# Patient Record
Sex: FEMALE | Race: WHITE | Employment: OTHER | URBAN - METROPOLITAN AREA
[De-identification: names, ages, dates, MRNs, and addresses within clinical notes are randomized per-mention and may not be internally consistent; named-entity substitution may affect disease eponyms.]

---

## 2017-01-02 ENCOUNTER — APPOINTMENT (OUTPATIENT)
Dept: PHYSICAL THERAPY | Facility: CLINIC | Age: 77
DRG: 190 | End: 2017-01-02
Attending: INTERNAL MEDICINE
Payer: COMMERCIAL

## 2017-01-02 ENCOUNTER — CARE COORDINATION (OUTPATIENT)
Dept: GERIATRIC MEDICINE | Facility: CLINIC | Age: 77
End: 2017-01-02

## 2017-01-02 DIAGNOSIS — Z76.89 HEALTH CARE HOME: Primary | ICD-10-CM

## 2017-01-02 NOTE — PROGRESS NOTES
Rec'd notice of ED/observation status, 12/31/16.   EPIC notes reviewed.  Left vm with Candis BARTHOLOMEW [facility] to introduce myself and share community support plan  E-mail to Windy BARTHOLOMEW [facility] to inform.   left with [employer].  CM to follow.  Yeni Savage RN, BC  Supervisor [facility]   [phone] (Fax)

## 2017-01-02 NOTE — PROGRESS NOTES
"Rec'd tele call from Windy BARTHOLOMEW [facility], reviewed client's admission. Windy state that she did complete home visits on 12/29 & 12/30/2016.    Rec'd tele call from client stating that she is in the hospital. Client shared that her hmkr contacted her that her  passed away, \"I really need someone to help me, I have dirty clothes to be washed, dishes to be cleaned.\"  Explained that CM will contact [facility] to inquire on a substitute, but cannot guarantee.  Client states that she is hoping that she can go to transitional care, \"will insurance pay for this?\"  Explained that TCU will be covered if she meets the criteria, that she will be assessed by the TCU facility -client states that she is using 02 off and on. Explained that CM will cont to follow and assist as needed.  Left vm with [facility] re services, request a call back  Left f/u vm with Windy, billing at [facility], requesting a call back re [facility] billing for services.   Rec'd tele call form client's daughter who inquired if CM was aware that her mother is in the hospital. Explained that CM did and that her mother also contacted me.  Shared the above info. Windy inquired if her mother's COPD has worsened since a year ago. Enc'd Windy to be in contact with Dr. Montana's office to inquire on Pulmonary Function results. Windy states that she has spoken with hospital nurses, enc'd use of a small dose of Lorazepam, unsure of efficacy. Explained that CM will cont to follow her mother, enc'd her to call this CM as needed.   Yeni Savage RN, BC  Supervisor [facility]   [phone] (Fax)    "

## 2017-01-03 ENCOUNTER — APPOINTMENT (OUTPATIENT)
Dept: SPEECH THERAPY | Facility: CLINIC | Age: 77
DRG: 190 | End: 2017-01-03
Attending: HOSPITALIST
Payer: COMMERCIAL

## 2017-01-03 ENCOUNTER — APPOINTMENT (OUTPATIENT)
Dept: CT IMAGING | Facility: CLINIC | Age: 77
DRG: 190 | End: 2017-01-03
Attending: HOSPITALIST
Payer: COMMERCIAL

## 2017-01-03 ENCOUNTER — APPOINTMENT (OUTPATIENT)
Dept: OCCUPATIONAL THERAPY | Facility: CLINIC | Age: 77
DRG: 190 | End: 2017-01-03
Attending: INTERNAL MEDICINE
Payer: COMMERCIAL

## 2017-01-03 ENCOUNTER — APPOINTMENT (OUTPATIENT)
Dept: GENERAL RADIOLOGY | Facility: CLINIC | Age: 77
DRG: 190 | End: 2017-01-03
Attending: HOSPITALIST
Payer: COMMERCIAL

## 2017-01-03 ENCOUNTER — TELEPHONE (OUTPATIENT)
Dept: INTERNAL MEDICINE | Facility: CLINIC | Age: 77
End: 2017-01-03

## 2017-01-03 ENCOUNTER — APPOINTMENT (OUTPATIENT)
Dept: PHYSICAL THERAPY | Facility: CLINIC | Age: 77
DRG: 190 | End: 2017-01-03
Attending: INTERNAL MEDICINE
Payer: COMMERCIAL

## 2017-01-03 ENCOUNTER — CARE COORDINATION (OUTPATIENT)
Dept: GERIATRIC MEDICINE | Facility: CLINIC | Age: 77
End: 2017-01-03

## 2017-01-03 PROCEDURE — 71260 CT THORAX DX C+: CPT

## 2017-01-03 PROCEDURE — 71020 XR CHEST 2 VW: CPT

## 2017-01-03 PROCEDURE — 70450 CT HEAD/BRAIN W/O DYE: CPT

## 2017-01-03 NOTE — PROGRESS NOTES
"Rec'd tele call from client inquiring on what the plan is for her hmkr. Explained the info below, client stated that she thought that she was told that her homemakers' spouse passed away and she would not be able to receive assistance for sometime.   Client states that she might have to pay someone to do her dishes, \"the apt management will get upset because the apt is such a mess.\"  Had discussion with client about her feelings of anxiety/worry. Client acknowledged.  Client states that she has requested to speak with a SW.   Offered assistance with a behavioral health referral, client agreeable. Explained that CM will cont to follow, unsure of d/c plans at this time.   Yeni Savage RN, BC  Supervisor [facility]   [phone] (Fax)    "

## 2017-01-03 NOTE — PROGRESS NOTES
EPIC notes reviewed, inpatient status.   Rec'd tele call from Sabi Marin, stating client transferred to the 5th floor, aware that CM is following client.   Provided info from 1/2/17-[facility] status, daughter Windy's concerns, client requesting TCU.   CM to follow.  Yeni Savage RN, BC  Supervisor [facility]   [phone] (Fax)

## 2017-01-03 NOTE — PROGRESS NOTES
Rec'd tele call from [facility], stating that client's caregiver will be available next week (spouse did not pass away-he is currently in the hospital). CM to follow  Yeni Savage RN, BC  Supervisor [facility]   [phone] (Fax)

## 2017-01-04 ENCOUNTER — CARE COORDINATION (OUTPATIENT)
Dept: GERIATRIC MEDICINE | Facility: CLINIC | Age: 77
End: 2017-01-04

## 2017-01-04 ENCOUNTER — APPOINTMENT (OUTPATIENT)
Dept: PHYSICAL THERAPY | Facility: CLINIC | Age: 77
DRG: 190 | End: 2017-01-04
Payer: COMMERCIAL

## 2017-01-04 ENCOUNTER — APPOINTMENT (OUTPATIENT)
Dept: OCCUPATIONAL THERAPY | Facility: CLINIC | Age: 77
DRG: 190 | End: 2017-01-04
Payer: COMMERCIAL

## 2017-01-05 ENCOUNTER — APPOINTMENT (OUTPATIENT)
Dept: PHYSICAL THERAPY | Facility: CLINIC | Age: 77
DRG: 190 | End: 2017-01-05
Payer: COMMERCIAL

## 2017-01-05 ENCOUNTER — APPOINTMENT (OUTPATIENT)
Dept: SPEECH THERAPY | Facility: CLINIC | Age: 77
DRG: 190 | End: 2017-01-05
Payer: COMMERCIAL

## 2017-01-05 DIAGNOSIS — Z53.9 DIAGNOSIS NOT YET DEFINED: Primary | ICD-10-CM

## 2017-01-05 PROCEDURE — G0180 MD CERTIFICATION HHA PATIENT: HCPCS | Performed by: INTERNAL MEDICINE

## 2017-01-05 NOTE — PROGRESS NOTES
1/4/17 Rec'd tele call from Sabi Marin to report that she has placed referrals to [facility] for TCU.  Provided info on homemaking, would be able to provide services next week.  CM to follow.  Yeni Savage RN, BC  Supervisor [facility]   [phone] (Fax)

## 2017-01-06 ENCOUNTER — CARE COORDINATION (OUTPATIENT)
Dept: CARE COORDINATION | Facility: CLINIC | Age: 77
End: 2017-01-06

## 2017-01-06 NOTE — PROGRESS NOTES
Clinic Care Coordination Contact  Care Team Conversations    Received a Care Transition referral.  Reviewed chart and pt was transferred to [facility] for TCU,  Will contact [facility] and request notification once pt is ready to discharge home and provide contact information.  Will continue to partner with TCU to provide care coordination for pt.    Zachary Sullivan RN/CC  Care Coordinator [facility]  [phone]

## 2017-01-07 ENCOUNTER — CARE COORDINATION (OUTPATIENT)
Dept: GERIATRIC MEDICINE | Facility: CLINIC | Age: 77
End: 2017-01-07

## 2017-01-07 NOTE — PROGRESS NOTES
Noted in EPIC, client d/c from [facility] on  1/6/17 and admitted to [facility]  TCU.  Left vm with Codie RINALDI [phone].  Message sent to Debi [facility] to notify and share information on client's community support plan.  Call placed to [facility] to inform.  Client's room # 105, [phone]  Yeni Savage RN, BC  Supervisor [facility]   [phone] (Fax)

## 2017-01-09 ENCOUNTER — NURSING HOME VISIT (OUTPATIENT)
Dept: GERIATRICS | Facility: CLINIC | Age: 77
End: 2017-01-09
Payer: COMMERCIAL

## 2017-01-09 VITALS
BODY MASS INDEX: 25.69 KG/M2 | HEIGHT: 63 IN | HEART RATE: 94 BPM | RESPIRATION RATE: 16 BRPM | WEIGHT: 145 LBS | OXYGEN SATURATION: 93 % | TEMPERATURE: 98 F | DIASTOLIC BLOOD PRESSURE: 85 MMHG | SYSTOLIC BLOOD PRESSURE: 134 MMHG

## 2017-01-09 DIAGNOSIS — E86.0 DEHYDRATION: Primary | ICD-10-CM

## 2017-01-09 DIAGNOSIS — R53.81 PHYSICAL DECONDITIONING: ICD-10-CM

## 2017-01-09 DIAGNOSIS — I10 UNCONTROLLED HYPERTENSION: ICD-10-CM

## 2017-01-09 DIAGNOSIS — I49.1 PAC (PREMATURE ATRIAL CONTRACTION): ICD-10-CM

## 2017-01-09 DIAGNOSIS — J44.1 COPD EXACERBATION (H): ICD-10-CM

## 2017-01-09 DIAGNOSIS — N17.9 AKI (ACUTE KIDNEY INJURY) (H): ICD-10-CM

## 2017-01-09 DIAGNOSIS — I47.10 PSVT (PAROXYSMAL SUPRAVENTRICULAR TACHYCARDIA) (H): ICD-10-CM

## 2017-01-09 PROCEDURE — 99207 ZZC CDG-CORRECTLY CODED, REVIEWED AND AGREE: CPT | Performed by: NURSE PRACTITIONER

## 2017-01-09 PROCEDURE — 99310 SBSQ NF CARE HIGH MDM 45: CPT | Performed by: NURSE PRACTITIONER

## 2017-01-09 NOTE — PROGRESS NOTES
"[facility]  PRIMARY CARE PROVIDER AND CLINIC:  [facility] [address] [facility] *  Chief Complaint   Patient presents with     Hospital F/U       HPI:    Ana Luisa Lee is a 76 year old  (1940),admitted to the [facility] from Hospital  [facility].  Hospital stay 12/31/16 through 1/6/17.  Admitted to this facility for  rehab, medical management and nursing care. Current issues are:       Per Hospital Course:     \"76 year old female with longstanding hx of COPD, and has had 2 recent admissions in the last month for COPD exacerbation who was admitted on 12/31/2016 with episode of hypotension, lightheadedness and dizziness and acute renal insufficiency and positive orthostasis c/w hypovolemia due to over diuresis. Sxs are improved with IV hydration however,  she has become more SOB with activity while on observation and hypoxic (85%) on RA and tachycardic and requiring supplemental O2, which she has never required O2 in the past.     Of note this is her 3rd admission in 1 month for COPD exacerbation and now with dehydration. CT chest -ve for acute pathology. She was noted to be quite deconditioned. Evaluated by PT OT who recommended TCU dc    Pt had significant issues with uncontrolled HTN mostly BP running SBP>190, started on clonidine along with PTA losartan with improvement. Also BP improved with tapering off steroids    She continued to endorse SOB with exertion.  TTE in 2015 showed diastolic dysfunction. She was somewhat improved prior to dc. May consider lexiscan as outpatient per PCP discretion/      1.Acute COPD exacerbation with hypoxia: 3rd episode in 30 days. Off oxygen now but conitnued to endorse SOB and weakness. CT chest -ve for acute etiology. Improved initially with  IV solumedrol at 40 mg q8h->switched to PO prednisone, dc on prolonged taper as below    --cont scheduled " "duonebs, finish 7 days of levofloxacin    --PT/OT at TCU  --needs pulm f/u  --consider lexiscan per PCP discretion     Acute renal injury secondary to overdiuresis and combination of Lasix and ARB: now normal . Resumed lasix and losartan     Lactic elevation, sinus tachycardia: sepsis protocol triggered, due to tachycardia and leukocytosis. Likely partly d/t nebs but treated for presumed bronchitis with levofloxacin      Hypertension: continue losartan. Has been having issues with uncontrolled HTN overnight, suspect 2/2 steroids. Improved with clonidine and tapering steroids. monitor for hypotension when comes off steroids      Steroid-induced hyperglycemia: covered with sliding scale    Episode of blurry vision on the L: intermittent issue over yrs, worse on 1/3. CT head -ve for CVA. Possibly d/t over correction of BP w/ drastic drop. Improved    One out of 2 positive blood culture for gram-positive cocci in clusters, likely contaminant, final culture growing staph epidermidis\"     ---------------------    Alert, calm, NAD. Reports breathing a seems to be getting better. Denies SOB, chest pain. Reports some GRACIA but resolves with rest. States occasional dizziness with position changes- resolves once up a bit. Reminded patient to take position changes slowly and ask for help. Reports appetite good, sleeping okay. States is constipated with no BM in last 3 days. States does not want suppository and requests Miralax stating this usually works well. Voiding without issue.     CODE STATUS/ADVANCE DIRECTIVES DISCUSSION:   CPR/Full code   Patient's living condition: lives alone in an independent living facility     ALLERGIES:Hydralazine; Penicillin g; Meloxicam; Metoprolol; and Norvasc  PAST MEDICAL HISTORY:  has a past medical history of Asthma, persistent; HTN, goal below 140/90; Hyperlipidemia LDL goal < 130; Osteoporosis; Esophageal stricture (2007); Left shoulder pain (Nov 2011); Left foot pain (Nov 2011); Foot " deformity; Chronic intermittent steroid use; Hypothyroidism (Dec 2011); Anemia (Dec 2011); Skin cyst (Dec 2011); PVC (premature ventricular contraction) (May 2012); SOB (shortness of breath) on exertion (May 2012); Medication side effects; Hypothyroidism; Hyperlipidemia; SVT (supraventricular tachycardia) (H); Arthritis of hand; Asthma, persistent; COPD exacerbation (H) (10/25/2013); and Paroxysmal supraventricular tachycardia (H). She also has no past medical history of Coronary artery disease, Congestive heart failure, unspecified, Unspecified cerebral artery occlusion with cerebral infarction, Diabetes mellitus (H), Malignant neoplasm (H), or Blood transfusion.  PAST SURGICAL HISTORY:  has past surgical history that includes GYN surgery (1980); Bunionectomy lapidus with tarsal metatarsal (TMT) fusion (1990's); Tonsillectomy; hysterectomy, pap no longer indicated; Hysterectomy total abdominal; and EP study, modified (7/2012).  FAMILY HISTORY: family history includes CEREBROVASCULAR DISEASE in her mother; Family History Negative in her brother, daughter, father, sister, and son; Hypertension in her mother; Unknown/Adopted in her maternal grandfather, maternal grandmother, paternal grandfather, and paternal grandmother. There is no history of Asthma, C.A.D., DIABETES, or CANCER.  SOCIAL HISTORY:  reports that she quit smoking about 37 years ago. She has never used smokeless tobacco. She reports that she does not drink alcohol or use illicit drugs.    Post Discharge Medication Reconciliation Status: discharge medications reconciled and changed, per note/orders (see AVS).  Current Outpatient Prescriptions   Medication Sig Dispense Refill     melatonin 1 MG TABS tablet Take 5 tablets (5 mg) by mouth At Bedtime       LORazepam (ATIVAN) 0.5 MG tablet Take 0.5 tablets (0.25 mg) by mouth daily as needed for anxiety 20 tablet 0     albuterol (ALBUTEROL) 108 (90 BASE) MCG/ACT Inhaler Inhale 2 puffs into the lungs every 6  hours as needed       levalbuterol (XOPENEX) 1.25 MG/3ML neb solution Take 3 mLs (1.25 mg) by nebulization every 4 hours as needed for shortness of breath / dyspnea or wheezing 1584 mL 1     budesonide (PULMICORT FLEXHALER) 180 MCG/ACT inhaler Inhale 1 puff into the lungs 2 times daily       montelukast (SINGULAIR) 10 MG tablet Take 1 tablet (10 mg) by mouth At Bedtime 30 tablet      salmeterol (SEREVENT) 50 MCG/DOSE diskus inhaler Inhale 1 puff into the lungs 2 times daily       tiotropium (SPIRIVA) 18 MCG capsule Inhale 1 capsule (18 mcg) into the lungs daily 30 capsule      ondansetron (ZOFRAN ODT) 4 MG ODT tab Take 1-2 tablets (4-8 mg) by mouth every 8 hours as needed for nausea 20 tablet 3     gemfibrozil (LOPID) 600 MG tablet Take 1 tablet (600 mg) by mouth daily 90 tablet 0     cloNIDine (CATAPRES) 0.1 MG tablet Take 1 tablet (0.1 mg) by mouth 2 times daily 30 tablet      losartan (COZAAR) 50 MG tablet Take 1 tablet (50 mg) by mouth daily 30 tablet      predniSONE (DELTASONE) 20 MG tablet 40 mg twice a day x3 days then 30 mg daily x3 days then 20 mg daily x3 days then resume daily 10 mg daily x3 days 38 tablet 0     dextromethorphan-guaiFENesin (MUCINEX DM)  MG per 12 hr tablet Take 1 tablet by mouth 2 times daily as needed for cough       guaiFENesin-dextromethorphan (ROBITUSSIN DM) 100-10 MG/5ML syrup Take 5 mLs by mouth every 4 hours as needed for cough 560 mL      Cyanocobalamin (B-12) 1000 MCG TBCR Take 1,000 mcg by mouth daily 100 tablet 1     ferrous sulfate (IRON) 325 (65 FE) MG tablet Take 1 tablet (325 mg) by mouth daily (with breakfast) 100 tablet      calcium 600 MG tablet Take 1 tablet (600 mg) by mouth daily 60 tablet      levothyroxine (SYNTHROID/LEVOTHROID) 25 MCG tablet Take 1 tablet (25 mcg) by mouth daily 90 tablet 1     LANsoprazole (PREVACID) 30 MG CR capsule Take 1 capsule (30 mg) by mouth daily 90 capsule 3     cholecalciferol (VITAMIN D) 1000 UNIT tablet Take 1 tablet (1,000  "Units) by mouth daily 30 tablet      levofloxacin (LEVAQUIN) 500 MG tablet Take 1 tablet (500 mg) by mouth daily for 4 days 4 tablet 0     Multiple Vitamins-Minerals (MULTIVITAMIN OR) Take 1 tablet by mouth daily       ASPIRIN NOT PRESCRIBED, INTENTIONAL, 1 each continuous prn. Antiplatelet medication not prescribed intentionally due to Use of NSAIDS 0 each 0     order for DME Equipment being ordered: Walker Wheels (), Walker () and Other: Four Wheeled Walker  Treatment Diagnosis: Impaired gait stability and activity tolerance 1 each 0     order for DME Equipment being ordered: Nebulizer machine, cup, and tubing 1 each 0     order for DME Equipment being ordered: blood pressure machine 1 Device 0     order for DME Overnight pulse oximetry 1 Device 0       ROS:  4 point ROS including Respiratory, CV, GI and , other than that noted in the HPI,  is negative    Exam:  /85 mmHg  Pulse 94  Temp(Src) 98  F (36.7  C)  Resp 16  Ht 5' 3\" (1.6 m)  Wt 145 lb (65.772 kg)  BMI 25.69 kg/m2  SpO2 93%    GENERAL APPEARANCE: Alert, in no distress   ENT: Mouth and posterior oropharynx normal, moist mucous membranes   EYES: EOM, conjunctivae, lids, pupils and irises normal   NECK: No adenopathy,masses or thyromegaly   RESP: respiratory effort and palpation of chest normal, Lungs scattered wheezing and crackles left base  CV: Palpation and auscultation of heart done , regular rate and rhythm, no murmur, rub, or gallop, peripheral edema - puffy 1+ LE  ABDOMEN: normal bowel sounds, soft, nontender, no hepatosplenomegaly or other masses   : palpation of bladder WNL   M/S: Gait and station normal   Digits and nails normal - [place]  SKIN: Inspection of skin and subcutaneous tissue baseline, Palpation of skin and subcutaneous tissue baseline,   NEURO: Cranial nerves 2-12 are normal tested and grossly at patient's baseline, Examination of sensation by touch normal   PSYCH: oriented X 3, affect and mood normal "         Blood Pressures: 132/77 mmHg-159/99 mmHg    Lab/Diagnostic data:     WBC   Date Value Ref Range Status   01/06/2017 9.3 4.0 - 11.0 10e9/L Final     HEMOGLOBIN   Date Value Ref Range Status   01/06/2017 10.8* 11.7 - 15.7 g/dL Final   01/05/2017 10.1* 11.7 - 15.7 g/dL Final     Last Basic Metabolic Panel:  NA      136   1/6/2017   POTASSIUM      4.1   1/6/2017  DIANA      9.1   1/6/2017  CO2       22   1/6/2017  BUN       18   1/6/2017  CR     0.96   1/6/2017  GLC       81   1/6/2017    WBC      9.3   1/6/2017  RBC     3.68   1/6/2017  HGB     10.8   1/6/2017  HCT     33.7   1/6/2017  MCV       92   1/6/2017  MCH     29.3   1/6/2017  MCHC     32.0   1/6/2017  RDW     15.6   1/6/2017  PLT      221   1/6/2017    ASSESSMENT/PLAN:  Dehydration  - resolved with IVF inpatient  - observe intake/weights/clinically    ALEKSANDRA (acute kidney injury) (H)  2/2 above  Resolved  Recheck BMP    COPD exacerbation (H)  - 3rd exacerbation in one month  - continue prednisone taper- done 1/18  - complete Levaquin- 1/10  - continue on albuterol nebs, Pulmicort, Singulaire, Serevent, Spiriva, albuterol, guaifenesin and Spiriva  - wean O2 off  - follow clinically  - recheck BMP/CBC  - f/w pulmonology as scheduled 1/18    Uncontrolled hypertension  - clonidine added inpatient  - continue Losartan  - VS per unit protocol    PSVT (paroxysmal supraventricular tachycardia) (H)  PAC (premature atrial contraction)   noted  - rate currently stable as above  - VS per unit protocol  - observe    Anxiety  Insomnia  - continue melatonin, prn lorazepam as above    Physical deconditioning  - 2/2 above  - lives I/L   - PT/OT  - ongoing discharge planning, SW follow and care conferences per unit protocol         Orders:      Information reviewed:  Medications, vital signs, orders, nursing notes, problem list, hospital information. Total time spent with patient visit was 45 min including patient visit and review of past records. Greater than 50% of total  time spent with counseling and coordinating care.    Electronically signed by:  IVÁN Bowden CNP

## 2017-01-10 ENCOUNTER — CARE COORDINATION (OUTPATIENT)
Dept: GERIATRIC MEDICINE | Facility: CLINIC | Age: 77
End: 2017-01-10

## 2017-01-10 NOTE — PROGRESS NOTES
Received a voice mail message from the [facility], Codie ([phone]) at [facility]. In her message she was calling to inquire about transportation options for client.  Called Codie back and left a message updating her on the [employer] and encouraged the [facility] to assist with setting up the rides or to contact CMS if need be.  Renata Ibanez, [facility]  [phone]

## 2017-01-11 ENCOUNTER — HOSPITAL LABORATORY (OUTPATIENT)
Dept: OTHER | Facility: CLINIC | Age: 77
End: 2017-01-11

## 2017-01-11 ENCOUNTER — NURSING HOME VISIT (OUTPATIENT)
Dept: GERIATRICS | Facility: CLINIC | Age: 77
End: 2017-01-11
Payer: COMMERCIAL

## 2017-01-11 VITALS
OXYGEN SATURATION: 95 % | HEIGHT: 63 IN | HEART RATE: 103 BPM | RESPIRATION RATE: 16 BRPM | SYSTOLIC BLOOD PRESSURE: 110 MMHG | TEMPERATURE: 98.3 F | DIASTOLIC BLOOD PRESSURE: 75 MMHG | BODY MASS INDEX: 27.14 KG/M2 | WEIGHT: 153.2 LBS

## 2017-01-11 DIAGNOSIS — I47.10 SVT (SUPRAVENTRICULAR TACHYCARDIA) (H): ICD-10-CM

## 2017-01-11 DIAGNOSIS — I10 ESSENTIAL HYPERTENSION WITH GOAL BLOOD PRESSURE LESS THAN 140/90: Chronic | ICD-10-CM

## 2017-01-11 DIAGNOSIS — E03.9 HYPOTHYROIDISM, UNSPECIFIED TYPE: Chronic | ICD-10-CM

## 2017-01-11 DIAGNOSIS — R53.81 PHYSICAL DECONDITIONING: ICD-10-CM

## 2017-01-11 DIAGNOSIS — E78.5 HYPERLIPIDEMIA WITH TARGET LDL LESS THAN 130: ICD-10-CM

## 2017-01-11 DIAGNOSIS — J44.1 COPD EXACERBATION (H): Primary | ICD-10-CM

## 2017-01-11 LAB
ANION GAP SERPL CALCULATED.3IONS-SCNC: 9 MMOL/L (ref 3–14)
BUN SERPL-MCNC: 21 MG/DL (ref 7–30)
CALCIUM SERPL-MCNC: 9.4 MG/DL (ref 8.5–10.1)
CHLORIDE SERPL-SCNC: 99 MMOL/L (ref 94–109)
CO2 SERPL-SCNC: 25 MMOL/L (ref 20–32)
CREAT SERPL-MCNC: 1.15 MG/DL (ref 0.52–1.04)
ERYTHROCYTE [DISTWIDTH] IN BLOOD BY AUTOMATED COUNT: 16.1 % (ref 10–15)
GFR SERPL CREATININE-BSD FRML MDRD: 46 ML/MIN/1.7M2
GLUCOSE SERPL-MCNC: 66 MG/DL (ref 70–99)
HCT VFR BLD AUTO: 34.5 % (ref 35–47)
HGB BLD-MCNC: 11.5 G/DL (ref 11.7–15.7)
MCH RBC QN AUTO: 29.9 PG (ref 26.5–33)
MCHC RBC AUTO-ENTMCNC: 33.3 G/DL (ref 31.5–36.5)
MCV RBC AUTO: 90 FL (ref 78–100)
PLATELET # BLD AUTO: 236 10E9/L (ref 150–450)
POTASSIUM SERPL-SCNC: 4.2 MMOL/L (ref 3.4–5.3)
RBC # BLD AUTO: 3.84 10E12/L (ref 3.8–5.2)
SODIUM SERPL-SCNC: 133 MMOL/L (ref 133–144)
WBC # BLD AUTO: 9.5 10E9/L (ref 4–11)

## 2017-01-11 PROCEDURE — 99306 1ST NF CARE HIGH MDM 50: CPT | Performed by: INTERNAL MEDICINE

## 2017-01-11 PROCEDURE — 99207 ZZC CDG-CORRECTLY CODED, REVIEWED AND AGREE: CPT | Performed by: INTERNAL MEDICINE

## 2017-01-11 RX ORDER — POLYETHYLENE GLYCOL 3350 17 G/17G
17 POWDER, FOR SOLUTION ORAL DAILY PRN
Status: ON HOLD | COMMUNITY
End: 2018-07-22

## 2017-01-11 NOTE — PROGRESS NOTES
PRIMARY CARE PROVIDER AND CLINIC RESPONSIBLE:  Jenny Hamilton, [facility] [address] [facility] *        ADMISSION HISTORY AND PHYSICAL EXAMINATION     Chief Complaint   Patient presents with     Hospital F/U         HISTORY OF PRESENT ILLNESS:  76 year old female, (1940), admitted to the [facility] TCU for continuation of medical care and rehab.    Pt admitted [facility] 12/31 to 1/6 for COPD exacerbation and dehydration.    Pt states breathing is getting better and she is getting stronger. Couch is clear. No fevers/chills/chest pain.    Patient is seen and examined by me with Billie Berumen CNP. Please see Billie Berumen's admit noted dated 1/9 for details of admission, past medical history, family history, allergies, medication list, social history and other details pertinent with this admission. Hospital admission and dc summary reviewed.      Past Medical History   Diagnosis Date     Asthma, persistent      f/U dr Brock at [facility]     HTN, goal below 140/90      Hyperlipidemia LDL goal < 130      Osteoporosis      Esophageal stricture 2007     s/p dilation     Left shoulder pain Nov 2011     Left foot pain Nov 2011     Foot deformity      Left     Chronic intermittent steroid use      for asthma     Hypothyroidism Dec 2011     Anemia Dec 2011     Skin cyst Dec 2011     L thumb     PVC (premature ventricular contraction) May 2012     SOB (shortness of breath) on exertion May 2012     Medication side effects      Hypothyroidism      Hyperlipidemia      SVT (supraventricular tachycardia) (H)      Arthritis of hand      Asthma, persistent      COPD exacerbation (H) 10/25/2013     Paroxysmal supraventricular tachycardia (H)        Past Surgical History   Procedure Laterality Date     Gyn surgery  1980     Bunionectomy lapidus with tarsal metatarsal (tmt) fusion  1990's     Tonsillectomy       Hysterectomy, pap no longer indicated       Hysterectomy total  abdominal       Ep study, modified  7/2012     SVT not induced, PVC's       Current Outpatient Prescriptions   Medication Sig     polyethylene glycol (MIRALAX/GLYCOLAX) Packet Take 17 g by mouth daily as needed for constipation     melatonin 1 MG TABS tablet Take 5 tablets (5 mg) by mouth At Bedtime     LORazepam (ATIVAN) 0.5 MG tablet Take 0.5 tablets (0.25 mg) by mouth daily as needed for anxiety     albuterol (ALBUTEROL) 108 (90 BASE) MCG/ACT Inhaler Inhale 2 puffs into the lungs every 6 hours as needed     levalbuterol (XOPENEX) 1.25 MG/3ML neb solution Take 3 mLs (1.25 mg) by nebulization every 4 hours as needed for shortness of breath / dyspnea or wheezing     budesonide (PULMICORT FLEXHALER) 180 MCG/ACT inhaler Inhale 1 puff into the lungs 2 times daily     montelukast (SINGULAIR) 10 MG tablet Take 1 tablet (10 mg) by mouth At Bedtime     salmeterol (SEREVENT) 50 MCG/DOSE diskus inhaler Inhale 1 puff into the lungs 2 times daily     tiotropium (SPIRIVA) 18 MCG capsule Inhale 1 capsule (18 mcg) into the lungs daily     ondansetron (ZOFRAN ODT) 4 MG ODT tab Take 1-2 tablets (4-8 mg) by mouth every 8 hours as needed for nausea     gemfibrozil (LOPID) 600 MG tablet Take 1 tablet (600 mg) by mouth daily     cloNIDine (CATAPRES) 0.1 MG tablet Take 1 tablet (0.1 mg) by mouth 2 times daily     losartan (COZAAR) 50 MG tablet Take 1 tablet (50 mg) by mouth daily     predniSONE (DELTASONE) 20 MG tablet 40 mg twice a day x3 days then 30 mg daily x3 days then 20 mg daily x3 days then resume daily 10 mg daily x3 days     dextromethorphan-guaiFENesin (MUCINEX DM)  MG per 12 hr tablet Take 1 tablet by mouth 2 times daily as needed for cough     guaiFENesin-dextromethorphan (ROBITUSSIN DM) 100-10 MG/5ML syrup Take 5 mLs by mouth every 4 hours as needed for cough     Cyanocobalamin (B-12) 1000 MCG TBCR Take 1,000 mcg by mouth daily     ferrous sulfate (IRON) 325 (65 FE) MG tablet Take 1 tablet (325 mg) by mouth daily  (with breakfast)     calcium 600 MG tablet Take 1 tablet (600 mg) by mouth daily     levothyroxine (SYNTHROID/LEVOTHROID) 25 MCG tablet Take 1 tablet (25 mcg) by mouth daily     LANsoprazole (PREVACID) 30 MG CR capsule Take 1 capsule (30 mg) by mouth daily     cholecalciferol (VITAMIN D) 1000 UNIT tablet Take 1 tablet (1,000 Units) by mouth daily     Multiple Vitamins-Minerals (MULTIVITAMIN OR) Take 1 tablet by mouth daily     ASPIRIN NOT PRESCRIBED, INTENTIONAL, 1 each continuous prn. Antiplatelet medication not prescribed intentionally due to Use of NSAIDS     order for DME Equipment being ordered: Walker Wheels (), Walker () and Other: Four Wheeled Walker  Treatment Diagnosis: Impaired gait stability and activity tolerance     order for DME Equipment being ordered: Nebulizer machine, cup, and tubing     order for DME Equipment being ordered: blood pressure machine     order for DME Overnight pulse oximetry     No current facility-administered medications for this visit.       Allergies   Allergen Reactions     Hydralazine Anxiety     Penicillin G Hives     Meloxicam      dizziness       Metoprolol      ? Skin rash on the back     Norvasc [Amlodipine Besylate] Hives       Social History     Social History     Marital Status:      Spouse Name: N/A     Number of Children: N/A     Years of Education: N/A     Occupational History     Not on file.     Social History Main Topics     Smoking status: Former Smoker -- 1.00 packs/day for 15 years     Quit date: 01/01/1980     Smokeless tobacco: Never Used     Alcohol Use: No      Comment: Occasional drink     Drug Use: No     Sexual Activity: No     Other Topics Concern     Caffeine Concern No     1 cup of tea in the morning     Sleep Concern No     Stress Concern No     Weight Concern No     Special Diet No     Exercise No     some walking     Seat Belt Yes     Social History Narrative          Information reviewed:  Medications, vital signs, orders,  "nursing notes, problem list, hospital information.     ROS: All 10 point review of system completed, those pertinent positive, please see H&P, the remaining ROS is negative.    /75 mmHg  Pulse 103  Temp(Src) 98.3  F (36.8  C)  Resp 16  Ht 5' 3\" (1.6 m)  Wt 153 lb 3.2 oz (69.491 kg)  BMI 27.14 kg/m2  SpO2 95%    PHYSICAL EXAMINATION:   GENERAL:  No acute distress. Sitting in bed.  SKIN:  Dry and warm.  There is no rash, lesions, ulcers or juandice at area of skin examined.  HEENT:  Head without trauma.  Pupils round, reactive. Exam of conjunctiva and lids are normal. Sclera without icterus. There is no oral thrush.  NECK:  Supple.  There is no cervical adenopathy, no thyromegaly. No jugular venous distension.  CHEST: No reproducible chest tenderness.   LUNGS:  Normal respiratory effort. Lungs are Clear on ascultation. No wheezing.  HEART:  Regular rate and rhythm.  2/6 systolic murmur,no gallops or rubs auscultated.  ABDOMEN:  Soft, bowel sounds positive.  There is no tenderness or guarding.   EXTREMITIES: Trace edema. Normal range of motion. No calf swelling or tenderness.  NEUROLOGIC:  Alert and oriented x3.  There is no focal deficit appreciated.  PSYCH: Recent and remote memory is normal. Mood and affect are normal.    Lab/Diagnostic data:  Reviewed    WBC      9.5   1/11/2017  RBC     3.84   1/11/2017  HGB     11.5   1/11/2017  HCT     34.5   1/11/2017  MCV       90   1/11/2017  MCH     29.9   1/11/2017  MCHC     33.3   1/11/2017  RDW     16.1   1/11/2017  PLT      236   1/11/2017    Last Basic Metabolic Panel:  NA      133   1/11/2017   POTASSIUM      4.2   1/11/2017  CHLORIDE       99   1/11/2017  DIANA      9.4   1/11/2017  CO2       25   1/11/2017  BUN       21   1/11/2017  CR     1.15   1/11/2017  GLC       66   1/11/2017    ASSESSMENT / PLAN:     COPD exacerbation (H)  - Severe COPD at baseline with FEV1 < 1L per PFT's 5/2016  - On pulmicort, serevent, singulair, spiriva, xopenex and prednisone " taper.  - Off supplemental oxygen.    Dehydration and SRF.  - Resolved with IVF's.    H/o SVT (supraventricular tachycardia) (H)  - Monitor.    Hyperlipidemia with target LDL less than 130  - On lopid.    Essential hypertension with goal blood pressure less than 140/90  - On clonidine and losartan.    Hypothyroidism, unspecified type  - On synthroid.    Physical deconditioning  -Plan: PT/OT, fall precautions. Care conference with patient and family for the progress of rehab and disposition issues will be discussed as planned. Rehab evaluation and other evaluations including CPT are at rehab logs, to be reviewed separately.  Fall risk assessment as well as cognitive evaluation will be formed during rehab stay if indicated.    Bacteremia.  - 1/2 staph epidermidis.  - Likely contaminant.    Other problems with same care. Primary care doctor and other specialists to address those chronic problems in next clinic appointment to be scheduled upon discharge from the TCU.    Total time spent with patient visit was 50 min including patient visit, review of past records, 1/2 time on patients counseling and coordinating care.        Latesha Cao MD

## 2017-01-12 ENCOUNTER — NURSING HOME VISIT (OUTPATIENT)
Dept: GERIATRICS | Facility: CLINIC | Age: 77
End: 2017-01-12
Payer: COMMERCIAL

## 2017-01-12 VITALS
SYSTOLIC BLOOD PRESSURE: 107 MMHG | RESPIRATION RATE: 20 BRPM | HEART RATE: 94 BPM | OXYGEN SATURATION: 96 % | DIASTOLIC BLOOD PRESSURE: 71 MMHG | TEMPERATURE: 98.1 F | BODY MASS INDEX: 27.43 KG/M2 | WEIGHT: 154.8 LBS

## 2017-01-12 DIAGNOSIS — G47.00 INSOMNIA, UNSPECIFIED TYPE: ICD-10-CM

## 2017-01-12 DIAGNOSIS — F41.9 ANXIETY: ICD-10-CM

## 2017-01-12 DIAGNOSIS — N17.9 AKI (ACUTE KIDNEY INJURY) (H): ICD-10-CM

## 2017-01-12 DIAGNOSIS — R53.81 PHYSICAL DECONDITIONING: ICD-10-CM

## 2017-01-12 DIAGNOSIS — I10 UNCONTROLLED HYPERTENSION: ICD-10-CM

## 2017-01-12 DIAGNOSIS — E86.0 DEHYDRATION: ICD-10-CM

## 2017-01-12 DIAGNOSIS — I47.10 PAROXYSMAL SUPRAVENTRICULAR TACHYCARDIA (H): ICD-10-CM

## 2017-01-12 DIAGNOSIS — J44.1 COPD EXACERBATION (H): ICD-10-CM

## 2017-01-12 DIAGNOSIS — I49.1 PAC (PREMATURE ATRIAL CONTRACTION): ICD-10-CM

## 2017-01-12 DIAGNOSIS — I95.9 HYPOTENSION, UNSPECIFIED HYPOTENSION TYPE: Primary | ICD-10-CM

## 2017-01-12 PROCEDURE — 99309 SBSQ NF CARE MODERATE MDM 30: CPT | Performed by: NURSE PRACTITIONER

## 2017-01-12 PROCEDURE — 99207 ZZC CDG-CORRECTLY CODED, REVIEWED AND AGREE: CPT | Performed by: NURSE PRACTITIONER

## 2017-01-12 NOTE — PROGRESS NOTES
"[facility]    Chief Complaint   Patient presents with     Hypotension       HPI:    Ana Luisa Lee is a 76 year old  (1940), who is being seen today for an episodic care visit at [facility]. Today's concern is:     Hypotension  Dehydration  ALEKSANDRA (acute kidney injury) (H)  COPD exacerbation (H)  Uncontrolled hypertension  Paroxysmal supraventricular tachycardia (H)  PAC (premature atrial contraction)  Anxiety  Insomnia  Physical deconditioning     Reports \"I feel funny\" but unable to expound in specifics. SBP in low 100s- so antihypertensive agents held today. Patient denies dizziness, vertigo, heart palpitation, chest pain. States does yet have some mild SOB/GRACIA- but reports it is \"about the same' as recent baseline. States is working with therapy and seems to be making progress. Sates sleeping okay. Appetite fair. Denies n/v abd pain- reports moving bowels but does feel slightly constipation. Denies  trouble voiding.     ALLERGIES: Hydralazine; Penicillin g; Meloxicam; Metoprolol; and Norvasc  Past Medical, Surgical, Family and Social History reviewed and updated in [facility].    Current Outpatient Prescriptions   Medication Sig Dispense Refill     ONDANSETRON PO Take 4 mg by mouth every 6 hours as needed for nausea       polyethylene glycol (MIRALAX/GLYCOLAX) Packet Take 17 g by mouth daily as needed for constipation       melatonin 1 MG TABS tablet Take 5 tablets (5 mg) by mouth At Bedtime       LORazepam (ATIVAN) 0.5 MG tablet Take 0.5 tablets (0.25 mg) by mouth daily as needed for anxiety 20 tablet 0     albuterol (ALBUTEROL) 108 (90 BASE) MCG/ACT Inhaler Inhale 2 puffs into the lungs every 6 hours as needed       levalbuterol (XOPENEX) 1.25 MG/3ML neb solution Take 3 mLs (1.25 mg) by nebulization every 4 hours as needed for shortness of breath / dyspnea or wheezing 1584 mL 1     budesonide (PULMICORT FLEXHALER) 180 MCG/ACT inhaler Inhale 1 puff into the lungs " 2 times daily       montelukast (SINGULAIR) 10 MG tablet Take 1 tablet (10 mg) by mouth At Bedtime 30 tablet      salmeterol (SEREVENT) 50 MCG/DOSE diskus inhaler Inhale 1 puff into the lungs 2 times daily       tiotropium (SPIRIVA) 18 MCG capsule Inhale 1 capsule (18 mcg) into the lungs daily 30 capsule      gemfibrozil (LOPID) 600 MG tablet Take 1 tablet (600 mg) by mouth daily 90 tablet 0     losartan (COZAAR) 50 MG tablet Take 1 tablet (50 mg) by mouth daily 30 tablet      predniSONE (DELTASONE) 20 MG tablet 40 mg twice a day x3 days then 30 mg daily x3 days then 20 mg daily x3 days then resume daily 10 mg daily x3 days 38 tablet 0     dextromethorphan-guaiFENesin (MUCINEX DM)  MG per 12 hr tablet Take 1 tablet by mouth 2 times daily as needed for cough       guaiFENesin-dextromethorphan (ROBITUSSIN DM) 100-10 MG/5ML syrup Take 5 mLs by mouth every 4 hours as needed for cough 560 mL      Cyanocobalamin (B-12) 1000 MCG TBCR Take 1,000 mcg by mouth daily 100 tablet 1     ferrous sulfate (IRON) 325 (65 FE) MG tablet Take 1 tablet (325 mg) by mouth daily (with breakfast) 100 tablet      calcium 600 MG tablet Take 1 tablet (600 mg) by mouth daily 60 tablet      levothyroxine (SYNTHROID/LEVOTHROID) 25 MCG tablet Take 1 tablet (25 mcg) by mouth daily 90 tablet 1     LANsoprazole (PREVACID) 30 MG CR capsule Take 1 capsule (30 mg) by mouth daily 90 capsule 3     cholecalciferol (VITAMIN D) 1000 UNIT tablet Take 1 tablet (1,000 Units) by mouth daily 30 tablet      Multiple Vitamins-Minerals (MULTIVITAMIN OR) Take 1 tablet by mouth daily       ASPIRIN NOT PRESCRIBED, INTENTIONAL, 1 each continuous prn. Antiplatelet medication not prescribed intentionally due to Use of NSAIDS 0 each 0     order for DME Equipment being ordered: Walker Wheels (), Walker () and Other: Four Wheeled Walker  Treatment Diagnosis: Impaired gait stability and activity tolerance 1 each 0     order for DME Equipment being ordered:  Nebulizer machine, cup, and tubing 1 each 0     order for DME Equipment being ordered: blood pressure machine 1 Device 0     order for DME Overnight pulse oximetry 1 Device 0     Medications reviewed:  Medications reconciled to facility chart and changes were made to reflect current medications as identified as above med list. Below are the changes that were made:   Medications stopped since last EPIC medication reconciliation:   Medications Discontinued During This Encounter   Medication Reason     ondansetron (ZOFRAN ODT) 4 MG ODT tab Dose adjustment     cloNIDine (CATAPRES) 0.1 MG tablet Therapy completed       Medications started since last [facility] medication reconciliation:  Orders Placed This Encounter   Medications     ONDANSETRON PO     Sig: Take 4 mg by mouth every 6 hours as needed for nausea         REVIEW OF SYSTEMS:  4 point ROS including Respiratory, CV, GI and , other than that noted in the HPI,  is negative    Physical Exam:  /71 mmHg  Pulse 94  Temp(Src) 98.1  F (36.7  C)  Resp 20  Wt 154 lb 12.8 oz (70.217 kg)  SpO2 96%    Resp: Effort WNL, LSCTA   CV: S1-2, no S3-4, no murmurs noted- - no edema noted  Abd- soft, nontender, BS +  Musc- VILLEGAS  Psych- alert, calm, pleasant        Blood Pressures:     01/12/2017 07:30 107/71 mmHg    01/11/2017 20:35 103/60 mmHg (Lying l/arm)    01/11/2017 20:33 103/60 mmHg    01/11/2017 16:40 127/75 mmHg    01/11/2017 15:37 127/75 mmHg (Sitting l/arm)    01/11/2017 07:42 110/75 mmHg    01/10/2017 19:39 112/70 mmHg (Sitting l/arm)    01/10/2017 19:39 112/70 mmHg    01/10/2017 16:01 108/72 mmHg    01/10/2017 07:57 115/73 mmHg    Recent Labs:      Last Basic Metabolic Panel:  NA      133   1/11/2017   POTASSIUM      4.2   1/11/2017  CHLORIDE       99   1/11/2017  DIANA      9.4   1/11/2017  CO2       25   1/11/2017  BUN       21   1/11/2017  CR     1.15   1/11/2017  GLC       66   1/11/2017    WBC      9.5   1/11/2017  RBC     3.84   1/11/2017  HGB     11.5    1/11/2017  HCT     34.5   1/11/2017  MCV       90   1/11/2017  MCH     29.9   1/11/2017  MCHC     33.3   1/11/2017  RDW     16.1   1/11/2017  PLT      236   1/11/2017    Assessment/Plan:    Hypotension/HTN  - clonidine added inpatient 2/2 hypertension  - acute COPD exac improved and now BP coming down- will d/c clonidine and observe  - continue Losartan  - VS per unit protocol      Dehydration  - resolved with IVF inpatient  - observe intake/weights/clinically    ALEKSANDRA (acute kidney injury) (H)  2/2 above  Resolved  Recheck BMP    COPD exacerbation (H)  - 3rd exacerbation in one month  - continue prednisone taper- done 1/18  - complete Levaquin- 1/10  - continue on albuterol nebs, Pulmicort, Singulaire, Serevent, Spiriva, albuterol, guaifenesin and Spiriva  - wean O2 off  - follow clinically  - recheck BMP/CBC  - f/w pulmonology as scheduled 1/18      PSVT (paroxysmal supraventricular tachycardia) (H)  PAC (premature atrial contraction)   noted  - rate currently stable as above  - VS per unit protocol  - observe    Anxiety  Insomnia  - continue melatonin, prn lorazepam as above    Constipation  - continue prn Miralax - give dose today  - encourage po fluids/fiber    Physical deconditioning  - 2/2 above  - lives I/L   - PT/OT  - ongoing discharge planning, SW follow and care conferences per unit protocol          Electronically signed by  Billie Berumen, IVÁN CNP

## 2017-01-16 ENCOUNTER — NURSING HOME VISIT (OUTPATIENT)
Dept: GERIATRICS | Facility: CLINIC | Age: 77
End: 2017-01-16

## 2017-01-16 DIAGNOSIS — Z53.9 ERRONEOUS ENCOUNTER--DISREGARD: ICD-10-CM

## 2017-01-17 VITALS
DIASTOLIC BLOOD PRESSURE: 81 MMHG | RESPIRATION RATE: 18 BRPM | BODY MASS INDEX: 26.79 KG/M2 | WEIGHT: 151.2 LBS | TEMPERATURE: 98 F | SYSTOLIC BLOOD PRESSURE: 114 MMHG | HEART RATE: 97 BPM | OXYGEN SATURATION: 94 %

## 2017-01-17 NOTE — PROGRESS NOTES
"[facility]    Chief Complaint   Patient presents with     RECHECK       HPI:    Ana Luisa Lee is a 76 year old  (1940), who is being seen today for an episodic care visit at [facility]. Today's concern is:     Essential hypertension  Dehydration  ALEKSANDRA (acute kidney injury) (H)  COPD exacerbation (H)  Paroxysmal supraventricular tachycardia (H)  PAC (premature atrial contraction)  Anxiety  Insomnia, unspecified type  Constipation  Physical deconditioning     Reports \"I feel funny\" but unable to expound in specifics. SBP in low 100s- so antihypertensive agents held today. Patient denies dizziness, vertigo, heart palpitation, chest pain. States does yet have some mild SOB/GRACIA- but reports it is \"about the same' as recent baseline.  is working with therapy and seems to be making progress. Sates sleeping okay. Appetite fair. Denies n/v abd pain- reports moving bowels but does feel slightly constipation. Denies  trouble voiding.     ALLERGIES: Hydralazine; Penicillin g; Meloxicam; Metoprolol; and Norvasc  Past Medical, Surgical, Family and Social History reviewed and updated in [facility].    Current Outpatient Prescriptions   Medication Sig Dispense Refill     ONDANSETRON PO Take 4 mg by mouth every 6 hours as needed for nausea       polyethylene glycol (MIRALAX/GLYCOLAX) Packet Take 17 g by mouth daily as needed for constipation       melatonin 1 MG TABS tablet Take 5 tablets (5 mg) by mouth At Bedtime       LORazepam (ATIVAN) 0.5 MG tablet Take 0.5 tablets (0.25 mg) by mouth daily as needed for anxiety 20 tablet 0     albuterol (ALBUTEROL) 108 (90 BASE) MCG/ACT Inhaler Inhale 2 puffs into the lungs every 6 hours as needed       levalbuterol (XOPENEX) 1.25 MG/3ML neb solution Take 3 mLs (1.25 mg) by nebulization every 4 hours as needed for shortness of breath / dyspnea or wheezing 1584 mL 1     budesonide (PULMICORT FLEXHALER) 180 MCG/ACT inhaler Inhale 1 puff " into the lungs 2 times daily       montelukast (SINGULAIR) 10 MG tablet Take 1 tablet (10 mg) by mouth At Bedtime 30 tablet      salmeterol (SEREVENT) 50 MCG/DOSE diskus inhaler Inhale 1 puff into the lungs 2 times daily       tiotropium (SPIRIVA) 18 MCG capsule Inhale 1 capsule (18 mcg) into the lungs daily 30 capsule      gemfibrozil (LOPID) 600 MG tablet Take 1 tablet (600 mg) by mouth daily 90 tablet 0     losartan (COZAAR) 50 MG tablet Take 1 tablet (50 mg) by mouth daily 30 tablet      predniSONE (DELTASONE) 20 MG tablet 40 mg twice a day x3 days then 30 mg daily x3 days then 20 mg daily x3 days then resume daily 10 mg daily x3 days 38 tablet 0     dextromethorphan-guaiFENesin (MUCINEX DM)  MG per 12 hr tablet Take 1 tablet by mouth 2 times daily as needed for cough       guaiFENesin-dextromethorphan (ROBITUSSIN DM) 100-10 MG/5ML syrup Take 5 mLs by mouth every 4 hours as needed for cough 560 mL      Cyanocobalamin (B-12) 1000 MCG TBCR Take 1,000 mcg by mouth daily 100 tablet 1     ferrous sulfate (IRON) 325 (65 FE) MG tablet Take 1 tablet (325 mg) by mouth daily (with breakfast) 100 tablet      calcium 600 MG tablet Take 1 tablet (600 mg) by mouth daily 60 tablet      levothyroxine (SYNTHROID/LEVOTHROID) 25 MCG tablet Take 1 tablet (25 mcg) by mouth daily 90 tablet 1     LANsoprazole (PREVACID) 30 MG CR capsule Take 1 capsule (30 mg) by mouth daily 90 capsule 3     cholecalciferol (VITAMIN D) 1000 UNIT tablet Take 1 tablet (1,000 Units) by mouth daily 30 tablet      Multiple Vitamins-Minerals (MULTIVITAMIN OR) Take 1 tablet by mouth daily       ASPIRIN NOT PRESCRIBED, INTENTIONAL, 1 each continuous prn. Antiplatelet medication not prescribed intentionally due to Use of NSAIDS 0 each 0     order for DME Equipment being ordered: Walker Wheels (), Walker () and Other: Four Wheeled Walker  Treatment Diagnosis: Impaired gait stability and activity tolerance 1 each 0     order for DME Equipment  being ordered: Nebulizer machine, cup, and tubing 1 each 0     order for DME Equipment being ordered: blood pressure machine 1 Device 0     order for DME Overnight pulse oximetry 1 Device 0     Medications reviewed:  Medications reconciled to facility chart and changes were made to reflect current medications as identified as above med list. Below are the changes that were made:   Medications stopped since last EPIC medication reconciliation:   There are no discontinued medications.    Medications started since last [facility] medication reconciliation:  No orders of the defined types were placed in this encounter.     ***        REVIEW OF SYSTEMS:  4 point ROS including Respiratory, CV, GI and , other than that noted in the HPI,  is negative    Physical Exam:  /81 mmHg  Pulse 97  Temp(Src) 98  F (36.7  C)  Resp 18  Wt 151 lb 3.2 oz (68.584 kg)  SpO2 94%    Resp: Effort WNL, LSCTA   CV: S1-2, no S3-4, no murmurs noted- - no edema noted  Abd- soft, nontender, BS +  Musc- VILLEGAS  Psych- alert, calm, pleasant        Blood Pressures: 103/60 mmHg-159/99 mmHg    Recent Labs:      Last Basic Metabolic Panel:  NA      133   1/11/2017   POTASSIUM      4.2   1/11/2017  CHLORIDE       99   1/11/2017  DIANA      9.4   1/11/2017  CO2       25   1/11/2017  BUN       21   1/11/2017  CR     1.15   1/11/2017  GLC       66   1/11/2017    WBC      9.5   1/11/2017  RBC     3.84   1/11/2017  HGB     11.5   1/11/2017  HCT     34.5   1/11/2017  MCV       90   1/11/2017  MCH     29.9   1/11/2017  MCHC     33.3   1/11/2017  RDW     16.1   1/11/2017  PLT      236   1/11/2017    Assessment/Plan:    Hypotension/HTN  - clonidine added inpatient 2/2 hypertension  - acute COPD exac improved and now BP coming down- will d/c clonidine and observe  - continue Losartan  - VS per unit protocol      Dehydration  - resolved with IVF inpatient  - observe intake/weights/clinically    ALEKSANDRA (acute kidney injury) (H)  2/2 above  Resolved  Recheck  BMP    COPD exacerbation (H)  - 3rd exacerbation in one month  - continue prednisone taper- done 1/18  - complete Levaquin- 1/10  - continue on albuterol nebs, Pulmicort, Singulaire, Serevent, Spiriva, albuterol, guaifenesin and Spiriva  - wean O2 off  - follow clinically  - recheck BMP/CBC  - f/w pulmonology as scheduled 1/18      PSVT (paroxysmal supraventricular tachycardia) (H)  PAC (premature atrial contraction)   noted  - rate currently stable as above  - VS per unit protocol  - observe    Anxiety  Insomnia  - continue melatonin, prn lorazepam as above    Constipation  - continue prn Miralax - give dose today  - encourage po fluids/fiber    Physical deconditioning  - 2/2 above  - lives I/L   - PT/OT  - ongoing discharge planning, SW follow and care conferences per unit protocol          Electronically signed by  Gay Archer

## 2017-01-18 ENCOUNTER — NURSING HOME VISIT (OUTPATIENT)
Dept: GERIATRICS | Facility: CLINIC | Age: 77
End: 2017-01-18
Payer: COMMERCIAL

## 2017-01-18 ENCOUNTER — TRANSFERRED RECORDS (OUTPATIENT)
Dept: HEALTH INFORMATION MANAGEMENT | Facility: CLINIC | Age: 77
End: 2017-01-18

## 2017-01-18 VITALS
DIASTOLIC BLOOD PRESSURE: 77 MMHG | BODY MASS INDEX: 27.36 KG/M2 | WEIGHT: 154.4 LBS | HEART RATE: 109 BPM | RESPIRATION RATE: 18 BRPM | TEMPERATURE: 98.2 F | SYSTOLIC BLOOD PRESSURE: 142 MMHG | OXYGEN SATURATION: 96 % | HEIGHT: 63 IN

## 2017-01-18 DIAGNOSIS — G47.00 INSOMNIA, UNSPECIFIED TYPE: ICD-10-CM

## 2017-01-18 DIAGNOSIS — I49.1 PAC (PREMATURE ATRIAL CONTRACTION): ICD-10-CM

## 2017-01-18 DIAGNOSIS — I10 ESSENTIAL HYPERTENSION: ICD-10-CM

## 2017-01-18 DIAGNOSIS — I95.9 HYPOTENSION, UNSPECIFIED HYPOTENSION TYPE: Primary | ICD-10-CM

## 2017-01-18 DIAGNOSIS — R53.81 PHYSICAL DECONDITIONING: ICD-10-CM

## 2017-01-18 DIAGNOSIS — I47.10 PAROXYSMAL SUPRAVENTRICULAR TACHYCARDIA (H): ICD-10-CM

## 2017-01-18 DIAGNOSIS — N17.9 AKI (ACUTE KIDNEY INJURY) (H): ICD-10-CM

## 2017-01-18 DIAGNOSIS — K59.00 CONSTIPATION, UNSPECIFIED CONSTIPATION TYPE: ICD-10-CM

## 2017-01-18 DIAGNOSIS — J44.1 COPD EXACERBATION (H): ICD-10-CM

## 2017-01-18 DIAGNOSIS — F41.9 ANXIETY: ICD-10-CM

## 2017-01-18 DIAGNOSIS — E86.0 DEHYDRATION: ICD-10-CM

## 2017-01-18 PROCEDURE — 99309 SBSQ NF CARE MODERATE MDM 30: CPT | Performed by: NURSE PRACTITIONER

## 2017-01-18 NOTE — PROGRESS NOTES
"[place] [facility]    Chief Complaint   Patient presents with     RECHECK       HPI:    Ana Luisa Lee is a 76 year old  (1940), who is being seen today for an episodic care visit at [facility]. Today's concern is:     Hypotension  Essential hypertension  Dehydration  ALEKSANDRA (acute kidney injury) (H)  COPD exacerbation (H)  Paroxysmal supraventricular tachycardia (H)  PAC (premature atrial contraction)  Anxiety  Insomnia  Constipation, unspecified constipation type  Physical deconditioning     Per nursing weight gain as below. Patient reporting noting some increased SOB. States \"I think it is because of the prednisone\" (Taper). Nsg and therapy note some increased generalized anxiety. Patient agrees is anxious. VSS, O2 sats stable. No new edema. Is making functional gains in therapy. Patient has appt to f/w pulmonology today.         ALLERGIES: Hydralazine; Penicillin g; Meloxicam; Metoprolol; and Norvasc  Past Medical, Surgical, Family and Social History reviewed and updated in [facility].    Current Outpatient Prescriptions   Medication Sig Dispense Refill     ONDANSETRON PO Take 4 mg by mouth every 6 hours as needed for nausea       polyethylene glycol (MIRALAX/GLYCOLAX) Packet Take 17 g by mouth daily as needed for constipation       melatonin 1 MG TABS tablet Take 5 tablets (5 mg) by mouth At Bedtime       LORazepam (ATIVAN) 0.5 MG tablet Take 0.5 tablets (0.25 mg) by mouth daily as needed for anxiety 20 tablet 0     albuterol (ALBUTEROL) 108 (90 BASE) MCG/ACT Inhaler Inhale 2 puffs into the lungs every 6 hours as needed       levalbuterol (XOPENEX) 1.25 MG/3ML neb solution Take 3 mLs (1.25 mg) by nebulization every 4 hours as needed for shortness of breath / dyspnea or wheezing 1584 mL 1     budesonide (PULMICORT FLEXHALER) 180 MCG/ACT inhaler Inhale 1 puff into the lungs 2 times daily       montelukast (SINGULAIR) 10 MG tablet Take 1 tablet (10 mg) by mouth At Bedtime 30 " tablet      salmeterol (SEREVENT) 50 MCG/DOSE diskus inhaler Inhale 1 puff into the lungs 2 times daily       tiotropium (SPIRIVA) 18 MCG capsule Inhale 1 capsule (18 mcg) into the lungs daily 30 capsule      gemfibrozil (LOPID) 600 MG tablet Take 1 tablet (600 mg) by mouth daily 90 tablet 0     losartan (COZAAR) 50 MG tablet Take 1 tablet (50 mg) by mouth daily 30 tablet      predniSONE (DELTASONE) 20 MG tablet 40 mg twice a day x3 days then 30 mg daily x3 days then 20 mg daily x3 days then resume daily 10 mg daily x3 days 38 tablet 0     dextromethorphan-guaiFENesin (MUCINEX DM)  MG per 12 hr tablet Take 1 tablet by mouth 2 times daily as needed for cough       guaiFENesin-dextromethorphan (ROBITUSSIN DM) 100-10 MG/5ML syrup Take 5 mLs by mouth every 4 hours as needed for cough 560 mL      Cyanocobalamin (B-12) 1000 MCG TBCR Take 1,000 mcg by mouth daily 100 tablet 1     ferrous sulfate (IRON) 325 (65 FE) MG tablet Take 1 tablet (325 mg) by mouth daily (with breakfast) 100 tablet      calcium 600 MG tablet Take 1 tablet (600 mg) by mouth daily 60 tablet      levothyroxine (SYNTHROID/LEVOTHROID) 25 MCG tablet Take 1 tablet (25 mcg) by mouth daily 90 tablet 1     LANsoprazole (PREVACID) 30 MG CR capsule Take 1 capsule (30 mg) by mouth daily 90 capsule 3     cholecalciferol (VITAMIN D) 1000 UNIT tablet Take 1 tablet (1,000 Units) by mouth daily 30 tablet      Multiple Vitamins-Minerals (MULTIVITAMIN OR) Take 1 tablet by mouth daily       ASPIRIN NOT PRESCRIBED, INTENTIONAL, 1 each continuous prn. Antiplatelet medication not prescribed intentionally due to Use of NSAIDS 0 each 0     order for DME Equipment being ordered: Walker Wheels (), Walker () and Other: Four Wheeled Walker  Treatment Diagnosis: Impaired gait stability and activity tolerance 1 each 0     order for DME Equipment being ordered: Nebulizer machine, cup, and tubing 1 each 0     order for DME Equipment being ordered: blood pressure  "machine 1 Device 0     order for DME Overnight pulse oximetry 1 Device 0     Medications reviewed:  Medications reconciled to facility chart and changes were made to reflect current medications as identified as above med list. Below are the changes that were made:   Medications stopped since last EPIC medication reconciliation:   There are no discontinued medications.    Medications started since last [facility] medication reconciliation:  No orders of the defined types were placed in this encounter.         REVIEW OF SYSTEMS:  4 point ROS including Respiratory, CV, GI and , other than that noted in the HPI,  is negative    Physical Exam:  /77 mmHg  Pulse 109  Temp(Src) 98.2  F (36.8  C)  Resp 18  Ht 5' 3\" (1.6 m)  Wt 154 lb 6.4 oz (70.035 kg)  BMI 27.36 kg/m2  SpO2 96%    Resp: Effort WNL, LSCTA   CV: S1-2, no S3-4, no murmurs noted- - no edema noted  Abd- soft, nontender, BS +  Musc- VILLEGAS  Psych- alert, anxious, pleasant      Weights:     01/18/2017 08:37 154.4 Lbs (Sitting)    01/17/2017 11:23 151 Lbs (Sitting)    01/16/2017 11:59 151.2 Lbs (Sitting)    01/16/2017 11:47 151.2 Lbs (Sitting)    01/15/2017 10:05 151.4 Lbs (Sitting)    01/14/2017 10:16 151.2 Lbs (Sitting)    01/13/2017 13:51 152 Lbs (Sitting)    01/12/2017 14:19 154.8 Lbs (Sitting)    01/12/2017 14:14 154.8 Lbs (Sitting)    01/11/2017 14:02 155.2 Lbs (Sitting)    01/10/2017 10:28 153.2 Lbs (Sitting)    Blood Pressures: 103/60 mmHg-159/99 mmHg    Recent Labs:      Last Basic Metabolic Panel:  NA      133   1/11/2017   POTASSIUM      4.2   1/11/2017  CHLORIDE       99   1/11/2017  DIANA      9.4   1/11/2017  CO2       25   1/11/2017  BUN       21   1/11/2017  CR     1.15   1/11/2017  GLC       66   1/11/2017    WBC      9.5   1/11/2017  RBC     3.84   1/11/2017  HGB     11.5   1/11/2017  HCT     34.5   1/11/2017  MCV       90   1/11/2017  MCH     29.9   1/11/2017  MCHC     33.3   1/11/2017  RDW     16.1   1/11/2017  PLT      236   " 1/11/2017    Assessment/Plan:    Hypotension/HTN  - clonidine added inpatient 2/2 hypertension  - acute COPD exac improved and BP trending down in TCU so d/cd clonidine and now running -140  - continue Losartan  - VS per unit protocol      Dehydration  - resolved with IVF inpatient  - observe intake/weights/clinically    ALEKSANDRA (acute kidney injury) (H)  2/2 above  Resolved  Recheck BMP    COPD exacerbation (H)  - 3rd exacerbation in one month  - continue prednisone taper- done 1/18  - completed Levaquin- 1/10  - continue on albuterol nebs, Pulmicort, Singulaire, Serevent, Spiriva, albuterol, guaifenesin and Spiriva  - weaned O2 off  - follow clinically  - recheck BMP/CBC  - f/w pulmonology as scheduled 1/18 (today)      PSVT (paroxysmal supraventricular tachycardia) (H)  PAC (premature atrial contraction)   noted  - rate currently stable as above  - VS per unit protocol  - observe    Anxiety  Insomnia  - continue melatonin, prn lorazepam as above    Constipation  - continue prn Miralax - give dose today  - encourage po fluids/fiber    Physical deconditioning  - 2/2 above  - lives I/L   - PT/OT  - ongoing discharge planning, SW follow and care conferences per unit protocol          Electronically signed by  IVÁN Bowden CNP

## 2017-01-19 ENCOUNTER — TELEPHONE (OUTPATIENT)
Dept: INTERNAL MEDICINE | Facility: CLINIC | Age: 77
End: 2017-01-19

## 2017-01-19 NOTE — TELEPHONE ENCOUNTER
Form received from: [facility]    Form requesting following info/need: D/C SN orders    PHYLICIA needed?: No    Location of form: Dr. Tierney's in basket    When completed the route for return: Fax

## 2017-01-20 ENCOUNTER — CARE COORDINATION (OUTPATIENT)
Dept: CARE COORDINATION | Facility: CLINIC | Age: 77
End: 2017-01-20

## 2017-01-20 NOTE — PROGRESS NOTES
Clinic Care Coordination Contact  Care Team Conversations    Reviewed chart and reached out to [facility] and they reported that pt remains in TCU.  Will continue to partner with TCU to partner with TCU to provide care coordination for pt.    Zachary Sullivan RN/CC  Care Coordinator [facility]  [phone]

## 2017-01-23 ENCOUNTER — NURSING HOME VISIT (OUTPATIENT)
Dept: GERIATRICS | Facility: CLINIC | Age: 77
End: 2017-01-23
Payer: COMMERCIAL

## 2017-01-23 DIAGNOSIS — G47.00 INSOMNIA, UNSPECIFIED TYPE: ICD-10-CM

## 2017-01-23 DIAGNOSIS — I10 ESSENTIAL HYPERTENSION WITH GOAL BLOOD PRESSURE LESS THAN 140/90: Chronic | ICD-10-CM

## 2017-01-23 DIAGNOSIS — R00.0 TACHYCARDIA: Primary | ICD-10-CM

## 2017-01-23 DIAGNOSIS — N17.9 AKI (ACUTE KIDNEY INJURY) (H): ICD-10-CM

## 2017-01-23 DIAGNOSIS — J44.1 COPD EXACERBATION (H): ICD-10-CM

## 2017-01-23 DIAGNOSIS — R53.81 PHYSICAL DECONDITIONING: ICD-10-CM

## 2017-01-23 DIAGNOSIS — E86.0 DEHYDRATION: ICD-10-CM

## 2017-01-23 DIAGNOSIS — K59.00 CONSTIPATION, UNSPECIFIED CONSTIPATION TYPE: ICD-10-CM

## 2017-01-23 DIAGNOSIS — I49.1 PAC (PREMATURE ATRIAL CONTRACTION): ICD-10-CM

## 2017-01-23 DIAGNOSIS — I47.10 PAROXYSMAL SUPRAVENTRICULAR TACHYCARDIA (H): ICD-10-CM

## 2017-01-23 DIAGNOSIS — F41.9 ANXIETY: ICD-10-CM

## 2017-01-23 PROCEDURE — 99207 ZZC CDG-CORRECTLY CODED, REVIEWED AND AGREE: CPT | Performed by: NURSE PRACTITIONER

## 2017-01-23 PROCEDURE — 99309 SBSQ NF CARE MODERATE MDM 30: CPT | Performed by: NURSE PRACTITIONER

## 2017-01-24 ENCOUNTER — CARE COORDINATION (OUTPATIENT)
Dept: GERIATRIC MEDICINE | Facility: CLINIC | Age: 77
End: 2017-01-24

## 2017-01-24 VITALS
BODY MASS INDEX: 26.82 KG/M2 | RESPIRATION RATE: 22 BRPM | WEIGHT: 151.4 LBS | DIASTOLIC BLOOD PRESSURE: 80 MMHG | SYSTOLIC BLOOD PRESSURE: 124 MMHG | HEIGHT: 63 IN | OXYGEN SATURATION: 94 % | TEMPERATURE: 97.6 F | HEART RATE: 102 BPM

## 2017-01-24 RX ORDER — AZITHROMYCIN 250 MG/1
250 TABLET, FILM COATED ORAL DAILY
COMMUNITY
Start: 2017-01-24 | End: 2017-01-28

## 2017-01-24 RX ORDER — ROFLUMILAST 500 UG/1
500 TABLET ORAL DAILY
COMMUNITY
Start: 2017-01-19

## 2017-01-24 NOTE — PROGRESS NOTES
Called the [facility], Codie ([phone]), to follow up.  Codie shared that there was a care conference on 1/20/17 and when she had called this CM to invite last week this CM was out of the office.  Codie shared that the discharge is set for 1/28/17.  She shared that the recommendations for discharge are as follows: PT, OT, HHA, and RN.  Reviewed that homemaking and CL services would be resumed.  Codie shared that it was recommended that client get a walker (client is walking 300 feet with walker) but is was still unclear if a standard front wheeled or 4 wheeled walker would be best.  Daughter would like client to just get both and would be willing to privately pay for the one that is not covered.  Codie shared that client had home delivered groceries prior to admission, however this CM was not able to find any provider on the POC.  Reviewed that this CM would follow up with client regarding this after discharge.    Codie also talked about client's anxiety and that client had been seeing the [facility] house psychologist at the TCU and inquired about client being able to see a psychologist upon discharge.  Reviewed that [facility] does home visits and that client may also benefit from an [facility] worker.  Reviewed that the current wait time for an [facility] worker with [facility] is 4-6 weeks.  Requested of Codie to follow up with the house psychologist from [facility] to see if the house psychologist would be able to get client a psychologist and [facility] worker faster by making the referral.  Codie shared that she would follow up with the psychologist and get back to this CM.   Codie also shared that pulmonary rehab was recommended and that client does not want to start this until after she has completed her home therapy.  Will review this with client after discharge.   at [facility] is Kaya ([phone]).  Thanked Codie for the update.  Renata Ibanez, [facility]  [phone]

## 2017-01-24 NOTE — PROGRESS NOTES
"[place] [facility]    Chief Complaint   Patient presents with     RECHECK       HPI:    Ana Luisa Lee is a 76 year old  (1940), who is being seen today for an episodic care visit at [facility]. Today's concern is:     Tachycardia  Hypotension  Essential hypertension with goal blood pressure less than 140/90  Dehydration  ALEKSANDRA (acute kidney injury) (H)  COPD exacerbation (H)  Paroxysmal supraventricular tachycardia (H)  PAC (premature atrial contraction)  Anxiety  Insomnia, unspecified type  Constipation, unspecified constipation type  Physical deconditioning     Patient reporting increased congested cough and SOB. Tight cough noted during interaction. Reports some dizziness with standing- intermittently. Denies chest pain, headache, vision changes. Per therapy has c/o of this intermittently during therapy and when VS checked are always stable. HR slight elevated 100-110 per patient this is chronic 2/2 \"extra heart beats\". Denies palpitations. BP stable.     ALLERGIES: Hydralazine; Penicillin g; Meloxicam; Metoprolol; and Norvasc  Past Medical, Surgical, Family and Social History reviewed and updated in [facility].    Current Outpatient Prescriptions   Medication Sig Dispense Refill     roflumilast (DALIRESP) 500 MCG TABS tablet 259 mcg po QOD 1/19-2/2, 250 mcg po QD 2/2-2/16, 500 mcg po QD starting 2/16       PREDNISONE PO Give 25 mg po on 1/23/17, 40 mg po qd 1/24-1/27, 30 mg po qd 1/27-1/30, 20 mg po qd 1/30-2/2, 10 mg po qd 2/2-2/5       azithromycin (ZITHROMAX) 250 MG tablet Take 250 mg by mouth daily Give 500 mg x 1 on 1/23, then 250 mg X 4 days 1/24-1/28       ONDANSETRON PO Take 4 mg by mouth every 6 hours as needed for nausea       polyethylene glycol (MIRALAX/GLYCOLAX) Packet Take 17 g by mouth daily as needed for constipation       melatonin 1 MG TABS tablet Take 5 tablets (5 mg) by mouth At Bedtime       LORazepam (ATIVAN) 0.5 MG tablet Take 0.5 tablets (0.25 mg) " by mouth daily as needed for anxiety 20 tablet 0     albuterol (ALBUTEROL) 108 (90 BASE) MCG/ACT Inhaler Inhale 2 puffs into the lungs every 6 hours as needed       levalbuterol (XOPENEX) 1.25 MG/3ML neb solution Take 3 mLs (1.25 mg) by nebulization every 4 hours as needed for shortness of breath / dyspnea or wheezing 1584 mL 1     budesonide (PULMICORT FLEXHALER) 180 MCG/ACT inhaler Inhale 1 puff into the lungs 2 times daily       montelukast (SINGULAIR) 10 MG tablet Take 1 tablet (10 mg) by mouth At Bedtime 30 tablet      salmeterol (SEREVENT) 50 MCG/DOSE diskus inhaler Inhale 1 puff into the lungs 2 times daily       tiotropium (SPIRIVA) 18 MCG capsule Inhale 1 capsule (18 mcg) into the lungs daily 30 capsule      gemfibrozil (LOPID) 600 MG tablet Take 1 tablet (600 mg) by mouth daily 90 tablet 0     losartan (COZAAR) 50 MG tablet Take 1 tablet (50 mg) by mouth daily 30 tablet      dextromethorphan-guaiFENesin (MUCINEX DM)  MG per 12 hr tablet Take 1 tablet by mouth 2 times daily as needed for cough       guaiFENesin-dextromethorphan (ROBITUSSIN DM) 100-10 MG/5ML syrup Take 5 mLs by mouth every 4 hours as needed for cough 560 mL      Cyanocobalamin (B-12) 1000 MCG TBCR Take 1,000 mcg by mouth daily 100 tablet 1     ferrous sulfate (IRON) 325 (65 FE) MG tablet Take 1 tablet (325 mg) by mouth daily (with breakfast) 100 tablet      calcium 600 MG tablet Take 1 tablet (600 mg) by mouth daily 60 tablet      levothyroxine (SYNTHROID/LEVOTHROID) 25 MCG tablet Take 1 tablet (25 mcg) by mouth daily 90 tablet 1     LANsoprazole (PREVACID) 30 MG CR capsule Take 1 capsule (30 mg) by mouth daily 90 capsule 3     cholecalciferol (VITAMIN D) 1000 UNIT tablet Take 1 tablet (1,000 Units) by mouth daily 30 tablet      Multiple Vitamins-Minerals (MULTIVITAMIN OR) Take 1 tablet by mouth daily       ASPIRIN NOT PRESCRIBED, INTENTIONAL, 1 each continuous prn. Antiplatelet medication not prescribed intentionally due to Use of  "NSAIDS 0 each 0     order for DME Equipment being ordered: Walker Wheels (), Walker () and Other: Four Wheeled Walker  Treatment Diagnosis: Impaired gait stability and activity tolerance 1 each 0     order for DME Equipment being ordered: Nebulizer machine, cup, and tubing 1 each 0     order for DME Equipment being ordered: blood pressure machine 1 Device 0     order for DME Overnight pulse oximetry 1 Device 0     Medications reviewed:  Medications reconciled to facility chart and changes were made to reflect current medications as identified as above med list. Below are the changes that were made:   Medications stopped since last EPIC medication reconciliation:   There are no discontinued medications.    Medications started since last [facility] medication reconciliation:  No orders of the defined types were placed in this encounter.         REVIEW OF SYSTEMS:  4 point ROS including Respiratory, CV, GI and , other than that noted in the HPI,  is negative    Physical Exam:  /80 mmHg  Pulse 102  Temp(Src) 97.6  F (36.4  C)  Resp 22  Ht 5' 3\" (1.6 m)  Wt 151 lb 6.4 oz (68.675 kg)  BMI 26.83 kg/m2  SpO2 94%    Resp: Effort WNL, LSCTA   CV: S1-2, no S3-4, no murmurs noted- - no edema noted  Abd- soft, nontender, BS +  Musc- VILLEGAS  Psych- alert, anxious, pleasant      Weights:     01/23/2017 10:46 151.4 Lbs (Sitting)    01/22/2017 10:44 154 Lbs (Sitting)    01/21/2017 13:37 154.4 Lbs (Sitting)    01/20/2017 14:10 154.2 Lbs (Sitting)    01/19/2017 09:54 154.4 Lbs (Sitting)    01/18/2017 08:37 154.4 Lbs (Sitting)    01/17/2017 11:23 151 Lbs (Sitting)    01/16/2017 11:59 151.2 Lbs (Sitting)    01/16/2017 11:47 151.2 Lbs (Sitting)    01/15/2017 10:05 151.4 Lbs (Sitting)    01/14/2017 10:16 151.2 Lbs (Sitting)    01/13/2017 13:51 152 Lbs (Sitting)      Blood Pressures: 103/60 mmHg-159/99 mmHg    Recent Labs:      Last Basic Metabolic Panel:  NA      133   1/11/2017   POTASSIUM      4.2   1/11/2017  CHLORIDE   "     99   1/11/2017  DIANA      9.4   1/11/2017  CO2       25   1/11/2017  BUN       21   1/11/2017  CR     1.15   1/11/2017  GLC       66   1/11/2017    WBC      9.5   1/11/2017  RBC     3.84   1/11/2017  HGB     11.5   1/11/2017  HCT     34.5   1/11/2017  MCV       90   1/11/2017  MCH     29.9   1/11/2017  MCHC     33.3   1/11/2017  RDW     16.1   1/11/2017  PLT      236   1/11/2017    Assessment/Plan:    Tachycardia  PSVT (paroxysmal supraventricular tachycardia) (H)  PAC (premature atrial contraction)  Patient has a long history of palpitation due to PVCs and runs of nonsustained SVT - dating back to at least 2012 with Echo, Cardiac stress test and mx Holter exams. Seen by cards 5/2016 and per cardiology --discussed with her that pharmacologic treatment of these innocent arrhythmias can be challenging.  Typically, it is only the strong antiarrhythmic drugs that successfully suppress these.  Unfortunately, treating these arrhythmias with antiarrhythmic drugs is similar to going after a mosquito with a sledgehammer.  Effective, but probably not advisable--  - EKG 1/2017 - Sinus tach with frequent premature SV beats  - VS per unit protocol  - follow clinically      Hypotension/HTN  - clonidine added inpatient - d/cd in TCU 2/2 hypotension which is now resolved  - continue Losartan  - VS per unit protocol    Dehydration  - resolved with IVF inpatient  - observe intake/weights/clinically    ALEKSANDRA (acute kidney injury) (H)  2/2 above  Resolved  Recheck BMP    COPD exacerbation (H)  - 3rd exacerbation in one month- exacerbating again- did just see pulm who recommended adding pred taper and z-pack in setting of exac/bronchitis  - will add as above  - continue on albuterol nebs, Pulmicort, Singulaire, Serevent, Spiriva, albuterol, guaifenesin and Spiriva  - weaned O2 off  - follow clinically  - recheck BMP/CBC  - f/w pulmonology as scheduled 1/18 and as directed         Anxiety  Insomnia  - continue melatonin, prn lorazepam  as above    Constipation  - continue prn Miralax - give dose today  - encourage po fluids/fiber    Physical deconditioning  - 2/2 above  - lives I/L   - PT/OT  - ongoing discharge planning, SW follow and care conferences per unit protocol          Electronically signed by  IVÁN Bowden CNP

## 2017-01-26 ENCOUNTER — CARE COORDINATION (OUTPATIENT)
Dept: GERIATRIC MEDICINE | Facility: CLINIC | Age: 77
End: 2017-01-26

## 2017-01-26 NOTE — PROGRESS NOTES
Received a call from Renata at [facility].  She shared that client reached out to the homemaker and that client had requested that the homemaker resume next week.  Reviewed with Renata that the plan is for client to go home on the 28th.  Agreed to resume the homemaking.  No further questions.  Renata Ibanez, SANDY  [employer]  [phone]

## 2017-01-27 ENCOUNTER — DISCHARGE SUMMARY NURSING HOME (OUTPATIENT)
Dept: GERIATRICS | Facility: CLINIC | Age: 77
End: 2017-01-27
Payer: COMMERCIAL

## 2017-01-27 VITALS
HEIGHT: 63 IN | SYSTOLIC BLOOD PRESSURE: 137 MMHG | TEMPERATURE: 97.7 F | WEIGHT: 149.8 LBS | DIASTOLIC BLOOD PRESSURE: 80 MMHG | RESPIRATION RATE: 18 BRPM | OXYGEN SATURATION: 95 % | BODY MASS INDEX: 26.54 KG/M2 | HEART RATE: 103 BPM

## 2017-01-27 DIAGNOSIS — I47.10 PSVT (PAROXYSMAL SUPRAVENTRICULAR TACHYCARDIA) (H): ICD-10-CM

## 2017-01-27 DIAGNOSIS — J44.1 COPD EXACERBATION (H): Primary | ICD-10-CM

## 2017-01-27 DIAGNOSIS — G47.00 INSOMNIA, UNSPECIFIED TYPE: ICD-10-CM

## 2017-01-27 DIAGNOSIS — K59.00 CONSTIPATION, UNSPECIFIED CONSTIPATION TYPE: ICD-10-CM

## 2017-01-27 DIAGNOSIS — F41.9 ANXIETY: ICD-10-CM

## 2017-01-27 DIAGNOSIS — I10 ESSENTIAL HYPERTENSION, BENIGN: ICD-10-CM

## 2017-01-27 DIAGNOSIS — R53.81 PHYSICAL DECONDITIONING: ICD-10-CM

## 2017-01-27 PROCEDURE — 99316 NF DSCHRG MGMT 30 MIN+: CPT | Performed by: NURSE PRACTITIONER

## 2017-01-27 PROCEDURE — 99207 ZZC CDG-CORRECTLY CODED, REVIEWED AND AGREE: CPT | Performed by: NURSE PRACTITIONER

## 2017-01-27 NOTE — PROGRESS NOTES
[facility] DISCHARGE SUMMARY    PATIENT'S NAME: Ana Luisa Lee  YOB: 1940  MEDICAL RECORD NUMBER:  [id number]    PRIMARY CARE PROVIDER AND CLINIC RESPONSIBLE AFTER TRANSFER: Jenny Hamilton [facility] [address] [facility] *     CODE STATUS/ADVANCE DIRECTIVES DISCUSSION:   DNR/DNI        Allergies   Allergen Reactions     Hydralazine Anxiety     Penicillin G Hives     Meloxicam      dizziness       Metoprolol      ? Skin rash on the back     Norvasc [Amlodipine Besylate] Hives       TRANSFERRING PROVIDERS: IVÁN Blanton CNP, Latesha Cao MD  DATE OF SNF ADMISSION:  January / 06 / 2017  DATE OF SNF (anticipated) DISCHARGE: January / 28 / 2017  [place] DISPOSITION: [facility]   Nursing Facility: [facility] stay 12/31/16 to 1/6/17.     Condition on Discharge:  Improving.  Function:  Mod I with 4WW  Cognitive Scores: SLUMS 21/30    Equipment: walker    DISCHARGE DIAGNOSIS:   1. COPD exacerbation (H)    2. PSVT (paroxysmal supraventricular tachycardia) (H)    3. Essential hypertension, benign    4. Anxiety    5. Insomnia, unspecified type    6. Constipation, unspecified constipation type    7. Physical deconditioning        [facility] Course:    Tachycardia  PSVT (paroxysmal supraventricular tachycardia) (H)  PAC (premature atrial contraction)  Patient has a long history of palpitation due to PVCs and runs of nonsustained SVT - dating back to at least 2012 with Echo, Cardiac stress test and mx Holter exams. Seen by cards 5/2016 and per cardiology --discussed with her that pharmacologic treatment of these innocent arrhythmias can be challenging.  Typically, it is only the strong antiarrhythmic drugs that successfully suppress these.  Unfortunately, treating these arrhythmias with antiarrhythmic drugs is similar to going after a mosquito with a  eh.  Effective, but probably not advisable--  - EKG 1/2017 - Sinus tach with frequent premature SV beats  - VS per unit protocol  - follow clinically      Hypotension/HTN  - clonidine added inpatient - d/cd in TCU 2/2 hypotension which is now resolved  - continue Losartan  - VS per unit protocol    Dehydration  - resolved with IVF inpatient  - observe intake/weights/clinically    ALEKSANDRA (acute kidney injury) (H)  2/2 above  Resolved  Recheck BMP    COPD exacerbation (H)  - 3rd exacerbation in one month- exacerbating again- did just see pulm who recommended adding pred taper and z-pack in setting of exac/bronchitis  - will add as above  - continue on albuterol nebs, Pulmicort, Singulaire, Serevent, Spiriva, albuterol, guaifenesin and Spiriva  - weaned O2 off  - follow clinically  - recheck BMP/CBC  - f/w pulmonology as scheduled 1/18 and as directed  -recommend taking the Guaifenesin scheduled versus PRN        Anxiety  Insomnia  - continue melatonin, prn lorazepam as above    Constipation  - continue prn Miralax - give dose today  - encourage po fluids/fiber    Physical deconditioning  - 2/2 above  - lives I/L    - PT/OT  - ongoing discharge planning, SW follow and care conferences per unit protocol         COPD exacerbation (H)  PSVT (paroxysmal supraventricular tachycardia) (H)  Essential hypertension, benign  Anxiety  Insomnia, unspecified type  Constipation, unspecified constipation type  Physical deconditioning    PAST MEDICAL HISTORY:  has a past medical history of Asthma, persistent; HTN, goal below 140/90; Hyperlipidemia LDL goal < 130; Osteoporosis; Esophageal stricture (2007); Left shoulder pain (Nov 2011); Left foot pain (Nov 2011); Foot deformity; Chronic intermittent steroid use; Hypothyroidism (Dec 2011); Anemia (Dec 2011); Skin cyst (Dec 2011); PVC (premature ventricular contraction) (May 2012); SOB (shortness of breath) on exertion (May 2012); Medication side effects; Hypothyroidism;  Hyperlipidemia; SVT (supraventricular tachycardia) (H); Arthritis of hand; Asthma, persistent; COPD exacerbation (H) (10/25/2013); and Paroxysmal supraventricular tachycardia (H). She also has no past medical history of Coronary artery disease, Congestive heart failure, unspecified, Unspecified cerebral artery occlusion with cerebral infarction, Diabetes mellitus (H), Malignant neoplasm (H), or Blood transfusion.    DISCHARGE MEDICATIONS:  Current Outpatient Prescriptions   Medication Sig Dispense Refill     roflumilast (DALIRESP) 500 MCG TABS tablet 250 mcg po QOD 1/19-2/2, 250 mcg po QD 2/2-2/16, 500 mcg po QD starting 2/16       PREDNISONE PO Give 25 mg po on 1/23/17, 40 mg po qd 1/24-1/27, 30 mg po qd 1/27-1/30, 20 mg po qd 1/30-2/2, 10 mg po qd 2/2-2/5       azithromycin (ZITHROMAX) 250 MG tablet Take 250 mg by mouth daily Give 500 mg x 1 on 1/23, then 250 mg X 4 days 1/24-1/28       ONDANSETRON PO Take 4 mg by mouth every 6 hours as needed for nausea       polyethylene glycol (MIRALAX/GLYCOLAX) Packet Take 17 g by mouth daily as needed for constipation       melatonin 1 MG TABS tablet Take 5 tablets (5 mg) by mouth At Bedtime       LORazepam (ATIVAN) 0.5 MG tablet Take 0.5 tablets (0.25 mg) by mouth daily as needed for anxiety 20 tablet 0     albuterol (ALBUTEROL) 108 (90 BASE) MCG/ACT Inhaler Inhale 2 puffs into the lungs every 6 hours as needed       levalbuterol (XOPENEX) 1.25 MG/3ML neb solution Take 3 mLs (1.25 mg) by nebulization every 4 hours as needed for shortness of breath / dyspnea or wheezing 1584 mL 1     budesonide (PULMICORT FLEXHALER) 180 MCG/ACT inhaler Inhale 1 puff into the lungs 2 times daily       montelukast (SINGULAIR) 10 MG tablet Take 1 tablet (10 mg) by mouth At Bedtime 30 tablet      salmeterol (SEREVENT) 50 MCG/DOSE diskus inhaler Inhale 1 puff into the lungs 2 times daily       tiotropium (SPIRIVA) 18 MCG capsule Inhale 1 capsule (18 mcg) into the lungs daily 30 capsule       gemfibrozil (LOPID) 600 MG tablet Take 1 tablet (600 mg) by mouth daily 90 tablet 0     losartan (COZAAR) 50 MG tablet Take 1 tablet (50 mg) by mouth daily 30 tablet      dextromethorphan-guaiFENesin (MUCINEX DM)  MG per 12 hr tablet Take 1 tablet by mouth 2 times daily as needed for cough       guaiFENesin-dextromethorphan (ROBITUSSIN DM) 100-10 MG/5ML syrup Take 5 mLs by mouth every 4 hours as needed for cough 560 mL      Cyanocobalamin (B-12) 1000 MCG TBCR Take 1,000 mcg by mouth daily 100 tablet 1     ferrous sulfate (IRON) 325 (65 FE) MG tablet Take 1 tablet (325 mg) by mouth daily (with breakfast) 100 tablet      calcium 600 MG tablet Take 1 tablet (600 mg) by mouth daily 60 tablet      levothyroxine (SYNTHROID/LEVOTHROID) 25 MCG tablet Take 1 tablet (25 mcg) by mouth daily 90 tablet 1     LANsoprazole (PREVACID) 30 MG CR capsule Take 1 capsule (30 mg) by mouth daily 90 capsule 3     cholecalciferol (VITAMIN D) 1000 UNIT tablet Take 1 tablet (1,000 Units) by mouth daily 30 tablet      Multiple Vitamins-Minerals (MULTIVITAMIN OR) Take 1 tablet by mouth daily       ASPIRIN NOT PRESCRIBED, INTENTIONAL, 1 each continuous prn. Antiplatelet medication not prescribed intentionally due to Use of NSAIDS 0 each 0     order for DME Equipment being ordered: Walker Wheels (), Walker () and Other: Four Wheeled Walker  Treatment Diagnosis: Impaired gait stability and activity tolerance 1 each 0     order for DME Equipment being ordered: Nebulizer machine, cup, and tubing 1 each 0     order for DME Equipment being ordered: blood pressure machine 1 Device 0     order for DME Overnight pulse oximetry 1 Device 0       MEDICATION CHANGES/RATIONALE:   1/12 Clonidine discontinued due to hypotension  1/23 Prednisone burst with taper started due to increased sx.  Also Zpack initiated  Controlled medications sent with patient: Medication: Lorazepam , 10 tabs given to patient at the time of discharge to take  "home     ROS:    4 point ROS including Respiratory, CV, GI and , other than that noted in the HPI,  is negative    Physical Exam:   Vitals: /80 mmHg  Pulse 103  Temp(Src) 97.7  F (36.5  C)  Resp 18  Ht 5' 3\" (1.6 m)  Wt 149 lb 12.8 oz (67.949 kg)  BMI 26.54 kg/m2  SpO2 95%  BMI= Body mass index is 26.54 kg/(m^2).    GENERAL APPEARANCE:  Alert, appears healthy  RESP:  respiratory effort and palpation of chest normal, no respiratory distress, crackles chronic, cough non-productive  CV:  Palpation and auscultation of heart done , regular rate and rhythm, no murmur, rub, or gallop, no edema  ABDOMEN:  normal bowel sounds, soft, nontender, no hepatosplenomegaly or other masses    DISCHARGE PLAN:  Occupational Therapy, Physical Therapy, Registered Nurse, Home Health Aide and From:  [facility]  Patient instructed to follow-up with:  PCP in 7 days      Current [facility] scheduled appointments: None      Pending labs: none  SNF labs     Last Basic Metabolic Panel:  NA      133   1/11/2017   POTASSIUM      4.2   1/11/2017  CHLORIDE       99   1/11/2017  DIANA      9.4   1/11/2017  CO2       25   1/11/2017  BUN       21   1/11/2017  CR     1.15   1/11/2017  GLC       66   1/11/2017    WBC      9.5   1/11/2017  RBC     3.84   1/11/2017  HGB     11.5   1/11/2017  HCT     34.5   1/11/2017  MCV       90   1/11/2017  MCH     29.9   1/11/2017  MCHC     33.3   1/11/2017  RDW     16.1   1/11/2017  PLT      236   1/11/2017    Discharge Treatments: nebulizer    TOTAL DISCHARGE TIME:   Greater than 30 minutes  Electronically signed by:  IVÁN Blanton CNP    "

## 2017-01-27 NOTE — PATIENT INSTRUCTIONS
[facility] Discharge Orders    Name: Ana Luisa Lee  : 1940  Planned Discharge Date: 2017  Discharged to: previous independent home    MEDICAL FOLLOW UP  Follow up with PCP in 1-2 weeks Dr. Tierney  Follow up with Specialists  none      FUTURE LABS: No labs orders/due    ORDER CHANGES:  Recommend taking Guaifenesin 600 mg Twice daily for a while    DISCHARGE MEDICATIONS:  The patient s pharmacy is authorized to dispense a 30-day supply of medications. Refill requests should be directed to the primary provider, Jenny Hamilton MD.   At discharge, the facility may send patient s remaining supply of controlled substances, specifically Lorazepam.  Current Outpatient Prescriptions   Medication Sig Dispense Refill     roflumilast (DALIRESP) 500 MCG TABS tablet 250 mcg po QOD -, 250 mcg po QD -, 500 mcg po QD starting        PREDNISONE PO Give 25 mg po on 17, 40 mg po qd -, 30 mg po qd -, 20 mg po qd -, 10 mg po qd -       azithromycin (ZITHROMAX) 250 MG tablet Take 250 mg by mouth daily Give 500 mg x 1 on , then 250 mg X 4 days -       ONDANSETRON PO Take 4 mg by mouth every 6 hours as needed for nausea       polyethylene glycol (MIRALAX/GLYCOLAX) Packet Take 17 g by mouth daily as needed for constipation       melatonin 1 MG TABS tablet Take 5 tablets (5 mg) by mouth At Bedtime       LORazepam (ATIVAN) 0.5 MG tablet Take 0.5 tablets (0.25 mg) by mouth daily as needed for anxiety 20 tablet 0     albuterol (ALBUTEROL) 108 (90 BASE) MCG/ACT Inhaler Inhale 2 puffs into the lungs every 6 hours as needed       levalbuterol (XOPENEX) 1.25 MG/3ML neb solution Take 3 mLs (1.25 mg) by nebulization every 4 hours as needed for shortness of breath / dyspnea or wheezing 1584 mL 1     budesonide (PULMICORT FLEXHALER) 180 MCG/ACT inhaler Inhale 1 puff into the lungs 2 times daily       montelukast (SINGULAIR) 10 MG tablet  Take 1 tablet (10 mg) by mouth At Bedtime 30 tablet      salmeterol (SEREVENT) 50 MCG/DOSE diskus inhaler Inhale 1 puff into the lungs 2 times daily       tiotropium (SPIRIVA) 18 MCG capsule Inhale 1 capsule (18 mcg) into the lungs daily 30 capsule      gemfibrozil (LOPID) 600 MG tablet Take 1 tablet (600 mg) by mouth daily 90 tablet 0     losartan (COZAAR) 50 MG tablet Take 1 tablet (50 mg) by mouth daily 30 tablet      dextromethorphan-guaiFENesin (MUCINEX DM)  MG per 12 hr tablet Take 1 tablet by mouth 2 times daily as needed for cough       guaiFENesin-dextromethorphan (ROBITUSSIN DM) 100-10 MG/5ML syrup Take 5 mLs by mouth every 4 hours as needed for cough 560 mL      Cyanocobalamin (B-12) 1000 MCG TBCR Take 1,000 mcg by mouth daily 100 tablet 1     ferrous sulfate (IRON) 325 (65 FE) MG tablet Take 1 tablet (325 mg) by mouth daily (with breakfast) 100 tablet      calcium 600 MG tablet Take 1 tablet (600 mg) by mouth daily 60 tablet      levothyroxine (SYNTHROID/LEVOTHROID) 25 MCG tablet Take 1 tablet (25 mcg) by mouth daily 90 tablet 1     LANsoprazole (PREVACID) 30 MG CR capsule Take 1 capsule (30 mg) by mouth daily 90 capsule 3     cholecalciferol (VITAMIN D) 1000 UNIT tablet Take 1 tablet (1,000 Units) by mouth daily 30 tablet      Multiple Vitamins-Minerals (MULTIVITAMIN OR) Take 1 tablet by mouth daily       ASPIRIN NOT PRESCRIBED, INTENTIONAL, 1 each continuous prn. Antiplatelet medication not prescribed intentionally due to Use of NSAIDS 0 each 0     order for DME Equipment being ordered: Walker Wheels (), Walker () and Other: Four Wheeled Walker  Treatment Diagnosis: Impaired gait stability and activity tolerance 1 each 0     order for DME Equipment being ordered: Nebulizer machine, cup, and tubing 1 each 0     order for DME Equipment being ordered: blood pressure machine 1 Device 0     order for DME Overnight pulse oximetry 1 Device 0       SERVICES:  Home Care:  Occupational Therapy,  Physical Therapy, Registered Nurse, Home Health Aide and From:  [facility]    ADDITIONAL INSTRUCTIONS:  None    IVÁN Blanton CNP   NPI: [id number]  This document was electronically signed on January 27, 2017

## 2017-01-30 ENCOUNTER — TELEPHONE (OUTPATIENT)
Dept: INTERNAL MEDICINE | Facility: CLINIC | Age: 77
End: 2017-01-30

## 2017-01-30 ENCOUNTER — CARE COORDINATION (OUTPATIENT)
Dept: GERIATRIC MEDICINE | Facility: CLINIC | Age: 77
End: 2017-01-30

## 2017-01-30 NOTE — TELEPHONE ENCOUNTER
IP F/U    Date: 01/28/17  Diagnosis: COPD Exacerbation  Is patient active in care coordination? Yes - Route to Care Coordination (P 26843)  Was patient in TCU? No    Next 5 appointments (look out 90 days)     Feb 03, 2017  3:40 PM   [facility]   [facility]    [address]  [facility] [id number] [phone]

## 2017-01-30 NOTE — PROGRESS NOTES
Received a call from the [facility], Codie.  She shared that client did discharge on 1/28/17 back home.  She shared that she tried to get the house psychologist to get an [facility] worker more rapidly in the home and was not able to do so.  Codie shared that the referral for an [facility] worker was placed but that she was told that it could take 4-6 week to get a practitioner assigned.  Thanked Codie for the update.  Called client to follow up on the discharge. Client shared that she is still feeling tired and SOB and will use her nebulizer when she has SOB.  Client shared that she did talk with  Home care regarding the RN, HHA, PT and OT.  She shared that the RN will be out this week to see her.  Client shared that she does have a walker that she is borrowing from a friend that is a standard walker.  Talked with client about ordering her, her own 4 wheeled walker and client stated that it was not necessary at this time but would call if she changes her mind.  Reviewed Pulmonary Rehab and client stated that she doesn't want to start this until after she has completed home therapy.  Talked with client at length regarding the complex mobile team.  Client shared at this time she wants to talk with her PCP regarding this as well as have a discussion with her daughter.  Client acknowledged that it would be nice to have an MD come out when she isn't feeling well but that she really likes Dr. Tierney.  Encouraged client to talk with her PCP regarding this option at the appointment on the 3rd.  Asked client if this CM would be able to call her daughter, Windy, to update Windy on available services in the home and client gave verbal consent for this CM to call her daughter, and thanked this CM for doing so.  Client had no further questions.  Called client's daughter, Windy, and left a message requesting a return call.  Renata Ibanez, [facility]  [phone]

## 2017-02-01 ENCOUNTER — TELEPHONE (OUTPATIENT)
Dept: INTERNAL MEDICINE | Facility: CLINIC | Age: 77
End: 2017-02-01

## 2017-02-01 ENCOUNTER — CARE COORDINATION (OUTPATIENT)
Dept: GERIATRIC MEDICINE | Facility: CLINIC | Age: 77
End: 2017-02-01

## 2017-02-01 NOTE — TELEPHONE ENCOUNTER
Call from Leida at [facility] to clarify meds. Done. Also requesting orders to resume skilled nursing, PT and OT. Please advise.

## 2017-02-01 NOTE — PROGRESS NOTES
Reviewed [facility] and  Home Care has started.  Called the  Home Care RN, Windy Wells, and left a message updating her regarding the medication concerns and that the daughter would like the medication set up to continue and requested to be updated on what her assessment is.  Also reviewed who the primary CM is.    Received a voice mail message from client stating that she would like to get the 4 wheeled walker and requested a return call.  Called client back and left a message requesting a return call to discuss the walker and what color she would like (if possible).  Renata Ibanez, [facility]  [phone]

## 2017-02-01 NOTE — PROGRESS NOTES
Late entry from 1/30/17: Received a return call from client's daughter, Windy.  Reviewed with Windy this CMs conversation with client.  Windy has concerns regarding the medications and would like to ensure that client continues to received medication set up.  Reviewed that the home care RN has not started and reviewed her concerns.  Windy was concerned with 2 medications that have tapers.  Reviewed the medications that are currently being tapered up and tapered down.  Windy will follow up with client regarding this to ensure that client is getting the necessary services.  Will place call to  Home Care regarding the having the RN do medication set up.  Talked about getting client on waiting lists for ALs to start planning for the future.  Discussed that client declined the walker stating that she has one that she has been using.  Daughter shared that she will talk with client about this. Daughter had no further questions.  Renata Ibanez, [facility]  [phone]

## 2017-02-02 NOTE — TELEPHONE ENCOUNTER
Please inform the C that I agree with the HHC as long as they make sure the patient has the documentation for face to face and home bound status. ( If these are required by the patient's insurance).      please instruct the patient to bring all the meds bottles to tomorrow appointment so we can review them.

## 2017-02-03 ENCOUNTER — TELEPHONE (OUTPATIENT)
Dept: INTERNAL MEDICINE | Facility: CLINIC | Age: 77
End: 2017-02-03

## 2017-02-03 ENCOUNTER — OFFICE VISIT (OUTPATIENT)
Dept: INTERNAL MEDICINE | Facility: CLINIC | Age: 77
End: 2017-02-03
Payer: COMMERCIAL

## 2017-02-03 VITALS
HEART RATE: 70 BPM | TEMPERATURE: 97.4 F | SYSTOLIC BLOOD PRESSURE: 110 MMHG | HEIGHT: 63 IN | DIASTOLIC BLOOD PRESSURE: 64 MMHG | WEIGHT: 153.7 LBS | OXYGEN SATURATION: 97 % | BODY MASS INDEX: 27.23 KG/M2

## 2017-02-03 DIAGNOSIS — E03.9 HYPOTHYROIDISM, UNSPECIFIED TYPE: Chronic | ICD-10-CM

## 2017-02-03 DIAGNOSIS — N18.30 CKD (CHRONIC KIDNEY DISEASE) STAGE 3, GFR 30-59 ML/MIN (H): ICD-10-CM

## 2017-02-03 DIAGNOSIS — R11.0 NAUSEA: ICD-10-CM

## 2017-02-03 DIAGNOSIS — Z09 HOSPITAL DISCHARGE FOLLOW-UP: Primary | ICD-10-CM

## 2017-02-03 DIAGNOSIS — J44.9 COPD, MODERATE (H): Chronic | ICD-10-CM

## 2017-02-03 PROCEDURE — 99495 TRANSJ CARE MGMT MOD F2F 14D: CPT | Performed by: INTERNAL MEDICINE

## 2017-02-03 NOTE — NURSING NOTE
"Chief Complaint   Patient presents with     Hospital F/U     Medication Problem     Recheck Medication       Initial /64 mmHg  Pulse 70  Temp(Src) 97.4  F (36.3  C) (Oral)  Ht 5' 3\" (1.6 m)  Wt 153 lb 11.2 oz (69.718 kg)  BMI 27.23 kg/m2  SpO2 97%  Breastfeeding? No Estimated body mass index is 27.23 kg/(m^2) as calculated from the following:    Height as of this encounter: 5' 3\" (1.6 m).    Weight as of this encounter: 153 lb 11.2 oz (69.718 kg).  Medication Reconciliation: complete   Mariluz Ibrahim CMA      "

## 2017-02-03 NOTE — PATIENT INSTRUCTIONS
Plan:  1. Continue same meds, same doses for now   2. Follow up in about a month   3. You need to stay on Prednisone 10 mg until I see you in about a month

## 2017-02-03 NOTE — PROGRESS NOTES
"Dr Tierney's note      Patient's instructions / PLAN:                                                        Plan:  1. Continue same meds, same doses for now   2. Follow up in about a month   3. You need to stay on Prednisone 10 mg until I see you in about a month      ASSESSMENT & PLAN:                                                      (Z09) Hospital discharge follow-up  (primary encounter diagnosis)  (J44.9) COPD, moderate (HCC) / asthma on chr prednisone   Comment: better  Plan: as above     (R11.0) Nausea  Comment:   Plan: ondansetron (ZOFRAN-ODT) 4 MG ODT tab            (E03.9) Hypothyroidism, unspecified type  Comment: Controlled   TSH   Date Value Ref Range Status   01/04/2017 0.35* 0.40 - 4.00 mU/L Final   ]   Plan: Continue same meds, same doses for now     (N18.3) CKD (chronic kidney disease) stage 3, GFR 30-59 ml/min  Comment: stable   Plan: f/u cr        Chief complaint:                                                        hosp f/u  Daughter from [place] on the cell phone       SUBJECTIVE:                                                    Ana Luisa Lee is a 77 year old female who presents to clinic today for the following health issues:    She wants to know what dr Carrasco said : I told her that for now she stays at 10 mg Prednisone and maybe decrease to 5 mg in a month. Ariana doesn't know what dose she takes. The RN puts the meds for her    Dr Carrasco prescribed Daliresp She thinks it makes her dizzy    She has questions about \" mobile home\". She is not yet intersted in it once she heard that she will have different pcp    She feels sleepy and she wants to stop Melatonin         Review of Systems:                                                      ROS: negative for fever, chills,  wheezes, chest pain, shortness of breath, vomiting, abdominal pain, leg swelling Pos for chr sob    A 10-point review of systems was obtained.  Those pertinent are above and in the in the Subjective section.  The " "rest of the systems are negative.           OBJECTIVE:             Physical exam:  Blood pressure 110/64, pulse 70, temperature 97.4  F (36.3  C), temperature source Oral, height 5' 3\" (1.6 m), weight 153 lb 11.2 oz (69.718 kg), SpO2 97 %, not currently breastfeeding.   BP rechecked 122/64  NAD, appears comfortable  Skin: no rashes   Neck: supple, no JVD, No thyroidmegaly. Lymph nodes nonpalpable cervical and supraclavicular.  Chest: clear to auscultation bilaterally, good respiratory effort  Heart: S1 S2, RRR, no mgr appreciated  Abdomen: soft, not tender,   Extremities: no edema,   Neurologic: A, Ox3, no focal signs appreciated    PMHx: reviewed  Past Medical History   Diagnosis Date     Asthma, persistent      f/U dr Brock at [facility]     HTN, goal below 140/90      Hyperlipidemia LDL goal < 130      Osteoporosis      Esophageal stricture 2007     s/p dilation     Left shoulder pain Nov 2011     Left foot pain Nov 2011     Foot deformity      Left     Chronic intermittent steroid use      for asthma     Hypothyroidism Dec 2011     Anemia Dec 2011     Skin cyst Dec 2011     L thumb     PVC (premature ventricular contraction) May 2012     SOB (shortness of breath) on exertion May 2012     Medication side effects      Hypothyroidism      Hyperlipidemia      SVT (supraventricular tachycardia) (H)      Arthritis of hand      Asthma, persistent      COPD exacerbation (H) 10/25/2013     Paroxysmal supraventricular tachycardia (H)       PSHx: reviewed  Past Surgical History   Procedure Laterality Date     Gyn surgery  1980     Bunionectomy lapidus with tarsal metatarsal (tmt) fusion  1990's     Tonsillectomy       Hysterectomy, pap no longer indicated       Hysterectomy total abdominal       Ep study, modified  7/2012     SVT not induced, PVC's        Meds: reviewed  Current Outpatient Prescriptions   Medication Sig Dispense Refill     roflumilast (DALIRESP) 500 MCG TABS tablet 250 mcg po QOD 1/19-2/2, 250 mcg " po QD 2/2-2/16, 500 mcg po QD starting 2/16       PREDNISONE PO Give 25 mg po on 1/23/17, 40 mg po qd 1/24-1/27, 30 mg po qd 1/27-1/30, 20 mg po qd 1/30-2/2, 10 mg po qd 2/2-2/5       ONDANSETRON PO Take 4 mg by mouth every 6 hours as needed for nausea       polyethylene glycol (MIRALAX/GLYCOLAX) Packet Take 17 g by mouth daily as needed for constipation       LORazepam (ATIVAN) 0.5 MG tablet Take 0.5 tablets (0.25 mg) by mouth daily as needed for anxiety 20 tablet 0     albuterol (ALBUTEROL) 108 (90 BASE) MCG/ACT Inhaler Inhale 2 puffs into the lungs every 6 hours as needed       levalbuterol (XOPENEX) 1.25 MG/3ML neb solution Take 3 mLs (1.25 mg) by nebulization every 4 hours as needed for shortness of breath / dyspnea or wheezing 1584 mL 1     budesonide (PULMICORT FLEXHALER) 180 MCG/ACT inhaler Inhale 1 puff into the lungs 2 times daily       montelukast (SINGULAIR) 10 MG tablet Take 1 tablet (10 mg) by mouth At Bedtime 30 tablet      salmeterol (SEREVENT) 50 MCG/DOSE diskus inhaler Inhale 1 puff into the lungs 2 times daily       tiotropium (SPIRIVA) 18 MCG capsule Inhale 1 capsule (18 mcg) into the lungs daily 30 capsule      gemfibrozil (LOPID) 600 MG tablet Take 1 tablet (600 mg) by mouth daily 90 tablet 0     losartan (COZAAR) 50 MG tablet Take 1 tablet (50 mg) by mouth daily 30 tablet      dextromethorphan-guaiFENesin (MUCINEX DM)  MG per 12 hr tablet Take 1 tablet by mouth 2 times daily as needed for cough       guaiFENesin-dextromethorphan (ROBITUSSIN DM) 100-10 MG/5ML syrup Take 5 mLs by mouth every 4 hours as needed for cough 560 mL      Cyanocobalamin (B-12) 1000 MCG TBCR Take 1,000 mcg by mouth daily 100 tablet 1     ferrous sulfate (IRON) 325 (65 FE) MG tablet Take 1 tablet (325 mg) by mouth daily (with breakfast) 100 tablet      calcium 600 MG tablet Take 1 tablet (600 mg) by mouth daily 60 tablet      levothyroxine (SYNTHROID/LEVOTHROID) 25 MCG tablet Take 1 tablet (25 mcg) by mouth daily 90  tablet 1     LANsoprazole (PREVACID) 30 MG CR capsule Take 1 capsule (30 mg) by mouth daily 90 capsule 3     cholecalciferol (VITAMIN D) 1000 UNIT tablet Take 1 tablet (1,000 Units) by mouth daily 30 tablet      order for DME Equipment being ordered: Walker Wheels (), Walker () and Other: Four Wheeled Walker  Treatment Diagnosis: Impaired gait stability and activity tolerance 1 each 0     order for DME Equipment being ordered: Nebulizer machine, cup, and tubing 1 each 0     order for DME Equipment being ordered: blood pressure machine 1 Device 0     Multiple Vitamins-Minerals (MULTIVITAMIN OR) Take 1 tablet by mouth daily       ASPIRIN NOT PRESCRIBED, INTENTIONAL, 1 each continuous prn. Antiplatelet medication not prescribed intentionally due to Use of NSAIDS 0 each 0     melatonin 1 MG TABS tablet Take 5 tablets (5 mg) by mouth At Bedtime       order for DME Overnight pulse oximetry 1 Device 0       Soc Hx: reviewed  Fam Hx: reviewed          Jenny Tierney MD  Internal Medicine            Hospital Follow-up Visit:    Hospital/Nursing Home/ Rehab Facility: [facility]  Date of Admission: [facility] from 12/31/16 through 1/6/17 and [facility] from 1/6/17 through 1/28/17  Reason(s) for Admission:   1.  COPD exacerbation (H)     2.  PSVT (paroxysmal supraventricular tachycardia) (H)     3.  Essential hypertension, benign     4.  Anxiety     5.  Insomnia, unspecified type     6.  Constipation, unspecified constipation type     7.  Physical deconditioning              Problems taking medications regularly:  None       Medication changes since discharge: Medication list reconciled.       Problems adhering to non-medication therapy:  None    Summary of hospitalization:  [facility] discharge summary reviewed  Diagnostic Tests/Treatments reviewed.  Follow up needed: as above   Other Healthcare Providers  Involved in Patient s Care:         pulmonary  Update since discharge: stable.     Post Discharge Medication Reconciliation: discharge medications reconciled and changed, per note/orders (see AVS).  Plan of care communicated with patient     Coding guidelines for this visit:  Type of Medical   Decision Making Face-to-Face Visit       within 7 Days of discharge Face-to-Face Visit        within 14 days of discharge   Moderate Complexity 22408 [id number]   High Complexity 46913 [id number]

## 2017-02-03 NOTE — MR AVS SNAPSHOT
"              After Visit Summary   2/3/2017    Ana Luisa Lee    MRN: [id number]           Patient Information     Date Of Birth          1940        Visit Information        Provider Department      2/3/2017 3:40 PM Jenny Hamilton MD [facility]        Care Instructions    Plan:  1. Continue same meds, same doses for now   2. Follow up in about a month   3. You need to stay on Prednisone 10 mg until I see you in about a month        Follow-ups after your visit        Who to contact     If you have questions or need follow up information about today's clinic visit or your schedule please contact [facility] directly at [phone].  Normal or non-critical lab and imaging results will be communicated to you by MyChart, letter or phone within 4 business days after the clinic has received the results. If you do not hear from us within 7 days, please contact the clinic through [employer] or phone. If you have a critical or abnormal lab result, we will notify you by phone as soon as possible.  Submit refill requests through [facility] or call your pharmacy and they will forward the refill request to us. Please allow 3 business days for your refill to be completed.          Additional Information About Your Visit        MyChart Information     [email or web address] lets you send messages to your doctor, view your test results, renew your prescriptions, schedule appointments and more. To sign up, go to [email or web address] . Click on \"Log in\" on the left side of the screen, which will take you to the Welcome page. Then click on \"Sign up Now\" on the right side of the page.     You will be asked to enter the access code listed below, as well as some personal information. Please follow the directions to create your username and password.     Your access code is: [email or web address]  Expires: 3/1/2017 12:28 PM     Your access code will  in 90 days. If you need help or a new code, " "please call your [facility] or [phone].        Care EveryWhere ID     This is your Care EveryWhere ID. This could be used by other organizations to access your [facility] medical records  [id number]        Your Vitals Were     Pulse Temperature Height BMI (Body Mass Index) Pulse Oximetry Breastfeeding?    70 97.4  F (36.3  C) (Oral) 5' 3\" (1.6 m) 27.23 kg/m2 97% No       Blood Pressure from Last 3 Encounters:   02/03/17 110/64   01/27/17 137/80   01/24/17 124/80    Weight from Last 3 Encounters:   02/03/17 153 lb 11.2 oz (69.718 kg)   01/27/17 149 lb 12.8 oz (67.949 kg)   01/24/17 151 lb 6.4 oz (68.675 kg)              Today, you had the following     No orders found for display       Primary Care Provider Office Phone # Fax #    Jenny Padmini Hamilton [phone]       [facility] [address] [place] [address]        Thank you!     Thank you for choosing [facility]  for your care. Our goal is always to provide you with excellent care. Hearing back from our patients is one way we can continue to improve our services. Please take a few minutes to complete the written survey that you may receive in the mail after your visit with us. Thank you!             Your Updated Medication List - Protect others around you: Learn how to safely use, store and throw away your medicines at www.disposemymeds.org.          This list is accurate as of: 2/3/17  4:09 PM.  Always use your most recent med list.                   Brand Name Dispense Instructions for use    albuterol 108 (90 BASE) MCG/ACT Inhaler    albuterol     Inhale 2 puffs into the lungs every 6 hours as needed       ASPIRIN NOT PRESCRIBED    INTENTIONAL    0 each    1 each continuous prn. Antiplatelet medication not prescribed intentionally due to Use of NSAIDS       B-12 1000 MCG Tbcr     100 tablet    Take 1,000 mcg by mouth daily       budesonide 180 MCG/ACT inhaler    PULMICORT FLEXHALER     " Inhale 1 puff into the lungs 2 times daily       calcium 600 MG tablet     60 tablet    Take 1 tablet (600 mg) by mouth daily       cholecalciferol 1000 UNIT tablet    vitamin D    30 tablet    Take 1 tablet (1,000 Units) by mouth daily       * dextromethorphan-guaiFENesin  MG per 12 hr tablet    MUCINEX DM     Take 1 tablet by mouth 2 times daily as needed for cough       * guaiFENesin-dextromethorphan 100-10 MG/5ML syrup    ROBITUSSIN DM    560 mL    Take 5 mLs by mouth every 4 hours as needed for cough       ferrous sulfate 325 (65 FE) MG tablet    IRON    100 tablet    Take 1 tablet (325 mg) by mouth daily (with breakfast)       gemfibrozil 600 MG tablet    LOPID    90 tablet    Take 1 tablet (600 mg) by mouth daily       LANsoprazole 30 MG CR capsule    PREVACID    90 capsule    Take 1 capsule (30 mg) by mouth daily       levalbuterol 1.25 MG/3ML neb solution    XOPENEX    1584 mL    Take 3 mLs (1.25 mg) by nebulization every 4 hours as needed for shortness of breath / dyspnea or wheezing       levothyroxine 25 MCG tablet    SYNTHROID/LEVOTHROID    90 tablet    Take 1 tablet (25 mcg) by mouth daily       LORazepam 0.5 MG tablet    ATIVAN    20 tablet    Take 0.5 tablets (0.25 mg) by mouth daily as needed for anxiety       losartan 50 MG tablet    COZAAR    30 tablet    Take 1 tablet (50 mg) by mouth daily       melatonin 1 MG Tabs tablet      Take 5 tablets (5 mg) by mouth At Bedtime       montelukast 10 MG tablet    SINGULAIR    30 tablet    Take 1 tablet (10 mg) by mouth At Bedtime       MULTIVITAMIN PO      Take 1 tablet by mouth daily       ONDANSETRON PO      Take 4 mg by mouth every 6 hours as needed for nausea       * order for DME     1 Device    Overnight pulse oximetry       * order for DME     1 Device    Equipment being ordered: blood pressure machine       order for DME     1 each    Equipment being ordered: Nebulizer machine, cup, and tubing       * order for DME     1 each    Equipment  being ordered: Walker Wheels (), Walker () and Other: Four Wheeled Walker Treatment Diagnosis: Impaired gait stability and activity tolerance       polyethylene glycol Packet    MIRALAX/GLYCOLAX     Take 17 g by mouth daily as needed for constipation       PREDNISONE PO      Give 25 mg po on 1/23/17, 40 mg po qd 1/24-1/27, 30 mg po qd 1/27-1/30, 20 mg po qd 1/30-2/2, 10 mg po qd 2/2-2/5       roflumilast 500 MCG Tabs tablet    DALIRESP     250 mcg po QOD 1/19-2/2, 250 mcg po QD 2/2-2/16, 500 mcg po QD starting 2/16       salmeterol 50 MCG/DOSE diskus inhaler    SEREVENT     Inhale 1 puff into the lungs 2 times daily       tiotropium 18 MCG capsule    SPIRIVA    30 capsule    Inhale 1 capsule (18 mcg) into the lungs daily       * Notice:  This list has 5 medication(s) that are the same as other medications prescribed for you. Read the directions carefully, and ask your doctor or other care provider to review them with you.

## 2017-02-04 ASSESSMENT — ASTHMA QUESTIONNAIRES: ACT_TOTALSCORE: 18

## 2017-02-05 RX ORDER — ONDANSETRON 4 MG/1
4-8 TABLET, ORALLY DISINTEGRATING ORAL EVERY 8 HOURS PRN
Qty: 120 TABLET | Refills: 0 | Status: SHIPPED | OUTPATIENT
Start: 2017-02-05 | End: 2017-02-15

## 2017-02-06 ENCOUNTER — TELEPHONE (OUTPATIENT)
Dept: INTERNAL MEDICINE | Facility: CLINIC | Age: 77
End: 2017-02-06

## 2017-02-06 NOTE — TELEPHONE ENCOUNTER
LASHANDA Nation calls for orders for HHA    2x a week for 2 weeks.     Verbal order given to approve visits until certification received for MD signature.

## 2017-02-07 ENCOUNTER — TELEPHONE (OUTPATIENT)
Dept: INTERNAL MEDICINE | Facility: CLINIC | Age: 77
End: 2017-02-07

## 2017-02-07 ENCOUNTER — CARE COORDINATION (OUTPATIENT)
Dept: GERIATRIC MEDICINE | Facility: CLINIC | Age: 77
End: 2017-02-07

## 2017-02-07 NOTE — PROGRESS NOTES
Order placed with [facility] for 4WW.  Order placed on 2/7/17. Access updated.  Kesha Rosado  Case Management Specialist  [facility]  [phone]

## 2017-02-08 ENCOUNTER — CARE COORDINATION (OUTPATIENT)
Dept: GERIATRIC MEDICINE | Facility: CLINIC | Age: 77
End: 2017-02-08

## 2017-02-08 NOTE — PROGRESS NOTES
Received a request from CMS that client is requesting a call regarding her home care services, stating that PT has not started and would be interested in a HHA and face masks.  Called client's home care RN, Windy Lantigua ([phone]) and left a message requesting a return call with any updates on the PT and HHA.  Called client and updated her that this CM had left a message for her home care RN.  Client stated that OT is scheduled and she is wondering about PT.  Reviewed that client's home care RN will need to get back to her regarding the PT.  Client stated that all of her other questions had been answered and would not elaborate on who answered the questions.  But had no other questions for this CM.  Received a call from client's home care RN, Windy, stating that she has been out twice today already, once to set up medications and the other time to complete paperwork for the HHA.  Windy shared that she had talked with client about the PT several times today and had updated client.  Windy also shared that a HHA came out to see client already today and gave client a shower.  Windy shared that she will get a couple of masks for client to use, stating that she has reviewed with client that she is able to re-use the masks.  Reviewed that client is open to EW and that should client need more masks she can access her EW benefit. Windy shared that once client is out of masks she will discuss with client if the masks are even necessary, stating that other than for flu season, there would be no other reason.  Reviewed with Windy that client's daughter would like the medication set up to continue after skilled Medicare time.  Windy shared that she would keep the primary CM posted on the transition to senior living RN.  Renata Ibanez, [facility]  [phone]

## 2017-02-09 ENCOUNTER — TELEPHONE (OUTPATIENT)
Dept: INTERNAL MEDICINE | Facility: CLINIC | Age: 77
End: 2017-02-09

## 2017-02-09 DIAGNOSIS — R11.0 NAUSEA: Primary | ICD-10-CM

## 2017-02-09 RX ORDER — PROCHLORPERAZINE MALEATE 5 MG
5 TABLET ORAL EVERY 6 HOURS PRN
Qty: 120 TABLET | Refills: 0 | Status: SHIPPED | OUTPATIENT
Start: 2017-02-09 | End: 2017-06-20

## 2017-02-09 NOTE — TELEPHONE ENCOUNTER
Received fax from pharm stating Ondansetron ODT not covered by insur (plain Ondansetron not covered either).    Initiate a PA?  Or diff rx?    [facility]sur [phone]    ID # [id number]    Please advise, thanks.

## 2017-02-13 ENCOUNTER — TELEPHONE (OUTPATIENT)
Dept: INTERNAL MEDICINE | Facility: CLINIC | Age: 77
End: 2017-02-13

## 2017-02-14 ENCOUNTER — TELEPHONE (OUTPATIENT)
Dept: INTERNAL MEDICINE | Facility: CLINIC | Age: 77
End: 2017-02-14

## 2017-02-14 ENCOUNTER — CARE COORDINATION (OUTPATIENT)
Dept: GERIATRIC MEDICINE | Facility: CLINIC | Age: 77
End: 2017-02-14

## 2017-02-15 ENCOUNTER — TELEPHONE (OUTPATIENT)
Dept: INTERNAL MEDICINE | Facility: CLINIC | Age: 77
End: 2017-02-15

## 2017-02-15 ENCOUNTER — CARE COORDINATION (OUTPATIENT)
Dept: GERIATRIC MEDICINE | Facility: CLINIC | Age: 77
End: 2017-02-15

## 2017-02-15 DIAGNOSIS — R11.0 NAUSEA: ICD-10-CM

## 2017-02-15 RX ORDER — ONDANSETRON 4 MG/1
4-8 TABLET, ORALLY DISINTEGRATING ORAL EVERY 8 HOURS PRN
Qty: 20 TABLET | Refills: 0 | Status: SHIPPED | OUTPATIENT
Start: 2017-02-15 | End: 2017-02-15

## 2017-02-15 RX ORDER — ONDANSETRON 4 MG/1
4-8 TABLET, ORALLY DISINTEGRATING ORAL EVERY 8 HOURS PRN
Qty: 120 TABLET | Refills: 0 | Status: CANCELLED | OUTPATIENT
Start: 2017-02-15

## 2017-02-15 NOTE — TELEPHONE ENCOUNTER
Spoke with pt. She doesn't know why they notified us about the Zofran again. Pt is already taking Compazine is working fine for her. Pharmacy notified to cancel Zofran.   MIGUEL Dominguez

## 2017-02-15 NOTE — TELEPHONE ENCOUNTER
Gisella, PT from [facility] calls, left vm stating she saw pt for PT today. Requesting orders for gait training and therapeutic exercise.    - 2 times a week x2 wks  - 1 time a week x1 wk    Per epic recent home care orders have been signed by provider at this clinic.    Called Gisella, relay verbal authorization given.

## 2017-02-15 NOTE — PROGRESS NOTES
Received a voice mail message from client stating that she still has not received a return call from the [facility] worker, Jitendra.  Called client back.  Client shared that she called Jitendra again today and left another message.  Client shared that she just needs to know what documentation that the county worker needs.  Reviewed with client that this CM is also able to call Jitendra but that client will need to wait for the county worker as well.  Called Jitendra and left a message inquiring what client is missing or needing to complete and requested a return call.  Renata Ibanez, [facility]  [phone]

## 2017-02-15 NOTE — PROGRESS NOTES
Late entry from 2/14/17: Received a call from client.  She shared that she received a letter from [facility] stating that her MA would be ending at the end of the month.  Reviewed the letter that she received over the phone and from what client read, client has not turned in her renewal.  Client stated that she never received any paperwork for the renewal.  Asked client if she had called her financial worker, and client reported that she had left a message on the 10th.  Reviewed with client that the worker has 72 hours return her call. Client stated that it had already been 72 hours.  Reviewed that weekends do not county.  Encouraged client to allow the worker through today to return her call.  Reviewed that if the worker does call to request a copy of the renewal.  And if the worker does not call to call this CM on the 15th.  Client stated that she would wait.  Renata Ibanez, [facility]  [phone]

## 2017-02-15 NOTE — TELEPHONE ENCOUNTER
Received fax from [employer] Ondansetron 4 mg, plan does not cover this medication, Call [phone] to initiate PA or call/fax to pharmacy to change medication. ID is [id number]

## 2017-02-15 NOTE — TELEPHONE ENCOUNTER
Called [facility], relayed MD message. States they ran even a 9 tablet supply for 30 days and receive message that med needs PA.    Please advise, thanks.

## 2017-02-17 ENCOUNTER — CARE COORDINATION (OUTPATIENT)
Dept: GERIATRIC MEDICINE | Facility: CLINIC | Age: 77
End: 2017-02-17

## 2017-02-17 NOTE — PROGRESS NOTES
Received a voice mail message from client stating that the [facility] worker has still not called her back.  Called client back and updated her that this CM has not received any calls back either.  Reviewed with client that this CM isn't able to get anything from the [facility] until Jitendra calls back with the information that is necessary.  Client shared that it has been a week since she first called the [facility] and left a message.  Reviewed with client that this CM isn't sure what the [facility] needs until Jitendra calls back.  Client is very anxious about this paperwork and that her MA would be ending at the end of the month.  Reviewed the 90 day window with client.  Also encouraged client to see if she would be able to reach Jitendra's supervisor.  Client shared that she would try to see if she is able to reach the supervisor and would let this CM know the outcome if need be.  Renata Ibanez, [facility]  [phone]

## 2017-02-21 ENCOUNTER — CARE COORDINATION (OUTPATIENT)
Dept: GERIATRIC MEDICINE | Facility: CLINIC | Age: 77
End: 2017-02-21

## 2017-02-21 NOTE — PROGRESS NOTES
Late entry out of office, 2/17/17. Rec'd vm from Madhavi OT [facility] to report that client is continue with SNV, HHA, PT/OT under a Medicare episode. Madhavi is inquiring if CM would be agreeable to transition client to the [facility] program due to multiple hospital admissions in order to monitor client more closely.   2/21/17 Call placed to ROSARIO Hendrickson in support of the above and will send e-mail to Windy BARTHOLOMEW.  2/21/17 e-mail sent to Windy BARTHOLOMEW [facility] that CM will support and encourage [facility] visits for med set up and monitoring of health status. CM inquired if HHA will continue.    2/21/17 Call placed to client to inquire on MA status -left vm requesting a return call.  Yeni Savage RN, BC  Supervisor [facility]   [phone] (Fax)

## 2017-02-22 ENCOUNTER — TELEPHONE (OUTPATIENT)
Dept: INTERNAL MEDICINE | Facility: CLINIC | Age: 77
End: 2017-02-22

## 2017-02-22 ENCOUNTER — CARE COORDINATION (OUTPATIENT)
Dept: GERIATRIC MEDICINE | Facility: CLINIC | Age: 77
End: 2017-02-22

## 2017-02-22 DIAGNOSIS — Z76.89 HEALTH CARE HOME: ICD-10-CM

## 2017-02-22 NOTE — PROGRESS NOTES
"Rec'd vm from Windy BARTHOLOMEW [facility] to report that the plan is to transition client to [facility], and cont with HHA 2x/wk. Windy will update CM with SOC date for [facility].    Rec'd vm from client requesting a return call.  Call placed to client who states that she is doing, \"ok.\"  Client states that she did receive a call from Yue at [facility] who states that all paperwork has been rec'd and her financial worker (Sofia) will be back on Friday.    Reviewed [facility] POC -client agreeable to the transition to [facility], states that managing her mediations have been difficult. Client is also requesting to continue HHA 2x/wk, \"I feel more secure because I feel dizzy.\"   Client states that she will see her therapist this afternoon, [employer] worker, comes weekly.   Client also states that she was told by her PCP to wear a mask when she is out of her apt. Client states that the PCC and home care nurse gave her masks to use. Enc'd client to discuss with homecare how often she should change her mask.   Client will f/u with [facility] to see if they can send a replacement this week, as her hmkr is ill.  Enc'd client to f/u CM as needed.     Call to [facility], CM informed SOC date for HHA 2/8/17. CM to have auth completed for [employer].   "

## 2017-02-23 ENCOUNTER — TELEPHONE (OUTPATIENT)
Dept: INTERNAL MEDICINE | Facility: CLINIC | Age: 77
End: 2017-02-23

## 2017-02-24 ENCOUNTER — CARE COORDINATION (OUTPATIENT)
Dept: GERIATRIC MEDICINE | Facility: CLINIC | Age: 77
End: 2017-02-24

## 2017-02-24 NOTE — PROGRESS NOTES
Rec'd vm from client stating that she recd a letter from [employer] that her insurance will be ending.  Call placed to client, left  that CM and client can f/u again with [facility] next Wednesday if she has not rec'd any f/u.  Yeni Savage RN, BC  Supervisor [facility]   [phone] (Fax)

## 2017-02-25 ENCOUNTER — DOCUMENTATION ONLY (OUTPATIENT)
Dept: CARE COORDINATION | Facility: CLINIC | Age: 77
End: 2017-02-25

## 2017-02-25 NOTE — PROGRESS NOTES
[facility] now requests orders and shares plan of care/discharge summaries for some patients through [employer].  Please REPLY TO THIS MESSAGE in order to give authorization for orders when needed.  This is considered a verbal order, you will still receive a faxed copy of orders for signature.  Thank you for your assistance in improving collaboration for our patients.    ORDERS for Recertification    SN 2 w 4, 1 w 5 , 3 As needed visits to include medication management and set up, disease education and management high risk for rehospitalization, care coordination, and lab draws as needed/ordered.

## 2017-02-27 DIAGNOSIS — E78.5 HYPERLIPIDEMIA WITH TARGET LDL LESS THAN 130: ICD-10-CM

## 2017-02-27 NOTE — TELEPHONE ENCOUNTER
gemfibrozil       Last Written Prescription Date: 1/6/17  Last Fill Quantity: 90, # refills: 0    Last Office Visit with G, P or [facility] prescribing provider:  2/3/17   Future Office Visit:    Next 5 appointments (look out 90 days)     Mar 23, 2017 12:00 PM CDT   [facility] with [facility] Hamilton [facility]   [facility]    [address]  [facility] [address]   [phone]                  Cholesterol   Date Value Ref Range Status   06/01/2015 154 <200 mg/dL Final     Comment:     LDL Cholesterol is the primary guide to therapy.   The NCEP recommends further evaluation of: patients with cholesterol greater   than 200 mg/dL if additional risk factors are present, cholesterol greater   than   240 mg/dL, triglycerides greater than 150 mg/dL, or HDL less than 40 mg/dL.       HDL Cholesterol   Date Value Ref Range Status   06/01/2015 48 (L) >50 mg/dL Final     LDL Cholesterol Calculated   Date Value Ref Range Status   06/01/2015 77 0 - 129 mg/dL Final     Comment:     LDL Cholesterol is the primary guide to therapy: LDL-cholesterol goal in high   risk patients is <100 mg/dL and in very high risk patients is <70 mg/dL.       Triglycerides   Date Value Ref Range Status   06/01/2015 144 0 - 150 mg/dL Final     Cholesterol/HDL Ratio   Date Value Ref Range Status   06/01/2015 3.2 0.0 - 5.0 Final     ALT   Date Value Ref Range Status   12/22/2016 17 0 - 50 U/L Final            Labs showing if normal/abnormal  Lab Results   Component Value Date    CHOL 154 06/01/2015    TRIG 144 06/01/2015    HDL 48 (L) 06/01/2015    LDL 77 06/01/2015    VLDL 29 06/01/2015    CHOLHDLRATIO 3.2 06/01/2015     Lab Results   Component Value Date    ALT 17 12/22/2016    GLC 66 (L) 01/11/2017     Lab Results   Component Value Date    WBC 9.5 01/11/2017    RBC 3.84 01/11/2017    HGB 11.5 (L) 01/11/2017    HCT 34.5 (L) 01/11/2017    MCV 90 01/11/2017    MCH 29.9 01/11/2017    [facility]  33.3 01/11/2017    RDW 16.1 (H) 01/11/2017     01/11/2017    DTYP Manual Differential 01/01/2017    NEUTROPHIL 87.0 01/01/2017    LYMPH 6.0 01/01/2017    MONOCYTE 2.0 01/01/2017    EOSINOPHIL 0.0 01/01/2017    BASOPHIL 0.0 01/01/2017    IG 2.0 12/22/2016    ANEU 12.0 (H) 01/01/2017    ALYM 0.8 01/01/2017    KEVIN 0.3 01/01/2017    AEOS 0.0 01/01/2017    ABAS 0.0 01/01/2017    AIG 0.2 12/22/2016

## 2017-02-28 ENCOUNTER — TELEPHONE (OUTPATIENT)
Dept: INTERNAL MEDICINE | Facility: CLINIC | Age: 77
End: 2017-02-28

## 2017-02-28 NOTE — TELEPHONE ENCOUNTER
Leida with [facility] ([phone]) calling to request order for HHA visits twice a week x8 wks for personal cares and bathing.  Order approved per RN protocol.  CAROL Flores R.N.

## 2017-03-01 ENCOUNTER — CARE COORDINATION (OUTPATIENT)
Dept: GERIATRIC MEDICINE | Facility: CLINIC | Age: 77
End: 2017-03-01

## 2017-03-01 ENCOUNTER — TELEPHONE (OUTPATIENT)
Dept: INTERNAL MEDICINE | Facility: CLINIC | Age: 77
End: 2017-03-01

## 2017-03-01 RX ORDER — GEMFIBROZIL 600 MG/1
600 TABLET, FILM COATED ORAL DAILY
Qty: 90 TABLET | Refills: 0 | Status: SHIPPED | OUTPATIENT
Start: 2017-03-01 | End: 2017-05-25

## 2017-03-01 NOTE — PROGRESS NOTES
2/28/17 Rec'd vm from client that the aide was not coming today, stating that she spoke with PT and was informed that she is no longer eligible for the aide to come. Client requests a return call.  2/28/17 Left vm with client, that a HHA 2x/wk was approved with nursing visits under the Community well program. Explained that PT is likely completed as she no longer meets criteria. Explained that CM can be in contact with [facility] to confirm  2/28/17 Rec'd vm from that she rec'd a call from Sofia and she will cont with with nursing and HHA. Client states that she will was suppose to call Sofia back.  3/1/17 Rec'd vm from client that she has not rec'd a call from Sofia regarding her MA, client inquiring if CM can assist.   3/1/17 Call placed to [facility], left vm to f/u on client's MA renewal.  Call placed to client who states that she did speak with [facility], but unsure if she will cont to receive PT. Explained that PT coverage is based on meeting Medicare criteria.  Enc'd client to be in contact with HC re schedules and POC. Client stated understanding.  Explained that CM left vm with Sofia at [facility]. Client stated that she did receive a call from Sofia yesterday and she was requesting bank statements to be faxed to her. Client stated that she faxed the statements to her right away. Enc'd client to be patient, that Sofia will likely not call her back unless there are concerns and she should receive a letter soon.   Left vm with Chayito Yip [facility] to introduce myself, request a call back re POC.  Yeni Savage RN, BC  Supervisor [facility]   [phone] (Fax)

## 2017-03-02 ENCOUNTER — TELEPHONE (OUTPATIENT)
Dept: INTERNAL MEDICINE | Facility: CLINIC | Age: 77
End: 2017-03-02

## 2017-03-03 ENCOUNTER — CARE COORDINATION (OUTPATIENT)
Dept: GERIATRIC MEDICINE | Facility: CLINIC | Age: 77
End: 2017-03-03

## 2017-03-03 NOTE — TELEPHONE ENCOUNTER
"Forms partially completed after speaking to pt. Not sure how to answer a few of the questions.     \"What is the diagnosis that requires the use of this item?\"   Pt reports she also has arthritis and weakness, she was advised she should get one while at the TCU.    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 Fingers on her left hand are deformed from this and she is unable to straighten them all the way. She has limited use of that hand especially, but can still use it for some things.   "

## 2017-03-06 ENCOUNTER — MEDICAL CORRESPONDENCE (OUTPATIENT)
Dept: HEALTH INFORMATION MANAGEMENT | Facility: CLINIC | Age: 77
End: 2017-03-06

## 2017-03-07 ENCOUNTER — TELEPHONE (OUTPATIENT)
Dept: INTERNAL MEDICINE | Facility: CLINIC | Age: 77
End: 2017-03-07

## 2017-03-07 NOTE — TELEPHONE ENCOUNTER
Letter/fax recieved from [employer] (in formulary folder/basket) states lansoprazole is not covered under patient's insurance.      Can medication be changed or should a PA be initiated?    (Provider may request we ask patient to check their formulary book or call their insurance company to find out what medications are on their formulary. Ask patient to call back within 2-3 days.  If they are not able to call back in this time frame this encounter will be closed.  Open new encounter when/if patient calls back.)    If PA [facility] [phone]  No pt id provided.

## 2017-03-08 ENCOUNTER — CARE COORDINATION (OUTPATIENT)
Dept: GERIATRIC MEDICINE | Facility: CLINIC | Age: 77
End: 2017-03-08

## 2017-03-08 NOTE — PROGRESS NOTES
"3/7/17 Rec'd vm from client that she rec'd a letter from [employer] that her MA has been reinstated but she also has paperwork for a spend down, \"what do I do?\"  3/8/17 Attempted to reach client, no answer, left vm that client has had a waiver obligation in the past, but enc'd her to contact Sofia for questions re finances.  Yeni Savage RN, BC  Supervisor [facility]   [phone] (Fax)    "

## 2017-03-09 NOTE — PROGRESS NOTES
3/1/2017  Rec'd vm from Chayito Davis ACP, stating that she is working with client in her her to provide therapy (not [facility]). Chayito states that they are working on anxiety management, reviewed cognitive distortion thinking patterns.  Chayito will see client every other week for one hour. Chayito requests a call back as needed.  Yeni Savage RN, BC  Supervisor [facility]   [phone] (Fax)

## 2017-03-13 DIAGNOSIS — J44.9 COPD, MODERATE (H): Chronic | ICD-10-CM

## 2017-03-13 RX ORDER — PREDNISONE 10 MG/1
10 TABLET ORAL DAILY
Qty: 30 TABLET | Refills: 3 | Status: ON HOLD | OUTPATIENT
Start: 2017-03-13 | End: 2017-05-04

## 2017-03-13 NOTE — TELEPHONE ENCOUNTER
Call from pt stating that she takes Prednisone 10 mg daily. States over the weekend she was having increased trouble breathing so she increased the Prednisone to 20 mg daily. States she took the increased dose Sat and Sun and does feel it is helping. States she has done this before and PCP was OK with it. Asking what she should do with the dosing now. Has apt next week. Also states she has home care and yesterday her BP was 142/86.  Pt also requesting refill for Prednisone. Not current on med list. Pt sates she always takes 10 mg daily. It appears it may have been dc'd off her med list at last hospitalization. Please advise.

## 2017-03-16 ENCOUNTER — TELEPHONE (OUTPATIENT)
Dept: INTERNAL MEDICINE | Facility: CLINIC | Age: 77
End: 2017-03-16

## 2017-03-16 NOTE — TELEPHONE ENCOUNTER
"Leiad BARTHOLOMEW with [facility] ([phone]) calling with update.  Patient has been having SOB and over weekend MD increased her Prednisone from 10 mg daily to 20 mg daily.  Patient having no improvement after 5 days on increased dose.  Still feels SOB with activity, feels like heart racing, heart rate 102, using levoalbuterol nebs q4 hours and Albuterol inhaler as needed q6 hours.  Lungs sound clear, no wheezing, she does have a rattly sounding cough or \"a COPD cough\", afebrile.  No SOB at rest, she looks good when not exerting herself.  Appetite good and drinking plenty of fluids.  RN has reviewed pursed lip breathing, deep breathing with her.  She does have an appt scheduled next week with Dr. Tierney.  "

## 2017-03-17 ENCOUNTER — CARE COORDINATION (OUTPATIENT)
Dept: GERIATRIC MEDICINE | Facility: CLINIC | Age: 77
End: 2017-03-17

## 2017-03-17 NOTE — PROGRESS NOTES
3/14/17 Rec'd vm from client's daughter Windy stating that she would like to talk to CM about the possibility of placing her mother on a waiting lists for Assisted Living facilities.  [phone]  3/15/17 Call placed to Windy, shared that CM can assist with any transitions, but would like her to review this with her mother. Windy states that she plans to talk to her mother within the next couple of weeks. Windy also expressed concerns that her mother might be self adjusting the Prednisone on the weekends, her preference is that her mother f/u with Dr. Carrasco.  Shared that her mother is still receiving services through [facility] management and A for bathing. Windy inquired if someone is managing 02.  Explained that homecare nurses would be monitoring VS at each visit.   CM will f/u with [facility] and client  3/17/17 secure e-mail sent to Windy RN HC re the above info. Rec'd tele call from Windy who shared that client has been instructed by PCP re Prednisone and client does f/u with PCP re tapering dose. VS are monitored at each visit. Windy states that  was contacted yesterday who recommended outpatient Pulmonary Rehab. Windy states that client is to f/u with him.   3/17/17 Call placed to client who shared that she is doing well, will see Dr. Carrasco on Tuesday next week. Client states that she does not feel that she could manage Pulmonary rehab at this time. Reviewed current services.   Client has not rec'd her walker. CM to f/u with [facility].   Yeni Savage RN, BC  Supervisor [facility]   [phone] (Fax)

## 2017-03-21 ENCOUNTER — TELEPHONE (OUTPATIENT)
Dept: INTERNAL MEDICINE | Facility: CLINIC | Age: 77
End: 2017-03-21

## 2017-03-22 ENCOUNTER — TRANSFERRED RECORDS (OUTPATIENT)
Dept: HEALTH INFORMATION MANAGEMENT | Facility: CLINIC | Age: 77
End: 2017-03-22

## 2017-03-22 DIAGNOSIS — Z53.9 DIAGNOSIS NOT YET DEFINED: Primary | ICD-10-CM

## 2017-03-22 PROCEDURE — G0179 MD RECERTIFICATION HHA PT: HCPCS | Performed by: INTERNAL MEDICINE

## 2017-03-24 ENCOUNTER — CARE COORDINATION (OUTPATIENT)
Dept: GERIATRIC MEDICINE | Facility: CLINIC | Age: 77
End: 2017-03-24

## 2017-03-24 NOTE — PROGRESS NOTES
Rec'd message to mail information on Assisted Living facilities to Ana Luisa's daughter Windy.  3/23/17 Mailed information on [facility], [facility], [facility], [facility] and [facility] [place].  Yeni Savage RN, BC  Supervisor [facility]   [phone] (Fax)

## 2017-03-27 ENCOUNTER — CARE COORDINATION (OUTPATIENT)
Dept: GERIATRIC MEDICINE | Facility: CLINIC | Age: 77
End: 2017-03-27

## 2017-03-27 DIAGNOSIS — J44.9 COPD, MODERATE (H): Chronic | ICD-10-CM

## 2017-03-27 DIAGNOSIS — J44.1 COPD EXACERBATION (H): ICD-10-CM

## 2017-03-27 NOTE — TELEPHONE ENCOUNTER
Patient reports that DME order for neb machine had been sent to [facility] but needed to be sent to home medical store.  Please sign and fax to [facility].  They are not open yet, will need to contact them for fax number.  CAROL Flores R.N.

## 2017-03-27 NOTE — PROGRESS NOTES
Rec'd vm from client's daughter Windy requesting a return call re: AL facilities and also to report that Dr. Carrasco's office recommended outpatient Pulmonary Rehab. Windy inquired if her mother would still be eligible for [facility]  E-mail sent to Windy RN [facility] to inform of the above, would like to confirm that there would be no conflict to cont with [facility] and outpatient Pulmonary Rehab.  Call placed to Windy, client's dtr, shared that CM mailed info on AL facilities late last week.  Windy also inquired on options of other Pulmonary clinics.   CM to f/u with [employer] re:  in network providers and will call her back tomorrow. Explained that to CM knowledge, client would be able to cont [facility] and attend outpatient therapy as there is no duplication of services.  Yeni Savage RN, BC  Supervisor [facility]   [phone] (Fax)

## 2017-03-28 RX ORDER — LEVALBUTEROL INHALATION SOLUTION 1.25 MG/3ML
SOLUTION RESPIRATORY (INHALATION)
Qty: 1584 ML | Refills: 0 | Status: ON HOLD | OUTPATIENT
Start: 2017-03-28 | End: 2017-05-04

## 2017-03-28 NOTE — TELEPHONE ENCOUNTER
xopenex       Last Written Prescription Date: 1/6/17  Last Fill Quantity: 1584mL, # refills: 1    Last Office Visit with [facility], [facility] or [facility] prescribing provider:  2/3/17   Future Office Visit:       Date of Last Asthma Action Plan Letter:   Asthma Action Plan Q1 Year    Topic Date Due     Asthma Action Plan - yearly  10/03/2017      Asthma Control Test:   ACT Total Scores 2/3/2017   ACT TOTAL SCORE (Goal Greater than or Equal to 20) 18   In the past 12 months, how many times did you visit the emergency room for your asthma without being admitted to the hospital? 0   In the past 12 months, how many times were you hospitalized overnight because of your asthma? 3       Date of Last Spirometry Test:   No results found for this or any previous visit.

## 2017-03-30 ENCOUNTER — CARE COORDINATION (OUTPATIENT)
Dept: GERIATRIC MEDICINE | Facility: CLINIC | Age: 77
End: 2017-03-30

## 2017-03-30 NOTE — PROGRESS NOTES
3/29/17 Left vm client's daughter Windy amin in network options for Pulmonary Medicine:  [facility] [phone]  [facility] [phone]  [facility] [phone]  Reqeust a call back as needed.  Yeni Savage RN, BC  Supervisor [facility]   [phone] (Fax)

## 2017-03-30 NOTE — PROGRESS NOTES
Rec'd vm from Windy BARTHOLOMEW [facility] stating that it is client's request to change to a new pulmonary specialist. Windy states that she explained to client that CM will be in contact with her daughter re options.  Windy states that client is willing to start Pulmonary Rehab and inquired if CM can assist. Windy states that client will need assistance with transportation   [facility] to decrease SNV to 1x/wk and cont with HHA 2x/wk  Call placed to  Outpatient Pulmonary Rehab central scheduling, no referral rec'd.  Call placed to JACKELIN Snowden, informed that last week visit with NP orders for pulmonary rehab. Left vm with respiratory requesting referral be faxed to  Outpatient Re[facility], [place].  Call placed to client to share the above info. CM did leave vm with her daughter 3/29/17 with 3 different clinic options.   Explained that JACKELIN Snowden will fax orders for rehab to  and she will be contacted to schedule the initial appt.  Provided contact number to call  [facility] for assistance with scheduling.     Rec'd f/u call from JACKELIN Snowden stating that they just faxed orders to  Heather Savage RN, BC  Supervisor [facility]   [phone] (Fax)

## 2017-04-04 ENCOUNTER — CARE COORDINATION (OUTPATIENT)
Dept: GERIATRIC MEDICINE | Facility: CLINIC | Age: 77
End: 2017-04-04

## 2017-04-04 NOTE — PROGRESS NOTES
Rec'd vm from client's daughter Windy thanking CM for the assistance with a listing of alternative lung specialists. Windy inquired if a referral is needed and how the files will be transferred. Windy also stated that she has rec'd the info on AL providers, stating that her mother thinks she can stay in her apt and have more services when needed. Windy requests a return call to review when is it the right time to transition to an AL  Rec'd vm from client stating that she scheduled and eye exam with [facility] and does not know if they are contracted with [facility]  Left vm with Renetta, manager at [facility] requesting a return call to review client's case and inquired on services that can be provided in the apt.   Call placed to client, shared that per the [employer] web site, [facility] does have a participating provider in [place]. Enc'd client to bring her [employer] card to the appt and inquire at the appt about status. Client stated that she was not satisfied with [facility].   Call placed to Windy, shared that a referral is not needed for speciality care, but it is important to keep PCP updated. Explained that she or her mother may need to complete a PHYLICIA to transfer current clinic notes to a new provider. Enc'd establishing care with new provider prior to increased medical needs.   Had discission on AL, enc'd to place her mother on wait list. Explained that her mother could receive more services in her apt depending on her needs. Explained that CM will f/u with Renetta to inquire of any services through [facility] AL would be available.  Had discussion on ACP, per Windy she does not believe her mother is aware of lung condition.   Client currently has a POLST. Enc'd Windy to [employer] [place] [employer] to review documents. CM is available to meet with her mother to complete a HCD.  Yeni Savage RN, BC  Supervisor [facility]   [phone] (Fax)

## 2017-04-10 ENCOUNTER — TELEPHONE (OUTPATIENT)
Dept: INTERNAL MEDICINE | Facility: CLINIC | Age: 77
End: 2017-04-10

## 2017-04-10 NOTE — TELEPHONE ENCOUNTER
Nurse calling to verify pt's Losartan dose.  She says the recent bottle she got the directions were to take 50mg bid.  She was advised that our med list says to only take it once daily.  She says her BP has been fine on this dose.

## 2017-04-17 DIAGNOSIS — F41.9 ANXIETY: ICD-10-CM

## 2017-04-17 DIAGNOSIS — I10 BENIGN ESSENTIAL HYPERTENSION: ICD-10-CM

## 2017-04-17 DIAGNOSIS — I10 ESSENTIAL HYPERTENSION WITH GOAL BLOOD PRESSURE LESS THAN 140/90: ICD-10-CM

## 2017-04-17 RX ORDER — LOSARTAN POTASSIUM 25 MG/1
TABLET ORAL
Qty: 120 TABLET | Refills: 0 | OUTPATIENT
Start: 2017-04-17

## 2017-04-17 RX ORDER — LOSARTAN POTASSIUM 50 MG/1
50 TABLET ORAL DAILY
Qty: 30 TABLET | Refills: 0 | Status: SHIPPED | OUTPATIENT
Start: 2017-04-17 | End: 2017-05-24

## 2017-04-17 NOTE — TELEPHONE ENCOUNTER
Last OV-2/3/17    Per 2/3 dict-Follow up in about a month     Looks like dose dec from 50mg bid to 50mg qd

## 2017-04-19 ENCOUNTER — DOCUMENTATION ONLY (OUTPATIENT)
Dept: OTHER | Facility: CLINIC | Age: 77
End: 2017-04-19

## 2017-04-19 DIAGNOSIS — Z71.89 ADVANCED DIRECTIVES, COUNSELING/DISCUSSION: Chronic | ICD-10-CM

## 2017-04-25 ENCOUNTER — DOCUMENTATION ONLY (OUTPATIENT)
Dept: CARE COORDINATION | Facility: CLINIC | Age: 77
End: 2017-04-25

## 2017-04-25 NOTE — PROGRESS NOTES
Please inform the HHC that I agree with the HHC as long as they make sure the patient has the documentation for face to face and home bound status. ( If these are required by the patient's insurance).

## 2017-04-25 NOTE — PROGRESS NOTES
[facility] now requests orders and shares plan of care/discharge summaries for some patients through [employer].  Please REPLY TO THIS MESSAGE in order to give authorization for orders when needed.  This is considered a verbal order, you will still receive a faxed copy of orders for signature.  Thank you for your assistance in improving collaboration for our patients.    ORDERS for Recertification  HHA 2w9 personal cares and bathing  SN 1w8 4 prn disease management, care coordination, medication management, set up, education, lab collection as ordered/needed

## 2017-04-26 ENCOUNTER — TELEPHONE (OUTPATIENT)
Dept: INTERNAL MEDICINE | Facility: CLINIC | Age: 77
End: 2017-04-26

## 2017-04-26 ENCOUNTER — CARE COORDINATION (OUTPATIENT)
Dept: GERIATRIC MEDICINE | Facility: CLINIC | Age: 77
End: 2017-04-26

## 2017-04-26 NOTE — PROGRESS NOTES
[facility] Six-Month Telephone Assessment    6 month telephone assessment completed on 4/26/2017  See previous note, CM made contact with client as a f/u to inquire on how she is doing.     ER visits: No  Hospitalizations: Yes -  [facility]  3x/COPD   TCU stays: Yes -  [facility]  Significant health status changes: see note below, contact with [facility], prednisone changes   Falls/Injuries: Yes: 1 fall due to illness   ADL/IADL changes: No  Changes in services: Yes: since [facility] for med set up, HHA for bathing, hmkg hours increased, client is followed by a behavioral  Health specialist that makes home visits.     Caregiver Assessment follow up:  N/a     Goals: See POC in chart for goal progress documentation.    Client requests to d/c MTM appt goal.     Will see client in 6 months for an annual health risk assessment.   Encouraged client to call CM with any questions or concerns in the meantime.   Yeni Savage RN, BC  Supervisor [facility]   [phone] (Fax)

## 2017-04-26 NOTE — PROGRESS NOTES
Call placed to client to inquire on how she is doing. Client states that she has been having some increased difficulty with her breathing the past couple of days. Client states that she has made the decision to stay with [facility] as they have been better at returning her calls and she is satisfied. Client states that she recently had a change in her prednisone.   Client cont with [facility], states that she has a new homemaker that she is happy with. Client inquired on Mom's Meals, CM provided info, client stated that she is not ready for delivered meals yet.   Reviewed ACP, client states that she is has a POLST. Offered a home visit to discuss and assist with completing a HCD. Client states that she had a HCD completed with a . Client states that she is going through paperwork today and will call CM back.  Yeni Savage RN, BC  Supervisor [facility]   [phone] (Fax)

## 2017-05-01 DIAGNOSIS — E03.9 HYPOTHYROIDISM, UNSPECIFIED TYPE: Primary | Chronic | ICD-10-CM

## 2017-05-01 NOTE — TELEPHONE ENCOUNTER
Levothyroxine     Last Written Prescription Date: 01/06/17 (too early to fill)  Last Quantity: 90, # refills: 1  Last Office Visit with G, P or [facility] prescribing provider: 02/03/17        TSH   Date Value Ref Range Status   01/04/2017 0.35 (L) 0.40 - 4.00 mU/L Final

## 2017-05-02 RX ORDER — LEVOTHYROXINE SODIUM 25 UG/1
TABLET ORAL
Qty: 90 TABLET | Refills: 0 | Status: ON HOLD | OUTPATIENT
Start: 2017-05-02 | End: 2017-05-04

## 2017-05-02 NOTE — TELEPHONE ENCOUNTER
Routing refill request to provider for review/approval because:  Labs out of range:  Cr  0.35

## 2017-05-03 ENCOUNTER — CARE COORDINATION (OUTPATIENT)
Dept: GERIATRIC MEDICINE | Facility: CLINIC | Age: 77
End: 2017-05-03

## 2017-05-03 ENCOUNTER — HOSPITAL ENCOUNTER (OUTPATIENT)
Facility: CLINIC | Age: 77
Setting detail: OBSERVATION
Discharge: HOME | End: 2017-05-05
Attending: EMERGENCY MEDICINE | Admitting: INTERNAL MEDICINE
Payer: COMMERCIAL

## 2017-05-03 ENCOUNTER — APPOINTMENT (OUTPATIENT)
Dept: GENERAL RADIOLOGY | Facility: CLINIC | Age: 77
End: 2017-05-03
Attending: EMERGENCY MEDICINE
Payer: COMMERCIAL

## 2017-05-03 ENCOUNTER — APPOINTMENT (OUTPATIENT)
Dept: CT IMAGING | Facility: CLINIC | Age: 77
End: 2017-05-03
Attending: EMERGENCY MEDICINE
Payer: COMMERCIAL

## 2017-05-03 DIAGNOSIS — S00.83XA FOREHEAD CONTUSION, INITIAL ENCOUNTER: ICD-10-CM

## 2017-05-03 DIAGNOSIS — R55 SYNCOPE, UNSPECIFIED SYNCOPE TYPE: ICD-10-CM

## 2017-05-03 DIAGNOSIS — I35.0 AORTIC VALVE STENOSIS, UNSPECIFIED ETIOLOGY: Primary | ICD-10-CM

## 2017-05-03 LAB
ANION GAP SERPL CALCULATED.3IONS-SCNC: 10 MMOL/L (ref 3–14)
BASOPHILS # BLD AUTO: 0 10E9/L (ref 0–0.2)
BASOPHILS NFR BLD AUTO: 0.2 %
BUN SERPL-MCNC: 12 MG/DL (ref 7–30)
CALCIUM SERPL-MCNC: 9.2 MG/DL (ref 8.5–10.1)
CHLORIDE SERPL-SCNC: 99 MMOL/L (ref 94–109)
CO2 SERPL-SCNC: 21 MMOL/L (ref 20–32)
CREAT SERPL-MCNC: 0.96 MG/DL (ref 0.52–1.04)
DIFFERENTIAL METHOD BLD: ABNORMAL
EOSINOPHIL # BLD AUTO: 0.1 10E9/L (ref 0–0.7)
EOSINOPHIL NFR BLD AUTO: 0.6 %
ERYTHROCYTE [DISTWIDTH] IN BLOOD BY AUTOMATED COUNT: 14.4 % (ref 10–15)
GFR SERPL CREATININE-BSD FRML MDRD: 56 ML/MIN/1.7M2
GLUCOSE SERPL-MCNC: 98 MG/DL (ref 70–99)
HCT VFR BLD AUTO: 33.1 % (ref 35–47)
HGB BLD-MCNC: 10.8 G/DL (ref 11.7–15.7)
IMM GRANULOCYTES # BLD: 0.1 10E9/L (ref 0–0.4)
IMM GRANULOCYTES NFR BLD: 1.4 %
LACTATE BLD-SCNC: 1.8 MMOL/L (ref 0.7–2.1)
LYMPHOCYTES # BLD AUTO: 1.4 10E9/L (ref 0.8–5.3)
LYMPHOCYTES NFR BLD AUTO: 13.7 %
MCH RBC QN AUTO: 29.8 PG (ref 26.5–33)
MCHC RBC AUTO-ENTMCNC: 32.6 G/DL (ref 31.5–36.5)
MCV RBC AUTO: 91 FL (ref 78–100)
MONOCYTES # BLD AUTO: 0.9 10E9/L (ref 0–1.3)
MONOCYTES NFR BLD AUTO: 8.8 %
NEUTROPHILS # BLD AUTO: 7.6 10E9/L (ref 1.6–8.3)
NEUTROPHILS NFR BLD AUTO: 75.3 %
NRBC # BLD AUTO: 0 10*3/UL
NRBC BLD AUTO-RTO: 0 /100
PLATELET # BLD AUTO: 306 10E9/L (ref 150–450)
POTASSIUM SERPL-SCNC: 4.2 MMOL/L (ref 3.4–5.3)
RBC # BLD AUTO: 3.62 10E12/L (ref 3.8–5.2)
SODIUM SERPL-SCNC: 130 MMOL/L (ref 133–144)
TROPONIN I SERPL-MCNC: NORMAL UG/L (ref 0–0.04)
WBC # BLD AUTO: 10 10E9/L (ref 4–11)

## 2017-05-03 PROCEDURE — 96360 HYDRATION IV INFUSION INIT: CPT

## 2017-05-03 PROCEDURE — 93005 ELECTROCARDIOGRAM TRACING: CPT

## 2017-05-03 PROCEDURE — 70450 CT HEAD/BRAIN W/O DYE: CPT

## 2017-05-03 PROCEDURE — 25000128 H RX IP 250 OP 636: Performed by: EMERGENCY MEDICINE

## 2017-05-03 PROCEDURE — 83605 ASSAY OF LACTIC ACID: CPT | Performed by: EMERGENCY MEDICINE

## 2017-05-03 PROCEDURE — 96361 HYDRATE IV INFUSION ADD-ON: CPT

## 2017-05-03 PROCEDURE — 40000985 XR CHEST 2 VW

## 2017-05-03 PROCEDURE — 80048 BASIC METABOLIC PNL TOTAL CA: CPT | Performed by: EMERGENCY MEDICINE

## 2017-05-03 PROCEDURE — 85025 COMPLETE CBC W/AUTO DIFF WBC: CPT | Performed by: EMERGENCY MEDICINE

## 2017-05-03 PROCEDURE — 99285 EMERGENCY DEPT VISIT HI MDM: CPT | Mod: 25

## 2017-05-03 PROCEDURE — 84484 ASSAY OF TROPONIN QUANT: CPT | Performed by: EMERGENCY MEDICINE

## 2017-05-03 RX ORDER — SODIUM CHLORIDE 9 MG/ML
1000 INJECTION, SOLUTION INTRAVENOUS CONTINUOUS
Status: DISCONTINUED | OUTPATIENT
Start: 2017-05-03 | End: 2017-05-04

## 2017-05-03 RX ADMIN — SODIUM CHLORIDE 1000 ML: 9 INJECTION, SOLUTION INTRAVENOUS at 23:10

## 2017-05-03 NOTE — IP AVS SNAPSHOT
MRN:[id number]                      After Visit Summary   5/3/2017    Ana Luisa Lee    MRN: [id number]           Thank you!     Thank you for choosing [facility] for your care. Our goal is always to provide you with excellent care. Hearing back from our patients is one way we can continue to improve our services. Please take a few minutes to complete the written survey that you may receive in the mail after you visit. If you would like to speak to someone directly about your visit please contact Patient Relations at [phone]. Thank you!          Patient Information     Date Of Birth          1940        About your hospital stay     You were admitted on:  May 4, 2017 You last received care in the:  [facility] Observation Department    You were discharged on:  May 5, 2017        Reason for your hospital stay       You were admitted after a mechanical fall at home.  You were up and walking without difficulty at time of d/c.  You will have home PT evaluation at time of d/c.  You also will need to f/u with your outpt pulmonary rehab, as previously scheduled.                  Who to Call     For medical emergencies, please call 911.  For non-urgent questions about your medical care, please call your primary care provider or clinic, [phone]          Attending Provider     Provider Specialty    Russell Camejo MD Emergency Medicine    [facility] Hospital[facility]ido siddiqui MD Internal Medicine    Dea Riley DO Internal Medicine       Primary Care Provider Office Phone # Fax #    Jenny Padmini Hamilton [phone]       [facility] [address] [place] [address]        After Care Instructions     Activity       Your activity upon discharge: as tolerated with walker.            Diet       Follow this diet upon discharge: resume home cardiac diet                  Follow-up Appointments     Follow-up and recommended  labs and tests        Reschedule your initial pulmonary rehab appointment for next week.  F/U with PCP for hospital f/u in 1-2 weeks.  F/U with cardiology new consult within one month.                  Additional Services     Cardiology Eval Adult Referral       Your provider has referred you to:  [facility]: [facility] [phone]   [email or web address]    Please be aware that coverage of these services is subject to the terms and limitations of your health insurance plan.  Call member services at your health plan with any benefit or coverage questions.      Type of Referral:  New Cardiology Consult    Timeframe requested:  Within 1 month    Please bring the following to your appointment:  >>   Any x-rays, CTs or MRIs which have been performed.  Contact the facility where they were done to arrange for  prior to your scheduled appointment.    >>   List of current medications  >>   This referral request   >>   Any documents/labs given to you for this referral            Home Care PT Referral for Hospital Discharge       PT to eval and treat    Your provider has ordered home care - physical therapy. If you have not been contacted within 2 days of your discharge please call the department phone number listed on the top of this document.            Home care nursing referral       RN skilled nursing visit. RN to assess vital signs and weight and respiratory and cardiac status.    Your provider has ordered home care nursing services. If you have not been contacted within 2 days of your discharge please call the inpatient department phone number at [phone] .                             Pending Results     No orders found from 5/1/2017 to 5/4/2017.            Statement of Approval     Ordered          05/05/17 1418  I have reviewed and agree with all the recommendations and orders detailed in this document.  EFFECTIVE NOW    "  Approved and electronically signed by:  Dea Riley DO           17 0937  I have reviewed and agree with all the recommendations and orders detailed in this document.  EFFECTIVE NOW     Approved and electronically signed by:  Dea Riley DO             Admission Information     Date & Time Provider Department Dept. Phone    5/3/2017 Dea Riley DO [facility] Observation Department [phone]      Your Vitals Were     Blood Pressure Pulse Temperature Respirations Height Weight    178/92 (BP Location: Right arm) 91 97.9  F (36.6  C) (Oral) 18 1.575 m (5' 2\") 66.5 kg (146 lb 8 oz)    Pulse Oximetry BMI (Body Mass Index)                97% 26.8 kg/m2          [employer] Information     [employer] lets you send messages to your doctor, view your test results, renew your prescriptions, schedule appointments and more. To sign up, go to [email or web address] . Click on \"Log in\" on the left side of the screen, which will take you to the Welcome page. Then click on \"Sign up Now\" on the right side of the page.     You will be asked to enter the access code listed below, as well as some personal information. Please follow the directions to create your username and password.     Your access code is: [email or web address]  Expires: 8/3/2017 12:38 PM     Your access code will  in 90 days. If you need help or a new code, please call your [facility] clinic or [phone].        Care EveryWhere ID     This is your Care EveryWhere ID. This could be used by other organizations to access your [facility] medical records  [id number]           Review of your medicines      CONTINUE these medicines which have NOT CHANGED        Dose / Directions    albuterol 108 (90 BASE) MCG/ACT Inhaler   Commonly known as:  albuterol   Used for:  Asthma, persistent        Dose:  2 puff   Inhale 2 puffs into the lungs every 6 hours as needed   Refills:  0       ASPIRIN NOT PRESCRIBED   Commonly " known as:  INTENTIONAL        Dose:  1 each   1 each continuous prn. Antiplatelet medication not prescribed intentionally due to Use of NSAIDS   Quantity:  0 each   Refills:  0       B-12 1000 MCG Tbcr   Used for:  Pernicious anemia        Dose:  1000 mcg   Take 1,000 mcg by mouth daily   Quantity:  100 tablet   Refills:  1       budesonide 180 MCG/ACT inhaler   Commonly known as:  PULMICORT FLEXHALER   Used for:  Pulmonary emphysema, unspecified emphysema type (H)        Dose:  1 puff   Inhale 1 puff into the lungs 2 times daily   Refills:  0       calcium 600 MG tablet   Used for:  Pulmonary emphysema, unspecified emphysema type (H)        Dose:  1 tablet   Take 1 tablet (600 mg) by mouth daily   Quantity:  60 tablet   Refills:  0       cholecalciferol 1000 UNIT tablet   Commonly known as:  vitamin D   Used for:  COPD, moderate (H)        Dose:  1000 Units   Take 1 tablet (1,000 Units) by mouth daily   Quantity:  30 tablet   Refills:  0       ferrous sulfate 325 (65 FE) MG tablet   Commonly known as:  IRON   Used for:  Pulmonary emphysema, unspecified emphysema type (H)        Dose:  1 tablet   Take 1 tablet (325 mg) by mouth daily (with breakfast)   Quantity:  100 tablet   Refills:  0       gemfibrozil 600 MG tablet   Commonly known as:  LOPID   Used for:  Hyperlipidemia with target LDL less than 130        Dose:  600 mg   Take 1 tablet (600 mg) by mouth daily   Quantity:  90 tablet   Refills:  0       guaiFENesin-dextromethorphan 100-10 MG/5ML syrup   Commonly known as:  ROBITUSSIN DM   Used for:  Pulmonary emphysema, unspecified emphysema type (H)        Dose:  5 mL   Take 5 mLs by mouth every 4 hours as needed for cough   Quantity:  560 mL   Refills:  0       LANsoprazole 30 MG CR capsule   Commonly known as:  PREVACID   Used for:  Gastroesophageal reflux disease without esophagitis        Dose:  30 mg   Take 1 capsule (30 mg) by mouth daily   Quantity:  90 capsule   Refills:  3       levalbuterol 1.25 MG/3ML  neb solution   Commonly known as:  XOPENEX   Used for:  COPD, moderate (H), COPD exacerbation (H)        Dose:  1 ampule   Take 3 mLs (1.25 mg) by nebulization every 4 hours as needed for shortness of breath / dyspnea or wheezing   Quantity:  1584 mL   Refills:  1       levothyroxine 25 MCG tablet   Commonly known as:  SYNTHROID/LEVOTHROID   Used for:  Hypothyroidism due to acquired atrophy of thyroid        Dose:  25 mcg   Take 1 tablet (25 mcg) by mouth daily   Quantity:  90 tablet   Refills:  1       losartan 50 MG tablet   Commonly known as:  COZAAR   Used for:  Benign essential hypertension        Dose:  50 mg   Take 1 tablet (50 mg) by mouth daily   Quantity:  30 tablet   Refills:  0       montelukast 10 MG tablet   Commonly known as:  SINGULAIR   Used for:  Pulmonary emphysema, unspecified emphysema type (H)        Dose:  10 mg   Take 1 tablet (10 mg) by mouth At Bedtime   Quantity:  30 tablet   Refills:  0       MULTIVITAMIN PO        Dose:  1 tablet   Take 1 tablet by mouth daily   Refills:  0       * order for DME   Used for:  COPD, moderate (H), Other fatigue        Overnight pulse oximetry   Quantity:  1 Device   Refills:  0       * order for DME   Used for:  HTN, goal below 140/90, Labile blood pressure        Equipment being ordered: blood pressure machine   Quantity:  1 Device   Refills:  0       * order for DME   Used for:  Pulmonary emphysema, unspecified emphysema type (H)        Equipment being ordered: Walker Wheels (), Walker () and Other: Four Wheeled Walker Treatment Diagnosis: Impaired gait stability and activity tolerance   Quantity:  1 each   Refills:  0       order for DME   Used for:  COPD, moderate (H)        Equipment being ordered: Nebulizer machine, cup, and tubing.  Fax to [facility] per pt request   Quantity:  1 each   Refills:  0       polyethylene glycol Packet   Commonly known as:  MIRALAX/GLYCOLAX        Dose:  17 g   Take 17 g by mouth daily as  needed for constipation   Refills:  0       * predniSONE 10 MG tablet   Commonly known as:  DELTASONE        Dose:  10 mg   Take 10 mg by mouth every other day (alternates Prednisone 10 mg with Prednisone 15 mg every other day)   Refills:  0       * PREDNISONE PO        Dose:  15 mg   Take 15 mg by mouth every other day (alternates Prednisone 10 mg with Prednisone 15 mg every other day)   Refills:  0       prochlorperazine 5 MG tablet   Commonly known as:  COMPAZINE   Used for:  Nausea        Dose:  5 mg   Take 1 tablet (5 mg) by mouth every 6 hours as needed for nausea or vomiting   Quantity:  120 tablet   Refills:  0       roflumilast 500 MCG Tabs tablet   Commonly known as:  DALIRESP        Dose:  500 mcg   Take 500 mcg by mouth daily   Refills:  0       salmeterol 50 MCG/DOSE diskus inhaler   Commonly known as:  SEREVENT   Used for:  Pulmonary emphysema, unspecified emphysema type (H)        Dose:  1 puff   Inhale 1 puff into the lungs 2 times daily   Refills:  0       tiotropium 18 MCG capsule   Commonly known as:  SPIRIVA   Used for:  Pulmonary emphysema, unspecified emphysema type (H)        Dose:  18 mcg   Inhale 1 capsule (18 mcg) into the lungs daily   Quantity:  30 capsule   Refills:  0       * Notice:  This list has 5 medication(s) that are the same as other medications prescribed for you. Read the directions carefully, and ask your doctor or other care provider to review them with you.             Protect others around you: Learn how to safely use, store and throw away your medicines at www.disposemymeds.org.             Medication List: This is a list of all your medications and when to take them. Check marks below indicate your daily home schedule. Keep this list as a reference.      Medications           Morning Afternoon Evening Bedtime As Needed    albuterol 108 (90 BASE) MCG/ACT Inhaler   Commonly known as:  albuterol   Inhale 2 puffs into the lungs every 6 hours as needed   Last time this was  given:  2 puffs on 5/4/2017  3:00 AM                                ASPIRIN NOT PRESCRIBED   Commonly known as:  INTENTIONAL   1 each continuous prn. Antiplatelet medication not prescribed intentionally due to Use of NSAIDS                                B-12 1000 MCG Tbcr   Take 1,000 mcg by mouth daily                                budesonide 180 MCG/ACT inhaler   Commonly known as:  PULMICORT FLEXHALER   Inhale 1 puff into the lungs 2 times daily                                calcium 600 MG tablet   Take 1 tablet (600 mg) by mouth daily                                cholecalciferol 1000 UNIT tablet   Commonly known as:  vitamin D   Take 1 tablet (1,000 Units) by mouth daily                                ferrous sulfate 325 (65 FE) MG tablet   Commonly known as:  IRON   Take 1 tablet (325 mg) by mouth daily (with breakfast)                                gemfibrozil 600 MG tablet   Commonly known as:  LOPID   Take 1 tablet (600 mg) by mouth daily   Last time this was given:  600 mg on 5/5/2017  9:10 AM                                guaiFENesin-dextromethorphan 100-10 MG/5ML syrup   Commonly known as:  ROBITUSSIN DM   Take 5 mLs by mouth every 4 hours as needed for cough                                LANsoprazole 30 MG CR capsule   Commonly known as:  PREVACID   Take 1 capsule (30 mg) by mouth daily   Last time this was given:  30 mg on 5/5/2017  9:10 AM                                levalbuterol 1.25 MG/3ML neb solution   Commonly known as:  XOPENEX   Take 3 mLs (1.25 mg) by nebulization every 4 hours as needed for shortness of breath / dyspnea or wheezing                                levothyroxine 25 MCG tablet   Commonly known as:  SYNTHROID/LEVOTHROID   Take 1 tablet (25 mcg) by mouth daily   Last time this was given:  25 mcg on 5/5/2017  9:10 AM                                losartan 50 MG tablet   Commonly known as:  COZAAR   Take 1 tablet (50 mg) by mouth daily   Last time this was given:  50 mg on  5/5/2017  9:10 AM                                montelukast 10 MG tablet   Commonly known as:  SINGULAIR   Take 1 tablet (10 mg) by mouth At Bedtime   Last time this was given:  10 mg on 5/4/2017  9:37 PM                                MULTIVITAMIN PO   Take 1 tablet by mouth daily                                * order for DME   Overnight pulse oximetry                                * order for DME   Equipment being ordered: blood pressure machine                                * order for DME   Equipment being ordered: Walker Wheels (), Walker () and Other: Four Wheeled Walker Treatment Diagnosis: Impaired gait stability and activity tolerance                                order for DME   Equipment being ordered: Nebulizer machine, cup, and tubing.  Fax to [facility] per pt request                                polyethylene glycol Packet   Commonly known as:  MIRALAX/GLYCOLAX   Take 17 g by mouth daily as needed for constipation                                * predniSONE 10 MG tablet   Commonly known as:  DELTASONE   Take 10 mg by mouth every other day (alternates Prednisone 10 mg with Prednisone 15 mg every other day)   Last time this was given:  10 mg on 5/5/2017  9:10 AM                                * PREDNISONE PO   Take 15 mg by mouth every other day (alternates Prednisone 10 mg with Prednisone 15 mg every other day)   Last time this was given:  10 mg on 5/5/2017  9:10 AM                                prochlorperazine 5 MG tablet   Commonly known as:  COMPAZINE   Take 1 tablet (5 mg) by mouth every 6 hours as needed for nausea or vomiting                                roflumilast 500 MCG Tabs tablet   Commonly known as:  DALIRESP   Take 500 mcg by mouth daily   Last time this was given:  500 mcg on 5/5/2017  9:10 AM                                salmeterol 50 MCG/DOSE diskus inhaler   Commonly known as:  SEREVENT   Inhale 1 puff into the lungs 2 times daily                                 tiotropium 18 MCG capsule   Commonly known as:  SPIRIVA   Inhale 1 capsule (18 mcg) into the lungs daily                                * Notice:  This list has 5 medication(s) that are the same as other medications prescribed for you. Read the directions carefully, and ask your doctor or other care provider to review them with you.

## 2017-05-03 NOTE — PROGRESS NOTES
Per Kyler, arranged transportation thru [facility] for the below appt:  Appt Date: 5/4/17 @ 3:00pm  Clinic Name:  Pulmonary Rehab, [address] [facility], [place] [address]  Transportation Provider: [place], [place], [place] or [place]   time:  2:15-2:45pm    Notified member of  time.    Carmenza Griffin  Case Management Specialist  [facility]  [phone]

## 2017-05-03 NOTE — IP AVS SNAPSHOT
[facility] Observation Department    [address]    [facility] [address]    Phone:  [phone]                                       After Visit Summary   5/3/2017    Ana Luisa Lee    MRN: [id number]           After Visit Summary Signature Page     I have received my discharge instructions, and my questions have been answered. I have discussed any challenges I see with this plan with the nurse or doctor.    ..........................................................................................................................................  Patient/Patient Representative Signature      ..........................................................................................................................................  Patient Representative Print Name and Relationship to Patient    ..................................................               ................................................  Date                                            Time    ..........................................................................................................................................  Reviewed by Signature/Title    ...................................................              ..............................................  Date                                                            Time

## 2017-05-04 ENCOUNTER — HOSPITAL ENCOUNTER (OUTPATIENT)
Dept: CARDIAC REHAB | Facility: CLINIC | Age: 77
End: 2017-05-04
Attending: INTERNAL MEDICINE
Payer: COMMERCIAL

## 2017-05-04 ENCOUNTER — CARE COORDINATION (OUTPATIENT)
Dept: GERIATRIC MEDICINE | Facility: CLINIC | Age: 77
End: 2017-05-04

## 2017-05-04 ENCOUNTER — APPOINTMENT (OUTPATIENT)
Dept: CARDIOLOGY | Facility: CLINIC | Age: 77
End: 2017-05-04
Attending: PHYSICIAN ASSISTANT
Payer: COMMERCIAL

## 2017-05-04 PROBLEM — R55 SYNCOPE AND COLLAPSE: Status: ACTIVE | Noted: 2017-05-04

## 2017-05-04 LAB
ALBUMIN UR-MCNC: NEGATIVE MG/DL
ANION GAP SERPL CALCULATED.3IONS-SCNC: 9 MMOL/L (ref 3–14)
APPEARANCE UR: CLEAR
BACTERIA #/AREA URNS HPF: ABNORMAL /HPF
BILIRUB UR QL STRIP: NEGATIVE
BUN SERPL-MCNC: 11 MG/DL (ref 7–30)
CALCIUM SERPL-MCNC: 8.9 MG/DL (ref 8.5–10.1)
CHLORIDE SERPL-SCNC: 104 MMOL/L (ref 94–109)
CO2 SERPL-SCNC: 22 MMOL/L (ref 20–32)
COLOR UR AUTO: YELLOW
CREAT SERPL-MCNC: 0.82 MG/DL (ref 0.52–1.04)
GFR SERPL CREATININE-BSD FRML MDRD: 67 ML/MIN/1.7M2
GLUCOSE SERPL-MCNC: 79 MG/DL (ref 70–99)
GLUCOSE UR STRIP-MCNC: NEGATIVE MG/DL
HGB UR QL STRIP: NEGATIVE
HYALINE CASTS #/AREA URNS LPF: 5 /LPF (ref 0–2)
INTERPRETATION ECG - MUSE: NORMAL
KETONES UR STRIP-MCNC: NEGATIVE MG/DL
LACTATE BLD-SCNC: 1.4 MMOL/L (ref 0.7–2.1)
LEUKOCYTE ESTERASE UR QL STRIP: ABNORMAL
MUCOUS THREADS #/AREA URNS LPF: PRESENT /LPF
NITRATE UR QL: NEGATIVE
PH UR STRIP: 6 PH (ref 5–7)
POTASSIUM SERPL-SCNC: 4.1 MMOL/L (ref 3.4–5.3)
RBC #/AREA URNS AUTO: 2 /HPF (ref 0–2)
SODIUM SERPL-SCNC: 135 MMOL/L (ref 133–144)
SP GR UR STRIP: 1.01 (ref 1–1.03)
SQUAMOUS #/AREA URNS AUTO: <1 /HPF (ref 0–1)
TROPONIN I SERPL-MCNC: NORMAL UG/L (ref 0–0.04)
TSH SERPL DL<=0.005 MIU/L-ACNC: 1.96 MU/L (ref 0.4–4)
URN SPEC COLLECT METH UR: ABNORMAL
UROBILINOGEN UR STRIP-MCNC: 0 MG/DL (ref 0–2)
WBC #/AREA URNS AUTO: 21 /HPF (ref 0–2)

## 2017-05-04 PROCEDURE — 25000132 ZZH RX MED GY IP 250 OP 250 PS 637: Performed by: PHYSICIAN ASSISTANT

## 2017-05-04 PROCEDURE — 83605 ASSAY OF LACTIC ACID: CPT | Performed by: PHYSICIAN ASSISTANT

## 2017-05-04 PROCEDURE — G0378 HOSPITAL OBSERVATION PER HR: HCPCS

## 2017-05-04 PROCEDURE — 25000132 ZZH RX MED GY IP 250 OP 250 PS 637: Performed by: INTERNAL MEDICINE

## 2017-05-04 PROCEDURE — 25000125 ZZHC RX 250

## 2017-05-04 PROCEDURE — 81001 URINALYSIS AUTO W/SCOPE: CPT | Performed by: EMERGENCY MEDICINE

## 2017-05-04 PROCEDURE — 94640 AIRWAY INHALATION TREATMENT: CPT | Mod: 76

## 2017-05-04 PROCEDURE — 87086 URINE CULTURE/COLONY COUNT: CPT | Performed by: PHYSICIAN ASSISTANT

## 2017-05-04 PROCEDURE — 80048 BASIC METABOLIC PNL TOTAL CA: CPT | Performed by: INTERNAL MEDICINE

## 2017-05-04 PROCEDURE — 36415 COLL VENOUS BLD VENIPUNCTURE: CPT | Performed by: PHYSICIAN ASSISTANT

## 2017-05-04 PROCEDURE — 40000264 ECHO COMPLETE WITH OPTISON

## 2017-05-04 PROCEDURE — 93306 TTE W/DOPPLER COMPLETE: CPT | Mod: 26 | Performed by: INTERNAL MEDICINE

## 2017-05-04 PROCEDURE — 84484 ASSAY OF TROPONIN QUANT: CPT | Performed by: INTERNAL MEDICINE

## 2017-05-04 PROCEDURE — 25000125 ZZHC RX 250: Performed by: INTERNAL MEDICINE

## 2017-05-04 PROCEDURE — 96361 HYDRATE IV INFUSION ADD-ON: CPT

## 2017-05-04 PROCEDURE — 36415 COLL VENOUS BLD VENIPUNCTURE: CPT | Performed by: INTERNAL MEDICINE

## 2017-05-04 PROCEDURE — 40000274 ZZH STATISTIC RCP CONSULT EA 30 MIN

## 2017-05-04 PROCEDURE — 25000128 H RX IP 250 OP 636: Performed by: PHYSICIAN ASSISTANT

## 2017-05-04 PROCEDURE — 25000125 ZZHC RX 250: Performed by: PHYSICIAN ASSISTANT

## 2017-05-04 PROCEDURE — 25500064 ZZH RX 255 OP 636: Performed by: INTERNAL MEDICINE

## 2017-05-04 PROCEDURE — 40000275 ZZH STATISTIC RCP TIME EA 10 MIN

## 2017-05-04 PROCEDURE — 99219 ZZC INITIAL OBSERVATION CARE,LEVL II: CPT | Performed by: INTERNAL MEDICINE

## 2017-05-04 PROCEDURE — 84443 ASSAY THYROID STIM HORMONE: CPT | Performed by: INTERNAL MEDICINE

## 2017-05-04 PROCEDURE — 94640 AIRWAY INHALATION TREATMENT: CPT

## 2017-05-04 RX ORDER — LEVALBUTEROL INHALATION SOLUTION 1.25 MG/3ML
1 SOLUTION RESPIRATORY (INHALATION) EVERY 4 HOURS PRN
Status: DISCONTINUED | OUTPATIENT
Start: 2017-05-04 | End: 2017-05-05 | Stop reason: HOSPADM

## 2017-05-04 RX ORDER — MECOBALAMIN 5000 MCG
30 TABLET,DISINTEGRATING ORAL DAILY
Status: DISCONTINUED | OUTPATIENT
Start: 2017-05-04 | End: 2017-05-05 | Stop reason: HOSPADM

## 2017-05-04 RX ORDER — PREDNISONE 10 MG/1
10 TABLET ORAL DAILY
Status: DISCONTINUED | OUTPATIENT
Start: 2017-05-04 | End: 2017-05-04

## 2017-05-04 RX ORDER — IBUPROFEN 400 MG/1
400 TABLET, FILM COATED ORAL EVERY 6 HOURS PRN
Status: DISCONTINUED | OUTPATIENT
Start: 2017-05-04 | End: 2017-05-05 | Stop reason: HOSPADM

## 2017-05-04 RX ORDER — IPRATROPIUM BROMIDE AND ALBUTEROL SULFATE 2.5; .5 MG/3ML; MG/3ML
3 SOLUTION RESPIRATORY (INHALATION)
Status: DISCONTINUED | OUTPATIENT
Start: 2017-05-04 | End: 2017-05-05 | Stop reason: HOSPADM

## 2017-05-04 RX ORDER — PREDNISONE 10 MG/1
10 TABLET ORAL EVERY OTHER DAY
Status: DISCONTINUED | OUTPATIENT
Start: 2017-05-04 | End: 2017-05-04

## 2017-05-04 RX ORDER — SODIUM CHLORIDE 9 MG/ML
INJECTION, SOLUTION INTRAVENOUS CONTINUOUS
Status: DISCONTINUED | OUTPATIENT
Start: 2017-05-04 | End: 2017-05-05

## 2017-05-04 RX ORDER — ALBUTEROL SULFATE 90 UG/1
2 AEROSOL, METERED RESPIRATORY (INHALATION) 4 TIMES DAILY PRN
Status: DISCONTINUED | OUTPATIENT
Start: 2017-05-04 | End: 2017-05-05 | Stop reason: HOSPADM

## 2017-05-04 RX ORDER — ROFLUMILAST 500 UG/1
500 TABLET ORAL DAILY
Status: DISCONTINUED | OUTPATIENT
Start: 2017-05-04 | End: 2017-05-05 | Stop reason: HOSPADM

## 2017-05-04 RX ORDER — PREDNISONE 10 MG/1
10 TABLET ORAL EVERY OTHER DAY
COMMUNITY
End: 2017-05-27

## 2017-05-04 RX ORDER — PREDNISONE 10 MG/1
10 TABLET ORAL EVERY OTHER DAY
Status: DISCONTINUED | OUTPATIENT
Start: 2017-05-05 | End: 2017-05-05 | Stop reason: HOSPADM

## 2017-05-04 RX ORDER — LOSARTAN POTASSIUM 25 MG/1
50 TABLET ORAL DAILY
Status: DISCONTINUED | OUTPATIENT
Start: 2017-05-04 | End: 2017-05-05 | Stop reason: HOSPADM

## 2017-05-04 RX ORDER — GEMFIBROZIL 600 MG/1
600 TABLET, FILM COATED ORAL DAILY
Status: DISCONTINUED | OUTPATIENT
Start: 2017-05-04 | End: 2017-05-05 | Stop reason: HOSPADM

## 2017-05-04 RX ORDER — ALBUTEROL SULFATE 0.83 MG/ML
SOLUTION RESPIRATORY (INHALATION)
Status: COMPLETED
Start: 2017-05-04 | End: 2017-05-04

## 2017-05-04 RX ORDER — LIDOCAINE 40 MG/G
CREAM TOPICAL
Status: DISCONTINUED | OUTPATIENT
Start: 2017-05-04 | End: 2017-05-05 | Stop reason: HOSPADM

## 2017-05-04 RX ORDER — LEVOTHYROXINE SODIUM 25 UG/1
25 TABLET ORAL DAILY
Status: DISCONTINUED | OUTPATIENT
Start: 2017-05-04 | End: 2017-05-05 | Stop reason: HOSPADM

## 2017-05-04 RX ORDER — NALOXONE HYDROCHLORIDE 0.4 MG/ML
.1-.4 INJECTION, SOLUTION INTRAMUSCULAR; INTRAVENOUS; SUBCUTANEOUS
Status: DISCONTINUED | OUTPATIENT
Start: 2017-05-04 | End: 2017-05-05 | Stop reason: HOSPADM

## 2017-05-04 RX ORDER — MONTELUKAST SODIUM 10 MG/1
10 TABLET ORAL AT BEDTIME
Status: DISCONTINUED | OUTPATIENT
Start: 2017-05-04 | End: 2017-05-05 | Stop reason: HOSPADM

## 2017-05-04 RX ORDER — TIOTROPIUM BROMIDE 18 UG/1
18 CAPSULE ORAL; RESPIRATORY (INHALATION) DAILY
Status: DISCONTINUED | OUTPATIENT
Start: 2017-05-04 | End: 2017-05-05 | Stop reason: HOSPADM

## 2017-05-04 RX ORDER — SODIUM CHLORIDE 9 MG/ML
1000 INJECTION, SOLUTION INTRAVENOUS CONTINUOUS
Status: DISCONTINUED | OUTPATIENT
Start: 2017-05-04 | End: 2017-05-04

## 2017-05-04 RX ORDER — ALBUTEROL SULFATE 90 UG/1
2 AEROSOL, METERED RESPIRATORY (INHALATION) 4 TIMES DAILY
Status: DISCONTINUED | OUTPATIENT
Start: 2017-05-04 | End: 2017-05-04

## 2017-05-04 RX ADMIN — LOSARTAN POTASSIUM 50 MG: 25 TABLET, FILM COATED ORAL at 10:31

## 2017-05-04 RX ADMIN — IPRATROPIUM BROMIDE AND ALBUTEROL SULFATE 3 ML: .5; 3 SOLUTION RESPIRATORY (INHALATION) at 19:43

## 2017-05-04 RX ADMIN — GEMFIBROZIL 600 MG: 600 TABLET ORAL at 10:32

## 2017-05-04 RX ADMIN — ROFLUMILAST 500 MCG: 500 TABLET ORAL at 10:32

## 2017-05-04 RX ADMIN — MONTELUKAST SODIUM 10 MG: 10 TABLET, FILM COATED ORAL at 21:37

## 2017-05-04 RX ADMIN — IPRATROPIUM BROMIDE AND ALBUTEROL SULFATE 3 ML: .5; 3 SOLUTION RESPIRATORY (INHALATION) at 15:47

## 2017-05-04 RX ADMIN — ALBUTEROL SULFATE 2 PUFF: 90 INHALANT RESPIRATORY (INHALATION) at 03:00

## 2017-05-04 RX ADMIN — ALBUTEROL SULFATE 2.5 MG: 2.5 SOLUTION RESPIRATORY (INHALATION) at 07:19

## 2017-05-04 RX ADMIN — LEVOTHYROXINE SODIUM 25 MCG: 25 TABLET ORAL at 10:34

## 2017-05-04 RX ADMIN — IBUPROFEN 400 MG: 400 TABLET ORAL at 13:25

## 2017-05-04 RX ADMIN — IPRATROPIUM BROMIDE AND ALBUTEROL SULFATE 3 ML: .5; 3 SOLUTION RESPIRATORY (INHALATION) at 11:51

## 2017-05-04 RX ADMIN — HUMAN ALBUMIN MICROSPHERES AND PERFLUTREN 3 ML: 10; .22 INJECTION, SOLUTION INTRAVENOUS at 15:39

## 2017-05-04 RX ADMIN — LANSOPRAZOLE 30 MG: 15 CAPSULE, DELAYED RELEASE ORAL at 10:33

## 2017-05-04 RX ADMIN — PREDNISONE 15 MG: 10 TABLET ORAL at 10:33

## 2017-05-04 RX ADMIN — SODIUM CHLORIDE: 9 INJECTION, SOLUTION INTRAVENOUS at 13:22

## 2017-05-04 ASSESSMENT — ENCOUNTER SYMPTOMS
PALPITATIONS: 0
DIZZINESS: 1
DIARRHEA: 0
WOUND: 1
HEADACHES: 1

## 2017-05-04 NOTE — PROGRESS NOTES
S: pt. C/o more lightheadedness consistently whenever she is getting up  B: Orthostatic positive this morning, pt. asymptomatic at that time  A: Renee CARVALHO made aware.  R: Orthostatics obtained again, pt. Symptomatic, pt. Started on NS @ 100 mL/hr

## 2017-05-04 NOTE — PROGRESS NOTES
"[facility] RCAT     Date: 5/4/2017  Admission Dx: syncope  Pulmonary History COPD  Home Nebulizer/MDI Use: Spiriva QD, Serevent BID, Pulmicort BID, Albuterol prn, xopenex prn  Home Oxygen: none  Acuity Level (RCAT flow sheet): 4- but will keep pt QID, per pt she takes that often at home  Aerosol Therapy initiated: duoneb QID, pt did not bring home inhalers other then albuterol MDI      Pulmonary Hygiene initiated: deep breath and coughing technique      Volume Expansion initiated: IS      Current Oxygen Requirements: RA   Current SpO2: 97%    Re-evaluation date: 5/7/2017    Patient Education: discussed home regimen and indications of bronchodilators.     See \"RT Assessments\" flow sheet for patient assessment scoring and Acuity Level Details.             "

## 2017-05-04 NOTE — H&P
"Dictation to follow.    Ana Luisa Lee is a 77 year old woman with steroid-dependent COPD who lives in the independent care at [facility]. She came to attention after an apparent syncopal event at home. It sounds very much like a simple vasovagal/orthostatic faint, but she did fall and hit her head.     Ms. Lee, who appears fully appropriate and oriented, describes having falen asleep on the couch at home. She got up to go to the bathroom before going to bed, and she abruptly became very dizzy. She found that she has having trouble and changed her course, but fell before she could sit down. She does not think she lost consciousness. She denies other precedent or following symptoms.    In the ED, she is found to have a bruise on her forehead above her left eyebrow and an abrasion on the bridge of her nose from her glasses. Otherwise, she was feeling well, and denied other pain.     Meds at home include losartan 50 mg daily, prednisone 10 mg daily, lorazepam 0.5 mg bid prn and multiple respiratory meds.     /64  Pulse 101  Temp 97.1  F (36.2  C) (Oral)  Resp 20  Ht 1.575 m (5' 2\")  Wt 66.5 kg (146 lb 8 oz)  SpO2 98%  BMI 26.8 kg/m2   A & O x 3.  Heart is reg without R/G. Aortic murmur is 2/6 and sounds harsh, consistent with known aortic sclerosis.  Chest: CTA  No edema, well perfused.     EKG:     Dx:  Syncope, most consistent with orthostatic hypotension and simple faint. EKG nl, Trop neg.    PLAN:  1. Admit to obs on tele.   2. Orthostatic BPs in am.   3. Anticipate discharge home in am.    MD Richa   Internal Medicine/Pediatrics   "

## 2017-05-04 NOTE — PLAN OF CARE
Problem: Discharge Planning  Goal: Discharge Planning (Adult, OB, Behavioral, Peds)  Outcome: Improving  PRIMARY DIAGNOSIS: SYNCOPE  OUTPATIENT/OBSERVATION GOALS TO BE MET BEFORE DISCHARGE:     1. Diagnostic testing complete & at baseline neurologic function: Yes  2. Cleared by consultants (if involved): PT consult scheduled  3. ADLs back to baseline?  Yes  4. Activity and level of assistance: Up with standby assistance for safety  5. Pain status: Pain free.  6. Barriers to discharge noted No     Patient is alert and oriented x4, VSS, except BP slightly elevated. Orthostatics negative. Denies pain except when touching her nose. During the fall patient received an abrasion to the bridge of her nose and forehead. Patient also has black and blue eyes. The left eye is worse than the right. Neuro's intact. IV site saline locked. Reports have a floater in left eye, patient reports this is baseline for her. No new vision changes noted. Will continue to monitor, assess, and offer supportive cares.

## 2017-05-04 NOTE — PLAN OF CARE
Problem: Discharge Planning  Goal: Discharge Planning (Adult, OB, Behavioral, Peds)  ROOM # 222  Living Situation (if not independent, order SW consult): Lives ind senior living   Facility name: [facility]  : Windy (daughter)     Activity level at baseline: Ind with rolling walker  Activity level on admit:SBA with rolling walker.         Patient registered to observation; given Patient Bill of Rights; given the opportunity to ask questions about observation status and their plan of care.  Patient has been oriented to the observation room, bathroom and call light is in place.     Discussed discharge goals and expectations with patient/family.

## 2017-05-04 NOTE — PHARMACY-ADMISSION MEDICATION HISTORY
Admission medication history interview status for this patient is complete. See [facility] admission navigator for allergy information, prior to admission medications and immunization status.     Medication history interview source(s):Patient  Medication history resources (including written lists, pill bottles, clinic record):[facility] list  Primary pharmacy:[facility] [place]    Changes made to [facility] medication list:  Added: none  Deleted: Lorazepam, Melatonin, Mucinex DM. Duplicate Levalbuterol and duplicate Levothyroxine.  Changed: Prednisone to 10 mg alternating with 15 mg per patient (change per pulmonologist). Daliresp taper now at goal of 500 mcg daily.    Actions taken by pharmacist (provider contacted, etc):None     Additional medication history information:None    Medication reconciliation/reorder completed by provider prior to medication history? Yes      Prior to Admission medications    Medication Sig Last Dose Taking? Auth Provider   predniSONE (DELTASONE) 10 MG tablet Take 10 mg by mouth every other day (alternates Prednisone 10 mg with Prednisone 15 mg every other day) 5/3/2017 at Unknown time Yes Unknown, Entered By History   PREDNISONE PO Take 15 mg by mouth every other day 5/2/2017 at Unknown time Yes Unknown, Entered By History   losartan (COZAAR) 50 MG tablet Take 1 tablet (50 mg) by mouth daily 5/3/2017 at Unknown time Yes Jenny Hamilton MD   gemfibrozil (LOPID) 600 MG tablet Take 1 tablet (600 mg) by mouth daily 5/3/2017 at Unknown time Yes Jenny Hamilton MD   prochlorperazine (COMPAZINE) 5 MG tablet Take 1 tablet (5 mg) by mouth every 6 hours as needed for nausea or vomiting 5/3/2017 at Unknown time Yes Mae Barrera MD   roflumilast (DALIRESP) 500 MCG TABS tablet Take 500 mcg by mouth daily  5/3/2017 at Unknown time Yes Reported, Patient   polyethylene glycol (MIRALAX/GLYCOLAX) Packet Take 17 g by mouth daily as needed for constipation 5/3/2017 at Unknown time Yes  Reported, Patient   albuterol (ALBUTEROL) 108 (90 BASE) MCG/ACT Inhaler Inhale 2 puffs into the lungs every 6 hours as needed 5/3/2017 at Unknown time Yes Hallie Barroso MD   levalbuterol (XOPENEX) 1.25 MG/3ML neb solution Take 3 mLs (1.25 mg) by nebulization every 4 hours as needed for shortness of breath / dyspnea or wheezing 5/3/2017 at Unknown time Yes Hallie Barroso MD   budesonide (PULMICORT FLEXHALER) 180 MCG/ACT inhaler Inhale 1 puff into the lungs 2 times daily 5/3/2017 at Unknown time Yes Hallie Barroso MD   montelukast (SINGULAIR) 10 MG tablet Take 1 tablet (10 mg) by mouth At Bedtime 5/3/2017 at Unknown time Yes Hallie Barroso MD   salmeterol (SEREVENT) 50 MCG/DOSE diskus inhaler Inhale 1 puff into the lungs 2 times daily 5/3/2017 at Unknown time Yes Hallie Barroso MD   tiotropium (SPIRIVA) 18 MCG capsule Inhale 1 capsule (18 mcg) into the lungs daily 5/3/2017 at Unknown time Yes Hallie Barroso MD   guaiFENesin-dextromethorphan (ROBITUSSIN DM) 100-10 MG/5ML syrup Take 5 mLs by mouth every 4 hours as needed for cough unknown Yes Hallie Barroso MD   Cyanocobalamin (B-12) 1000 MCG TBCR Take 1,000 mcg by mouth daily 5/3/2017 at Unknown time Yes Hallie Barroso MD   ferrous sulfate (IRON) 325 (65 FE) MG tablet Take 1 tablet (325 mg) by mouth daily (with breakfast) 5/3/2017 at Unknown time Yes Hallie Barroso MD   calcium 600 MG tablet Take 1 tablet (600 mg) by mouth daily 5/3/2017 at Unknown time Yes Hallie Barroso MD   LANsoprazole (PREVACID) 30 MG CR capsule Take 1 capsule (30 mg) by mouth daily 5/3/2017 at Unknown time Yes Hallie Barroso MD   cholecalciferol (VITAMIN D) 1000 UNIT tablet Take 1 tablet (1,000 Units) by mouth daily 5/3/2017 at Unknown time Yes Hallie Barroso MD   Multiple Vitamins-Minerals (MULTIVITAMIN OR) Take 1 tablet by mouth daily 5/3/2017 at Unknown time Yes Unknown, Entered By  History   order for DME Equipment being ordered: Nebulizer machine, cup, and tubing.  Fax to [facility] per pt request   Jenny Hamilton MD   levothyroxine (SYNTHROID/LEVOTHROID) 25 MCG tablet Take 1 tablet (25 mcg) by mouth daily   Hallie Barroso MD   order for DME Equipment being ordered: Walker Wheels (), Walker () and Other: Four Wheeled Walker  Treatment Diagnosis: Impaired gait stability and activity tolerance   Moses Calle MD   order for DME Equipment being ordered: blood pressure machine   Jenny Hamilton MD   order for DME Overnight pulse oximetry   Jenny Hamilton MD   ASPIRIN NOT PRESCRIBED, INTENTIONAL, 1 each continuous prn. Antiplatelet medication not prescribed intentionally due to Use of NSAIDS   Jenny Hamilton MD

## 2017-05-04 NOTE — PLAN OF CARE
Problem: Discharge Planning  Goal: Discharge Planning (Adult, OB, Behavioral, Peds)  Outcome: Improving  Problem: Discharge Planning  Goal: Discharge Planning (Adult, OB, Behavioral, Peds)  Outcome: Improving  PRIMARY DIAGNOSIS: SYNCOPE  OUTPATIENT/OBSERVATION GOALS TO BE MET BEFORE DISCHARGE:      1. Diagnostic testing complete & at baseline neurologic function: No - echocardiogram ordered  2. Cleared by consultants (if involved): N/A - Pt. Canceled, pt. Moving well SBA  3. ADLs back to baseline? Yes  4. Activity and level of assistance: Up with standby assistance for safety, pt. Able to rise out of bed and move Ind with SBA due to previous fall/presyncope episode   5. Pain status: Pain free.  6. Barriers to discharge noted No      Patient is alert and oriented x4, VSS, orthostatics slightly positive - PA Renee Griffin aware.  Pt. Has trace/+1 edema in right foot.  Pt. Walked this morning with SBA and reports slight lightheadedness and shortness of breath, but feels steady.  Pt. Reports she was supposed to start pulmonary rehab today. During the fall patient received an abrasion to the bridge of her nose and forehead. Patient also ecchymosis over left eye. Neuro's intact. Reports have a floater in left eye, patient reports this is baseline for her. No new vision changes noted. Will continue to monitor, assess, and offer supportive cares.

## 2017-05-04 NOTE — ED PROVIDER NOTES
"  History     Chief Complaint:    Fall      HPI   Ana Luisa Lee is a 77 year old female who presents after a fall. The patient states that earlier this evening she stood up from the couch that she was sleeping on to head toward the bathroom. She states that she perhaps stood up too fast and started to \"stumble\" on the way to the bathroom. She reports feeling dizziness that she described more as lightheadedness as she denies vertigo. Because of this unsteadiness, she changed directions and headed to her bed, but states that she fell to the ground and hit her head before she could make it to her bed. Of note, she states that she normally ambulates with a walker, and that she was not using after she stood up from the couch. The patient states that she crawled to her phone after the fall and called her neighbor. Her neighbor came and called EMS. The patient states that the paramedics helped her onto her feet. Here, she is complaining of pain in her forehead and at the bridge of her nose where she also has an abrasion. The patient denies any other pain in her extremities. The patient also denies diarrhea, chest pain, palpitations, or recent changes to her medications.     Cardiac Risk Factors   Sex: Female   Tobacco: Negative   Hypertension: Positive   Diabetes: Negative  Hyperlipidemia: Positive   Family History: Negative      Allergies:  Hydralazine  Penicillin G   Metoprolol   Norvasc   Meloxicam    Medications:    Levothyroxine (synthroid/levothroid) 25 mcg tablet  Losartan (cozaar) 50 mg tablet  Levalbuterol (xopenex) 1.25 mg/3ml neb solution  Order for dme  Prednisone (deltasone) 10 mg tablet  Gemfibrozil (lopid) 600 mg tablet  Prochlorperazine (compazine) 5 mg tablet  Roflumilast (daliresp) 500 mcg tabs tablet  Polyethylene glycol (miralax/glycolax) packet  Melatonin 1 mg tabs tablet  Lorazepam (ativan) 0.5 mg tablet  Albuterol (albuterol) 108 (90 base) mcg/act inhaler  Levalbuterol (xopenex) 1.25 mg/3ml " neb solution  Budesonide (pulmicort flexhaler) 180 mcg/act inhaler  Montelukast (singulair) 10 mg tablet  Salmeterol (serevent) 50 mcg/dose diskus inhaler  Tiotropium (spiriva) 18 mcg capsule  Dextromethorphan-guaifenesin (mucinex dm)  mg per 12 hr tablet  Guaifenesin-dextromethorphan (robitussin dm) 100-10 mg/5ml syrup  Cyanocobalamin (b-12) 1000 mcg tbcr  Ferrous sulfate (iron) 325 (65 fe) mg tablet  Calcium 600 mg tablet  Levothyroxine (synthroid/levothroid) 25 mcg tablet  Lansoprazole (prevacid) 30 mg cr capsule  Cholecalciferol (vitamin d) 1000 unit tablet  Multiple vitamins-minerals (multivitamin or)  Aspirin not prescribed, intentional,    Past Medical History:    Anemia   Arthritis of hand   Asthma, persistent   Asthma, persistent   Chronic intermittent steroid use   COPD exacerbation (H)   Esophageal stricture   Foot deformity   HTN, goal below 140/90   Hyperlipidemia   Hyperlipidemia LDL goal < 130   Hypothyroidism   Hypothyroidism   Left foot pain   Left shoulder pain   Medication side effects   Osteoporosis   Paroxysmal supraventricular tachycardia (H)   PVC (premature ventricular contraction)   Skin cyst   SOB (shortness of breath) on exertion   SVT (supraventricular tachycardia) (H)   Pernicious anemia  Thrush of mouth and esophagus  Steroid-dependent chronic obstructive pulmonary disease (H)  Hypercalcemia  Pes planus  ALEKSANDRA (acute kidney injury)    Past Surgical History:    Bunionectomy lapidus with tarsal metatarsal (tmt) fusion   Ep study, modified svt not induced, pvc's   Gyn surgery   Hysterectomy total abdominal   Hysterectomy, pap no longer indicated   Tonsillectomy     Family History:    Cerebrovascular disease - Mother   Hypertension - Mother     Social History:  Marital Status:   Presents to the ED alone  Tobacco Use: Former smoker  Alcohol Use: No  PCP: Jenny Hamilton      Review of Systems   Cardiovascular: Negative for chest pain and palpitations.    Gastrointestinal: Negative for diarrhea.   Musculoskeletal:        Negative for extremity pain    Skin: Positive for wound (abrasion to bridge of nose).   Neurological: Positive for dizziness, syncope and headaches.         Physical Exam   First Vitals:  BP: (!) 125/91  Pulse: 101  Heart Rate: 101  Temp: 98  F (36.7  C)  Resp: 18  SpO2: 100 %    Physical Exam  Constitutional:  Appears well-developed and well-nourished. Alert. Conversant. Non toxic.  HENT:   Head: No depressed skull fracture, Racoon Eyes, Og's sign, or hemotympanum. Face normal.   Nose: Nose normal.  Mouth/Throat: Oral mucosa is clear and moist. no trismus. Pharynx normal. Tonsils symmetric. No tonsillar enlargement, erythema, or exudate.  Eyes: Conjunctivae normal. EOM normal. Pupils equal, round, and reactive to light. No scleral icterus.   Neck: Normal range of motion. Neck supple. No tracheal deviation present.   Cardiovascular: Normal rate, regular rhythm. No gallop. No friction rub. systolic murmur heard. Symmetric radial artery pulses   Pulmonary/Chest: Effort normal. No stridor. No respiratory distress. No wheezes. No rales. No rhonchi . No tenderness.   Abdominal: Soft. Bowel sounds normal. No distension. No mass. No tenderness. No rebound. No guarding.   Musculoskeletal: Normal range of motion. No edema. No tenderness. No deformity   Lymph: No cervical adenopathy.   Neurological: Alert and oriented to person, place, and time. Normal strength. CN II-VII intact. No sensory deficit. GCS eye subscore is 4. GCS verbal subscore is 5. GCS motor subscore is 6. Normal coordination   Skin: Skin is warm and dry. No rash noted. No pallor. Normal capillary refill.  Psychiatric:  Normal mood. Normal affect.     Emergency Department Course   ECG:  @ 2232  Indication: syncope   Vent. Rate 106 bpm. FL interval 168 ms. QRS duration 88 ms. QT/QTc 328/435 ms. P-R-T axis 46 -9 50.   Sinus tachycardia with occasional and consecutive premature ventricular  complexes    Abnormal ECG.  No significant change when compared to previous ECG from 1/4/17   Read @ 2251 by Dr. Camejo.       Imaging:  X-ray Chest, 2 views:    Heart size is within normal limits. Minimal linear  atelectasis or scarring left lower lung. Interstitial prominence at  the right base medially noted previously is less prominent. No new  focal air-space disease. Moderate-sized esophageal hiatal hernia. A  small nodular density noted previously in the left lower lung is not  seen. Tiny nodular density measuring 0.4 cm at the right base is of  doubtful significance but technically indeterminate. This may be focal  atelectasis or scarring.  Report per radiology.     Head CT without contrast:    1. No acute intracranial findings.  2. Left anterior scalp soft tissue swelling. No underlying skull  fractures.  3. Opacification of multiple left-sided mastoid air cells.  Report per radiology.     Radiographic findings were communicated with the patient who voiced understanding of the findings.      Laboratory:  UA: Clear yellow urine, leukocyte esterase moderate, WBC 21 (H), bacteria few, mucous present, hyaline casts 5 (H), otherwise WNL     CBC:  WBC 10.0, HGB 10.8 (L),     BMP:  (L), GFR estimate 56 (L), otherwise WNL (Creatinine 0.96)     (2239) Lactic acid: 1.8    (2239) Troponin I: <0.015      Interventions:  (2310) Normal Saline, 1 liter, IV bolus      Emergency Department Course:  Nursing notes and vitals reviewed.  I performed an exam of the patient as documented above.   EKG was done, interpretation as above.   IV inserted.   Blood was drawn from the patient. This was sent for laboratory testing, findings above.   Urine sample was obtained and sent for laboratory analysis, findings above.   The patient was sent for a chest x-ray and head CT while in the emergency department, findings above.     Findings and plan explained to the patient who consents to admission.   (0108) I discussed the  patient with Dr. Spears of the hospitalist service, who will admit the patient to a obs tele bed for further monitoring, evaluation, and treatment.       Impression & Plan      Medical Decision Making:  Ana Luisa Lee is a pleasant 77 year old female with COPD who presents to the ER today by EMS after having had a syncopal event that occurred after she stood up from sleeping on the couch tonight. She thinks that maybe she just got up too fast. She describes her dizziness as a lightheadedness, but not any vertigo type symptoms or other neurologic deficits to suggest stroke. Differential for syncope was broad. She does have a heart murmur although no known history of aortic stenosis.     EKG shows sinus rhythm with PVC's but no other dysrhythmia. During her stay here in the ER she maintained initially sinus tach, then normal sinus rhythm with dysrhythmia. She will be admitted to the tele floor for dysrhythmia monitoring.     At this point, EKG is non-ischemic (although not specific, and troponin is negative)    Chest x-ray is negative for pneumonia or CHF. No cardiomegaly or mediastinal widening.     She has baseline anemia, no symptoms of recent GI bleeding. She has mild hyponatremia of uncertain etiology as she is no longer on diuretics. She has been having some recent trouble with her breathing, but that is actually better now. No evidence for COPD exacerbation that would have triggered hypoxia leading to her syncope.     Overall, even after a liter of fluids, she still feels somewhat unsteady when standing up. She will be admitted to the hospitalist service for ongoing hydration for possible dehydration.     Diagnosis:    ICD-10-CM    1. Syncope, unspecified syncope type R55 UA with Microscopic   2. Forehead contusion, initial encounter S00.83XA        Disposition:  Admitted to OBS [facility]      I, Osman See am serving as a scribe on 5/3/2017 at 10:51 PM to personally document services performed by   Bashir based on my observations and the provider's statements to me.     5/3/2017   [facility] EMERGENCY DEPARTMENT       Russell Camejo MD  05/08/17 1437

## 2017-05-04 NOTE — ED NOTES
[facility]  ED Nurse Handoff Report    Ana Luisa Lee is a 77 year old female Pt to ER with lightheadedness and syncopal episode. Pt c/o head pain pt has small abrasion to bridge of nose  ED Chief complaint: Fall  . ED Diagnosis:   Final diagnoses:   Syncope, unspecified syncope type   Forehead contusion, initial encounter     Allergies:   Allergies   Allergen Reactions     Hydralazine Anxiety     Penicillin G Hives     Meloxicam      dizziness       Metoprolol      ? Skin rash on the back     Norvasc [Amlodipine Besylate] Hives   Full Code    Code Status:   Activity level - Baseline/Home:  Independent. Activity Level - Current:   Stand with Assist. Lift room needed: No. Bariatric: No   Needed: No   Isolation: No. Infection: Not Applicable.     Vital Signs:   Vitals:    05/03/17 2315 05/03/17 2330 05/03/17 2345 05/04/17 0010   BP:    159/86   Pulse:       Resp:       Temp:       TempSrc:       SpO2: 97% (!) 87% 98%      Cardiac Rhythm:  ,      Pain level: 0-10 Pain Scale: 5  Patient confused: No. Patient Falls Risk: Yes.     Patient Report - Initial Complaint:     Syncopal episode                                                                 . Focused Assessment:     Tests Performed:  CT of head normal, Cxr normal. Abnormal Results:See Epic  Treatments provided: IVF  Family Comments: No family here with pt  OBS brochure/video discussed/provided to patient:  Yes  ED Medications:   Medications   0.9% sodium chloride BOLUS (1,000 mLs Intravenous New Bag 5/3/17 2310)     Followed by   0.9% sodium chloride infusion (not administered)   0.9% sodium chloride BOLUS (not administered)     Followed by   0.9% sodium chloride infusion (not administered)     Drips infusing:  No     ED Nurse Name/Phone Number: Sheila Griffin,   1:23 AM  RECEIVING UNIT ED HANDOFF REVIEW    Above ED Nurse Handoff Report was reviewed: Yes  Reviewed by: Rachel Jackson on May 4, 2017 at 1:49 AM

## 2017-05-04 NOTE — PLAN OF CARE
Problem: Discharge Planning  Goal: Discharge Planning (Adult, OB, Behavioral, Peds)  Outcome: No Change  Problem: Discharge Planning  Goal: Discharge Planning (Adult, OB, Behavioral, Peds)  Outcome: Improving  Problem: Discharge Planning  Goal: Discharge Planning (Adult, OB, Behavioral, Peds)  Outcome: Improving  PRIMARY DIAGNOSIS: SYNCOPE  OUTPATIENT/OBSERVATION GOALS TO BE MET BEFORE DISCHARGE:      1. Diagnostic testing complete & at baseline neurologic function: No - echocardiogram ordered  2. Cleared by consultants (if involved): N/A - Pt. Canceled, pt. Moving well SBA  3. ADLs back to baseline? Yes  4. Activity and level of assistance: Up with standby assistance for safety, pt. Able to rise out of bed and move Ind with SBA due to previous fall/presyncope episode   5. Pain status: Pain free.  6. Barriers to discharge noted No      Patient is alert and oriented x4, VSS, orthostatics slightly positive - PA Renee Elias aware. Pt. Has trace/+1 edema in right foot. Pt. Walked this morning with SBA and reports slight lightheadedness and shortness of breath, but feels steady. Pt. Reports she was supposed to start pulmonary rehab today. During the fall patient received an abrasion to the bridge of her nose and forehead. Patient also ecchymosis over left eye. Neuro's intact. Reports have a floater in left eye, patient reports this is baseline for her. No new vision changes noted. Waiting for echocardiogram. Will continue to monitor, assess, and offer supportive cares.

## 2017-05-04 NOTE — ED NOTES
Pt arrived to the ED with a syncopal episode at home. Tried to go to the bathroon felt light headed sat down. Stood up again to try and get to the bedroom when she fell. ABC' intact. A&Ox4 C/o pain on head bruise on head. Pain 4/10

## 2017-05-04 NOTE — PLAN OF CARE
Problem: Discharge Planning  Goal: Discharge Planning (Adult, OB, Behavioral, Peds)  Outcome: No Change  PRIMARY DIAGNOSIS: SYNCOPE  OUTPATIENT/OBSERVATION GOALS TO BE MET BEFORE DISCHARGE:     1. Diagnostic testing complete & at baseline neurologic function: No  2. Cleared by consultants (if involved): No  3. ADLs back to baseline?  Yes  4. Activity and level of assistance: Up with standby assistance.  5. Pain status: Improved-controlled with oral pain medications.Shoulder pain.  6. Barriers to discharge noted No     Patient is alert and oriented. Patient denies SOB at this time. Patient does complain of some light headedness and is on IV fluids. Patient had positive orthostatics, will repeat that after IV fluids are administered. Patient is running sinus tach at a rate of 101 but just had a neb. Patient had echo done and results are pending. Patient is a SBA with walker for safety. Patient has bruising on side of face- head ct was done and was negative. Neuros are intact. Will continue to monitor.

## 2017-05-04 NOTE — PROGRESS NOTES
Patient admitted overnight with syncope that was preceded by dizziness/lightheadedness. She is not orthostatic, but continues to have heart rates in the low 100s. We discussed echocardiogram which she would like to have and she continues to be dizzy when walking so will start fluids and wait for urine culture to return. No complaints of painful urination. She also has no fever or elevated WBC.    -Echo pending  -Urine culture pending  -Fluids tonight  -TSH normal  -Restarted Losartan as pressures are higher

## 2017-05-04 NOTE — PROGRESS NOTES
Rec'd voice message from client, stating that she is in the hospital because she had a fall during the night. Client states that it is nothing serious and will likely be going home later today. Client requests that CM cancel her hmkr for today and also cancel her pulmonary rehab appt for later today.   [phone]  EPIC notes reviewed.   Left vm with [employer] to cancel homemaking for today, call placed to [place] [facility] to cancel 3 pm appt due to hospital.  Attempted to reach client, no answer.  Yeni Savage RN, BC  [employer]   [phone] (Fax)

## 2017-05-04 NOTE — H&P
ADMISSION HISTORY AND PHYSICAL      DATE OF ADMISSION:  05/03/2017.      IDENTIFICATION AND CHIEF COMPLAINT:  Ms. Ana Luisa Lee is a 77-year-old woman with steroid dependent COPD who lives in independent care at [facility].  She came to attention today after passing out at home.      HISTORY OF PRESENT ILLNESS:  Ms. Lee evidently had fallen asleep on the cough today.  When she awakened to go to the toilet she stood and started off.  She did not use her walker as she typically would and on the way to the bathroom she evidently became more and more dizzy.  She tried to changed direction but apparently passed out.  She believes that she did not really lose consciousness and was able to sit up right away.  She did strike her forehead and injure the bridge of her nose on her glasses.  She denies other precedent or following symptoms.      REVIEW OF SYSTEMS:  The patient states she has been feeling completely well over the past several days.  No fevers, sweats, chills, respiratory tract symptoms.  No shortness of breath or chest discomfort.  No palpitations, nausea, vomiting, diarrhea or stool change.  She has not noticed new or increased difficulty with ambulating.  The comprehensive 10-system review is negative except as otherwise documented.      PRIOR MEDICAL HISTORY:   1.  Moderate persistent asthma for which she currently is on prednisone at 10 mg daily.  She follows with Dr. Carrasco from [facility] in [place].   2.  Hypothyroidism.   3.  Dyslipidemia.   4.  Hypertension for which the patient is on losartan.      PRIOR SURGICAL HISTORY:    1.  Remote tonsillectomy.   2.  Remote total abdominal hysterectomy.      FAMILY MEDICAL HISTORY:  Reviewed and considered noncontributory given the patient's advanced age.       SOCIAL HISTORY:  The patient apparently smoked and quit in the 1980s.  She is a nondrinker for the most part and has not had any alcohol in the last several weeks.  She currently lives by herself  in independent living as noted.      ALLERGIES:  Hydralazine which caused anxiety and penicillin which caused hives.  Otherwise she reports milder reactions to Norvasc, metoprolol and meloxicam.      MEDICATIONS PRIOR TO THIS HOSPITALIZATION:   1.  Levothyroxine 25 mcg p.o. daily.   2.  Losartan 50 mg p.o. daily.   3.  Xopenex nebulizer every 4 hours as needed.   4.  Prednisone 10 mg p.o. daily.   5.  Gemfibrozil 600 mg p.o. daily.   6.  Compazine 5 mg every 6 hours as needed.   7.  Roflumilast 500 mcg p.o. daily.   8.  Melatonin at bedtime.   9.  Lorazepam 0.25 mg p.o. daily as needed.   10.  Pulmicort inhaler, 1 inhalation b.i.d.   11.  Montelukast 10 mg p.o. daily.   12.  Salmeterol Diskus inhaler, 1 inhalation b.i.d.   13.  Tiotropium 18 mcg inhaled daily.   14.  Lansoprazole 30 mg p.o. daily.   15.  Vitamin supplements.      OBJECTIVE:   GENERAL:  Ms. Lee is an alert, elderly,  female resting comfortably on a gurney in the Emergency Department.  She is alert and coherent.  She is able to give a full history.     VITAL SIGNS:  Patient is afebrile.  Heart rate about 100.  Blood pressure ranging from 125-170/64-90.  Respirations are unlabored in room air with oxygen saturation greater than 95%.  Weight on admission 66.5 kg.  Body mass index approximately 26.   HEENT:  The cranium is normocephalic and significant for a bruise over the left eyebrow.  No skin break is noted here.  In addition, the bridge of the patient's nose is abraded where she presumably fell and had her glasses hit the bridge of her nose.  The eyes are without remarkable scleral icterus or conjunctival injection.  Extraocular motions are conjugate.  Pupils are equal, minimally reactive to light.  Nares and oropharynx are free of obvious lesions.     NECK:  Supple without remarkable cervical or supraclavicular lymphadenopathy or thyromegaly.   CHEST:  Clear with scant rales scattered.  No wheezes or prolonged expiratory phase.   HEART:   Regular without rubs or gallops.  There is a coarse sounding murmur, grade 2/6 in the aortic area radiating to the upper chest.   ABDOMEN:  Soft, nontender, nondistended without appreciable hepatosplenomegaly.  No other masses are appreciated.  Bowel sounds are normal and active.   GENITOURINARY AND BREAST EXAMINATION:  Deferred.   MUSCULOSKELETAL:  No remarkable tenderness over the posterior spinous processes, paraspinous muscle or costovertebral angles.  No significant trauma to the extremities is noted.  No deformities are noted other than some arthritic changes over the hands bilaterally.  No swollen joints.  No significant peripheral edema.   NEUROLOGIC:  Muscle, tone and bulk is within normal limits.  Cranial nerves II-XII are intact.  As noted previously the patient is alert and oriented, gives a comprehensive history.  Word choice, thought processes are entirely appropriate.  Speech is articulate.   SKIN:  Free of obvious lesions over the exposed areas of the distal extremities, back, head and neck.      DATA:  Basic metabolic panel obtained on admission:  Sodium 130, potassium 4.2, creatinine 0.96.  Troponin is less than measureable threshold.  Glucose is normal.  White cell count 10 with normal differential.  Hemoglobin 10.8 with a normal MCV.  Platelet count 306,000.  Urinalysis is a midstream specimen and appears to be potentially suggestive of a urinary tract infection with moderate leukocyte esterase and 21 white cells per high powered field.  Urine culture is requested.      IMAGING:  Chest x-ray is significant for the absence of acute abnormality.  Heart size is normal.        CT scan of the head without contrast shows no acute abnormality.  There is left anterior soft tissue swelling as described consistent with the frontal bruise.      EKG shows a normal sinus rhythm with normal axes and intervals.  No ST-T wave abnormalities are appreciated.  A premature ventricular contraction is noted.       ASSESSMENT:  Ms. Lee is a 77-year-old woman who currently resides in independent care at [facility].  She does have COPD but overall is doing very well.  She apparently stood after being asleep for a period of time on her couch and after taking a couple of steps away from the couch developed abrupt onset of dizziness.  She did fall.  Overall though this is all very consistent with orthostatic hypotension probably due to decreased vasodepressor response.        DIAGNOSES:   1.  Syncope.  This is most consistent with orthostatic hypotension in a simple faint.  EKG and troponins are negative.     2.  Hypertension.  Managed with losartan.  This may have contributed to some degree to the patient's event.  She also though has poorly controlled hypertension and certainly needs to have that losartan.     3.  Steroid dependent COPD.      PLAN:   1.  Admit to observation on telemetry.   2.  Orthostatic blood pressures in the morning.   3.  Medications from home are resumed.   4.  I anticipate discharge tomorrow.  I did not think that an echocardiogram would be indicated but certainly could be considered.         JUAN J MENDOZA MD             D: 2017 09:02   T: 2017 09:29   MT:       Name:     NAM LEE   MRN:      [id number]        Account:      [id number]   :      1940           Admitted:     [id number]      Document: [id number]

## 2017-05-05 ENCOUNTER — CARE COORDINATION (OUTPATIENT)
Dept: GERIATRIC MEDICINE | Facility: CLINIC | Age: 77
End: 2017-05-05

## 2017-05-05 VITALS
OXYGEN SATURATION: 97 % | TEMPERATURE: 97.9 F | BODY MASS INDEX: 26.96 KG/M2 | HEIGHT: 62 IN | RESPIRATION RATE: 18 BRPM | WEIGHT: 146.5 LBS | DIASTOLIC BLOOD PRESSURE: 92 MMHG | HEART RATE: 91 BPM | SYSTOLIC BLOOD PRESSURE: 178 MMHG

## 2017-05-05 LAB
BACTERIA SPEC CULT: NO GROWTH
Lab: NORMAL
MAGNESIUM SERPL-MCNC: 2.2 MG/DL (ref 1.6–2.3)
MICRO REPORT STATUS: NORMAL
SPECIMEN SOURCE: NORMAL
TSH SERPL DL<=0.005 MIU/L-ACNC: 1.34 MU/L (ref 0.4–4)

## 2017-05-05 PROCEDURE — 96361 HYDRATE IV INFUSION ADD-ON: CPT

## 2017-05-05 PROCEDURE — 36415 COLL VENOUS BLD VENIPUNCTURE: CPT | Performed by: INTERNAL MEDICINE

## 2017-05-05 PROCEDURE — 25000132 ZZH RX MED GY IP 250 OP 250 PS 637: Performed by: INTERNAL MEDICINE

## 2017-05-05 PROCEDURE — 40000275 ZZH STATISTIC RCP TIME EA 10 MIN

## 2017-05-05 PROCEDURE — 25000128 H RX IP 250 OP 636: Performed by: PHYSICIAN ASSISTANT

## 2017-05-05 PROCEDURE — 25000125 ZZHC RX 250: Performed by: INTERNAL MEDICINE

## 2017-05-05 PROCEDURE — 25000132 ZZH RX MED GY IP 250 OP 250 PS 637: Performed by: PHYSICIAN ASSISTANT

## 2017-05-05 PROCEDURE — 99217 ZZC OBSERVATION CARE DISCHARGE: CPT | Performed by: INTERNAL MEDICINE

## 2017-05-05 PROCEDURE — G0378 HOSPITAL OBSERVATION PER HR: HCPCS

## 2017-05-05 PROCEDURE — 84443 ASSAY THYROID STIM HORMONE: CPT | Performed by: INTERNAL MEDICINE

## 2017-05-05 PROCEDURE — 83735 ASSAY OF MAGNESIUM: CPT | Performed by: INTERNAL MEDICINE

## 2017-05-05 PROCEDURE — 25000125 ZZHC RX 250: Performed by: PHYSICIAN ASSISTANT

## 2017-05-05 PROCEDURE — 94640 AIRWAY INHALATION TREATMENT: CPT | Mod: 76

## 2017-05-05 PROCEDURE — 94640 AIRWAY INHALATION TREATMENT: CPT

## 2017-05-05 RX ADMIN — IBUPROFEN 400 MG: 400 TABLET ORAL at 10:31

## 2017-05-05 RX ADMIN — IPRATROPIUM BROMIDE AND ALBUTEROL SULFATE 3 ML: .5; 3 SOLUTION RESPIRATORY (INHALATION) at 12:45

## 2017-05-05 RX ADMIN — PREDNISONE 10 MG: 10 TABLET ORAL at 09:10

## 2017-05-05 RX ADMIN — ROFLUMILAST 500 MCG: 500 TABLET ORAL at 09:10

## 2017-05-05 RX ADMIN — LEVOTHYROXINE SODIUM 25 MCG: 25 TABLET ORAL at 09:10

## 2017-05-05 RX ADMIN — LANSOPRAZOLE 30 MG: 15 CAPSULE, DELAYED RELEASE ORAL at 09:10

## 2017-05-05 RX ADMIN — GEMFIBROZIL 600 MG: 600 TABLET ORAL at 09:10

## 2017-05-05 RX ADMIN — IPRATROPIUM BROMIDE AND ALBUTEROL SULFATE 3 ML: .5; 3 SOLUTION RESPIRATORY (INHALATION) at 08:30

## 2017-05-05 RX ADMIN — LOSARTAN POTASSIUM 50 MG: 25 TABLET, FILM COATED ORAL at 09:10

## 2017-05-05 RX ADMIN — SODIUM CHLORIDE: 9 INJECTION, SOLUTION INTRAVENOUS at 00:25

## 2017-05-05 NOTE — DISCHARGE SUMMARY
"[facility]    Discharge Summary  Hospitalist    Date of Admission:  5/3/2017  Date of Discharge:  5/5/2017  Provider:  Dea Riley, DO    Discharge Diagnoses   1.  Mechanical fall over walker  2.  Facial contusion to nasal bridge and left eye  3.  COPD, steroid-dependent  4.  HTN  5.  Fatigue     Other medical issues:  Past Medical History:   Diagnosis Date     Anemia Dec 2011     Arthritis of hand      Asthma, persistent     f/U dr Brock at [facility]     Asthma, persistent      Chronic intermittent steroid use     for asthma     COPD exacerbation (H) 10/25/2013     Esophageal stricture 2007    s/p dilation     Foot deformity     Left     HTN, goal below 140/90      Hyperlipidemia      Hyperlipidemia LDL goal < 130      Hypothyroidism Dec 2011     Hypothyroidism      Left foot pain Nov 2011     Left shoulder pain Nov 2011     Medication side effects      Osteoporosis      Paroxysmal supraventricular tachycardia (H)      PVC (premature ventricular contraction) May 2012     Skin cyst Dec 2011    L thumb     SOB (shortness of breath) on exertion May 2012     SVT (supraventricular tachycardia) (H)        History of Present Illness   Ana Luisa Lee is an 77 year old female who presented to the ER after sustaining a mechanical fall and facial contusion at independent living situation.  Please see the admission history and physical for full details.    Hospital Course   Ana Luisa Lee was admitted on 5/3/2017.  The following problems were addressed during her hospitalization:    1.  Mechanical fall with facial contusion  Ms. Lee resides in independent living apartment.  She was attempting to get up quickly, felt a \"little dizzy\" and then missed her walker and fell to the ground.  She hit her face in the fall with her glasses on and sustained bruising to the left eye and the nasal bridge.      CT of the head was down without any acute intracranial findings.  She had some " left ant. Scalp soft tissue swelling but no skull fractures.  She did not have any significant tenderness over her facial bones and no clear fractures clinically evident.  She did have significant bruising over the left orbit.  She was told to hold her ASA, for now, because of the bruising.  This can be restarted after hospital f/u with PCP.    She was walking with her walker independently in the halls prior to d/c.  She had negative orthostatic BP readings on admission and prior to d/c.      She will have home PT eval her after her d/c home.    2.  COPD - steroid dependent  No changes were made in her chronic, daily prednisone.  She is followed closely with pulmonary medicine and is scheduled to start pulmonary rehab in the outpt setting.  CXR without acute infiltrate ---but noted to have atelechtesis/scarring.  Will have close f/u imaging with pulmonary medicine.    3.  Aortic stenosis  She had an echo done on day of admission.  She is found to have some progression of her aortic stenosis.  She should f/u with cardiology within the next month (new consult ordered).  She had no exertional symptoms of chest pain, light-headedness or SOB.    Significant Results and Procedures       Pending Results   Unresulted Labs Ordered in the Past 30 Days of this Admission     Date and Time Order Name Status Description    5/4/2017 0850 Urine Culture Aerobic Bacterial Preliminary           Code Status   Full Code       Primary Care Physician   Jenny Hamilton    Physical Exam   Temp: 97.9  F (36.6  C) Temp src: Oral BP: (!) 178/92 Pulse: 91 Heart Rate: 96 Resp: 18 SpO2: 96 % O2 Device: None (Room air)    Vitals:    05/04/17 0215   Weight: 66.5 kg (146 lb 8 oz)     Vital Signs with Ranges  Temp:  [97.3  F (36.3  C)-98.8  F (37.1  C)] 97.9  F (36.6  C)  Pulse:  [91] 91  Heart Rate:  [] 96  Resp:  [18-28] 18  BP: (129-178)/(65-92) 178/92  SpO2:  [95 %-98 %] 96 %  I/O last 3 completed shifts:  In: 1217 [P.O.:400;  "I.V.:817]  Out: -   PT SEEN AND EXAMINED ON DAY OF D/C  GEN:  Alert, oriented x 3, comfortable, no acute respiratory distress  HEENT:  Normocephalic/atraumatic, PERRL, no scleral icterus, no nasal discharge, mouth moist, no clear oral ulcers or thrush noted.  Known \"geographic tongue\".  CV:  Regular rate and rhythm, no murmur to ausc.  S1 + S2 noted, no S3 or S4.  LUNGS:  Mild rales at bilateral bases, otherwise clear to auscultation bilaterally.  No significant rhonchi or wheezing auscultated bilaterally.  No costal retractions bilaterally.  Symmetric chest rise on inhalation noted.  ABD:  Active bowel sounds, soft, non-tender/non-distended.  No rebound/guarding/rigidity.  No masses palpated.  No obvious HSM to exam.  EXT:  No edema or cyanosis bilaterally. No joint synovitis noted.  No calf-tenderness or asymmetry noted.  SKIN:  Dry to touch, no rashes or jaundice noted.  PSYCH:  Mood somewhat anxious, Not tearful or depressed.  Maintains direct eye contact.  NEURO:  No tremors at rest    Discharge Disposition   Discharged to home    Consultations This Hospital Stay   PHYSICAL THERAPY ADULT IP CONSULT  PHYSICAL THERAPY ADULT IP CONSULT    Time Spent on this Encounter   I, Dea Riley, personally saw the patient today and spent greater than 30 minutes discharging this patient.    Discharge Orders     Home care nursing referral     Home Care PT Referral for Hospital Discharge     Cardiology Eval Adult Referral     Reason for your hospital stay   You were admitted after a mechanical fall at home.  You were up and walking without difficulty at time of d/c.  You will have home PT evaluation at time of d/c.  You also will need to f/u with your outpt pulmonary rehab, as previously scheduled.     Activity   Your activity upon discharge: as tolerated with walker.     MD face to face encounter   Documentation of Face to Face and Certification for Home Health Services    I certify that patient: Ana Luisa Haynes " Rosa is under my care and that I, or a nurse practitioner or physician's assistant working with me, had a face-to-face encounter that meets the physician face-to-face encounter requirements with this patient on: 5/5/2017.    This encounter with the patient was in whole, or in part, for the following medical condition, which is the primary reason for home health care: resume home RN and medication set-up services.    I certify that, based on my findings, the following services are medically necessary home health services: Nursing and Physical Therapy.    My clinical findings support the need for the above services because: Nurse is needed: To provide assessment and oversight required in the home to assure adherence to the medical plan due to: progressive COPD.    Further, I certify that my clinical findings support that this patient is homebound (i.e. absences from home require considerable and taxing effort and are for medical reasons or [employer] services or infrequently or of short duration when for other reasons) because: Requires assistance of another person or specialized equipment to access medical services because patient: Is unable to operate assistive equipment on their own...    Based on the above findings. I certify that this patient is confined to the home and needs intermittent skilled nursing care, physical therapy and/or speech therapy.  The patient is under my care, and I have initiated the establishment of the plan of care.  This patient will be followed by a physician who will periodically review the plan of care.  Physician/Provider to provide follow up care: Jenny Hamilton    Attending hospital physician (the Medicare certified [place] provider): Dea Riley  Physician Signature: See electronic signature associated with these discharge orders.  Date: 5/5/2017     Follow-up and recommended labs and tests    Reschedule your initial pulmonary rehab appointment for next  week.  F/U with PCP for hospital f/u in 1-2 weeks.  F/U with cardiology new consult within one month.     Full Code     Diet   Follow this diet upon discharge: resume home cardiac diet       Discharge Medications   Current Discharge Medication List      CONTINUE these medications which have NOT CHANGED    Details   !! predniSONE (DELTASONE) 10 MG tablet Take 10 mg by mouth every other day (alternates Prednisone 10 mg with Prednisone 15 mg every other day)      !! PREDNISONE PO Take 15 mg by mouth every other day (alternates Prednisone 10 mg with Prednisone 15 mg every other day)      losartan (COZAAR) 50 MG tablet Take 1 tablet (50 mg) by mouth daily  Qty: 30 tablet, Refills: 0    Comments: Pt past due for appt-needs to call our office.  Associated Diagnoses: Benign essential hypertension      gemfibrozil (LOPID) 600 MG tablet Take 1 tablet (600 mg) by mouth daily  Qty: 90 tablet, Refills: 0    Comments: Medication is being filled for 1 time refill only due to:  upcoming appt  Associated Diagnoses: Hyperlipidemia with target LDL less than 130      prochlorperazine (COMPAZINE) 5 MG tablet Take 1 tablet (5 mg) by mouth every 6 hours as needed for nausea or vomiting  Qty: 120 tablet, Refills: 0    Associated Diagnoses: Nausea      roflumilast (DALIRESP) 500 MCG TABS tablet Take 500 mcg by mouth daily       polyethylene glycol (MIRALAX/GLYCOLAX) Packet Take 17 g by mouth daily as needed for constipation      albuterol (ALBUTEROL) 108 (90 BASE) MCG/ACT Inhaler Inhale 2 puffs into the lungs every 6 hours as needed    Associated Diagnoses: Asthma, persistent      levalbuterol (XOPENEX) 1.25 MG/3ML neb solution Take 3 mLs (1.25 mg) by nebulization every 4 hours as needed for shortness of breath / dyspnea or wheezing  Qty: 1584 mL, Refills: 1    Associated Diagnoses: COPD, moderate (H); COPD exacerbation (H)      budesonide (PULMICORT FLEXHALER) 180 MCG/ACT inhaler Inhale 1 puff into the lungs 2 times daily    Associated  Diagnoses: Pulmonary emphysema, unspecified emphysema type (H)      montelukast (SINGULAIR) 10 MG tablet Take 1 tablet (10 mg) by mouth At Bedtime  Qty: 30 tablet    Associated Diagnoses: Pulmonary emphysema, unspecified emphysema type (H)      salmeterol (SEREVENT) 50 MCG/DOSE diskus inhaler Inhale 1 puff into the lungs 2 times daily    Associated Diagnoses: Pulmonary emphysema, unspecified emphysema type (H)      tiotropium (SPIRIVA) 18 MCG capsule Inhale 1 capsule (18 mcg) into the lungs daily  Qty: 30 capsule    Associated Diagnoses: Pulmonary emphysema, unspecified emphysema type (H)      guaiFENesin-dextromethorphan (ROBITUSSIN DM) 100-10 MG/5ML syrup Take 5 mLs by mouth every 4 hours as needed for cough  Qty: 560 mL    Associated Diagnoses: Pulmonary emphysema, unspecified emphysema type (H)      Cyanocobalamin (B-12) 1000 MCG TBCR Take 1,000 mcg by mouth daily  Qty: 100 tablet, Refills: 1    Associated Diagnoses: Pernicious anemia      ferrous sulfate (IRON) 325 (65 FE) MG tablet Take 1 tablet (325 mg) by mouth daily (with breakfast)  Qty: 100 tablet    Associated Diagnoses: Pulmonary emphysema, unspecified emphysema type (H)      calcium 600 MG tablet Take 1 tablet (600 mg) by mouth daily  Qty: 60 tablet    Associated Diagnoses: Pulmonary emphysema, unspecified emphysema type (H)      LANsoprazole (PREVACID) 30 MG CR capsule Take 1 capsule (30 mg) by mouth daily  Qty: 90 capsule, Refills: 3    Associated Diagnoses: Gastroesophageal reflux disease without esophagitis      cholecalciferol (VITAMIN D) 1000 UNIT tablet Take 1 tablet (1,000 Units) by mouth daily  Qty: 30 tablet    Associated Diagnoses: COPD, moderate (H)      Multiple Vitamins-Minerals (MULTIVITAMIN OR) Take 1 tablet by mouth daily      !! order for DME Equipment being ordered: Nebulizer machine, cup, and tubing.  Fax to [facility] per pt request  Qty: 1 each, Refills: 0    Associated Diagnoses: COPD, moderate (H)       levothyroxine (SYNTHROID/LEVOTHROID) 25 MCG tablet Take 1 tablet (25 mcg) by mouth daily  Qty: 90 tablet, Refills: 1    Associated Diagnoses: Hypothyroidism due to acquired atrophy of thyroid      !! order for DME Equipment being ordered: Walker Wheels (), Walker () and Other: Four Wheeled Walker  Treatment Diagnosis: Impaired gait stability and activity tolerance  Qty: 1 each, Refills: 0    Associated Diagnoses: Pulmonary emphysema, unspecified emphysema type (H)      !! order for DME Equipment being ordered: blood pressure machine  Qty: 1 Device, Refills: 0    Associated Diagnoses: HTN, goal below 140/90; Labile blood pressure      !! order for DME Overnight pulse oximetry  Qty: 1 Device, Refills: 0    Associated Diagnoses: COPD, moderate (H); Other fatigue      ASPIRIN NOT PRESCRIBED, INTENTIONAL, 1 each continuous prn. Antiplatelet medication not prescribed intentionally due to Use of NSAIDS  Qty: 0 each, Refills: 0       !! - Potential duplicate medications found. Please discuss with provider.        Allergies   Allergies   Allergen Reactions     Hydralazine Anxiety     Penicillin G Hives     Meloxicam      dizziness       Metoprolol      ? Skin rash on the back     Norvasc [Amlodipine Besylate] Hives     Data     Recent Labs  Lab 05/03/17 2239   WBC 10.0   HGB 10.8*   HCT 33.1*   MCV 91          Recent Labs  Lab 05/04/17  0555 05/03/17 2239    130*   POTASSIUM 4.1 4.2   CHLORIDE 104 99   CO2 22 21   ANIONGAP 9 10   GLC 79 98   BUN 11 12   CR 0.82 0.96   GFRESTIMATED 67 56*   GFRESTBLACK 81 68   DIANA 8.9 9.2       Recent Labs  Lab 05/05/17  0950 05/04/17  0555 05/03/17 2239   NA  --  135 130*   POTASSIUM  --  4.1 4.2   CHLORIDE  --  104 99   CO2  --  22 21   ANIONGAP  --  9 10   GLC  --  79 98   BUN  --  11 12   CR  --  0.82 0.96   GFRESTIMATED  --  67 56*   GFRESTBLACK  --  81 68   DIANA  --  8.9 9.2   MAG 2.2  --   --      No results for input(s): AST, ALT, GGT, ALKPHOS, BILITOTAL,  BILICONJ, BILIDIRECT, BEKAH in the last 168 hours.    Invalid input(s): BILIRUBININDIRECT    Recent Labs  Lab 05/05/17  0950 05/04/17  0555   TSH 1.34 1.96     Results for orders placed or performed during the hospital encounter of 05/03/17   XR Chest 2 Views    Narrative    XR CHEST 2 VW  5/4/2017 12:24 AM     INDICATION: Syncope.    COMPARISON: 1/3/2017.      Impression    IMPRESSION: Heart size is within normal limits. Minimal linear  atelectasis or scarring left lower lung. Interstitial prominence at  the right base medially noted previously is less prominent. No new  focal air-space disease. Moderate-sized esophageal hiatal hernia. A  small nodular density noted previously in the left lower lung is not  seen. Tiny nodular density measuring 0.4 cm at the right base is of  doubtful significance but technically indeterminate. This may be focal  atelectasis or scarring.    MANISH AGUDELO MD   CT Head w/o Contrast    Narrative    CT HEAD W/O CONTRAST  5/3/2017 11:55 PM     HISTORY: Syncope, head injury.    TECHNIQUE: Axial images of the head and coronal reformations without  I.V. contrast material. Radiation dose for this scan was reduced using  automated exposure control, adjustment of the mA and/or kV according  to patient size, or iterative reconstruction technique.    COMPARISON: 1/3/2017.    FINDINGS: Mild left anterior scalp soft tissue swelling. No underlying  skull fractures. No intracranial hemorrhage, mass or mass effect. No  acute infarct identified. No shift of midline structures. The  ventricles are symmetric. Visualized paranasal sinuses and right  mastoid air cells are clear. There is new opacification of multiple  left-sided mastoid air cells.      Impression    IMPRESSION:  1. No acute intracranial findings.  2. Left anterior scalp soft tissue swelling. No underlying skull  fractures.  3. Opacification of multiple left-sided mastoid air cells.    MANISH AGUDELO MD

## 2017-05-05 NOTE — PLAN OF CARE
Problem: Discharge Planning  Goal: Discharge Planning (Adult, OB, Behavioral, Peds)  Outcome: Improving  PRIMARY DIAGNOSIS: SYNCOPE  OUTPATIENT/OBSERVATION GOALS TO BE MET BEFORE DISCHARGE:      1. Diagnostic testing complete & at baseline neurologic function: No  2. Cleared by consultants (if involved): No  3. ADLs back to baseline? Yes  4. Activity and level of assistance: Up with standby assistance.  5. Pain status: Improved-controlled with oral pain medications.Shoulder pain.  6. Barriers to discharge noted No      Patient is alert and oriented. Patient denies SOB at this time. Patient does complain of some light headedness and is on IV fluids. Patient had positive orthostatics, will repeat that after IV fluids are administered. Patient is running sinus tach at a rate of 101 but just had a neb. Patient flagged septic protocol, lactic acid was drawn and was WNL. Patient had echo done and results are back, PA notified of these results and said patient will need follow up in am. Patient is a SBA with walker for safety. Patient has bruising on side of face- head ct was done and was negative. Neuros are intact. Will continue to monitor.

## 2017-05-05 NOTE — PROGRESS NOTES
Patient will discharge to home with estabilished [facility] adding PT she will return home at 3:30 via green and white taxi from our hospital, [facility] RN will meet her there as she will need help getting settled.  Home health Aid will be arriving around 4:00.   Please call [facility] when she leaves the hospital so she know that she is on her way to the [facility].    GIANLUCA [phone])  confirmations number [phone]  Rafia Romero RN BSN CTS  [facility]   Care Management Coordinator  [email or web address]   [phone]

## 2017-05-05 NOTE — CONSULTS
Care Transition Initial Assessment - RN    Reason For Consult: care coordination/care conference   Met with: Patient.    DATA   Active Problems:    Syncope and collapse       Cognitive Status: awake.  Primary Care Clinic Name: [facility]  Primary Care MD Name: Kelsy  Contact information and PCP information verified: Yes    Lives With: alone  Living Arrangements: apartment ([place])       Insurance concerns: No Insurance issues identified    ASSESSMENT  Patient currently receives the following services:  [facility]-she would like a life line trial        Identified issues/concerns regarding health management: no  Safety at home, life line offered and she would like that option, [facility] will be increased with PT for home, she currently has [facility]    PLAN  Financial costs for the patient include yes .  Patient given options and choices for discharge yes to TCU .  Patient/family is agreeable to the plan?  Yes: home with increased [facility] PT  Patient anticipates discharging to Home with [facility] .     Patient anticipates needs for home equipment: NO    Plan/Disposition: Home   Appointments: none at this time     Care  (CTS) will continue to follow as needed.    Rafia Romero RN BSN CTS  [facility]   Care Management Coordinator  [email or web address]   [phone]

## 2017-05-05 NOTE — PROGRESS NOTES
Discharge Placement        Facility/Agency Request Status Selected? Address Phone Number Fax Number     [facility] - REFERRAL ONLY (SNF) Pending - Request Sent     [id number] [address] [phone]     [facility] (SNF) Pending - Request Sent     [address] [place] [address]       Rafia Romero RN BSN CTS  [facility]   Care Management Coordinator  [email or web address]   [phone]

## 2017-05-05 NOTE — PLAN OF CARE
Problem: Discharge Planning  Goal: Discharge Planning (Adult, OB, Behavioral, Peds)  Outcome: Improving  PRIMARY DIAGNOSIS: SYNCOPE  OUTPATIENT/OBSERVATION GOALS TO BE MET BEFORE DISCHARGE:      1. Diagnostic testing complete & at baseline neurologic function: No  2. Cleared by consultants (if involved): No  3. ADLs back to baseline? Yes  4. Activity and level of assistance: Up with standby assistance and a walker  5. Pain status: denies pain.   6. Barriers to discharge noted No      Patient is alert and oriented, BP elevated 173/. Tele monitoring in place, Patient is a SBA with walker for safety. Neuros are intact. Will continue to monitor.

## 2017-05-05 NOTE — PLAN OF CARE
Problem: Discharge Planning  Goal: Discharge Planning (Adult, OB, Behavioral, Peds)  Outcome: Adequate for Discharge Date Met:  05/05/17  Patient's After Visit Summary was reviewed with patient.  Patient verbalized understanding of After Visit Summary, recommended follow up and was given an opportunity to ask questions.   Discharge medications sent home with patient/family: Not applicable,    Discharged with self- taxi service     OBSERVATION patient END time: 1454     Patient was stable at discharge. Patient will be provided a wheelchair transport down to discharge around 3:20, taxi is to arrive at 3:30

## 2017-05-05 NOTE — PROGRESS NOTES
EPIC notes reviewed, client remains at [facility].   Rec'd vm from client requesting CM cancel her HHA today.   Rec'd voice message from client's daughter Windy to inform CM that her mother is in the hospital under observation due to fall and will likely be released later today. Windy inquired if her mother would be able to get assistance to get from the taxi to her apt and get settled in.    Rec'd f/u vm from client's daughter Windy to state that her mother will be going to a TCU. Windy requests a return call, stating that she has questions if her mother scheduled her pulmonary rehab appt and how her appt's are going with her psycho therapist  [phone]  Left vm with Mariana BARTHOLOMEW CC at [facility] to introduce myself and state that CM did note in [facility] referral placed to [facility] and [facility]. Shared current community POC, request a call back as needed.  Call placed to client to report that CM is aware of her hospital admission and did notify Windy RN [facility] on 5/4/17 of observation status. Client states that she will be going to TCU. Inquired how she felt about this. Client stated that she feels ok to go home, but the staff recommended she go to rehab. Explained that CM will f/u in the EMR and cont communication during the transition process, enc'd client to call CM as needed.   Shared that her daughter Windy had left CM a vm re pulmonary rehab and her therapist. Explained that CM will call her daughter back and also enc her to be in contact with [facility] as needed. Explained that per this CM knowledge, there would be no one that could assist her with getting from her taxi and back to her apt and get settled in. Explained that she is currently living in an indep apt complex, but CM will f/u with Kaya at the apt.  Client states that she is not ready to move to an AL building yet, stating that a friend usually is available to assist with transportation and help her get settled in her apt, but her friend  is gone for the next week.   Left vm with [facility] , requesting a return call, inquired on how client is doing and if there would be any assistance for client to assist with helping her get settled in to her apt after a hospitalization.   Left vm with client's daughter Windy that CM is aware of the hosp admission as CM is alerted to any [facility] admissions. Explained that client did have a pulmonary rehab appt scheduled for yesterday but was cancelled due to hospitalization. Explained that CM has not been in communication with her mother's therapist recently. Request a call back.  Rec'd vm from Rafia CC at [facility] to report that she has spoken with homecare RN, who will meet client at her apt today at 3:30 via taxi. [facility] to resume services and will add PT. Rafia states that client would like a lifeline unit.   Call placed to client, explained that CM is aware that she will be going home vs TCU. Client reports that she is happy that [facility] will be assisting her. Had discussion on lifeline unit. Client states that she does have an emergency pendant provided by the apt complex.  CM will f/u with Renetta at [facility] on options.   Left vm with Renetta to inquire on emergency pendant that is provided by the apt complex, request a return call.   Rec'd tele call from client's daughter Windy who is aware that her mother will be going home vs TCU.  Reviewed information above. Windy expressed frustration in her mother's reluctance to attend pulmonary rehab. Explained that CM did speak with her mother about a HCD and that her mother insists that she has a HCD completed.   Windy states that she will be coming to [place] the last week in May and will review her mother's paperwork and enc her mother to complete a new HCD. Windy stated that she will also help her mother tour AL facilities to get on a wait list. Enc'd Windy to review the [employer] web-site for info on ACP.  Per Windy's request, ROSARIO  will inform [facility] that she would like to be in communication with them. Explained that her mother may need to complete a PHYLICIA.   Rec'd vm from Windy BARTHOLOMEW [facility] to report that she will meet client at 3:30 and a HHA will come at 4 PM to assist with a sponge bath and help with an evening meal. Windy requests a call back   [phone]  Spoke with Windy BARTHOLOMEW who will write the order for PT today. Explained that  sent her an e-mail with client's daughter contact information.   CM to follow.  Yeni Savage RN, BC  Supervisor [facility]   [phone] (Fax)

## 2017-05-05 NOTE — PLAN OF CARE
Problem: Discharge Planning  Goal: Discharge Planning (Adult, OB, Behavioral, Peds)  Outcome: Improving  PRIMARY DIAGNOSIS: SYNCOPE  OUTPATIENT/OBSERVATION GOALS TO BE MET BEFORE DISCHARGE:      1. Diagnostic testing complete & at baseline neurologic function: No  2. Cleared by consultants (if involved): No  3. ADLs back to baseline? Yes  4. Activity and level of assistance: Up with standby assistance and a walker  5. Pain status: denies pain.   6. Barriers to discharge noted No      Patient is alert and oriented, BP elevated 173/84. Tele monitoring in place, Patient is a SBA with walker for safety. Neuros are intact. Will continue to monitor.

## 2017-05-05 NOTE — PLAN OF CARE
Problem: Discharge Planning  Goal: Discharge Planning (Adult, OB, Behavioral, Peds)  Outcome: Improving  PRIMARY DIAGNOSIS: SYNCOPE  OUTPATIENT/OBSERVATION GOALS TO BE MET BEFORE DISCHARGE:     1. Diagnostic testing complete & at baseline neurologic function: Yes  2. Cleared by consultants (if involved): Yes  3. ADLs back to baseline?  Yes  4. Activity and level of assistance: Up with standby assistance.  5. Pain status: Pain free.  6. Barriers to discharge noted No     Patient is alert and oriented. Patient denies any pain at this time. Patient complains of SOB with exertion and still complains of some lightheadedness. Patient's orthostatics were repeated and were negative. Plan to walk patient in the halls to see how she does. VSS except elevated BP. Will continue to monitor.

## 2017-05-05 NOTE — PLAN OF CARE
Problem: Discharge Planning  Goal: Discharge Planning (Adult, OB, Behavioral, Peds)  Outcome: Improving  PRIMARY DIAGNOSIS: SYNCOPE  OUTPATIENT/OBSERVATION GOALS TO BE MET BEFORE DISCHARGE:     1. Diagnostic testing complete & at baseline neurologic function: Yes  2. Cleared by consultants (if involved): Yes  3. ADLs back to baseline?  Yes  4. Activity and level of assistance: Up with standby assistance.  5. Pain status: Pain free.  6. Barriers to discharge noted No     Patient is alert and oriented. Patient denies SOB. Patient's orthostatics were rechecked this am and were neg. Patient recommended herself going to a TCU so referrals were sent out to a couple and SW is following.

## 2017-05-05 NOTE — PLAN OF CARE
Problem: Discharge Planning  Goal: Discharge Planning (Adult, OB, Behavioral, Peds)  Outcome: Declining  PRIMARY DIAGNOSIS: SYNCOPE  OUTPATIENT/OBSERVATION GOALS TO BE MET BEFORE DISCHARGE:      1. Diagnostic testing complete & at baseline neurologic function: No  2. Cleared by consultants (if involved): No  3. ADLs back to baseline? Yes  4. Activity and level of assistance: Up with standby assistance.  5. Pain status: Improved-controlled with oral pain medications.Shoulder pain.  6. Barriers to discharge noted No      Patient is alert and oriented, VSS except BP slightly elevated. Tele monitoring in place, tele tech reports St/SR with PVC's 100. Patient is a SBA with walker for safety. Patient has bruising on side of face- head. Neuros are intact. Will continue to monitor.

## 2017-05-08 ENCOUNTER — CARE COORDINATION (OUTPATIENT)
Dept: GERIATRIC MEDICINE | Facility: CLINIC | Age: 77
End: 2017-05-08

## 2017-05-08 ENCOUNTER — DOCUMENTATION ONLY (OUTPATIENT)
Dept: CARE COORDINATION | Facility: CLINIC | Age: 77
End: 2017-05-08

## 2017-05-08 ENCOUNTER — TELEPHONE (OUTPATIENT)
Dept: INTERNAL MEDICINE | Facility: CLINIC | Age: 77
End: 2017-05-08

## 2017-05-08 DIAGNOSIS — Z76.89 HEALTH CARE HOME: ICD-10-CM

## 2017-05-08 NOTE — PROGRESS NOTES
"Rec'd tele call from Kaya,  at [facility]. Kaya confirmed that they do not have services available to assist residents, independent apartment complex.  Shared client's community care plan. Kaya shared that she has noticed that client has not been as social recently, preferring to stay in her apt. Explained that client has been more busy with homecare services. Kaya states that she will f/u with client.    Inquired on emergency pendant, per Kaya once initiated a staff member from [facility] will check on client and call 911.   Rec'd tele call from client thanking [facility] and nurse from [facility] who assisted her with her hospital d/c, \"everyone went above and beyond.\"   Shared information above, client states that she is aware of the [facility] apt status and also will cont with the [facility] emergency pendant system. Client states that she is wearing her emergency pendant.   Client reports PT visit this morning, was told that her b/p was low and the nurse will f/u later today. Client reports feeling fatigued, was told to increase her fluids.   Client reports no medication changes. Client is aware to schedule a f/u PCP appt and reschedule her Pulmonary rehab appt. Client is also to schedule a new appt with Cardiology within one month. Client has the contact phone numbers.   Client inquired on [employer] Meals again, CM to place referral per client's request.  Shared that Kaya had concerns re isolation.Client states that she does not feel like socializing, \"my friends come to me and visit me and I go to their apartment.\"  Client reports not feeling well over the past several months.   CM to cont to follow.  Client's daughter Windy to visit the last week in May to assist with HCD and tour AL facilities with her mother.  Yeni aSvage RN, BC  Supervisor [facility]   [phone] (Fax)    "

## 2017-05-08 NOTE — TELEPHONE ENCOUNTER
Called Laura RN informed to hold Losartan. Ama states there will be a home care nurse in tomorrow and once more this week. PT will be there other days. BP will be monitored. Ama will call with an update.

## 2017-05-08 NOTE — PROGRESS NOTES
[facility] now requests orders and shares plan of care/discharge summaries for some patients through [employer].  Please REPLY TO THIS MESSAGE in order to give authorization for orders when needed.  This is considered a verbal order, you will still receive a faxed copy of orders for signature.  Thank you for your assistance in improving collaboration for our patients.    ORDER  PT 2m1 for gait, HEP, safety    MD SUMMARY/PLAN OF CARE  patient presents with pain as well as decreased strength and stability which impairs overall mobility and safety.  patient would benefit from 2 additional PT visits to assess how patient doing with new walker height, assess ambulation on uneven surfaces for ability to get out and review/modifiy HEP.

## 2017-05-08 NOTE — TELEPHONE ENCOUNTER
"Ama RN with [facility] ([phone]) calling because pt had a fall last week after getting up from chair then \"staggering\", hospitalized for observation as she had hit her head.  Still has dizziness at time with sudden movements or movement of her head and bruising around L eye and forehead.  Ama spoke with patient about the importance of staying hydrated.      Patient's BP today sitting 92/52, standing 80/48 with heartrate 100-105.  BP normally runs 120-124 / 70-86.  Currently takes Losartan 50 mg once a day.  Tablets are scored, do you want to decrease dose or hold med?  CAROL Flores R.N.    "

## 2017-05-11 ENCOUNTER — TELEPHONE (OUTPATIENT)
Dept: INTERNAL MEDICINE | Facility: CLINIC | Age: 77
End: 2017-05-11

## 2017-05-11 NOTE — TELEPHONE ENCOUNTER
Laura from [facility] left message on vm today @ 11:58a with update on BP.    States BP today sitting was 148/84, standing 134/80 pulse 96 and reg and R 22.    5-9-17: BP sitting was 110/78, standing 98/70 pulse 80 and reg and R 16.    Pt is c/o dizziness with motion today.    Per TE 5-8-17: Lets hold the losartan for now per Dr. Garza.     Any recommendations?     Please advise, thanks.

## 2017-05-15 ENCOUNTER — TELEPHONE (OUTPATIENT)
Dept: INTERNAL MEDICINE | Facility: CLINIC | Age: 77
End: 2017-05-15

## 2017-05-15 ENCOUNTER — CARE COORDINATION (OUTPATIENT)
Dept: GERIATRIC MEDICINE | Facility: CLINIC | Age: 77
End: 2017-05-15

## 2017-05-15 NOTE — PROGRESS NOTES
Rec'd vm from client's daughter Windy requesting a return call. Windy inquired on what she needed to do to check in with her mother's nurse.   [phone]  Call placed to Windy to share that CM did provide her name and contact info to the [facility] RN, Windy. Explained that Windy has been out of the office the past week and CM will f/u.  Widny states that she will be in town all next week visiting with her mother. Windy states that her mother is not interested in touring any of the AL facilities but she will.   E-mail sent to Windy MUIR RN [facility] with contact number of client's daughter.  Yeni Savage RN, BC  Supervisor [facility]   [phone] (Fax)

## 2017-05-18 ENCOUNTER — TELEPHONE (OUTPATIENT)
Dept: INTERNAL MEDICINE | Facility: CLINIC | Age: 77
End: 2017-05-18

## 2017-05-18 DIAGNOSIS — Z53.9 DIAGNOSIS NOT YET DEFINED: Primary | ICD-10-CM

## 2017-05-18 PROCEDURE — G0179 MD RECERTIFICATION HHA PT: HCPCS | Performed by: INTERNAL MEDICINE

## 2017-05-19 ENCOUNTER — CARE COORDINATION (OUTPATIENT)
Dept: GERIATRIC MEDICINE | Facility: CLINIC | Age: 77
End: 2017-05-19

## 2017-05-19 NOTE — PROGRESS NOTES
5/16/17 Rec'd vm from client's daughter Windy who shared that she was in communication with Renetta,  who expressed some concerns -client is self isolating. Windy inquired if CM would be available for a mini care conference 5/22/2017.    5/16/17 Left vm with Windy that CM would be available at 2:30, request a call back.   5/17/17 Rec'd tele call from Windy, explained that her mother has a busier schedule now with the homecare services: HHA 2x/wk, RN for med set up, weekly hmkg. Windy acknowledged, but expressed concerns of her mother's reluctance to tour any of the AL facilities. Windy states that her mother told her that it was recommended that she have someone with her when she is walking. Windy stated that she could not clarify if the recommendation was for when her mother was walking in the hallways or outside of the apt complex.   Will meet with client/daughter Windy, Renetta and CM for CC on 5/22/2017.  Yeni Savage RN, BC  Supervisor [facility]   [phone] (Fax)

## 2017-05-22 ENCOUNTER — CARE COORDINATION (OUTPATIENT)
Dept: GERIATRIC MEDICINE | Facility: CLINIC | Age: 77
End: 2017-05-22

## 2017-05-22 DIAGNOSIS — Z76.89 HEALTH CARE HOME: ICD-10-CM

## 2017-05-23 NOTE — PROGRESS NOTES
5/22/2017 Rec'd vm from Caren at [facility] requesting a return call regarding spend down  [phone]  5/22/17 Spoke with Caren, who states that [facility] has informed them to collect spend down of 165.96 monthly. Caren states that client has stated that her spend down is only 165. Monthly. CM shared process that [facility] sends information monthly to the health plan and that CM does not receive routinely. Caren expressed concern that client is not in agreement. Explained that CM has a planned visit this afternoon with client and will inquire with her.   5/22/2017 Rec'd vm from Windy BARTHOLOMEW [facility] to report that client has completed 3 PT sessions and is now d/c. Windy states that PT states that client is indep with HEP and fully capable of waling indep without any assistance. Windy states that client's recent fall, client was not using her walker. Windy reports that client was seen on the w/e, monitoring b/p. Windy states that she is planning to be attend the care conference later today, may be a little late. Windy states that she has not been able to reach client's daughter Windy to introduce herself.  5/22/17 Rec'd vm from Windy HOUGH to report that she did speak with client's daughter and she expressed concern that client does not want to go out to dinner because she  eats with her hands.  Windy states that she was not aware that there were concerns, but will offer OT specific eating utensils if needed.   5/22/2017 Attended care conference at client's home with client, daughter Windy, Renetta apt manager, this ROSARIO and Windy BARTHOLOMEW [facility].   Client reports that she is happy/satisfied living in her apt. Discussion regarding client's request to have assistance provided by the housing staff, client resides in an indep apt and there are no services that can be provided unplanned or by the housing staff. Client tearful when talking about Assisted Living facilities.  Client's daughter Windy has scheduled 3 tours ([employer]  [facility], [place] [facility]). Windy BARTHOLOMEW provided info from PT and homecare. Windy also offered OT, client agreeable.   Discussed spend down, client stated that she did not receive any information from [facility] about the increase. Client requests CM to f/u.   Reviewed ACP, Windy states that she has the [place] [employer] choices form and they have already begun to initiate. CM offered assistance.   Client has new cell phone, [phone].  Reviewed community POC. Client states that she is no longer seeing the psychologist, unsure of last visit and preference is not to continue, \"I don't think I need to.\"   CM available as needed.  Yeni Savage RN, BC  Supervisor [facility]   [phone] (Fax)    "

## 2017-05-24 ENCOUNTER — OFFICE VISIT (OUTPATIENT)
Dept: INTERNAL MEDICINE | Facility: CLINIC | Age: 77
End: 2017-05-24
Payer: COMMERCIAL

## 2017-05-24 ENCOUNTER — TELEPHONE (OUTPATIENT)
Dept: INTERNAL MEDICINE | Facility: CLINIC | Age: 77
End: 2017-05-24

## 2017-05-24 VITALS
HEIGHT: 62 IN | OXYGEN SATURATION: 95 % | DIASTOLIC BLOOD PRESSURE: 60 MMHG | BODY MASS INDEX: 26.13 KG/M2 | RESPIRATION RATE: 24 BRPM | HEART RATE: 100 BPM | SYSTOLIC BLOOD PRESSURE: 100 MMHG | WEIGHT: 142 LBS | TEMPERATURE: 98.3 F

## 2017-05-24 DIAGNOSIS — R05.9 COUGH: ICD-10-CM

## 2017-05-24 DIAGNOSIS — J43.9 PULMONARY EMPHYSEMA, UNSPECIFIED EMPHYSEMA TYPE (H): ICD-10-CM

## 2017-05-24 DIAGNOSIS — J44.1 COPD EXACERBATION (H): ICD-10-CM

## 2017-05-24 DIAGNOSIS — J44.9 COPD, MODERATE (H): Primary | Chronic | ICD-10-CM

## 2017-05-24 DIAGNOSIS — J98.01 ACUTE BRONCHOSPASM: ICD-10-CM

## 2017-05-24 DIAGNOSIS — J20.9 ACUTE BRONCHITIS WITH SYMPTOMS > 10 DAYS: ICD-10-CM

## 2017-05-24 PROCEDURE — 99214 OFFICE O/P EST MOD 30 MIN: CPT | Performed by: NURSE PRACTITIONER

## 2017-05-24 RX ORDER — GUAIFENESIN/DEXTROMETHORPHAN 100-10MG/5
5 SYRUP ORAL EVERY 4 HOURS PRN
Qty: 560 ML | Refills: 1 | Status: SHIPPED | OUTPATIENT
Start: 2017-05-24 | End: 2017-06-27

## 2017-05-24 RX ORDER — PREDNISONE 10 MG/1
TABLET ORAL
Qty: 20 TABLET | Refills: 0 | Status: SHIPPED | OUTPATIENT
Start: 2017-05-24 | End: 2017-06-05

## 2017-05-24 RX ORDER — LEVOFLOXACIN 500 MG/1
500 TABLET, FILM COATED ORAL DAILY
Qty: 10 TABLET | Refills: 0 | Status: SHIPPED | OUTPATIENT
Start: 2017-05-24 | End: 2017-06-27

## 2017-05-24 NOTE — PROGRESS NOTES
SUBJECTIVE:                                                    Ana Luisa Lee is a 77 year old female who presents to clinic today for the following health issues:    Chief Complaint   Patient presents with     Cough     Cough and runny nose started 5 days ago. Pt thought she had a cold. Wheezing started yesterday or day before.   Cough productive yellow   Able to sleep at night - wakes her at night   No fever no chills   no ear pain or sore throat   Prednisone 10 alternating with 15 mg daily  Chronic dose   She has nurse set up medication   Nebs once to twice daily        Daughter here, and she needs FMLA form completed for her to help her look into assisted living and to help care for her this week while ill               Problem list and histories reviewed & adjusted, as indicated.  Additional history: as documented    Patient Active Problem List   Diagnosis     Osteoporosis     Hyperlipidemia with target LDL less than 130     Asthma, persistent     Pes planus     Advance Care Planning     Skin cyst     Pain in joint, shoulder region     PVC (premature ventricular contraction)     Arthritis of hand     COPD, moderate (HCC)     Pulmonary nodule seen on imaging study     Health Care Home     Hypercalcemia     Other fatigue     Thrush of mouth and esophagus (H)     Steroid-dependent chronic obstructive pulmonary disease (H)     SVT (supraventricular tachycardia) (H)     Hypothyroidism, unspecified type     HTN, goal <  140/90     Pernicious anemia     COPD exacerbation (H)     COPD (chronic obstructive pulmonary disease) (H)     ALEKSANDRA (acute kidney injury) (H)     Uncontrolled hypertension     Paroxysmal supraventricular tachycardia (H)     PAC (premature atrial contraction)     Syncope and collapse     Past Surgical History:   Procedure Laterality Date     BUNIONECTOMY LAPIDUS WITH TARSAL METATARSAL (TMT) FUSION  1990's     EP STUDY, MODIFIED  7/2012    SVT not induced, PVC's     GYN SURGERY  1980      "HYSTERECTOMY TOTAL ABDOMINAL       HYSTERECTOMY, PAP NO LONGER INDICATED       TONSILLECTOMY         Social History   Substance Use Topics     Smoking status: Former Smoker     Packs/day: 1.00     Years: 15.00     Quit date: 1/1/1980     Smokeless tobacco: Never Used     Alcohol use No      Comment: Occasional drink     Family History   Problem Relation Age of Onset     CEREBROVASCULAR DISEASE Mother      Hypertension Mother      Family History Negative Father      Unknown/Adopted Maternal Grandmother      Unknown/Adopted Maternal Grandfather      Unknown/Adopted Paternal Grandmother      Unknown/Adopted Paternal Grandfather      Family History Negative Brother      Family History Negative Sister      Family History Negative Son      Family History Negative Daughter      Asthma No family hx of      C.A.D. No family hx of      DIABETES No family hx of      CANCER No family hx of            Reviewed and updated as needed this visit by clinical staff  Tobacco  Allergies  Meds  Soc Hx      Reviewed and updated as needed this visit by Provider         ROS:  Constitutional, HEENT, cardiovascular, pulmonary, GI, , musculoskeletal, neuro, skin, endocrine and psych systems are negative, except as otherwise noted.    OBJECTIVE:                                                    /60 (BP Location: Left arm, Patient Position: Chair, Cuff Size: Adult Large)  Pulse 100  Temp 98.3  F (36.8  C) (Oral)  Resp 24  Ht 5' 2\" (1.575 m)  Wt 142 lb (64.4 kg)  SpO2 95%  BMI 25.97 kg/m2  Body mass index is 25.97 kg/(m^2).  GENERAL: alert and no distress  RESP: lungs with scattered wheezing through lung fields and cough present   CV: regular rate and rhythm- systolic murmur   ABDOMEN: soft, nontender, and bowel sounds normal  MS: no gross musculoskeletal defects noted, no edema  NEURO: Normal strength and tone, mentation intact and speech normal  PSYCH: mentation appears normal- a little forgetful , affect " normal/bright    Diagnostic Test Results:  none      ASSESSMENT/PLAN:                                                            1. COPD, moderate (HCC)  exacerbation  - levofloxacin (LEVAQUIN) 500 MG tablet; Take 1 tablet (500 mg) by mouth daily  Dispense: 10 tablet; Refill: 0  - predniSONE (DELTASONE) 10 MG tablet; Take 3 tabs daily for 4 days Take 2 tabs daily for 4 days Then go back to your previous dose of 10 mg alternating with 15 mg  Dispense: 20 tablet; Refill: 0    2. COPD exacerbation (H)  As above     3. Cough  As above     4. Acute bronchospasm  Wheezing - cough   - predniSONE (DELTASONE) 10 MG tablet; Take 3 tabs daily for 4 days Take 2 tabs daily for 4 days Then go back to your previous dose of 10 mg alternating with 15 mg  Dispense: 20 tablet; Refill: 0    5. Acute bronchitis with symptoms > 10 days  As above   - levofloxacin (LEVAQUIN) 500 MG tablet; Take 1 tablet (500 mg) by mouth daily  Dispense: 10 tablet; Refill: 0  - predniSONE (DELTASONE) 10 MG tablet; Take 3 tabs daily for 4 days Take 2 tabs daily for 4 days Then go back to your previous dose of 10 mg alternating with 15 mg  Dispense: 20 tablet; Refill: 0    6. Pulmonary emphysema, unspecified emphysema type (H)  As above   - guaiFENesin-dextromethorphan (ROBITUSSIN DM) 100-10 MG/5ML syrup; Take 5 mLs by mouth every 4 hours as needed for cough  Dispense: 560 mL; Refill: 1    Patient Instructions   Levaquin once daily for 10 days     Prednisone- take in morning with food   Take 3 tabs daily for 4 days   Take 2 tabs daily for 4 days   Then go back to your previous dose of 10 mg alternating with 15 mg     Cough syrup as needed     Follow up with any questions or concerns.             IVÁN Ramirez [facility]

## 2017-05-24 NOTE — TELEPHONE ENCOUNTER
Letter/fax recieved from [facility] [phone] stating Lansoprazole 30mg Dr Capsules is not covered under patient's insurance.      Can medication be changed or should a PA be initiated? **LAST FILLED BY Hallie Barroso MD**    (Provider may request we ask patient to check their formulary book or call their insurance company to find out what medications are on their formulary. Ask patient to call back within 2-3 days.  If they are not able to call back in this time frame this encounter will be closed.  Open new encounter when/if patient calls back.)    Insurance Name: Medicare RX  Insurance Phone: [phone]  Patient ID: [id number]  Patient Group:[id number]  RX BIN: [id number]  RXPCN:[id number]   CoverMyMedsKey: ELEANOR

## 2017-05-24 NOTE — NURSING NOTE
"Chief Complaint   Patient presents with     Cough     Cough and runny nose started 5 days ago. Pt thought she had a cold. Wheezing started yesterday or day before.        Initial /60 (BP Location: Left arm, Patient Position: Chair, Cuff Size: Adult Large)  Pulse 100  Temp 98.3  F (36.8  C) (Oral)  Resp 24  Ht 5' 2\" (1.575 m)  Wt 142 lb (64.4 kg)  SpO2 95%  BMI 25.97 kg/m2 Estimated body mass index is 25.97 kg/(m^2) as calculated from the following:    Height as of this encounter: 5' 2\" (1.575 m).    Weight as of this encounter: 142 lb (64.4 kg).  Medication Reconciliation: complete     MIGUEL Dominguez      "

## 2017-05-24 NOTE — PATIENT INSTRUCTIONS
Levaquin once daily for 10 days     Prednisone- take in morning with food   Take 3 tabs daily for 4 days   Take 2 tabs daily for 4 days   Then go back to your previous dose of 10 mg alternating with 15 mg     Cough syrup as needed     Follow up with any questions or concerns.

## 2017-05-24 NOTE — MR AVS SNAPSHOT
After Visit Summary   5/24/2017    Ana Luisa Lee    MRN: [id number]           Patient Information     Date Of Birth          1940        Visit Information        Provider Department      5/24/2017 1:20 PM Nelia Macdonald APRN CNP [facility]        Today's Diagnoses     COPD, moderate (HCC)    -  1    COPD exacerbation (H)        Cough        Acute bronchospasm        Acute bronchitis with symptoms > 10 days        Pulmonary emphysema, unspecified emphysema type (H)          Care Instructions    Levaquin once daily for 10 days     Prednisone- take in morning with food   Take 3 tabs daily for 4 days   Take 2 tabs daily for 4 days   Then go back to your previous dose of 10 mg alternating with 15 mg     Cough syrup as needed     Follow up with any questions or concerns.                 Follow-ups after your visit        Your next 10 appointments already scheduled     Jun 07, 2017 10:15 AM CDT   New Visit with Tanmay Melton MD   [facility] ([facility])    [address]  [place] [id number] [phone]            Jun 08, 2017 11:00 AM CDT   Pulmonary Eval with [facility]   [facility] ([facility])    [address]  [place] [id number] [phone]              Who to contact     If you have questions or need follow up information about today's clinic visit or your schedule please contact [facility] directly at [phone].  Normal or non-critical lab and imaging results will be communicated to you by MyChart, letter or phone within 4 business days after the clinic has received the results. If you do not hear from us within 7 days, please contact the clinic through MyChart or phone. If you have a critical or abnormal lab result, we will notify you by phone as soon as possible.  Submit refill requests through [facility] or  "call your pharmacy and they will forward the refill request to us. Please allow 3 business days for your refill to be completed.          Additional Information About Your Visit        MyChart Information     [employer] lets you send messages to your doctor, view your test results, renew your prescriptions, schedule appointments and more. To sign up, go to [email or web address] . Click on \"Log in\" on the left side of the screen, which will take you to the Welcome page. Then click on \"Sign up Now\" on the right side of the page.     You will be asked to enter the access code listed below, as well as some personal information. Please follow the directions to create your username and password.     Your access code is: [email or web address]  Expires: 8/3/2017 12:38 PM     Your access code will  in 90 days. If you need help or a new code, please call your [place] clinic or [phone].        Care EveryWhere ID     This is your Care EveryWhere ID. This could be used by other organizations to access your [place] medical records  [id number]        Your Vitals Were     Pulse Temperature Respirations Height Pulse Oximetry BMI (Body Mass Index)    100 98.3  F (36.8  C) (Oral) 24 5' 2\" (1.575 m) 95% 25.97 kg/m2       Blood Pressure from Last 3 Encounters:   17 100/60   17 (!) 178/92   17 110/64    Weight from Last 3 Encounters:   17 142 lb (64.4 kg)   17 146 lb 8 oz (66.5 kg)   17 153 lb 11.2 oz (69.7 kg)              Today, you had the following     No orders found for display         Today's Medication Changes          These changes are accurate as of: 17  2:13 PM.  If you have any questions, ask your nurse or doctor.               Start taking these medicines.        Dose/Directions    levofloxacin 500 MG tablet   Commonly known as:  LEVAQUIN   Used for:  COPD, moderate (H), Acute bronchitis with symptoms > 10 days   Started by:  Nelia Macdonald APRN CNP        Dose:  500 mg "   Take 1 tablet (500 mg) by mouth daily   Quantity:  10 tablet   Refills:  0         These medicines have changed or have updated prescriptions.        Dose/Directions    * predniSONE 10 MG tablet   Commonly known as:  DELTASONE   This may have changed:  Another medication with the same name was added. Make sure you understand how and when to take each.        Dose:  10 mg   Take 10 mg by mouth every other day (alternates Prednisone 10 mg with Prednisone 15 mg every other day)   Refills:  0       * PREDNISONE PO   This may have changed:  Another medication with the same name was added. Make sure you understand how and when to take each.        Dose:  15 mg   Take 15 mg by mouth every other day (alternates Prednisone 10 mg with Prednisone 15 mg every other day)   Refills:  0       * predniSONE 10 MG tablet   Commonly known as:  DELTASONE   This may have changed:  You were already taking a medication with the same name, and this prescription was added. Make sure you understand how and when to take each.   Used for:  COPD, moderate (H), Acute bronchospasm, Acute bronchitis with symptoms > 10 days   Changed by:  Nelia Macdonald APRN CNP        Take 3 tabs daily for 4 days Take 2 tabs daily for 4 days Then go back to your previous dose of 10 mg alternating with 15 mg   Quantity:  20 tablet   Refills:  0       * Notice:  This list has 3 medication(s) that are the same as other medications prescribed for you. Read the directions carefully, and ask your doctor or other care provider to review them with you.         Where to get your medicines      [facility] [address] [place] [address] [address] AT [place] [address] [address]  [address], [facility] [address]     Phone:  [phone]     guaiFENesin-dextromethorphan 100-10 MG/5ML syrup    levofloxacin 500 MG tablet    predniSONE 10 MG tablet                Primary Care Provider Office Phone # Fax #     Jenny Hamilton [phone]       [facility] [address] [facility] [address]        Thank you!     Thank you for choosing [facility]  for your care. Our goal is always to provide you with excellent care. Hearing back from our patients is one way we can continue to improve our services. Please take a few minutes to complete the written survey that you may receive in the mail after your visit with us. Thank you!             Your Updated Medication List - Protect others around you: Learn how to safely use, store and throw away your medicines at www.disposemymeds.org.          This list is accurate as of: 5/24/17  2:13 PM.  Always use your most recent med list.                   Brand Name Dispense Instructions for use    albuterol 108 (90 BASE) MCG/ACT Inhaler    albuterol     Inhale 2 puffs into the lungs every 6 hours as needed       ASPIRIN NOT PRESCRIBED    INTENTIONAL    0 each    1 each continuous prn. Antiplatelet medication not prescribed intentionally due to Use of NSAIDS       B-12 1000 MCG Tbcr     100 tablet    Take 1,000 mcg by mouth daily       budesonide 180 MCG/ACT inhaler    PULMICORT FLEXHALER     Inhale 1 puff into the lungs 2 times daily       calcium 600 MG tablet     60 tablet    Take 1 tablet (600 mg) by mouth daily       cholecalciferol 1000 UNIT tablet    vitamin D    30 tablet    Take 1 tablet (1,000 Units) by mouth daily       ferrous sulfate 325 (65 FE) MG tablet    IRON    100 tablet    Take 1 tablet (325 mg) by mouth daily (with breakfast)       gemfibrozil 600 MG tablet    LOPID    90 tablet    Take 1 tablet (600 mg) by mouth daily       guaiFENesin-dextromethorphan 100-10 MG/5ML syrup    ROBITUSSIN DM    560 mL    Take 5 mLs by mouth every 4 hours as needed for cough       LANsoprazole 30 MG CR capsule    PREVACID    90 capsule    Take 1 capsule (30 mg) by mouth daily       levalbuterol 1.25 MG/3ML neb solution     XOPENEX    1584 mL    Take 3 mLs (1.25 mg) by nebulization every 4 hours as needed for shortness of breath / dyspnea or wheezing       levofloxacin 500 MG tablet    LEVAQUIN    10 tablet    Take 1 tablet (500 mg) by mouth daily       levothyroxine 25 MCG tablet    SYNTHROID/LEVOTHROID    90 tablet    Take 1 tablet (25 mcg) by mouth daily       montelukast 10 MG tablet    SINGULAIR    30 tablet    Take 1 tablet (10 mg) by mouth At Bedtime       MULTIVITAMIN PO      Take 1 tablet by mouth daily       * order for DME     1 Device    Overnight pulse oximetry       * order for DME     1 Device    Equipment being ordered: blood pressure machine       * order for DME     1 each    Equipment being ordered: Walker Wheels (), Walker () and Other: Four Wheeled Walker Treatment Diagnosis: Impaired gait stability and activity tolerance       order for DME     1 each    Equipment being ordered: Nebulizer machine, cup, and tubing.  Fax to [facility] per pt request       polyethylene glycol Packet    MIRALAX/GLYCOLAX     Take 17 g by mouth daily as needed for constipation       * predniSONE 10 MG tablet    DELTASONE     Take 10 mg by mouth every other day (alternates Prednisone 10 mg with Prednisone 15 mg every other day)       * PREDNISONE PO      Take 15 mg by mouth every other day (alternates Prednisone 10 mg with Prednisone 15 mg every other day)       * predniSONE 10 MG tablet    DELTASONE    20 tablet    Take 3 tabs daily for 4 days Take 2 tabs daily for 4 days Then go back to your previous dose of 10 mg alternating with 15 mg       prochlorperazine 5 MG tablet    COMPAZINE    120 tablet    Take 1 tablet (5 mg) by mouth every 6 hours as needed for nausea or vomiting       roflumilast 500 MCG Tabs tablet    DALIRESP     Take 500 mcg by mouth daily       salmeterol 50 MCG/DOSE diskus inhaler    SEREVENT     Inhale 1 puff into the lungs 2 times daily       tiotropium 18 MCG capsule    SPIRIVA     30 capsule    Inhale 1 capsule (18 mcg) into the lungs daily       * Notice:  This list has 6 medication(s) that are the same as other medications prescribed for you. Read the directions carefully, and ask your doctor or other care provider to review them with you.

## 2017-05-25 ENCOUNTER — TELEPHONE (OUTPATIENT)
Dept: INTERNAL MEDICINE | Facility: CLINIC | Age: 77
End: 2017-05-25

## 2017-05-25 DIAGNOSIS — I47.10 SVT (SUPRAVENTRICULAR TACHYCARDIA) (H): Primary | ICD-10-CM

## 2017-05-25 DIAGNOSIS — J44.9 COPD, MODERATE (H): Chronic | ICD-10-CM

## 2017-05-25 DIAGNOSIS — E78.5 HYPERLIPIDEMIA WITH TARGET LDL LESS THAN 130: ICD-10-CM

## 2017-05-25 NOTE — TELEPHONE ENCOUNTER
gemfibrozil       Last Written Prescription Date: 3/1/17  Last Fill Quantity: 90, # refills: 0    [place] [facility] or [facility] prescribing provider:  5/24/17   Future Office Visit:      Cholesterol   Date Value Ref Range Status   06/01/2015 154 <200 mg/dL Final     Comment:     LDL Cholesterol is the primary guide to therapy.   The NCEP recommends further evaluation of: patients with cholesterol greater   than 200 mg/dL if additional risk factors are present, cholesterol greater   than   240 mg/dL, triglycerides greater than 150 mg/dL, or HDL less than 40 mg/dL.       HDL Cholesterol   Date Value Ref Range Status   06/01/2015 48 (L) >50 mg/dL Final     LDL Cholesterol Calculated   Date Value Ref Range Status   06/01/2015 77 0 - 129 mg/dL Final     Comment:     LDL Cholesterol is the primary guide to therapy: LDL-cholesterol goal in high   risk patients is <100 mg/dL and in very high risk patients is <70 mg/dL.       Triglycerides   Date Value Ref Range Status   06/01/2015 144 0 - 150 mg/dL Final     Cholesterol/HDL Ratio   Date Value Ref Range Status   06/01/2015 3.2 0.0 - 5.0 Final     ALT   Date Value Ref Range Status   12/22/2016 17 0 - 50 U/L Final            Labs showing if normal/abnormal  Lab Results   Component Value Date    CHOL 154 06/01/2015    TRIG 144 06/01/2015    HDL 48 (L) 06/01/2015    LDL 77 06/01/2015    VLDL 29 06/01/2015    CHOLHDLRATIO 3.2 06/01/2015     Lab Results   Component Value Date    ALT 17 12/22/2016    GLC 79 05/04/2017     Lab Results   Component Value Date    WBC 10.0 05/03/2017    RBC 3.62 (L) 05/03/2017    HGB 10.8 (L) 05/03/2017    HCT 33.1 (L) 05/03/2017    MCV 91 05/03/2017    MCH 29.8 05/03/2017    MCHC 32.6 05/03/2017    RDW 14.4 05/03/2017     05/03/2017    DTYP Automated Method 05/03/2017    NEUTROPHIL 75.3 05/03/2017    LYMPH 13.7 05/03/2017    MONOCYTE 8.8 05/03/2017    EOSINOPHIL 0.6 05/03/2017    BASOPHIL 0.2 05/03/2017    IG 1.4 05/03/2017    ANEU  7.6 05/03/2017    ALYM 1.4 05/03/2017    KEVIN 0.9 05/03/2017    AEOS 0.1 05/03/2017    ABAS 0.0 05/03/2017    AIG 0.1 05/03/2017

## 2017-05-25 NOTE — TELEPHONE ENCOUNTER
Prednisone high dose can cause the above symptoms.  She has history of tachycardia, has seen cardio in the past with no meds changes.  I recommend MTM consult to help her with the prednisone

## 2017-05-25 NOTE — TELEPHONE ENCOUNTER
If it is not covered, patient needs to call insurance and find out which equivalent is covered.

## 2017-05-25 NOTE — TELEPHONE ENCOUNTER
Patient calling with complaints of her heart beating fast and hard along with dizziness.  States she is usually on daily Prednisone 10mg qod alternating with 15 mg qod but was given a new rx for a taper which she started today but is unable to say whether she took 3 of the 10 mg tabs this morning or if they have been spread out in her pill box to three times daily today.  Even looking at pill box pt is unable to tell which pills are the Prednisone or if there are 3 of the same pills in one section.  Complains of feeling shakey and like her heart is beating fast and hard and is having some dizziness.  Denies CP and breathing is about the same as when seen in clinic yesterday, no worse.  Patient states she is not taking her regularly scheduled Prednisone while on this taper.  Advised pt that it is not unusual for Prednisone to cause shakiness or rapid heart rate.  CAROL Flores R.N.

## 2017-05-26 NOTE — TELEPHONE ENCOUNTER
Called pt, left detailed vm (per consent to communicate) relaying provider message below. Gave phone number to make MTM appt.

## 2017-05-27 ENCOUNTER — APPOINTMENT (OUTPATIENT)
Dept: GENERAL RADIOLOGY | Facility: CLINIC | Age: 77
End: 2017-05-27
Attending: EMERGENCY MEDICINE
Payer: COMMERCIAL

## 2017-05-27 ENCOUNTER — HOSPITAL ENCOUNTER (OUTPATIENT)
Facility: CLINIC | Age: 77
Setting detail: OBSERVATION
Discharge: NURSING, REHAB OR LONG-TERM CARE | End: 2017-05-28
Attending: EMERGENCY MEDICINE | Admitting: HOSPITALIST
Payer: COMMERCIAL

## 2017-05-27 DIAGNOSIS — E87.1 HYPONATREMIA: ICD-10-CM

## 2017-05-27 DIAGNOSIS — I35.0 AORTIC VALVE STENOSIS, UNSPECIFIED ETIOLOGY: ICD-10-CM

## 2017-05-27 DIAGNOSIS — J44.9 COPD, MODERATE (H): Primary | Chronic | ICD-10-CM

## 2017-05-27 DIAGNOSIS — E86.9 VOLUME DEPLETION: ICD-10-CM

## 2017-05-27 DIAGNOSIS — R42 DIZZINESS: ICD-10-CM

## 2017-05-27 PROBLEM — R53.1 WEAKNESS: Status: ACTIVE | Noted: 2017-05-27

## 2017-05-27 LAB
ANION GAP SERPL CALCULATED.3IONS-SCNC: 10 MMOL/L (ref 3–14)
BASOPHILS # BLD AUTO: 0 10E9/L (ref 0–0.2)
BASOPHILS NFR BLD AUTO: 0 %
BUN SERPL-MCNC: 18 MG/DL (ref 7–30)
CALCIUM SERPL-MCNC: 10.1 MG/DL (ref 8.5–10.1)
CHLORIDE SERPL-SCNC: 96 MMOL/L (ref 94–109)
CO2 SERPL-SCNC: 26 MMOL/L (ref 20–32)
CREAT SERPL-MCNC: 1.07 MG/DL (ref 0.52–1.04)
DIFFERENTIAL METHOD BLD: ABNORMAL
EOSINOPHIL # BLD AUTO: 0 10E9/L (ref 0–0.7)
EOSINOPHIL NFR BLD AUTO: 0 %
ERYTHROCYTE [DISTWIDTH] IN BLOOD BY AUTOMATED COUNT: 14 % (ref 10–15)
GFR SERPL CREATININE-BSD FRML MDRD: 50 ML/MIN/1.7M2
GLUCOSE SERPL-MCNC: 146 MG/DL (ref 70–99)
HCT VFR BLD AUTO: 37.9 % (ref 35–47)
HGB BLD-MCNC: 12.5 G/DL (ref 11.7–15.7)
IMM GRANULOCYTES # BLD: 0.1 10E9/L (ref 0–0.4)
IMM GRANULOCYTES NFR BLD: 0.8 %
LYMPHOCYTES # BLD AUTO: 0.4 10E9/L (ref 0.8–5.3)
LYMPHOCYTES NFR BLD AUTO: 6.7 %
MCH RBC QN AUTO: 29.2 PG (ref 26.5–33)
MCHC RBC AUTO-ENTMCNC: 33 G/DL (ref 31.5–36.5)
MCV RBC AUTO: 89 FL (ref 78–100)
MONOCYTES # BLD AUTO: 0.2 10E9/L (ref 0–1.3)
MONOCYTES NFR BLD AUTO: 3 %
NEUTROPHILS # BLD AUTO: 5.8 10E9/L (ref 1.6–8.3)
NEUTROPHILS NFR BLD AUTO: 89.5 %
NRBC # BLD AUTO: 0 10*3/UL
NRBC BLD AUTO-RTO: 0 /100
PLATELET # BLD AUTO: 261 10E9/L (ref 150–450)
POTASSIUM SERPL-SCNC: 3.4 MMOL/L (ref 3.4–5.3)
RBC # BLD AUTO: 4.28 10E12/L (ref 3.8–5.2)
SODIUM SERPL-SCNC: 132 MMOL/L (ref 133–144)
WBC # BLD AUTO: 6.4 10E9/L (ref 4–11)

## 2017-05-27 PROCEDURE — 99285 EMERGENCY DEPT VISIT HI MDM: CPT | Mod: 25

## 2017-05-27 PROCEDURE — 25000125 ZZHC RX 250: Performed by: EMERGENCY MEDICINE

## 2017-05-27 PROCEDURE — 96361 HYDRATE IV INFUSION ADD-ON: CPT

## 2017-05-27 PROCEDURE — 94640 AIRWAY INHALATION TREATMENT: CPT

## 2017-05-27 PROCEDURE — 96360 HYDRATION IV INFUSION INIT: CPT

## 2017-05-27 PROCEDURE — 85025 COMPLETE CBC W/AUTO DIFF WBC: CPT | Performed by: EMERGENCY MEDICINE

## 2017-05-27 PROCEDURE — 71020 XR CHEST 2 VW: CPT

## 2017-05-27 PROCEDURE — 25000128 H RX IP 250 OP 636: Performed by: EMERGENCY MEDICINE

## 2017-05-27 PROCEDURE — 93005 ELECTROCARDIOGRAM TRACING: CPT

## 2017-05-27 PROCEDURE — 80048 BASIC METABOLIC PNL TOTAL CA: CPT | Performed by: EMERGENCY MEDICINE

## 2017-05-27 PROCEDURE — 25000125 ZZHC RX 250: Performed by: HOSPITALIST

## 2017-05-27 PROCEDURE — 40000275 ZZH STATISTIC RCP TIME EA 10 MIN

## 2017-05-27 PROCEDURE — 99220 ZZC INITIAL OBSERVATION CARE,LEVL III: CPT | Performed by: HOSPITALIST

## 2017-05-27 PROCEDURE — G0378 HOSPITAL OBSERVATION PER HR: HCPCS

## 2017-05-27 PROCEDURE — 94640 AIRWAY INHALATION TREATMENT: CPT | Mod: 76

## 2017-05-27 PROCEDURE — 25000132 ZZH RX MED GY IP 250 OP 250 PS 637: Performed by: HOSPITALIST

## 2017-05-27 RX ORDER — ROFLUMILAST 500 UG/1
500 TABLET ORAL DAILY
Status: DISCONTINUED | OUTPATIENT
Start: 2017-05-28 | End: 2017-05-28 | Stop reason: HOSPADM

## 2017-05-27 RX ORDER — ACETAMINOPHEN 325 MG/1
650 TABLET ORAL EVERY 4 HOURS PRN
Status: DISCONTINUED | OUTPATIENT
Start: 2017-05-27 | End: 2017-05-28 | Stop reason: HOSPADM

## 2017-05-27 RX ORDER — LEVOTHYROXINE SODIUM 25 UG/1
25 TABLET ORAL DAILY
Status: DISCONTINUED | OUTPATIENT
Start: 2017-05-28 | End: 2017-05-28 | Stop reason: HOSPADM

## 2017-05-27 RX ORDER — SODIUM CHLORIDE 9 MG/ML
1000 INJECTION, SOLUTION INTRAVENOUS CONTINUOUS
Status: DISCONTINUED | OUTPATIENT
Start: 2017-05-27 | End: 2017-05-27

## 2017-05-27 RX ORDER — MONTELUKAST SODIUM 10 MG/1
10 TABLET ORAL AT BEDTIME
Status: DISCONTINUED | OUTPATIENT
Start: 2017-05-27 | End: 2017-05-28 | Stop reason: HOSPADM

## 2017-05-27 RX ORDER — LEVOFLOXACIN 500 MG/1
500 TABLET, FILM COATED ORAL DAILY
Status: DISCONTINUED | OUTPATIENT
Start: 2017-05-28 | End: 2017-05-28

## 2017-05-27 RX ORDER — LEVALBUTEROL INHALATION SOLUTION 1.25 MG/3ML
1 SOLUTION RESPIRATORY (INHALATION) EVERY 6 HOURS PRN
Status: DISCONTINUED | OUTPATIENT
Start: 2017-05-27 | End: 2017-05-28 | Stop reason: HOSPADM

## 2017-05-27 RX ORDER — IPRATROPIUM BROMIDE AND ALBUTEROL SULFATE 2.5; .5 MG/3ML; MG/3ML
3 SOLUTION RESPIRATORY (INHALATION) ONCE
Status: COMPLETED | OUTPATIENT
Start: 2017-05-27 | End: 2017-05-27

## 2017-05-27 RX ORDER — GUAIFENESIN/DEXTROMETHORPHAN 100-10MG/5
5 SYRUP ORAL EVERY 4 HOURS PRN
Status: DISCONTINUED | OUTPATIENT
Start: 2017-05-27 | End: 2017-05-28 | Stop reason: HOSPADM

## 2017-05-27 RX ORDER — NALOXONE HYDROCHLORIDE 0.4 MG/ML
.1-.4 INJECTION, SOLUTION INTRAMUSCULAR; INTRAVENOUS; SUBCUTANEOUS
Status: DISCONTINUED | OUTPATIENT
Start: 2017-05-27 | End: 2017-05-28 | Stop reason: HOSPADM

## 2017-05-27 RX ORDER — PREDNISONE 20 MG/1
20 TABLET ORAL DAILY
Status: DISCONTINUED | OUTPATIENT
Start: 2017-05-28 | End: 2017-05-28 | Stop reason: HOSPADM

## 2017-05-27 RX ORDER — ONDANSETRON 4 MG/1
4 TABLET, ORALLY DISINTEGRATING ORAL EVERY 6 HOURS PRN
Status: DISCONTINUED | OUTPATIENT
Start: 2017-05-27 | End: 2017-05-28 | Stop reason: HOSPADM

## 2017-05-27 RX ORDER — GEMFIBROZIL 600 MG/1
600 TABLET, FILM COATED ORAL DAILY
Status: DISCONTINUED | OUTPATIENT
Start: 2017-05-28 | End: 2017-05-28 | Stop reason: HOSPADM

## 2017-05-27 RX ORDER — ONDANSETRON 2 MG/ML
4 INJECTION INTRAMUSCULAR; INTRAVENOUS EVERY 6 HOURS PRN
Status: DISCONTINUED | OUTPATIENT
Start: 2017-05-27 | End: 2017-05-28 | Stop reason: HOSPADM

## 2017-05-27 RX ORDER — IPRATROPIUM BROMIDE AND ALBUTEROL SULFATE 2.5; .5 MG/3ML; MG/3ML
3 SOLUTION RESPIRATORY (INHALATION) EVERY 4 HOURS PRN
Status: DISCONTINUED | OUTPATIENT
Start: 2017-05-27 | End: 2017-05-28 | Stop reason: HOSPADM

## 2017-05-27 RX ORDER — MECOBALAMIN 5000 MCG
30 TABLET,DISINTEGRATING ORAL DAILY
Status: DISCONTINUED | OUTPATIENT
Start: 2017-05-28 | End: 2017-05-28 | Stop reason: HOSPADM

## 2017-05-27 RX ORDER — SODIUM CHLORIDE AND POTASSIUM CHLORIDE 150; 900 MG/100ML; MG/100ML
INJECTION, SOLUTION INTRAVENOUS CONTINUOUS
Status: DISPENSED | OUTPATIENT
Start: 2017-05-27 | End: 2017-05-28

## 2017-05-27 RX ADMIN — IPRATROPIUM BROMIDE AND ALBUTEROL SULFATE 3 ML: .5; 3 SOLUTION RESPIRATORY (INHALATION) at 15:30

## 2017-05-27 RX ADMIN — POTASSIUM CHLORIDE AND SODIUM CHLORIDE: 900; 150 INJECTION, SOLUTION INTRAVENOUS at 21:32

## 2017-05-27 RX ADMIN — IPRATROPIUM BROMIDE AND ALBUTEROL SULFATE 3 ML: .5; 3 SOLUTION RESPIRATORY (INHALATION) at 20:29

## 2017-05-27 RX ADMIN — GUAIFENESIN AND DEXTROMETHORPHAN 5 ML: 100; 10 SYRUP ORAL at 23:02

## 2017-05-27 RX ADMIN — SODIUM CHLORIDE 1000 ML: 9 INJECTION, SOLUTION INTRAVENOUS at 15:35

## 2017-05-27 RX ADMIN — MONTELUKAST SODIUM 10 MG: 10 TABLET, FILM COATED ORAL at 21:32

## 2017-05-27 ASSESSMENT — ENCOUNTER SYMPTOMS
LIGHT-HEADEDNESS: 1
FEVER: 1
NAUSEA: 1
APPETITE CHANGE: 1
COUGH: 1
CHILLS: 0
ABDOMINAL PAIN: 0
VOMITING: 0
SHORTNESS OF BREATH: 1

## 2017-05-27 NOTE — ED NOTES
Patient presents with c/o of increasing dizziness;  Patient states that she has been dizzy for the past 3 days; however her dizziness is getting worse and this afternoon she almost fell.  Patient uses a walker for ambulation, and lives  In an independent living facility.  ABCs intact.

## 2017-05-27 NOTE — ED PROVIDER NOTES
History     Chief Complaint:  Lightheadedness    HPI   Ana Luisa Lee is a 77 year old female with a history of COPD start on prednisone and Levaquin on 5/24/17  who presents via EMS to the emergency department today for evaluation of generalized weakness and lightheadedness. The patient has had three day long lightheadedness, increased cough with clear phlegm, shortness of breath, and generalized weakness, which has worsened this afternoon with near syncope prompting visit. She notes symptoms are worse with exertion. The patient also reports week long nausea with decreased fluid and PO intake. The patient states she has not been generally feeling well. Of note, the patient has a recent fall and was evaluated here on 5/3/17. No fevers or chills. No vomiting. No chest pain or leg swelling. No abdominal pain. She states she feels more shaky since starting prednisone.    Cardiac Risk Factors   Sex: Female   Tobacco: Negative   Hypertension: Positive   Diabetes: Negative  Hyperlipidemia: Positive   Family History: Negative     Allergies:  Hydralazine  Penicillin G   Metoprolol   Norvasc   Meloxicam     Medications:    Levothyroxine (synthroid/levothroid) 25 mcg tablet  Losartan (cozaar) 50 mg tablet  Levalbuterol (xopenex) 1.25 mg/3ml neb solution  Order for dme  Prednisone (deltasone) 10 mg tablet  Gemfibrozil (lopid) 600 mg tablet  Prochlorperazine (compazine) 5 mg tablet  Roflumilast (daliresp) 500 mcg tabs tablet  Polyethylene glycol (miralax/glycolax) packet  Melatonin 1 mg tabs tablet  Lorazepam (ativan) 0.5 mg tablet  Albuterol (albuterol) 108 (90 base) mcg/act inhaler  Levalbuterol (xopenex) 1.25 mg/3ml neb solution  Budesonide (pulmicort flexhaler) 180 mcg/act inhaler  Montelukast (singulair) 10 mg tablet  Salmeterol (serevent) 50 mcg/dose diskus inhaler  Tiotropium (spiriva) 18 mcg capsule  Dextromethorphan-guaifenesin (mucinex dm)  mg per 12 hr tablet  Guaifenesin-dextromethorphan (robitussin  dm) 100-10 mg/5ml syrup  Cyanocobalamin (b-12) 1000 mcg tbcr  Ferrous sulfate (iron) 325 (65 fe) mg tablet  Calcium 600 mg tablet  Levothyroxine (synthroid/levothroid) 25 mcg tablet  Lansoprazole (prevacid) 30 mg cr capsule  Cholecalciferol (vitamin d) 1000 unit tablet  Multiple vitamins-minerals (multivitamin or)  Aspirin not prescribed, intentional,     Past Medical History:    Anemia  Arthritis of hand  Asthma, persistent  Asthma, persistent  Chronic intermittent steroid use  COPD exacerbation (H)  Esophageal stricture  Foot deformity  HTN, goal below 140/90  Hyperlipidemia  Hyperlipidemia LDL goal < 130  Hypothyroidism  Hypothyroidism  Left foot pain  Left shoulder pain  Medication side effects  Osteoporosis  Paroxysmal supraventricular tachycardia (H)  PVC (premature ventricular contraction)  Skin cyst  SOB (shortness of breath) on exertion  SVT (supraventricular tachycardia) (H)  Pernicious anemia  Thrush of mouth and esophagus  Steroid-dependent chronic obstructive pulmonary disease (H)  Hypercalcemia  Pes planus  ALEKSANDRA (acute kidney injury)     Past Surgical History:    Bunionectomy lapidus with tarsal metatarsal (tmt) fusion                  Ep study, modified svt not induced, pvc's                  Gyn surgery                Hysterectomy total abdominal                        Hysterectomy, pap no longer indicated                      Tonsillectomy                 Family History:    Cerebrovascular disease - Mother   Hypertension - Mother      Social History:  Marital Status:   Presents to the ED alone  Tobacco Use: Former smoker  Alcohol Use: No  PCP: Jenny Hamilton      Review of Systems   Constitutional: Positive for appetite change and fever. Negative for chills.   Respiratory: Positive for cough and shortness of breath.    Cardiovascular: Negative for chest pain and leg swelling.   Gastrointestinal: Positive for nausea. Negative for abdominal pain and vomiting.   Neurological:  "Positive for light-headedness.   All other systems reviewed and are negative.    Physical Exam   First Vitals:  BP: (!) 120/93  Pulse: 106  Heart Rate: 106  Temp: 98.1  F (36.7  C)  Resp: 18  Height: 157.5 cm (5' 2\")  Weight: 64.4 kg (142 lb)  SpO2: 96 %    Physical Exam  Gen: Pleasant, appears stated age.    Eye:   Pupils are equal, round, and reactive.     Sclera non-injected.    ENT:   Moist mucus membranes.     Normal tongue.    Oropharynx without lesions.    Cardiac:     Tachycardic, but regular rhythm.     No murmurs, gallops, or rubs.    Pulmonary:     Rales in left lung base and prolonged expiratory phase.    Anterior expiratory wheezing.  No increased work of breathing    Abdomen:     Normal active bowel sounds.     Abdomen is soft and non-distended, without focal tenderness.    Musculoskeletal:     Normal movement of all extremities without evidence for deficit.    Extremities:    No edema.    Skin:   Warm and dry. Purplish lesion 1 cm on the base of tongue.     Neurologic:      Speech is fluent, cognition is normal.     EOMI, symmetric grimace.     Str 5/5 in RUE, LUE, RLE, LLE.     Fine touch sensation intact in BUE/BLE.   FTN intact bilaterally.        Psychiatric:     Normal affect with appropriate interaction with examiner.     Emergency Department Course   EC2017 15:22:12  Indication: rule out cardiac etiololgy  Findings:    .Normal sinus rhythm   Normal ECG..   Ventricular rate: 90 bpm  CA interval: 156 ms  QRS duration: 90 ms  QT/QTc: 368/450 ms  R-Axis: -14  No significant change from ECG dated 2017.   ECG read at 15:25 by Dr. West.    Imaging:  Radiographic findings were communicated with the patient who voiced understanding of the findings.    Chest X-Ray. 2 Views:   No active infiltrates. No significant change.     Preliminary reading per radiology.     Laboratory:  CBC:  WBC 6.4, HGB 12.5,   BMP: , Glucose 146, Creatinine 1.07, GFR 50 o/w " WNL.    Interventions:  15:30 DuoNeb 3mL nebulizer   15:35 Normal saline 1,000mL IV     Emergency Department Course:  Nursing notes and vitals reviewed.    15:08 I performed an exam of the patient as documented above.     15:22 EKG obtained in the ED, see results above.     16:07 Blood drawn. This was sent to the lab for further testing, results above.    The patient was taken for x-ray, see imaging results above.     15:35 A peripheral IV was established.The patient received the intervention(s) above.    17:32 Recheck patient. After ambulation test, the patient is feeling better, but still somewhat lightheaded, not feeling safe to go home. Findings and plan explained to the Patient who consents to admission.      Discussed the patient with DEVEN Law, who will admit the patient to an observation bed for further monitoring, evaluation, and treatment.    Impression & Plan    Medical Decision Making:  Ana Luisa Lee is a 77 year old female with history of aortic stenosis, COPD, and chronic hyponitremia who presents today with generalized weakness and lightheadedness. On exam, the patient was initially slightly tachycardiac although this has improved with fluid resuscitation. Otherwise, she has an intact neurological exam. She is tolerating oral fluids. She does feel slightly better following fluid resuscitation although states she still feels somewhat lighheaded with walking. She also notes she feels tremulousness. At this point, her symptoms does not seen to be consistent with vertigo therefore I do not suspect intracranial cause such as a mass, subdural hemorrhage, or poor circulation stroke. She does have hyponitremia although this appears to fairly chronic for her. She is not anemic. She does admit to poor oral intact for unclear reasons for the last week. It could be that her symptoms have been exacerbated by prednisone which is a higher dose that she is accustomed to. Otherwise, she continues to feel  slightly lightheaded with ambulation around the ED. Her symptoms resolved with lying down. At this point, she does not feel comfortable returning home as she lives alone and her daughter lives in [place]. She will be admitted for observation, continued gentle fluid resuscitation, and continued evaluation.     Diagnosis:    ICD-10-CM    1. Volume depletion E86.9    2. Dizziness R42    3. Aortic valve stenosis, unspecified etiology I35.0    4. Hyponatremia E87.1        Disposition:  Admitted to an observation bed under the care of Dr. Gigi Joy Disclosure:  I, Liyah Faust, am serving as a scribe at 3:08 PM on 5/27/2017 to document services personally performed by Bri West MD, based on my observations and the provider's statements to me.  Liyah Faust  5/27/2017   [facility] EMERGENCY DEPARTMENT       Bri West MD  05/27/17 1828

## 2017-05-27 NOTE — PHARMACY-ADMISSION MEDICATION HISTORY
Admission medication history interview status for this patient is complete. See [facility] admission navigator for allergy information, prior to admission medications and immunization status.     Medication history interview source(s):Patient  Medication history resources (including written lists, pill bottles, clinic record):None  Primary pharmacy:[facility] on [address]    Changes made to PTA medication list:  Added: -  Deleted: duplicate prednisone  Changed: -    Actions taken by pharmacist (provider contacted, etc):None     Additional medication history information:None    Medication reconciliation/reorder completed by provider prior to medication history? No    For patients on insulin therapy: No   Lantus/levemir/NPH/Mix 70/30 dose:  _____   in AM/PM  or twice daily   Sliding scale Novolog Y/N  If Yes, do you have a baseline novolog pre-meal dose:  ______units with meals   Patients eat three meals a day:   Y/N     Any Barriers to therapy:  cost of medications/comfortable with giving injections (if applicable)/ comfortable and confident with current diabetes regimen       Prior to Admission medications    Medication Sig Last Dose Taking? Auth Provider   levofloxacin (LEVAQUIN) 500 MG tablet Take 1 tablet (500 mg) by mouth daily 5/27/2017 at day 3 Yes Nelia Macdonald APRN CNP   predniSONE (DELTASONE) 10 MG tablet Take 3 tabs daily for 4 days Take 2 tabs daily for 4 days Then go back to your previous dose of 10 mg alternating with 15 mg 5/27/2017 at 30 mg Yes Nelia Macdonald APRN CNP   guaiFENesin-dextromethorphan (ROBITUSSIN DM) 100-10 MG/5ML syrup Take 5 mLs by mouth every 4 hours as needed for cough  Yes Nelia Macdonald APRN CNP   gemfibrozil (LOPID) 600 MG tablet Take 1 tablet (600 mg) by mouth daily 5/27/2017 at Unknown time Yes Jenny Hamilton MD   prochlorperazine (COMPAZINE) 5 MG tablet Take 1 tablet (5 mg) by mouth every 6 hours as needed for nausea or vomiting  Yes Mae Barrera,  MD   roflumilast (DALIRESP) 500 MCG TABS tablet Take 500 mcg by mouth daily  5/27/2017 at Unknown time Yes Reported, Patient   polyethylene glycol (MIRALAX/GLYCOLAX) Packet Take 17 g by mouth daily as needed for constipation  Yes Reported, Patient   albuterol (ALBUTEROL) 108 (90 BASE) MCG/ACT Inhaler Inhale 2 puffs into the lungs every 6 hours as needed  Yes Hallie Barroso MD   levalbuterol (XOPENEX) 1.25 MG/3ML neb solution Take 3 mLs (1.25 mg) by nebulization every 4 hours as needed for shortness of breath / dyspnea or wheezing  Yes Hallie Barroso MD   budesonide (PULMICORT FLEXHALER) 180 MCG/ACT inhaler Inhale 1 puff into the lungs 2 times daily 5/27/2017 at x1 Yes Hallie Barroso MD   montelukast (SINGULAIR) 10 MG tablet Take 1 tablet (10 mg) by mouth At Bedtime 5/26/2017 at Unknown time Yes Hallie Barroso MD   salmeterol (SEREVENT) 50 MCG/DOSE diskus inhaler Inhale 1 puff into the lungs 2 times daily 5/27/2017 at x1 Yes Hallie Barroso MD   tiotropium (SPIRIVA) 18 MCG capsule Inhale 1 capsule (18 mcg) into the lungs daily 5/27/2017 at Unknown time Yes Hallie Barroso MD   Cyanocobalamin (B-12) 1000 MCG TBCR Take 1,000 mcg by mouth daily 5/27/2017 at Unknown time Yes Hallie Barroso MD   ferrous sulfate (IRON) 325 (65 FE) MG tablet Take 1 tablet (325 mg) by mouth daily (with breakfast) 5/27/2017 at Unknown time Yes Hallie Barroso MD   calcium 600 MG tablet Take 1 tablet (600 mg) by mouth daily 5/27/2017 at Unknown time Yes Hallie Barroso MD   levothyroxine (SYNTHROID/LEVOTHROID) 25 MCG tablet Take 1 tablet (25 mcg) by mouth daily 5/27/2017 at Unknown time Yes Hallie Barroso MD   LANsoprazole (PREVACID) 30 MG CR capsule Take 1 capsule (30 mg) by mouth daily 5/27/2017 at Unknown time Yes Hallie Barroso MD   cholecalciferol (VITAMIN D) 1000 UNIT tablet Take 1 tablet (1,000 Units) by mouth daily 5/27/2017 at Unknown time  Yes Hallie Barroso MD   Multiple Vitamins-Minerals (MULTIVITAMIN OR) Take 1 tablet by mouth daily 5/27/2017 at Unknown time Yes Unknown, Entered By History   order for DME Equipment being ordered: Nebulizer machine, cup, and tubing.  Fax to [facility] per pt request   Jenny Hamilton MD   order for DME Equipment being ordered: Walker Wheels (), Walker () and Other: Four Wheeled Walker  Treatment Diagnosis: Impaired gait stability and activity tolerance   Moses Calle MD   order for DME Equipment being ordered: blood pressure machine   Jenny Hamilton MD   order for DME Overnight pulse oximetry   Jenny Hamilton MD   ASPIRIN NOT PRESCRIBED, INTENTIONAL, 1 each continuous prn. Antiplatelet medication not prescribed intentionally due to Use of NSAIDS   Jenny Hamilton MD

## 2017-05-27 NOTE — IP AVS SNAPSHOT
MRN:[id number]                      After Visit Summary   5/27/2017    Ana Luisa Lee    MRN: [id number]           Thank you!     Thank you for choosing [facility] for your care. Our goal is always to provide you with excellent care. Hearing back from our patients is one way we can continue to improve our services. Please take a few minutes to complete the written survey that you may receive in the mail after you visit. If you would like to speak to someone directly about your visit please contact Patient Relations at [phone]. Thank you!          Patient Information     Date Of Birth          1940        About your hospital stay     You were admitted on:  May 27, 2017 You last received care in the:  [facility] Observation Department    You were discharged on:  May 28, 2017        Reason for your hospital stay       Generalized weakness. Recent treatment for COPD exacerbation                  Who to Call     For medical emergencies, please call 911.  For non-urgent questions about your medical care, please call your primary care provider or clinic, [phone]          Attending Provider     Provider Specialty    Bri West MD Emergency Medicine    Young, Speedy Vinson MD Internal Medicine       Primary Care Provider Office Phone # Fax #    Jenny Hamilton [phone]       [place] [address] [place] [address]        After Care Instructions     Activity       Your activity upon discharge: activity as tolerated            Diet       Follow this diet upon discharge: Orders Placed This Encounter      Snacks/Supplements Adult: Ensure Plus Adult Shake; Between Meals      Regular Diet Adult                  Follow-up Appointments     Follow-up and recommended labs and tests        Follow up with primary care provider, Jenny Hamilton, within 7 days for hospital follow- up.   Follow-up on your blood pressure and re-assess need for BP meds                  Your next 10 appointments already scheduled     Jun 07, 2017 10:15 AM CDT   New Visit with Tanmay Melton MD   [facility] [place] ([facility])    [address]  [facility] [id number] [phone]            Jun 08, 2017 11:00 AM CDT   Pulmonary Eval with [facility]   [facility] ([facility])    [address], [address]  [facility] [id number] [phone]              Additional Services     Home Care OT Referral for Hospital Discharge       OT to eval and treat    Your provider has ordered home care - occupational therapy. If you have not been contacted within 2 days of your discharge please call the department phone number listed on the top of this document.            Home Care PT Referral for Hospital Discharge       PT to eval and treat    Your provider has ordered home care - physical therapy. If you have not been contacted within 2 days of your discharge please call the department phone number listed on the top of this document.            Home Care Social Service Referral for Hospital Discharge        to assist in finding assisted living facility    Your provider has ordered home care - . If you have not been contacted within 2 days of your discharge please call the department phone number listed on the top of this document.            Home care nursing referral       RN extended hours visit. RN to assess vital signs and weight, respiratory and cardiac status, hydration, nutrition and bowel status and home safety.    Your provider has ordered home care nursing services. If you have not been contacted within 2 days of your discharge please call the inpatient department phone number at [phone] .                             Pending Results     No orders found for last 3 day(s).           "  Statement of Approval     Ordered          17 1251  I have reviewed and agree with all the recommendations and orders detailed in this document.  EFFECTIVE NOW     Approved and electronically signed by:  Speedy Yu MD             Admission Information     Date & Time Provider Department Dept. Phone    2017 Speedy Yu MD [facility] Observation Department [phone]      Your Vitals Were     Blood Pressure Pulse Temperature Respirations Height Weight    183/85 (BP Location: Right arm) 106 98.7  F (37.1  C) (Oral) 20 1.575 m (5' 2\") 63.1 kg (139 lb 3.2 oz)    Pulse Oximetry BMI (Body Mass Index)                93% 25.46 kg/m2          [employer] Information     [employer] lets you send messages to your doctor, view your test results, renew your prescriptions, schedule appointments and more. To sign up, go to [email or web address] . Click on \"Log in\" on the left side of the screen, which will take you to the Welcome page. Then click on \"Sign up Now\" on the right side of the page.     You will be asked to enter the access code listed below, as well as some personal information. Please follow the directions to create your username and password.     Your access code is: [email or web address]  Expires: 8/3/2017 12:38 PM     Your access code will  in 90 days. If you need help or a new code, please call your [facility] or [phone].        Care EveryWhere ID     This is your Care EveryWhere ID. This could be used by other organizations to access your [facility] medical records  [id number]           Review of your medicines      CONTINUE these medicines which have NOT CHANGED        Dose / Directions    albuterol 108 (90 BASE) MCG/ACT Inhaler   Commonly known as:  albuterol   Used for:  Asthma, persistent        Dose:  2 puff   Inhale 2 puffs into the lungs every 6 hours as needed   Refills:  0       ASPIRIN NOT PRESCRIBED   Commonly known as:  INTENTIONAL        Dose:  1 each   1 " each continuous prn. Antiplatelet medication not prescribed intentionally due to Use of NSAIDS   Quantity:  0 each   Refills:  0       B-12 1000 MCG Tbcr   Used for:  Pernicious anemia        Dose:  1000 mcg   Take 1,000 mcg by mouth daily   Quantity:  100 tablet   Refills:  1       budesonide 180 MCG/ACT inhaler   Commonly known as:  PULMICORT FLEXHALER   Used for:  Pulmonary emphysema, unspecified emphysema type (H)        Dose:  1 puff   Inhale 1 puff into the lungs 2 times daily   Refills:  0       calcium 600 MG tablet   Used for:  Pulmonary emphysema, unspecified emphysema type (H)        Dose:  1 tablet   Take 1 tablet (600 mg) by mouth daily   Quantity:  60 tablet   Refills:  0       cholecalciferol 1000 UNIT tablet   Commonly known as:  vitamin D   Used for:  COPD, moderate (H)        Dose:  1000 Units   Take 1 tablet (1,000 Units) by mouth daily   Quantity:  30 tablet   Refills:  0       ferrous sulfate 325 (65 FE) MG tablet   Commonly known as:  IRON   Used for:  Pulmonary emphysema, unspecified emphysema type (H)        Dose:  1 tablet   Take 1 tablet (325 mg) by mouth daily (with breakfast)   Quantity:  100 tablet   Refills:  0       gemfibrozil 600 MG tablet   Commonly known as:  LOPID   Used for:  Hyperlipidemia with target LDL less than 130        Dose:  600 mg   Take 1 tablet (600 mg) by mouth daily   Quantity:  90 tablet   Refills:  0       guaiFENesin-dextromethorphan 100-10 MG/5ML syrup   Commonly known as:  ROBITUSSIN DM   Used for:  Pulmonary emphysema, unspecified emphysema type (H)        Dose:  5 mL   Take 5 mLs by mouth every 4 hours as needed for cough   Quantity:  560 mL   Refills:  1       LANsoprazole 30 MG CR capsule   Commonly known as:  PREVACID   Used for:  Gastroesophageal reflux disease without esophagitis        Dose:  30 mg   Take 1 capsule (30 mg) by mouth daily   Quantity:  90 capsule   Refills:  3       levalbuterol 1.25 MG/3ML neb solution   Commonly known as:  XOPENEX    Used for:  COPD, moderate (H), COPD exacerbation (H)        Dose:  1 ampule   Take 3 mLs (1.25 mg) by nebulization every 4 hours as needed for shortness of breath / dyspnea or wheezing   Quantity:  1584 mL   Refills:  1       levofloxacin 500 MG tablet   Commonly known as:  LEVAQUIN   Used for:  COPD, moderate (H), Acute bronchitis with symptoms > 10 days        Dose:  500 mg   Take 1 tablet (500 mg) by mouth daily   Quantity:  10 tablet   Refills:  0       levothyroxine 25 MCG tablet   Commonly known as:  SYNTHROID/LEVOTHROID   Used for:  Hypothyroidism due to acquired atrophy of thyroid        Dose:  25 mcg   Take 1 tablet (25 mcg) by mouth daily   Quantity:  90 tablet   Refills:  1       montelukast 10 MG tablet   Commonly known as:  SINGULAIR   Used for:  Pulmonary emphysema, unspecified emphysema type (H)        Dose:  10 mg   Take 1 tablet (10 mg) by mouth At Bedtime   Quantity:  30 tablet   Refills:  0       MULTIVITAMIN PO        Dose:  1 tablet   Take 1 tablet by mouth daily   Refills:  0       * order for DME   Used for:  COPD, moderate (H), Other fatigue        Overnight pulse oximetry   Quantity:  1 Device   Refills:  0       * order for DME   Used for:  HTN, goal below 140/90, Labile blood pressure        Equipment being ordered: blood pressure machine   Quantity:  1 Device   Refills:  0       * order for DME   Used for:  Pulmonary emphysema, unspecified emphysema type (H)        Equipment being ordered: Walker Wheels (), Walker () and Other: Four Wheeled Walker Treatment Diagnosis: Impaired gait stability and activity tolerance   Quantity:  1 each   Refills:  0       order for DME   Used for:  COPD, moderate (H)        Equipment being ordered: Nebulizer machine, cup, and tubing.  Fax to [facility] per pt request   Quantity:  1 each   Refills:  0       polyethylene glycol Packet   Commonly known as:  MIRALAX/GLYCOLAX        Dose:  17 g   Take 17 g by mouth daily as  needed for constipation   Refills:  0       predniSONE 10 MG tablet   Commonly known as:  DELTASONE   Used for:  COPD, moderate (H), Acute bronchospasm, Acute bronchitis with symptoms > 10 days        Take 3 tabs daily for 4 days Take 2 tabs daily for 4 days Then go back to your previous dose of 10 mg alternating with 15 mg   Quantity:  20 tablet   Refills:  0       prochlorperazine 5 MG tablet   Commonly known as:  COMPAZINE   Used for:  Nausea        Dose:  5 mg   Take 1 tablet (5 mg) by mouth every 6 hours as needed for nausea or vomiting   Quantity:  120 tablet   Refills:  0       roflumilast 500 MCG Tabs tablet   Commonly known as:  DALIRESP        Dose:  500 mcg   Take 500 mcg by mouth daily   Refills:  0       salmeterol 50 MCG/DOSE diskus inhaler   Commonly known as:  SEREVENT   Used for:  Pulmonary emphysema, unspecified emphysema type (H)        Dose:  1 puff   Inhale 1 puff into the lungs 2 times daily   Refills:  0       tiotropium 18 MCG capsule   Commonly known as:  SPIRIVA   Used for:  Pulmonary emphysema, unspecified emphysema type (H)        Dose:  18 mcg   Inhale 1 capsule (18 mcg) into the lungs daily   Quantity:  30 capsule   Refills:  0       * Notice:  This list has 3 medication(s) that are the same as other medications prescribed for you. Read the directions carefully, and ask your doctor or other care provider to review them with you.             Protect others around you: Learn how to safely use, store and throw away your medicines at www.disposemymeds.org.             Medication List: This is a list of all your medications and when to take them. Check marks below indicate your daily home schedule. Keep this list as a reference.      Medications           Morning Afternoon Evening Bedtime As Needed    albuterol 108 (90 BASE) MCG/ACT Inhaler   Commonly known as:  albuterol   Inhale 2 puffs into the lungs every 6 hours as needed                                ASPIRIN NOT PRESCRIBED   Commonly  known as:  INTENTIONAL   1 each continuous prn. Antiplatelet medication not prescribed intentionally due to Use of NSAIDS                                B-12 1000 MCG Tbcr   Take 1,000 mcg by mouth daily                                budesonide 180 MCG/ACT inhaler   Commonly known as:  PULMICORT FLEXHALER   Inhale 1 puff into the lungs 2 times daily                                calcium 600 MG tablet   Take 1 tablet (600 mg) by mouth daily                                cholecalciferol 1000 UNIT tablet   Commonly known as:  vitamin D   Take 1 tablet (1,000 Units) by mouth daily                                ferrous sulfate 325 (65 FE) MG tablet   Commonly known as:  IRON   Take 1 tablet (325 mg) by mouth daily (with breakfast)                                gemfibrozil 600 MG tablet   Commonly known as:  LOPID   Take 1 tablet (600 mg) by mouth daily   Last time this was given:  600 mg on 5/28/2017  8:17 AM                                guaiFENesin-dextromethorphan 100-10 MG/5ML syrup   Commonly known as:  ROBITUSSIN DM   Take 5 mLs by mouth every 4 hours as needed for cough   Last time this was given:  5 mLs on 5/27/2017 11:02 PM                                LANsoprazole 30 MG CR capsule   Commonly known as:  PREVACID   Take 1 capsule (30 mg) by mouth daily   Last time this was given:  30 mg on 5/28/2017  8:16 AM                                levalbuterol 1.25 MG/3ML neb solution   Commonly known as:  XOPENEX   Take 3 mLs (1.25 mg) by nebulization every 4 hours as needed for shortness of breath / dyspnea or wheezing                                levofloxacin 500 MG tablet   Commonly known as:  LEVAQUIN   Take 1 tablet (500 mg) by mouth daily   Last time this was given:  250 mg on 5/28/2017  9:59 AM                                levothyroxine 25 MCG tablet   Commonly known as:  SYNTHROID/LEVOTHROID   Take 1 tablet (25 mcg) by mouth daily   Last time this was given:  25 mcg on 5/28/2017  8:16 AM                                 montelukast 10 MG tablet   Commonly known as:  SINGULAIR   Take 1 tablet (10 mg) by mouth At Bedtime   Last time this was given:  10 mg on 5/27/2017  9:32 PM                                MULTIVITAMIN PO   Take 1 tablet by mouth daily                                * order for DME   Overnight pulse oximetry                                * order for DME   Equipment being ordered: blood pressure machine                                * order for DME   Equipment being ordered: Walker Wheels (), Walker () and Other: Four Wheeled Walker Treatment Diagnosis: Impaired gait stability and activity tolerance                                order for DME   Equipment being ordered: Nebulizer machine, cup, and tubing.  Fax to [facility] per pt request                                polyethylene glycol Packet   Commonly known as:  MIRALAX/GLYCOLAX   Take 17 g by mouth daily as needed for constipation                                predniSONE 10 MG tablet   Commonly known as:  DELTASONE   Take 3 tabs daily for 4 days Take 2 tabs daily for 4 days Then go back to your previous dose of 10 mg alternating with 15 mg   Last time this was given:  20 mg on 5/28/2017  8:16 AM                                prochlorperazine 5 MG tablet   Commonly known as:  COMPAZINE   Take 1 tablet (5 mg) by mouth every 6 hours as needed for nausea or vomiting                                roflumilast 500 MCG Tabs tablet   Commonly known as:  DALIRESP   Take 500 mcg by mouth daily   Last time this was given:  500 mcg on 5/28/2017  8:17 AM                                salmeterol 50 MCG/DOSE diskus inhaler   Commonly known as:  SEREVENT   Inhale 1 puff into the lungs 2 times daily                                tiotropium 18 MCG capsule   Commonly known as:  SPIRIVA   Inhale 1 capsule (18 mcg) into the lungs daily                                * Notice:  This list has 3 medication(s) that are the same as  other medications prescribed for you. Read the directions carefully, and ask your doctor or other care provider to review them with you.

## 2017-05-27 NOTE — ED NOTES
Walked pt. around ED. Pt. Stated she felt lightheaded and unsteady while walking. Pt. Walked well with her walker. MD notified.

## 2017-05-28 VITALS
WEIGHT: 139.2 LBS | RESPIRATION RATE: 20 BRPM | HEART RATE: 106 BPM | BODY MASS INDEX: 25.62 KG/M2 | DIASTOLIC BLOOD PRESSURE: 85 MMHG | SYSTOLIC BLOOD PRESSURE: 183 MMHG | HEIGHT: 62 IN | OXYGEN SATURATION: 93 % | TEMPERATURE: 98.7 F

## 2017-05-28 LAB
ANION GAP SERPL CALCULATED.3IONS-SCNC: 7 MMOL/L (ref 3–14)
BUN SERPL-MCNC: 14 MG/DL (ref 7–30)
CALCIUM SERPL-MCNC: 8.8 MG/DL (ref 8.5–10.1)
CHLORIDE SERPL-SCNC: 105 MMOL/L (ref 94–109)
CO2 SERPL-SCNC: 24 MMOL/L (ref 20–32)
CREAT SERPL-MCNC: 0.92 MG/DL (ref 0.52–1.04)
GFR SERPL CREATININE-BSD FRML MDRD: 59 ML/MIN/1.7M2
GLUCOSE SERPL-MCNC: 92 MG/DL (ref 70–99)
INTERPRETATION ECG - MUSE: NORMAL
LACTATE BLD-SCNC: 0.9 MMOL/L (ref 0.7–2.1)
POTASSIUM SERPL-SCNC: 3 MMOL/L (ref 3.4–5.3)
SODIUM SERPL-SCNC: 136 MMOL/L (ref 133–144)

## 2017-05-28 PROCEDURE — 99217 ZZC OBSERVATION CARE DISCHARGE: CPT | Performed by: HOSPITALIST

## 2017-05-28 PROCEDURE — 80048 BASIC METABOLIC PNL TOTAL CA: CPT | Performed by: HOSPITALIST

## 2017-05-28 PROCEDURE — 96361 HYDRATE IV INFUSION ADD-ON: CPT

## 2017-05-28 PROCEDURE — 40000275 ZZH STATISTIC RCP TIME EA 10 MIN

## 2017-05-28 PROCEDURE — 94640 AIRWAY INHALATION TREATMENT: CPT | Mod: 76

## 2017-05-28 PROCEDURE — 25000132 ZZH RX MED GY IP 250 OP 250 PS 637: Performed by: HOSPITALIST

## 2017-05-28 PROCEDURE — 94640 AIRWAY INHALATION TREATMENT: CPT

## 2017-05-28 PROCEDURE — G0378 HOSPITAL OBSERVATION PER HR: HCPCS

## 2017-05-28 PROCEDURE — 25000125 ZZHC RX 250: Performed by: HOSPITALIST

## 2017-05-28 PROCEDURE — 36415 COLL VENOUS BLD VENIPUNCTURE: CPT | Performed by: HOSPITALIST

## 2017-05-28 PROCEDURE — 25000132 ZZH RX MED GY IP 250 OP 250 PS 637: Performed by: PHYSICIAN ASSISTANT

## 2017-05-28 PROCEDURE — 83605 ASSAY OF LACTIC ACID: CPT | Performed by: HOSPITALIST

## 2017-05-28 RX ORDER — LEVOFLOXACIN 250 MG/1
250 TABLET, FILM COATED ORAL DAILY
Status: DISCONTINUED | OUTPATIENT
Start: 2017-05-28 | End: 2017-05-28 | Stop reason: HOSPADM

## 2017-05-28 RX ORDER — POTASSIUM CHLORIDE 29.8 MG/ML
20 INJECTION INTRAVENOUS
Status: DISCONTINUED | OUTPATIENT
Start: 2017-05-28 | End: 2017-05-28

## 2017-05-28 RX ORDER — LOSARTAN POTASSIUM 25 MG/1
25 TABLET ORAL DAILY
Status: DISCONTINUED | OUTPATIENT
Start: 2017-05-28 | End: 2017-05-28 | Stop reason: HOSPADM

## 2017-05-28 RX ORDER — IPRATROPIUM BROMIDE AND ALBUTEROL SULFATE 2.5; .5 MG/3ML; MG/3ML
3 SOLUTION RESPIRATORY (INHALATION)
Status: DISCONTINUED | OUTPATIENT
Start: 2017-05-28 | End: 2017-05-28 | Stop reason: HOSPADM

## 2017-05-28 RX ORDER — POTASSIUM CHLORIDE 1500 MG/1
20-40 TABLET, EXTENDED RELEASE ORAL
Status: DISCONTINUED | OUTPATIENT
Start: 2017-05-28 | End: 2017-05-28 | Stop reason: HOSPADM

## 2017-05-28 RX ORDER — POTASSIUM CHLORIDE 1.5 G/1.58G
20-40 POWDER, FOR SOLUTION ORAL
Status: DISCONTINUED | OUTPATIENT
Start: 2017-05-28 | End: 2017-05-28 | Stop reason: HOSPADM

## 2017-05-28 RX ORDER — POTASSIUM CHLORIDE 7.45 MG/ML
10 INJECTION INTRAVENOUS
Status: DISCONTINUED | OUTPATIENT
Start: 2017-05-28 | End: 2017-05-28 | Stop reason: HOSPADM

## 2017-05-28 RX ORDER — POTASSIUM CL/LIDO/0.9 % NACL 10MEQ/0.1L
10 INTRAVENOUS SOLUTION, PIGGYBACK (ML) INTRAVENOUS
Status: DISCONTINUED | OUTPATIENT
Start: 2017-05-28 | End: 2017-05-28 | Stop reason: HOSPADM

## 2017-05-28 RX ADMIN — LEVOTHYROXINE SODIUM 25 MCG: 25 TABLET ORAL at 08:16

## 2017-05-28 RX ADMIN — LANSOPRAZOLE 30 MG: 15 CAPSULE, DELAYED RELEASE ORAL at 08:16

## 2017-05-28 RX ADMIN — POTASSIUM CHLORIDE 40 MEQ: 1500 TABLET, EXTENDED RELEASE ORAL at 08:55

## 2017-05-28 RX ADMIN — LOSARTAN POTASSIUM 25 MG: 25 TABLET, FILM COATED ORAL at 12:11

## 2017-05-28 RX ADMIN — ONDANSETRON 4 MG: 4 TABLET, ORALLY DISINTEGRATING ORAL at 11:49

## 2017-05-28 RX ADMIN — PREDNISONE 20 MG: 20 TABLET ORAL at 08:16

## 2017-05-28 RX ADMIN — IPRATROPIUM BROMIDE AND ALBUTEROL SULFATE 3 ML: .5; 3 SOLUTION RESPIRATORY (INHALATION) at 03:40

## 2017-05-28 RX ADMIN — ROFLUMILAST 500 MCG: 500 TABLET ORAL at 08:17

## 2017-05-28 RX ADMIN — LEVOFLOXACIN 250 MG: 250 TABLET, FILM COATED ORAL at 09:59

## 2017-05-28 RX ADMIN — IPRATROPIUM BROMIDE AND ALBUTEROL SULFATE 3 ML: .5; 3 SOLUTION RESPIRATORY (INHALATION) at 10:34

## 2017-05-28 RX ADMIN — GEMFIBROZIL 600 MG: 600 TABLET ORAL at 08:17

## 2017-05-28 RX ADMIN — POTASSIUM CHLORIDE 20 MEQ: 1500 TABLET, EXTENDED RELEASE ORAL at 11:59

## 2017-05-28 NOTE — PLAN OF CARE
Problem: Discharge Planning  Goal: Discharge Planning (Adult, OB, Behavioral, Peds)  Outcome: No Change  ROOM # 222     Living Situation (if not independent, order SW consult):Senior living Ind  Facility name[facility]  : [facility] [place]     Activity level at baseline: Ind with walker  Activity level on admit: SBA with walker        Patient registered to observation; given Patient Bill of Rights; given the opportunity to ask questions about observation status and their plan of care.  Patient has been oriented to the observation room, bathroom and call light is in place.     Discussed discharge goals and expectations with patient/family.

## 2017-05-28 NOTE — DISCHARGE SUMMARY
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 Skin rash on the back     Norvasc [Amlodipine Besylate] Hives        Discharge Medications:   Current Discharge Medication List      CONTINUE these medications which have NOT CHANGED    Details   levofloxacin (LEVAQUIN) 500 MG tablet Take 1 tablet (500 mg) by mouth daily  Qty: 10 tablet, Refills: 0    Associated Diagnoses: COPD, moderate (H); Acute bronchitis with symptoms > 10 days      predniSONE (DELTASONE) 10 MG tablet Take 3 tabs daily for 4 days Take 2 tabs daily for 4 days Then go back to your previous dose of 10 mg alternating with 15 mg  Qty: 20 tablet, Refills: 0    Associated Diagnoses: COPD, moderate (H); Acute bronchospasm; Acute bronchitis with symptoms > 10 days      guaiFENesin-dextromethorphan (ROBITUSSIN DM) 100-10 MG/5ML syrup Take 5 mLs by mouth every 4 hours as needed for cough  Qty: 560 mL, Refills: 1    Associated Diagnoses: Pulmonary emphysema, unspecified emphysema type (H)      gemfibrozil (LOPID) 600 MG tablet Take 1 tablet (600 mg) by mouth daily  Qty: 90 tablet, Refills: 0    Comments: Medication is being filled for 1 time refill only due to:  upcoming appt  Associated Diagnoses: Hyperlipidemia with target LDL less than 130      prochlorperazine (COMPAZINE) 5 MG tablet Take  1 tablet (5 mg) by mouth every 6 hours as needed for nausea or vomiting  Qty: 120 tablet, Refills: 0    Associated Diagnoses: Nausea      roflumilast (DALIRESP) 500 MCG TABS tablet Take 500 mcg by mouth daily       polyethylene glycol (MIRALAX/GLYCOLAX) Packet Take 17 g by mouth daily as needed for constipation      albuterol (ALBUTEROL) 108 (90 BASE) MCG/ACT Inhaler Inhale 2 puffs into the lungs every 6 hours as needed    Associated Diagnoses: Asthma, persistent      levalbuterol (XOPENEX) 1.25 MG/3ML neb solution Take 3 mLs (1.25 mg) by nebulization every 4 hours as needed for shortness of breath / dyspnea or wheezing  Qty: 1584 mL, Refills: 1    Associated Diagnoses: COPD, moderate (H); COPD exacerbation (H)      budesonide (PULMICORT FLEXHALER) 180 MCG/ACT inhaler Inhale 1 puff into the lungs 2 times daily    Associated Diagnoses: Pulmonary emphysema, unspecified emphysema type (H)      montelukast (SINGULAIR) 10 MG tablet Take 1 tablet (10 mg) by mouth At Bedtime  Qty: 30 tablet    Associated Diagnoses: Pulmonary emphysema, unspecified emphysema type (H)      salmeterol (SEREVENT) 50 MCG/DOSE diskus inhaler Inhale 1 puff into the lungs 2 times daily    Associated Diagnoses: Pulmonary emphysema, unspecified emphysema type (H)      tiotropium (SPIRIVA) 18 MCG capsule Inhale 1 capsule (18 mcg) into the lungs daily  Qty: 30 capsule    Associated Diagnoses: Pulmonary emphysema, unspecified emphysema type (H)      Cyanocobalamin (B-12) 1000 MCG TBCR Take 1,000 mcg by mouth daily  Qty: 100 tablet, Refills: 1    Associated Diagnoses: Pernicious anemia      ferrous sulfate (IRON) 325 (65 FE) MG tablet Take 1 tablet (325 mg) by mouth daily (with breakfast)  Qty: 100 tablet    Associated Diagnoses: Pulmonary emphysema, unspecified emphysema type (H)      calcium 600 MG tablet Take 1 tablet (600 mg) by mouth daily  Qty: 60 tablet    Associated Diagnoses: Pulmonary emphysema, unspecified emphysema type (H)     "  levothyroxine (SYNTHROID/LEVOTHROID) 25 MCG tablet Take 1 tablet (25 mcg) by mouth daily  Qty: 90 tablet, Refills: 1    Associated Diagnoses: Hypothyroidism due to acquired atrophy of thyroid      LANsoprazole (PREVACID) 30 MG CR capsule Take 1 capsule (30 mg) by mouth daily  Qty: 90 capsule, Refills: 3    Associated Diagnoses: Gastroesophageal reflux disease without esophagitis      cholecalciferol (VITAMIN D) 1000 UNIT tablet Take 1 tablet (1,000 Units) by mouth daily  Qty: 30 tablet    Associated Diagnoses: COPD, moderate (H)      Multiple Vitamins-Minerals (MULTIVITAMIN OR) Take 1 tablet by mouth daily      !! order for DME Equipment being ordered: Nebulizer machine, cup, and tubing.  Fax to [facility] per pt request  Qty: 1 each, Refills: 0    Associated Diagnoses: COPD, moderate (H)      !! order for DME Equipment being ordered: Walker Wheels (), Walker () and Other: Four Wheeled Walker  Treatment Diagnosis: Impaired gait stability and activity tolerance  Qty: 1 each, Refills: 0    Associated Diagnoses: Pulmonary emphysema, unspecified emphysema type (H)      !! order for DME Equipment being ordered: blood pressure machine  Qty: 1 Device, Refills: 0    Associated Diagnoses: HTN, goal below 140/90; Labile blood pressure      !! order for DME Overnight pulse oximetry  Qty: 1 Device, Refills: 0    Associated Diagnoses: COPD, moderate (H); Other fatigue      ASPIRIN NOT PRESCRIBED, INTENTIONAL, 1 each continuous prn. Antiplatelet medication not prescribed intentionally due to Use of NSAIDS  Qty: 0 each, Refills: 0       !! - Potential duplicate medications found. Please discuss with provider.           Condition on Discharge:  Discharge condition: Stable   Discharge vitals: Blood pressure 183/85, pulse 106, temperature 98.7  F (37.1  C), temperature source Oral, resp. rate 20, height 1.575 m (5' 2\"), weight 63.1 kg (139 lb 3.2 oz), SpO2 93 %, not currently breastfeeding.   Code " "status on discharge: DNR / DNI     History of Illness:  See detailed admission note for full details.    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 Patient states that she saw her PCP on 5/24 for increasing cough. She was diagnosed with COPD exacerbation/bronchitis. She went home with steroid taper and levoflox. For the past few days she has had some decreased appetite. \"just didn't feel like eating\". Some mild nausea (no vomiting). She thinks it may be due to the steroids. Today she felt more generally weak. She did not fall but had fallen a few weeks ago so she got scared and called EMS. No chest pain. No new SOB. Her cough has improved since seeing her PCP. No abdominal pain, vomiting or diarrhea. No fever or chills. No urinary symptoms. She states her daughter had been visiting from [place] last week but went back home. She was scared to be home alone. She has a son in [facility] who is not very involved. Her daughter has been recommending going to assisted living, but patient does not want to.    Significant Physical Exam Findings:  Patient doing better. No new complaints  s1s2 rrr, lungs: good air movement. She does still have delia expiratory wheezing    Procedures:  none     Imaging:  Results for orders placed or performed during the hospital encounter of 05/27/17   XR Chest 2 Views    Narrative    XR CHEST 2 VW   5/27/2017 4:26 PM     HISTORY: increased shortness of breath    COMPARISON: Film performed on 5/3/2017    FINDINGS: The heart is negative. Moderate-sized hiatal hernia. The  lungs are clear. The pulmonary vasculature is normal.  The bones and  soft tissues are unremarkable.      Impression    IMPRESSION: No active infiltrates. No significant change.        WILMA CARBAJAL MD        Consultations:  No consultations were requested during this admission.     Significant Lab Results:    Recent " Labs  Lab 05/27/17  1607   WBC 6.4   HGB 12.5   HCT 37.9   MCV 89             Lab Results   Component Value Date     05/28/2017     05/27/2017     05/04/2017    Lab Results   Component Value Date    CHLORIDE 105 05/28/2017    CHLORIDE 96 05/27/2017    CHLORIDE 104 05/04/2017    Lab Results   Component Value Date    BUN 14 05/28/2017    BUN 18 05/27/2017    BUN 11 05/04/2017      Lab Results   Component Value Date    POTASSIUM 3.0 05/28/2017    POTASSIUM 3.4 05/27/2017    POTASSIUM 4.1 05/04/2017    Lab Results   Component Value Date    CO2 24 05/28/2017    CO2 26 05/27/2017    CO2 22 05/04/2017    Lab Results   Component Value Date    CR 0.92 05/28/2017    CR 1.07 05/27/2017    CR 0.82 05/04/2017           Pending Results:    Unresulted Labs Ordered in the Past 30 Days of this Admission     No orders found for last 61 day(s).           Discharge Instructions and Follow-Up:   Discharge diet:   Active Diet Order      Regular Diet Adult      Diet   Discharge activity: Activity as tolerated   Discharge follow-up: Follow up with primary care provider in 7 days   Outpatient therapy: None    Home Care agency: Home care services    Other instructions: None      Hospital Course:  Patient was admitted to the Obs Unit. She was continued on her home meds for her recent COPD exacerbation. Her work-up was unrevealing. Likely a big issue is the patient is scared to be home alone. I spoke in depth with the patient and her daughter (in [place]). There are plans to move her to assisted living. The daughter has already toured a couple facilities. Patient DC'd home with home services.      Total time spent in face to face contact with the patient and coordinating discharge was:  30 Minutes.    Speedy Yu MD  Pager: [phone]

## 2017-05-28 NOTE — PLAN OF CARE
Problem: Discharge Planning  Goal: Discharge Planning (Adult, OB, Behavioral, Peds)  Outcome: Improving  PRIMARY DIAGNOSIS: Weakness/dizziness  OUTPATIENT/OBSERVATION GOALS TO BE MET BEFORE DISCHARGE:      1. Tolerating PO medications Yes  2. Return to near baseline physical activity (including ADL and safe ambulation) Yes  3. Cleared for discharge by consultants (if involved) Yes  4. Safe discharge environment identified by care coordination (if unable to fully meet other goals) Yes      Interpretation of rhythm per telemetry tech: NA      Patient is alert and oriented. Patient complains of dizziness when ambulating only. Patient denies pain and SOB. Patient is wheezing exp and had a prn neb which she says was effective. Patient is resting comfortably. Patient is a SBA d/t dizziness but is ambulating at baseline. Patient lives in senior living and has home care services. VSS except elevated BP. Will continue to monitor.

## 2017-05-28 NOTE — PROGRESS NOTES
OBSERVATION patient END time: 1320    Patient's After Visit Summary was reviewed with patient.   Patient verbalized understanding of After Visit Summary, recommended follow up and was given an opportunity to ask questions.   Discharge medications sent home with patient/family: Not applicable   Discharged with friend.

## 2017-05-28 NOTE — H&P
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    1. Neuro- no issues  2. Cardiovascular- HTN- recently taken off losartan due to lightheadedness. BP stable. Dyslipidemia- cont meds  3. Pulmonary- COPD- managed by Dr. Carrasco (patient even on Daliresp) . Recently started on steroid taper for exacerbation. Cont oupt dosing (starts 20mg tomorrow). Cont home meds. Nebs.  4. GI- regular diet. Has had decreased PO intake. Patient states she has had decreased appetite and some mild nausea- likely from the steroids  5. ID- was being treated for bronchitis in setting of COPD exacerbation as oupt. Cont her oupt levofox  6. Renal- hyponatremia, mild renal insuff. Likely due to decreased PO. Hyponatremia is chronic. Cont IVF overnight  7. Heme/Onc-no issues  8. Endo- hypothyroidism- cont synthroid  9. Musculoskeletal- general weakness. Likely due to decreased PO intake. Will have nursing assess if patient needs PT  10. Prophylaxis- start if has prolonged hospitalizations  11. DNR/DNI- discussed with patient  12. Offered to update family. She states she already spoke with her daughter      Speedy Yu MD, Hospitalist  Pager: [phone]    Disposition: Expected discharge in 1 day    Speedy Yu MD    Primary Care Physician   Jenny Hamilton    Chief Complaint   Generalized weakness    History is obtained from the patient. Sign out obtained directly from Dr. West in the ED.    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  "Patient states that she saw her PCP on 5/24 for increasing cough. She was diagnosed with COPD exacerbation/bronchitis. She went home with steroid taper and levoflox. For the past few days she has had some decreased appetite. \"just didn't feel like eating\". Some mild nausea (no vomiting). She thinks it may be due to the steroids. Today she felt more generally weak. She did not fall but had fallen a few weeks ago so she got scared and called EMS. No chest pain. No new SOB. Her cough has improved since seeing her PCP. No abdominal pain, vomiting or diarrhea. No fever or chills. No urinary symptoms. She states her daughter had been visiting from [place] last week but went back home. She was scared to be home alone. She has a son in [facility] who is not very involved. Her daughter has been recommending going to assisted living, but patient does not want to.    Past Medical History    I have reviewed this patient's medical history and updated it with pertinent information if needed.   Past Medical History:   Diagnosis Date     Anemia Dec 2011     Arthritis of hand      Asthma, persistent     f/U dr Brock at [facility]     Asthma, persistent      Chronic intermittent steroid use     for asthma     COPD exacerbation (H) 10/25/2013     Esophageal stricture 2007    s/p dilation     Foot deformity     Left     HTN, goal below 140/90      Hyperlipidemia      Hyperlipidemia LDL goal < 130      Hypothyroidism Dec 2011     Hypothyroidism      Left foot pain Nov 2011     Left shoulder pain Nov 2011     Medication side effects      Osteoporosis      Paroxysmal supraventricular tachycardia (H)      PVC (premature ventricular contraction) May 2012     Skin cyst Dec 2011    L thumb     SOB (shortness of breath) on exertion May 2012     SVT (supraventricular tachycardia) (H)        Past Surgical History   I have reviewed this patient's surgical history and updated it with pertinent information if needed.  Past Surgical History: "   Procedure Laterality Date     BUNIONECTOMY LAPIDUS WITH TARSAL METATARSAL (TMT) FUSION  's     EP STUDY, MODIFIED  2012    SVT not induced, PVC's     GYN SURGERY       HYSTERECTOMY TOTAL ABDOMINAL       HYSTERECTOMY, PAP NO LONGER INDICATED       TONSILLECTOMY         Prior to Admission Medications   Prior to Admission Medications   Prescriptions Last Dose Informant Patient Reported? Taking?   ASPIRIN NOT PRESCRIBED, INTENTIONAL,  Self Yes No   Si each continuous prn. Antiplatelet medication not prescribed intentionally due to Use of NSAIDS   Cyanocobalamin (B-12) 1000 MCG TBCR 2017 at Unknown time  No Yes   Sig: Take 1,000 mcg by mouth daily   LANsoprazole (PREVACID) 30 MG CR capsule 2017 at Unknown time  No Yes   Sig: Take 1 capsule (30 mg) by mouth daily   Multiple Vitamins-Minerals (MULTIVITAMIN OR) 2017 at Unknown time Self Yes Yes   Sig: Take 1 tablet by mouth daily   albuterol (ALBUTEROL) 108 (90 BASE) MCG/ACT Inhaler   No Yes   Sig: Inhale 2 puffs into the lungs every 6 hours as needed   budesonide (PULMICORT FLEXHALER) 180 MCG/ACT inhaler 2017 at x1  No Yes   Sig: Inhale 1 puff into the lungs 2 times daily   calcium 600 MG tablet 2017 at Unknown time  No Yes   Sig: Take 1 tablet (600 mg) by mouth daily   cholecalciferol (VITAMIN D) 1000 UNIT tablet 2017 at Unknown time  No Yes   Sig: Take 1 tablet (1,000 Units) by mouth daily   ferrous sulfate (IRON) 325 (65 FE) MG tablet 2017 at Unknown time  No Yes   Sig: Take 1 tablet (325 mg) by mouth daily (with breakfast)   gemfibrozil (LOPID) 600 MG tablet 2017 at Unknown time  No Yes   Sig: Take 1 tablet (600 mg) by mouth daily   guaiFENesin-dextromethorphan (ROBITUSSIN DM) 100-10 MG/5ML syrup   No Yes   Sig: Take 5 mLs by mouth every 4 hours as needed for cough   levalbuterol (XOPENEX) 1.25 MG/3ML neb solution   No Yes   Sig: Take 3 mLs (1.25 mg) by nebulization every 4 hours as needed for shortness of breath  / dyspnea or wheezing   levofloxacin (LEVAQUIN) 500 MG tablet 5/27/2017 at day 3  No Yes   Sig: Take 1 tablet (500 mg) by mouth daily   levothyroxine (SYNTHROID/LEVOTHROID) 25 MCG tablet 5/27/2017 at Unknown time  No Yes   Sig: Take 1 tablet (25 mcg) by mouth daily   montelukast (SINGULAIR) 10 MG tablet 5/26/2017 at Unknown time  No Yes   Sig: Take 1 tablet (10 mg) by mouth At Bedtime   order for DME  Self No No   Sig: Overnight pulse oximetry   order for DME  Self No No   Sig: Equipment being ordered: blood pressure machine   order for DME   No No   Sig: Equipment being ordered: Walker Wheels (), Walker () and Other: Four Wheeled Walker  Treatment Diagnosis: Impaired gait stability and activity tolerance   order for DME   No No   Sig: Equipment being ordered: Nebulizer machine, cup, and tubing.  Fax to [facility] per pt request   polyethylene glycol (MIRALAX/GLYCOLAX) Packet   Yes Yes   Sig: Take 17 g by mouth daily as needed for constipation   predniSONE (DELTASONE) 10 MG tablet 5/27/2017 at 30 mg  No Yes   Sig: Take 3 tabs daily for 4 days Take 2 tabs daily for 4 days Then go back to your previous dose of 10 mg alternating with 15 mg   prochlorperazine (COMPAZINE) 5 MG tablet   No Yes   Sig: Take 1 tablet (5 mg) by mouth every 6 hours as needed for nausea or vomiting   roflumilast (DALIRESP) 500 MCG TABS tablet 5/27/2017 at Unknown time  Yes Yes   Sig: Take 500 mcg by mouth daily    salmeterol (SEREVENT) 50 MCG/DOSE diskus inhaler 5/27/2017 at x1  No Yes   Sig: Inhale 1 puff into the lungs 2 times daily   tiotropium (SPIRIVA) 18 MCG capsule 5/27/2017 at Unknown time  No Yes   Sig: Inhale 1 capsule (18 mcg) into the lungs daily      Facility-Administered Medications: None     Allergies   Allergies   Allergen Reactions     Hydralazine Anxiety     Penicillin G Hives     Meloxicam      dizziness       Metoprolol      ? Skin rash on the back     Norvasc [Amlodipine Besylate] Hives        Social History   I have reviewed this patient's social history and updated it with pertinent information if needed. Ana Luisa Lee  reports that she quit smoking about 37 years ago. She has a 15.00 pack-year smoking history. She has never used smokeless tobacco. She reports that she does not drink alcohol or use illicit drugs.    Family History   I have reviewed this patient's family history and updated it with pertinent information if needed.   Family History   Problem Relation Age of Onset     CEREBROVASCULAR DISEASE Mother      Hypertension Mother      Family History Negative Father      Unknown/Adopted Maternal Grandmother      Unknown/Adopted Maternal Grandfather      Unknown/Adopted Paternal Grandmother      Unknown/Adopted Paternal Grandfather      Family History Negative Brother      Family History Negative Sister      Family History Negative Son      Family History Negative Daughter      Asthma No family hx of      C.A.D. No family hx of      DIABETES No family hx of      CANCER No family hx of      Father  at 99 from fall  Mom had stroke  Review of Systems   The 10 point Review of Systems is negative other than noted in the HPI or here.     Physical Exam   Temp: 98.1  F (36.7  C) Temp src: Oral BP: 138/86 Pulse: 106 Heart Rate: 93 Resp: 16 SpO2: 96 % O2 Device: None (Room air)    Vital Signs with Ranges  Temp:  [98.1  F (36.7  C)] 98.1  F (36.7  C)  Pulse:  [106] 106  Heart Rate:  [] 93  Resp:  [16-18] 16  BP: (120-172)/() 138/86  SpO2:  [94 %-98 %] 96 %  142 lbs 0 oz    Exam:  GEN:  Alert, oriented x 3, appears comfortable, NAD.  HEENT:  Normocephalic/atraumatic, no scleral icterus, no nasal discharge, mouth moist.  CV:  Regular rate and rhythm, no murmur or JVD.  S1 + S2 noted, no S3 or S4.  LUNGS:  Mod air movement. Exp wheezing bilaterally  ABD:  Active bowel sounds, soft, non-tender/non-distended.  No rebound/guarding/rigidity.  EXT:  No edema or cyanosis.  Hands/feet warm  to touch with good signs of peripheral perfusion.  No joint synovitis noted.  SKIN:  Dry to touch, no exanthems noted in the visualized areas.  NEURO:  Symmetric muscle strength, sensation to touch grossly intact.  No new focal deficits appreciated.    Data   Data reviewed today:  I personally reviewed all imaging and EKGs   Results for orders placed or performed during the hospital encounter of 05/27/17   XR Chest 2 Views    Narrative    XR CHEST 2 VW   5/27/2017 4:26 PM     HISTORY: increased shortness of breath    COMPARISON: Film performed on 5/3/2017    FINDINGS: The heart is negative. Moderate-sized hiatal hernia. The  lungs are clear. The pulmonary vasculature is normal.  The bones and  soft tissues are unremarkable.      Impression    IMPRESSION: No active infiltrates. No significant change.        WILMA CARBAJAL MD     I personally reviewed the CXR and did not appreciate any acute findings.    Recent Labs  Lab 05/27/17  1607   WBC 6.4   HGB 12.5   HCT 37.9   MCV 89             Lab Results   Component Value Date     05/27/2017     05/04/2017     05/03/2017    Lab Results   Component Value Date    CHLORIDE 96 05/27/2017    CHLORIDE 104 05/04/2017    CHLORIDE 99 05/03/2017    Lab Results   Component Value Date    BUN 18 05/27/2017    BUN 11 05/04/2017    BUN 12 05/03/2017      Lab Results   Component Value Date    POTASSIUM 3.4 05/27/2017    POTASSIUM 4.1 05/04/2017    POTASSIUM 4.2 05/03/2017    Lab Results   Component Value Date    CO2 26 05/27/2017    CO2 22 05/04/2017    CO2 21 05/03/2017    Lab Results   Component Value Date    CR 1.07 05/27/2017    CR 0.82 05/04/2017    CR 0.96 05/03/2017          Recent Results (from the past 24 hour(s))   XR Chest 2 Views    Narrative    XR CHEST 2 VW   5/27/2017 4:26 PM     HISTORY: increased shortness of breath    COMPARISON: Film performed on 5/3/2017    FINDINGS: The heart is negative. Moderate-sized hiatal hernia. The  lungs are clear. The  pulmonary vasculature is normal.  The bones and  soft tissues are unremarkable.      Impression    IMPRESSION: No active infiltrates. No significant change.        WILMA CARBAJAL MD

## 2017-05-28 NOTE — PLAN OF CARE
Problem: Discharge Planning  Goal: Discharge Planning (Adult, OB, Behavioral, Peds)  PRIMARY DIAGNOSIS: Weakness/dizziness  OUTPATIENT/OBSERVATION GOALS TO BE MET BEFORE DISCHARGE:      1. Tolerating PO medications Yes  2. Return to near baseline physical activity (including ADL and safe ambulation) Yes  3. Cleared for discharge by consultants (if involved) Yes  4. Safe discharge environment identified by care coordination (if unable to fully meet other goals) Yes      Interpretation of rhythm per telemetry tech: NA      Patient is alert and oriented. Patient states she feels a little worse than yesterday, more fatigued and weak. Some nausea this am, Zofran given. K+ 3.0, replaced. Recheck at 4. LS course throughout, expiratory wheezes.

## 2017-05-28 NOTE — ED NOTES
Patient presented to the Ed with c/o of dizziness progressing in severity for the past 48 hours.  VSS.  Patient up with assist of one using her walker. Patient denies pain or discomfort.  Patient wishes not to see Observation video; she states that she has seen this video multiple times.      Ana Luisa Lee is a 77 year old female with a history of COPD start on prednisone and Levaquin on 5/24/17  who presents via EMS to the emergency department today for evaluation of generalized weakness and lightheadedness. The patient has had three day long lightheadedness, increased cough with clear phlegm, shortness of breath, and generalized weakness, which has worsened this afternoon with near syncope prompting visit. She notes symptoms are worse with exertion. The patient also reports week long nausea with decreased fluid and PO intake. The patient states she has not been generally feeling well. Of note, the patient has a recent fall and was evaluated here on 5/3/17. No fevers or chills. No vomiting. No chest pain or leg swelling. No abdominal pain. She states she feels more shaky since starting prednisone.     Cardiac Risk Factors   Sex: Female   Tobacco: Negative   Hypertension: Positive   Diabetes: Negative  Hyperlipidemia: Positive   Family History: Negative      Allergies:  Hydralazine  Penicillin G   Metoprolol   Norvasc   Meloxicam

## 2017-05-28 NOTE — PLAN OF CARE
Problem: Discharge Planning  Goal: Discharge Planning (Adult, OB, Behavioral, Peds)  Outcome: Improving  PRIMARY DIAGNOSIS: Weakness/dizziness  OUTPATIENT/OBSERVATION GOALS TO BE MET BEFORE DISCHARGE:     1. Tolerating PO medications Yes  2. Return to near baseline physical activity (including ADL and safe ambulation) Yes  3. Cleared for discharge by consultants (if involved) Yes  4. Safe discharge environment identified by care coordination (if unable to fully meet other goals) Yes     Interpretation of rhythm per telemetry tech: NA     Patient is alert and oriented. Patient complains of dizziness when ambulating only. Patient denies pain and SOB. Patient is wheezing exp and had a prn neb which she says was effective. Patient is resting comfortably. Patient is a SBA d/t dizziness but is ambulating at baseline. Patient lives in senior living and has home care services. VSS except elevated BP but patient was ambulating prior and was SOB. Will continue to monitor.

## 2017-05-28 NOTE — ED NOTES
[facility]  ED Nurse Handoff Report    Ana Luisa Lee is a 77 year old female   ED Chief complaint: Dizziness  . ED Diagnosis:   Final diagnoses:   Volume depletion   Dizziness   Aortic valve stenosis, unspecified etiology   Hyponatremia     Allergies:   Allergies   Allergen Reactions     Hydralazine Anxiety     Penicillin G Hives     Meloxicam      dizziness       Metoprolol      ? Skin rash on the back     Norvasc [Amlodipine Besylate] Hives       Code Status: Full Code  Activity level - Baseline/Home:  Stand with Assist. Activity Level - Current:   Stand with Assist. Lift room needed: No. Bariatric: No   Needed: No   Isolation: No. Infection: Not Applicable.     Vital Signs:   Vitals:    05/27/17 1715 05/27/17 1730 05/27/17 1800 05/27/17 1830   BP:  (!) 168/105 160/83 153/82   Pulse:       Resp:  18 16 16   Temp:       TempSrc:       SpO2: 94%  94% 96%   Weight:       Height:         Cardiac Rhythm:  ,      Pain level: 0-10 Pain Scale: 0  Patient confused: No. Patient Falls Risk: Yes.   Elimination Status: Has voided   Patient Report - Initial Complaint: Patient presented to the Ed with c/o of dizziness progressing in severity for the past 48 hours.  VSS.  Patient up with assist of one using her walker. Patient denies pain or discomfort.  Patient wishes not to see Observation video; she states that she has seen this video multiple times.  . Focused Assessment: Dizziness with ambulation.  Uses walker.  Decreased intake secondary to nausea.    Tests Performed: labs and chest x-ray. Abnormal Results: NA   Treatments provided: Fluid bolus  Family Comments:  No family present at this time  OBS brochure/video discussed/provided to patient:  Yes  ED Medications:   Medications   sodium chloride (PF) 0.9% PF flush 3 mL (not administered)   sodium chloride (PF) 0.9% PF flush 3 mL (not administered)   0.9% sodium chloride BOLUS (1,000 mLs Intravenous New Bag 5/27/17 1645)     Followed by    0.9% sodium chloride infusion (not administered)   ipratropium - albuterol 0.5 mg/2.5 mg/3 mL (DUONEB) neb solution 3 mL (3 mLs Nebulization Given 5/27/17 2320)     Drips infusing:  No     ED Nurse Name/Phone Number: Libia Henderson,   7:31 PM  RECEIVING UNIT ED HANDOFF REVIEW    Above ED Nurse Handoff Report was reviewed: Yes  Reviewed by: Aurora Lee on May 27, 2017 at 7:46 PM

## 2017-05-28 NOTE — PLAN OF CARE
Problem: Discharge Planning  Goal: Discharge Planning (Adult, OB, Behavioral, Peds)  PRIMARY DIAGNOSIS: Weakness/dizziness  OUTPATIENT/OBSERVATION GOALS TO BE MET BEFORE DISCHARGE:      1. Tolerating PO medications Yes  2. Return to near baseline physical activity (including ADL and safe ambulation) Yes  3. Cleared for discharge by consultants (if involved) Yes  4. Safe discharge environment identified by care coordination (if unable to fully meet other goals) Yes      Interpretation of rhythm per telemetry tech: NA      Patient is alert and oriented. Patient states she feels a little worse than yesterday, more fatigued and weak. Some dizziness when standing. LS course throughout, expiratory wheezes.

## 2017-05-30 ENCOUNTER — CARE COORDINATION (OUTPATIENT)
Dept: GERIATRIC MEDICINE | Facility: CLINIC | Age: 77
End: 2017-05-30

## 2017-05-30 RX ORDER — GEMFIBROZIL 600 MG/1
600 TABLET, FILM COATED ORAL DAILY
Qty: 90 TABLET | Refills: 0 | Status: SHIPPED | OUTPATIENT
Start: 2017-05-30 | End: 2017-09-18

## 2017-05-30 NOTE — PROGRESS NOTES
Rec'd vm from Sofia at [facility] on 5/25/17 to report that client's waiver obligation increased to 169/month eff 3/1/17  [facility]  Rec'd EPIC notification of ED visit/observation 5/27/17-5/28/17 weakness/COPD. EPIC note reviewed.  Rec'd vm from client's daughter Windy stating that her mother went to the hospital on Saturday and is now back home. Windy states that she rec'd a call from the doctor who also made it clear to her mother that the time is now to move to an AL. Windy reports that she has made 21 calls to AL facilities, stating that she is not comfortable with [facility] or [facility].  Windy inquired on exploring smaller group home setting.   Windy also inquired if her mother could receive additional help on the weekends as this is the time that she has increased panic and will call 911  Call placed to [facility], explained that CM has not had the chance to f/u with her mother since the ED visit.   Reviewed AL facilities, CM offered to f/u and assist with calls. Explained that this CM experience of group homes is for client's with more cognitive impairment, also stated that her mother would only have her bedroom as private space as the rest of the home would be shared space.   Windy states that she is interested in [facility] in [facility], stating that they have EW openings and she was sent an application and will do a virtual tour on Thursday. Windy also has rec'd info on EW availability for an AL in [facility]. Windy states that her mother is more accepting of moving to an AL now, is aware that she needs more supervision.   Windy expressed concern re her mother's hearing and if the batteries are working in her hearing aides.   PLAN:  CM to f/u with [facility] to introduce myself. Left vm requesting a return call  F/U with Windy [facility] to inquire if the HHA can check hearing aid batteries and change if needed, also to inquire if there is a chance that a HHA can  provide one of the shower assists on a w/e day.  Secure e-mail sent to Simi Underwood  with client requesting a return call.   Yeni Savage RN, BC  Supervisor [facility]   [phone] (Fax)

## 2017-05-31 ENCOUNTER — CARE COORDINATION (OUTPATIENT)
Dept: GERIATRIC MEDICINE | Facility: CLINIC | Age: 77
End: 2017-05-31

## 2017-05-31 ENCOUNTER — TELEPHONE (OUTPATIENT)
Dept: INTERNAL MEDICINE | Facility: CLINIC | Age: 77
End: 2017-05-31

## 2017-05-31 DIAGNOSIS — Z76.89 HEALTH CARE HOME: ICD-10-CM

## 2017-05-31 NOTE — PROGRESS NOTES
Rec'd vm from [facility] requesting a call back [phone]  Spoke with Carlos who states that they are in need of authorization for HHA start date 2/8/2017. Information provided 2/8/17-11/30/17, CM to have auth completed.  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 Maryan requests a call back to review, [phone]  Rec'd tele call from Windy [facility]OMEW [facility] who states that she has a planned visit with client today. Windy states that she will attempt to schedule a visit on a w/e to decrease anxiety.Windy states that client has an emergency number for [facility] that she can call and she has called in the past.  Windy will f/u if client would benefit from a PT visit. Windy states that she met with client on Monday to set up medications, client then indep with med admin.   Call placed to client to f/u on ED visit. Client had visit from home care today, stating that she will have a PT visit tomorrow. Client states that she just wants to feel better. Had discussion on what feeling better means to her and with her COPD what are some of the expectations on how she will feel. Enc'd client to talk to the pulmonary rehab staff about her prognosis/expectations.  Client states that she did not get the chance to complete her HCD when her daughter was visiting because she wasn't feeling well. CM offered to schedule a visit to assist, client requests that CM call her next week to set up a visit. Client will begin outpatient pulmonary rehab, has an appt with cardiologist scheduled. Client states that she will call PCP to inquire about scheduling a f/u ED visit.   Enc'd client to talk with the HHA to check her hearing aide batteries, client states that she can change her own batteries, but would like someone to clean her hearing aid. Client will check with HHA, if not able to assist, to contact CM.   Client is aware that her daughter Windy is working on the  "application for [facility], \"it sounds like a nice place.\"   Client also aware of [facility], stating that she really likes her homemaker and is agreeable to the change in agencies.   JACINTA log not completed, visit was observation status.   Yeni Savage RN, BC  Supervisor [facility]   [phone] (Fax)      "

## 2017-05-31 NOTE — TELEPHONE ENCOUNTER
Windy RN with [facility] ([phone]) calling for orders:  1.  Increase SN visits for a few weeks post hospital visit (observation for COPD exacerbation) as recommended by ER provider.  2.  PT and OT to rey Flores R.N.

## 2017-06-01 ENCOUNTER — TELEPHONE (OUTPATIENT)
Dept: INTERNAL MEDICINE | Facility: CLINIC | Age: 77
End: 2017-06-01

## 2017-06-01 NOTE — TELEPHONE ENCOUNTER
Bel, PT with [facility] ([phone]) calling to request order for pt to have a vestibular evaluation.  Patient has been getting episodes of dizziness, room spins.  Bel understands that some of patient's symptoms may be related to low BP but would like a vestibular eval done to see if there are any other issues contributing to the dizziness.      There is a vestibular therapist that can see patient tomorrow 6/2/17 if PCP gives approval.  CAROL Flores R.N.

## 2017-06-02 ENCOUNTER — TELEPHONE (OUTPATIENT)
Dept: INTERNAL MEDICINE | Facility: CLINIC | Age: 77
End: 2017-06-02

## 2017-06-02 NOTE — TELEPHONE ENCOUNTER
Bel, PT calling informed PCP agrees. Bel states no Referral is necessary, home care does vestibular evaluation. Bel states will fax over forms for PCP to sign.

## 2017-06-05 DIAGNOSIS — J20.9 ACUTE BRONCHITIS WITH SYMPTOMS > 10 DAYS: ICD-10-CM

## 2017-06-05 DIAGNOSIS — J98.01 ACUTE BRONCHOSPASM: ICD-10-CM

## 2017-06-05 DIAGNOSIS — J44.9 COPD, MODERATE (H): Chronic | ICD-10-CM

## 2017-06-05 NOTE — TELEPHONE ENCOUNTER
prednisone      Last Written Prescription Date:  5/24/17  Last Fill Quantity: 20,   # refills: 0  Last Office Visit with [facility] or  Health prescribing provider: 5/24/17  Future Office visit:       Routing refill request to provider for review/approval because:  Drug not on the [facility] or [facility] refill protocol or controlled substance

## 2017-06-06 ENCOUNTER — CARE COORDINATION (OUTPATIENT)
Dept: GERIATRIC MEDICINE | Facility: CLINIC | Age: 77
End: 2017-06-06

## 2017-06-06 ENCOUNTER — TELEPHONE (OUTPATIENT)
Dept: INTERNAL MEDICINE | Facility: CLINIC | Age: 77
End: 2017-06-06

## 2017-06-06 RX ORDER — PREDNISONE 10 MG/1
TABLET ORAL
Qty: 20 TABLET | Refills: 0 | Status: ON HOLD | OUTPATIENT
Start: 2017-06-06 | End: 2017-11-20

## 2017-06-06 NOTE — PROGRESS NOTES
6/2/2017 Rec'd vm from Loyda at [facility], stating that she is retuning CM call. Loyda requests a return call to herself or Ana Luisa, [phone]  6/5/17 Rec'd e-mail from Madhavi OT [facility] with recommendations for client: I would recommend the fork and spoon for the utensils and then the rocker knife.  Madhavi also recommends a magnifying glass to assist with the vials for her nebulizer. Madhavi provided product information through [facility].   6/5/17 equipment recommendations from OT sent to MARIA T Herr to place order with [facility]. CM requests EW costs to place on care plan.  6/6/17 left vm with Ana Luisa at [facility] requesting a return call to discuss EW admissions process/availability.  6/6/17 left vm with client's daughter Windy to inquire on the virtual visit with [facility] and offered assistance as needed.   Yeni Savage RN, BC  Supervisor [facility]   [phone] (Fax)

## 2017-06-06 NOTE — TELEPHONE ENCOUNTER
Laura RN with [facility] ([phone]) calling.  Patient's weight on 6/4/17 was 131 lb and today on 6/6/17 she is 134.5 lb.  Bilateral lower extremity is noted, not pitting.  BP today 98/60, heart rate 102.  Lungs are clear, had wheezing at 6/4/17 visit but has been using nebs and having no wheezing today.  Denies SOB unless walking any distance, denies dizziness.  Has cardiology appt tomorrow but wants to know if there is any recommendation from PCP.  CAROL Flores R.N.

## 2017-06-07 ENCOUNTER — TELEPHONE (OUTPATIENT)
Dept: INTERNAL MEDICINE | Facility: CLINIC | Age: 77
End: 2017-06-07

## 2017-06-07 ENCOUNTER — OFFICE VISIT (OUTPATIENT)
Dept: CARDIOLOGY | Facility: CLINIC | Age: 77
End: 2017-06-07
Attending: INTERNAL MEDICINE
Payer: COMMERCIAL

## 2017-06-07 VITALS
DIASTOLIC BLOOD PRESSURE: 64 MMHG | BODY MASS INDEX: 24.1 KG/M2 | WEIGHT: 136 LBS | SYSTOLIC BLOOD PRESSURE: 120 MMHG | HEART RATE: 84 BPM | HEIGHT: 63 IN

## 2017-06-07 DIAGNOSIS — I49.1 PAC (PREMATURE ATRIAL CONTRACTION): ICD-10-CM

## 2017-06-07 DIAGNOSIS — I47.10 PAROXYSMAL SUPRAVENTRICULAR TACHYCARDIA (H): ICD-10-CM

## 2017-06-07 DIAGNOSIS — I47.10 SVT (SUPRAVENTRICULAR TACHYCARDIA) (H): ICD-10-CM

## 2017-06-07 DIAGNOSIS — I35.0 AORTIC VALVE STENOSIS, UNSPECIFIED ETIOLOGY: Primary | ICD-10-CM

## 2017-06-07 DIAGNOSIS — I10 ESSENTIAL HYPERTENSION WITH GOAL BLOOD PRESSURE LESS THAN 140/90: Chronic | ICD-10-CM

## 2017-06-07 DIAGNOSIS — E78.5 HYPERLIPIDEMIA WITH TARGET LDL LESS THAN 130: ICD-10-CM

## 2017-06-07 PROCEDURE — 99214 OFFICE O/P EST MOD 30 MIN: CPT | Performed by: INTERNAL MEDICINE

## 2017-06-07 NOTE — PROGRESS NOTES
REQUESTING PROVIDER:  Dr. Riley.      INDICATION:  Aortic stenosis.      HISTORY OF PRESENT ILLNESS:  Ms. Lee is a pleasant 77-year-old female with a history of COPD, former tobacco abuse, palpitations related to nonsustained atrial tachycardia and PACs who has previously seen Dr. Donaldson who is referred to [facility] because of aortic stenosis.      The patient was recently admitted to the hospital following a mechanical fall.  She did not have syncope.  A transthoracic echocardiogram was completed on 05/04/2017 and showed moderate to severe aortic valvular stenosis with a mean gradient of 26 mmHg.  The patient had a hyperdynamic LV with an ejection fraction estimated at 65-70%.  There was mild concentric left ventricular hypertrophy.  In comparison with the previous transthoracic echocardiogram from 2015, the patient's aortic stenosis had worsened.      Fortunately, the patient is not having any chest pain or chest pressure.  There has been no evidence of congestive heart failure.  She has not had ozzie syncope nor does she have any presyncope.  She clearly recalls that her episode in May was a mechanical fall.      The patient follows with [facility] for COPD and does have a history of dyspnea with exertion.  She states since discharge from the hospital, she has been feeling well.      Please see my separate note with a full list of her allergies, medications, past medical history, social history, family history and review of systems.      Please see my separate note with her full physical examination.      IMPRESSION AND PLAN:  Ms. Lee is a pleasant 77-year-old female who has previously seen [facility] because of paroxysmal atrial tachycardia and palpitations.  She is diagnosed with moderate to severe aortic valvular stenosis with a mean gradient of 26 mmHg.  Fortunately, this is asymptomatic and the patient is not manifesting any symptoms from her aortic stenosis.  At this time,  as the patient is clinically stable.  I have asked her to return to see me in 1 year with a repeat transthoracic echocardiogram to be completed at that time.  Since she did not have rapid progression from her last transthoracic echocardiogram in  until 2017 I do believe that we can repeat an echo in 1 year.         NOELLE OLIVAS MD             D: 2017 10:56   T: 2017 15:16   MT: [facility]      Name:     NAM DAS   MRN:      [id number]        Account:      [id number]   :      1940           Service Date: 2017      Document: [id number]

## 2017-06-07 NOTE — LETTER
6/7/2017    eDa Riley, DO  [address] [place] [address]    RE: Ana Luisa Lee       Dear Colleague,    I had the pleasure of seeing Ana Luisa Lee in the [facility].    REQUESTING PROVIDER:  Dr. Riley.      INDICATION:  Aortic stenosis.      Ms. Lee is a pleasant 77-year-old female with a history of COPD, former tobacco abuse, palpitations related to nonsustained atrial tachycardia and PACs who has previously seen Dr. Donaldson who is referred to [facility] because of aortic stenosis.      The patient was recently admitted to the hospital following a mechanical fall.  She did not have syncope.  A transthoracic echocardiogram was completed on 05/04/2017 and showed moderate to severe aortic valvular stenosis with a mean gradient of 26 mmHg.  The patient had a hyperdynamic LV with an ejection fraction estimated at 65-70%.  There was mild concentric left ventricular hypertrophy.  In comparison with the previous transthoracic echocardiogram from 2015, the patient's aortic stenosis had worsened.      Fortunately, the patient is not having any chest pain or chest pressure.  There has been no evidence of congestive heart failure.  She has not had ozzie syncope nor does she have any presyncope.  She clearly recalls that her episode in May was a mechanical fall.      The patient follows with [facility] for COPD and does have a history of dyspnea with exertion.  She states since discharge from the hospital, she has been feeling well.      Please see my separate note with a full list of her allergies, medications, past medical history, social history, family history and review of systems.      Please see my separate note with her full physical examination.      Outpatient Encounter Prescriptions as of 6/7/2017   Medication Sig Dispense Refill     predniSONE (DELTASONE) 10 MG tablet Take 3 tabs daily for 4 days Take 2 tabs daily for  4 days Then go back to your previous dose of 10 mg alternating with 15 mg (Patient taking differently: Take dose of 10 mg alternating with 15 mg) 20 tablet 0     gemfibrozil (LOPID) 600 MG tablet Take 1 tablet (600 mg) by mouth daily 90 tablet 0     [DISCONTINUED] levofloxacin (LEVAQUIN) 500 MG tablet Take 1 tablet (500 mg) by mouth daily (Patient not taking: Reported on 6/27/2017) 10 tablet 0     [DISCONTINUED] guaiFENesin-dextromethorphan (ROBITUSSIN DM) 100-10 MG/5ML syrup Take 5 mLs by mouth every 4 hours as needed for cough (Patient not taking: Reported on 6/27/2017) 560 mL 1     order for DME Equipment being ordered: Nebulizer machine, cup, and tubing.  Fax to [facility] per pt request 1 each 0     [DISCONTINUED] prochlorperazine (COMPAZINE) 5 MG tablet Take 1 tablet (5 mg) by mouth every 6 hours as needed for nausea or vomiting 120 tablet 0     roflumilast (DALIRESP) 500 MCG TABS tablet Take 500 mcg by mouth daily        polyethylene glycol (MIRALAX/GLYCOLAX) Packet Take 17 g by mouth daily as needed for constipation       albuterol (ALBUTEROL) 108 (90 BASE) MCG/ACT Inhaler Inhale 2 puffs into the lungs every 6 hours as needed       budesonide (PULMICORT FLEXHALER) 180 MCG/ACT inhaler Inhale 1 puff into the lungs 2 times daily       montelukast (SINGULAIR) 10 MG tablet Take 1 tablet (10 mg) by mouth At Bedtime 30 tablet      salmeterol (SEREVENT) 50 MCG/DOSE diskus inhaler Inhale 1 puff into the lungs 2 times daily       tiotropium (SPIRIVA) 18 MCG capsule Inhale 1 capsule (18 mcg) into the lungs daily 30 capsule      Cyanocobalamin (B-12) 1000 MCG TBCR Take 1,000 mcg by mouth daily 100 tablet 1     ferrous sulfate (IRON) 325 (65 FE) MG tablet Take 1 tablet (325 mg) by mouth daily (with breakfast) 100 tablet      calcium 600 MG tablet Take 1 tablet (600 mg) by mouth daily 60 tablet      LANsoprazole (PREVACID) 30 MG CR capsule Take 1 capsule (30 mg) by mouth daily 90 capsule 3      cholecalciferol (VITAMIN D) 1000 UNIT tablet Take 1 tablet (1,000 Units) by mouth daily 30 tablet      [DISCONTINUED] levalbuterol (XOPENEX) 1.25 MG/3ML neb solution Take 3 mLs (1.25 mg) by nebulization every 4 hours as needed for shortness of breath / dyspnea or wheezing 1584 mL 1     [DISCONTINUED] levothyroxine (SYNTHROID/LEVOTHROID) 25 MCG tablet Take 1 tablet (25 mcg) by mouth daily 90 tablet 1     order for DME Equipment being ordered: Walker Wheels (), Walker () and Other: Four Wheeled Walker  Treatment Diagnosis: Impaired gait stability and activity tolerance 1 each 0     order for DME Equipment being ordered: blood pressure machine 1 Device 0     order for DME Overnight pulse oximetry 1 Device 0     Multiple Vitamins-Minerals (MULTIVITAMIN OR) Take 1 tablet by mouth daily       ASPIRIN NOT PRESCRIBED, INTENTIONAL, 1 each continuous prn. Antiplatelet medication not prescribed intentionally due to Use of NSAIDS 0 each 0     No facility-administered encounter medications on file as of 6/7/2017.      IMPRESSION AND PLAN:  Ms. Lee is a pleasant 77-year-old female who has previously seen [facility] because of paroxysmal atrial tachycardia and palpitations.  She is diagnosed with moderate to severe aortic valvular stenosis with a mean gradient of 26 mmHg.  Fortunately, this is asymptomatic and the patient is not manifesting any symptoms from her aortic stenosis.  At this time, as the patient is clinically stable.  I have asked her to return to see me in 1 year with a repeat transthoracic echocardiogram to be completed at that time.  Since she did not have rapid progression from her last transthoracic echocardiogram in 2015 until 2017 I do believe that we can repeat an echo in 1 year.     Again, thank you for allowing me to participate in the care of your patient.      Sincerely,    Tanmay Melton MD     [facility] [place]    cc:   Jenny Hamilton MD  [address] [facility] [id number]

## 2017-06-07 NOTE — PROGRESS NOTES
HPI and Plan:   See dictation    Orders Placed This Encounter   Procedures     Follow-Up with Cardiologist     Echocardiogram       No orders of the defined types were placed in this encounter.      There are no discontinued medications.      Encounter Diagnoses   Name Primary?     SVT (supraventricular tachycardia) (H)      HTN, goal <  140/90      Paroxysmal supraventricular tachycardia (H)      PAC (premature atrial contraction)      Hyperlipidemia with target LDL less than 130      Aortic valve stenosis, unspecified etiology Yes       CURRENT MEDICATIONS:  Current Outpatient Prescriptions   Medication Sig Dispense Refill     predniSONE (DELTASONE) 10 MG tablet Take 3 tabs daily for 4 days Take 2 tabs daily for 4 days Then go back to your previous dose of 10 mg alternating with 15 mg 20 tablet 0     gemfibrozil (LOPID) 600 MG tablet Take 1 tablet (600 mg) by mouth daily 90 tablet 0     levofloxacin (LEVAQUIN) 500 MG tablet Take 1 tablet (500 mg) by mouth daily 10 tablet 0     guaiFENesin-dextromethorphan (ROBITUSSIN DM) 100-10 MG/5ML syrup Take 5 mLs by mouth every 4 hours as needed for cough 560 mL 1     order for DME Equipment being ordered: Nebulizer machine, cup, and tubing.  Fax to [facility] per pt request 1 each 0     prochlorperazine (COMPAZINE) 5 MG tablet Take 1 tablet (5 mg) by mouth every 6 hours as needed for nausea or vomiting 120 tablet 0     roflumilast (DALIRESP) 500 MCG TABS tablet Take 500 mcg by mouth daily        polyethylene glycol (MIRALAX/GLYCOLAX) Packet Take 17 g by mouth daily as needed for constipation       albuterol (ALBUTEROL) 108 (90 BASE) MCG/ACT Inhaler Inhale 2 puffs into the lungs every 6 hours as needed       levalbuterol (XOPENEX) 1.25 MG/3ML neb solution Take 3 mLs (1.25 mg) by nebulization every 4 hours as needed for shortness of breath / dyspnea or wheezing 1584 mL 1     budesonide (PULMICORT FLEXHALER) 180 MCG/ACT inhaler Inhale 1 puff into the lungs  2 times daily       montelukast (SINGULAIR) 10 MG tablet Take 1 tablet (10 mg) by mouth At Bedtime 30 tablet      salmeterol (SEREVENT) 50 MCG/DOSE diskus inhaler Inhale 1 puff into the lungs 2 times daily       tiotropium (SPIRIVA) 18 MCG capsule Inhale 1 capsule (18 mcg) into the lungs daily 30 capsule      Cyanocobalamin (B-12) 1000 MCG TBCR Take 1,000 mcg by mouth daily 100 tablet 1     ferrous sulfate (IRON) 325 (65 FE) MG tablet Take 1 tablet (325 mg) by mouth daily (with breakfast) 100 tablet      calcium 600 MG tablet Take 1 tablet (600 mg) by mouth daily 60 tablet      levothyroxine (SYNTHROID/LEVOTHROID) 25 MCG tablet Take 1 tablet (25 mcg) by mouth daily 90 tablet 1     LANsoprazole (PREVACID) 30 MG CR capsule Take 1 capsule (30 mg) by mouth daily 90 capsule 3     cholecalciferol (VITAMIN D) 1000 UNIT tablet Take 1 tablet (1,000 Units) by mouth daily 30 tablet      order for DME Equipment being ordered: Walker Wheels (), Walker () and Other: Four Wheeled Walker  Treatment Diagnosis: Impaired gait stability and activity tolerance 1 each 0     order for DME Equipment being ordered: blood pressure machine 1 Device 0     order for DME Overnight pulse oximetry 1 Device 0     Multiple Vitamins-Minerals (MULTIVITAMIN OR) Take 1 tablet by mouth daily       ASPIRIN NOT PRESCRIBED, INTENTIONAL, 1 each continuous prn. Antiplatelet medication not prescribed intentionally due to Use of NSAIDS 0 each 0       ALLERGIES     Allergies   Allergen Reactions     Hydralazine Anxiety     Penicillin G Hives     Meloxicam      dizziness       Metoprolol      ? Skin rash on the back     Norvasc [Amlodipine Besylate] Hives       PAST MEDICAL HISTORY:  Past Medical History:   Diagnosis Date     Anemia Dec 2011     Arthritis of hand      Asthma, persistent     f/U dr Brock at [facility]     Asthma, persistent      Chronic intermittent steroid use     for asthma     COPD exacerbation (H) 10/25/2013     Esophageal  stricture 2007    s/p dilation     Foot deformity     Left     HTN, goal below 140/90      Hyperlipidemia      Hyperlipidemia LDL goal < 130      Hypothyroidism Dec 2011     Hypothyroidism      Left foot pain Nov 2011     Left shoulder pain Nov 2011     Medication side effects      Osteoporosis      Paroxysmal supraventricular tachycardia (H)      PVC (premature ventricular contraction) May 2012     Skin cyst Dec 2011    L thumb     SOB (shortness of breath) on exertion May 2012     SVT (supraventricular tachycardia) (H)        PAST SURGICAL HISTORY:  Past Surgical History:   Procedure Laterality Date     BUNIONECTOMY LAPIDUS WITH TARSAL METATARSAL (TMT) FUSION  1990's     EP STUDY, MODIFIED  7/2012    SVT not induced, PVC's     GYN SURGERY  1980     HYSTERECTOMY TOTAL ABDOMINAL       HYSTERECTOMY, PAP NO LONGER INDICATED       TONSILLECTOMY         FAMILY HISTORY:  Family History   Problem Relation Age of Onset     CEREBROVASCULAR DISEASE Mother      Hypertension Mother      Family History Negative Father      Unknown/Adopted Maternal Grandmother      Unknown/Adopted Maternal Grandfather      Unknown/Adopted Paternal Grandmother      Unknown/Adopted Paternal Grandfather      Family History Negative Brother      Family History Negative Sister      Family History Negative Son      Family History Negative Daughter      Asthma No family hx of      C.A.D. No family hx of      DIABETES No family hx of      CANCER No family hx of        SOCIAL HISTORY:  Social History     Social History     Marital status:      Spouse name: N/A     Number of children: N/A     Years of education: N/A     Social History Main Topics     Smoking status: Former Smoker     Packs/day: 1.00     Years: 15.00     Quit date: 1/1/1980     Smokeless tobacco: Never Used     Alcohol use No      Comment: Occasional drink     Drug use: No     Sexual activity: No     Other Topics Concern     Caffeine Concern No     1 cup of tea in the morning      "Sleep Concern No     Stress Concern No     Weight Concern No     Special Diet No     Exercise No     some walking     Seat Belt Yes     Social History Narrative       Review of Systems:  Skin:  Negative for       Eyes:    glasses floaters  ENT:  Positive for hearing loss    Respiratory:  Positive for dyspnea on exertion asthma   Cardiovascular:    fatigue;lightheadedness    Gastroenterology: Negative for      Genitourinary:  Negative for      Musculoskeletal:  Positive for arthritis    Neurologic:  Negative for      Psychiatric:  Positive for anxiety    Heme/Lymph/Imm:  Negative      Endocrine:  Negative        Physical Exam:  Vitals: /64  Pulse 84  Ht 1.6 m (5' 3\")  Wt 61.7 kg (136 lb)  BMI 24.09 kg/m2    Constitutional:  cooperative;well developed;in no acute distress        Skin:  warm and dry to the touch        Head:  normocephalic        Eyes:  sclera white        ENT:  no pallor or cyanosis        Neck:  no stiffness        Chest:  clear to auscultation diminished breath sounds bilaterally        Cardiac:   irregular rhythm                Abdomen:  abdomen soft        Vascular: pulses full and equal                                        Extremities and Back:  no edema              Neurological:  no gross motor deficits;affect appropriate              CC  Dea Riley DO  [facility]  [address] [place] [address]              "

## 2017-06-07 NOTE — MR AVS SNAPSHOT
After Visit Summary   6/7/2017    Ana Luisa Lee    MRN: [id number]           Patient Information     Date Of Birth          1940        Visit Information        Provider Department      6/7/2017 10:15 AM Tanmay Melton MD [facility]        Today's Diagnoses     Aortic valve stenosis, unspecified etiology    -  1    SVT (supraventricular tachycardia) (H)        HTN, goal <  140/90        Paroxysmal supraventricular tachycardia (H)        PAC (premature atrial contraction)        Hyperlipidemia with target LDL less than 130           Follow-ups after your visit        Additional Services     Follow-Up with Cardiologist                 Your next 10 appointments already scheduled     Jun 08, 2017 11:00 AM CDT   Pulmonary Eval with [facility]   [facility]    [address] [facility], [address] [address] [phone]              Future tests that were ordered for you today     Open Future Orders        Priority Expected Expires Ordered    Echocardiogram Routine 6/7/2018 7/12/2018 6/7/2017    Follow-Up with Cardiologist Routine 6/7/2018 10/20/2018 6/7/2017            Who to contact     If you have questions or need follow up information about today's clinic visit or your schedule please contact [facility] AT [place] directly at [phone].  Normal or non-critical lab and imaging results will be communicated to you by MyChart, letter or phone within 4 business days after the clinic has received the results. If you do not hear from us within 7 days, please contact the clinic through MyChart or phone. If you have a critical or abnormal lab result, we will notify you by phone as soon as possible.  Submit refill requests through [employer] or call your pharmacy and they will forward the refill request to us. Please allow 3 business days for your refill to be  "completed.          Additional Information About Your Visit        [employer] Information     [facility] lets you send messages to your doctor, view your test results, renew your prescriptions, schedule appointments and more. To sign up, go to [email or web address] . Click on \"Log in\" on the left side of the screen, which will take you to the Welcome page. Then click on \"Sign up Now\" on the right side of the page.     You will be asked to enter the access code listed below, as well as some personal information. Please follow the directions to create your username and password.     Your access code is: [email or web address]  Expires: 8/3/2017 12:38 PM     Your access code will  in 90 days. If you need help or a new code, please call your [place] clinic or [phone].        Care EveryWhere ID     This is your Care EveryWhere ID. This could be used by other organizations to access your [place] medical records  [id number]        Your Vitals Were     Pulse Height BMI (Body Mass Index)             84 1.6 m (5' 3\") 24.09 kg/m2          Blood Pressure from Last 3 Encounters:   17 120/64   17 183/85   17 100/60    Weight from Last 3 Encounters:   17 61.7 kg (136 lb)   17 63.1 kg (139 lb 3.2 oz)   17 64.4 kg (142 lb)               Primary Care Provider Office Phone # Fax #    Jenny Padmini Hamilton [phone]       [facility] [address] [place] [address]        Thank you!     Thank you for choosing [facility] AT [place]  for your care. Our goal is always to provide you with excellent care. Hearing back from our patients is one way we can continue to improve our services. Please take a few minutes to complete the written survey that you may receive in the mail after your visit with us. Thank you!             Your Updated Medication List - Protect others around you: Learn how to safely use, store and throw away " your medicines at www.disposemymeds.org.          This list is accurate as of: 6/7/17 10:39 AM.  Always use your most recent med list.                   Brand Name Dispense Instructions for use    albuterol 108 (90 BASE) MCG/ACT Inhaler    albuterol     Inhale 2 puffs into the lungs every 6 hours as needed       ASPIRIN NOT PRESCRIBED    INTENTIONAL    0 each    1 each continuous prn. Antiplatelet medication not prescribed intentionally due to Use of NSAIDS       B-12 1000 MCG Tbcr     100 tablet    Take 1,000 mcg by mouth daily       budesonide 180 MCG/ACT inhaler    PULMICORT FLEXHALER     Inhale 1 puff into the lungs 2 times daily       calcium 600 MG tablet     60 tablet    Take 1 tablet (600 mg) by mouth daily       cholecalciferol 1000 UNIT tablet    vitamin D    30 tablet    Take 1 tablet (1,000 Units) by mouth daily       ferrous sulfate 325 (65 FE) MG tablet    IRON    100 tablet    Take 1 tablet (325 mg) by mouth daily (with breakfast)       gemfibrozil 600 MG tablet    LOPID    90 tablet    Take 1 tablet (600 mg) by mouth daily       guaiFENesin-dextromethorphan 100-10 MG/5ML syrup    ROBITUSSIN DM    560 mL    Take 5 mLs by mouth every 4 hours as needed for cough       LANsoprazole 30 MG CR capsule    PREVACID    90 capsule    Take 1 capsule (30 mg) by mouth daily       levalbuterol 1.25 MG/3ML neb solution    XOPENEX    1584 mL    Take 3 mLs (1.25 mg) by nebulization every 4 hours as needed for shortness of breath / dyspnea or wheezing       levofloxacin 500 MG tablet    LEVAQUIN    10 tablet    Take 1 tablet (500 mg) by mouth daily       levothyroxine 25 MCG tablet    SYNTHROID/LEVOTHROID    90 tablet    Take 1 tablet (25 mcg) by mouth daily       montelukast 10 MG tablet    SINGULAIR    30 tablet    Take 1 tablet (10 mg) by mouth At Bedtime       MULTIVITAMIN PO      Take 1 tablet by mouth daily       * order for DME     1 Device    Overnight pulse oximetry       * order for DME     1 Device     Equipment being ordered: blood pressure machine       * order for DME     1 each    Equipment being ordered: Walker Wheels (), Walker () and Other: Four Wheeled Walker Treatment Diagnosis: Impaired gait stability and activity tolerance       order for DME     1 each    Equipment being ordered: Nebulizer machine, cup, and tubing.  Fax to [facility] per pt request       polyethylene glycol Packet    MIRALAX/GLYCOLAX     Take 17 g by mouth daily as needed for constipation       predniSONE 10 MG tablet    DELTASONE    20 tablet    Take 3 tabs daily for 4 days Take 2 tabs daily for 4 days Then go back to your previous dose of 10 mg alternating with 15 mg       prochlorperazine 5 MG tablet    COMPAZINE    120 tablet    Take 1 tablet (5 mg) by mouth every 6 hours as needed for nausea or vomiting       roflumilast 500 MCG Tabs tablet    DALIRESP     Take 500 mcg by mouth daily       salmeterol 50 MCG/DOSE diskus inhaler    SEREVENT     Inhale 1 puff into the lungs 2 times daily       tiotropium 18 MCG capsule    SPIRIVA    30 capsule    Inhale 1 capsule (18 mcg) into the lungs daily       * Notice:  This list has 3 medication(s) that are the same as other medications prescribed for you. Read the directions carefully, and ask your doctor or other care provider to review them with you.

## 2017-06-08 ENCOUNTER — HOSPITAL ENCOUNTER (OUTPATIENT)
Dept: CARDIAC REHAB | Facility: CLINIC | Age: 77
End: 2017-06-08
Attending: INTERNAL MEDICINE
Payer: COMMERCIAL

## 2017-06-08 VITALS — HEIGHT: 63 IN | WEIGHT: 135.2 LBS | BODY MASS INDEX: 23.96 KG/M2

## 2017-06-08 PROCEDURE — 40000244 ZZH STATISTIC VISIT PULM REHAB

## 2017-06-08 PROCEDURE — G0424 PULMONARY REHAB W EXER: HCPCS

## 2017-06-08 ASSESSMENT — PULMONARY FUNCTION TESTS
FVC_PERCENT_PREDICTED: 61
FVC_PERCENT_PREDICTED: 61
FEV1 (%PREDICTED): 49
FEV1/FVC: 74
FEV1: .85
FEV1/FVC: 74
FVC: 1.44
FVC: 1.44
FEV1 (%PREDICTED): 49
FEV1: .85

## 2017-06-08 ASSESSMENT — 6 MINUTE WALK TEST (6MWT)
MALE CALC: 407.5
GENDER SELECTION: FEMALE
FEMALE CALC: 418.81
TOTAL DISTANCE WALKED: 170.73
TOTAL DISTANCE WALKED: 170.73
GENDER SELECTION: FEMALE

## 2017-06-08 ASSESSMENT — ACTIVITIES OF DAILY LIVING (ADL)
ADL_LIMITATIONS: STAIR CLIMBING
ADL_LIMITATIONS: STAIR CLIMBING

## 2017-06-08 ASSESSMENT — DUKE ACTIVITY SCORE INDEX (DASI)
VO2_PEAK: 14.61
VO2_PEAK: 14.61
DASI METS SCORE: 4.17
DASI METS SCORE: 4.17

## 2017-06-08 NOTE — PROGRESS NOTES
Ana Luisa Ivy Lee  77 year old  1940  I have reviewed and agree with this patient s individual treatment plan and exercise prescription for pulmonary rehabilitation.  Please see  individual treatment plan for details of progress and plan.   06/08/17 1100   Session   Session 30 Day Individualized Treatment Plan  (forwarded for medical director signature)   Certified through this date 07/08/17   Type Reassessment   General Information   Treatment Diagnosis COPD   Classification of COPD Stage 3 (Severe)   Onset Date 06/14/14   Hospital Location [facility]   Outpatient Pulmonary Rehab Start Date 06/14/16   Primary Physician Jenny Tierney-Vero   Pulmonolgist Cole Carrasco MD   Medical History/Comorbidities   Medical History/Comorbidities Hypertension;Osteoarthritis;GERD   Medical History Comments Past Medical History:   Diagnosis Date     Anemia Dec 2011     Arthritis of hand      Asthma, persistent     f/U dr Brock at [facility]     Asthma, persistent      Chronic intermittent steroid use     for asthma     COPD exacerbation (H) 10/25/2013     Esophageal stricture 2007    s/p dilation     Foot deformity     Left     HTN, goal below 140/90      Hyperlipidemia      Hyperlipidemia LDL goal < 130      Hypothyroidism Dec 2011     Hypothyroidism      Left foot pain Nov 2011     Left shoulder pain Nov 2011     Medication side effects      Osteoporosis      Paroxysmal supraventricular tachycardia (H)      PVC (premature ventricular contraction) May 2012     Skin cyst Dec 2011    L thumb     SOB (shortness of breath) on exertion May 2012     SVT (supraventricular tachycardia) (H)       Untoward Events/Exacerbations/Hospitalizations   Untoward Events/Exacerbations/Hospitalizations None   Sputum   Sputum Production Amount none   Tobacco History   Tobacco Former smoker   Tobacco Habit Cigarettes   Years Smoked 7   Average Packs Per Day 1   Quit Date or Planned Quit Date 06/14/81   Interventions Planned  None: Patient is in maintenance   Medications   Long-Acting Beta Agonist Prescribed, taking as prescribed  (Serevent Diskus)   Short-Acting Beta Agonist Prescribed, taking as prescribed  (Albuterol)   Long-Acting Anticholinergic Prescribed, taking as prescribed  (Spiriva)   Short-Acting Anticholinergic Not prescribed   Inhaled Corticosteroid Prescribed, taking as prescribed  (Pulmicort)   Oral Corticosteroid Prescribed, taking as prescribed  (Prednisone)   Medications Reconciled By Patient;Medical record   Pain   Patient Currently in Pain Yes   Pain Location Shoulder, Ankle   Pain Rating 4/10  (on and off pain with walking)   Pain Description Ache   Pain Comments osteoarthritis   Pain Treatment Recommendations (takes Advil as needed)   Falls Screen   Have you fallen two or more times in the past year? No   Have you fallen and had an injury in the past year? No   Comment using a 4 wheeled cart   Living and Work Status   Living Arrangements apartment   Support System Live alone, family in area   Environmental Factors No concerns   ADL Limitations Stair climbing   Initial Duke Activity Status Index (DASI) score. A measure of functional capacity. The goal is to have a pre-program raw score of 9.95 (~4 METs) or above 11.65   Initial DASI VO2 Peak (ml*kg-1*min-1) 14.61   Initial DASI MET Level 4.17   Return to Employment Retired   Physical Assessments   Incisions Not assessed   Edema None   Left Lung Sounds normal   Right Lung Sounds normal   Pulmonary Function Test (PFTs)   PFT Results Available   Date Completed 03/22/16   FVC Actual 1.44   % Predicted FVC 61   FEV1 Actual 0.85   % Predicted FEV1 49   FEV1/FEV Ratio 74   FRC Actual 60   Pre/Post Bronchodilator Post   Airway Obstruction Severe   Individualized Treatment Plan   Sessions Scheduled 18   Oxygen Use   Supplemental Oxygen Needed No   Exercise Prescription   Mode Treadmill;Nustep;Weights   Frequency 2 days/week   Duration/Time 15-30 min   THR (85% of age  "predicted max heart rate)  121.55   Effort Rating (0-10) 4-6/10   Progression of Exercise Start with 2 bouts on TM until 15\" is reached continuous  , then add MPH and grade at .025 METS/week    Oxygen Titration with Exercise NA   Exercise Assessment   6 Minute Walk Predicted - Gender Selection Female   6 Minute Walk Distance (Initial) 170.73 Meters   Resting HR 94   Resting /80   Exercise /60   SpO2 97   Exercise SpO2 96   Current MET level 2.0   Exercise Tolerance fair   Normal Limits Discussed Yes   Current Symptoms at Home Dyspnea;Fatigue   Limitations frozen shoulder , arthritis in feet.    Nutrition Management   Age 76   Assessment Normal   Goal weight (not to lose wt)   Interventions Planned Attend group nutrition class;Dietician Consult   Psychosocial   Initial Patient Health Questionnaire -9 (PHQ-9) for depression. To notify physician if pre-score >9. 8   Initial [facility] [place] Survey score. A quality of life measure. To re evaluate if total score is > 25. Also consider PHQ-9 and DASI to determine if patient should be referred back to physician. 25   Initial Shortness of Breath Questionnaire (SOBQ) score. The goal is to reduce the score pre to post program. 66   Psychosocial Comments 6/8/17 Pt getsdown about her lower level of activity.She sees people gardening and misses it.   Stages of Change   Aerobic Exercise Preparation   Physical Activity Preparation   Recommended diet Maintenance   Stress Maintenance   Smoking Cessation Maintenance   Oxygen Usage NA   Current Home Exercise   Type of Exercise Walking;None   Recommended Home Exercise Prescription   Type of Exercise Walking;LE Strengthening Exercise   Frequency (Days per week) 2-3   Duration (minutes per session) 15-30 min   Effort Rating Recommended (0-10) Scale  4-6/10   30 Day Exercise Plan Start by walking in hallway at apartment [facility]    Learning Assessment   Learner Patient   Primary Language English   Preferred Learning Style " Listening;Reading;Demonstration;Pictures/Video   Barriers to Learning No barriers noted   Patient Education/Referrals   Education Recommended Activities of Daily Living;Breathing Techniques;Community Resources/Exacerbation Management;Energy Conservation;Exercise Principles   Follow-up/On-going Support   Provider follow-up needed on the following No follow-up needed   Pulmonary Rehab Goals   Pulmonary Rehab Goals 1;2   Goal 1   Goal Pt to learn   Target Date 08/08/17   Goal 2   Goal Pt to start an aerobic and muscle strengthing program through [facility] to carry home after the program has ended.   Target Date 08/08/17   Assessment   Assessment Pt is a 78 yo with COPD III. She was in the [facility] program and discharged9/17/16. She is back to review education and to gain strength and endurance. She is limited by shoulder and other ostioarthitic pain but manages well with OTC tx. She now has a walker instead of a cane for walking.

## 2017-06-08 NOTE — PROGRESS NOTES
"6/6/17 Rec'd vm from client's daughter Windy that the virtual tour went well, concerns that it is a large AL facility ([facility]). Windy states that she also heard back form [place] in [place] and [place] in [place]. Windy states that her mother is not interested and is digging in her heels. Windy reports that she is stronger and she is taking a more active role in her health. Windy states that she is doubtful. Windy also stated that she is signing up for My Chart.  6/7/17 rec'd vm from Ana Luisa at [facility], stating that they have just opened in November and every apt is eligible for EW. Ana Luisa states that they have tax credit community rent, less than market rate.  Request a call back if needed.  6/8/17 Call placed to client to inquire on how she is doing, client states that she is feeling better, \"doing more stuff, more active, getting out of my apartment.\" Client shared that she had her cardiology appt yesterday, \"I have a leakage in my heart, but not to worry, they are just going to watch it.\"  Client also shared that she had her first Pulmonary rehab appt, stating that it is different from the past and that she will meet again with the same therapist next week to complete the paperwork and work on goals.  Had brief conversation on assisted living, client stated that she is not interested. Client states that she has support of her friends in the building.   Scheduled home visit to assist client with her HCD 6/12/17 at 3 PM.  Call placed to client's daughter Windy to share info above. CM will not f/u with AL facilities at this time.  Yeni Savage RN, BC  Supervisor [facility]   [phone] (Fax)      .    "

## 2017-06-08 NOTE — PROGRESS NOTES
Ana Luisa Ivy Lee  77 year old  1940  I have reviewed initial evaluation outcomes including patient's response to initial activity assessment, and agree with exercise prescription for pulmonary rehabilitation for this patient.  Physician Signature: _____________________________  Date: _______   Time: ______ 06/08/17 0900   Session   Session Initial Evaluation and Exercise Prescription   Certified through this date 07/07/17   Type Initial   General Information   Treatment Diagnosis COPD   Classification of COPD Stage 3 (Severe)   Onset Date 06/14/14   Hospital Location [facility]   Current Signs and Symptoms SOB;Fatigue   Outpatient Pulmonary Rehab Start Date 06/14/16   Primary Physician Jenny Parekh   Pulmonolgist Cole Carrasco MD   Medical History/Comorbidities   Medical History/Comorbidities Hypertension;Osteoarthritis;GERD   Medical History Comments Past Medical History:   Diagnosis Date     Anemia Dec 2011     Arthritis of hand      Asthma, persistent     f/U dr Brock at [facility]     Asthma, persistent      Chronic intermittent steroid use     for asthma     COPD exacerbation (H) 10/25/2013     Esophageal stricture 2007    s/p dilation     Foot deformity     Left     HTN, goal below 140/90      Hyperlipidemia      Hyperlipidemia LDL goal < 130      Hypothyroidism Dec 2011     Hypothyroidism      Left foot pain Nov 2011     Left shoulder pain Nov 2011     Medication side effects      Osteoporosis      Paroxysmal supraventricular tachycardia (H)      PVC (premature ventricular contraction) May 2012     Skin cyst Dec 2011    L thumb     SOB (shortness of breath) on exertion May 2012     SVT (supraventricular tachycardia) (H)       Untoward Events/Exacerbations/Hospitalizations   Untoward Events/Exacerbations/Hospitalizations None   Sputum   Sputum Production Amount none   Tobacco History   Tobacco Former smoker   Tobacco Habit Cigarettes   Years Smoked 7   Average Packs Per Day 1    Quit Date or Planned Quit Date 06/14/81   Interventions Planned None: Patient is in maintenance   Medications   Long-Acting Beta Agonist Prescribed, taking as prescribed  (Serevent Diskus)   Short-Acting Beta Agonist Prescribed, taking as prescribed  (Albuterol)   Long-Acting Anticholinergic Prescribed, taking as prescribed  (Spiriva)   Short-Acting Anticholinergic Not prescribed   Inhaled Corticosteroid Prescribed, taking as prescribed  (Pulmicort)   Oral Corticosteroid Prescribed, taking as prescribed  (Prednisone)   Medications Reconciled By Patient;Medical record   Preventative Vaccinations   Influenza Vaccination Yes   Pneumonia Vaccination Yes   Pain   Patient Currently in Pain Yes   Pain Location Shoulder, Ankle   Pain Rating 4/10  (on and off pain with walking)   Pain Description Ache   Pain Comments osteoarthritis   Pain Treatment Recommendations (takes Advil as needed)   Falls Screen   Have you fallen two or more times in the past year? No   Have you fallen and had an injury in the past year? No   Comment using a 4 wheeled cart   Living and Work Status   Living Arrangements apartment   Support System Live alone, family in area   Environmental Factors No concerns   ADL Limitations Stair climbing   Initial Duke Activity Status Index (DASI) score. A measure of functional capacity. The goal is to have a pre-program raw score of 9.95 (~4 METs) or above 11.65   Initial DASI VO2 Peak (ml*kg-1*min-1) 14.61   Initial DASI MET Level 4.17   Return to Employment Retired   Physical Assessments   Incisions Not assessed   Edema None   Left Lung Sounds normal   Right Lung Sounds normal   Pulmonary Function Test (PFTs)   PFT Results Available   Date Completed 03/22/16   FVC Actual 1.44   % Predicted FVC 61   FEV1 Actual 0.85   % Predicted FEV1 49   FEV1/FEV Ratio 74   FRC Actual 60   Pre/Post Bronchodilator Post   Airway Obstruction Severe   Individualized Treatment Plan   Sessions Scheduled 18   Sessions Attended 1  "  Type Aerobic exercise;Resistance training   Oxygen Use   Supplemental Oxygen Needed No   Exercise Prescription   Mode Treadmill;Nustep;Weights   Frequency 2 days/week   Duration/Time 15-30 min   THR (85% of age predicted max heart rate)  121.55   Effort Rating (0-10) 4-6/10   Progression of Exercise Start with 2 bouts on TM until 15\" is reached continuous  , then add MPH and grade at .025 METS/week    Oxygen Titration with Exercise NA   Exercise Assessment   6 Minute Walk Predicted - Gender Selection Female   6 Minute Walk Predicted (Female) 418.81   6 Minute Walk Predicted (Male) 407.5   6 Minute Walk Distance (Initial) 170.73 Meters   Resting HR 94   Exercise    Resting /80   Exercise /60   SpO2 97   Exercise SpO2 96   Current MET level 2.0   Exercise Tolerance fair   Normal Limits Discussed Yes   Current Symptoms at Home Dyspnea;Fatigue   Limitations frozen shoulder , arthritis in feet.    Nutrition Management   Age 76   Height 1.6 m (5' 2.99\")   Weight 61.3 kg (135 lb 3.2 oz)   BMI (Calculated) 24.01   Assessment Normal   Goal weight (not to lose wt)   Interventions Planned Attend group nutrition class;Dietician Consult   Psychosocial   Initial Patient Health Questionnaire -9 (PHQ-9) for depression. To notify physician if pre-score >9. 8   Initial [facility] Survey score. A quality of life measure. To re evaluate if total score is > 25. Also consider PHQ-9 and DASI to determine if patient should be referred back to physician. 25   Initial Shortness of Breath Questionnaire (SOBQ) score. The goal is to reduce the score pre to post program. 66   Psychosocial Comments 6/8/17 Pt getsdown about her lower level of activity.She sees people gardening and misses it.   Stages of Change   Aerobic Exercise Preparation   Physical Activity Preparation   Recommended diet Maintenance   Stress Maintenance   Smoking Cessation Maintenance   Oxygen Usage NA   Current Home Exercise   Type of Exercise " Walking;None   Recommended Home Exercise Prescription   Type of Exercise Walking;LE Strengthening Exercise   Frequency (Days per week) 2-3   Duration (minutes per session) 15-30 min   Effort Rating Recommended (0-10) Scale  4-6/10   Recommended Exercise Heart Rate Range not given   30 Day Exercise Plan Start by walking in hallway at apartment building    Learning Assessment   Learner Patient   Primary Language English   Preferred Learning Style Listening;Reading;Demonstration;Pictures/Video   Barriers to Learning No barriers noted   Patient Education/Referrals   Education Recommended Activities of Daily Living;Breathing Techniques;Community Resources/Exacerbation Management;Energy Conservation;Exercise Principles   Follow-up/On-going Support   Provider follow-up needed on the following No follow-up needed   Pulmonary Rehab Goals   Pulmonary Rehab Goals 1;2   Goal 1   Goal Pt to learn   Target Date 08/08/17   Goal 2   Goal Pt to start an aerobic and muscle strengthing program through [facility] to carry home after the program has ended.   Target Date 08/08/17   Assessment   Assessment Pt is a 76 yo with COPD III. She was in the [facility] program and discharged9/17/16. She is back to review education and to gain strength and endurance. She is limited by shoulder and other ostioarthitic pain but manages well with OTC tx. She now has a walker instead of a cane for walking.    The patient was assessed to be stable and appropriate to begin exercise.  The patient's functional capacity and exercise prescription were determined by the completion of the 6 minute walk test. See results above.  The patient was orientated to the program and the equipment. Pulmonary response to exercise was assessed and WNL. No symptoms, complaints or pain were reported.   Goals and objectives were discussed. Good prognosis for reaching goals.   Skilled therapy is necessary to monitor pulmonary response to exercise, provide education, and provide behavior  change counseling to achieve patient's goals.

## 2017-06-12 NOTE — PROGRESS NOTES
Spoke with client to request change of home visit to 6/14/17 @ 3 PM to assist with HCD, client in agreement.   Yeni [place] RN, BC  Supervisor [facility]   [phone] (Fax)

## 2017-06-14 ENCOUNTER — CARE COORDINATION (OUTPATIENT)
Dept: GERIATRIC MEDICINE | Facility: CLINIC | Age: 77
End: 2017-06-14

## 2017-06-15 ENCOUNTER — CARE COORDINATION (OUTPATIENT)
Dept: GERIATRIC MEDICINE | Facility: CLINIC | Age: 77
End: 2017-06-15

## 2017-06-15 NOTE — PROGRESS NOTES
Per CM request, changed meal delivery services from [employer] to [employer]. Emailed [employer] to cancel services. Faxed new referral form to [employer].     Carmenza Griffin  Case Management Specialist  [facility]  [phone]

## 2017-06-15 NOTE — PROGRESS NOTES
"6/14/17 Met with client in her home to assist with ACP.  Client's [place] the [place]. Client talked about completing a POLST when she was at [facility]. Noted in EPIC POLST, reviewed with client who states that the form is up to date and accurate. CM to provide client with a copy of her POLST.   CM assisted client with initiating the [place], client states that she wants to be DNR/DNI, client shared that she would like Hospice care when appropriate. Client stated that she wants her daughter Windy to be her health care agent. CM to complete f/u visit on 6/22/2017 to finish forms and have witnessed.  CM to assist client in contacting a local [facility] to inquire on [place] nursing.    Client also inquired if she can change [employer] to [employer]. Client states that she does not like the 2 wk delivery, \"too much.\"   Client agreeable that CM f/u with client's dtr Windy to share that the HCD was been initiated.  6/15/17 left vm with Windy, sharing the above info.   Yeni [place] RN, BC  Supervisor [facility]   [phone] (Fax)    "

## 2017-06-19 ENCOUNTER — HOSPITAL ENCOUNTER (OUTPATIENT)
Dept: CARDIAC REHAB | Facility: CLINIC | Age: 77
End: 2017-06-19
Attending: INTERNAL MEDICINE
Payer: COMMERCIAL

## 2017-06-19 VITALS — BODY MASS INDEX: 24.45 KG/M2 | WEIGHT: 138 LBS

## 2017-06-19 PROCEDURE — 40000244 ZZH STATISTIC VISIT PULM REHAB

## 2017-06-19 PROCEDURE — G0424 PULMONARY REHAB W EXER: HCPCS

## 2017-06-19 NOTE — PROGRESS NOTES
6/16/17 Rec'd vm from Yohana, Cris Zapata requesting emergency contact information. Yohana provided direct fax/contact numbers for [employer].  6/19/17, Call placed to Yohana to provide client's daughter Windy's name and contact information.  Yeni [place] RN, BC  Supervisor [facility]   [phone] (Fax)

## 2017-06-20 DIAGNOSIS — R11.0 NAUSEA: ICD-10-CM

## 2017-06-20 NOTE — TELEPHONE ENCOUNTER
COMPAZINE      Last Written Prescription Date: 002/09/17  Last Fill Quantity: 120,  # refills: 0   Last Office Visit with G, [facility] or [facility] prescribing provider: 05/24/17                                         Next 5 appointments (look out 90 days)     Jun 23, 2017  2:00 PM CDT   SHORT with Jenny [place] MD   [facility]    [address]  [facility] [id number] [phone]

## 2017-06-21 ENCOUNTER — TELEPHONE (OUTPATIENT)
Dept: INTERNAL MEDICINE | Facility: CLINIC | Age: 77
End: 2017-06-21

## 2017-06-21 RX ORDER — PROCHLORPERAZINE MALEATE 5 MG
5 TABLET ORAL EVERY 6 HOURS PRN
Qty: 120 TABLET | Refills: 0 | Status: SHIPPED | OUTPATIENT
Start: 2017-06-21 | End: 2017-08-07

## 2017-06-21 NOTE — TELEPHONE ENCOUNTER
Form received from: [facility], [facility].    Form requesting following info/need: Homemaking services orders     PHYLICIA needed?: No    Location of form: Dr. Tierney's in basket    When completed the route for return: Fax

## 2017-06-22 ENCOUNTER — HOSPITAL ENCOUNTER (OUTPATIENT)
Dept: CARDIAC REHAB | Facility: CLINIC | Age: 77
End: 2017-06-22
Attending: INTERNAL MEDICINE
Payer: COMMERCIAL

## 2017-06-22 ENCOUNTER — TELEPHONE (OUTPATIENT)
Dept: INTERNAL MEDICINE | Facility: CLINIC | Age: 77
End: 2017-06-22

## 2017-06-22 ENCOUNTER — CARE COORDINATION (OUTPATIENT)
Dept: GERIATRIC MEDICINE | Facility: CLINIC | Age: 77
End: 2017-06-22

## 2017-06-22 VITALS — BODY MASS INDEX: 24.1 KG/M2 | WEIGHT: 136 LBS

## 2017-06-22 DIAGNOSIS — Z76.89 HEALTH CARE HOME: ICD-10-CM

## 2017-06-22 PROCEDURE — G0424 PULMONARY REHAB W EXER: HCPCS

## 2017-06-22 PROCEDURE — 40000244 ZZH STATISTIC VISIT PULM REHAB

## 2017-06-22 NOTE — TELEPHONE ENCOUNTER
Windy RN with [facility] ([phone]) calling for orders:  1.  SN visits once a week x9 wks plus 3 as needed visits  2. HHA twice a week x9 wks to assist with bathing.    Left detailed message on Windy's VM giving approval of orders per RN protocol.  CAROL Flores R.N.

## 2017-06-22 NOTE — PROGRESS NOTES
6/19/17 Call placed to [facility] in [place] (client's preference). CM informed that they provide a Be-friender's group that can make home visit 1-2x/month to visit and bring communion.  CM will provide information to client.   To initiate the Be-friender's group, call Deacon Bennett Dinero at [phone]    6/21/17 Rec'd vm from Madhavi HOGAN [facility] to inquire if client has rec'd the feeding utensils that were requested.  Per [facility]: Ana Luisa NUNEZ -rocker knife, built up fork and spoon, magnifying glass. Per Alfonzo at [facility], auth received yesterday (6/19) and order will be out to patient shortly.  Information provided to Madhavi HOGAN.   6/22/2017 Rec'd vm from client inquiring if she could receive a standard walker with tennis balls. Client states that this walker would be able to fold easier and place in a car.     6/22/2017 Information recd: [employer] is delivering lat meal today,  then [employer] will start on Monday 6/26.   6/22/2017  Left vm with client that she is not eligible for a new walker as she just rec'd a walker 2/2017.   Explained that a home visit is scheduled for later today to complete HCD.  Yeni [place] RN, BC  Supervisor [facility]   [phone] (Fax)

## 2017-06-22 NOTE — PROGRESS NOTES
Rec'd tele call from client stating that she is having diarrhea and inquired if CM could change visit to tomorrow. Home visit rescheduled for tomorrow 6/23/17 @ 9:30  Client states understanding that she is not able to obtain another walker.    Yeni [place] RN, BC  Supervisor [facility]   [phone] (Fax)

## 2017-06-23 ENCOUNTER — CARE COORDINATION (OUTPATIENT)
Dept: GERIATRIC MEDICINE | Facility: CLINIC | Age: 77
End: 2017-06-23

## 2017-06-23 DIAGNOSIS — Z71.89 ADVANCED DIRECTIVES, COUNSELING/DISCUSSION: Chronic | ICD-10-CM

## 2017-06-23 NOTE — PROGRESS NOTES
Completed home visit with client to complete HCD. Client has the original, enc'd to review with her daughter Windy, who is her health care agent.  Client to have document witnessed by 2 friends in the building and will fax a copy to CM to have scanned in EPIC.    6/22/17 Rec'd vm from client's daughter Windy thanking CM for assistance. Windy states that she will also f/u with Windy RN [facility] to inquire her mother's health status.  Windy states that she purchased a smart phone for her mother, but her mother is having difficulty navigating the touch screen due to her arthritis and shakiness. Windy inquired if CM had any recommendations.  6/22/17 e-mail to Madhavi HOGAN [facility] to inquire if she had any ideas re daughter's inquiry on cell phone.  6/22/2017 rec'd e-mail from Madhavi: I spoke with Windy and I will try out a stylus if that doesn't work I recommend the jitterbug phone it's got larger buttons on it    Yeni [place] RN, BC  Supervisor [facility]   [phone] (Fax)

## 2017-06-26 ENCOUNTER — TELEPHONE (OUTPATIENT)
Dept: INTERNAL MEDICINE | Facility: CLINIC | Age: 77
End: 2017-06-26

## 2017-06-26 NOTE — TELEPHONE ENCOUNTER
Received call from Laura RN with [facility] ([phone]) reporting that patient hurt her R shoulder during pulmonary rehab last week.  Has very minimal ROM,  rates pain 8/10, heart rate 100.  Patient had been scheduled to see PCP 6/29/17 but she has been rescheduled to see PCP tomorrow 6/27/17.  CAROL Flores R.N.

## 2017-06-27 ENCOUNTER — OFFICE VISIT (OUTPATIENT)
Dept: INTERNAL MEDICINE | Facility: CLINIC | Age: 77
End: 2017-06-27
Payer: COMMERCIAL

## 2017-06-27 ENCOUNTER — TELEPHONE (OUTPATIENT)
Dept: INTERNAL MEDICINE | Facility: CLINIC | Age: 77
End: 2017-06-27

## 2017-06-27 VITALS
SYSTOLIC BLOOD PRESSURE: 122 MMHG | HEIGHT: 63 IN | TEMPERATURE: 98.6 F | HEART RATE: 100 BPM | OXYGEN SATURATION: 95 % | BODY MASS INDEX: 23.92 KG/M2 | WEIGHT: 135 LBS | DIASTOLIC BLOOD PRESSURE: 65 MMHG

## 2017-06-27 DIAGNOSIS — M25.511 ACUTE PAIN OF RIGHT SHOULDER: Primary | ICD-10-CM

## 2017-06-27 DIAGNOSIS — M25.511 ACUTE PAIN OF RIGHT SHOULDER: ICD-10-CM

## 2017-06-27 DIAGNOSIS — K21.9 GASTROESOPHAGEAL REFLUX DISEASE, ESOPHAGITIS PRESENCE NOT SPECIFIED: ICD-10-CM

## 2017-06-27 DIAGNOSIS — J44.9 COPD, MODERATE (H): Chronic | ICD-10-CM

## 2017-06-27 DIAGNOSIS — I35.0 NONRHEUMATIC AORTIC VALVE STENOSIS: ICD-10-CM

## 2017-06-27 PROCEDURE — 99214 OFFICE O/P EST MOD 30 MIN: CPT | Performed by: INTERNAL MEDICINE

## 2017-06-27 RX ORDER — OMEPRAZOLE 40 MG/1
40 CAPSULE, DELAYED RELEASE ORAL DAILY
Qty: 90 CAPSULE | Refills: 3 | Status: SHIPPED | OUTPATIENT
Start: 2017-06-27 | End: 2018-06-22

## 2017-06-27 NOTE — TELEPHONE ENCOUNTER
Called [facility], they stock the gel. Called pt, states she has no ride or anyone to  the rx tonight. Pt okay with sending rx to [facility] and will have someone drive her or pick it up for her tomorrow. Resent rx to [facility].

## 2017-06-27 NOTE — MR AVS SNAPSHOT
After Visit Summary   6/27/2017    Ana Luisa Lee    MRN: [id number]           Patient Information     Date Of Birth          1940        Visit Information        Provider Department      6/27/2017 11:00 AM Jenny Hamilton MD [facility]        Today's Diagnoses     Acute pain of right shoulder    -  1    Gastroesophageal reflux disease, esophagitis presence not specified          Care Instructions    Plan:  1. Voltaren gel 3-4 times a day to the R shoulder as needed  2. Ortho referral  [phone]  3. Omeprazole 40 mg daily  4. Continue the other meds, same doses for now.  5. Follow up 2 months          Follow-ups after your visit        Additional Services     ORTHO  REFERRAL       [facility] is referring you to the Orthopedic  Services at [facility].       The  Representative will assist you in the coordination of your Orthopedic and Musculoskeletal Care as prescribed by your physician.    The  Representative will call you within 1 business day to help schedule your appointment, or you may contact the  Representative at:    All areas ~ [phone]     Type of Referral : Non Surgical       Timeframe requested: Routine    Coverage of these services is subject to the terms and limitations of your health insurance plan.  Please call member services at your health plan with any benefit or coverage questions.      If X-rays, CT or MRI's have been performed, please contact the facility where they were done to arrange for , prior to your scheduled appointment.  Please bring this referral request to your appointment and present it to your specialist.                  Your next 10 appointments already scheduled     Jun 29, 2017  1:00 PM CDT   Pulmonary Treatment with Rh Pulmonary Rehab 1   [facility] ([facility])    [address],  [address]  [facility] [address]   [phone]            Jul 06, 2017  1:00 PM CDT   Pulmonary Treatment with Rh Pulmonary Rehab 1   [facility] ([facility])    [address], [address]  [place] [facility] [address]   [phone]            Jul 10, 2017  2:00 PM CDT   Pulmonary Treatment with Rh Pulmonary Rehab 1   [facility] ([facility])    [address], [address]  [facility] [address]   [phone]            Jul 13, 2017  1:00 PM CDT   Pulmonary Treatment with Rh Pulmonary Rehab 1   [facility] ([facility])    [address], [address]  [facility] [address]   [phone]            Jul 17, 2017 11:00 AM CDT   CONSULT with Rh Pulmonary Rehab 2   [facility] ([facility])    [address], [address]  [facility] [address]   [phone]            Jul 20, 2017  1:00 PM CDT   Pulmonary Treatment with Rh Pulmonary Rehab 1   [facility] ([facility])    [address], [address]  [facility] [address]   [phone]            Jul 24, 2017  2:00 PM CDT   Pulmonary Treatment with Rh Pulmonary Rehab 1   [facility] ([facility])    [address], [address]  [facility] [address]   [phone]            Jul 27, 2017  1:00 PM CDT   Pulmonary Treatment with Rh Pulmonary Rehab 1   [facility] ([facility])    [address], [address]  [facility] [address]   [phone]            Jul 31, 2017  2:00 PM CDT   Pulmonary Treatment with Rh Pulmonary Rehab 1   [facility] ([facility])    [address], [address]  [facility] [address]   [phone]            Aug 03, 2017  1:00 PM CDT   Pulmonary Treatment with Rh Pulmonary Rehab 1   [facility] ([facility])    [address]  [facility] [address] [phone]              Who to contact     If you have questions or need follow up information about today's clinic visit or your schedule please contact [facility] directly at [phone].  Normal or non-critical lab and imaging results will be communicated to you by MyChart, letter or phone within 4 business days after the clinic has received the results. If you do not hear from us within 7 days, please contact the clinic through [employer] or phone. If you have a critical or abnormal lab result, we will notify you by phone as soon as possible.  Submit refill requests through [employer] or call your pharmacy and they will forward the refill request to us. Please allow 3 business days for your refill to be completed.          Additional Information About Your Visit        [employer] Information     [employer] gives you secure access to your electronic health record. If you see a primary care provider, you can also send messages to your care team and make appointments. If you have questions, please call your primary care clinic.  If you do not have a primary care provider, please call [phone] and they will assist you.        Care EveryWhere ID     This is your Care EveryWhere ID. This could be used by other organizations to access your [place] medical records  [id number]        Your Vitals Were     Pulse Temperature Height Pulse Oximetry BMI (Body Mass Index)       100 98.6  F (37  C) (Oral) 5' 3\" (1.6 m) 95% 23.91 kg/m2        Blood Pressure from Last 3 Encounters:   06/27/17 122/65   06/07/17 120/64   05/28/17 183/85    Weight from Last 3 Encounters:   06/27/17 135 lb (61.2 kg)   06/22/17 136 lb (61.7 kg)   06/19/17 138 lb (62.6 kg)              We Performed the Following     ORTHO  REFERRAL          Today's Medication Changes          These changes are accurate as of: 6/27/17 11:32 AM.  If you have any questions, ask your nurse or " doctor.               Start taking these medicines.        Dose/Directions    diclofenac 1 % Gel topical gel   Commonly known as:  VOLTAREN   Used for:  Acute pain of right shoulder   Started by:  Jenny Hamilton MD        2 grams to R shoulder four times daily using enclosed dosing card.   Quantity:  100 g   Refills:  1       omeprazole 40 MG capsule   Commonly known as:  priLOSEC   Used for:  Gastroesophageal reflux disease, esophagitis presence not specified   Started by:  Jenny Hamilton MD        Dose:  40 mg   Take 1 capsule (40 mg) by mouth daily Take 30-60 minutes before a meal.   Quantity:  90 capsule   Refills:  3         These medicines have changed or have updated prescriptions.        Dose/Directions    predniSONE 10 MG tablet   Commonly known as:  DELTASONE   This may have changed:  additional instructions   Used for:  COPD, moderate (H), Acute bronchospasm, Acute bronchitis with symptoms > 10 days        Take 3 tabs daily for 4 days Take 2 tabs daily for 4 days Then go back to your previous dose of 10 mg alternating with 15 mg   Quantity:  20 tablet   Refills:  0            Where to get your medicines      [facility] [address] [place] [address] - [address] AT [place] [address] [address]  [address], [facility] [address]     Phone:  [phone]     diclofenac 1 % Gel topical gel    omeprazole 40 MG capsule                Primary Care Provider Office Phone # Fax #    Jenny Hamilton [phone]       [facility] [address] [place] [address]        Equal Access to Services     [facility] AH: Hadii aad ku hadasho Soomaali, waaxda luqadaha, qaybta kaalmada adeegyada, waxay mychalin hayroyal braga. So [facility] [phone].    ATENCIÓN: Si habla español, tiene a martinez disposición servicios gratuitos de asistencia lingüística. Llame al [phone].    We  comply with applicable federal civil rights laws and [place] laws. We do not discriminate on the basis of race, color, national origin, age, disability sex, sexual orientation or gender identity.            Thank you!     Thank you for choosing [facility]  for your care. Our goal is always to provide you with excellent care. Hearing back from our patients is one way we can continue to improve our services. Please take a few minutes to complete the written survey that you may receive in the mail after your visit with us. Thank you!             Your Updated Medication List - Protect others around you: Learn how to safely use, store and throw away your medicines at www.disposemymeds.org.          This list is accurate as of: 6/27/17 11:32 AM.  Always use your most recent med list.                   Brand Name Dispense Instructions for use Diagnosis    albuterol 108 (90 BASE) MCG/ACT Inhaler    albuterol     Inhale 2 puffs into the lungs every 6 hours as needed    Asthma, persistent       ASPIRIN NOT PRESCRIBED    INTENTIONAL    0 each    1 each continuous prn. Antiplatelet medication not prescribed intentionally due to Use of NSAIDS        B-12 1000 MCG Tbcr     100 tablet    Take 1,000 mcg by mouth daily    Pernicious anemia       budesonide 180 MCG/ACT inhaler    PULMICORT FLEXHALER     Inhale 1 puff into the lungs 2 times daily    Pulmonary emphysema, unspecified emphysema type (H)       calcium 600 MG tablet     60 tablet    Take 1 tablet (600 mg) by mouth daily    Pulmonary emphysema, unspecified emphysema type (H)       cholecalciferol 1000 UNIT tablet    vitamin D    30 tablet    Take 1 tablet (1,000 Units) by mouth daily    COPD, moderate (H)       diclofenac 1 % Gel topical gel    VOLTAREN    100 g    2 grams to R shoulder four times daily using enclosed dosing card.    Acute pain of right shoulder       ferrous sulfate 325 (65 FE) MG tablet    IRON    100 tablet    Take 1 tablet (325 mg) by  mouth daily (with breakfast)    Pulmonary emphysema, unspecified emphysema type (H)       gemfibrozil 600 MG tablet    LOPID    90 tablet    Take 1 tablet (600 mg) by mouth daily    Hyperlipidemia with target LDL less than 130       LANsoprazole 30 MG CR capsule    PREVACID    90 capsule    Take 1 capsule (30 mg) by mouth daily    Gastroesophageal reflux disease without esophagitis       levalbuterol 1.25 MG/3ML neb solution    XOPENEX    1584 mL    Take 3 mLs (1.25 mg) by nebulization every 4 hours as needed for shortness of breath / dyspnea or wheezing    COPD, moderate (H), COPD exacerbation (H)       levothyroxine 25 MCG tablet    SYNTHROID/LEVOTHROID    90 tablet    Take 1 tablet (25 mcg) by mouth daily    Hypothyroidism due to acquired atrophy of thyroid       montelukast 10 MG tablet    SINGULAIR    30 tablet    Take 1 tablet (10 mg) by mouth At Bedtime    Pulmonary emphysema, unspecified emphysema type (H)       MULTIVITAMIN PO      Take 1 tablet by mouth daily        omeprazole 40 MG capsule    priLOSEC    90 capsule    Take 1 capsule (40 mg) by mouth daily Take 30-60 minutes before a meal.    Gastroesophageal reflux disease, esophagitis presence not specified       * order for DME     1 Device    Overnight pulse oximetry    COPD, moderate (H), Other fatigue       * order for DME     1 Device    Equipment being ordered: blood pressure machine    HTN, goal below 140/90, Labile blood pressure       * order for DME     1 each    Equipment being ordered: Walker Wheels (), Walker () and Other: Four Wheeled Walker Treatment Diagnosis: Impaired gait stability and activity tolerance    Pulmonary emphysema, unspecified emphysema type (H)       order for DME     1 each    Equipment being ordered: Nebulizer machine, cup, and tubing.  Fax to [facility] per pt request    COPD, moderate (H)       polyethylene glycol Packet    MIRALAX/GLYCOLAX     Take 17 g by mouth daily as needed for  constipation        predniSONE 10 MG tablet    DELTASONE    20 tablet    Take 3 tabs daily for 4 days Take 2 tabs daily for 4 days Then go back to your previous dose of 10 mg alternating with 15 mg    COPD, moderate (H), Acute bronchospasm, Acute bronchitis with symptoms > 10 days       prochlorperazine 5 MG tablet    COMPAZINE    120 tablet    Take 1 tablet (5 mg) by mouth every 6 hours as needed for nausea or vomiting    Nausea       roflumilast 500 MCG Tabs tablet    DALIRESP     Take 500 mcg by mouth daily        salmeterol 50 MCG/DOSE diskus inhaler    SEREVENT     Inhale 1 puff into the lungs 2 times daily    Pulmonary emphysema, unspecified emphysema type (H)       tiotropium 18 MCG capsule    SPIRIVA    30 capsule    Inhale 1 capsule (18 mcg) into the lungs daily    Pulmonary emphysema, unspecified emphysema type (H)       * Notice:  This list has 3 medication(s) that are the same as other medications prescribed for you. Read the directions carefully, and ask your doctor or other care provider to review them with you.

## 2017-06-27 NOTE — TELEPHONE ENCOUNTER
Patient states that the pharmacy said it will be a 5-7 day wait on the Voltaren gel but patient states she needs something now.  Using heat and Advil which gives very short term relief.  CAROL Flores R.N.

## 2017-06-27 NOTE — PROGRESS NOTES
Patient's instructions / PLAN:                                                        Plan:  1. Voltaren gel 3-4 times a day to the R shoulder as needed  2. Ortho referral  [phone]  3. Omeprazole 40 mg daily  4. Continue the other meds, same doses for now.  5. Follow up 2 months        ASSESSMENT & PLAN:                                                      (M25.511) Acute pain of right shoulder  (primary encounter diagnosis)  Comment: no po NSAIDs because of GERD  Plan: ORTHO  REFERRAL, DISCONTINUED:         diclofenac (VOLTAREN) 1 % GEL topical gel            (K21.9) Gastroesophageal reflux disease, esophagitis presence not specified  Comment:   Plan: omeprazole (PRILOSEC) 40 MG capsule            (I35.0) Nonrheumatic aortic valve stenosis  Comment: advancing   Plan: f/u with cardio in 1y. Continue same meds, same doses for now     (J44.9) COPD, moderate (HCC)  Comment: better  Plan: Continue same meds, same doses for now         Chief Complaint:                                                      R shoulder  Follow up chronic medical problems        SUBJECTIVE:                                                    History of present illness     Cardiology appointment June 7 with dr Melton   -- _ reviewed the note   -- the Ao stenosis is getting worse  --  F/u in 1 year with cardio   -- Hx of parox A Tach Eval in the past by EP. Can't tolerate BB or calcium channel blockers.     R shoulder pain  -- started with pulmonary rehab lifting weights  -- pain at rest as well    -- tender on the ant part and tender on the ant part of the R arm - possible muscle strain    GERD  -- she takes OTC omeprazole because insurance doesn't cover it anymore the lansoprazole. Omeprazole hasn't worked so well  -- she has heartburn if she doesn't take it     Asthma/COPD  -- better since Daliresp tx    ROS:                                                      ROS: negative for fever, chills, cough, wheezes, chest pain,  "shortness of breath, vomiting, abdominal pain, leg swelling     A 10-point review of systems was obtained.  Those pertinent are above and in the in the Subjective section.  The rest of the systems are negative.        OBJECTIVE:                                                    Physical Exam :    Blood pressure 122/65, pulse 100, temperature 98.6  F (37  C), temperature source Oral, height 5' 3\" (1.6 m), weight 135 lb (61.2 kg), SpO2 95 %, not currently breastfeeding.     NAD, appears comfortable  Skin: no rashes   HEENT: PERRLA, EOMI, pink conjunctiva, anicteric sclerae, bilateral tympanic membranes are clinically normal, oropharynx is normal color  Neck: supple, no JVD,  No thyroidmegaly. Lymph nodes nonpalpable cervical and supraclavicular.  Chest: clear to auscultation bilaterally, but distant sounds, poor respiratory effort  Heart: S1 S2, RRR, no mgr appreciated  Abdomen: soft, not tender,   Extremities: no edema,   Neurologic: A, Ox3, no focal signs appreciated    PMHx: reviewed  Past Medical History:   Diagnosis Date     Anemia Dec 2011     Arthritis of hand      Asthma, persistent     f/U dr Brock [facility]     Asthma, persistent      Chronic intermittent steroid use     for asthma     COPD exacerbation (H) 10/25/2013     Esophageal stricture 2007    s/p dilation     Foot deformity     Left     HTN, goal below 140/90      Hyperlipidemia      Hyperlipidemia LDL goal < 130      Hypothyroidism Dec 2011     Hypothyroidism      Left foot pain Nov 2011     Left shoulder pain Nov 2011     Medication side effects      Osteoporosis      Paroxysmal supraventricular tachycardia (H)      PVC (premature ventricular contraction) May 2012     Skin cyst Dec 2011    L thumb     SOB (shortness of breath) on exertion May 2012     SVT (supraventricular tachycardia) (H)       PSHx: reviewed  Past Surgical History:   Procedure Laterality Date     BUNIONECTOMY LAPIDUS WITH TARSAL METATARSAL (TMT) FUSION  1990's     " EP STUDY, MODIFIED  7/2012    SVT not induced, PVC's     GYN SURGERY  1980     HYSTERECTOMY TOTAL ABDOMINAL       HYSTERECTOMY, PAP NO LONGER INDICATED       TONSILLECTOMY          Meds: reviewed  Current Outpatient Prescriptions   Medication Sig Dispense Refill     prochlorperazine (COMPAZINE) 5 MG tablet Take 1 tablet (5 mg) by mouth every 6 hours as needed for nausea or vomiting 120 tablet 0     predniSONE (DELTASONE) 10 MG tablet Take 3 tabs daily for 4 days Take 2 tabs daily for 4 days Then go back to your previous dose of 10 mg alternating with 15 mg (Patient taking differently: Take dose of 10 mg alternating with 15 mg) 20 tablet 0     gemfibrozil (LOPID) 600 MG tablet Take 1 tablet (600 mg) by mouth daily 90 tablet 0     order for DME Equipment being ordered: Nebulizer machine, cup, and tubing.  Fax to [facility] per pt request 1 each 0     roflumilast (DALIRESP) 500 MCG TABS tablet Take 500 mcg by mouth daily        polyethylene glycol (MIRALAX/GLYCOLAX) Packet Take 17 g by mouth daily as needed for constipation       albuterol (ALBUTEROL) 108 (90 BASE) MCG/ACT Inhaler Inhale 2 puffs into the lungs every 6 hours as needed       levalbuterol (XOPENEX) 1.25 MG/3ML neb solution Take 3 mLs (1.25 mg) by nebulization every 4 hours as needed for shortness of breath / dyspnea or wheezing 1584 mL 1     budesonide (PULMICORT FLEXHALER) 180 MCG/ACT inhaler Inhale 1 puff into the lungs 2 times daily       montelukast (SINGULAIR) 10 MG tablet Take 1 tablet (10 mg) by mouth At Bedtime 30 tablet      salmeterol (SEREVENT) 50 MCG/DOSE diskus inhaler Inhale 1 puff into the lungs 2 times daily       tiotropium (SPIRIVA) 18 MCG capsule Inhale 1 capsule (18 mcg) into the lungs daily 30 capsule      Cyanocobalamin (B-12) 1000 MCG TBCR Take 1,000 mcg by mouth daily 100 tablet 1     ferrous sulfate (IRON) 325 (65 FE) MG tablet Take 1 tablet (325 mg) by mouth daily (with breakfast) 100 tablet      calcium 600  MG tablet Take 1 tablet (600 mg) by mouth daily 60 tablet      levothyroxine (SYNTHROID/LEVOTHROID) 25 MCG tablet Take 1 tablet (25 mcg) by mouth daily 90 tablet 1     LANsoprazole (PREVACID) 30 MG CR capsule Take 1 capsule (30 mg) by mouth daily 90 capsule 3     cholecalciferol (VITAMIN D) 1000 UNIT tablet Take 1 tablet (1,000 Units) by mouth daily 30 tablet      order for DME Equipment being ordered: Walker Wheels (), Walker () and Other: Four Wheeled Walker  Treatment Diagnosis: Impaired gait stability and activity tolerance 1 each 0     order for DME Overnight pulse oximetry 1 Device 0     Multiple Vitamins-Minerals (MULTIVITAMIN OR) Take 1 tablet by mouth daily       ASPIRIN NOT PRESCRIBED, INTENTIONAL, 1 each continuous prn. Antiplatelet medication not prescribed intentionally due to Use of NSAIDS 0 each 0     order for DME Equipment being ordered: blood pressure machine 1 Device 0       Soc Hx: reviewed  Fam Hx: reviewed          Jenny Tierney MD  Internal Medicine

## 2017-06-27 NOTE — PATIENT INSTRUCTIONS
Plan:  1. Voltaren gel 3-4 times a day to the R shoulder as needed  2. Ortho referral  [phone]  3. Omeprazole 40 mg daily  4. Continue the other meds, same doses for now.  5. Follow up 2 months

## 2017-06-27 NOTE — TELEPHONE ENCOUNTER
Check with other pharmacies ( including the one down stairs) and see where she can get it sooner and transfer Rx

## 2017-06-27 NOTE — TELEPHONE ENCOUNTER
Form received from: [facility]    Form requesting following info/need: Certification and OC    PHYLICIA needed?: No    Location of form: Dr. Tierney's in basket    When completed the route for return: Fax

## 2017-06-28 ENCOUNTER — DOCUMENTATION ONLY (OUTPATIENT)
Dept: OTHER | Facility: CLINIC | Age: 77
End: 2017-06-28

## 2017-06-28 ENCOUNTER — TELEPHONE (OUTPATIENT)
Dept: INTERNAL MEDICINE | Facility: CLINIC | Age: 77
End: 2017-06-28

## 2017-06-28 DIAGNOSIS — Z71.89 ADVANCED DIRECTIVES, COUNSELING/DISCUSSION: Chronic | ICD-10-CM

## 2017-06-28 NOTE — TELEPHONE ENCOUNTER
PA needed for pt for Voltaren gel 2%  Initiated on CMM, awaiting response, pt aware that we are working on this as well, she wants it to go to [facility], when we hear response let [facility] know  Key # [id number]  [id number]  Last name-Rosa  [phone]   phone  [phone]   fax

## 2017-06-29 ENCOUNTER — HOSPITAL ENCOUNTER (OUTPATIENT)
Dept: CARDIAC REHAB | Facility: CLINIC | Age: 77
End: 2017-06-29
Attending: INTERNAL MEDICINE
Payer: COMMERCIAL

## 2017-06-29 VITALS — WEIGHT: 135 LBS | BODY MASS INDEX: 23.92 KG/M2 | HEIGHT: 63 IN

## 2017-06-29 PROCEDURE — G0424 PULMONARY REHAB W EXER: HCPCS | Performed by: REHABILITATION PRACTITIONER

## 2017-06-29 PROCEDURE — 40000244 ZZH STATISTIC VISIT PULM REHAB: Performed by: REHABILITATION PRACTITIONER

## 2017-06-29 ASSESSMENT — 6 MINUTE WALK TEST (6MWT)
TOTAL DISTANCE WALKED: 170.73
GENDER SELECTION: FEMALE
FEMALE CALC: 419.02
MALE CALC: 407.66

## 2017-06-29 ASSESSMENT — PULMONARY FUNCTION TESTS
FEV1: .85
FEV1 (%PREDICTED): 49
FEV1/FVC: 74
FVC_PERCENT_PREDICTED: 61
FVC: 1.44

## 2017-06-29 ASSESSMENT — ACTIVITIES OF DAILY LIVING (ADL): ADL_LIMITATIONS: STAIR CLIMBING

## 2017-06-29 ASSESSMENT — DUKE ACTIVITY SCORE INDEX (DASI)
DASI METS SCORE: 4.17
VO2_PEAK: 14.61

## 2017-06-30 PROCEDURE — G0179 MD RECERTIFICATION HHA PT: HCPCS | Performed by: INTERNAL MEDICINE

## 2017-06-30 NOTE — TELEPHONE ENCOUNTER
Rep from ins comp called-Had questions regarding PA for Voltaren gel. Answered questions, rep will now forward for medical review

## 2017-07-02 PROBLEM — I35.0 NONRHEUMATIC AORTIC VALVE STENOSIS: Status: ACTIVE | Noted: 2017-07-02

## 2017-07-06 ENCOUNTER — HOSPITAL ENCOUNTER (OUTPATIENT)
Dept: CARDIAC REHAB | Facility: CLINIC | Age: 77
End: 2017-07-06
Attending: INTERNAL MEDICINE
Payer: COMMERCIAL

## 2017-07-06 VITALS — BODY MASS INDEX: 24.1 KG/M2 | WEIGHT: 136 LBS

## 2017-07-06 DIAGNOSIS — J44.1 COPD EXACERBATION (H): ICD-10-CM

## 2017-07-06 DIAGNOSIS — J44.9 COPD, MODERATE (H): Chronic | ICD-10-CM

## 2017-07-06 PROCEDURE — 40000244 ZZH STATISTIC VISIT PULM REHAB

## 2017-07-06 PROCEDURE — G0424 PULMONARY REHAB W EXER: HCPCS

## 2017-07-06 NOTE — TELEPHONE ENCOUNTER
levalbuterol       Last Written Prescription Date: 1/6/17  Last Fill Quantity: 1584mL, # refills: 1    Last Office Visit with [facility], [facility] or [facility] prescribing provider:  6/27/17   Future Office Visit:       Date of Last Asthma Action Plan Letter:   Asthma Action Plan Q1 Year    Topic Date Due     Asthma Action Plan - yearly  10/03/2017      Asthma Control Test:   ACT Total Scores 2/3/2017   ACT TOTAL SCORE (Goal Greater than or Equal to 20) 18   In the past 12 months, how many times did you visit the emergency room for your asthma without being admitted to the hospital? 0   In the past 12 months, how many times were you hospitalized overnight because of your asthma? 3       Date of Last Spirometry Test:   No results found for this or any previous visit.

## 2017-07-07 RX ORDER — LEVALBUTEROL INHALATION SOLUTION 1.25 MG/3ML
1 SOLUTION RESPIRATORY (INHALATION) EVERY 4 HOURS PRN
Qty: 1584 ML | Refills: 1 | Status: SHIPPED | OUTPATIENT
Start: 2017-07-07 | End: 2018-03-21

## 2017-07-10 ENCOUNTER — HOSPITAL ENCOUNTER (OUTPATIENT)
Dept: CARDIAC REHAB | Facility: CLINIC | Age: 77
End: 2017-07-10
Attending: INTERNAL MEDICINE
Payer: COMMERCIAL

## 2017-07-10 VITALS — BODY MASS INDEX: 24.1 KG/M2 | WEIGHT: 136 LBS

## 2017-07-10 PROCEDURE — G0424 PULMONARY REHAB W EXER: HCPCS

## 2017-07-10 PROCEDURE — 40000244 ZZH STATISTIC VISIT PULM REHAB

## 2017-07-12 ENCOUNTER — CARE COORDINATION (OUTPATIENT)
Dept: GERIATRIC MEDICINE | Facility: CLINIC | Age: 77
End: 2017-07-12

## 2017-07-12 NOTE — PROGRESS NOTES
Rec'd vm from [facility] requesting a return call to discuss client's spend down.   [phone]  Call placed to Ca[facility], shared client's obligation 169 (3/1/17-6/30/17) 167 (7/1/17).  Caren shared that recently billing has been done differently and she has been trying to assist client in understanding her spend down.   Yeni Savage RN, BC  Supervisor [facility]   [phone] (Fax)

## 2017-07-13 ENCOUNTER — HOSPITAL ENCOUNTER (OUTPATIENT)
Dept: CARDIAC REHAB | Facility: CLINIC | Age: 77
End: 2017-07-13
Attending: INTERNAL MEDICINE
Payer: COMMERCIAL

## 2017-07-13 VITALS — BODY MASS INDEX: 23.74 KG/M2 | WEIGHT: 134 LBS

## 2017-07-13 PROCEDURE — G0424 PULMONARY REHAB W EXER: HCPCS

## 2017-07-13 PROCEDURE — 40000244 ZZH STATISTIC VISIT PULM REHAB

## 2017-07-17 ENCOUNTER — HOSPITAL ENCOUNTER (OUTPATIENT)
Dept: CARDIAC REHAB | Facility: CLINIC | Age: 77
End: 2017-07-17
Attending: INTERNAL MEDICINE
Payer: COMMERCIAL

## 2017-07-17 ENCOUNTER — CARE COORDINATION (OUTPATIENT)
Dept: GERIATRIC MEDICINE | Facility: CLINIC | Age: 77
End: 2017-07-17

## 2017-07-17 VITALS — BODY MASS INDEX: 23.64 KG/M2 | HEIGHT: 63 IN | WEIGHT: 133.4 LBS

## 2017-07-17 PROCEDURE — G0424 PULMONARY REHAB W EXER: HCPCS

## 2017-07-17 PROCEDURE — 40000244 ZZH STATISTIC VISIT PULM REHAB

## 2017-07-17 ASSESSMENT — PULMONARY FUNCTION TESTS
FEV1 (%PREDICTED): 49
FVC_PERCENT_PREDICTED: 61
FEV1: .85
FVC: 1.44
FEV1/FVC: 74

## 2017-07-17 ASSESSMENT — 6 MINUTE WALK TEST (6MWT)
TOTAL DISTANCE WALKED: 170.73
GENDER SELECTION: FEMALE
FEMALE CALC: 420.68
MALE CALC: 408.94

## 2017-07-17 ASSESSMENT — DUKE ACTIVITY SCORE INDEX (DASI)
DASI METS SCORE: 4.17
VO2_PEAK: 14.61

## 2017-07-17 ASSESSMENT — ACTIVITIES OF DAILY LIVING (ADL): ADL_LIMITATIONS: STAIR CLIMBING

## 2017-07-17 NOTE — PROGRESS NOTES
Ana Luisa Ivy Lee  77 year old  1940  I have reviewed and agree with this patient s individual treatment plan and exercise prescription for pulmonary rehabilitation.  Please see  individual treatment plan for details of progress and plan. 07/17/17 1100   Session   Session 90 Day Individualized Treatment Plan   Certified through this date 09/02/17   Type Reassessment   General Information   Treatment Diagnosis COPD   Classification of COPD Stage 3 (Severe)   Onset Date 06/14/14   Hospital Location [facility]   Outpatient Pulmonary Rehab Start Date 06/14/16   Primary Physician Jenny Parekh   Pulmonolgist Cole Carrasco MD   Medical History/Comorbidities   Medical History/Comorbidities Hypertension;Osteoarthritis;GERD   Medical History Comments Past Medical History:   Diagnosis Date     Anemia Dec 2011     Arthritis of hand      Asthma, persistent     f/U dr Brock at [facility]     Asthma, persistent      Chronic intermittent steroid use     for asthma     COPD exacerbation (H) 10/25/2013     Esophageal stricture 2007    s/p dilation     Foot deformity     Left     HTN, goal below 140/90      Hyperlipidemia      Hyperlipidemia LDL goal < 130      Hypothyroidism Dec 2011     Hypothyroidism      Left foot pain Nov 2011     Left shoulder pain Nov 2011     Medication side effects      Osteoporosis      Paroxysmal supraventricular tachycardia (H)      PVC (premature ventricular contraction) May 2012     Skin cyst Dec 2011    L thumb     SOB (shortness of breath) on exertion May 2012     SVT (supraventricular tachycardia) (H)       Untoward Events/Exacerbations/Hospitalizations   Untoward Events/Exacerbations/Hospitalizations None   Sputum   Sputum Production Amount none   Tobacco History   Tobacco Former smoker   Tobacco Habit Cigarettes   Years Smoked 7   Average Packs Per Day 1   Quit Date or Planned Quit Date 06/14/81   Interventions Planned None: Patient is in maintenance   Medications    Long-Acting Beta Agonist Prescribed, taking as prescribed  (Serevent Diskus)   Short-Acting Beta Agonist Prescribed, taking as prescribed  (Albuterol)   Long-Acting Anticholinergic Prescribed, taking as prescribed  (Spiriva)   Short-Acting Anticholinergic Not prescribed   Inhaled Corticosteroid Prescribed, taking as prescribed  (Pulmicort)   Oral Corticosteroid Prescribed, taking as prescribed  (Prednisone)   Medications Reconciled By Patient;Medical record   Preventative Vaccinations   Influenza Vaccination Yes   Pneumonia Vaccination Yes   Pain   Patient Currently in Pain Yes   Pain Location Shoulder, Ankle   Pain Rating 4/10  (on and off pain with walking  SHoulder increased with wts)   Pain Description Ache   Pain Comments osteoarthritis  6-29-17  NO change in pain noted.    Pain Treatment Recommendations (takes Advil as needed )   Falls Screen   Have you fallen two or more times in the past year? No   Have you fallen and had an injury in the past year? No   Comment using a 4 wheeled cart   Living and Work Status   Living Arrangements apartment   Support System Live alone, family in area   Environmental Factors No concerns   ADL Limitations Stair climbing   Initial Duke Activity Status Index (DASI) score. A measure of functional capacity. The goal is to have a pre-program raw score of 9.95 (~4 METs) or above 11.65   Initial DASI VO2 Peak (ml*kg-1*min-1) 14.61   Initial DASI MET Level 4.17   Return to Employment Retired   Physical Assessments   Incisions Not assessed   Edema None   Left Lung Sounds normal   Right Lung Sounds normal   Pulmonary Function Test (PFTs)   PFT Results Available   Date Completed 03/22/16   FVC Actual 1.44   % Predicted FVC 61   FEV1 Actual 0.85   % Predicted FEV1 49   FEV1/FEV Ratio 74   FRC Actual 60   Pre/Post Bronchodilator Post   Airway Obstruction Severe   Individualized Treatment Plan   Sessions Scheduled 18   Sessions Attended 8   Type Aerobic exercise;Resistance training  "  Oxygen Use   Supplemental Oxygen Needed No   Exercise Prescription   Mode Treadmill;Nustep;Weights   Frequency 2 days/week   Duration/Time 15-30 min   THR (85% of age predicted max heart rate)  121.55   Effort Rating (0-10) 4-6/10   Progression of Exercise PT exercising two bouts of 10-15 min.  WIll continue to increase MET level by 1/4 MET per tolerance. 7/17/17 Pt is now at 15' continuous and progress at 0.25 mets/1-2 weeks if OMNI/SOB 4-6/10.    Oxygen Titration with Exercise NA   Exercise Assessment   6 Minute Walk Predicted - Gender Selection Female   6 Minute Walk Predicted (Female) 420.68   6 Minute Walk Predicted (Male) 408.94   6 Minute Walk Distance (Initial) 170.73 Meters   Resting    Resting /76   SpO2 94   Exercise SpO2 94   Current MET level 2.0   Exercise Tolerance fair   Normal Limits Discussed Yes   Current Symptoms at Home Dyspnea;Fatigue   Limitations frozen shoulder , arthritis in feet.    Nutrition Management   Age 76   Height 1.6 m (5' 2.99\")   Weight 60.5 kg (133 lb 6.4 oz)   BMI (Calculated) 23.69   Assessment Normal   Goal weight (not to lose wt)   Interventions Planned Attend group nutrition class;Dietician Consult   Psychosocial   Initial Patient Health Questionnaire -9 (PHQ-9) for depression. To notify physician if pre-score >9. 8   [facility] score. A quality of life measure. To re evaluate if total score is > 25. Also consider PHQ-9 and DASI to determine if patient should be referred back to physician. 25   Initial Shortness of Breath Questionnaire (SOBQ) score. The goal is to reduce the score pre to post program. 66   Intervention Planned Patient to identify 2-3 coping mechanisms to decrease stress and anxiety;Patient to attend appropriate education class;Develop and implement coping techniques;Recognize signs and symptoms of depression   Interventions Completed Identified 2-3 coping mechanisms for stress;Recognizes Signs and Symptoms of " Depression;Introduced to Relaxation Techniques to assist with decreased anxiety   Psychosocial Comments 6/8/17 Pt getsdown about her lower level of activity.She sees people gardening and misses it. 7/17/17 Pt has good support. Her daughter lives in [place] but calls daily. She lives in [facility] and has friends there that get her to appointments. She feels well taken care of.   Stages of Change   Aerobic Exercise Action   Physical Activity Action   Recommended diet Maintenance   Stress Maintenance   Smoking Cessation Maintenance   Oxygen Usage NA   Current Home Exercise   Type of Exercise Walking   Frequency (days per week) daily    Duration (minutes per session) 5 min  (up and down hallway)   Recommended Home Exercise Prescription   Type of Exercise Walking;LE Strengthening Exercise   Frequency (Days per week) 2-3   Duration (minutes per session) 15-30 min  (Intermittent bouts of up to 10 min X 3. )   Effort Rating Recommended (0-10) Scale  4-6/10   Recommended Exercise Heart Rate Range 121   30 Day Exercise Plan Start by walking in hallway at apartment building /.  6-29-17  PT encoureaged to walk on her off days up to 3 times daily if able.  GOal 30 min a day. 7/17/17 pt to continue hallwalking plan.   Learning Assessment   Learner Patient   Primary Language English   Preferred Learning Style Listening;Reading;Demonstration;Pictures/Video   Barriers to Learning No barriers noted   Patient Education/Referrals   Education Recommended Activities of Daily Living;Breathing Techniques;Community Resources/Exacerbation Management;Energy Conservation;Exercise Principles   Follow-up/On-going Support   Provider follow-up needed on the following No follow-up needed   Pulmonary Rehab Goals   Pulmonary Rehab Goals 1;2   Goal 1   Goal Pt to learn relaxation techniques and pursed lip breathing to assist with anxietyu   Target Date 08/08/17   Progress Towards Goal 6-29-17  PT given relaxation handout and we practiced puresed lip  breathing today. 7/17/17 Continue to practice PLB during exercise and encorage it's use when relaxing.   Goal 2   Goal Pt to start an aerobic and muscle strengthing program through [facility] to carry home after the program has ended.   Target Date 08/08/17   Date Met 07/17/17   Progress Towards Goal 6/29/17Pt was given exercise handouts on stretchinga nd muscle conditioning to take home with her. She is doing these without weights a couple days a week and feels confident in the form and proper breathing technique. 7/17/17 Pt continues to do some exercises at home without wt due to shoulder pain. She has not been staying for conditioning exercises with the group. Reviewed exercise principles today and gave her a handout as well as ADL instruction and demo. Pt was able to repeat and demonstrate at 100% back. Goal met.   Assessment   Assessment Pt is a 78 yo with COPD III. She was in the [facility] program and discharged9/17/16. She is back to review education and to gain strength and endurance. She is limited by shoulder and other ostioarthitic pain but manages well with OTC tx. She now has a walker instead of a cane for walking.   7/17/17 Pt is consistantly here for her MO. Skilled care will continue to support  her goal of increasing her stamina and reviewing educational components. Nutrition education remains to be discussed. Today we covered stairclimbing, ADL's and Exercise principles. She went home with handouts and was able to comprehend and demonstrate back incorporating breathing with different ADL's and stairclimbing.

## 2017-07-20 ENCOUNTER — HOSPITAL ENCOUNTER (OUTPATIENT)
Dept: CARDIAC REHAB | Facility: CLINIC | Age: 77
End: 2017-07-20
Attending: INTERNAL MEDICINE
Payer: COMMERCIAL

## 2017-07-20 VITALS — BODY MASS INDEX: 23.99 KG/M2 | WEIGHT: 135.4 LBS

## 2017-07-20 PROCEDURE — 40000244 ZZH STATISTIC VISIT PULM REHAB

## 2017-07-20 PROCEDURE — G0424 PULMONARY REHAB W EXER: HCPCS

## 2017-07-24 ENCOUNTER — HOSPITAL ENCOUNTER (OUTPATIENT)
Dept: CARDIAC REHAB | Facility: CLINIC | Age: 77
End: 2017-07-24
Attending: INTERNAL MEDICINE
Payer: COMMERCIAL

## 2017-07-24 VITALS — BODY MASS INDEX: 24.03 KG/M2 | WEIGHT: 135.6 LBS

## 2017-07-24 PROCEDURE — 40000244 ZZH STATISTIC VISIT PULM REHAB

## 2017-07-24 PROCEDURE — G0424 PULMONARY REHAB W EXER: HCPCS

## 2017-07-27 ENCOUNTER — HOSPITAL ENCOUNTER (OUTPATIENT)
Dept: CARDIAC REHAB | Facility: CLINIC | Age: 77
End: 2017-07-27
Attending: INTERNAL MEDICINE
Payer: COMMERCIAL

## 2017-07-27 VITALS — WEIGHT: 136.4 LBS | BODY MASS INDEX: 24.17 KG/M2

## 2017-07-27 PROCEDURE — G0424 PULMONARY REHAB W EXER: HCPCS

## 2017-07-27 PROCEDURE — 40000244 ZZH STATISTIC VISIT PULM REHAB

## 2017-07-28 ENCOUNTER — CARE COORDINATION (OUTPATIENT)
Dept: GERIATRIC MEDICINE | Facility: CLINIC | Age: 77
End: 2017-07-28

## 2017-07-28 NOTE — PATIENT INSTRUCTIONS
Arranged transportation thru [employer] for the below appt:  Appt Date & Time: 8/3 @ 1:00pm  Clinic Name & Address:  [facility] - [address] [place] [address]   Transportation Provider: [employer]   time:  12:30 - 12:55pm (per pt request)    Notified member of  time.    Arranged transportation thru [employer] [facility] for the below appt:  Appt Date & Time: 8/7 @ 2:00pm  Clinic Name & Address:  [facility] - [address] [place] [address]   Transportation Provider: [employer]   time:  1:30 - 1:55pm (per pt request)    Notified member of  time.    Arranged transportation thru [employer] for the below appt:  Appt Date & Time: 8/10 @ 1:00pm  Clinic Name & Address:  [facility] [address]   Transportation Provider: [employer]   time:  12:30 - 12:55pm (per pt request)    Notified member of  time.    Arranged transportation thru [employer] for the below appt:  Appt Date & Time: 8/14 @ 2:00pm  Clinic Name & Address:  [facility] - [address] [place] [address]   Transportation Provider: [employer]   time:  1:30 - 1:55pm (per pt request)    Notified member of  time.    Arranged transportation thru [employer] for the below appt:  Appt Date & Time: 8/17 @ 1:00pm  Clinic Name & Address:  [facility] - [address] [place] [address]   Transportation Provider: [employer]   time:  12:30 - 12:55pm (per pt request)    Notified member of  time.

## 2017-07-30 DIAGNOSIS — E03.9 HYPOTHYROIDISM, UNSPECIFIED TYPE: Chronic | ICD-10-CM

## 2017-07-31 DIAGNOSIS — E03.9 HYPOTHYROIDISM: ICD-10-CM

## 2017-08-01 ENCOUNTER — OFFICE VISIT (OUTPATIENT)
Dept: INTERNAL MEDICINE | Facility: CLINIC | Age: 77
End: 2017-08-01
Payer: COMMERCIAL

## 2017-08-01 VITALS
SYSTOLIC BLOOD PRESSURE: 128 MMHG | TEMPERATURE: 98.3 F | OXYGEN SATURATION: 97 % | WEIGHT: 136 LBS | BODY MASS INDEX: 24.1 KG/M2 | HEART RATE: 100 BPM | DIASTOLIC BLOOD PRESSURE: 78 MMHG | HEIGHT: 63 IN

## 2017-08-01 DIAGNOSIS — H90.6 MIXED CONDUCTIVE AND SENSORINEURAL HEARING LOSS OF BOTH EARS: ICD-10-CM

## 2017-08-01 DIAGNOSIS — H61.23 BILATERAL IMPACTED CERUMEN: Primary | ICD-10-CM

## 2017-08-01 DIAGNOSIS — E03.4 HYPOTHYROIDISM DUE TO ACQUIRED ATROPHY OF THYROID: ICD-10-CM

## 2017-08-01 PROCEDURE — 99213 OFFICE O/P EST LOW 20 MIN: CPT | Mod: 25 | Performed by: INTERNAL MEDICINE

## 2017-08-01 PROCEDURE — 69210 REMOVE IMPACTED EAR WAX UNI: CPT | Mod: 50 | Performed by: INTERNAL MEDICINE

## 2017-08-01 RX ORDER — LEVOTHYROXINE SODIUM 25 UG/1
TABLET ORAL
Qty: 90 TABLET | Refills: 0 | Status: SHIPPED | OUTPATIENT
Start: 2017-08-01 | End: 2017-08-01

## 2017-08-01 RX ORDER — LEVOTHYROXINE SODIUM 25 UG/1
TABLET ORAL
Qty: 90 TABLET | Refills: 0 | OUTPATIENT
Start: 2017-08-01

## 2017-08-01 RX ORDER — LEVOTHYROXINE SODIUM 25 UG/1
25 TABLET ORAL DAILY
Qty: 90 TABLET | Refills: 0 | Status: SHIPPED | OUTPATIENT
Start: 2017-08-01 | End: 2018-01-29

## 2017-08-01 NOTE — NURSING NOTE
"Chief Complaint   Patient presents with     Ear Problem     Difficulty hearing, ears plugged       Initial /78  Pulse 100  Temp 98.3  F (36.8  C) (Oral)  Ht 5' 3\" (1.6 m)  Wt 136 lb (61.7 kg)  SpO2 97%  BMI 24.09 kg/m2 Estimated body mass index is 24.09 kg/(m^2) as calculated from the following:    Height as of this encounter: 5' 3\" (1.6 m).    Weight as of this encounter: 136 lb (61.7 kg).  Medication Reconciliation: complete   Rosa M Contreras MA      "

## 2017-08-01 NOTE — PROGRESS NOTES
SUBJECTIVE:                                                    Ana Luisa Lee is a 77 year old female who presents to clinic today for the following health issues:      Difficulty hearing, ears plugged:    Presents with difficulty hearing. Has hearing aids ,  But getting worse, ears feel plugged.   Worse after taking a shower.   No pain, no dizziness, no fever.       Problem list and histories reviewed & adjusted, as indicated.  Additional history: as documented    Patient Active Problem List   Diagnosis     Osteoporosis     Hyperlipidemia with target LDL less than 130     Asthma, persistent     Pes planus     Advance Care Planning     Skin cyst     Pain in joint, shoulder region     PVC (premature ventricular contraction)     Arthritis of hand     COPD, moderate (HCC)     Pulmonary nodule seen on imaging study     Health Care Home     Hypercalcemia     Other fatigue     Thrush of mouth and esophagus (H)     Steroid-dependent chronic obstructive pulmonary disease (H)     SVT (supraventricular tachycardia) (H)     Hypothyroidism, unspecified type     HTN, goal <  140/90     Pernicious anemia     COPD exacerbation (H)     COPD (chronic obstructive pulmonary disease) (H)     ALEKSANDRA (acute kidney injury) (H)     Uncontrolled hypertension     Paroxysmal supraventricular tachycardia (H)     PAC (premature atrial contraction)     Syncope and collapse     Malignant hypertension     Weakness     Nonrheumatic aortic valve stenosis     Past Surgical History:   Procedure Laterality Date     BUNIONECTOMY LAPIDUS WITH TARSAL METATARSAL (TMT) FUSION  1990's     EP STUDY, MODIFIED  7/2012    SVT not induced, PVC's     GYN SURGERY  1980     HYSTERECTOMY TOTAL ABDOMINAL       HYSTERECTOMY, PAP NO LONGER INDICATED       TONSILLECTOMY         Social History   Substance Use Topics     Smoking status: Former Smoker     Packs/day: 1.00     Years: 15.00     Quit date: 1/1/1980     Smokeless tobacco: Never Used     Alcohol use No       Comment: Occasional drink     Family History   Problem Relation Age of Onset     CEREBROVASCULAR DISEASE Mother      Hypertension Mother      Family History Negative Father      Unknown/Adopted Maternal Grandmother      Unknown/Adopted Maternal Grandfather      Unknown/Adopted Paternal Grandmother      Unknown/Adopted Paternal Grandfather      Family History Negative Brother      Family History Negative Sister      Family History Negative Son      Family History Negative Daughter      Asthma No family hx of      C.A.D. No family hx of      DIABETES No family hx of      CANCER No family hx of          Current Outpatient Prescriptions   Medication Sig Dispense Refill     [DISCONTINUED] levothyroxine (SYNTHROID/LEVOTHROID) 25 MCG tablet TAKE ONE TABLET BY MOUTH ONCE DAILY 90 tablet 0     omeprazole (PRILOSEC) 40 MG capsule Take 1 capsule (40 mg) by mouth daily Take 30-60 minutes before a meal. 90 capsule 3     diclofenac (VOLTAREN) 1 % GEL topical gel 2 grams to R shoulder four times daily using enclosed dosing card. 100 g 1     prochlorperazine (COMPAZINE) 5 MG tablet Take 1 tablet (5 mg) by mouth every 6 hours as needed for nausea or vomiting 120 tablet 0     predniSONE (DELTASONE) 10 MG tablet Take 3 tabs daily for 4 days Take 2 tabs daily for 4 days Then go back to your previous dose of 10 mg alternating with 15 mg (Patient taking differently: Take dose of 10 mg alternating with 15 mg) 20 tablet 0     gemfibrozil (LOPID) 600 MG tablet Take 1 tablet (600 mg) by mouth daily 90 tablet 0     roflumilast (DALIRESP) 500 MCG TABS tablet Take 500 mcg by mouth daily        polyethylene glycol (MIRALAX/GLYCOLAX) Packet Take 17 g by mouth daily as needed for constipation       albuterol (ALBUTEROL) 108 (90 BASE) MCG/ACT Inhaler Inhale 2 puffs into the lungs every 6 hours as needed       budesonide (PULMICORT FLEXHALER) 180 MCG/ACT inhaler Inhale 1 puff into the lungs 2 times daily       montelukast (SINGULAIR) 10 MG tablet  Take 1 tablet (10 mg) by mouth At Bedtime 30 tablet      salmeterol (SEREVENT) 50 MCG/DOSE diskus inhaler Inhale 1 puff into the lungs 2 times daily       tiotropium (SPIRIVA) 18 MCG capsule Inhale 1 capsule (18 mcg) into the lungs daily 30 capsule      Cyanocobalamin (B-12) 1000 MCG TBCR Take 1,000 mcg by mouth daily 100 tablet 1     ferrous sulfate (IRON) 325 (65 FE) MG tablet Take 1 tablet (325 mg) by mouth daily (with breakfast) 100 tablet      calcium 600 MG tablet Take 1 tablet (600 mg) by mouth daily 60 tablet      LANsoprazole (PREVACID) 30 MG CR capsule Take 1 capsule (30 mg) by mouth daily 90 capsule 3     cholecalciferol (VITAMIN D) 1000 UNIT tablet Take 1 tablet (1,000 Units) by mouth daily 30 tablet      [DISCONTINUED] levothyroxine (SYNTHROID/LEVOTHROID) 25 MCG tablet Take 1 tablet (25 mcg) by mouth daily 90 tablet 1     Multiple Vitamins-Minerals (MULTIVITAMIN OR) Take 1 tablet by mouth daily       levothyroxine (SYNTHROID/LEVOTHROID) 25 MCG tablet Take 1 tablet (25 mcg) by mouth daily 90 tablet 0     levalbuterol (XOPENEX) 1.25 MG/3ML neb solution Take 3 mLs (1.25 mg) by nebulization every 4 hours as needed for shortness of breath / dyspnea or wheezing 1584 mL 1     order for DME Equipment being ordered: Nebulizer machine, cup, and tubing.  [facility]ax to [facility] per pt request 1 each 0     order for DME Equipment being ordered: Walker Wheels (), Walker () and Other: Four Wheeled Walker  Treatment Diagnosis: Impaired gait stability and activity tolerance 1 each 0     order for DME Equipment being ordered: blood pressure machine 1 Device 0     order for DME Overnight pulse oximetry 1 Device 0     ASPIRIN NOT PRESCRIBED, INTENTIONAL, 1 each continuous prn. Antiplatelet medication not prescribed intentionally due to Use of NSAIDS 0 each 0         Reviewed and updated as needed this visit by clinical staff       Reviewed and updated as needed this visit by Provider      "    ROS:  Constitutional, HEENT, cardiovascular, pulmonary, gi and gu systems are negative, except as otherwise noted.      OBJECTIVE:   /78  Pulse 100  Temp 98.3  F (36.8  C) (Oral)  Ht 5' 3\" (1.6 m)  Wt 136 lb (61.7 kg)  SpO2 97%  BMI 24.09 kg/m2  Body mass index is 24.09 kg/(m^2).   GENERAL: healthy, alert and no distress  HENT: ear canals - obstructed with light yellow, soft cerumen, and after removal of cerumen TM's normal, nose and mouth without ulcers or lesions  MS: no gross musculoskeletal defects noted, no edema    Diagnostic Test Results:  none     ASSESSMENT/PLAN:     Problem List Items Addressed This Visit     None      Visit Diagnoses     Bilateral impacted cerumen    -  Primary    Relevant Orders    REMOVE IMPACTED CERUMEN (Completed)    Mixed conductive and sensorineural hearing loss of both ears               Bilateral ear canals cerumen removed with curette and flushed  Good results and able to use her hearing aids.     Follow-Up:as needed     Marin Mar MD  [facility]  "

## 2017-08-01 NOTE — TELEPHONE ENCOUNTER
Prescription approved per [facility] Refill Protocol. CAROL Flores R.N.

## 2017-08-01 NOTE — MR AVS SNAPSHOT
After Visit Summary   8/1/2017    Ana Luisa Lee    MRN: [id number]           Patient Information     Date Of Birth          1940        Visit Information        Provider Department      8/1/2017 3:00 PM Marin Mar MD [facility]        Today's Diagnoses     Bilateral impacted cerumen    -  1    Mixed conductive and sensorineural hearing loss of both ears           Follow-ups after your visit        Your next 10 appointments already scheduled     Aug 03, 2017  1:00 PM CDT   Pulmonary Treatment with Rh Pulmonary Rehab 1   [facility] ([facility])    [id number] [place] [address], [address]  [facility] [address]   [phone]            Aug 07, 2017  2:00 PM CDT   Pulmonary Treatment with Rh Pulmonary Rehab 1   [facility] ([facility])    [id number] [place] [address], [address]  [facility] [address]   [phone]            Aug 10, 2017  1:00 PM CDT   Pulmonary Treatment with Rh Pulmonary Rehab 1   [facility] ([facility])    [id number] [place] [address], [address]  [facility] [address]   [phone]            Aug 14, 2017  2:00 PM CDT   Pulmonary Treatment with Rh Pulmonary Rehab 1   [facility] ([facility])    [id number] [place] [address], [address]  [facility] [address]   [phone]            Aug 17, 2017  1:00 PM CDT   Pulmonary Treatment with Rh Pulmonary Rehab 1   [facility] ([facility])    [address] [place] [address], [address]  [facility] [address]   [phone]            Aug 21, 2017  2:00 PM CDT   Pulmonary Treatment with Rh Pulmonary Rehab 1   [facility] ([facility])    [address] [place] [address], [address]  [facility] [address]   [phone]              Who to contact     If you have questions or need follow up information about today's clinic visit or your schedule please contact  "[facility] directly at [phone].  Normal or non-critical lab and imaging results will be communicated to you by MyChart, letter or phone within 4 business days after the clinic has received the results. If you do not hear from us within 7 days, please contact the clinic through [facility] or phone. If you have a critical or abnormal lab result, we will notify you by phone as soon as possible.  Submit refill requests through [employer] or call your pharmacy and they will forward the refill request to us. Please allow 3 business days for your refill to be completed.          Additional Information About Your Visit        [facility] Information     [employer] gives you secure access to your electronic health record. If you see a primary care provider, you can also send messages to your care team and make appointments. If you have questions, please call your primary care clinic.  If you do not have a primary care provider, please call [phone] and they will assist you.        Care EveryWhere ID     This is your Care EveryWhere ID. This could be used by other organizations to access your [facility] medical records  [id number]        Your Vitals Were     Pulse Temperature Height Pulse Oximetry BMI (Body Mass Index)       100 98.3  F (36.8  C) (Oral) 5' 3\" (1.6 m) 97% 24.09 kg/m2        Blood Pressure from Last 3 Encounters:   08/01/17 128/78   06/27/17 122/65   06/07/17 120/64    Weight from Last 3 Encounters:   08/01/17 136 lb (61.7 kg)   07/27/17 136 lb 6.4 oz (61.9 kg)   07/24/17 135 lb 9.6 oz (61.5 kg)              We Performed the Following     REMOVE IMPACTED CERUMEN          Today's Medication Changes          These changes are accurate as of: 8/1/17  3:51 PM.  If you have any questions, ask your nurse or doctor.               These medicines have changed or have updated prescriptions.        Dose/Directions    predniSONE 10 MG tablet   Commonly known as:  DELTASONE   This may have changed:  additional " instructions   Used for:  COPD, moderate (H), Acute bronchospasm, Acute bronchitis with symptoms > 10 days        Take 3 tabs daily for 4 days Take 2 tabs daily for 4 days Then go back to your previous dose of 10 mg alternating with 15 mg   Quantity:  20 tablet   Refills:  0            Where to get your medicines      These medications were sent to [facility] - [place], [place] - [address] AT [facility] [address] [facility]  [address], [facility] [address]     Phone:  [phone]     levothyroxine 25 MCG tablet                Primary Care Provider Office Phone # Fax #    Jenny Padmini Hamilton [phone]       [facility] [address] [facility] [address]        Equal Access to Services     SOBEIDA GIFFORD : Hadii ankit alegriao Somerry, waaxda luqadaha, qaybta kaalmada adeegyada, holly garcia . So [facility] [place] [facility] [phone].    ATENCIÓN: Si habla español, tiene a martinez disposición servicios gratuitos de asistencia lingüística. Robert al [phone].    We comply with applicable federal civil rights laws and [place] laws. We do not discriminate on the basis of race, color, national origin, age, disability sex, sexual orientation or gender identity.            Thank you!     Thank you for choosing [facility]  for your care. Our goal is always to provide you with excellent care. Hearing back from our patients is one way we can continue to improve our services. Please take a few minutes to complete the written survey that you may receive in the mail after your visit with us. Thank you!             Your Updated Medication List - Protect others around you: Learn how to safely use, store and throw away your medicines at www.disposemymeds.org.          This list is accurate as of: 8/1/17  3:51 PM.  Always use your most recent med list.                   Brand Name Dispense Instructions for use Diagnosis     albuterol 108 (90 BASE) MCG/ACT Inhaler    albuterol     Inhale 2 puffs into the lungs every 6 hours as needed    Asthma, persistent       ASPIRIN NOT PRESCRIBED    INTENTIONAL    0 each    1 each continuous prn. Antiplatelet medication not prescribed intentionally due to Use of NSAIDS        B-12 1000 MCG Tbcr     100 tablet    Take 1,000 mcg by mouth daily    Pernicious anemia       budesonide 180 MCG/ACT inhaler    PULMICORT FLEXHALER     Inhale 1 puff into the lungs 2 times daily    Pulmonary emphysema, unspecified emphysema type (H)       calcium 600 MG tablet     60 tablet    Take 1 tablet (600 mg) by mouth daily    Pulmonary emphysema, unspecified emphysema type (H)       cholecalciferol 1000 UNIT tablet    vitamin D    30 tablet    Take 1 tablet (1,000 Units) by mouth daily    COPD, moderate (H)       diclofenac 1 % Gel topical gel    VOLTAREN    100 g    2 grams to R shoulder four times daily using enclosed dosing card.    Acute pain of right shoulder       ferrous sulfate 325 (65 FE) MG tablet    IRON    100 tablet    Take 1 tablet (325 mg) by mouth daily (with breakfast)    Pulmonary emphysema, unspecified emphysema type (H)       gemfibrozil 600 MG tablet    LOPID    90 tablet    Take 1 tablet (600 mg) by mouth daily    Hyperlipidemia with target LDL less than 130       LANsoprazole 30 MG CR capsule    PREVACID    90 capsule    Take 1 capsule (30 mg) by mouth daily    Gastroesophageal reflux disease without esophagitis       levalbuterol 1.25 MG/3ML neb solution    XOPENEX    1584 mL    Take 3 mLs (1.25 mg) by nebulization every 4 hours as needed for shortness of breath / dyspnea or wheezing    COPD, moderate (H), COPD exacerbation (H)       levothyroxine 25 MCG tablet    SYNTHROID/LEVOTHROID    90 tablet    Take 1 tablet (25 mcg) by mouth daily    Hypothyroidism due to acquired atrophy of thyroid       montelukast 10 MG tablet    SINGULAIR    30 tablet    Take 1 tablet (10 mg) by mouth At Bedtime     Pulmonary emphysema, unspecified emphysema type (H)       MULTIVITAMIN PO      Take 1 tablet by mouth daily        omeprazole 40 MG capsule    priLOSEC    90 capsule    Take 1 capsule (40 mg) by mouth daily Take 30-60 minutes before a meal.    Gastroesophageal reflux disease, esophagitis presence not specified       * order for DME     1 Device    Overnight pulse oximetry    COPD, moderate (H), Other fatigue       * order for DME     1 Device    Equipment being ordered: blood pressure machine    HTN, goal below 140/90, Labile blood pressure       * order for DME     1 each    Equipment being ordered: Walker Wheels (), Walker () and Other: Four Wheeled Walker Treatment Diagnosis: Impaired gait stability and activity tolerance    Pulmonary emphysema, unspecified emphysema type (H)       order for DME     1 each    Equipment being ordered: Nebulizer machine, cup, and tubing.  Fax to [facility] per pt request    COPD, moderate (H)       polyethylene glycol Packet    MIRALAX/GLYCOLAX     Take 17 g by mouth daily as needed for constipation        predniSONE 10 MG tablet    DELTASONE    20 tablet    Take 3 tabs daily for 4 days Take 2 tabs daily for 4 days Then go back to your previous dose of 10 mg alternating with 15 mg    COPD, moderate (H), Acute bronchospasm, Acute bronchitis with symptoms > 10 days       prochlorperazine 5 MG tablet    COMPAZINE    120 tablet    Take 1 tablet (5 mg) by mouth every 6 hours as needed for nausea or vomiting    Nausea       roflumilast 500 MCG Tabs tablet    DALIRESP     Take 500 mcg by mouth daily        salmeterol 50 MCG/DOSE diskus inhaler    SEREVENT     Inhale 1 puff into the lungs 2 times daily    Pulmonary emphysema, unspecified emphysema type (H)       tiotropium 18 MCG capsule    SPIRIVA    30 capsule    Inhale 1 capsule (18 mcg) into the lungs daily    Pulmonary emphysema, unspecified emphysema type (H)       * Notice:  This list has 3  medication(s) that are the same as other medications prescribed for you. Read the directions carefully, and ask your doctor or other care provider to review them with you.

## 2017-08-02 NOTE — NURSING NOTE
Right ear irrigation with good results. Patient tolerated procedure well.    Arlin Parks, [facility]

## 2017-08-03 ENCOUNTER — HOSPITAL ENCOUNTER (OUTPATIENT)
Dept: CARDIAC REHAB | Facility: CLINIC | Age: 77
End: 2017-08-03
Attending: INTERNAL MEDICINE
Payer: COMMERCIAL

## 2017-08-03 VITALS — BODY MASS INDEX: 23.67 KG/M2 | WEIGHT: 133.6 LBS

## 2017-08-03 PROCEDURE — G0424 PULMONARY REHAB W EXER: HCPCS

## 2017-08-03 PROCEDURE — 40000244 ZZH STATISTIC VISIT PULM REHAB

## 2017-08-07 ENCOUNTER — TELEPHONE (OUTPATIENT)
Dept: INTERNAL MEDICINE | Facility: CLINIC | Age: 77
End: 2017-08-07

## 2017-08-07 ENCOUNTER — HOSPITAL ENCOUNTER (OUTPATIENT)
Dept: CARDIAC REHAB | Facility: CLINIC | Age: 77
End: 2017-08-07
Attending: INTERNAL MEDICINE
Payer: COMMERCIAL

## 2017-08-07 VITALS — WEIGHT: 134 LBS | BODY MASS INDEX: 23.74 KG/M2 | HEIGHT: 63 IN

## 2017-08-07 DIAGNOSIS — R11.0 NAUSEA: ICD-10-CM

## 2017-08-07 PROCEDURE — 40000244 ZZH STATISTIC VISIT PULM REHAB

## 2017-08-07 PROCEDURE — G0424 PULMONARY REHAB W EXER: HCPCS

## 2017-08-07 RX ORDER — PROCHLORPERAZINE MALEATE 5 MG
5 TABLET ORAL EVERY 6 HOURS PRN
Qty: 120 TABLET | Refills: 0 | Status: SHIPPED | OUTPATIENT
Start: 2017-08-07 | End: 2018-06-25

## 2017-08-07 ASSESSMENT — PULMONARY FUNCTION TESTS
FVC_PERCENT_PREDICTED: 61
FEV1/FVC: 74
FEV1 (%PREDICTED): 49
FEV1: .85
FVC: 1.44

## 2017-08-07 ASSESSMENT — 6 MINUTE WALK TEST (6MWT)
FEMALE CALC: 420.06
MALE CALC: 408.46
GENDER SELECTION: FEMALE
TOTAL DISTANCE WALKED: 170.73

## 2017-08-07 ASSESSMENT — DUKE ACTIVITY SCORE INDEX (DASI)
VO2_PEAK: 14.61
DASI METS SCORE: 4.17

## 2017-08-07 ASSESSMENT — ACTIVITIES OF DAILY LIVING (ADL): ADL_LIMITATIONS: STAIR CLIMBING

## 2017-08-07 NOTE — TELEPHONE ENCOUNTER
compazine      Last Written Prescription Date: 6/21/17  Last Fill Quantity: 120,  # refills: 0   Last Office Visit with G, [facility] or [facility] prescribing provider: 8/1/17

## 2017-08-07 NOTE — TELEPHONE ENCOUNTER
"Ama, home care nurse calling to report tachycardia trend and patient had reported felt like \"heart is racing\" during visit today.     7/03/17   7/10/17   7/16/17 HR 94  7/23/17   7/31/17 HR 96  8/07/17 , /60, R: 28 , denies SOB. Denies chest pain. CMS  bilateral LE non-pitting edema noted.    Provider please review and advise. Thank you.    "

## 2017-08-07 NOTE — PROGRESS NOTES
Ana Luisa Ivy Lee  77 year old  1940  I have reviewed and agree with this patient s individual treatment plan and exercise prescription for pulmonary rehabilitation.  Please see  individual treatment plan for details of progress and plan. 08/07/17 1500   Session   Session 120 Day Individualized Treatment Plan   Certified through this date 09/23/17   Type Reassessment   General Information   Treatment Diagnosis COPD   Classification of COPD Stage 3 (Severe)   Onset Date 06/14/14   Hospital Location [facility]   Current Signs and Symptoms SOB;Fatigue   Outpatient Pulmonary Rehab Start Date 06/14/16   Primary Physician Jenny Tierney-Vero   Pulmonolgist Cole Carrasco MD   Medical History/Comorbidities   Medical History/Comorbidities Hypertension;Osteoarthritis;GERD   Medical History Comments Past Medical History:   Diagnosis Date     Anemia Dec 2011     Arthritis of hand      Asthma, persistent     f/U dr [facility]     Asthma, persistent      Chronic intermittent steroid use     for asthma     COPD exacerbation (H) 10/25/2013     Esophageal stricture 2007    s/p dilation     Foot deformity     Left     HTN, goal below 140/90      Hyperlipidemia      Hyperlipidemia LDL goal < 130      Hypothyroidism Dec 2011     Hypothyroidism      Left foot pain Nov 2011     Left shoulder pain Nov 2011     Medication side effects      Osteoporosis      Paroxysmal supraventricular tachycardia (H)      PVC (premature ventricular contraction) May 2012     Skin cyst Dec 2011    L thumb     SOB (shortness of breath) on exertion May 2012     SVT (supraventricular tachycardia) (H)       Untoward Events/Exacerbations/Hospitalizations   Untoward Events/Exacerbations/Hospitalizations None   Sputum   Sputum Production Amount none   Tobacco History   Tobacco Former smoker   Tobacco Habit Cigarettes   Years Smoked 7   Average Packs Per Day 1   Quit Date or Planned Quit Date 06/14/81   Interventions Planned None:  Patient is in maintenance   Medications   Long-Acting Beta Agonist Prescribed, taking as prescribed  (Serevent Diskus)   Short-Acting Beta Agonist Prescribed, taking as prescribed  (Albuterol)   Long-Acting Anticholinergic Prescribed, taking as prescribed  (Spiriva)   Short-Acting Anticholinergic Not prescribed   Inhaled Corticosteroid Prescribed, taking as prescribed  (Pulmicort)   Oral Corticosteroid Prescribed, taking as prescribed  (Prednisone)   Medications Reconciled By Patient;Medical record   Preventative Vaccinations   Influenza Vaccination Yes   Pneumonia Vaccination Yes   Pain   Patient Currently in Pain Yes   Pain Location Shoulder, Ankle   Pain Rating 4/10  (on and off pain with walking  SHoulder increased with wts)   Pain Description Ache   Pain Comments osteoarthritis  6-29-17  NO change in pain noted.    Pain Treatment Recommendations (takes Advil as needed )   Falls Screen   Have you fallen two or more times in the past year? No   Have you fallen and had an injury in the past year? No   Comment using a 4 wheeled cart   Living and Work Status   Living Arrangements apartment   Support System Live alone, family in area   Environmental Factors No concerns   ADL Limitations Stair climbing   Initial Duke Activity Status Index (DASI) score. A measure of functional capacity. The goal is to have a pre-program raw score of 9.95 (~4 METs) or above 11.65   Initial DASI VO2 Peak (ml*kg-1*min-1) 14.61   Initial DASI MET Level 4.17   Return to Employment Retired   Physical Assessments   Incisions Not assessed   Edema None   Left Lung Sounds normal   Right Lung Sounds normal   Pulmonary Function Test (PFTs)   PFT Results Available   Date Completed 03/22/16   FVC Actual 1.44   % Predicted FVC 61   FEV1 Actual 0.85   % Predicted FEV1 49   FEV1/FEV Ratio 74   FRC Actual 60   Pre/Post Bronchodilator Post   Airway Obstruction Severe   Individualized Treatment Plan   Sessions Scheduled 18   Sessions Attended 13   Type  "Aerobic exercise;Resistance training   Oxygen Use   Supplemental Oxygen Needed No   Exercise Prescription   Mode Treadmill;Nustep;Weights   Frequency 2 days/week   Duration/Time 15-30 min   THR (85% of age predicted max heart rate)  121.55   Effort Rating (0-10) 4-6/10   Progression of Exercise PT exercising two bouts of 10-15 min.  WIll continue to increase MET level by 1/4 MET per tolerance. 7/17/17 Pt is now at 15' continuous and progress at 0.25 mets/1-2 weeks if OMNI/SOB 4-6/10. 8/7/17 Continue with 20\" nustep and 10\" TM for a total of 30\" of aerobic exercise/session and taking rest breaks as needed. No conditioning 8/7/17    Oxygen Titration with Exercise NA   Exercise Assessment   6 Minute Walk Predicted - Gender Selection Female   6 Minute Walk Predicted (Female) 420.06   6 Minute Walk Predicted (Male) 408.46   6 Minute Walk Distance (Initial) 170.73 Meters   Resting    Resting /76   SpO2 94   Exercise SpO2 94   Current MET level 2.0   Exercise Tolerance fair   Normal Limits Discussed Yes   Current Symptoms at Home Dyspnea;Fatigue   Limitations frozen shoulder , arthritis in feet.    Nutrition Management   Age 76   Height 1.6 m (5' 2.99\")   Weight 60.8 kg (134 lb)   BMI (Calculated) 23.79   Assessment Normal   Goal weight (not to lose wt)   Interventions Planned Attend group nutrition class;Dietician Consult   Psychosocial   Initial Patient Health Questionnaire -9 (PHQ-9) for depression. To notify physician if pre-score >9. 8   Initial [facility] [place] Survey score. A quality of life measure. To re evaluate if total score is > 25. Also consider PHQ-9 and DASI to determine if patient should be referred back to physician. 25   Initial Shortness of Breath Questionnaire (SOBQ) score. The goal is to reduce the score pre to post program. 66   Intervention Planned Patient to identify 2-3 coping mechanisms to decrease stress and anxiety;Patient to attend appropriate education class;Develop and implement " "coping techniques;Recognize signs and symptoms of depression   Interventions Completed Identified 2-3 coping mechanisms for stress;Recognizes Signs and Symptoms of Depression;Introduced to Relaxation Techniques to assist with decreased anxiety   Psychosocial Comments 6/8/17 Pt getsdown about her lower level of activity.She sees people gardening and misses it. 7/17/17 Pt has good support. Her daughter lives in [place] but calls daily. She lives in [facility] and has friends there that get her to appointments. She feels well taken care of. 8/7/17 Pt is active in [employer] and cards at her building. She does not need intervention for depression or anxiety.    Stages of Change   Aerobic Exercise Action   Physical Activity Action   Recommended diet Maintenance   Stress Maintenance   Smoking Cessation Maintenance   Oxygen Usage NA   Current Home Exercise   Type of Exercise Walking   Frequency (days per week) daily    Duration (minutes per session) 5 min  (up and down hallway)   Recommended Home Exercise Prescription   Type of Exercise Walking;LE Strengthening Exercise   Frequency (Days per week) 2-3   Duration (minutes per session) 15-30 min  (Intermittent bouts of up to 10 min X 3. )   Effort Rating Recommended (0-10) Scale  4-6/10   Recommended Exercise Heart Rate Range 121   30 Day Exercise Plan Start by walking in hallway at apartment building /.  6-29-17  PT encoureaged to walk on her off days up to 3 times daily if able.  GOal 30 min a day. 7/17/17 pt to continue hallwalking plan. 8/7/17  Pt agrees that she needs to increase her ocasio walking to 20-30\" two times per week.   Learning Assessment   Learner Patient   Primary Language English   Preferred Learning Style Listening;Reading;Demonstration;Pictures/Video   Barriers to Learning No barriers noted   Patient Education/Referrals   Education Recommended Activities of Daily Living;Breathing Techniques;Community Resources/Exacerbation Management;Energy Conservation;Exercise " Principles   Follow-up/On-going Support   Provider follow-up needed on the following No follow-up needed   Pulmonary Rehab Goals   Pulmonary Rehab Goals 1;2   Goal 1   Goal Pt to learn relaxation techniques and pursed lip breathing to assist with anxietyu   Target Date 08/08/17   Progress Towards Goal 6-29-17  PT given relaxation handout and we practiced puresed lip breathing today. 7/17/17 Continue to practice PLB during exercise and encorage it's use when relaxing.   Goal 2   Goal Pt to start an aerobic and muscle strengthing program through [facility] to carry home after the program has ended.   Target Date 08/08/17   Date Met 07/17/17   Progress Towards Goal 6/29/17Pt was given exercise handouts on stretchinga nd muscle conditioning to take home with her. She is doing these without weights a couple days a week and feels confident in the form and proper breathing technique. 7/17/17 Pt continues to do some exercises at home without wt due to shoulder pain. She has not been staying for conditioning exercises with the group. Reviewed exercise principles today and gave her a handout as well as ADL instruction and demo. Pt was able to repeat and demonstrate at 100% back. Goal met.   Assessment   Assessment Pt is a 78 yo with COPD III. She was in the [facility] program and discharged9/17/16. She is back to review education and to gain strength and endurance. She is limited by shoulder and other ostioarthitic pain but manages well with OTC tx. She now has a walker instead of a cane for walking.   7/17/17 Pt is consistantly here for her MO. Skilled care will continue to support  her goal of increasing her stamina and reviewing educational components. Nutrition education remains to be discussed. Today we covered stairclimbing, ADL's and Exercise principles. She went home with handouts and was able to comprehend and demonstrate back incorporating breathing with different ADL's and stairclimbing. 8/7/17 Updated ITP today. She is making  slow but steady progress due to shoulder pain and weakness and congestion today. Pt will continue skilled care to get dietary info  and progression of exercise when aches and pains allow her to advance in mets. She has agreed to increase the walking at home twice/week to add to stamina.. 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 She continues to have a home care nurse check in on her for any medical needs. Presents with crackele in the right base today. not sure if this is her baseline.and will be listening once more next session.

## 2017-08-08 NOTE — TELEPHONE ENCOUNTER
Tachycardia is not new.  She doesn't tolerate medications that would slow  down the heart rate: Calcium channel blockers and beta blockers  She has been evaluated by cardiologist in the past for the same reasons    Last appointment with me was June 27 and we agreed for two month follow-up.  She can come sooner if needed

## 2017-08-08 NOTE — TELEPHONE ENCOUNTER
Attempted to contact [facility], no answer, left detailed voice message as instructed by Ama with provider message from below and informed patient to call back with questions.

## 2017-08-10 ENCOUNTER — TELEPHONE (OUTPATIENT)
Dept: INTERNAL MEDICINE | Facility: CLINIC | Age: 77
End: 2017-08-10

## 2017-08-10 ENCOUNTER — CARE COORDINATION (OUTPATIENT)
Dept: GERIATRIC MEDICINE | Facility: CLINIC | Age: 77
End: 2017-08-10

## 2017-08-10 NOTE — TELEPHONE ENCOUNTER
Pt unable to come in today and tomorrow cannot come in until after 11:30 a.m.  Scheduled with PCP at noon Fri. 8/11/17.  Unable to reach Windy to let her know about appt.  CAROL Flores R.N.

## 2017-08-10 NOTE — PROGRESS NOTES
Arranged transportation thru [employer] for the below appt:  Appt Date & Time: 8/11 @ 12:00pm  Clinic Name & Address:  [facility]  Transportation Provider: [employer]    time:  11:15am - 11:45am.    Notified member of  time.    Carmenza Griffin  Case Management Specialist  [facility]  [phone]

## 2017-08-10 NOTE — TELEPHONE ENCOUNTER
"PUMA Temple with [facility] ([phone]) calling.  She made a supervisory visit to pt today and pt told her that she feels \"lousy\".  Patient feels warm to touch but afebrile, has a dry hacky cough, diarrhea yesterday so she canceled today's therapy, lung sounds are course in R lower lobe, still on prednisone 15 mg alternating with 10 mg.  BP is good, heart rate 96, O2 sat on RA 93-94%.  Pt is \"high risk\" for bronchitis/upper respiratory problems.  CAROL Flores R.N.    "

## 2017-08-11 ENCOUNTER — OFFICE VISIT (OUTPATIENT)
Dept: INTERNAL MEDICINE | Facility: CLINIC | Age: 77
End: 2017-08-11
Payer: COMMERCIAL

## 2017-08-11 VITALS
OXYGEN SATURATION: 95 % | HEART RATE: 80 BPM | TEMPERATURE: 98.4 F | HEIGHT: 63 IN | WEIGHT: 131.4 LBS | DIASTOLIC BLOOD PRESSURE: 70 MMHG | BODY MASS INDEX: 23.28 KG/M2 | SYSTOLIC BLOOD PRESSURE: 110 MMHG

## 2017-08-11 DIAGNOSIS — J44.9 COPD, MODERATE (H): Primary | Chronic | ICD-10-CM

## 2017-08-11 PROCEDURE — 99214 OFFICE O/P EST MOD 30 MIN: CPT | Performed by: INTERNAL MEDICINE

## 2017-08-11 RX ORDER — PREDNISONE 5 MG/1
TABLET ORAL
Qty: 300 TABLET | Refills: 1 | Status: ON HOLD | OUTPATIENT
Start: 2017-08-11 | End: 2017-11-20

## 2017-08-11 NOTE — PROGRESS NOTES
"Dr Tierney's note      Patient's instructions / PLAN:                                                        Plan:  1. If the breathing becomes heavy this weekend, take Prednisone 15 mg daily for a week and then we will try to decrease to 15 alternating with 10  2. Continue nebulizer and the rest of the meds same doses   3. Follow up as needed  4. Continue pulmonary rehab      ASSESSMENT & PLAN:                                                      (J44.9) COPD, moderate (HCC)  (primary encounter diagnosis)  Comment:   Plan: predniSONE (DELTASONE) 5 MG tablet        as above        Chief complaint:                                                      SOB    SUBJECTIVE:                                                    Ana Luisa Lee is a 77 year old female who presents to clinic today for the following health issues:    Severe COPD   -- somedays better, some day worse   -- 3 days ago the breathing was worse but she didn't feel ill enough to come to the doctor but the  RN called here to be seen This am feels little better, but some lightheadeness   -- she doesn't look ill.   -- takes prednisone 10 altern with 15  -- No fever    Tachycardia  -- She has known tachycardia.  She doesn't tolerate calcium channel blockers.  No beta blockers because of severe asthma/COPD  -- Some times the HR goes high and it increases her anxiety but she doesn't think she needs anything for anxiety          Review of Systems:                                                      ROS: negative for fever, chills,  wheezes, chest pain,  vomiting, abdominal pain, leg swelling positive for chronic cough and shortness of breath    A 10-point review of systems was obtained.  Those pertinent are above and in the in the Subjective section.  The rest of the systems are negative.           OBJECTIVE:             Physical exam:  Blood pressure 110/70, pulse 80, temperature 98.4  F (36.9  C), temperature source Oral, height 5' 3\" (1.6 m), " weight 131 lb 6.4 oz (59.6 kg), SpO2 95 %, not currently breastfeeding.   NAD, appears comfortable  Skin: no rashes   Neck: supple, no JVD, . No thyroidmegaly. Lymph nodes nonpalpable cervical and supraclavicular.  Chest:-- few rales in the L base, but otherwise clear, no wheezes, good respiratory effort  Heart: S1 S2, RRR, no mgr appreciated  Abdomen: soft, not tender,   Extremities: no edema,   Neurologic: A, Ox3, no focal signs appreciated    PMHx: reviewed  Past Medical History:   Diagnosis Date     Anemia Dec 2011     Arthritis of hand      Asthma, persistent     f/U dr Brock at [facility]     Asthma, persistent      Chronic intermittent steroid use     for asthma     COPD exacerbation (H) 10/25/2013     Esophageal stricture 2007    s/p dilation     Foot deformity     Left     HTN, goal below 140/90      Hyperlipidemia      Hyperlipidemia LDL goal < 130      Hypothyroidism Dec 2011     Hypothyroidism      Left foot pain Nov 2011     Left shoulder pain Nov 2011     Medication side effects      Osteoporosis      Paroxysmal supraventricular tachycardia (H)      PVC (premature ventricular contraction) May 2012     Skin cyst Dec 2011    L thumb     SOB (shortness of breath) on exertion May 2012     SVT (supraventricular tachycardia) (H)       PSHx: reviewed  Past Surgical History:   Procedure Laterality Date     BUNIONECTOMY LAPIDUS WITH TARSAL METATARSAL (TMT) FUSION  1990's     EP STUDY, MODIFIED  7/2012    SVT not induced, PVC's     GYN SURGERY  1980     HYSTERECTOMY TOTAL ABDOMINAL       HYSTERECTOMY, PAP NO LONGER INDICATED       TONSILLECTOMY          Meds: reviewed  Current Outpatient Prescriptions   Medication Sig Dispense Refill     prochlorperazine (COMPAZINE) 5 MG tablet Take 1 tablet (5 mg) by mouth every 6 hours as needed for nausea or vomiting 120 tablet 0     levothyroxine (SYNTHROID/LEVOTHROID) 25 MCG tablet Take 1 tablet (25 mcg) by mouth daily 90 tablet 0     levalbuterol (XOPENEX) 1.25  MG/3ML neb solution Take 3 mLs (1.25 mg) by nebulization every 4 hours as needed for shortness of breath / dyspnea or wheezing 1584 mL 1     omeprazole (PRILOSEC) 40 MG capsule Take 1 capsule (40 mg) by mouth daily Take 30-60 minutes before a meal. 90 capsule 3     predniSONE (DELTASONE) 10 MG tablet Take 3 tabs daily for 4 days Take 2 tabs daily for 4 days Then go back to your previous dose of 10 mg alternating with 15 mg (Patient taking differently: Take dose of 10 mg alternating with 15 mg) 20 tablet 0     gemfibrozil (LOPID) 600 MG tablet Take 1 tablet (600 mg) by mouth daily 90 tablet 0     roflumilast (DALIRESP) 500 MCG TABS tablet Take 500 mcg by mouth daily        budesonide (PULMICORT FLEXHALER) 180 MCG/ACT inhaler Inhale 1 puff into the lungs 2 times daily       montelukast (SINGULAIR) 10 MG tablet Take 1 tablet (10 mg) by mouth At Bedtime 30 tablet      salmeterol (SEREVENT) 50 MCG/DOSE diskus inhaler Inhale 1 puff into the lungs 2 times daily       tiotropium (SPIRIVA) 18 MCG capsule Inhale 1 capsule (18 mcg) into the lungs daily 30 capsule      Cyanocobalamin (B-12) 1000 MCG TBCR Take 1,000 mcg by mouth daily 100 tablet 1     ferrous sulfate (IRON) 325 (65 FE) MG tablet Take 1 tablet (325 mg) by mouth daily (with breakfast) 100 tablet      calcium 600 MG tablet Take 1 tablet (600 mg) by mouth daily 60 tablet      LANsoprazole (PREVACID) 30 MG CR capsule Take 1 capsule (30 mg) by mouth daily 90 capsule 3     cholecalciferol (VITAMIN D) 1000 UNIT tablet Take 1 tablet (1,000 Units) by mouth daily 30 tablet      Multiple Vitamins-Minerals (MULTIVITAMIN OR) Take 1 tablet by mouth daily       diclofenac (VOLTAREN) 1 % GEL topical gel 2 grams to R shoulder four times daily using enclosed dosing card. 100 g 1     order for DME Equipment being ordered: Nebulizer machine, cup, and tubing.  Fax to [facility] per pt request 1 each 0     polyethylene glycol (MIRALAX/GLYCOLAX) Packet Take 17 g  by mouth daily as needed for constipation       albuterol (ALBUTEROL) 108 (90 BASE) MCG/ACT Inhaler Inhale 2 puffs into the lungs every 6 hours as needed       order for DME Equipment being ordered: Walker Wheels (), Walker () and Other: Four Wheeled Walker  Treatment Diagnosis: Impaired gait stability and activity tolerance 1 each 0     order for DME Equipment being ordered: blood pressure machine 1 Device 0     order for DME Overnight pulse oximetry 1 Device 0     ASPIRIN NOT PRESCRIBED, INTENTIONAL, 1 each continuous prn. Antiplatelet medication not prescribed intentionally due to Use of NSAIDS 0 each 0       Soc Hx: reviewed  Fam Hx: reviewed          Jenny Tierney MD  Internal Medicine

## 2017-08-11 NOTE — NURSING NOTE
"Chief Complaint   Patient presents with     Cough     Diarrhea       Initial /70  Pulse 80  Temp 98.4  F (36.9  C) (Oral)  Ht 5' 3\" (1.6 m)  Wt 131 lb 6.4 oz (59.6 kg)  SpO2 95%  BMI 23.28 kg/m2 Estimated body mass index is 23.28 kg/(m^2) as calculated from the following:    Height as of this encounter: 5' 3\" (1.6 m).    Weight as of this encounter: 131 lb 6.4 oz (59.6 kg).  Medication Reconciliation: complete    "

## 2017-08-11 NOTE — MR AVS SNAPSHOT
After Visit Summary   8/11/2017    Ana Luisa Lee    MRN: [id number]           Patient Information     Date Of Birth          1940        Visit Information        Provider Department      8/11/2017 12:00 PM Jenny Hamilton MD [facility]        Care Instructions    Plan:  1. If the breathing becomes heavy this weekend, take Prednisone 15 mg daily for a week and then we will try to decrease to 15 alternating with 10  2. Continue nebulizer and the rest of the meds same doses   3. Follow up as needed  4. Continue pulmonary rehab          Follow-ups after your visit        Your next 10 appointments already scheduled     Aug 14, 2017  2:00 PM CDT   Pulmonary Treatment with [facility] Pulmonary Rehab 1   [facility] ([facility])    [id number] [place] [address], [facility] [address]  [facility] [id number] [phone]            Aug 17, 2017  1:00 PM CDT   Pulmonary Treatment with [facility] Pulmonary Rehab 1   [facility] ([facility])    [id number] [place] [address], [facility] [address]  [facility] [id number] [phone]            Aug 21, 2017  2:00 PM CDT   Pulmonary Treatment with [facility] Pulmonary Rehab 1   [facility] ([facility])    [address] [place] [address], [address]  [facility] [id number] [phone]              Who to contact     If you have questions or need follow up information about today's clinic visit or your schedule please contact [facility] directly at [phone].  Normal or non-critical lab and imaging results will be communicated to you by MyChart, letter or phone within 4 business days after the clinic has received the results. If you do not hear from us within 7 days, please contact the clinic through [employer] or phone. If you have a critical or abnormal lab result, we will notify you by phone as soon as possible.  Submit refill requests through [employer] or call your pharmacy  "and they will forward the refill request to us. Please allow 3 business days for your refill to be completed.          Additional Information About Your Visit        [employer] Information     [employer] gives you secure access to your electronic health record. If you see a primary care provider, you can also send messages to your care team and make appointments. If you have questions, please call your primary care clinic.  If you do not have a primary care provider, please call [phone] and they will assist you.        Care EveryWhere ID     This is your Care EveryWhere ID. This could be used by other organizations to access your [place] medical records  [id number]        Your Vitals Were     Pulse Temperature Height Pulse Oximetry BMI (Body Mass Index)       80 98.4  F (36.9  C) (Oral) 5' 3\" (1.6 m) 95% 23.28 kg/m2        Blood Pressure from Last 3 Encounters:   08/11/17 110/70   08/01/17 128/78   06/27/17 122/65    Weight from Last 3 Encounters:   08/11/17 131 lb 6.4 oz (59.6 kg)   08/07/17 134 lb (60.8 kg)   08/03/17 133 lb 9.6 oz (60.6 kg)              Today, you had the following     No orders found for display         Today's Medication Changes          These changes are accurate as of: 8/11/17 12:34 PM.  If you have any questions, ask your nurse or doctor.               These medicines have changed or have updated prescriptions.        Dose/Directions    predniSONE 10 MG tablet   Commonly known as:  DELTASONE   This may have changed:  additional instructions   Used for:  COPD, moderate (H), Acute bronchospasm, Acute bronchitis with symptoms > 10 days        Take 3 tabs daily for 4 days Take 2 tabs daily for 4 days Then go back to your previous dose of 10 mg alternating with 15 mg   Quantity:  20 tablet   Refills:  0                Primary Care Provider Office Phone # Fax #    Jenny Hamilton [phone]       [address]  [facility] [address]        Equal Access to " Services     [facility]: Hadii aad ku hadalanissilvestre Lissetmerry, waaxda luqadaha, qaybta kaalmada germain, holly garcia . So [facility] [phone].    ATENCIÓN: Si habla analilia, tiene a martinez disposición servicios gratuitos de asistencia lingüística. Llame al [phone].    We comply with applicable federal civil rights laws and [place] laws. We do not discriminate on the basis of race, color, national origin, age, disability sex, sexual orientation or gender identity.            Thank you!     Thank you for choosing [facility]  for your care. Our goal is always to provide you with excellent care. Hearing back from our patients is one way we can continue to improve our services. Please take a few minutes to complete the written survey that you may receive in the mail after your visit with us. Thank you!             Your Updated Medication List - Protect others around you: Learn how to safely use, store and throw away your medicines at www.disposemymeds.org.          This list is accurate as of: 8/11/17 12:34 PM.  Always use your most recent med list.                   Brand Name Dispense Instructions for use Diagnosis    albuterol 108 (90 BASE) MCG/ACT Inhaler    albuterol     Inhale 2 puffs into the lungs every 6 hours as needed    Asthma, persistent       ASPIRIN NOT PRESCRIBED    INTENTIONAL    0 each    1 each continuous prn. Antiplatelet medication not prescribed intentionally due to Use of NSAIDS        B-12 1000 MCG Tbcr     100 tablet    Take 1,000 mcg by mouth daily    Pernicious anemia       budesonide 180 MCG/ACT inhaler    PULMICORT FLEXHALER     Inhale 1 puff into the lungs 2 times daily    Pulmonary emphysema, unspecified emphysema type (H)       calcium 600 MG tablet     60 tablet    Take 1 tablet (600 mg) by mouth daily    Pulmonary emphysema, unspecified emphysema type (H)       cholecalciferol 1000 UNIT tablet    vitamin D    30 tablet    Take 1 tablet (1,000  Units) by mouth daily    COPD, moderate (H)       diclofenac 1 % Gel topical gel    VOLTAREN    100 g    2 grams to R shoulder four times daily using enclosed dosing card.    Acute pain of right shoulder       ferrous sulfate 325 (65 FE) MG tablet    IRON    100 tablet    Take 1 tablet (325 mg) by mouth daily (with breakfast)    Pulmonary emphysema, unspecified emphysema type (H)       gemfibrozil 600 MG tablet    LOPID    90 tablet    Take 1 tablet (600 mg) by mouth daily    Hyperlipidemia with target LDL less than 130       LANsoprazole 30 MG CR capsule    PREVACID    90 capsule    Take 1 capsule (30 mg) by mouth daily    Gastroesophageal reflux disease without esophagitis       levalbuterol 1.25 MG/3ML neb solution    XOPENEX    1584 mL    Take 3 mLs (1.25 mg) by nebulization every 4 hours as needed for shortness of breath / dyspnea or wheezing    COPD, moderate (H), COPD exacerbation (H)       levothyroxine 25 MCG tablet    SYNTHROID/LEVOTHROID    90 tablet    Take 1 tablet (25 mcg) by mouth daily    Hypothyroidism due to acquired atrophy of thyroid       montelukast 10 MG tablet    SINGULAIR    30 tablet    Take 1 tablet (10 mg) by mouth At Bedtime    Pulmonary emphysema, unspecified emphysema type (H)       MULTIVITAMIN PO      Take 1 tablet by mouth daily        omeprazole 40 MG capsule    priLOSEC    90 capsule    Take 1 capsule (40 mg) by mouth daily Take 30-60 minutes before a meal.    Gastroesophageal reflux disease, esophagitis presence not specified       * order for DME     1 Device    Overnight pulse oximetry    COPD, moderate (H), Other fatigue       * order for DME     1 Device    Equipment being ordered: blood pressure machine    HTN, goal below 140/90, Labile blood pressure       * order for DME     1 each    Equipment being ordered: Walker Wheels (), Walker () and Other: Four Wheeled Walker Treatment Diagnosis: Impaired gait stability and activity tolerance    Pulmonary emphysema,  unspecified emphysema type (H)       order for DME     1 each    Equipment being ordered: Nebulizer machine, cup, and tubing.  Fax to [facility] per pt request    COPD, moderate (H)       polyethylene glycol Packet    MIRALAX/GLYCOLAX     Take 17 g by mouth daily as needed for constipation        predniSONE 10 MG tablet    DELTASONE    20 tablet    Take 3 tabs daily for 4 days Take 2 tabs daily for 4 days Then go back to your previous dose of 10 mg alternating with 15 mg    COPD, moderate (H), Acute bronchospasm, Acute bronchitis with symptoms > 10 days       prochlorperazine 5 MG tablet    COMPAZINE    120 tablet    Take 1 tablet (5 mg) by mouth every 6 hours as needed for nausea or vomiting    Nausea       roflumilast 500 MCG Tabs tablet    DALIRESP     Take 500 mcg by mouth daily        salmeterol 50 MCG/DOSE diskus inhaler    SEREVENT     Inhale 1 puff into the lungs 2 times daily    Pulmonary emphysema, unspecified emphysema type (H)       tiotropium 18 MCG capsule    SPIRIVA    30 capsule    Inhale 1 capsule (18 mcg) into the lungs daily    Pulmonary emphysema, unspecified emphysema type (H)       * Notice:  This list has 3 medication(s) that are the same as other medications prescribed for you. Read the directions carefully, and ask your doctor or other care provider to review them with you.

## 2017-08-11 NOTE — PATIENT INSTRUCTIONS
Plan:  1. If the breathing becomes heavy this weekend, take Prednisone 15 mg daily for a week and then we will try to decrease to 15 alternating with 10  2. Continue nebulizer and the rest of the meds same doses   3. Follow up as needed  4. Continue pulmonary rehab

## 2017-08-12 ASSESSMENT — ASTHMA QUESTIONNAIRES: ACT_TOTALSCORE: 18

## 2017-08-14 ENCOUNTER — HOSPITAL ENCOUNTER (OUTPATIENT)
Dept: CARDIAC REHAB | Facility: CLINIC | Age: 77
End: 2017-08-14
Attending: INTERNAL MEDICINE
Payer: COMMERCIAL

## 2017-08-14 VITALS — BODY MASS INDEX: 23.38 KG/M2 | WEIGHT: 132 LBS

## 2017-08-14 DIAGNOSIS — D51.0 PERNICIOUS ANEMIA: Chronic | ICD-10-CM

## 2017-08-14 PROCEDURE — G0424 PULMONARY REHAB W EXER: HCPCS

## 2017-08-14 PROCEDURE — 40000244 ZZH STATISTIC VISIT PULM REHAB

## 2017-08-14 RX ORDER — UBIDECARENONE 75 MG
CAPSULE ORAL
Qty: 100 TABLET | Refills: 0 | Status: SHIPPED | OUTPATIENT
Start: 2017-08-14 | End: 2017-12-06

## 2017-08-14 NOTE — TELEPHONE ENCOUNTER
Vit B12        Last Written Prescription Date: 1/6/17  Last Fill Quantity: 100,    # refills: 1  Last Office Visit with [facility], [facility] or [facility] prescribing provider:  8/11/17      Lab Results   Component Value Date    WBC 6.4 05/27/2017     Lab Results   Component Value Date    RBC 4.28 05/27/2017     Lab Results   Component Value Date    HGB 12.5 05/27/2017     Lab Results   Component Value Date    HCT 37.9 05/27/2017     No components found for: MCT  Lab Results   Component Value Date    MCV 89 05/27/2017     Lab Results   Component Value Date    MCH 29.2 05/27/2017     Lab Results   Component Value Date    MCHC 33.0 05/27/2017     Lab Results   Component Value Date    RDW 14.0 05/27/2017     Lab Results   Component Value Date     05/27/2017     Lab Results   Component Value Date    AST 14 12/22/2016     Lab Results   Component Value Date    ALT 17 12/22/2016     Creatinine   Date Value Ref Range Status   05/28/2017 0.92 0.52 - 1.04 mg/dL Final       Labs showing if normal/abnormal  Lab Results   Component Value Date    WBC 6.4 05/27/2017    RBC 4.28 05/27/2017    HGB 12.5 05/27/2017    HCT 37.9 05/27/2017    MCV 89 05/27/2017    MCH 29.2 05/27/2017    MCHC 33.0 05/27/2017    RDW 14.0 05/27/2017     05/27/2017    DTYP Automated Method 05/27/2017    NEUTROPHIL 89.5 05/27/2017    LYMPH 6.7 05/27/2017    MONOCYTE 3.0 05/27/2017    EOSINOPHIL 0.0 05/27/2017    BASOPHIL 0.0 05/27/2017    IG 0.8 05/27/2017    ANEU 5.8 05/27/2017    ALYM 0.4 (L) 05/27/2017    KEVIN 0.2 05/27/2017    AEOS 0.0 05/27/2017    ABAS 0.0 05/27/2017    AIG 0.1 05/27/2017      Lab Results   Component Value Date    AST 14 12/22/2016    ALT 17 12/22/2016

## 2017-08-17 ENCOUNTER — HOSPITAL ENCOUNTER (OUTPATIENT)
Dept: CARDIAC REHAB | Facility: CLINIC | Age: 77
End: 2017-08-17
Attending: INTERNAL MEDICINE
Payer: COMMERCIAL

## 2017-08-17 VITALS — BODY MASS INDEX: 23.74 KG/M2 | WEIGHT: 134 LBS

## 2017-08-17 PROCEDURE — 40000244 ZZH STATISTIC VISIT PULM REHAB: Performed by: REHABILITATION PRACTITIONER

## 2017-08-17 PROCEDURE — G0239 OTH RESP PROC, GROUP: HCPCS | Performed by: REHABILITATION PRACTITIONER

## 2017-08-17 PROCEDURE — G0424 PULMONARY REHAB W EXER: HCPCS | Performed by: REHABILITATION PRACTITIONER

## 2017-08-21 ENCOUNTER — CARE COORDINATION (OUTPATIENT)
Dept: GERIATRIC MEDICINE | Facility: CLINIC | Age: 77
End: 2017-08-21

## 2017-08-21 NOTE — PROGRESS NOTES
8/20/17 Rec'd vm from client that she rec'd a letter from [facility] stating that her MA will close because they did not receive the paperwork. Client states that she did complete all the paperwork and mailed back.   8/21/17 Left vm with Sofia CHAN @ [place] re above info, request a call back if CM can be of any assistance with MA renewal.  Spoke with client to inform that CM left message with Sofia, client stated that she also called.  CM to follow.  Yeni Savage RN, BC  Supervisor [facility]   [phone] (Fax)

## 2017-08-22 ENCOUNTER — CARE COORDINATION (OUTPATIENT)
Dept: GERIATRIC MEDICINE | Facility: CLINIC | Age: 77
End: 2017-08-22

## 2017-08-22 NOTE — PROGRESS NOTES
Rec'd vm from Sofia at [facility] stating that client is due for MA renewal, application received but incomplete. Sofia states that verification of [employer]mer[employer] pension is required. Sofia reports the following info: tele # [phone], client's ID # [id number]  Call placed to client who states that she did contact her pension in [place] 3 weeks ago for the information. Client states that she will contact them again today. Client states that she will fax the verification form to Sofia once received.   Yeni Savage RN, BC  Supervisor [facility]   [phone] (Fax)

## 2017-08-22 NOTE — PROGRESS NOTES
Arranged transportation thru [employer] for the below appt:  Appt Date & Time: 8/17 @ 1:00pm  Clinic Name & Address:  Pulmonary Rehab - [address] [place] [address]   Transportation Provider: [employer]   time:  12:30 - 12:55pm (per pt request)     Notified member of  time.    Carmenza Griffin  Case Management Specialist  [facility]  [phone]

## 2017-08-24 ENCOUNTER — TELEPHONE (OUTPATIENT)
Dept: INTERNAL MEDICINE | Facility: CLINIC | Age: 77
End: 2017-08-24

## 2017-08-24 ENCOUNTER — HOSPITAL ENCOUNTER (OUTPATIENT)
Dept: CARDIAC REHAB | Facility: CLINIC | Age: 77
End: 2017-08-24
Attending: INTERNAL MEDICINE
Payer: COMMERCIAL

## 2017-08-24 VITALS — HEIGHT: 63 IN | WEIGHT: 134 LBS | BODY MASS INDEX: 23.74 KG/M2

## 2017-08-24 PROCEDURE — G0424 PULMONARY REHAB W EXER: HCPCS

## 2017-08-24 PROCEDURE — 40000244 ZZH STATISTIC VISIT PULM REHAB

## 2017-08-24 ASSESSMENT — PULMONARY FUNCTION TESTS
FEV1: .85
FEV1 (%PREDICTED): 49
FEV1/FVC: 74
FVC_PERCENT_PREDICTED: 61
FVC: 1.44

## 2017-08-24 ASSESSMENT — 6 MINUTE WALK TEST (6MWT)
TOTAL DISTANCE WALKED: 170.73
MALE CALC: 408.46
GENDER SELECTION: FEMALE
FEMALE CALC: 420.06
TOTAL DISTANCE WALKED: 208

## 2017-08-24 ASSESSMENT — DUKE ACTIVITY SCORE INDEX (DASI)
DASI METS SCORE: 4.17
VO2_PEAK: 14.61

## 2017-08-24 ASSESSMENT — ACTIVITIES OF DAILY LIVING (ADL): ADL_LIMITATIONS: STAIR CLIMBING

## 2017-08-24 NOTE — TELEPHONE ENCOUNTER
Windy RN with [facility] ([phone]) calling to request the following orders:  1.  Continued SN visits once a week x2 months and 3 as needed visits  2.  HHA twice a week.     Orders approved per RN protocol.  CAROL Flores R.N.

## 2017-08-24 NOTE — PROGRESS NOTES
Ana Luisa Lee  1940  77 year old  COPDIII 08/24/17 1400   Physician cosignature/electronic signature indicates agreements with the ITP document and approval of discharge.    Session   Session Discharge Note   Certified through this date 09/23/17   Type Reassessment   General Information   Treatment Diagnosis COPD   Classification of COPD Stage 3 (Severe)   Onset Date 06/14/14   Hospital Location [facility]   Outpatient Pulmonary Rehab Start Date 06/14/16   Primary Physician Jenny Parekh   Pulmonolgist Cole Carrasco MD   Medical History/Comorbidities   Medical History/Comorbidities Hypertension;Osteoarthritis;GERD   Medical History Comments .m   Untoward Events/Exacerbations/Hospitalizations   Untoward Events/Exacerbations/Hospitalizations None   Sputum   Sputum Production Amount none   Tobacco History   Tobacco Former smoker   Tobacco Habit Cigarettes   Years Smoked 7   Average Packs Per Day 1   Quit Date or Planned Quit Date 06/14/81   Interventions Planned None: Patient is in maintenance   Medications   Long-Acting Beta Agonist Prescribed, taking as prescribed  (Serevent Diskus)   Short-Acting Beta Agonist Prescribed, taking as prescribed  (Albuterol)   Long-Acting Anticholinergic Prescribed, taking as prescribed  (Spiriva)   Short-Acting Anticholinergic Not prescribed   Inhaled Corticosteroid Prescribed, taking as prescribed  (Pulmicort)   Oral Corticosteroid Prescribed, taking as prescribed  (Prednisone)   Medications Reconciled By Patient;Medical record   Preventative Vaccinations   Influenza Vaccination Yes   Pneumonia Vaccination Yes   Pain   Patient Currently in Pain Yes   Pain Location Shoulder, Ankle   Pain Rating 4/10  (on and off pain with walking  SHoulder increased with wts)   Pain Description Ache   Pain Comments osteoarthritis  6-29-17  NO change in pain noted. 8/24 No change in pain - pain has been chronic. She will be following up with MD regarding better pain management  "regimen.   Pain Treatment Recommendations (takes Advil as needed )   Falls Screen   Have you fallen two or more times in the past year? No   Have you fallen and had an injury in the past year? No   Referral initiated to physical therapy No   Comment using a 4 wheeled cart   Living and Work Status   Living Arrangements apartment   Support System Live alone, family in area   Environmental Factors No concerns   ADL Limitations Stair climbing   Initial Duke Activity Status Index (DASI) score. A measure of functional capacity. The goal is to have a pre-program raw score of 9.95 (~4 METs) or above 11.65   Initial DASI VO2 Peak (ml*kg-1*min-1) 14.61   Initial DASI MET Level 4.17   Return to Employment Retired   Physical Assessments   Incisions Not assessed   Edema None   Left Lung Sounds normal   Right Lung Sounds normal   Pulmonary Function Test (PFTs)   PFT Results Available   Date Completed 03/22/16   FVC Actual 1.44   % Predicted FVC 61   FEV1 Actual 0.85   % Predicted FEV1 49   FEV1/FEV Ratio 74   FRC Actual 60   Pre/Post Bronchodilator Post   Airway Obstruction Severe   Individualized Treatment Plan   Sessions Scheduled 18   Sessions Attended 15   Type Aerobic exercise;Flexibility training;Resistance training   Oxygen Use   Supplemental Oxygen Needed No   Interventions Recommended NA   Interventions Completed NA   Exercise Prescription   Mode Treadmill;Nustep;Weights   Frequency 2 days/week   Duration/Time 15-30 min   THR (85% of age predicted max heart rate)  121.55   Effort Rating (0-10) 4-6/10   Progression of Exercise PT exercising two bouts of 10-15 min.  WIll continue to increase MET level by 1/4 MET per tolerance. 7/17/17 Pt is now at 15' continuous and progress at 0.25 mets/1-2 weeks if OMNI/SOB 4-6/10. 8/7/17 Continue with 20\" nustep and 10\" TM for a total of 30\" of aerobic exercise/session and taking rest breaks as needed. No conditioning 8/7/17    Oxygen Titration with Exercise NA   Exercise Assessment   6 " "Minute Walk Predicted - Gender Selection Female   6 Minute Walk Predicted (Female) 420.06   6 Minute Walk Predicted (Male) 408.46   6 Minute Walk Distance (Initial) 170.73 Meters   6-min Walk Distance (Discharge) 208 Meters   Resting HR 97   Exercise    Resting /60   SpO2 96   Exercise SpO2 96   Current MET level 2.4   Exercise Tolerance fair   Normal Limits Discussed Yes   Current Symptoms at Home Dyspnea;Fatigue;Joint pain   Current Symptoms in Rehab Joint pain   Limitations frozen shoulder , arthritis in feet.    Nutrition Management   Age 76   Height 1.6 m (5' 2.99\")   Weight 60.8 kg (134 lb)   BMI (Calculated) 23.79   Assessment Normal   Goal weight (not to lose wt)   Interventions Planned Attend group nutrition class;Dietician Consult   Interventions Completed Instructed on label reading   Psychosocial   Initial Patient Health Questionnaire -9 (PHQ-9) for depression. To notify physician if pre-score >9. 8   Initial Dartmouth COOP Survey score. A quality of life measure. To re evaluate if total score is > 25. Also consider PHQ-9 and DASI to determine if patient should be referred back to physician. 25   Initial Shortness of Breath Questionnaire (SOBQ) score. The goal is to reduce the score pre to post program. 66   Discharge PHQ-9 for depression 2   Discharge [place]mouth COOP Survey Score 28   Discharge SOBQ Score 60   Intervention Planned Patient to identify 2-3 coping mechanisms to decrease stress and anxiety;Patient to attend appropriate education class;Develop and implement coping techniques;Recognize signs and symptoms of depression   Interventions Completed Identified 2-3 coping mechanisms for stress;Recognizes Signs and Symptoms of Depression;Introduced to Relaxation Techniques to assist with decreased anxiety;Verbalized understanding of negative impact of stress to personal health   Psychosocial Comments 6/8/17 Pt getsdown about her lower level of activity.She sees people gardening and [employer] " "it. 7/17/17 Pt has good support. Her daughter lives in [place] but calls daily. She lives [place] and has friends there that get her to appointments. She feels well taken care of. 8/7/17 Pt is active in [employer] and cards at her building. She does not need intervention for depression or anxiety.  8/23 Reviewed survey results with Pt today and she verbalized understanding. Noted improvement in PHQ9 score and dartmouth score adversely affected by pain rating.   Stages of Change   Aerobic Exercise Action   Physical Activity Action   Recommended diet Maintenance   Stress Maintenance   Smoking Cessation Maintenance   Oxygen Usage NA   Current Home Exercise   Type of Exercise Walking   Frequency (days per week) daily    Duration (minutes per session) 5 min  (up and down hallway)   Recommended Home Exercise Prescription   Type of Exercise Walking;LE Strengthening Exercise   Frequency (Days per week) 2-3   Duration (minutes per session) 15-30 min  (Intermittent bouts of up to 10 min X 3. )   Effort Rating Recommended (0-10) Scale  4-6/10   30 Day Exercise Plan Start by walking in hallway at apartment building /.  6-29-17  PT encoureaged to walk on her off days up to 3 times daily if able.  GOal 30 min a day. 7/17/17 pt to continue hallwalking plan. 8/7/17  Pt agrees that she needs to increase her ocasio walking to 20-30\" two times per week. 8/23 Pt to transition to at home walking program upon discharge from [facility] 3-5 times a week completing 20 minutes per day in 2x10 min bouts.    Learning Assessment   Learner Patient   Primary Language English   Preferred Learning Style Listening;Reading;Demonstration;Pictures/Video   Barriers to Learning No barriers noted   Patient Education/Referrals   Education Recommended Activities of Daily Living;Breathing Techniques;Community Resources/Exacerbation Management;Energy Conservation;Exercise Principles   Education Attended Exercise Principles   Follow-up/On-going Support   Provider follow-up " needed on the following No follow-up needed   Pulmonary Rehab Goals   Pulmonary Rehab Goals 1;2   Goal 1   Goal Pt to learn relaxation techniques and pursed lip breathing to assist with anxietyu   Target Date 08/08/17   Date Met 08/24/17   Progress Towards Goal 6-29-17  PT given relaxation handout and we practiced puresed lip breathing today. 7/17/17 Continue to practice PLB during exercise and encorage it's use when relaxing. 8/24 Pt continues to practice techniques that she has learned. Goal has been met   Goal 2   Goal Pt to start an aerobic and muscle strengthing program through DC to carry home after the program has ended.   Target Date 08/08/17   Date Met 07/17/17   Progress Towards Goal 6/29/17Pt was given exercise handouts on stretchinga nd muscle conditioning to take home with her. She is doing these without weights a couple days a week and feels confident in the form and proper breathing technique. 7/17/17 Pt continues to do some exercises at home without wt due to shoulder pain. She has not been staying for conditioning exercises with the group. Reviewed exercise principles today and gave her a handout as well as ADL instruction and demo. Pt was able to repeat and demonstrate at 100% back. Goal met.   Assessment   Assessment Pt is a 78 yo with COPD III. She was in the DC program and discharged9/17/16. She is back to review education and to gain strength and endurance. She is limited by shoulder and other ostioarthitic pain but manages well with OTC tx. She now has a walker instead of a cane for walking.   7/17/17 Pt is consistantly here for her DC. Skilled care will continue to support  her goal of increasing her stamina and reviewing educational components. Nutrition education remains to be discussed. Today we covered stairclimbing, ADL's and Exercise principles. She went home with handouts and was able to comprehend and demonstrate back incorporating breathing with different ADL's and stairclimbing.  8/7/17 Updated ITP today. She is making slow but steady progress due to shoulder pain and weakness and congestion today. Pt will continue skilled care to get dietary info  and progression of exercise when aches and pains allow her to advance in mets. She has agreed to increase the walking at home twice/week to add to stamina.. 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 She continues to have a home care nurse check in on her for any medical needs. Presents with crackele in the right base today. not sure if this is her baseline.and will be listening once more next session. 8/23 Discharge session completed today. Pt improved six minute walk test distance by 22% from initial session on 6/8/17. She has increaed her MET level from 1.8 METs to 2.4 METs with her exercise program that was completed during rehab. Her scores also improved in her SOBQ and PHQ9, with noted decrease in score for Dartmouth due to pain levels with joints. She will continue to exercise at home in her building using walking as her primary mode of exercise. She understands guidelines and was happy with the way rehab affected her ADL's at home.

## 2017-08-29 ENCOUNTER — TELEPHONE (OUTPATIENT)
Dept: INTERNAL MEDICINE | Facility: CLINIC | Age: 77
End: 2017-08-29

## 2017-08-30 ENCOUNTER — TELEPHONE (OUTPATIENT)
Dept: INTERNAL MEDICINE | Facility: CLINIC | Age: 77
End: 2017-08-30

## 2017-08-30 DIAGNOSIS — Z53.9 DIAGNOSIS NOT YET DEFINED: Primary | ICD-10-CM

## 2017-08-30 PROCEDURE — G0179 MD RECERTIFICATION HHA PT: HCPCS | Performed by: INTERNAL MEDICINE

## 2017-09-06 ENCOUNTER — CARE COORDINATION (OUTPATIENT)
Dept: GERIATRIC MEDICINE | Facility: CLINIC | Age: 77
End: 2017-09-06

## 2017-09-11 NOTE — PROGRESS NOTES
9/8/17 Rec'd vm from client stating that she did receive all the items listed on the bill. Client reports that she rec'd a magnifying glass, silverware, stating that she did not order and unsure how to proceed. Explained that [facility] OT recommended because client was having difficulty using regular utensils (client was eating the food with her hands). Explained that CM will f/u with [facility], possible f/u OT visit and if equipment not needed, will have sent back.  E-mail sent to Windy BARTHOLOMEW [facility].  Rec'd vm from Windy,stating that she will f/u with client and update CM.  Yeni Savage RN, BC  Supervisor [facility]   [phone] (Fax)

## 2017-09-18 DIAGNOSIS — E78.5 HYPERLIPIDEMIA WITH TARGET LDL LESS THAN 130: ICD-10-CM

## 2017-09-18 NOTE — TELEPHONE ENCOUNTER
gemfibrozil       Last Written Prescription Date: 5/30/17  Last Fill Quantity: 90, # refills: 0    [facility] Office [facility] prescribing provider:  8/11/17   Future Office Visit:      Cholesterol   Date Value Ref Range Status   06/01/2015 154 <200 mg/dL Final     Comment:     LDL Cholesterol is the primary guide to therapy.   The NCEP recommends further evaluation of: patients with cholesterol greater   than 200 mg/dL if additional risk factors are present, cholesterol greater   than   240 mg/dL, triglycerides greater than 150 mg/dL, or HDL less than 40 mg/dL.       HDL Cholesterol   Date Value Ref Range Status   06/01/2015 48 (L) >50 mg/dL Final     LDL Cholesterol Calculated   Date Value Ref Range Status   06/01/2015 77 0 - 129 mg/dL Final     Comment:     LDL Cholesterol is the primary guide to therapy: LDL-cholesterol goal in high   risk patients is <100 mg/dL and in very high risk patients is <70 mg/dL.       Triglycerides   Date Value Ref Range Status   06/01/2015 144 0 - 150 mg/dL Final     Cholesterol/HDL Ratio   Date Value Ref Range Status   06/01/2015 3.2 0.0 - 5.0 Final     ALT   Date Value Ref Range Status   12/22/2016 17 0 - 50 U/L Final            Labs showing if normal/abnormal  Lab Results   Component Value Date    CHOL 154 06/01/2015    TRIG 144 06/01/2015    HDL 48 (L) 06/01/2015    LDL 77 06/01/2015    VLDL 29 06/01/2015    CHOLHDLRATIO 3.2 06/01/2015     Lab Results   Component Value Date    ALT 17 12/22/2016    GLC 92 05/28/2017     Lab Results   Component Value Date    WBC 6.4 05/27/2017    RBC 4.28 05/27/2017    HGB 12.5 05/27/2017    HCT 37.9 05/27/2017    MCV 89 05/27/2017    MCH 29.2 05/27/2017    MCHC 33.0 05/27/2017    RDW 14.0 05/27/2017     05/27/2017    DTYP Automated Method 05/27/2017    NEUTROPHIL 89.5 05/27/2017    LYMPH 6.7 05/27/2017    MONOCYTE 3.0 05/27/2017    EOSINOPHIL 0.0 05/27/2017    BASOPHIL 0.0 05/27/2017    IG 0.8 05/27/2017    ANEU 5.8  05/27/2017    ALYM 0.4 (L) 05/27/2017    KEVIN 0.2 05/27/2017    AEOS 0.0 05/27/2017    ABAS 0.0 05/27/2017    AIG 0.1 05/27/2017

## 2017-09-20 ENCOUNTER — CARE COORDINATION (OUTPATIENT)
Dept: GERIATRIC MEDICINE | Facility: CLINIC | Age: 77
End: 2017-09-20

## 2017-09-20 NOTE — PROGRESS NOTES
9/19/17 Rec'd vm from client stating that she faxed all the paperwork to Sofia at [facility]. Client states that she did call and leave a voice message for Sofia. Client inquired if she has been reinstated. Client also states that there is no cash value on her EBT card, but the card is activated  9/20/17 Rec'd vm from clients inquiring if CM has any information from Sofia at [facility].  9/20/17 Call placed to client to explain that she will need to wait to hear back from Sofia at [facility]. Client states that she left a vm and also the apt manager called to leave a voice message. Shared information from [place] [facility]: MA eligible/[facility]/EW.   Client will f/u with Sofia regarding the EBT card.  CM also shared that [facility] informed CM that her $70 bill is her waiver obligation.   Yeni Savage RN, BC  Supervisor [facility]   [phone] (Fax)

## 2017-09-21 RX ORDER — GEMFIBROZIL 600 MG/1
600 TABLET, FILM COATED ORAL DAILY
Qty: 90 TABLET | Refills: 0 | Status: SHIPPED | OUTPATIENT
Start: 2017-09-21 | End: 2017-12-26

## 2017-09-21 NOTE — PROGRESS NOTES
Call placed to client who shared that she rec'd a letter in the mail from Sofia at [facility] that her MA has been reinstated 9/1/17  Yeni Savage RN, BC  Supervisor [facility]   [phone] (Fax)

## 2017-09-26 ENCOUNTER — CARE COORDINATION (OUTPATIENT)
Dept: GERIATRIC MEDICINE | Facility: CLINIC | Age: 77
End: 2017-09-26

## 2017-09-26 NOTE — PROGRESS NOTES
9/25/17 Rec'd vm from client's daughter Windy to report that she will be in [place] the week of 10/14/17  Windy states that her mother has agreed to tour AL facilities and that she has 3 good options and inquired if CM has any other suggestions.  Windy also states that she will be going with her mother to her Pulmonology appt.   [phone]  9/26/17 Call placed to Windy, explained that CM has been in communication intermittently with her mother, but overall she is stable at home. Reviewed services. Windy is aware the her HCD has been completed. CM to provide options to AL facilities that are accepting of EW.   Yeni Savage RN, BC  Supervisor [facility]   [phone] (Fax)

## 2017-10-04 NOTE — PROGRESS NOTES
E-mailed information on several AL facilities that accept EW in the [facility] to client's daughter Windy.   Yeni Savage RN, BC  Supervisor [facility]   [phone] (Fax)

## 2017-10-17 ENCOUNTER — OFFICE VISIT (OUTPATIENT)
Dept: INTERNAL MEDICINE | Facility: CLINIC | Age: 77
End: 2017-10-17
Payer: COMMERCIAL

## 2017-10-17 ENCOUNTER — TELEPHONE (OUTPATIENT)
Dept: INTERNAL MEDICINE | Facility: CLINIC | Age: 77
End: 2017-10-17

## 2017-10-17 VITALS
TEMPERATURE: 98.1 F | DIASTOLIC BLOOD PRESSURE: 76 MMHG | RESPIRATION RATE: 16 BRPM | SYSTOLIC BLOOD PRESSURE: 134 MMHG | WEIGHT: 131.4 LBS | HEIGHT: 62 IN | OXYGEN SATURATION: 98 % | BODY MASS INDEX: 24.18 KG/M2 | HEART RATE: 106 BPM

## 2017-10-17 DIAGNOSIS — T14.8XXA BRUISING: Primary | ICD-10-CM

## 2017-10-17 LAB
ERYTHROCYTE [DISTWIDTH] IN BLOOD BY AUTOMATED COUNT: 14.8 % (ref 10–15)
HCT VFR BLD AUTO: 29.7 % (ref 35–47)
HGB BLD-MCNC: 9.7 G/DL (ref 11.7–15.7)
MCH RBC QN AUTO: 30.2 PG (ref 26.5–33)
MCHC RBC AUTO-ENTMCNC: 32.7 G/DL (ref 31.5–36.5)
MCV RBC AUTO: 93 FL (ref 78–100)
PLATELET # BLD AUTO: 339 10E9/L (ref 150–450)
RBC # BLD AUTO: 3.21 10E12/L (ref 3.8–5.2)
WBC # BLD AUTO: 12.5 10E9/L (ref 4–11)

## 2017-10-17 PROCEDURE — 85027 COMPLETE CBC AUTOMATED: CPT | Performed by: NURSE PRACTITIONER

## 2017-10-17 PROCEDURE — 36415 COLL VENOUS BLD VENIPUNCTURE: CPT | Performed by: NURSE PRACTITIONER

## 2017-10-17 PROCEDURE — 99213 OFFICE O/P EST LOW 20 MIN: CPT | Performed by: NURSE PRACTITIONER

## 2017-10-17 NOTE — TELEPHONE ENCOUNTER
Please advise pt las show normal platelets, but low Hgb, 9.7, and slightly elevated WBC.  Please start ferrous sulfate 325 mg BID and recheck labs in 4 weeks.  Stool softener on hand since iron can constipate.  Paradise Vasquez CNP

## 2017-10-17 NOTE — PROGRESS NOTES
SUBJECTIVE:   Ana Luisa Lee is a 77 year old female who presents to clinic today for the following health issues:      Bruising on leg      Duration: first noted last night    Description (location/character/radiation): RLE from upper medial thigh to posterior knee, extensive dark purple bruising, no pain    Intensity:  moderate    Accompanying signs and symptoms: no pain or contusions    History (similar episodes/previous evaluation): None, no use of blood thinning agents.    Precipitating or alleviating factors: no known injury or fall or syncope    Therapies tried and outcome: None           Patient Active Problem List   Diagnosis     Osteoporosis     Hyperlipidemia with target LDL less than 130     Asthma, persistent     Pes planus     Advance Care Planning     Skin cyst     Pain in joint, shoulder region     PVC (premature ventricular contraction)     Arthritis of hand     COPD, moderate (HCC)     Pulmonary nodule seen on imaging study     Health Care Home     Hypercalcemia     Other fatigue     Thrush of mouth and esophagus (H)     Steroid-dependent chronic obstructive pulmonary disease (H)     SVT (supraventricular tachycardia) (H)     Hypothyroidism, unspecified type     HTN, goal <  140/90     Pernicious anemia     COPD exacerbation (H)     COPD (chronic obstructive pulmonary disease) (H)     ALEKSANDRA (acute kidney injury) (H)     Uncontrolled hypertension     Paroxysmal supraventricular tachycardia (H)     PAC (premature atrial contraction)     Syncope and collapse     Malignant hypertension     Weakness     Nonrheumatic aortic valve stenosis     Past Surgical History:   Procedure Laterality Date     BUNIONECTOMY LAPIDUS WITH TARSAL METATARSAL (TMT) FUSION  1990's     EP STUDY, MODIFIED  7/2012    SVT not induced, PVC's     GYN SURGERY  1980     HYSTERECTOMY TOTAL ABDOMINAL       HYSTERECTOMY, PAP NO LONGER INDICATED       TONSILLECTOMY         Social History   Substance Use Topics     Smoking status:  Former Smoker     Packs/day: 1.00     Years: 15.00     Quit date: 1/1/1980     Smokeless tobacco: Never Used     Alcohol use No      Comment: Occasional drink     Family History   Problem Relation Age of Onset     CEREBROVASCULAR DISEASE Mother      Hypertension Mother      Family History Negative Father      Unknown/Adopted Maternal Grandmother      Unknown/Adopted Maternal Grandfather      Unknown/Adopted Paternal Grandmother      Unknown/Adopted Paternal Grandfather      Family History Negative Brother      Family History Negative Sister      Family History Negative Son      Family History Negative Daughter      Asthma No family hx of      C.A.D. No family hx of      DIABETES No family hx of      CANCER No family hx of          Current Outpatient Prescriptions   Medication Sig Dispense Refill     gemfibrozil (LOPID) 600 MG tablet Take 1 tablet (600 mg) by mouth daily 90 tablet 0     B-12 TR 1000 MCG TBCR TAKE 1 TABLET BY MOUTH DAILY 100 tablet 0     predniSONE (DELTASONE) 5 MG tablet Takes 10 mg every other day alternating with 15 mg every other day. But she takes 40 mg daily during the exacerbations as per doctor advice. 300 tablet 1     prochlorperazine (COMPAZINE) 5 MG tablet Take 1 tablet (5 mg) by mouth every 6 hours as needed for nausea or vomiting 120 tablet 0     levothyroxine (SYNTHROID/LEVOTHROID) 25 MCG tablet Take 1 tablet (25 mcg) by mouth daily 90 tablet 0     levalbuterol (XOPENEX) 1.25 MG/3ML neb solution Take 3 mLs (1.25 mg) by nebulization every 4 hours as needed for shortness of breath / dyspnea or wheezing 1584 mL 1     omeprazole (PRILOSEC) 40 MG capsule Take 1 capsule (40 mg) by mouth daily Take 30-60 minutes before a meal. 90 capsule 3     diclofenac (VOLTAREN) 1 % GEL topical gel 2 grams to R shoulder four times daily using enclosed dosing card. 100 g 1     predniSONE (DELTASONE) 10 MG tablet Take 3 tabs daily for 4 days Take 2 tabs daily for 4 days Then go back to your previous dose of 10  mg alternating with 15 mg (Patient taking differently: Take dose of 10 mg alternating with 15 mg) 20 tablet 0     order for DME Equipment being ordered: Nebulizer machine, cup, and tubing.  Fax to [facility] per pt request 1 each 0     roflumilast (DALIRESP) 500 MCG TABS tablet Take 500 mcg by mouth daily        polyethylene glycol (MIRALAX/GLYCOLAX) Packet Take 17 g by mouth daily as needed for constipation       albuterol (ALBUTEROL) 108 (90 BASE) MCG/ACT Inhaler Inhale 2 puffs into the lungs every 6 hours as needed       budesonide (PULMICORT FLEXHALER) 180 MCG/ACT inhaler Inhale 1 puff into the lungs 2 times daily       montelukast (SINGULAIR) 10 MG tablet Take 1 tablet (10 mg) by mouth At Bedtime 30 tablet      salmeterol (SEREVENT) 50 MCG/DOSE diskus inhaler Inhale 1 puff into the lungs 2 times daily       tiotropium (SPIRIVA) 18 MCG capsule Inhale 1 capsule (18 mcg) into the lungs daily 30 capsule      Cyanocobalamin (B-12) 1000 MCG TBCR Take 1,000 mcg by mouth daily 100 tablet 1     ferrous sulfate (IRON) 325 (65 FE) MG tablet Take 1 tablet (325 mg) by mouth daily (with breakfast) 100 tablet      calcium 600 MG tablet Take 1 tablet (600 mg) by mouth daily 60 tablet      LANsoprazole (PREVACID) 30 MG CR capsule Take 1 capsule (30 mg) by mouth daily 90 capsule 3     cholecalciferol (VITAMIN D) 1000 UNIT tablet Take 1 tablet (1,000 Units) by mouth daily 30 tablet      order for DME Equipment being ordered: Walker Wheels (), Walker () and Other: Four Wheeled Walker  Treatment Diagnosis: Impaired gait stability and activity tolerance 1 each 0     order for DME Equipment being ordered: blood pressure machine 1 Device 0     order for DME Overnight pulse oximetry 1 Device 0     Multiple Vitamins-Minerals (MULTIVITAMIN OR) Take 1 tablet by mouth daily       ASPIRIN NOT PRESCRIBED, INTENTIONAL, 1 each continuous prn. Antiplatelet medication not prescribed intentionally due to Use of  "NSAIDS 0 each 0     BP Readings from Last 3 Encounters:   10/17/17 134/76   08/11/17 110/70   08/01/17 128/78    Wt Readings from Last 3 Encounters:   10/17/17 131 lb 6.4 oz (59.6 kg)   08/24/17 134 lb (60.8 kg)   08/17/17 134 lb (60.8 kg)                        Reviewed and updated as needed this visit by clinical staffTobacco  Allergies  Meds  Med Hx  Surg Hx  Fam Hx  Soc Hx      Reviewed and updated as needed this visit by Provider         ROS:  C: NEGATIVE for fever, chills, change in weight  E/M: NEGATIVE for ear, mouth and throat problems  RESP:POSITIVE for Hx COPD and SOB/dyspnea  CV: POSITIVE for palpitations  HEME/ALLERGY/IMMUNE: NEGATIVE for bleeding problems  ROS otherwise negative    OBJECTIVE:     /76 (BP Location: Right arm, Patient Position: Sitting, Cuff Size: Adult Regular)  Pulse 106  Temp 98.1  F (36.7  C) (Oral)  Resp 16  Ht 5' 2\" (1.575 m)  Wt 131 lb 6.4 oz (59.6 kg)  SpO2 98%  BMI 24.03 kg/m2  Body mass index is 24.03 kg/(m^2).  GENERAL: alert, no distress, frail and elderly female using wheeled walker  RESP: lungs clear to auscultation - no rales, rhonchi or wheezes  CV: regular rate and rhythm, normal S1 S2, no S3 or S4, no murmur, click or rub, no peripheral edema and peripheral pulses strong  CV: RLE extensive deep purple bruising, no visible cuts, contusions, or injury.  R medial thigh from inguinal to posterior knee.        ASSESSMENT/PLAN:               ICD-10-CM    1. Bruising T14.8XXA CBC with platelets       Home safety, fall and injury prevention.  Ambulation aides always.    Paradise Vasquez NP  [facility]    "

## 2017-10-17 NOTE — MR AVS SNAPSHOT
"              After Visit Summary   10/17/2017    Ana Luisa Lee    MRN: [id number]           Patient Information     Date Of Birth          1940        Visit Information        Provider Department      10/17/2017 1:40 PM Paradise Vasquez NP [facility]        Today's Diagnoses     Bruising    -  1       Follow-ups after your visit        Who to contact     If you have questions or need follow up information about today's clinic visit or your schedule please contact [facility] directly at [phone].  Normal or non-critical lab and imaging results will be communicated to you by [employer], letter or phone within 4 business days after the clinic has received the results. If you do not hear from us within 7 days, please contact the clinic through [employer] or phone. If you have a critical or abnormal lab result, we will notify you by phone as soon as possible.  Submit refill requests through [employer] or call your pharmacy and they will forward the refill request to us. Please allow 3 business days for your refill to be completed.          Additional Information About Your Visit        MyChart Information     [employer] gives you secure access to your electronic health record. If you see a primary care provider, you can also send messages to your care team and make appointments. If you have questions, please call your primary care clinic.  If you do not have a primary care provider, please call [phone] and they will assist you.        Care EveryWhere ID     This is your Care EveryWhere ID. This could be used by other organizations to access your [place] medical records  [id number]        Your Vitals Were     Pulse Temperature Respirations Height Pulse Oximetry BMI (Body Mass Index)    106 98.1  F (36.7  C) (Oral) 16 5' 2\" (1.575 m) 98% 24.03 kg/m2       Blood Pressure from Last 3 Encounters:   10/17/17 134/76   08/11/17 110/70   08/01/17 128/78    Weight from Last 3 " Encounters:   10/17/17 131 lb 6.4 oz (59.6 kg)   08/24/17 134 lb (60.8 kg)   08/17/17 134 lb (60.8 kg)              We Performed the Following     CBC with platelets          Today's Medication Changes          These changes are accurate as of: 10/17/17  2:16 PM.  If you have any questions, ask your nurse or doctor.               These medicines have changed or have updated prescriptions.        Dose/Directions    * predniSONE 10 MG tablet   Commonly known as:  DELTASONE   This may have changed:  additional instructions   Used for:  COPD, moderate (H), Acute bronchospasm, Acute bronchitis with symptoms > 10 days   Changed by:  Jenny Hamilton MD        Take 3 tabs daily for 4 days Take 2 tabs daily for 4 days Then go back to your previous dose of 10 mg alternating with 15 mg   Quantity:  20 tablet   Refills:  0       * predniSONE 5 MG tablet   Commonly known as:  DELTASONE   This may have changed:  Another medication with the same name was changed. Make sure you understand how and when to take each.   Used for:  COPD, moderate (H)   Changed by:  Jenny Hamilton MD        Takes 10 mg every other day alternating with 15 mg every other day. But she takes 40 mg daily during the exacerbations as per doctor advice.   Quantity:  300 tablet   Refills:  1       * Notice:  This list has 2 medication(s) that are the same as other medications prescribed for you. Read the directions carefully, and ask your doctor or other care provider to review them with you.             Primary Care Provider Office Phone # Fax #    Jenny Hamilton [phone]       [facility] [address] [facility] [address]        Equal Access to Services     [facility]: Hadronit Fernandes, shannan hodge, holly sampson. So [facility] [phone].    ATENCIÓN: Si habla español, tiene a martinez disposición servicios gratuitos de asistencia  lingüísticaVirginia Jones al [phone].    We comply with applicable federal civil rights laws and [place] laws. We do not discriminate on the basis of race, color, national origin, age, disability, sex, sexual orientation, or gender identity.            Thank you!     Thank you for choosing [facility]  for your care. Our goal is always to provide you with excellent care. Hearing back from our patients is one way we can continue to improve our services. Please take a few minutes to complete the written survey that you may receive in the mail after your visit with us. Thank you!             Your Updated Medication List - Protect others around you: Learn how to safely use, store and throw away your medicines at www.disposemymeds.org.          This list is accurate as of: 10/17/17  2:16 PM.  Always use your most recent med list.                   Brand Name Dispense Instructions for use Diagnosis    albuterol 108 (90 BASE) MCG/ACT Inhaler    PROAIR HFA     Inhale 2 puffs into the lungs every 6 hours as needed    Asthma, persistent       ASPIRIN NOT PRESCRIBED    INTENTIONAL    0 each    1 each continuous prn. Antiplatelet medication not prescribed intentionally due to Use of NSAIDS        * B-12 1000 MCG Tbcr     100 tablet    Take 1,000 mcg by mouth daily    Pernicious anemia       * B-12 TR 1000 MCG Tbcr   Generic drug:  cyanocobalamin     100 tablet    TAKE 1 TABLET BY MOUTH DAILY    Pernicious anemia       budesonide 180 MCG/ACT inhaler    PULMICORT FLEXHALER     Inhale 1 puff into the lungs 2 times daily    Pulmonary emphysema, unspecified emphysema type (H)       calcium 600 MG tablet     60 tablet    Take 1 tablet (600 mg) by mouth daily    Pulmonary emphysema, unspecified emphysema type (H)       cholecalciferol 1000 UNIT tablet    vitamin D    30 tablet    Take 1 tablet (1,000 Units) by mouth daily    COPD, moderate (H)       diclofenac 1 % Gel topical gel    VOLTAREN    100 g    2 grams to R  shoulder four times daily using enclosed dosing card.    Acute pain of right shoulder       ferrous sulfate 325 (65 FE) MG tablet    IRON    100 tablet    Take 1 tablet (325 mg) by mouth daily (with breakfast)    Pulmonary emphysema, unspecified emphysema type (H)       gemfibrozil 600 MG tablet    LOPID    90 tablet    Take 1 tablet (600 mg) by mouth daily    Hyperlipidemia with target LDL less than 130       LANsoprazole 30 MG CR capsule    PREVACID    90 capsule    Take 1 capsule (30 mg) by mouth daily    Gastroesophageal reflux disease without esophagitis       levalbuterol 1.25 MG/3ML neb solution    XOPENEX    1584 mL    Take 3 mLs (1.25 mg) by nebulization every 4 hours as needed for shortness of breath / dyspnea or wheezing    COPD, moderate (H), COPD exacerbation (H)       levothyroxine 25 MCG tablet    SYNTHROID/LEVOTHROID    90 tablet    Take 1 tablet (25 mcg) by mouth daily    Hypothyroidism due to acquired atrophy of thyroid       montelukast 10 MG tablet    SINGULAIR    30 tablet    Take 1 tablet (10 mg) by mouth At Bedtime    Pulmonary emphysema, unspecified emphysema type (H)       MULTIVITAMIN PO      Take 1 tablet by mouth daily        omeprazole 40 MG capsule    priLOSEC    90 capsule    Take 1 capsule (40 mg) by mouth daily Take 30-60 minutes before a meal.    Gastroesophageal reflux disease, esophagitis presence not specified       * order for DME     1 Device    Overnight pulse oximetry    COPD, moderate (H), Other fatigue       * order for DME     1 Device    Equipment being ordered: blood pressure machine    HTN, goal below 140/90, Labile blood pressure       * order for DME     1 each    Equipment being ordered: Walker Wheels (), Walker () and Other: Four Wheeled Walker Treatment Diagnosis: Impaired gait stability and activity tolerance    Pulmonary emphysema, unspecified emphysema type (H)       order for DME     1 each    Equipment being ordered: Nebulizer machine, cup, and  tubing.  Fax to [facility] per pt request    COPD, moderate (H)       polyethylene glycol Packet    MIRALAX/GLYCOLAX     Take 17 g by mouth daily as needed for constipation        * predniSONE 10 MG tablet    DELTASONE    20 tablet    Take 3 tabs daily for 4 days Take 2 tabs daily for 4 days Then go back to your previous dose of 10 mg alternating with 15 mg    COPD, moderate (H), Acute bronchospasm, Acute bronchitis with symptoms > 10 days       * predniSONE 5 MG tablet    DELTASONE    300 tablet    Takes 10 mg every other day alternating with 15 mg every other day. But she takes 40 mg daily during the exacerbations as per doctor advice.    COPD, moderate (H)       prochlorperazine 5 MG tablet    COMPAZINE    120 tablet    Take 1 tablet (5 mg) by mouth every 6 hours as needed for nausea or vomiting    Nausea       roflumilast 500 MCG Tabs tablet    DALIRESP     Take 500 mcg by mouth daily        salmeterol 50 MCG/DOSE diskus inhaler    SEREVENT     Inhale 1 puff into the lungs 2 times daily    Pulmonary emphysema, unspecified emphysema type (H)       tiotropium 18 MCG capsule    SPIRIVA    30 capsule    Inhale 1 capsule (18 mcg) into the lungs daily    Pulmonary emphysema, unspecified emphysema type (H)       * Notice:  This list has 7 medication(s) that are the same as other medications prescribed for you. Read the directions carefully, and ask your doctor or other care provider to review them with you.

## 2017-10-17 NOTE — NURSING NOTE
"Chief Complaint   Patient presents with     Musculoskeletal Problem     bruise on back of rt leg that travels down to the back of the knee.       Initial /76 (BP Location: Right arm, Patient Position: Sitting, Cuff Size: Adult Regular)  Pulse 106  Temp 98.1  F (36.7  C) (Oral)  Resp 16  Ht 5' 2\" (1.575 m)  Wt 131 lb 6.4 oz (59.6 kg)  SpO2 98%  BMI 24.03 kg/m2 Estimated body mass index is 24.03 kg/(m^2) as calculated from the following:    Height as of this encounter: 5' 2\" (1.575 m).    Weight as of this encounter: 131 lb 6.4 oz (59.6 kg).  Medication Reconciliation: complete    "

## 2017-10-19 ENCOUNTER — TELEPHONE (OUTPATIENT)
Dept: INTERNAL MEDICINE | Facility: CLINIC | Age: 77
End: 2017-10-19

## 2017-10-19 NOTE — TELEPHONE ENCOUNTER
Calling for Homecare orders:  [facility] [id number] and [facility] [id number].  Pt did just see NP, Paradise Vasquez.  OK'd

## 2017-10-20 ENCOUNTER — TRANSFERRED RECORDS (OUTPATIENT)
Dept: HEALTH INFORMATION MANAGEMENT | Facility: CLINIC | Age: 77
End: 2017-10-20

## 2017-10-22 NOTE — IP AVS SNAPSHOT
[facility] Observation Department    [address]    [facility] [address]    Phone:  [phone]                                       After Visit Summary   5/27/2017    Ana Luisa Lee    MRN: [id number]           After Visit Summary Signature Page     I have received my discharge instructions, and my questions have been answered. I have discussed any challenges I see with this plan with the nurse or doctor.    ..........................................................................................................................................  Patient/Patient Representative Signature      ..........................................................................................................................................  Patient Representative Print Name and Relationship to Patient    ..................................................               ................................................  Date                                            Time    ..........................................................................................................................................  Reviewed by Signature/Title    ...................................................              ..............................................  Date                                                            Time           no

## 2017-10-24 ENCOUNTER — TELEPHONE (OUTPATIENT)
Dept: INTERNAL MEDICINE | Facility: CLINIC | Age: 77
End: 2017-10-24

## 2017-10-31 ENCOUNTER — APPOINTMENT (OUTPATIENT)
Dept: GENERAL RADIOLOGY | Facility: CLINIC | Age: 77
End: 2017-10-31
Attending: EMERGENCY MEDICINE
Payer: COMMERCIAL

## 2017-10-31 ENCOUNTER — HOSPITAL ENCOUNTER (EMERGENCY)
Facility: CLINIC | Age: 77
Discharge: HOME | End: 2017-10-31
Attending: EMERGENCY MEDICINE | Admitting: EMERGENCY MEDICINE
Payer: COMMERCIAL

## 2017-10-31 ENCOUNTER — APPOINTMENT (OUTPATIENT)
Dept: ULTRASOUND IMAGING | Facility: CLINIC | Age: 77
End: 2017-10-31
Attending: EMERGENCY MEDICINE
Payer: COMMERCIAL

## 2017-10-31 ENCOUNTER — TELEPHONE (OUTPATIENT)
Dept: INTERNAL MEDICINE | Facility: CLINIC | Age: 77
End: 2017-10-31

## 2017-10-31 VITALS
HEIGHT: 61 IN | OXYGEN SATURATION: 99 % | RESPIRATION RATE: 22 BRPM | SYSTOLIC BLOOD PRESSURE: 148 MMHG | DIASTOLIC BLOOD PRESSURE: 79 MMHG | BODY MASS INDEX: 25.3 KG/M2 | WEIGHT: 134 LBS | TEMPERATURE: 98.2 F | HEART RATE: 101 BPM

## 2017-10-31 DIAGNOSIS — M79.661 PAIN OF RIGHT LOWER LEG: ICD-10-CM

## 2017-10-31 DIAGNOSIS — Z53.9 DIAGNOSIS NOT YET DEFINED: Primary | ICD-10-CM

## 2017-10-31 LAB
ANION GAP SERPL CALCULATED.3IONS-SCNC: 10 MMOL/L (ref 3–14)
APTT PPP: 31 SEC (ref 22–37)
BASOPHILS # BLD AUTO: 0 10E9/L (ref 0–0.2)
BASOPHILS NFR BLD AUTO: 0.1 %
BUN SERPL-MCNC: 10 MG/DL (ref 7–30)
CALCIUM SERPL-MCNC: 8.6 MG/DL (ref 8.5–10.1)
CHLORIDE SERPL-SCNC: 98 MMOL/L (ref 94–109)
CO2 SERPL-SCNC: 23 MMOL/L (ref 20–32)
CREAT SERPL-MCNC: 0.76 MG/DL (ref 0.52–1.04)
D DIMER PPP FEU-MCNC: 3.7 UG/ML FEU (ref 0–0.5)
DIFFERENTIAL METHOD BLD: ABNORMAL
EOSINOPHIL # BLD AUTO: 0 10E9/L (ref 0–0.7)
EOSINOPHIL NFR BLD AUTO: 0.1 %
ERYTHROCYTE [DISTWIDTH] IN BLOOD BY AUTOMATED COUNT: 17.6 % (ref 10–15)
GFR SERPL CREATININE-BSD FRML MDRD: 73 ML/MIN/1.7M2
GLUCOSE SERPL-MCNC: 166 MG/DL (ref 70–99)
HCT VFR BLD AUTO: 28.8 % (ref 35–47)
HGB BLD-MCNC: 8.9 G/DL (ref 11.7–15.7)
IMM GRANULOCYTES # BLD: 0.1 10E9/L (ref 0–0.4)
IMM GRANULOCYTES NFR BLD: 1.1 %
INR PPP: 1.13 (ref 0.86–1.14)
LYMPHOCYTES # BLD AUTO: 0.3 10E9/L (ref 0.8–5.3)
LYMPHOCYTES NFR BLD AUTO: 2.9 %
MCH RBC QN AUTO: 30.2 PG (ref 26.5–33)
MCHC RBC AUTO-ENTMCNC: 30.9 G/DL (ref 31.5–36.5)
MCV RBC AUTO: 98 FL (ref 78–100)
MONOCYTES # BLD AUTO: 0.3 10E9/L (ref 0–1.3)
MONOCYTES NFR BLD AUTO: 3 %
NEUTROPHILS # BLD AUTO: 8.3 10E9/L (ref 1.6–8.3)
NEUTROPHILS NFR BLD AUTO: 92.8 %
NRBC # BLD AUTO: 0 10*3/UL
NRBC BLD AUTO-RTO: 0 /100
PLATELET # BLD AUTO: 350 10E9/L (ref 150–450)
POTASSIUM SERPL-SCNC: 4 MMOL/L (ref 3.4–5.3)
RBC # BLD AUTO: 2.95 10E12/L (ref 3.8–5.2)
SODIUM SERPL-SCNC: 131 MMOL/L (ref 133–144)
WBC # BLD AUTO: 8.9 10E9/L (ref 4–11)

## 2017-10-31 PROCEDURE — 85610 PROTHROMBIN TIME: CPT | Performed by: EMERGENCY MEDICINE

## 2017-10-31 PROCEDURE — 85379 FIBRIN DEGRADATION QUANT: CPT | Performed by: EMERGENCY MEDICINE

## 2017-10-31 PROCEDURE — 93971 EXTREMITY STUDY: CPT | Mod: RT

## 2017-10-31 PROCEDURE — 85730 THROMBOPLASTIN TIME PARTIAL: CPT | Performed by: EMERGENCY MEDICINE

## 2017-10-31 PROCEDURE — 73502 X-RAY EXAM HIP UNI 2-3 VIEWS: CPT

## 2017-10-31 PROCEDURE — 99284 EMERGENCY DEPT VISIT MOD MDM: CPT | Mod: 25

## 2017-10-31 PROCEDURE — 85025 COMPLETE CBC W/AUTO DIFF WBC: CPT | Performed by: EMERGENCY MEDICINE

## 2017-10-31 PROCEDURE — G0179 MD RECERTIFICATION HHA PT: HCPCS | Performed by: INTERNAL MEDICINE

## 2017-10-31 PROCEDURE — 80048 BASIC METABOLIC PNL TOTAL CA: CPT | Performed by: EMERGENCY MEDICINE

## 2017-10-31 ASSESSMENT — ENCOUNTER SYMPTOMS
COLOR CHANGE: 1
FEVER: 0
SHORTNESS OF BREATH: 0
NUMBNESS: 1

## 2017-10-31 NOTE — ED NOTES
Patient discharged to home. Patient received follow-up information with PCP and US. Patient received discharge instructions and has no other questions at this time.

## 2017-10-31 NOTE — ED PROVIDER NOTES
History     Chief Complaint:  Leg Pain      HPI   Ana Luisa Lee is a 77 year old female who presents to the emergency department today via EMS for evaluation of right leg pain. The patient reports that she twisted her right leg while sitting down on the sofa approximately three weeks ago, resulting in right hip pain. Additionally, she states that the day before this injury occurred she developed diffuse bruising in the right leg with no precipitating injury or trauma. She reports that this bruising is improved today compared with 3 wks ago. She notes that she had a mechanical fall three to four months ago but that she thinks her current pain is unrelated. She reports aching, burning pain from her right buttocks and hip throughout her entire right leg. She also notes that she noticed swelling of her right foot this morning when she woke up, which is what prompted return to the ER. She denies any other recent injuries or trauma. She denies shooting pain or electric shock sensation, fevers, chest pain, shortness of breath, or numbness of the right leg. She notes occasional numbness of the left leg, though this is chronic. She states that she has taken Advil a couple of times today. She reports that she ambulates with a walker. She notes a history of COPD and states that she nebulizes four times daily.     Allergies:  Hydralazine  Penicillin G  Meloxicam  Metoprolol  Norvasc [Amlodipine Besylate]     Medications:    Gemfibrozil  Prednisone  Compazine  Synthroid  Levalbuterol  Prilosec  Voltaren  Roflumilast  Miralax  Albuterol inhaler  Singulair  Salmeterol  Spiriva  Lansoprazole    Past Medical History:    Anemia  Arthritis of hand  Asthma, persistent  Chronic intermittent steroid use  COPD  Esophageal stricture  Foot deformity  Hypertension  Hyperlipidemia  Hypothyroidism  Osteoporosis  Paroxysmal supraventricular tachycardia  Premature ventricular contraction  Skin cyst    Past Surgical History:   "  Bunionectomy lapidus with tarsal metatarsal fusion  Total hysterectomy  Tonsillectomy    Family History:    Mother: Cerebrovascular disease, hypertension    Social History:  Smoking Status: Former Smoker  Smokeless Tobacco: Never Used  Alcohol Use: Negative  Marital Status:       Review of Systems   Constitutional: Negative for fever.   Respiratory: Negative for shortness of breath.    Cardiovascular: Negative for chest pain.   Musculoskeletal:        Positive for right hip and leg pain, edema of the right foot.  Negative for shooting pain.   Skin: Positive for color change (ecchymosis of the right leg).   Neurological: Positive for numbness (left leg).        Negative for numbness of the right leg.   All other systems reviewed and are negative.    Physical Exam     Patient Vitals for the past 24 hrs:   BP Temp Temp src Pulse Resp SpO2 Height Weight   10/31/17 1649 - - - - - 99 % - -   10/31/17 1645 148/79 - - - - 100 % - -   10/31/17 1630 151/78 - - - - 98 % - -   10/31/17 1615 133/73 - - - - 98 % - -   10/31/17 1600 138/73 - - - - 98 % - -   10/31/17 1556 - - - - - 98 % - -   10/31/17 1500 129/69 - - - - 98 % - -   10/31/17 1445 123/67 - - - - 98 % - -   10/31/17 1430 121/67 - - - - 99 % - -   10/31/17 1417 146/74 98.2  F (36.8  C) Oral 101 22 97 % 1.549 m (5' 1\") 60.8 kg (134 lb)     Physical Exam  General:                        Well-nourished                        Speaking in full sentences  Eyes:                        Conjunctiva without injection or scleral icterus                        PERRL  ENT:                        Moist mucous membranes                        Posterior oropharynx clear without erythema or exudate                        Nares patent                        Pinnae normal  Neck:                        Full ROM                        No stiffness appreciated  Resp:                        Lungs CTAB                        No crackles, wheezing or audible rubs                     "    Good air movement  CV:                                        Tachycardic rate, regular rhythm                        S1 and S2 present                        II/VI systolic ejection murmur at LUSB (discussed with patient)  GI:                        BS present                        Abdomen soft without distention                        Non-tender to light and deep palpation                        No guarding or rebound tenderness  Skin:                        Warm, dry, well perfused                        Ecchymoses noted over RLE, predominantly to lateral aspect (improving per pt)                        Compartments are soft  MSK:                        Moves all extremities                        2+ LE edema to RLE                        2+ DP pulse bilaterally                        Full ROM to hip flexion, log rolling                        No leg length discrepancy  Neuro:                        Alert                        Answers questions appropriately                        Moves all extremities equally             Gait stable with walker in ED  Psych:                        Normal affect, normal mood    Emergency Department Course     Imaging:  Radiology findings were communicated with the patient who voiced understanding of the findings.    XR Pelvis and Hip Right 2 Views  No acute process. Mild degenerative changes of both hips.   Reading per radiology     US Lower Extremity Venous Duplex Right  No DVT demonstrated.  Reading per radiology    Laboratory:  Laboratory findings were communicated with the patient who voiced understanding of the findings.    CBC: WBC 8.9, HGB 8.9 (L),   INR: 1.13  PTT: 31  BMP:  (L), Glucose 166 (H),  o/w WNL (Creatinine 0.76)  D dimer: 3.7 (H)    Emergency Department Course:  Nursing notes and vitals reviewed.  I performed an exam of the patient as documented above.   The patient was sent for a XR Pelvis and Hip Right 2 Views and US Lower Extremity Venous  Duplex Right while in the emergency department, results above.   IV was inserted and blood was drawn for laboratory testing, results above.  1710 I updated her patient regarding her work up. I discussed the treatment plan with the patient. They expressed understanding of this plan and consented to discharge. They will be discharged home with instructions for care and follow up. In addition, the patient will return to the emergency department if their symptoms persist, worsen, if new symptoms arise or if there is any concern.  All questions were answered.  I personally reviewed the imaging and laboratory results with the patient and answered all related questions prior to discharge.    Impression & Plan      Medical Decision Making:  Ana Luisa Lee is a 77 year old female presenting to the emergency department for evaluation of leg pain. Her vital signs on presentation reveal elevated blood pressure and heart rate which improved during her ED course. Differential diagnosis is broad including but not limited to venous insufficiency, DVT, cellulitis, congestive heart failure, lymphedema, arterial occlusion, among others. At present, the exact cause of the patient's right lower extremity swelling is not entirely clear. She reports a history of mild traumatic injury three weeks prior with subsequent ecchymosis which is improving per her report. Compartments are soft throughout. She has not sustained any interval trauma to suggest bony abnormality and demonstrates full range of motion about her hips and lower extremity. X-ray negative for fracture or dislocation. Utilizing Well's criteria, the patient is at moderate risk for DVT. D dimer returned elevated at 3.7. Venous ultrasound demonstrates no evidence of DVT to the right lower extremity. Given elevated D dimer, I have recommended repeat ultrasound in three to five days for possible interval development of venous occlusion. Patient notes no symptoms of chest  pain or shortness of breath to suggest PE and is without hypoxia. Laboratory evaluation does reveal hemoglobin of 8.9 which is down slightly from 9.7 earlier this month. She denies any other external blood loss. This is unlikely to be secondary to acute arterial bleed as patient notes essentially no symptoms at rest. Again, compartments are soft. Extremities are warm and well perfused distally with palpable, strong distal pulses. She has no overlying skin changes to suggest cellulitis. Of note was incidental Baker's cyst behind the right knee. Patient informed of this. Patient was ambulatory in the ED without difficulty. I feel that she is stable for discharge home with close out-patient follow up. I recommended follow up with PCP in three days for repeat evaluation, and repeat venous ultrasound. Patient felt comfortable with this plan of care and all questions were answered prior to discharge.    Diagnosis:    ICD-10-CM    1. Pain of right lower leg M79.661 D dimer quantitative     D dimer quantitative     CANCELED: D dimer quantitative     Disposition:  The patient is discharged to home.  Scribe Disclosure:  I, Tin Chau, am serving as a scribe at 3:52 PM on 10/31/2017 to document services personally performed by Henri Hou MD based on my observations and the provider's statements to me.  [facility] EMERGENCY DEPARTMENT       Henri Hou MD  11/01/17 0859

## 2017-10-31 NOTE — ED AVS SNAPSHOT
[facility] Emergency Department    [address]    [facility] [address]    Phone:  [phone]    Fax:  [phone]                                       Ana Luisa Lee   MRN: [id number]    Department:  [facility] Emergency Department   Date of Visit:  10/31/2017           After Visit Summary Signature Page     I have received my discharge instructions, and my questions have been answered. I have discussed any challenges I see with this plan with the nurse or doctor.    ..........................................................................................................................................  Patient/Patient Representative Signature      ..........................................................................................................................................  Patient Representative Print Name and Relationship to Patient    ..................................................               ................................................  Date                                            Time    ..........................................................................................................................................  Reviewed by Signature/Title    ...................................................              ..............................................  Date                                                            Time

## 2017-10-31 NOTE — DISCHARGE INSTRUCTIONS
Follow-up:  Please follow-up with your primary care provider in 2 days for re-evaluation and discussion of your visit to the emergency department today.    You will need a repeat ultrasound of your leg in 3-5 days.  This can be arranged through your primary care provider.    Home treatments:  Recommended home therapies include rest, ice, elevation, and close monitoring of symptoms.    New prescriptions:  None    Return precautions:  Warning signs which should prompt you to return to the ER include worsening pain, swelling, chest pain, shortness of breath, or any other new or troubling symptoms.  We are always happy to see you again.      Leg Swelling in a Single Leg  Swelling of the arms, feet, ankles, and legs is called edema. It is caused by extra fluid collecting in the tissues. Because of gravity, extra fluid in the body settles to the lowest part. That is why the legs and feet are most affected. You have swelling in a single leg.  Some of the causes for swelling in only a single leg include:    Infection in the foot or leg    Long-term problem with a vein not working well (venous insufficiency)    Swollen, twisted vein in the leg (varicose veins)    Insect bite or sting on the foot or leg    Injury or recent surgery on the foot or leg    Blood clot in a deep vein of the leg (deep vein thrombosis or DVT)    Inflammation of the joints of the lower leg  Medical treatment will depend on what is causing your swelling.  Home care  Follow these guidelines when caring for yourself at home:    Don t wear tight clothing.    Keep your legs up while lying or sitting.    Take any medicines as directed.    If infection, injury, or recent surgery is the cause of your swelling, stay off your legs as much as possible until your symptoms get better.    If you have venous insufficiency or varicose veins, don t sit or  one place for long periods of time. Take breaks and walk around every few hours. Talk with your  healthcare provider about wearing support stockings to help lessen swelling during the day.    Wear compression stockings with your doctor's approval  Follow-up care  Follow up with your healthcare provider as advised.  Call 911  Call 911 if any of these occur:    Shortness of breath or trouble breathing    Chest pain    Coughing up blood    Fainting or loss of consciousness   When to seek medical advice  Call your healthcare provider right away if any of these occur:    Increased pain, swelling, warmth, or redness of the leg, ankle, or foot    Fever of 100.4 F (38 C) or higher, or as directed by your healthcare provider    Weakness or dizziness    Shaking chills    Drenching sweats  Date Last Reviewed: 4/11/2016 2000-2017 [employer]. [address] [place] [address], [place] [address]. All rights reserved. This information is not intended as a substitute for professional medical care. Always follow your healthcare professional's instructions.

## 2017-10-31 NOTE — ED NOTES
Twisted sitting onto the sofa about 3 weeks ago injurying right hip.  Bruising noted but swelling noticed this morning accompanied by worsening pain, unable to bear weight.  Patient alert and oriented x3.  Airway, breathing and circulation intact.

## 2017-10-31 NOTE — ED NOTES
Ambulated the patient down the ocasio and back. Patient was able to walk unassisted with a walker, stating that she only felt intermittent pain in her right hip while walking.

## 2017-10-31 NOTE — ED AVS SNAPSHOT
[facility] Emergency Department    [address]    [facility] [address]    Phone:  [phone]    Fax:  [phone]                                       Ana Luisa Lee   MRN: [id number]    Department:  [facility] Emergency Department   Date of Visit:  10/31/2017           Patient Information     Date Of Birth          1940        Your diagnoses for this visit were:     Pain of right lower leg        You were seen by Viridiana Parra MD and Henri Hou MD.      Follow-up Information     Follow up with Jenny Hamilton MD. Schedule an appointment as soon as possible for a visit in 2 days.    Specialty:  Internal Medicine    Contact information:    [address] [facility] [address]  [phone]          Follow up with [facility] Emergency Department.    Specialty:  EMERGENCY MEDICINE    Why:  If symptoms worsen    Contact information:    [address] [facility] [address] [phone]        Discharge Instructions       Follow-up:  Please follow-up with your primary care provider in 2 days for re-evaluation and discussion of your visit to the emergency department today.    You will need a repeat ultrasound of your leg in 3-5 days.  This can be arranged through your primary care provider.    Home treatments:  Recommended home therapies include rest, ice, elevation, and close monitoring of symptoms.    New prescriptions:  None    Return precautions:  Warning signs which should prompt you to return to the ER include worsening pain, swelling, chest pain, shortness of breath, or any other new or troubling symptoms.  We are always happy to see you again.      Leg Swelling in a Single Leg  Swelling of the arms, feet, ankles, and legs is called edema. It is caused by extra fluid collecting in the tissues. Because of gravity, extra fluid in the body settles to the lowest part. That is why the legs and feet are  most affected. You have swelling in a single leg.  Some of the causes for swelling in only a single leg include:    Infection in the foot or leg    Long-term problem with a vein not working well (venous insufficiency)    Swollen, twisted vein in the leg (varicose veins)    Insect bite or sting on the foot or leg    Injury or recent surgery on the foot or leg    Blood clot in a deep vein of the leg (deep vein thrombosis or DVT)    Inflammation of the joints of the lower leg  Medical treatment will depend on what is causing your swelling.  Home care  Follow these guidelines when caring for yourself at home:    Don t wear tight clothing.    Keep your legs up while lying or sitting.    Take any medicines as directed.    If infection, injury, or recent surgery is the cause of your swelling, stay off your legs as much as possible until your symptoms get better.    If you have venous insufficiency or varicose veins, don t sit or  one place for long periods of time. Take breaks and walk around every few hours. Talk with your healthcare provider about wearing support stockings to help lessen swelling during the day.    Wear compression stockings with your doctor's approval  Follow-up care  Follow up with your healthcare provider as advised.  Call 911  Call 911 if any of these occur:    Shortness of breath or trouble breathing    Chest pain    Coughing up blood    Fainting or loss of consciousness   When to seek medical advice  Call your healthcare provider right away if any of these occur:    Increased pain, swelling, warmth, or redness of the leg, ankle, or foot    Fever of 100.4 F (38 C) or higher, or as directed by your healthcare provider    Weakness or dizziness    Shaking chills    Drenching sweats  Date Last Reviewed: 4/11/2016 2000-2017 [employer]. [address] [place] [address]. All rights reserved. This information is not intended as a substitute for professional medical care.  Always follow your healthcare professional's instructions.                  24 Hour Appointment Hotline       To make an appointment at any [facility], call [phone] ([phone]). If you don't have a family doctor or clinic, we will help you find one. [place] clinics are conveniently located to serve the needs of you and your family.             Review of your medicines      Our records show that you are taking the medicines listed below. If these are incorrect, please call your family doctor or clinic.        Dose / Directions Last dose taken    albuterol 108 (90 BASE) MCG/ACT Inhaler   Commonly known as:  PROAIR HFA   Dose:  2 puff        Inhale 2 puffs into the lungs every 6 hours as needed   Refills:  0        ASPIRIN NOT PRESCRIBED   Commonly known as:  INTENTIONAL   Dose:  1 each   Quantity:  0 each        1 each continuous prn. Antiplatelet medication not prescribed intentionally due to Use of NSAIDS   Refills:  0        * B-12 1000 MCG Tbcr   Dose:  1000 mcg   Quantity:  100 tablet        Take 1,000 mcg by mouth daily   Refills:  1        * B-12 TR 1000 MCG Tbcr   Quantity:  100 tablet   Generic drug:  cyanocobalamin        TAKE 1 TABLET BY MOUTH DAILY   Refills:  0        budesonide 180 MCG/ACT inhaler   Commonly known as:  PULMICORT FLEXHALER   Dose:  1 puff        Inhale 1 puff into the lungs 2 times daily   Refills:  0        calcium 600 MG tablet   Dose:  1 tablet   Quantity:  60 tablet        Take 1 tablet (600 mg) by mouth daily   Refills:  0        cholecalciferol 1000 UNIT tablet   Commonly known as:  vitamin D3   Dose:  1000 Units   Quantity:  30 tablet        Take 1 tablet (1,000 Units) by mouth daily   Refills:  0        diclofenac 1 % Gel topical gel   Commonly known as:  VOLTAREN   Quantity:  100 g        2 grams to R shoulder four times daily using enclosed dosing card.   Refills:  1        ferrous sulfate 325 (65 FE) MG tablet   Commonly known as:  IRON   Dose:  1 tablet    Quantity:  100 tablet        Take 1 tablet (325 mg) by mouth daily (with breakfast)   Refills:  0        gemfibrozil 600 MG tablet   Commonly known as:  LOPID   Dose:  600 mg   Quantity:  90 tablet        Take 1 tablet (600 mg) by mouth daily   Refills:  0        LANsoprazole 30 MG CR capsule   Commonly known as:  PREVACID   Dose:  30 mg   Quantity:  90 capsule        Take 1 capsule (30 mg) by mouth daily   Refills:  3        levalbuterol 1.25 MG/3ML neb solution   Commonly known as:  XOPENEX   Dose:  1 ampule   Quantity:  1584 mL        Take 3 mLs (1.25 mg) by nebulization every 4 hours as needed for shortness of breath / dyspnea or wheezing   Refills:  1        levothyroxine 25 MCG tablet   Commonly known as:  SYNTHROID/LEVOTHROID   Dose:  25 mcg   Quantity:  90 tablet        Take 1 tablet (25 mcg) by mouth daily   Refills:  0        montelukast 10 MG tablet   Commonly known as:  SINGULAIR   Dose:  10 mg   Quantity:  30 tablet        Take 1 tablet (10 mg) by mouth At Bedtime   Refills:  0        MULTIVITAMIN PO   Dose:  1 tablet        Take 1 tablet by mouth daily   Refills:  0        omeprazole 40 MG capsule   Commonly known as:  priLOSEC   Dose:  40 mg   Quantity:  90 capsule        Take 1 capsule (40 mg) by mouth daily Take 30-60 minutes before a meal.   Refills:  3        * order for DME   Quantity:  1 Device        Overnight pulse oximetry   Refills:  0        * order for DME   Quantity:  1 Device        Equipment being ordered: blood pressure machine   Refills:  0        * order for DME   Quantity:  1 each        Equipment being ordered: Walker Wheels (), Walker () and Other: Four Wheeled Walker Treatment Diagnosis: Impaired gait stability and activity tolerance   Refills:  0        order for DME   Quantity:  1 each        Equipment being ordered: Nebulizer machine, cup, and tubing.  Fax to [facility] per pt request   Refills:  0        polyethylene glycol Packet   Commonly  known as:  MIRALAX/GLYCOLAX   Dose:  17 g        Take 17 g by mouth daily as needed for constipation   Refills:  0        * predniSONE 10 MG tablet   Commonly known as:  DELTASONE   Quantity:  20 tablet        Take 3 tabs daily for 4 days Take 2 tabs daily for 4 days Then go back to your previous dose of 10 mg alternating with 15 mg   Refills:  0        * predniSONE 5 MG tablet   Commonly known as:  DELTASONE   Quantity:  300 tablet        Takes 10 mg every other day alternating with 15 mg every other day. But she takes 40 mg daily during the exacerbations as per doctor advice.   Refills:  1        prochlorperazine 5 MG tablet   Commonly known as:  COMPAZINE   Dose:  5 mg   Quantity:  120 tablet        Take 1 tablet (5 mg) by mouth every 6 hours as needed for nausea or vomiting   Refills:  0        roflumilast 500 MCG Tabs tablet   Commonly known as:  DALIRESP   Dose:  500 mcg        Take 500 mcg by mouth daily   Refills:  0        salmeterol 50 MCG/DOSE diskus inhaler   Commonly known as:  SEREVENT   Dose:  1 puff        Inhale 1 puff into the lungs 2 times daily   Refills:  0        tiotropium 18 MCG capsule   Commonly known as:  SPIRIVA   Dose:  18 mcg   Quantity:  30 capsule        Inhale 1 capsule (18 mcg) into the lungs daily   Refills:  0        * Notice:  This list has 7 medication(s) that are the same as other medications prescribed for you. Read the directions carefully, and ask your doctor or other care provider to review them with you.            Procedures and tests performed during your visit     Basic metabolic panel    CBC with platelets differential    D dimer quantitative    INR    PTT    US Lower Extremity Venous Duplex Right    XR Pelvis and Hip Right 2 Views      Orders Needing Specimen Collection     None      Pending Results     No orders found from 10/29/2017 to 11/1/2017.            Pending Culture Results     No orders found from 10/29/2017 to 11/1/2017.            Pending Results  Instructions     If you had any lab results that were not finalized at the time of your Discharge, you can call the ED Lab Result RN at [phone]. You will be contacted by this team for any positive Lab results or changes in treatment. The nurses are available 7 days a week from 10A to 6:30P.  You can leave a message 24 hours per day and they will return your call.        Test Results From Your Hospital Stay        10/31/2017  3:53 PM      Narrative     ULTRASOUND VENOUS LOWER EXTREMITY UNILATERAL RIGHT  10/31/2017 3:23 PM      HISTORY: Edema, ecchymosis, pain.    COMPARISON: None.    TECHNIQUE: Ultrasound gray scale, Color Doppler flow, and spectral  Doppler waveform analysis performed.    FINDINGS: A 4.9 x 1.5 x 4.2 cm Baker's cyst is present. The right  common femoral, superficial femoral, popliteal and posterior tibial  veins are patent and fully compressible and demonstrate normal venous  Doppler flow. The visualized greater saphenous vein is negative for  thrombus.        Impression     IMPRESSION: No DVT demonstrated.    TORSTEN FLOWERS MD         10/31/2017  4:09 PM      Narrative     PELVIS AND RIGHT HIP TWO VIEWS  10/31/2017 3:33 PM    HISTORY: Fall, persistent hip pain, left lower extremity ecchymosis,  edema.    COMPARISON: None.        Impression     IMPRESSION: No acute process. Mild degenerative changes of both hips.     KEVIN TRIPP MD         10/31/2017  3:08 PM      Component Results     Component Value Ref Range & Units Status    WBC 8.9 4.0 - 11.0 10e9/L Final    RBC Count 2.95 (L) 3.8 - 5.2 10e12/L Final    Hemoglobin 8.9 (L) 11.7 - 15.7 g/dL Final    Hematocrit 28.8 (L) 35.0 - 47.0 % Final    MCV 98 78 - 100 fl Final    MCH 30.2 26.5 - 33.0 pg Final    MCHC 30.9 (L) 31.5 - 36.5 g/dL Final    RDW 17.6 (H) 10.0 - 15.0 % Final    Platelet Count 350 150 - 450 10e9/L Final    Diff Method Automated Method  Final    % Neutrophils 92.8 % Final    % Lymphocytes 2.9 % Final    % Monocytes 3.0 %  Final    % Eosinophils 0.1 % Final    % Basophils 0.1 % Final    % Immature Granulocytes 1.1 % Final    Nucleated RBCs 0 0 /100 Final    Absolute Neutrophil 8.3 1.6 - 8.3 10e9/L Final    Absolute Lymphocytes 0.3 (L) 0.8 - 5.3 10e9/L Final    Absolute Monocytes 0.3 0.0 - 1.3 10e9/L Final    Absolute Eosinophils 0.0 0.0 - 0.7 10e9/L Final    Absolute Basophils 0.0 0.0 - 0.2 10e9/L Final    Abs Immature Granulocytes 0.1 0 - 0.4 10e9/L Final    Absolute Nucleated RBC 0.0  Final         10/31/2017  3:29 PM      Component Results     Component Value Ref Range & Units Status    INR 1.13 0.86 - 1.14 Final         10/31/2017  3:29 PM      Component Results     Component Value Ref Range & Units Status    PTT 31 22 - 37 sec Final         10/31/2017  4:14 PM      Component Results     Component Value Ref Range & Units Status    Sodium 131 (L) 133 - 144 mmol/L Final    Potassium 4.0 3.4 - 5.3 mmol/L Final    Chloride 98 94 - 109 mmol/L Final    Carbon Dioxide 23 20 - 32 mmol/L Final    Anion Gap 10 3 - 14 mmol/L Final    Glucose 166 (H) 70 - 99 mg/dL Final    Urea Nitrogen 10 7 - 30 mg/dL Final    Creatinine 0.76 0.52 - 1.04 mg/dL Final    GFR Estimate 73 >60 mL/min/1.7m2 Final    Non  GFR Calc    GFR Estimate If Black 89 >60 mL/min/1.7m2 Final    African American GFR Calc    Calcium 8.6 8.5 - 10.1 mg/dL Final         10/31/2017  5:00 PM      Component Results     Component Value Ref Range & Units Status    D Dimer 3.7 (H) 0.0 - 0.50 ug/ml FEU Final    This D-dimer assay is intended for use in conjunction with a clinical pretest   probability assessment model to exclude pulmonary embolism (PE) and deep   venous thrombosis (DVT) in outpatients suspected of PE or DVT. The cut-off   value is 0.5 ug/mL FEU.                  Clinical Quality Measure: Blood Pressure Screening     Your blood pressure was checked while you were in the emergency department today. The last reading we obtained was  BP: 148/79 . Please read  the guidelines below about what these numbers mean and what you should do about them.  If your systolic blood pressure (the top number) is less than 120 and your diastolic blood pressure (the bottom number) is less than 80, then your blood pressure is normal. There is nothing more that you need to do about it.  If your systolic blood pressure (the top number) is 120-139 or your diastolic blood pressure (the bottom number) is 80-89, your blood pressure may be higher than it should be. You should have your blood pressure rechecked within a year by a primary care provider.  If your systolic blood pressure (the top number) is 140 or greater or your diastolic blood pressure (the bottom number) is 90 or greater, you may have high blood pressure. High blood pressure is treatable, but if left untreated over time it can put you at risk for heart attack, stroke, or kidney failure. You should have your blood pressure rechecked by a primary care provider within the next 4 weeks.  If your provider in the emergency department today gave you specific instructions to follow-up with your doctor or provider even sooner than that, you should follow that instruction and not wait for up to 4 weeks for your follow-up visit.        Thank you for choosing [facility]       Thank you for choosing [facility] for your care. Our goal is always to provide you with excellent care. Hearing back from our patients is one way we can continue to improve our services. Please take a few minutes to complete the written survey that you may receive in the mail after you visit with us. Thank you!        [employer] Information     [employer] gives you secure access to your electronic health record. If you see a primary care provider, you can also send messages to your care team and make appointments. If you have questions, please call your primary care clinic.  If you do not have a primary care provider, please call [phone] and they will assist you.        Care  EveryWhere ID     This is your Care EveryWhere ID. This could be used by other organizations to access your [place] medical records  [id number]        Equal Access to Services     SOBEIDA GIFFORD : Allyson Fernandes, shannan hodge, leslee groves, holly braga. So [facility] [phone].    ATENCIÓN: Si habla español, tiene a martinez disposición servicios gratuitos de asistencia lingüística. Llame al [phone].    We comply with applicable federal civil rights laws and [place] laws. We do not discriminate on the basis of race, color, national origin, age, disability, sex, sexual orientation, or gender identity.            After Visit Summary       This is your record. Keep this with you and show to your community pharmacist(s) and doctor(s) at your next visit.

## 2017-10-31 NOTE — ED NOTES
Bed: ED02  Expected date: 10/31/17  Expected time: 2:11 PM  Means of arrival: Ambulance  Comments:  BV 76 yo F leg pain

## 2017-10-31 NOTE — TELEPHONE ENCOUNTER
HARSHAL:  Laura RN with [facility] ([phone]) calling to report that the  bruising that started on R upper thigh is now into R buttock and R lower back.  Pt still states she has not had a fall but admits she did sit down hard on the couch.    Of note, pt is in ER now for leg pain.  CAROL Flores R.N.

## 2017-11-01 ENCOUNTER — CARE COORDINATION (OUTPATIENT)
Dept: GERIATRIC MEDICINE | Facility: CLINIC | Age: 77
End: 2017-11-01

## 2017-11-01 ENCOUNTER — CARE COORDINATION (OUTPATIENT)
Dept: CARE COORDINATION | Facility: CLINIC | Age: 77
End: 2017-11-01

## 2017-11-01 NOTE — PROGRESS NOTES
Rec'd notification of ED visit Bárbara Darlings 10/31/17. EPIC notes reviewed . Call placed to client who stated that her homecare recommended that she go to the ED due to bruising and swelling. Client states that all tests were negative, but needs to have a f/u ultra sound. Client states that she did receive a voice message from a care coordinator at the clinic. Enc'd client to contact [facility] to schedule f/u appt and ultra sound, client in agreement.  Scheduled annual HRA 11/8/17 @ 11 AM.  Yeni Savage RN, BC  Supervisor [facility]   [phone] (Fax)

## 2017-11-01 NOTE — PROGRESS NOTES
Clinic Care Coordination Contact  [facility]    Referral Source: ED Follow-Up      Ana Luisa Lee is a 77 year old female presenting to the emergency department for evaluation of leg pain. Her vital signs on presentation reveal elevated blood pressure and heart rate which improved during her ED course. Differential diagnosis is broad including but not limited to venous insufficiency, DVT, cellulitis, congestive heart failure, lymphedema, arterial occlusion, among others. At present, the exact cause of the patient's right lower extremity swelling is not entirely clear. She reports a history of mild traumatic injury three weeks prior with subsequent ecchymosis which is improving per her report. Compartments are soft throughout. She has not sustained any interval trauma to suggest bony abnormality and demonstrates full range of motion about her hips and lower extremity. X-ray negative for fracture or dislocation. Utilizing Well's criteria, the patient is at moderate risk for DVT. D dimer returned elevated at 3.7. Venous ultrasound demonstrates no evidence of DVT to the right lower extremity. Given elevated D dimer, I have recommended repeat ultrasound in three to five days for possible interval development of venous occlusion. Patient notes no symptoms of chest pain or shortness of breath to suggest PE and is without hypoxia. Laboratory evaluation does reveal hemoglobin of 8.9 which is down slightly from 9.7 earlier this month. She denies any other external blood loss. This is unlikely to be secondary to acute arterial bleed as patient notes essentially no symptoms at rest. Again, compartments are soft. Extremities are warm and well perfused distally with palpable, strong distal pulses. She has no overlying skin changes to suggest cellulitis. Of note was incidental Baker's cyst behind the right knee. Patient informed of this. Patient was ambulatory in the ED without difficulty. I feel that she is stable for  discharge home with close out-patient follow up. I recommended follow up with PCP in three days for repeat evaluation, and repeat venous ultrasound. Patient felt comfortable with this plan of care and all questions were answered prior to discharge.       Clinical Data: Care Coordinator Outreach    Outreach attempted x 1.  Left message on voicemail with call back information and requested return call.    Plan:Care Coordinator will try to reach patient again in 1-2 business days.      Zachary Sullivan RN/CC  Care Coordinator [facility]  [phone]

## 2017-11-06 ENCOUNTER — OFFICE VISIT (OUTPATIENT)
Dept: INTERNAL MEDICINE | Facility: CLINIC | Age: 77
End: 2017-11-06
Payer: COMMERCIAL

## 2017-11-06 VITALS
HEIGHT: 61 IN | HEART RATE: 110 BPM | TEMPERATURE: 97.6 F | WEIGHT: 133.8 LBS | SYSTOLIC BLOOD PRESSURE: 122 MMHG | DIASTOLIC BLOOD PRESSURE: 70 MMHG | BODY MASS INDEX: 25.26 KG/M2 | OXYGEN SATURATION: 96 %

## 2017-11-06 DIAGNOSIS — M79.661 PAIN OF RIGHT LOWER LEG: ICD-10-CM

## 2017-11-06 DIAGNOSIS — D64.9 ANEMIA, UNSPECIFIED TYPE: ICD-10-CM

## 2017-11-06 DIAGNOSIS — R60.0 BILATERAL LEG EDEMA: ICD-10-CM

## 2017-11-06 DIAGNOSIS — J44.9 COPD, MODERATE (H): Primary | Chronic | ICD-10-CM

## 2017-11-06 DIAGNOSIS — Z23 NEED FOR 23-POLYVALENT PNEUMOCOCCAL POLYSACCHARIDE VACCINE: ICD-10-CM

## 2017-11-06 LAB
ERYTHROCYTE [DISTWIDTH] IN BLOOD BY AUTOMATED COUNT: 17.2 % (ref 10–15)
FOLATE SERPL-MCNC: 34.8 NG/ML
HCT VFR BLD AUTO: 31.3 % (ref 35–47)
HGB BLD-MCNC: 9.7 G/DL (ref 11.7–15.7)
MCH RBC QN AUTO: 30.9 PG (ref 26.5–33)
MCHC RBC AUTO-ENTMCNC: 31 G/DL (ref 31.5–36.5)
MCV RBC AUTO: 100 FL (ref 78–100)
PLATELET # BLD AUTO: 417 10E9/L (ref 150–450)
RBC # BLD AUTO: 3.14 10E12/L (ref 3.8–5.2)
RETICS # AUTO: 183.4 10E9/L (ref 25–95)
RETICS/RBC NFR AUTO: 5.7 % (ref 0.5–2)
VIT B12 SERPL-MCNC: 1182 PG/ML (ref 193–986)
WBC # BLD AUTO: 9.6 10E9/L (ref 4–11)

## 2017-11-06 PROCEDURE — 83540 ASSAY OF IRON: CPT | Performed by: INTERNAL MEDICINE

## 2017-11-06 PROCEDURE — 36415 COLL VENOUS BLD VENIPUNCTURE: CPT | Performed by: INTERNAL MEDICINE

## 2017-11-06 PROCEDURE — 83550 IRON BINDING TEST: CPT | Performed by: INTERNAL MEDICINE

## 2017-11-06 PROCEDURE — 85045 AUTOMATED RETICULOCYTE COUNT: CPT | Performed by: INTERNAL MEDICINE

## 2017-11-06 PROCEDURE — 82728 ASSAY OF FERRITIN: CPT | Performed by: INTERNAL MEDICINE

## 2017-11-06 PROCEDURE — 82607 VITAMIN B-12: CPT | Performed by: INTERNAL MEDICINE

## 2017-11-06 PROCEDURE — 85027 COMPLETE CBC AUTOMATED: CPT | Performed by: INTERNAL MEDICINE

## 2017-11-06 PROCEDURE — 99215 OFFICE O/P EST HI 40 MIN: CPT | Performed by: INTERNAL MEDICINE

## 2017-11-06 PROCEDURE — 82746 ASSAY OF FOLIC ACID SERUM: CPT | Performed by: INTERNAL MEDICINE

## 2017-11-06 RX ORDER — FUROSEMIDE 20 MG
20 TABLET ORAL EVERY OTHER DAY
Qty: 10 TABLET | Refills: 3 | Status: ON HOLD | OUTPATIENT
Start: 2017-11-06 | End: 2017-11-20

## 2017-11-06 NOTE — NURSING NOTE
"Chief Complaint   Patient presents with     ER F/U       Initial /70 (BP Location: Right arm, Patient Position: Chair, Cuff Size: Adult Large)  Pulse 110  Temp 97.6  F (36.4  C) (Oral)  Ht 5' 1\" (1.549 m)  Wt 133 lb 12.8 oz (60.7 kg)  SpO2 96%  Breastfeeding? No  BMI 25.28 kg/m2 Estimated body mass index is 25.28 kg/(m^2) as calculated from the following:    Height as of this encounter: 5' 1\" (1.549 m).    Weight as of this encounter: 133 lb 12.8 oz (60.7 kg).  Medication Reconciliation: complete   Mariluz Ibrahim CMA      "

## 2017-11-06 NOTE — PATIENT INSTRUCTIONS
Plan:  1. Furosemide 20 mg every other day for 1-2 weeks until the swelling resolves   2. Lidocaine patch 4% over the counter 1-2 patches to the painful area for maximum 12 h a day  3. Follow up in 2 weeks  4. Continue the other meds, same doses for now.  5. Labs today

## 2017-11-06 NOTE — MR AVS SNAPSHOT
After Visit Summary   11/6/2017    Ana Luisa Lee    MRN: [id number]           Patient Information     Date Of Birth          1940        Visit Information        Provider Department      11/6/2017 1:20 PM Jenny Hamilton MD [facility]        Today's Diagnoses     Asthma, persistent    -  1    Need for 23-polyvalent pneumococcal polysaccharide vaccine        Bilateral leg edema        Anemia, unspecified type          Care Instructions      Plan:  1. Furosemide 20 mg every other day for 1-2 weeks until the swelling resolves   2. Lidocaine patch 4% over the counter 1-2 patches to the painful area for maximum 12 h a day  3. Follow up in 2 weeks  4. Continue the other meds, same doses for now.  5. Labs today          Follow-ups after your visit        Who to contact     If you have questions or need follow up information about today's clinic visit or your schedule please contact [facility] directly at [phone].  Normal or non-critical lab and imaging results will be communicated to you by Neocraftshart, letter or phone within 4 business days after the clinic has received the results. If you do not hear from us within 7 days, please contact the clinic through YogaTrailt or phone. If you have a critical or abnormal lab result, we will notify you by phone as soon as possible.  Submit refill requests through Greenlots or call your pharmacy and they will forward the refill request to us. Please allow 3 business days for your refill to be completed.          Additional Information About Your Visit        MyChart Information     Greenlots gives you secure access to your electronic health record. If you see a primary care provider, you can also send messages to your care team and make appointments. If you have questions, please call your primary care clinic.  If you do not have a primary care provider, please call [phone] and they will assist you.       "  Care EveryWhere ID     This is your Care EveryWhere ID. This could be used by other organizations to access your [place] medical records  [id number]        Your Vitals Were     Pulse Temperature Height Pulse Oximetry Breastfeeding? BMI (Body Mass Index)    110 97.6  F (36.4  C) (Oral) 5' 1\" (1.549 m) 96% No 25.28 kg/m2       Blood Pressure from Last 3 Encounters:   11/06/17 122/70   10/31/17 148/79   10/17/17 134/76    Weight from Last 3 Encounters:   11/06/17 133 lb 12.8 oz (60.7 kg)   10/31/17 134 lb (60.8 kg)   10/17/17 131 lb 6.4 oz (59.6 kg)              We Performed the Following     Asthma Action Plan (AAP)     CBC with platelets     Ferritin     Folate     Iron and iron binding capacity     Reticulocyte count     Vitamin B12          Today's Medication Changes          These changes are accurate as of: 11/6/17  2:00 PM.  If you have any questions, ask your nurse or doctor.               Start taking these medicines.        Dose/Directions    furosemide 20 MG tablet   Commonly known as:  LASIX   Used for:  Bilateral leg edema   Started by:  Jenny Hamilton MD        Dose:  20 mg   Take 1 tablet (20 mg) by mouth every other day   Quantity:  10 tablet   Refills:  3         These medicines have changed or have updated prescriptions.        Dose/Directions    * predniSONE 10 MG tablet   Commonly known as:  DELTASONE   This may have changed:  additional instructions   Used for:  COPD, moderate (H), Acute bronchospasm, Acute bronchitis with symptoms > 10 days   Changed by:  Jenny Hamilton MD        Take 3 tabs daily for 4 days Take 2 tabs daily for 4 days Then go back to your previous dose of 10 mg alternating with 15 mg   Quantity:  20 tablet   Refills:  0       * predniSONE 5 MG tablet   Commonly known as:  DELTASONE   This may have changed:  Another medication with the same name was changed. Make sure you understand how and when to take each.   Used for:  COPD, moderate (H) "   Changed by:  Jenny Hamilton MD        Takes 10 mg every other day alternating with 15 mg every other day. But she takes 40 mg daily during the exacerbations as per doctor advice.   Quantity:  300 tablet   Refills:  1       * Notice:  This list has 2 medication(s) that are the same as other medications prescribed for you. Read the directions carefully, and ask your doctor or other care provider to review them with you.         Where to get your medicines      These medications were sent to [id number] - [facility] [address] AT [place] [address] & [address]  [address], [facility] [address]     Phone:  [phone]     furosemide 20 MG tablet                Primary Care Provider Office Phone # Fax #    Jenny Hamilton [phone]       [address]  [facility] [address]        Equal Access to Services     [facility] Center[facility] : Hadii aad ku hadasho Soomaali, waaxda luqadaha, qaybta kaalmada adeegyada, waxay idiin hayaan adepatti kharajony garcia . So [facility] [phone].    ATENCIÓN: Si habla español, tiene a martinez disposición servicios gratuitos de asistencia lingüística. [facility] [phone].    We comply with applicable federal civil rights laws and [place] laws. We do not discriminate on the basis of race, color, national origin, age, disability, sex, sexual orientation, or gender identity.            Thank you!     Thank you for choosing [facility]  for your care. Our goal is always to provide you with excellent care. Hearing back from our patients is one way we can continue to improve our services. Please take a few minutes to complete the written survey that you may receive in the mail after your visit with us. Thank you!             Your Updated Medication List - Protect others around you: Learn how to safely use, store and throw away your medicines at www.disposemymeds.org.          This list is accurate  as of: 11/6/17  2:00 PM.  Always use your most recent med list.                   Brand Name Dispense Instructions for use Diagnosis    albuterol 108 (90 BASE) MCG/ACT Inhaler    PROAIR HFA     Inhale 2 puffs into the lungs every 6 hours as needed    Asthma, persistent       ASPIRIN NOT PRESCRIBED    INTENTIONAL    0 each    1 each continuous prn. Antiplatelet medication not prescribed intentionally due to Use of NSAIDS        * B-12 1000 MCG Tbcr     100 tablet    Take 1,000 mcg by mouth daily    Pernicious anemia       * B-12 TR 1000 MCG Tbcr   Generic drug:  cyanocobalamin     100 tablet    TAKE 1 TABLET BY MOUTH DAILY    Pernicious anemia       budesonide 180 MCG/ACT inhaler    PULMICORT FLEXHALER     Inhale 1 puff into the lungs 2 times daily    Pulmonary emphysema, unspecified emphysema type (H)       calcium 600 MG tablet     60 tablet    Take 1 tablet (600 mg) by mouth daily    Pulmonary emphysema, unspecified emphysema type (H)       cholecalciferol 1000 UNIT tablet    vitamin D3    30 tablet    Take 1 tablet (1,000 Units) by mouth daily    COPD, moderate (H)       diclofenac 1 % Gel topical gel    VOLTAREN    100 g    2 grams to R shoulder four times daily using enclosed dosing card.    Acute pain of right shoulder       ferrous sulfate 325 (65 FE) MG tablet    IRON    100 tablet    Take 1 tablet (325 mg) by mouth daily (with breakfast)    Pulmonary emphysema, unspecified emphysema type (H)       furosemide 20 MG tablet    LASIX    10 tablet    Take 1 tablet (20 mg) by mouth every other day    Bilateral leg edema       gemfibrozil 600 MG tablet    LOPID    90 tablet    Take 1 tablet (600 mg) by mouth daily    Hyperlipidemia with target LDL less than 130       LANsoprazole 30 MG CR capsule    PREVACID    90 capsule    Take 1 capsule (30 mg) by mouth daily    Gastroesophageal reflux disease without esophagitis       levalbuterol 1.25 MG/3ML neb solution    XOPENEX    1584 mL    Take 3 mLs (1.25 mg) by  nebulization every 4 hours as needed for shortness of breath / dyspnea or wheezing    COPD, moderate (H), COPD exacerbation (H)       levothyroxine 25 MCG tablet    SYNTHROID/LEVOTHROID    90 tablet    Take 1 tablet (25 mcg) by mouth daily    Hypothyroidism due to acquired atrophy of thyroid       montelukast 10 MG tablet    SINGULAIR    30 tablet    Take 1 tablet (10 mg) by mouth At Bedtime    Pulmonary emphysema, unspecified emphysema type (H)       MULTIVITAMIN PO      Take 1 tablet by mouth daily        omeprazole 40 MG capsule    priLOSEC    90 capsule    Take 1 capsule (40 mg) by mouth daily Take 30-60 minutes before a meal.    Gastroesophageal reflux disease, esophagitis presence not specified       * order for DME     1 Device    Overnight pulse oximetry    COPD, moderate (H), Other fatigue       * order for DME     1 Device    Equipment being ordered: blood pressure machine    HTN, goal below 140/90, Labile blood pressure       * order for DME     1 each    Equipment being ordered: Walker Wheels (), Walker () and Other: Four Wheeled Walker Treatment Diagnosis: Impaired gait stability and activity tolerance    Pulmonary emphysema, unspecified emphysema type (H)       order for DME     1 each    Equipment being ordered: Nebulizer machine, cup, and tubing.  Fax to [facility] per pt request    COPD, moderate (H)       polyethylene glycol Packet    MIRALAX/GLYCOLAX     Take 17 g by mouth daily as needed for constipation        * predniSONE 10 MG tablet    DELTASONE    20 tablet    Take 3 tabs daily for 4 days Take 2 tabs daily for 4 days Then go back to your previous dose of 10 mg alternating with 15 mg    COPD, moderate (H), Acute bronchospasm, Acute bronchitis with symptoms > 10 days       * predniSONE 5 MG tablet    DELTASONE    300 tablet    Takes 10 mg every other day alternating with 15 mg every other day. But she takes 40 mg daily during the exacerbations as per doctor  advice.    COPD, moderate (H)       prochlorperazine 5 MG tablet    COMPAZINE    120 tablet    Take 1 tablet (5 mg) by mouth every 6 hours as needed for nausea or vomiting    Nausea       roflumilast 500 MCG Tabs tablet    DALIRESP     Take 500 mcg by mouth daily        salmeterol 50 MCG/DOSE diskus inhaler    SEREVENT     Inhale 1 puff into the lungs 2 times daily    Pulmonary emphysema, unspecified emphysema type (H)       tiotropium 18 MCG capsule    SPIRIVA    30 capsule    Inhale 1 capsule (18 mcg) into the lungs daily    Pulmonary emphysema, unspecified emphysema type (H)       * Notice:  This list has 7 medication(s) that are the same as other medications prescribed for you. Read the directions carefully, and ask your doctor or other care provider to review them with you.

## 2017-11-06 NOTE — LETTER
My Asthma Action Plan  Name: Ana Luisa Lee   YOB: 1940  Date: 11/6/2017   My doctor: Jenny Hamilton MD   My clinic: [facility]        My Control Medicine: Budesonide (Pulmicort Flexhaler) -  180 mcg One puff BID  Montelukast (Singulair) -  10 mg daily  Tiotropium (Spiriva) -  Handihaler 18 mcg One capsule per day  My Rescue Medicine: Albuterol (Proair/Ventolin/Proventil) inhaler two puffs every 6 hours PRN  Levalbuterol (Xopenex) nebulizer solution 1.25mg/3mL PRN  Salmeterol 50mcg one puff BID  My Oral Steroid Medicine: Prednisone 40mg X 3 days, 20mg X 2 days My Asthma Severity: severe persistent  Avoid your asthma triggers: Patient is unaware of triggers               GREEN ZONE   Good Control    I feel good    No cough or wheeze    Can work, sleep and play without asthma symptoms       Take your asthma control medicine every day.     1. If exercise triggers your asthma, take your rescue medication    15 minutes before exercise or sports, and    During exercise if you have asthma symptoms  2. Spacer to use with inhaler: If you have a spacer, make sure to use it with your inhaler             YELLOW ZONE Getting Worse  I have ANY of these:    I do not feel good    Cough or wheeze    Chest feels tight    Wake up at night   1. Keep taking your Green Zone medications  2. Start taking your rescue medicine:    every 20 minutes for up to 1 hour. Then every 4 hours for 24-48 hours.  3. If you stay in the Yellow Zone for more than 12-24 hours, contact your doctor.  4. If you do not return to the Green Zone in 12-24 hours or you get worse, start taking your oral steroid medicine if prescribed by your provider.           RED ZONE Medical Alert - Get Help  I have ANY of these:    I feel awful    Medicine is not helping    Breathing getting harder    Trouble walking or talking    Nose opens wide to breathe       1. Take your rescue medicine NOW  2. If your provider has  prescribed an oral steroid medicine, start taking it NOW  3. Call your doctor NOW  4. If you are still in the Red Zone after 20 minutes and you have not reached your doctor:    Take your rescue medicine again and    Call 911 or go to the emergency room right away    See your regular doctor within 2 weeks of an Emergency Room or Urgent Care visit for follow-up treatment.        Electronically signed by: Mariluz Ibrahim, November 6, 2017    Annual Reminders:  Meet with Asthma Educator,  Flu Shot in the Fall, consider Pneumonia Vaccination for patients with asthma (aged 19 and older).    Pharmacy:    [facility] [address] [place] [address] - [address] AT [place] [address] & [address]  [facility]                    Asthma Triggers  How To Control Things That Make Your Asthma Worse    Triggers are things that make your asthma worse.  Look at the list below to help you find your triggers and what you can do about them.  You can help prevent asthma flare-ups by staying away from your triggers.      Trigger                                                          What you can do   Cigarette Smoke  Tobacco smoke can make asthma worse. Do not allow smoking in your home, car or around you.  Be sure no one smokes at a child s day care or school.  If you smoke, ask your health care provider for ways to help you quit.  Ask family members to quit too.  Ask your health care provider for a referral to Quit Plan to help you quit smoking, or call 7-366-693-PLAN.     Colds, Flu, Bronchitis  These are common triggers of asthma. Wash your hands often.  Don t touch your eyes, nose or mouth.  Get a flu shot every year.     Dust Mites  These are tiny bugs that live in cloth or carpet. They are too small to see. Wash sheets and blankets in hot water every week.   Encase pillows and mattress in dust mite proof covers.  Avoid having carpet if you can. If you have carpet, vacuum weekly.   Use a  dust mask and HEPA vacuum.   Pollen and Outdoor Mold  Some people are allergic to trees, grass, or weed pollen, or molds. Try to keep your windows closed.  Limit time out doors when pollen count is high.   Ask you health care provider about taking medicine during allergy season.     Animal Dander  Some people are allergic to skin flakes, urine or saliva from pets with fur or feathers. Keep pets with fur or feathers out of your home.    If you can t keep the pet outdoors, then keep the pet out of your bedroom.  Keep the bedroom door closed.  Keep pets off cloth furniture and away from stuffed toys.     Mice, Rats, and Cockroaches  Some people are allergic to the waste from these pests.   Cover food and garbage.  Clean up spills and food crumbs.  Store grease in the refrigerator.   Keep food out of the bedroom.   Indoor Mold  This can be a trigger if your home has high moisture. Fix leaking faucets, pipes, or other sources of water.   Clean moldy surfaces.  Dehumidify basement if it is damp and smelly.   Smoke, Strong Odors, and Sprays  These can reduce air quality. Stay away from strong odors and sprays, such as perfume, powder, hair spray, paints, smoke incense, paint, cleaning products, candles and new carpet.   Exercise or Sports  Some people with asthma have this trigger. Be active!  Ask your doctor about taking medicine before sports or exercise to prevent symptoms.    Warm up for 5-10 minutes before and after sports or exercise.     Other Triggers of Asthma  Cold air:  Cover your nose and mouth with a scarf.  Sometimes laughing or crying can be a trigger.  Some medicines and food can trigger asthma.

## 2017-11-06 NOTE — PROGRESS NOTES
Dr Tierney's note      Patient's instructions / PLAN:                                                        Plan:  1. Furosemide 20 mg every other day for 1-2 weeks until the swelling resolves   2. Lidocaine patch 4% over the counter 1-2 patches to the painful area for maximum 12 h a day  3. Follow up in 2 weeks  4. Continue the other meds, same doses for now.  5. Labs today         ASSESSMENT & PLAN:                                                      76 y/o woman with complex Med Prob: steroid depended COPD/asthma ( multiple hosp stay), SVT, (allergy to BB and Ca channel blockers), HTN, hypothyroidism, osteoporosis, HLipidemia   Today she has worsening anemia ( with no obvious signs of bleeding),  leg edema, R leg pain.   R leg pain -- I suspect hematoma and she opted for conservative tx - obs, for now, she will cont Advil and tylenol and use local lidoderm patch   Anemia is getting progressively worse. We need more labs to see what type.   She has leg edema and she will take low dose furosemide considering she had low BP in the past with diuretics.     (J44.9) COPD, moderate (HCC)  (primary encounter diagnosis)  Comment:   Plan: order for DME            (J45.909) Asthma, persistent  Comment:   Plan: Continue same meds, same doses for now     (R60.0) Bilateral leg edema  Comment:   Plan: furosemide (LASIX) 20 MG tablet            (D64.9) Anemia, unspecified type  Comment:   Plan: CBC with platelets, Iron and iron binding         capacity, Vitamin B12, Folate, Ferritin,         Reticulocyte count            (Z23) Need for 23-polyvalent pneumococcal polysaccharide vaccine  Comment:   Plan:      (M79.661) Pain of right lower leg  Comment:   Plan: obs     Chief complaint:                                                      R leg pain   Anemia   Nebs   Leg edema    SUBJECTIVE:   Ana Luisa Lee is a 77 year old female who presents to clinic today for the following health issues:  *ER F/U-LOV  8/11/17  *Consult-OK to do pneumovax today? Is coming down from a prednisone taper right now.     R leg pain:   -- About 3-4 weeks ago she twisted the R hip than she noticed pain in the R hip and black and blue all the way to the R knee and little lower, but not to the foot.   -- Takes Advil/tylenol for pain   -- pain mild-moderate. No fever.  -- Exam: swelling round at the inf R buttock, tender. No cellulitis, Suspect hematoma   -- Discuss about CT or waiting. She decided for waiting.     Leg edema   + 2 leg edema soft Afraid to take furosemide because in the past she became dehydrated and fainted       Anemia  -- Hgb 8.9 < -- 9.7<-- 10.8  -- Wants rx for iron   -- we don't know the iron  -- she needs more tests     COPD/Asthma   -- prednisone dependent   -- stable today   -- Wants rx for tubing and mouth piece to use w nebulizer. I will give it to her, but if more info or documentation ( Medicare)  is required she needs to get this rx through the [facility]     ED/ Followup:    Facility:  [facility]  Date of visit: 10/31/17  Reason for visit:   Pain of right lower leg M79.661   Current Status: That pain seems to have improved, but she now has a different type of pain in the upper butt/lower back area on right side (seemed to come on when she went to stand up out of a chair earlier today). Right foot is still swollen (has been like this for about a week, but she says they essentially dismissed this in the ED. Left foot is also swollen, but to a lesser extent).        Review of Systems:                                                      ROS: negative for fever, chills, cough, wheezes, chest pain,  vomiting, abdominal pain, leg swelling pos for chr sob    A 10-point review of systems was obtained.  Those pertinent are above and in the in the Subjective section.  The rest of the systems are negative.           OBJECTIVE:             Physical exam:  Blood pressure 122/70, pulse 110, temperature 97.6  " F (36.4  C), temperature source Oral, height 5' 1\" (1.549 m), weight 133 lb 12.8 oz (60.7 kg), SpO2 96 %, not currently breastfeeding.   NAD, appears comfortable  Skin: no rashes   HEENT: PERRLA, EOMI, pink conjunctiva, anicteric sclerae, bilateral tympanic membranes are clinically normal, oropharynx is normal color  Neck: supple, no JVD, no carotid bruits. No thyroidmegaly. Lymph nodes nonpalpable cervical and supraclavicular.  Chest: clear to auscultation bilaterally, good respiratory effort  Heart: S1 S2, RRR, no mgr appreciated  Abdomen: soft, not tender,   Extremities: +2 edema, swelling round at the inf R buttock, tender. No cellulitis, Suspect hematoma   Neurologic: A, Ox3, no focal signs appreciated    PMHx: reviewed  Past Medical History:   Diagnosis Date     Anemia Dec 2011     Arthritis of hand      Asthma, persistent     f/U dr Brock at [facility]     Asthma, persistent      Chronic intermittent steroid use     for asthma     COPD exacerbation (H) 10/25/2013     Esophageal stricture 2007    s/p dilation     Foot deformity     Left     HTN, goal below 140/90      Hyperlipidemia      Hyperlipidemia LDL goal < 130      Hypothyroidism Dec 2011     Hypothyroidism      Left foot pain Nov 2011     Left shoulder pain Nov 2011     Medication side effects      Osteoporosis      Paroxysmal supraventricular tachycardia (H)      PVC (premature ventricular contraction) May 2012     Skin cyst Dec 2011    L thumb     SOB (shortness of breath) on exertion May 2012     SVT (supraventricular tachycardia) (H)       PSHx: reviewed  Past Surgical History:   Procedure Laterality Date     BUNIONECTOMY LAPIDUS WITH TARSAL METATARSAL (TMT) FUSION  1990's     EP STUDY, MODIFIED  7/2012    SVT not induced, PVC's     GYN SURGERY  1980     HYSTERECTOMY TOTAL ABDOMINAL       HYSTERECTOMY, PAP NO LONGER INDICATED       TONSILLECTOMY          Meds: reviewed  Current Outpatient Prescriptions   Medication Sig Dispense Refill     " gemfibrozil (LOPID) 600 MG tablet Take 1 tablet (600 mg) by mouth daily 90 tablet 0     B-12 TR 1000 MCG TBCR TAKE 1 TABLET BY MOUTH DAILY 100 tablet 0     predniSONE (DELTASONE) 5 MG tablet Takes 10 mg every other day alternating with 15 mg every other day. But she takes 40 mg daily during the exacerbations as per doctor advice. 300 tablet 1     prochlorperazine (COMPAZINE) 5 MG tablet Take 1 tablet (5 mg) by mouth every 6 hours as needed for nausea or vomiting 120 tablet 0     levothyroxine (SYNTHROID/LEVOTHROID) 25 MCG tablet Take 1 tablet (25 mcg) by mouth daily 90 tablet 0     levalbuterol (XOPENEX) 1.25 MG/3ML neb solution Take 3 mLs (1.25 mg) by nebulization every 4 hours as needed for shortness of breath / dyspnea or wheezing 1584 mL 1     omeprazole (PRILOSEC) 40 MG capsule Take 1 capsule (40 mg) by mouth daily Take 30-60 minutes before a meal. 90 capsule 3     diclofenac (VOLTAREN) 1 % GEL topical gel 2 grams to R shoulder four times daily using enclosed dosing card. 100 g 1     predniSONE (DELTASONE) 10 MG tablet Take 3 tabs daily for 4 days Take 2 tabs daily for 4 days Then go back to your previous dose of 10 mg alternating with 15 mg (Patient taking differently: Take dose of 10 mg alternating with 15 mg) 20 tablet 0     order for DME Equipment being ordered: Nebulizer machine, cup, and tubing.  Fax to [facility] per pt request 1 each 0     roflumilast (DALIRESP) 500 MCG TABS tablet Take 500 mcg by mouth daily        polyethylene glycol (MIRALAX/GLYCOLAX) Packet Take 17 g by mouth daily as needed for constipation       albuterol (ALBUTEROL) 108 (90 BASE) MCG/ACT Inhaler Inhale 2 puffs into the lungs every 6 hours as needed       budesonide (PULMICORT FLEXHALER) 180 MCG/ACT inhaler Inhale 1 puff into the lungs 2 times daily       montelukast (SINGULAIR) 10 MG tablet Take 1 tablet (10 mg) by mouth At Bedtime 30 tablet      salmeterol (SEREVENT) 50 MCG/DOSE diskus inhaler Inhale 1 puff  into the lungs 2 times daily       tiotropium (SPIRIVA) 18 MCG capsule Inhale 1 capsule (18 mcg) into the lungs daily 30 capsule      Cyanocobalamin (B-12) 1000 MCG TBCR Take 1,000 mcg by mouth daily 100 tablet 1     ferrous sulfate (IRON) 325 (65 FE) MG tablet Take 1 tablet (325 mg) by mouth daily (with breakfast) 100 tablet      calcium 600 MG tablet Take 1 tablet (600 mg) by mouth daily 60 tablet      LANsoprazole (PREVACID) 30 MG CR capsule Take 1 capsule (30 mg) by mouth daily 90 capsule 3     cholecalciferol (VITAMIN D) 1000 UNIT tablet Take 1 tablet (1,000 Units) by mouth daily 30 tablet      order for DME Equipment being ordered: Walker Wheels (), Walker () and Other: Four Wheeled Walker  Treatment Diagnosis: Impaired gait stability and activity tolerance 1 each 0     order for DME Equipment being ordered: blood pressure machine 1 Device 0     order for DME Overnight pulse oximetry 1 Device 0     Multiple Vitamins-Minerals (MULTIVITAMIN OR) Take 1 tablet by mouth daily       ASPIRIN NOT PRESCRIBED, INTENTIONAL, 1 each continuous prn. Antiplatelet medication not prescribed intentionally due to Use of NSAIDS 0 each 0       Soc Hx: reviewed  Fam Hx: reviewed      Time spent with the patient  40 min, more than 50% in counseling and coordinating care, Re above medical problems leg pain, leg edema, meds side effects, anemia, COPD      Jenny Tierney MD  Internal Medicine

## 2017-11-07 LAB
FERRITIN SERPL-MCNC: 241 NG/ML (ref 8–252)
IRON SATN MFR SERPL: 15 % (ref 15–46)
IRON SERPL-MCNC: 46 UG/DL (ref 35–180)
TIBC SERPL-MCNC: 301 UG/DL (ref 240–430)

## 2017-11-08 ENCOUNTER — CARE COORDINATION (OUTPATIENT)
Dept: GERIATRIC MEDICINE | Facility: CLINIC | Age: 77
End: 2017-11-08

## 2017-11-08 DIAGNOSIS — Z76.89 HEALTH CARE HOME: ICD-10-CM

## 2017-11-08 ASSESSMENT — PATIENT HEALTH QUESTIONNAIRE - PHQ9: SUM OF ALL RESPONSES TO PHQ QUESTIONS 1-9: 4

## 2017-11-08 NOTE — PROGRESS NOTES
[facility] Home Visit Assessment     Home visit for Health Risk Assessment with Ana Luisa Lee completed on November 8, 2017  Member resides in [facility] and lives alone  Present at assessment: member and this care coordinator    Current Care Plan  Member currently receiving the following services: Home Health Aide Homemaking RN ([facility] wkly med set up and HHA 2x/wk bathing) PERS unit provided by the apt complex       Medication Review  Medication reconciliation completed in Epic:Yes  Medication set-up & administration: RN sets up  weekly.  Self-administers medications.  Medication understanding/adherence (by member, family or CC): Member has questions about his or her medications:  No  and Medications adherence concerns:  No   Client inquired on mail delivery of medications, CM to provide options to client.     Mental/Behavioral Health   Depression Screening: See PHQ assessment flowsheet.   Mental Health Diagnosis: No    Falls in last 12 months: No If yes, was an injury sustained? No    ADL/IADL Dependencies: Ambulation-walker, Bathing, Cleaning, Laundry, Shopping and Medication Management     [facility] sponsored benefits: Shared information re: Silver Sneakers/gym memberships, ASA, Calcium +D.    PCA Assessment completed at visit: Not applicable     Elderly Waiver Eligibility: Yes-will continue on EW    Care Plan & Recommendations: Noted referral for [facility], client states that she has not rec'd and does not wish to pursue, CM to complete a DTR.  Cont with [facility] weekly med set up and HHA 2x/wk, homemaker 3 hrs/wk.  Client states an interest in a , client agreeable with referral to [facility] -wkly visits to assist client with getting out of her apt, to do errands, shop.   Reviewed weight loss this past year, client states that she has a good appetite and PCP is aware.  Client will discuss at next PCP appt in 2 wks.     See LTCC for detailed assessment  information.    Follow-Up Plan: Member informed of future contact, plan to f/u with member with a 6 month telephone assessment.  Contact information shared with member and family, encouraged member to call with any questions or concerns at any time.  [place] care continuum providers: Please refer to Health Care Home on the [facility] Problem List to view this patient's [facility] Care Plan Summary  MMIS completed, Rate Cell B, Case Mix L.  Voice message left with Enid RINALDI at [facility] requesting to place a referral.   Care plan completed, pending .  Yeni Savage RN, BC  Supervisor [facility]   [phone] (Fax)

## 2017-11-09 NOTE — PROGRESS NOTES
11/8/17 Rec'd vm from [facility] stating that they no longer provide  services, request that CM contact January at [facility], [phone]  11/9/17 VM left with January [facility] requesting a return call  11/9/17 Rec'd vm from client's daughter Windy inquiring if CM had information on aide/ providers. Windy stated that she contacted [facility] and their minimum is 6 hrs/wk, her preference is 3 hrs/wk.   Call placed to Windy to share the above info.   CM to follow.   Yeni Savage RN, BC  Supervisor [facility]   [phone] (Fax)

## 2017-11-14 NOTE — PROGRESS NOTES
Rec'd e-mail from January at [facility] with updated  referral form. Completed  referral and Right faxed to [facility].  DTR completed per d/c MOW per client's request. Unknown date of last meal, client not able to recall.   Call placed to [facility] mail in pharmacy [phone] informed that all medications would be mailed via mail from [facility], any urgent medications such as antibiotics, would encourage client to p/u from a local pharmacy. 3 options to order, call in, intranet or automated system.  Call placed to client's current pharmacy [facility] informed that they do not provide home delivery of medications.  Call placed to client to share the above information, client would like to continue with [facility] at this time.  Yeni Savage RN, BC  Supervisor [facility]   [phone] (Fax)

## 2017-11-15 ENCOUNTER — CARE COORDINATION (OUTPATIENT)
Dept: GERIATRIC MEDICINE | Facility: CLINIC | Age: 77
End: 2017-11-15

## 2017-11-15 NOTE — PROGRESS NOTES
Received a request to submit a DTR for the termination of delivered meals. Documentation completed and faxed to the health plan.  aware.    Candis Wiseman RN  Utilization   [facility]  [phone]

## 2017-11-16 ENCOUNTER — CARE COORDINATION (OUTPATIENT)
Dept: GERIATRIC MEDICINE | Facility: CLINIC | Age: 77
End: 2017-11-16

## 2017-11-16 ENCOUNTER — TELEPHONE (OUTPATIENT)
Dept: INTERNAL MEDICINE | Facility: CLINIC | Age: 77
End: 2017-11-16

## 2017-11-16 DIAGNOSIS — J44.9 COPD, MODERATE (H): Primary | Chronic | ICD-10-CM

## 2017-11-16 NOTE — TELEPHONE ENCOUNTER
Windy, RN with [facility] ([phone]) calling to report that patient has not been feeling well for about the past 24 hours and contacted home care nurse to make a visit today.  Has had a low grade fever, wet and productive cough, slight wheezing but overall lungs sound pretty clear.  Heart rate 80s, BP slightly elevated.  Windy attempted to schedule appt for pt to be seen in clinic today or tomorrow and there were no openings.  Would provider consider giving rx for Levaquin which is what she has used in the past.  She does have an appt scheduled next week to review lab results.      Call patient with response.  CAROL Flores R.N.

## 2017-11-16 NOTE — PROGRESS NOTES
Information rec'd from [employer]  The DTR review team has reviewed your request for the member Ana Luisa Lee.     Decision: The reviewer agrees with CC recommendation to terminate services.  Item/Service: Home Delivered Meals.  Amount: NA  Start date of termination: 11/26/17.  Yeni Savage RN, BC  Supervisor [facility]   [phone] (Fax)

## 2017-11-17 RX ORDER — LEVOFLOXACIN 500 MG/1
500 TABLET, FILM COATED ORAL DAILY
Qty: 7 TABLET | Refills: 0 | Status: ON HOLD | OUTPATIENT
Start: 2017-11-17 | End: 2017-11-22

## 2017-11-19 ENCOUNTER — APPOINTMENT (OUTPATIENT)
Dept: GENERAL RADIOLOGY | Facility: CLINIC | Age: 77
DRG: 543 | End: 2017-11-19
Attending: EMERGENCY MEDICINE
Payer: COMMERCIAL

## 2017-11-19 ENCOUNTER — HOSPITAL ENCOUNTER (INPATIENT)
Facility: CLINIC | Age: 77
LOS: 2 days | Discharge: HOME HEALTH | DRG: 543 | End: 2017-11-22
Attending: EMERGENCY MEDICINE | Admitting: HOSPITALIST
Payer: COMMERCIAL

## 2017-11-19 ENCOUNTER — APPOINTMENT (OUTPATIENT)
Dept: CT IMAGING | Facility: CLINIC | Age: 77
DRG: 543 | End: 2017-11-19
Attending: EMERGENCY MEDICINE
Payer: COMMERCIAL

## 2017-11-19 DIAGNOSIS — M84.48XA SACRAL INSUFFICIENCY FRACTURE, INITIAL ENCOUNTER: ICD-10-CM

## 2017-11-19 DIAGNOSIS — R26.2 UNABLE TO AMBULATE: ICD-10-CM

## 2017-11-19 DIAGNOSIS — T14.8XXA HEMATOMA: ICD-10-CM

## 2017-11-19 LAB
ALBUMIN SERPL-MCNC: 3.5 G/DL (ref 3.4–5)
ALP SERPL-CCNC: 89 U/L (ref 40–150)
ALT SERPL W P-5'-P-CCNC: 14 U/L (ref 0–50)
ANION GAP SERPL CALCULATED.3IONS-SCNC: 8 MMOL/L (ref 3–14)
AST SERPL W P-5'-P-CCNC: 12 U/L (ref 0–45)
BASOPHILS # BLD AUTO: 0 10E9/L (ref 0–0.2)
BASOPHILS NFR BLD AUTO: 0.2 %
BILIRUB SERPL-MCNC: 0.4 MG/DL (ref 0.2–1.3)
BUN SERPL-MCNC: 10 MG/DL (ref 7–30)
CALCIUM SERPL-MCNC: 9.2 MG/DL (ref 8.5–10.1)
CHLORIDE SERPL-SCNC: 97 MMOL/L (ref 94–109)
CO2 SERPL-SCNC: 23 MMOL/L (ref 20–32)
CREAT SERPL-MCNC: 0.79 MG/DL (ref 0.52–1.04)
DIFFERENTIAL METHOD BLD: ABNORMAL
EOSINOPHIL # BLD AUTO: 0 10E9/L (ref 0–0.7)
EOSINOPHIL NFR BLD AUTO: 0.3 %
ERYTHROCYTE [DISTWIDTH] IN BLOOD BY AUTOMATED COUNT: 15.1 % (ref 10–15)
GFR SERPL CREATININE-BSD FRML MDRD: 71 ML/MIN/1.7M2
GLUCOSE SERPL-MCNC: 121 MG/DL (ref 70–99)
HCT VFR BLD AUTO: 31 % (ref 35–47)
HGB BLD-MCNC: 10.2 G/DL (ref 11.7–15.7)
IMM GRANULOCYTES # BLD: 0.1 10E9/L (ref 0–0.4)
IMM GRANULOCYTES NFR BLD: 0.9 %
LYMPHOCYTES # BLD AUTO: 1.5 10E9/L (ref 0.8–5.3)
LYMPHOCYTES NFR BLD AUTO: 14.1 %
MCH RBC QN AUTO: 30.5 PG (ref 26.5–33)
MCHC RBC AUTO-ENTMCNC: 32.9 G/DL (ref 31.5–36.5)
MCV RBC AUTO: 93 FL (ref 78–100)
MONOCYTES # BLD AUTO: 0.9 10E9/L (ref 0–1.3)
MONOCYTES NFR BLD AUTO: 8.5 %
NEUTROPHILS # BLD AUTO: 8.3 10E9/L (ref 1.6–8.3)
NEUTROPHILS NFR BLD AUTO: 76 %
NRBC # BLD AUTO: 0 10*3/UL
NRBC BLD AUTO-RTO: 0 /100
PLATELET # BLD AUTO: 347 10E9/L (ref 150–450)
POTASSIUM SERPL-SCNC: 3.6 MMOL/L (ref 3.4–5.3)
PROT SERPL-MCNC: 6.3 G/DL (ref 6.8–8.8)
RBC # BLD AUTO: 3.34 10E12/L (ref 3.8–5.2)
SODIUM SERPL-SCNC: 128 MMOL/L (ref 133–144)
WBC # BLD AUTO: 11 10E9/L (ref 4–11)

## 2017-11-19 PROCEDURE — 25000125 ZZHC RX 250: Performed by: EMERGENCY MEDICINE

## 2017-11-19 PROCEDURE — 36415 COLL VENOUS BLD VENIPUNCTURE: CPT | Performed by: EMERGENCY MEDICINE

## 2017-11-19 PROCEDURE — 85025 COMPLETE CBC W/AUTO DIFF WBC: CPT | Performed by: EMERGENCY MEDICINE

## 2017-11-19 PROCEDURE — 40000275 ZZH STATISTIC RCP TIME EA 10 MIN

## 2017-11-19 PROCEDURE — 80053 COMPREHEN METABOLIC PANEL: CPT | Performed by: EMERGENCY MEDICINE

## 2017-11-19 PROCEDURE — 99285 EMERGENCY DEPT VISIT HI MDM: CPT | Mod: 25

## 2017-11-19 PROCEDURE — 72192 CT PELVIS W/O DYE: CPT

## 2017-11-19 PROCEDURE — 96374 THER/PROPH/DIAG INJ IV PUSH: CPT

## 2017-11-19 PROCEDURE — 25000128 H RX IP 250 OP 636: Performed by: EMERGENCY MEDICINE

## 2017-11-19 PROCEDURE — 94640 AIRWAY INHALATION TREATMENT: CPT

## 2017-11-19 PROCEDURE — 72170 X-RAY EXAM OF PELVIS: CPT

## 2017-11-19 PROCEDURE — 96375 TX/PRO/DX INJ NEW DRUG ADDON: CPT

## 2017-11-19 PROCEDURE — 73552 X-RAY EXAM OF FEMUR 2/>: CPT | Mod: RT

## 2017-11-19 RX ORDER — IPRATROPIUM BROMIDE AND ALBUTEROL SULFATE 2.5; .5 MG/3ML; MG/3ML
3 SOLUTION RESPIRATORY (INHALATION) ONCE
Status: COMPLETED | OUTPATIENT
Start: 2017-11-19 | End: 2017-11-19

## 2017-11-19 RX ORDER — HYDROMORPHONE HYDROCHLORIDE 1 MG/ML
0.5 INJECTION, SOLUTION INTRAMUSCULAR; INTRAVENOUS; SUBCUTANEOUS
Status: DISCONTINUED | OUTPATIENT
Start: 2017-11-19 | End: 2017-11-20

## 2017-11-19 RX ADMIN — IPRATROPIUM BROMIDE AND ALBUTEROL SULFATE 3 ML: .5; 3 SOLUTION RESPIRATORY (INHALATION) at 21:09

## 2017-11-19 RX ADMIN — HYDROMORPHONE HYDROCHLORIDE 0.5 MG: 1 INJECTION, SOLUTION INTRAMUSCULAR; INTRAVENOUS; SUBCUTANEOUS at 22:39

## 2017-11-19 RX ADMIN — HYDROMORPHONE HYDROCHLORIDE 0.5 MG: 1 INJECTION, SOLUTION INTRAMUSCULAR; INTRAVENOUS; SUBCUTANEOUS at 21:10

## 2017-11-19 ASSESSMENT — ENCOUNTER SYMPTOMS
ARTHRALGIAS: 1
SHORTNESS OF BREATH: 1
WEAKNESS: 1
FEVER: 0

## 2017-11-19 NOTE — IP AVS SNAPSHOT
MRN:[id number]                      After Visit Summary   11/19/2017    Ana Luisa Lee    MRN: [id number]           Thank you!     Thank you for choosing [facility] for your care. Our goal is always to provide you with excellent care. Hearing back from our patients is one way we can continue to improve our services. Please take a few minutes to complete the written survey that you may receive in the mail after you visit. If you would like to speak to someone directly about your visit please contact Patient Relations at [phone]. Thank you!          Patient Information     Date Of Birth          1940        Designated Caregiver       Most Recent Value    Caregiver    Will someone help with your care after discharge? yes    Name of designated caregiver Home health nurse    Phone number of caregiver Unknown    Caregiver address Unknown      About your hospital stay     You were admitted on:  November 20, 2017 You last received care in the:  [facility]    You were discharged on:  November 22, 2017       Who to Call     For medical emergencies, please call 911.  For non-urgent questions about your medical care, please call your primary care provider or clinic, [phone]          Attending Provider     Provider Specialty    Lum, Maribel Hyatt MD Emergency Medicine    Bri Thompson MD Emergency Medicine    Alex, Damien Carlton DO Internal Medicine       Primary Care Provider Office Phone # Fax #    Jenny Hamilton [phone]      After Care Instructions     Activity       Your activity upon discharge: activity as tolerated            Diet       Follow this diet upon discharge: Regular                  Follow-up Appointments     Follow-up and recommended labs and tests        Follow up with primary care provider, Jenyn Hamilton, within 7 days for hospital follow- up.  The following labs/tests are  recommended: BMP and CBC in 7 days.                  Your next 10 appointments already scheduled     Nov 27, 2017 11:00 AM CST   [facility] Jenny [facility] MD   [facility]    [address]  [facility] [id number] [phone]           Bring a current list of meds and any records pertaining to this visit. For Physicals, please bring immunization records and any forms needing to be filled out. Please arrive 10 minutes early to complete paperwork.              Additional Services     Home Care OT Referral for Hospital Discharge       OT to eval and treat    Your provider has ordered home care - occupational therapy. If you have not been contacted within 2 days of your discharge please call the department phone number listed on the top of this document.            Home Care PT Referral for Hospital Discharge       PT to eval and treat    Your provider has ordered home care - physical therapy. If you have not been contacted within 2 days of your discharge please call the department phone number listed on the top of this document.            Home care nursing referral       RN skilled nursing visit. RN to assess respiratory and cardiac status.    Your provider has ordered home care nursing services. If you have not been contacted within 2 days of your discharge please call the inpatient department phone number at [phone] .                  Pending Results     No orders found from 11/17/2017 to 11/20/2017.            Statement of Approval     Ordered          11/22/17 1248  I have reviewed and agree with all the recommendations and orders detailed in this document.  EFFECTIVE NOW     Approved and electronically signed by:  Fuentes Hopson DO             Admission Information     Date & Time Provider Department Dept. Phone    11/19/2017 Damien Johnson DO [facility] [phone]      Your Vitals Were     Blood  Pressure Pulse Temperature Respirations Weight Pulse Oximetry    159/83 (BP Location: Left arm) 95 98.1  F (36.7  C) (Oral) 18 56.8 kg (125 lb 3.2 oz) 99%    BMI (Body Mass Index)                   23.66 kg/m2           [facility] Integrated Information     [employer] [facility] gives you secure access to your electronic health record. If you see a primary care provider, you can also send messages to your care team and make appointments. If you have questions, please call your primary care clinic.  If you do not have a primary care provider, please call [phone] and they will assist you.        Care EveryWhere ID     This is your Care EveryWhere ID. This could be used by other organizations to access your [place] medical records  [id number]        Equal Access to Services     SOBEIDA GIFFORD : Allyson Fernandes, shannan hodge, leslee groves, holly braga. So [facility] [phone].    ATENCIÓN: Si habla español, tiene a martinez disposición servicios gratuitos de asistencia lingüística. [facility] [phone].    We comply with applicable federal civil rights laws and [place] laws. We do not discriminate on the basis of race, color, national origin, age, disability, sex, sexual orientation, or gender identity.               Review of your medicines      START taking        Dose / Directions    acetaminophen 325 MG tablet   Commonly known as:  TYLENOL   Used for:  Sacral insufficiency fracture, initial encounter        Dose:  650 mg   Take 2 tablets (650 mg) by mouth every 4 hours as needed for mild pain   Quantity:  100 tablet   Refills:  0       hydrOXYzine 25 MG tablet   Commonly known as:  ATARAX   Used for:  Sacral insufficiency fracture, initial encounter        Dose:  25 mg   Take 1 tablet (25 mg) by mouth 3 times daily as needed for other (pain)   Quantity:  40 tablet   Refills:  0       lisinopril 5 MG tablet   Commonly known as:  PRINIVIL/ZESTRIL   Used for:  Sacral insufficiency  fracture, initial encounter        Dose:  5 mg   Start taking on:  11/23/2017   Take 1 tablet (5 mg) by mouth daily   Quantity:  30 tablet   Refills:  0       oxyCODONE IR 5 MG tablet   Commonly known as:  ROXICODONE   Used for:  Sacral insufficiency fracture, initial encounter        Dose:  5 mg   Take 1 tablet (5 mg) by mouth every 4 hours as needed for moderate to severe pain   Quantity:  20 tablet   Refills:  0       senna-docusate 8.6-50 MG per tablet   Commonly known as:  SENOKOT-S;PERICOLACE   Used for:  Sacral insufficiency fracture, initial encounter        Dose:  1 tablet   Take 1 tablet by mouth daily   Quantity:  20 tablet   Refills:  0         CONTINUE these medicines which may have CHANGED, or have new prescriptions. If we are uncertain of the size of tablets/capsules you have at home, strength may be listed as something that might have changed.        Dose / Directions    ferrous sulfate 325 (65 FE) MG tablet   Commonly known as:  IRON   This may have changed:  when to take this   Used for:  Pulmonary emphysema, unspecified emphysema type (H)        Dose:  1 tablet   Take 1 tablet (325 mg) by mouth daily (with breakfast)   Quantity:  100 tablet   Refills:  0         CONTINUE these medicines which have NOT CHANGED        Dose / Directions    albuterol 108 (90 BASE) MCG/ACT Inhaler   Commonly known as:  PROAIR HFA   Used for:  Asthma, persistent        Dose:  2 puff   Inhale 2 puffs into the lungs every 6 hours as needed   Refills:  0       ASPIRIN NOT PRESCRIBED   Commonly known as:  INTENTIONAL        Dose:  1 each   1 each continuous prn. Antiplatelet medication not prescribed intentionally due to Use of NSAIDS   Quantity:  0 each   Refills:  0       B-12 TR 1000 MCG Tbcr   Used for:  Pernicious anemia   Generic drug:  cyanocobalamin        TAKE 1 TABLET BY MOUTH DAILY   Quantity:  100 tablet   Refills:  0       budesonide 180 MCG/ACT inhaler   Commonly known as:  PULMICORT FLEXHALER   Used for:   Pulmonary emphysema, unspecified emphysema type (H)        Dose:  1 puff   Inhale 1 puff into the lungs 2 times daily   Refills:  0       calcium 600 MG tablet   Used for:  Pulmonary emphysema, unspecified emphysema type (H)        Dose:  1 tablet   Take 1 tablet (600 mg) by mouth daily   Quantity:  60 tablet   Refills:  0       cholecalciferol 1000 UNIT tablet   Commonly known as:  vitamin D3   Used for:  COPD, moderate (H)        Dose:  1000 Units   Take 1 tablet (1,000 Units) by mouth daily   Quantity:  30 tablet   Refills:  0       gemfibrozil 600 MG tablet   Commonly known as:  LOPID   Used for:  Hyperlipidemia with target LDL less than 130        Dose:  600 mg   Take 1 tablet (600 mg) by mouth daily   Quantity:  90 tablet   Refills:  0       INCRUSE ELLIPTA 62.5 MCG/INH oral inhaler   Generic drug:  umeclidinium        Dose:  1 puff   Inhale 1 puff into the lungs daily   Refills:  0       LANsoprazole 30 MG CR capsule   Commonly known as:  PREVACID   Used for:  Gastroesophageal reflux disease without esophagitis        Dose:  30 mg   Take 1 capsule (30 mg) by mouth daily   Quantity:  90 capsule   Refills:  3       levalbuterol 1.25 MG/3ML neb solution   Commonly known as:  XOPENEX   Used for:  COPD, moderate (H), COPD exacerbation (H)        Dose:  1 ampule   Take 3 mLs (1.25 mg) by nebulization every 4 hours as needed for shortness of breath / dyspnea or wheezing   Quantity:  1584 mL   Refills:  1       levothyroxine 25 MCG tablet   Commonly known as:  SYNTHROID/LEVOTHROID   Used for:  Hypothyroidism due to acquired atrophy of thyroid        Dose:  25 mcg   Take 1 tablet (25 mcg) by mouth daily   Quantity:  90 tablet   Refills:  0       montelukast 10 MG tablet   Commonly known as:  SINGULAIR   Used for:  Pulmonary emphysema, unspecified emphysema type (H)        Dose:  10 mg   Take 1 tablet (10 mg) by mouth At Bedtime   Quantity:  30 tablet   Refills:  0       MULTIVITAMIN PO        Dose:  1 tablet   Take 1  tablet by mouth daily   Refills:  0       omeprazole 40 MG capsule   Commonly known as:  priLOSEC   Used for:  Gastroesophageal reflux disease, esophagitis presence not specified        Dose:  40 mg   Take 1 capsule (40 mg) by mouth daily Take 30-60 minutes before a meal.   Quantity:  90 capsule   Refills:  3       polyethylene glycol Packet   Commonly known as:  MIRALAX/GLYCOLAX        Dose:  17 g   Take 17 g by mouth daily as needed for constipation   Refills:  0       PREDNISONE PO   Indication:  Chronic Obstructive Lung Disease        See Admin Instructions 11/17/17 started prednisone taper.  20 mg daily for 1 week then taper as directed   Refills:  0       prochlorperazine 5 MG tablet   Commonly known as:  COMPAZINE   Used for:  Nausea        Dose:  5 mg   Take 1 tablet (5 mg) by mouth every 6 hours as needed for nausea or vomiting   Quantity:  120 tablet   Refills:  0       roflumilast 500 MCG Tabs tablet   Commonly known as:  DALIRESP        Dose:  500 mcg   Take 500 mcg by mouth daily   Refills:  0       salmeterol 50 MCG/DOSE diskus inhaler   Commonly known as:  SEREVENT   Used for:  Pulmonary emphysema, unspecified emphysema type (H)        Dose:  1 puff   Inhale 1 puff into the lungs 2 times daily   Refills:  0         STOP taking     ADVIL PO           levofloxacin 500 MG tablet   Commonly known as:  LEVAQUIN                Where to get your medicines      These medications were sent to [facility] [address] [address]     Phone:  [phone]     hydrOXYzine 25 MG tablet    lisinopril 5 MG tablet    senna-docusate 8.6-50 MG per tablet         Some of these will need a paper prescription and others can be bought over the counter. Ask your nurse if you have questions.     Bring a paper prescription for each of these medications     oxyCODONE IR 5 MG tablet       You don't need a prescription for these medications      acetaminophen 325 MG tablet                Protect others around you: Learn how to safely use, store and throw away your medicines at www.disposemymeds.org.             Medication List: This is a list of all your medications and when to take them. Check marks below indicate your daily home schedule. Keep this list as a reference.      Medications           Morning Afternoon Evening Bedtime As Needed    acetaminophen 325 MG tablet   Commonly known as:  TYLENOL   Take 2 tablets (650 mg) by mouth every 4 hours as needed for mild pain   Last time this was given:  975 mg on 11/22/2017  8:28 AM                                albuterol 108 (90 BASE) MCG/ACT Inhaler   Commonly known as:  PROAIR HFA   Inhale 2 puffs into the lungs every 6 hours as needed                                ASPIRIN NOT PRESCRIBED   Commonly known as:  INTENTIONAL   1 each continuous prn. Antiplatelet medication not prescribed intentionally due to Use of NSAIDS   Last time this was given:  11/22/2017  9:09 AM                                B-12 TR 1000 MCG Tbcr   TAKE 1 TABLET BY MOUTH DAILY   Generic drug:  cyanocobalamin                                budesonide 180 MCG/ACT inhaler   Commonly known as:  PULMICORT FLEXHALER   Inhale 1 puff into the lungs 2 times daily                                calcium 600 MG tablet   Take 1 tablet (600 mg) by mouth daily   Last time this was given:  1,250 mg on 11/22/2017 12:49 PM                                cholecalciferol 1000 UNIT tablet   Commonly known as:  vitamin D3   Take 1 tablet (1,000 Units) by mouth daily   Last time this was given:  1,000 Units on 11/22/2017  8:28 AM                                ferrous sulfate 325 (65 FE) MG tablet   Commonly known as:  IRON   Take 1 tablet (325 mg) by mouth daily (with breakfast)                                gemfibrozil 600 MG tablet   Commonly known as:  LOPID   Take 1 tablet (600 mg) by mouth daily   Last time this was given:  600 mg on 11/22/2017   8:28 AM                                hydrOXYzine 25 MG tablet   Commonly known as:  ATARAX   Take 1 tablet (25 mg) by mouth 3 times daily as needed for other (pain)                                INCRUSE ELLIPTA 62.5 MCG/INH oral inhaler   Inhale 1 puff into the lungs daily   Last time this was given:  1 puff on 11/22/2017 10:45 AM   Generic drug:  umeclidinium                                LANsoprazole 30 MG CR capsule   Commonly known as:  PREVACID   Take 1 capsule (30 mg) by mouth daily                                levalbuterol 1.25 MG/3ML neb solution   Commonly known as:  XOPENEX   Take 3 mLs (1.25 mg) by nebulization every 4 hours as needed for shortness of breath / dyspnea or wheezing   Last time this was given:  1.25 mg on 11/22/2017 10:45 AM                                levothyroxine 25 MCG tablet   Commonly known as:  SYNTHROID/LEVOTHROID   Take 1 tablet (25 mcg) by mouth daily   Last time this was given:  25 mcg on 11/22/2017  8:10 AM                                lisinopril 5 MG tablet   Commonly known as:  PRINIVIL/ZESTRIL   Take 1 tablet (5 mg) by mouth daily   Start taking on:  11/23/2017   Last time this was given:  5 mg on 11/22/2017  8:28 AM                                montelukast 10 MG tablet   Commonly known as:  SINGULAIR   Take 1 tablet (10 mg) by mouth At Bedtime   Last time this was given:  10 mg on 11/21/2017 10:01 PM                                MULTIVITAMIN PO   Take 1 tablet by mouth daily                                omeprazole 40 MG capsule   Commonly known as:  priLOSEC   Take 1 capsule (40 mg) by mouth daily Take 30-60 minutes before a meal.   Last time this was given:  40 mg on 11/22/2017  8:28 AM                                oxyCODONE IR 5 MG tablet   Commonly known as:  ROXICODONE   Take 1 tablet (5 mg) by mouth every 4 hours as needed for moderate to severe pain   Last time this was given:  5 mg on 11/20/2017 10:35 AM                                polyethylene  glycol Packet   Commonly known as:  MIRALAX/GLYCOLAX   Take 17 g by mouth daily as needed for constipation                                PREDNISONE PO   See Admin Instructions 11/17/17 started prednisone taper.  20 mg daily for 1 week then taper as directed   Last time this was given:  20 mg on 11/22/2017  8:28 AM                                prochlorperazine 5 MG tablet   Commonly known as:  COMPAZINE   Take 1 tablet (5 mg) by mouth every 6 hours as needed for nausea or vomiting                                roflumilast 500 MCG Tabs tablet   Commonly known as:  DALIRESP   Take 500 mcg by mouth daily   Last time this was given:  500 mcg on 11/22/2017  8:28 AM                                salmeterol 50 MCG/DOSE diskus inhaler   Commonly known as:  SEREVENT   Inhale 1 puff into the lungs 2 times daily   Last time this was given:  1 puff on 11/22/2017 10:45 AM                                senna-docusate 8.6-50 MG per tablet   Commonly known as:  SENOKOT-S;PERICOLACE   Take 1 tablet by mouth daily

## 2017-11-19 NOTE — IP AVS SNAPSHOT
[facility]    [address]    [facility] [address]    Phone:  [phone]    Fax:  [phone]                                       After Visit Summary   11/19/2017    Ana Luisa Lee    MRN: [id number]           After Visit Summary Signature Page     I have received my discharge instructions, and my questions have been answered. I have discussed any challenges I see with this plan with the nurse or doctor.    ..........................................................................................................................................  Patient/Patient Representative Signature      ..........................................................................................................................................  Patient Representative Print Name and Relationship to Patient    ..................................................               ................................................  Date                                            Time    ..........................................................................................................................................  Reviewed by Signature/Title    ...................................................              ..............................................  Date                                                            Time

## 2017-11-19 NOTE — LETTER
Transition Communication Hand-off for Care Transitions to Next Level of Care Provider    Name: Ana Luisa Lee  MRN #: [id number]  Primary Care Provider: Jenny Hamilton  Primary Care MD Name: celestino   Primary Clinic: [address]  [facility] [address]  Primary Care Clinic Name: [facility]   Reason for Hospitalization:  Hematoma [T14.8XXA]  Unable to ambulate [R26.2]  Sacral insufficiency fracture, initial encounter [M84.48XA]  Admit Date/Time: 11/19/2017  8:39 PM  Discharge Date: 11/22/17  Payor Source: Payor: MEDICA / Plan: [employer] [employer] / Product Type: HMO /     Readmission Assessment Measure (COURTNEY) Risk Score/category: AVERAGE      Reason for Communication Hand-off Referral: Fragility  Difficulty understanding plan of care    Discharge Plan:       Concern for non-adherence with plan of care:   NO  Discharge Needs Assessment:  Needs       Most Recent Value    Equipment Currently Used at Home walker, rolling    Transportation Available car, family or friend will provide    # of Referrals Placed by [facility], MTM, Scheduled Follow-up appointments, Communication hand-offs to next level of Care Providers          Already enrolled in Tele-monitoring program and name of program:  na  Follow-up specialty is recommended: No    Follow-up plan:  Future Appointments  Date Time Provider Department Center   11/27/2017 11:00 AM Jenny Hamilton MD \[facility] [place] [facility]\""       Any outstanding tests or procedures:        Referrals     Future Labs/Procedures    Home care nursing referral     Comments:    RN skilled nursing visit. RN to assess respiratory and cardiac status.    Your provider has ordered home care nursing services. If you have not been contacted within 2 days of your discharge please call the inpatient department phone number at [phone] .    Home Care OT Referral for Hospital Discharge     Comments:    OT to eval and treat    Your provider has  ordered home care - occupational therapy. If you have not been contacted within 2 days of your discharge please call the department phone number listed on the top of this document.    Home Care PT Referral for Hospital Discharge     Comments:    PT to eval and treat    Your provider has ordered home care - physical therapy. If you have not been contacted within 2 days of your discharge please call the department phone number listed on the top of this document.          Follow-up and recommended labs and tests        Follow up with primary care provider, Jenny Hamilton, within 7 days for hospital follow- up.  The following labs/tests are recommended: BMP and CBC in 7 days             Key Recommendations:  [place], from [facility] [employer].  Follows with [facility].  RN CTS did upate her on resources requested.  Dc with [facility] support.      Katie Ellis    AVS/Discharge Summary is the source of truth; this is a helpful guide for improved communication of patient story

## 2017-11-20 ENCOUNTER — APPOINTMENT (OUTPATIENT)
Dept: MRI IMAGING | Facility: CLINIC | Age: 77
DRG: 543 | End: 2017-11-20
Attending: ORTHOPAEDIC SURGERY
Payer: COMMERCIAL

## 2017-11-20 ENCOUNTER — CARE COORDINATION (OUTPATIENT)
Dept: GERIATRIC MEDICINE | Facility: CLINIC | Age: 77
End: 2017-11-20

## 2017-11-20 PROBLEM — M25.559 HIP PAIN: Status: ACTIVE | Noted: 2017-11-20

## 2017-11-20 LAB
ANION GAP SERPL CALCULATED.3IONS-SCNC: 8 MMOL/L (ref 3–14)
BUN SERPL-MCNC: 10 MG/DL (ref 7–30)
CALCIUM SERPL-MCNC: 9.2 MG/DL (ref 8.5–10.1)
CHLORIDE SERPL-SCNC: 97 MMOL/L (ref 94–109)
CO2 SERPL-SCNC: 25 MMOL/L (ref 20–32)
CREAT SERPL-MCNC: 0.84 MG/DL (ref 0.52–1.04)
GFR SERPL CREATININE-BSD FRML MDRD: 66 ML/MIN/1.7M2
GLUCOSE SERPL-MCNC: 107 MG/DL (ref 70–99)
HGB BLD-MCNC: 9.9 G/DL (ref 11.7–15.7)
OSMOLALITY UR: 340 MMOL/KG (ref 100–1200)
POTASSIUM SERPL-SCNC: 3.9 MMOL/L (ref 3.4–5.3)
SODIUM SERPL-SCNC: 130 MMOL/L (ref 133–144)
SODIUM UR-SCNC: 33 MMOL/L

## 2017-11-20 PROCEDURE — 25000132 ZZH RX MED GY IP 250 OP 250 PS 637: Performed by: INTERNAL MEDICINE

## 2017-11-20 PROCEDURE — 83935 ASSAY OF URINE OSMOLALITY: CPT | Performed by: HOSPITALIST

## 2017-11-20 PROCEDURE — 94640 AIRWAY INHALATION TREATMENT: CPT

## 2017-11-20 PROCEDURE — 25000125 ZZHC RX 250

## 2017-11-20 PROCEDURE — 72197 MRI PELVIS W/O & W/DYE: CPT

## 2017-11-20 PROCEDURE — 36415 COLL VENOUS BLD VENIPUNCTURE: CPT | Performed by: HOSPITALIST

## 2017-11-20 PROCEDURE — 12000000 ZZH R&B MED SURG/OB

## 2017-11-20 PROCEDURE — A9585 GADOBUTROL INJECTION: HCPCS | Performed by: RADIOLOGY

## 2017-11-20 PROCEDURE — 99232 SBSQ HOSP IP/OBS MODERATE 35: CPT | Performed by: INTERNAL MEDICINE

## 2017-11-20 PROCEDURE — 99223 1ST HOSP IP/OBS HIGH 75: CPT | Mod: AI | Performed by: HOSPITALIST

## 2017-11-20 PROCEDURE — 84300 ASSAY OF URINE SODIUM: CPT | Performed by: HOSPITALIST

## 2017-11-20 PROCEDURE — 25000132 ZZH RX MED GY IP 250 OP 250 PS 637: Performed by: HOSPITALIST

## 2017-11-20 PROCEDURE — 80048 BASIC METABOLIC PNL TOTAL CA: CPT | Performed by: HOSPITALIST

## 2017-11-20 PROCEDURE — 25000128 H RX IP 250 OP 636: Performed by: HOSPITALIST

## 2017-11-20 PROCEDURE — 94640 AIRWAY INHALATION TREATMENT: CPT | Mod: 76

## 2017-11-20 PROCEDURE — 25000125 ZZHC RX 250: Performed by: HOSPITALIST

## 2017-11-20 PROCEDURE — 40000275 ZZH STATISTIC RCP TIME EA 10 MIN

## 2017-11-20 PROCEDURE — 25000128 H RX IP 250 OP 636: Performed by: RADIOLOGY

## 2017-11-20 PROCEDURE — 85018 HEMOGLOBIN: CPT | Performed by: HOSPITALIST

## 2017-11-20 RX ORDER — LORAZEPAM 2 MG/ML
0.5 INJECTION INTRAMUSCULAR ONCE
Status: DISCONTINUED | OUTPATIENT
Start: 2017-11-20 | End: 2017-11-20

## 2017-11-20 RX ORDER — ALBUTEROL SULFATE 0.83 MG/ML
SOLUTION RESPIRATORY (INHALATION)
Status: COMPLETED
Start: 2017-11-20 | End: 2017-11-20

## 2017-11-20 RX ORDER — TIOTROPIUM BROMIDE 18 UG/1
18 CAPSULE ORAL; RESPIRATORY (INHALATION) DAILY
Status: DISCONTINUED | OUTPATIENT
Start: 2017-11-20 | End: 2017-11-20

## 2017-11-20 RX ORDER — BISACODYL 10 MG
10 SUPPOSITORY, RECTAL RECTAL DAILY PRN
Status: DISCONTINUED | OUTPATIENT
Start: 2017-11-20 | End: 2017-11-22 | Stop reason: HOSPADM

## 2017-11-20 RX ORDER — LEVALBUTEROL INHALATION SOLUTION 1.25 MG/3ML
1 SOLUTION RESPIRATORY (INHALATION) EVERY 4 HOURS PRN
Status: DISCONTINUED | OUTPATIENT
Start: 2017-11-20 | End: 2017-11-22 | Stop reason: HOSPADM

## 2017-11-20 RX ORDER — LANOLIN ALCOHOL/MO/W.PET/CERES
1000 CREAM (GRAM) TOPICAL DAILY
Status: DISCONTINUED | OUTPATIENT
Start: 2017-11-20 | End: 2017-11-22 | Stop reason: HOSPADM

## 2017-11-20 RX ORDER — LORAZEPAM 2 MG/ML
0.5 INJECTION INTRAMUSCULAR ONCE
Status: COMPLETED | OUTPATIENT
Start: 2017-11-20 | End: 2017-11-20

## 2017-11-20 RX ORDER — CALCIUM CARBONATE 500(1250)
1250 TABLET ORAL DAILY
Status: DISCONTINUED | OUTPATIENT
Start: 2017-11-20 | End: 2017-11-22 | Stop reason: HOSPADM

## 2017-11-20 RX ORDER — GADOBUTROL 604.72 MG/ML
7.5 INJECTION INTRAVENOUS ONCE
Status: COMPLETED | OUTPATIENT
Start: 2017-11-20 | End: 2017-11-20

## 2017-11-20 RX ORDER — NALOXONE HYDROCHLORIDE 0.4 MG/ML
.1-.4 INJECTION, SOLUTION INTRAMUSCULAR; INTRAVENOUS; SUBCUTANEOUS
Status: DISCONTINUED | OUTPATIENT
Start: 2017-11-20 | End: 2017-11-22 | Stop reason: HOSPADM

## 2017-11-20 RX ORDER — LIDOCAINE 50 MG/G
2 PATCH TOPICAL
Status: DISCONTINUED | OUTPATIENT
Start: 2017-11-20 | End: 2017-11-22 | Stop reason: HOSPADM

## 2017-11-20 RX ORDER — AMOXICILLIN 250 MG
1 CAPSULE ORAL 2 TIMES DAILY PRN
Status: DISCONTINUED | OUTPATIENT
Start: 2017-11-20 | End: 2017-11-22 | Stop reason: HOSPADM

## 2017-11-20 RX ORDER — LEVOTHYROXINE SODIUM 25 UG/1
25 TABLET ORAL DAILY
Status: DISCONTINUED | OUTPATIENT
Start: 2017-11-20 | End: 2017-11-22 | Stop reason: HOSPADM

## 2017-11-20 RX ORDER — ROFLUMILAST 500 UG/1
500 TABLET ORAL DAILY
Status: DISCONTINUED | OUTPATIENT
Start: 2017-11-20 | End: 2017-11-22 | Stop reason: HOSPADM

## 2017-11-20 RX ORDER — PROCHLORPERAZINE MALEATE 5 MG
5 TABLET ORAL EVERY 6 HOURS PRN
Status: DISCONTINUED | OUTPATIENT
Start: 2017-11-20 | End: 2017-11-22 | Stop reason: HOSPADM

## 2017-11-20 RX ORDER — MONTELUKAST SODIUM 10 MG/1
10 TABLET ORAL AT BEDTIME
Status: DISCONTINUED | OUTPATIENT
Start: 2017-11-20 | End: 2017-11-22 | Stop reason: HOSPADM

## 2017-11-20 RX ORDER — HYDROMORPHONE HYDROCHLORIDE 1 MG/ML
.3-.5 INJECTION, SOLUTION INTRAMUSCULAR; INTRAVENOUS; SUBCUTANEOUS
Status: DISCONTINUED | OUTPATIENT
Start: 2017-11-20 | End: 2017-11-22 | Stop reason: HOSPADM

## 2017-11-20 RX ORDER — PREDNISONE 20 MG/1
20 TABLET ORAL DAILY
Status: DISCONTINUED | OUTPATIENT
Start: 2017-11-20 | End: 2017-11-22 | Stop reason: HOSPADM

## 2017-11-20 RX ORDER — ONDANSETRON 4 MG/1
4 TABLET, ORALLY DISINTEGRATING ORAL EVERY 6 HOURS PRN
Status: DISCONTINUED | OUTPATIENT
Start: 2017-11-20 | End: 2017-11-22 | Stop reason: HOSPADM

## 2017-11-20 RX ORDER — LISINOPRIL 5 MG/1
5 TABLET ORAL DAILY
Status: DISCONTINUED | OUTPATIENT
Start: 2017-11-20 | End: 2017-11-22 | Stop reason: HOSPADM

## 2017-11-20 RX ORDER — GEMFIBROZIL 600 MG/1
600 TABLET, FILM COATED ORAL DAILY
Status: DISCONTINUED | OUTPATIENT
Start: 2017-11-20 | End: 2017-11-22 | Stop reason: HOSPADM

## 2017-11-20 RX ORDER — LIDOCAINE 50 MG/G
2 PATCH TOPICAL
Status: DISCONTINUED | OUTPATIENT
Start: 2017-11-20 | End: 2017-11-20

## 2017-11-20 RX ORDER — POLYETHYLENE GLYCOL 3350 17 G/17G
17 POWDER, FOR SOLUTION ORAL DAILY PRN
Status: DISCONTINUED | OUTPATIENT
Start: 2017-11-20 | End: 2017-11-22 | Stop reason: HOSPADM

## 2017-11-20 RX ORDER — PROCHLORPERAZINE 25 MG
12.5 SUPPOSITORY, RECTAL RECTAL EVERY 12 HOURS PRN
Status: DISCONTINUED | OUTPATIENT
Start: 2017-11-20 | End: 2017-11-22 | Stop reason: HOSPADM

## 2017-11-20 RX ORDER — OXYCODONE HYDROCHLORIDE 5 MG/1
5-10 TABLET ORAL
Status: DISCONTINUED | OUTPATIENT
Start: 2017-11-20 | End: 2017-11-22 | Stop reason: HOSPADM

## 2017-11-20 RX ORDER — ACETAMINOPHEN 325 MG/1
975 TABLET ORAL EVERY 8 HOURS
Status: DISCONTINUED | OUTPATIENT
Start: 2017-11-20 | End: 2017-11-22 | Stop reason: HOSPADM

## 2017-11-20 RX ORDER — LANOLIN ALCOHOL/MO/W.PET/CERES
3 CREAM (GRAM) TOPICAL
Status: DISCONTINUED | OUTPATIENT
Start: 2017-11-20 | End: 2017-11-22 | Stop reason: HOSPADM

## 2017-11-20 RX ORDER — ONDANSETRON 2 MG/ML
4 INJECTION INTRAMUSCULAR; INTRAVENOUS EVERY 6 HOURS PRN
Status: DISCONTINUED | OUTPATIENT
Start: 2017-11-20 | End: 2017-11-22 | Stop reason: HOSPADM

## 2017-11-20 RX ORDER — HYDROXYZINE HYDROCHLORIDE 25 MG/1
25 TABLET, FILM COATED ORAL 3 TIMES DAILY PRN
Status: DISCONTINUED | OUTPATIENT
Start: 2017-11-20 | End: 2017-11-22 | Stop reason: HOSPADM

## 2017-11-20 RX ORDER — AMOXICILLIN 250 MG
2 CAPSULE ORAL 2 TIMES DAILY PRN
Status: DISCONTINUED | OUTPATIENT
Start: 2017-11-20 | End: 2017-11-22 | Stop reason: HOSPADM

## 2017-11-20 RX ORDER — FUROSEMIDE 20 MG
20 TABLET ORAL EVERY OTHER DAY
Status: DISCONTINUED | OUTPATIENT
Start: 2017-11-20 | End: 2017-11-20

## 2017-11-20 RX ADMIN — OXYCODONE HYDROCHLORIDE 5 MG: 5 TABLET ORAL at 10:35

## 2017-11-20 RX ADMIN — CYANOCOBALAMIN TAB 1000 MCG 1000 MCG: 1000 TAB at 10:59

## 2017-11-20 RX ADMIN — ACETAMINOPHEN 975 MG: 325 TABLET, FILM COATED ORAL at 01:59

## 2017-11-20 RX ADMIN — LEVALBUTEROL HYDROCHLORIDE 1.25 MG: 1.25 SOLUTION RESPIRATORY (INHALATION) at 15:43

## 2017-11-20 RX ADMIN — GADOBUTROL 6 ML: 604.72 INJECTION INTRAVENOUS at 16:52

## 2017-11-20 RX ADMIN — VITAMIN D, TAB 1000IU (100/BT) 1000 UNITS: 25 TAB at 10:33

## 2017-11-20 RX ADMIN — ROFLUMILAST 500 MCG: 500 TABLET ORAL at 11:43

## 2017-11-20 RX ADMIN — DICLOFENAC SODIUM 4 G: 10 GEL TOPICAL at 17:38

## 2017-11-20 RX ADMIN — LEVALBUTEROL HYDROCHLORIDE 1.25 MG: 1.25 SOLUTION RESPIRATORY (INHALATION) at 19:15

## 2017-11-20 RX ADMIN — UMECLIDINIUM 1 PUFF: 62.5 AEROSOL, POWDER ORAL at 11:07

## 2017-11-20 RX ADMIN — ACETAMINOPHEN 975 MG: 325 TABLET, FILM COATED ORAL at 17:37

## 2017-11-20 RX ADMIN — OMEPRAZOLE 40 MG: 20 CAPSULE, DELAYED RELEASE ORAL at 10:35

## 2017-11-20 RX ADMIN — PREDNISONE 20 MG: 20 TABLET ORAL at 10:34

## 2017-11-20 RX ADMIN — LISINOPRIL 5 MG: 5 TABLET ORAL at 14:45

## 2017-11-20 RX ADMIN — CALCIUM 1250 MG: 500 TABLET ORAL at 10:59

## 2017-11-20 RX ADMIN — LIDOCAINE 2 PATCH: 50 PATCH CUTANEOUS at 22:47

## 2017-11-20 RX ADMIN — LEVOTHYROXINE SODIUM 25 MCG: 25 TABLET ORAL at 11:43

## 2017-11-20 RX ADMIN — DICLOFENAC SODIUM 4 G: 10 GEL TOPICAL at 11:00

## 2017-11-20 RX ADMIN — SALMETEROL XINAFOATE 1 PUFF: 50 POWDER, METERED ORAL; RESPIRATORY (INHALATION) at 11:06

## 2017-11-20 RX ADMIN — MONTELUKAST SODIUM 10 MG: 10 TABLET, FILM COATED ORAL at 22:45

## 2017-11-20 RX ADMIN — GEMFIBROZIL 600 MG: 600 TABLET ORAL at 10:34

## 2017-11-20 RX ADMIN — MONTELUKAST SODIUM 10 MG: 10 TABLET, FILM COATED ORAL at 01:59

## 2017-11-20 RX ADMIN — ALBUTEROL SULFATE 2.5 MG: 2.5 SOLUTION RESPIRATORY (INHALATION) at 08:19

## 2017-11-20 RX ADMIN — LIDOCAINE 2 PATCH: 50 PATCH CUTANEOUS at 01:55

## 2017-11-20 RX ADMIN — SALMETEROL XINAFOATE 1 PUFF: 50 POWDER, METERED ORAL; RESPIRATORY (INHALATION) at 19:15

## 2017-11-20 RX ADMIN — LEVALBUTEROL HYDROCHLORIDE 1.25 MG: 1.25 SOLUTION RESPIRATORY (INHALATION) at 11:07

## 2017-11-20 RX ADMIN — LORAZEPAM 0.5 MG: 2 INJECTION INTRAMUSCULAR at 16:06

## 2017-11-20 NOTE — PLAN OF CARE
Problem: Patient Care Overview  Goal: Plan of Care/Patient Progress Review  Orders received, pt awaiting imaging, will hold eval until results of MRI are completed.

## 2017-11-20 NOTE — PLAN OF CARE
PT: Orders received and chart reviewed. Will await imaging results and formal ortho consult prior to initiating PT.

## 2017-11-20 NOTE — PROGRESS NOTES
Rec'd EPIC notification that client was admitted to [facility] 11/19/17, dx hematoma, sacral insufficiency fracture.  EPIC notes reviewed.  Voice message left with Rosio RINALDI to introduce myself, shared community POC.  E-mail sent to Windy BARTHOLOMEW [facility] to inform of admission  Call placed to [facility], left vm with Maryan to inform of admission.   JACINTA log initiated   Yeni Savage RN, BC  Supervisor [facility]   [phone] (Fax)

## 2017-11-20 NOTE — PLAN OF CARE
Problem: Patient Care Overview  Goal: Plan of Care/Patient Progress Review  Outcome: No Change  RN - Hypertensive and tachycardic otherwise vitally stable. Pt A&Ox4. Endorsing R hip tenderness and small visible bruising on the posterior side. Pain relieved with PO medications. Pt tolerating PO intake. Up with assist of 1 to commode. Pt able to void x1 for 175ml - bladder scanned earlier for 250ml. Pt awaiting MRI this afternoon of right hip - ordered to receive IV ativan push prior to MRI. Slight edema in lower extremities. Pt and family updated on POC.

## 2017-11-20 NOTE — PHARMACY-ADMISSION MEDICATION HISTORY
Admission medication history interview status for this patient is complete. See [facility] admission navigator for allergy information, prior to admission medications and immunization status.     Medication history interview source(s):Patient  Medication history resources (including written lists, pill bottles, clinic record):pharmacy records  Primary pharmacy:[facility]    Changes made to [facility] medication list:  Added: none  Deleted: Furosemide, Spiriva, Voltaren gel  Changed: prednisone taper    Actions taken by pharmacist (provider contacted, etc):None     Additional medication history information:None    Medication reconciliation/reorder completed by provider prior to medication history? Yes    For patients on insulin therapy: No     Prior to Admission medications    Medication Sig Last Dose Taking? Auth Provider   levofloxacin (LEVAQUIN) 500 MG tablet Take 1 tablet (500 mg) by mouth daily for 7 days 11/19/2017 at Unknown time Yes Jenny Hamilton MD   PREDNISONE PO See Admin Instructions 11/17/17 started prednisone taper.   20 mg daily for 1 week then taper as directed 11/19/2017 at Unknown time Yes Cole Carrasco MD   umeclidinium (INCRUSE ELLIPTA) 62.5 MCG/INH oral inhaler Inhale 1 puff into the lungs daily 11/19/2017 at Unknown time Yes Cole Carrasco MD   Ibuprofen (ADVIL PO) Take 400 mg by mouth 2 times daily as needed for moderate pain (arthritis pain) 11/19/2017 at Unknown time Yes Reported, Patient   gemfibrozil (LOPID) 600 MG tablet Take 1 tablet (600 mg) by mouth daily 11/19/2017 at Unknown time Yes Jenny Hamilton MD   B-12 TR 1000 MCG TBCR TAKE 1 TABLET BY MOUTH DAILY 11/19/2017 at Unknown time Yes Jenny Hamilton MD   prochlorperazine (COMPAZINE) 5 MG tablet Take 1 tablet (5 mg) by mouth every 6 hours as needed for nausea or vomiting Past Week at Unknown time Yes Jenny Hamilton MD   levothyroxine (SYNTHROID/LEVOTHROID) 25 MCG tablet  Take 1 tablet (25 mcg) by mouth daily 11/19/2017 at Unknown time Yes Jenny Hamilton MD   levalbuterol (XOPENEX) 1.25 MG/3ML neb solution Take 3 mLs (1.25 mg) by nebulization every 4 hours as needed for shortness of breath / dyspnea or wheezing 11/19/2017 at Unknown time Yes Jenny Hamilton MD   omeprazole (PRILOSEC) 40 MG capsule Take 1 capsule (40 mg) by mouth daily Take 30-60 minutes before a meal. 11/19/2017 at Unknown time Yes Jenny Hamilton MD   roflumilast (DALIRESP) 500 MCG TABS tablet Take 500 mcg by mouth daily  11/19/2017 at Unknown time Yes Cole Carrasco MD   polyethylene glycol (MIRALAX/GLYCOLAX) Packet Take 17 g by mouth daily as needed for constipation Past Week at Unknown time Yes Reported, Patient   albuterol (ALBUTEROL) 108 (90 BASE) MCG/ACT Inhaler Inhale 2 puffs into the lungs every 6 hours as needed Past Week at Unknown time Yes Hallie Barroso MD   budesonide (PULMICORT FLEXHALER) 180 MCG/ACT inhaler Inhale 1 puff into the lungs 2 times daily 11/19/2017 at Unknown time Yes Hallie Barroso MD   montelukast (SINGULAIR) 10 MG tablet Take 1 tablet (10 mg) by mouth At Bedtime 11/19/2017 at Unknown time Yes Hallie Barroso MD   salmeterol (SEREVENT) 50 MCG/DOSE diskus inhaler Inhale 1 puff into the lungs 2 times daily 11/19/2017 at Unknown time Yes Hallie Barroso MD   ferrous sulfate (IRON) 325 (65 FE) MG tablet Take 1 tablet (325 mg) by mouth daily (with breakfast)  Patient taking differently: Take 1 tablet by mouth 2 times daily  11/19/2017 at Unknown time Yes Hallie Barroso MD   calcium 600 MG tablet Take 1 tablet (600 mg) by mouth daily 11/19/2017 at Unknown time Yes SaHallie danielle MD   LANsoprazole (PREVACID) 30 MG CR capsule Take 1 capsule (30 mg) by mouth daily 11/19/2017 at Unknown time Yes Hallie Barroso MD   cholecalciferol (VITAMIN D) 1000 UNIT tablet Take 1 tablet (1,000  Units) by mouth daily 11/19/2017 at Unknown time Yes Hallie Barroso MD   Multiple Vitamins-Minerals (MULTIVITAMIN OR) Take 1 tablet by mouth daily 11/19/2017 at Unknown time Yes Unknown, Entered By History   ASPIRIN NOT PRESCRIBED, INTENTIONAL, 1 each continuous prn. Antiplatelet medication not prescribed intentionally due to Use of NSAIDS Jenny Gooden MD

## 2017-11-20 NOTE — CONSULTS
CTS aware of consult. Waiting on MRI result for pts disposition. SW will follow up.    Marti Mccoy RN, BSN CTS  Care Coordinator  [phone]

## 2017-11-20 NOTE — PROGRESS NOTES
[facility]  Hospitalist Progress Note  Patient Name: Ana Luisa Lee    MRN: [id number]  Provider: Paulo Curry MD  11/20/17    Initial presenting complaint/issue to hospital (Diagnosis): right hip pain         Assessment and Plan:      Summary:  Ana Luisa Lee is a 77-year-old female with history of steroid-dependent COPD, hypertension, hypothyroidism, chronic anemia, asthma, and aortic stenosis.  She presented to [facility] ED on 11/19/17 for evaluation of right hip pain.  She had hip pain without obvious trauma one month before, with unexplained bruising in her right leg.  She had abrupt worsening of pain so came to the ED on 11/19/17.  CT was obtained and suggested right upper thigh and gluteal fluid collection concerning for hematoma versus soft tissue mass.  It also showed multiple sacral insufficiency fractures. ED work up also showed modest hyponatremia with Na of 128 (this seems somewhat chronic).  She was admitted to the hospital for pain control and further evaluation and treatment.     Problem list:    1.  Right hip pain thought to be due to spontaneous sacral insufficiency fracture and hematoma in right gluteus and thigh. Risk factor for insufficiency fracture is chronic prednisone use for COPD. Sacral fracture may have happened weeks ago with development of pain due to enlarging hematoma over subsequent weeks.   MRI has been ordered for confirmation.  If confirmed, this will likely be treated conservatively with pain management.  If pain worsens or if patient develops neurologic symptoms related to hematoma intervention might be needed, but I doubt that will be the case.  Await MRI results.  Pain control.  Avoid anticoagulants for now (will use PCD's for DVT propylaxis).  Serial hemoglobins.  Orthopedic surgery consulted and their help is appreciated. PT consulted for mobility and discharge planning.  Consider Lovenox for DVT prophylaxis at some point if no  signs of ongoing blood loss.     2.  Steroid-dependent chronic obstructive pulmonary disease:  Continue PTA Prednisone 20 mg daily, bronchodilators, and O2 as needed.       3.  Hypertension:  BP persistently elevated.  Currently not on any medicine for this.  I will start Lisinopril 5 mg daily to be titrated as appropriate.  Note allergies to hydralazine, Metoprolol, and Norvasc.  HCTZ would not be adviseable in setting of chronic hyponatremia.      5.  Hypothyroidism:  Continue PTA Levothyroxine.     6.  Gastroesophageal reflux disease:  Continue PTA Prevacid    7.  Hyponatremia:  Stable, improved.  Monitor.     DVT Prophylaxis:  PCD's  Code Status: DNR / DNI  Discharge Dispo: home versus TCU depending on progress with mobility  Estimated Disch Date / # of Days until Discharge: 2 days?        Interval History:      Doing ok.  Pain is better but she hasn't really gotten up (awaiting MRI).  No new problems.                   Physical Exam:      Last Vital Signs:  /80  Pulse 101  Temp 98.2  F (36.8  C) (Oral)  Resp 18  Wt 58.2 kg (128 lb 3.2 oz)  SpO2 98%  BMI 24.22 kg/m2    Intake/Output Summary (Last 24 hours) at 11/20/17 1257  Last data filed at 11/20/17 0614   Gross per 24 hour   Intake              120 ml   Output              520 ml   Net             -400 ml       GENERAL:  Comfortable. Cooperative.  PSYCH: pleasant, oriented, No acute distress.  EYES: PERRLA, Normal conjunctiva.  HEART:  Regular rate and rhythm. No JVD. Pulses normal. No edema.  LUNGS:  Clear to auscultation, normal Respiratory effort.  ABDOMEN:  Soft, no hepatosplenomegaly, normal bowel sounds.  EXTREMETIES: No clubbing, cyanosis or ischemia  SKIN:  Dry to touch, No rash.           Medications:      All current medications were reviewed.         Data:      All new lab and imaging data was reviewed.   Labs:       Lab Results   Component Value Date     11/20/2017     11/19/2017     10/31/2017    Lab Results    Component Value Date    CHLORIDE 97 11/20/2017    CHLORIDE 97 11/19/2017    CHLORIDE 98 10/31/2017    Lab Results   Component Value Date    BUN 10 11/20/2017    BUN 10 11/19/2017    BUN 10 10/31/2017      Lab Results   Component Value Date    POTASSIUM 3.9 11/20/2017    POTASSIUM 3.6 11/19/2017    POTASSIUM 4.0 10/31/2017    Lab Results   Component Value Date    CO2 25 11/20/2017    CO2 23 11/19/2017    CO2 23 10/31/2017    Lab Results   Component Value Date    CR 0.84 11/20/2017    CR 0.79 11/19/2017    CR 0.76 10/31/2017          Recent Labs  Lab 11/20/17  0550 11/19/17  2250   WBC  --  11.0   HGB 9.9* 10.2*   HCT  --  31.0*   MCV  --  93   PLT  --  347

## 2017-11-20 NOTE — H&P
HISTORY AND PHYSICAL     PRIMARY CARE PHYSICIAN:  Jenny Hamilton MD      CHIEF COMPLAINT:  Hip pain.      HISTORY OF PRESENT ILLNESS:  Ana Luisa Lee is a 77-year-old female with a history of steroid-dependent COPD, hypothyroidism, hypertension, pernicious anemia, asthma, osteoarthritis, and aortic stenosis, presenting to [facility] for evaluation of right hip pain.  The patient indicates that earlier today, she was rolling over in bed and felt sudden onset of right hip pain.  It was diffusely located in her buttocks as well as the greater trochanter area.  It even radiated slightly down her femur and into her groin.  She denies any falls or trauma to the area.  Normally she lives in a senior living facility independently by herself.  Shortly after the onset of pain, she was unable to walk.  Due to these issues and need for pain control, she came to the Emergency Department for evaluation.  To note, she was seen in the Emergency Department about three weeks ago for similar complaints, but of less severity.  She had an unremarkable venous duplex ultrasound showing no DVT.  An x-ray of the pelvis and hip was also performed showing no acute process, but with mild degenerative changes.  She was ultimately discharged back home, but there was a finding noted of an incidental Baker cyst that was not thought to be contributory towards her presentation.  She was recommended follow-up with her primary care physician.      Here in the Emergency Department, I discussed the case with the emergency room physician, Dr. Madrigal.  Upon presentation, the patient had stable vital signs with a temperature of 98.5, heart rate of 108, blood pressure 153/116, respiratory rate 24, oxygen saturation 97% on room air.  Labs were drawn and largely unremarkable except for a sodium of 128 and protein of 6.3.  She was mildly anemic at 10.2, but otherwise, her CBC, BMP and liver function tests were normal.      Pelvic and  femur x-rays were repeated again with no specific acute findings.  CT of the pelvis, without contrast, was performed, and the radiologist has expressed concern that there could be a right upper thigh gluteal collection, concerning for hematoma versus a soft tissue mass.  There also appear to be multiple sacral insufficiency fractures.  The recommendation was for a Musculoskeletal Radiology specialist to evaluate the scan in the morning. The patient was given some pain medications and feels better.  Ambulation was attempted but failed.  As a result, she is being admitted for pain control, further workup, physical therapy, and possible placement concerns.      PAST MEDICAL HISTORY:   1.  Steroid-dependent chronic obstructive pulmonary disease.   2.  Hypothyroidism.   3.  Hypertension.   4.  Pernicious anemia.   5.  Osteoporosis.   6.  Hyperlipidemia.   7.  Asthma.   8.  History of supraventricular tachycardia.   9.  Aortic valvular stenosis.      Prior to Admission medications    Medication Sig Last Dose Taking? Auth Provider   levofloxacin (LEVAQUIN) 500 MG tablet Take 1 tablet (500 mg) by mouth daily for 7 days   Jenny Hamilton MD   PREDNISONE PO Take 5 mg by mouth daily 10/20/2017 new order: Decrease by 2.5 mg every 2 weeks, may need to slow the rate of the wean.   Cole Carrasco MD   umeclidinium (INCRUSE ELLIPTA) 62.5 MCG/INH oral inhaler Inhale 1 puff into the lungs daily   Cole Carrasco MD   Ibuprofen (ADVIL PO) Take 400 mg by mouth 2 times daily as needed for moderate pain (arthritis pain)   Reported, Patient   furosemide (LASIX) 20 MG tablet Take 1 tablet (20 mg) by mouth every other day   Jenny Hamilton MD   order for DME Tubing and mouth piece for nebulizer   Jenny Hamilton MD   gemfibrozil (LOPID) 600 MG tablet Take 1 tablet (600 mg) by mouth daily   Jenny Hamilton MD   B-12 TR 1000 MCG TBCR TAKE 1 TABLET BY MOUTH DAILY    Jenny Hamilton MD   predniSONE (DELTASONE) 5 MG tablet Takes 10 mg every other day alternating with 15 mg every other day. But she takes 40 mg daily during the exacerbations as per doctor advice.  Patient not taking: Reported on 11/8/2017   Jenny Hamilton MD   prochlorperazine (COMPAZINE) 5 MG tablet Take 1 tablet (5 mg) by mouth every 6 hours as needed for nausea or vomiting   Jenny Hamilton MD   levothyroxine (SYNTHROID/LEVOTHROID) 25 MCG tablet Take 1 tablet (25 mcg) by mouth daily   Jenny Hamilton MD   levalbuterol (XOPENEX) 1.25 MG/3ML neb solution Take 3 mLs (1.25 mg) by nebulization every 4 hours as needed for shortness of breath / dyspnea or wheezing   Jenny Hamilton MD   omeprazole (PRILOSEC) 40 MG capsule Take 1 capsule (40 mg) by mouth daily Take 30-60 minutes before a meal.   Jenny Hamilton MD   diclofenac (VOLTAREN) 1 % GEL topical gel 2 grams to R shoulder four times daily using enclosed dosing card.  Patient not taking: Reported on 11/8/2017   Jenny Hamilton MD   predniSONE (DELTASONE) 10 MG tablet Take 3 tabs daily for 4 days Take 2 tabs daily for 4 days Then go back to your previous dose of 10 mg alternating with 15 mg  Patient not taking: Reported on 11/8/2017   Jenny Hamilton MD   order for DME Equipment being ordered: Nebulizer machine, cup, and tubing.  Fax to [facility] per pt request   Jenny Hamilton MD   roflumilast (DALIRESP) 500 MCG TABS tablet Take 500 mcg by mouth daily    Cole Carrasco MD   polyethylene glycol (MIRALAX/GLYCOLAX) Packet Take 17 g by mouth daily as needed for constipation   Reported, Patient   albuterol (ALBUTEROL) 108 (90 BASE) MCG/ACT Inhaler Inhale 2 puffs into the lungs every 6 hours as needed   Hallie Barroso MD   budesonide (PULMICORT FLEXHALER) 180 MCG/ACT inhaler Inhale 1 puff  into the lungs 2 times daily   Hallie Barroso MD   montelukast (SINGULAIR) 10 MG tablet Take 1 tablet (10 mg) by mouth At Bedtime   Hallie Barroso MD   salmeterol (SEREVENT) 50 MCG/DOSE diskus inhaler Inhale 1 puff into the lungs 2 times daily   Hallie Barroso MD   tiotropium (SPIRIVA) 18 MCG capsule Inhale 1 capsule (18 mcg) into the lungs daily  Patient not taking: Reported on 11/8/2017   Hallie Barroso MD   Cyanocobalamin (B-12) 1000 MCG TBCR Take 1,000 mcg by mouth daily   Hallie Barroso MD   ferrous sulfate (IRON) 325 (65 FE) MG tablet Take 1 tablet (325 mg) by mouth daily (with breakfast)  Patient taking differently: Take 1 tablet by mouth 2 times daily    Hallie Barroso MD   calcium 600 MG tablet Take 1 tablet (600 mg) by mouth daily   Hallie Barroso MD   LANsoprazole (PREVACID) 30 MG CR capsule Take 1 capsule (30 mg) by mouth daily  Patient not taking: Reported on 11/8/2017   Hallie Barroso MD   cholecalciferol (VITAMIN D) 1000 UNIT tablet Take 1 tablet (1,000 Units) by mouth daily   Hallie Barroso MD   order for DME Equipment being ordered: Walker Wheels (), Walker () and Other: Four Wheeled Walker  Treatment Diagnosis: Impaired gait stability and activity tolerance   Moses Calle MD   order for DME Equipment being ordered: blood pressure machine   Jenny Hamilton MD   order for DME Overnight pulse oximetry   Jenny Hamilton MD   Multiple Vitamins-Minerals (MULTIVITAMIN OR) Take 1 tablet by mouth daily   Unknown, Entered By History   ASPIRIN NOT PRESCRIBED, INTENTIONAL, 1 each continuous prn. Antiplatelet medication not prescribed intentionally due to Use of NSAIDS   Jenny Hamilton MD     SOCIAL HISTORY:  The patient quit smoking.  She denies any drinking or drug use.  She lives in a senior living facility by herself.      FAMILY HISTORY:  Assessed and  noncontributory.      ALLERGIES:   1.  Hydralazine.   2.  Penicillin.   3.  Meloxicam.   4.  Metoprolol.   5.  Norvasc.      REVIEW OF SYSTEMS:  A 10-point review of systems was performed, and the pertinent positive findings are presented in the history of present illness.  The remainder of the review of systems is negative.      PHYSICAL EXAMINATION:   VITAL SIGNS:  Temperature 98.5, heart rate 108, blood pressure 153/116, respiratory rate 24, oxygen saturation 97% on room air.   GENERAL:  Mild distress from pain.  Awake, alert and oriented x3.   HEENT:  Normocephalic, atraumatic.  Extraocular movements are intact.   NECK:  Supple without JVD.   CARDIOVASCULAR:  Mildly tachycardic but regular.  No murmurs, rubs or gallops.   PULMONARY:  Mild end-expiratory wheezing.  No respiratory distress.  No crackles.   ABDOMEN:  Soft, nontender, nondistended.  Bowel sounds are present.   MUSCULOSKELETAL:  There is some point tenderness of the greater trochanter as well as the lateral gluteal area.  She also expresses some tenderness in the medial area of the thigh as well.  She has limited range of motion of that extremity as well.  Otherwise, no cyanosis, clubbing or edema.   SKIN:  No rashes, ulcers or jaundice.   NEUROLOGICAL:  Cranial nerves II through XII are grossly intact.  No focal neurologic deficits.   PSYCHIATRIC:  Mood and affect appropriate.      LABORATORY AND IMAGING:  I personally reviewed and discussed pertinent findings in the HPI.      ASSESSMENT AND PLAN:  This is a 77-year-old female with a history of steroid-dependent COPD, hypertension, hypothyroidism, chronic anemia, asthma, and aortic stenosis, presenting to [facility] for evaluation of right hip pain, with CT imaging evidence of a potential right upper thigh and gluteal collection, concerning for hematoma versus soft tissue mass, as well as multiple sacral insufficiency fractures.   1.  Right hip pain secondary to suspected thigh and  gluteal collection, concerning for hematoma or mass, with additional findings of multiple sacral insufficiency fractures:  The CT is likely explaining her pain complaints.  Currently, she is relatively comfortable.  Unfortunately, she cannot ambulate and does require admission to the hospital.  Recommendation from the overnight radiologist is to have a Musculoskeletal Radiology specialist evaluate the CT in the morning to see if there are any other pertinent findings.  I would like to request an Orthopedics consultation to see if surgical intervention is required, or potentially biopsy if there is a soft tissue mass present or maybe an ultrasound for better characterization.  Regarding the multiple sacral insufficiency fractures, I suspect these are nonoperative and require pain management.  Multimodal pain medications have been ordered.  Pain Management consultation could be considered, pending her course.  Physical and Occupational Therapy consults would certainly be reasonable, and have been ordered.  She will required enoxaparin DVT prophylaxis as her mobility will be limited for an extended time.   2.  Steroid-dependent chronic obstructive pulmonary disease:  The patient has been on variable doses of prednisone with attempt at weaning the dose down.  Her longstanding prednisone use is likely contributing to some osteoporosis and brittle bones.  At this time, she tells me she takes 20 mg daily.  This will be continued.  I will also add on some nebulizer treatments, as she does have some mild wheezing on examination.   3.  Hypertension:  Blood pressure is currently mildly elevated.  Some of this could be due to pain complaints.  We will continue her furosemide.  I otherwise do not see any antihypertensive medications in her prior to admission medication list.   4.  COPD and asthma:  Again, reasonably well controlled at this time.  She is on numerous inhalers that have all been continued following medication  reconciliation.   5.  Hypothyroidism:  We will continue her prior to admission levothyroxine.   6.  Gastroesophageal reflux disease:  We will continue her oral proton pump inhibitor.   7.  Hyponatremia:  Unclear etiology, chronic to some degree.  Will add on urine osmolality and urine sodium.  8.  The patient will be admitted to inpatient status with an expected 2-midnight hospitalization for pain control, physical therapy due to inability to ambulate, Orthopedics consultation, and potentially further evaluation of this abnormality seen in her thigh and buttock.   8.  CODE STATUS:  DNR/DNI, as discussed with the patient.         DEANN LYLES DO MPH             D: 2017 00:32   T: 2017 03:00   MT: [id number]      Name:     NAM DAS   MRN:      [id number]        Account:      [id number]   :      1940           Admitted:     [id number]      Document: [id number]       cc: Jenny Hamilton MD

## 2017-11-20 NOTE — ED PROVIDER NOTES
History     Chief Complaint:  Hip Pain     HPI   Ana Luisa Lee is a 77 year old female who presents with right hip pain.  Patient was rolling over today in bed and felt sudden pain in her right hip.  It radiates down into her femur.  She denies any specific trauma and has not fallen recently.  Patient states she was unable to get up and walk afterwards.  Patient is on chronic prednisone which is recently changed to 20 mg daily for her COPD.  Patient was seen several weeks ago in the emergency department had a negative x-ray and duplex ultrasound.  She denies any swelling in her lower extremities.  She is followed by home health care nurse.  She denies any fevers or cough.  She does feel short of breath and is requesting for a nebulizer treatment.    Allergies:  Hydralazine  Penicillin G  Meloxicam  Metoprolol  Norvasc [Amlodipine Besylate]      Medications:    Gemfibrozil  Prednisone  Compazine  Synthroid  Levalbuterol  Prilosec  Voltaren  Roflumilast  Miralax  Albuterol inhaler  Singulair  Salmeterol  Spiriva  Lansoprazole     Past Medical History:    Anemia  Arthritis of hand  Asthma, persistent  Chronic intermittent steroid use  COPD  Esophageal stricture  Foot deformity  Hypertension  Hyperlipidemia  Hypothyroidism  Osteoporosis  Paroxysmal supraventricular tachycardia  Premature ventricular contraction  Skin cyst     Past Surgical History:    Bunionectomy lapidus with tarsal metatarsal fusion  Total hysterectomy  Tonsillectomy     Family History:    Mother: Cerebrovascular disease, hypertension     Social History:  Smoking Status: Former Smoker  Smokeless Tobacco: Never Used  Alcohol Use: Negative  Marital Status:     PCP: Jenny Hamilton     Review of Systems   Constitutional: Negative for fever.   Respiratory: Positive for shortness of breath.    Musculoskeletal: Positive for arthralgias (right hip) and gait problem.   Neurological: Positive for weakness.   All other systems  reviewed and are negative.        Physical Exam     Patient Vitals for the past 24 hrs:   BP Temp Temp src Pulse Resp SpO2   11/19/17 2230 146/86 - - 98 20 98 %   11/19/17 2109 - - - - - 97 %   11/19/17 2045 (!) 153/116 98.5  F (36.9  C) Oral 108 24 97 %        Physical Exam  Constitutional: Alert, attentive, GCS 15, elderly woman, thin and cachectic appearing, in mild distress secondary to pain  HENT:    Nose: Nose normal.    Mouth/Throat: Oropharynx is clear, mucous membranes are moist   Eyes: Normal conjunctiva. Pupils are equal, round, and reactive to light.   CV: regular rate and rhythm; no murmurs, rubs or gallups  Chest: Effort normal and with bilateral expiratory wheezing  GI:  There is no tenderness, rebound or guarding.. No distension. Normal bowel sounds  MSK: Decreased range of motion of right hip, she is tender to palpation along her right hip and proximal femur.  No overlying bruising.  Right leg is slightly externally rotated.  No foreshortening  Neurological: Alert, attentive  Skin: Skin is warm and dry.    Emergency Department Course     Imaging:  CT Pelvis w/o contrast, per radiology  Radiologist discussed with me that there could be a right upper thigh/gluteal collection concerning for hematoma versus soft tissue mass.  There also appeared to be multiple sacral insufficiency fractures.  The radiologist will have the musculoskeletal specialist look at this scan in the morning.    Pelvis XR, per radiology:   Unremarkable exam.    Right Femur XR, per radiology:  Unremarkable exam.    Laboratory:  CBC: HG 10.2, otherwise WNL   CMP: Na 128 (L), Glucose 121 (H), Protein 6.3 (L), o/w WNL (Creatinine 0.79)     Procedures:  None     Interventions:  2109: Duoneb, 3 mL, Nebulization   2239: Dilaudid, 0.5 mg, IV injection    Emergency Department Course:  Past medical records, nursing notes, and vitals reviewed.  I performed an exam of the patient and obtained history, as documented above.    I rechecked the  patient. Findings and plan explained to the Patient and she is amenable to admission.  She is unable to ambulate.    I spoke with Dr. Johnson, internal medicine doctor.  He was updated about the CT results.    Impression & Plan      Medical Decision Makin-year-old female presenting with right hip pain.  She did not have any traumatic injury besides rolling over in bed, sudden onset of pain and inability to walk.  Differential diagnosis is broad and includes occult hip fracture, hip dislocation, osteoarthritis, septic arthritis, DVT.  Patient's x-rays of his right femur and hip do not show any acute fractures.  She was reevaluated, and was unable to stand or bear weight.  CT pelvis without contrast was obtained due to concern for occult fracture, and patient appears to have multiple sacral insufficiency fractures of indeterminate age, but possibly acute.  She also appears to have soft tissue mass versus hematoma in the right gluteal region and thigh.  Patient will be admitted to internal medicine for physical therapy, pain control and potentially placement.      Diagnosis:    ICD-10-CM    1. Hematoma T14.8XXA    2. Sacral insufficiency fracture, initial encounter M84.48XA    3. Unable to ambulate R26.2         Discharge Medications:  New Prescriptions    No medications on file        2017   Maribel Madrigal MD Lum, Marija Margaret, MD  [phone]

## 2017-11-20 NOTE — PROGRESS NOTES
[facility]  Patient is currently open to home care services with [facility].  The patient is currently receiving RN/HHA services.  [facility]  and team have been notified of patient admission.  [facility] liaison will continue to follow patient during stay.  If appropriate provide orders to resume home care at time of discharge.

## 2017-11-20 NOTE — ED NOTES
Pt arrived in the ED via EMS from home with reports of right hip pain after rolling on to her side in bed; no injury/trauma/falls.  Pt is awake, alert, and oriented x4; pt normally is ambulatory with a walker.  Asking for a nebulizer.

## 2017-11-20 NOTE — PLAN OF CARE
Problem: Patient Care Overview  Goal: Plan of Care/Patient Progress Review  Outcome: No Change  Admitted from ER just before 0100. Was able to pivot into bed from wheel chair. Denied pain. On scheduled Tylenol. Assist of 2 to bedside commode. Unable to void. Bladder scanned for 553ml. Straight cathed for 520ml, tolerated well.

## 2017-11-20 NOTE — PROGRESS NOTES
1029 Text page to MD: Can you please confirm if lovenox dose should be given? Is hematoma a concern or okay to give? Thank you

## 2017-11-20 NOTE — ED NOTES
[facility]  ED Nurse Handoff Report    Ana Luisa Lee is a 77 year old female   ED Chief complaint: Hip Pain  . ED Diagnosis:   Final diagnoses:   None     Allergies:   Allergies   Allergen Reactions     Hydralazine Anxiety     Penicillin G Hives     Meloxicam      dizziness       Metoprolol      ? Skin rash on the back     Norvasc [Amlodipine Besylate] Hives       Code Status: Not assessed.  Activity level - Baseline/Home:  Ambulates with a walker.. Activity Level - Current:   Stand with Assist of 2. Lift room needed: No. Bariatric: No   Needed: No   Isolation: No. Infection: Not Applicable.     Vital Signs:   Vitals:    11/19/17 2045 11/19/17 2109 11/19/17 2230   BP: (!) 153/116  146/86   BP Location:   Left arm   Pulse: 108  98   Resp: 24  20   Temp: 98.5  F (36.9  C)     TempSrc: Oral     SpO2: 97% 97% 98%       Cardiac Rhythm:  ,      Pain level: 0-10 Pain Scale: 7  Patient confused: No. Patient Falls Risk: Yes.   Elimination Status: Not voided.   Patient Report - Initial Complaint: Rolled over to her side in bed, c/o right hip pain---no injury/trauma.. Focused Assessment: Unable to ambulate in ED due to pain.  Pt states she has neb treatments every 4 hours due to her COPD.   Tests Performed: blood, imaging. Abnormal Results:   Labs Ordered and Resulted from Time of ED Arrival Up to the Time of Departure from the ED   CBC WITH PLATELETS DIFFERENTIAL - Abnormal; Notable for the following:        Result Value    RBC Count 3.34 (*)     Hemoglobin 10.2 (*)     Hematocrit 31.0 (*)     RDW 15.1 (*)     All other components within normal limits   COMPREHENSIVE METABOLIC PANEL - Abnormal; Notable for the following:     Sodium 128 (*)     Glucose 121 (*)     Protein Total 6.3 (*)     All other components within normal limits     .   Treatments provided: Neb treatment, IV dilaudid.  Family Comments: N/A  OBS brochure/video discussed/provided to patient:  Yes  ED Medications:   Medications    HYDROmorphone (PF) (DILAUDID) injection 0.5 mg (0.5 mg Intravenous Given 11/19/17 2239)   ipratropium - albuterol 0.5 mg/2.5 mg/3 mL (DUONEB) neb solution 3 mL (3 mLs Nebulization Given 11/19/17 2109)     Drips infusing:  No  For the majority of the shift, the patient's behavior Green. Interventions performed were N/A.     Severe Sepsis OR Septic Shock Diagnosis Present: No      ED Nurse Name/Phone Number: Aurora Casas,   11:43 PM.  RECEIVING UNIT ED HANDOFF REVIEW    Above ED Nurse Handoff Report was reviewed: Yes  Reviewed by: Gala Shepherd on November 20, 2017 at 12:32 AM

## 2017-11-20 NOTE — CONSULTS
ORTHOPEDIC CONSULTATION      CHIEF COMPLAINT:  Right hip pain.      HISTORY OF PRESENT ILLNESS:  Ana Luisa Lee is a 77-year-old woman who was admitted through the Emergency Room last night with a history of simply rolling over in bed and feeling severe pain in her right buttock and leg.  She was unable to ambulate.  She was admitted for definitive management.      The patient has not really had any falls that she can remember.  She tells me that a couple weeks ago, she did have a significant amount of bruising on her leg of unknown etiology.      PHYSICAL EXAMINATION:  The patient is alert and oriented in bed.  Her only complaint is her right hip and buttock area.  She is tender to palpation and with any movement of her leg.      IMAGING STUDIES:  The patient's x-rays are normal.  Her CT scan calls the possibility of a sacral insufficiency fracture as well as a hematoma.  It looks like the hematomas in the buttock and extending to the sciatic notch medial to the acetabular wall.      IMPRESSION:   1.  Probable right sacral insufficiency fracture.   2.  Probable hematoma, right hemipelvis.      PLAN:  I discussed options with the patient.  I think first we need to confirm the diagnosis.  An MRI scan will be much more sensitive to study the possibility of sacral insufficiency fracture and/or hematoma.  I ordered the MRI scan.  I told her that if it is simply an insufficiency fracture, it will be treated nonsurgically.  If it is simply hematoma, unless there is neurologic compromise that will also be managed nonsurgically.  I suppose there is a possibility that it would need to be drained if she was having extreme pain or neurologic dysfunction, but that would be done with a percutaneous CT-guided needle biopsy drainage.  I will see her back when the results of the MRI scan are available to me.         JESSIKA HILARIO MD             D: 11/20/2017 10:44   T: 11/20/2017 11:26   MT: [id number]      Name:     PIPPA  NAM   MRN:      [id number]        Account:       [id number]   :      1940           Consult Date:  2017      Document: [id number]       cc: Yan Cruz MD

## 2017-11-21 ENCOUNTER — APPOINTMENT (OUTPATIENT)
Dept: OCCUPATIONAL THERAPY | Facility: CLINIC | Age: 77
DRG: 543 | End: 2017-11-21
Attending: HOSPITALIST
Payer: COMMERCIAL

## 2017-11-21 LAB
ANION GAP SERPL CALCULATED.3IONS-SCNC: 8 MMOL/L (ref 3–14)
BUN SERPL-MCNC: 11 MG/DL (ref 7–30)
CALCIUM SERPL-MCNC: 9.3 MG/DL (ref 8.5–10.1)
CHLORIDE SERPL-SCNC: 100 MMOL/L (ref 94–109)
CO2 SERPL-SCNC: 26 MMOL/L (ref 20–32)
CREAT SERPL-MCNC: 0.81 MG/DL (ref 0.52–1.04)
GFR SERPL CREATININE-BSD FRML MDRD: 68 ML/MIN/1.7M2
GLUCOSE SERPL-MCNC: 97 MG/DL (ref 70–99)
HGB BLD-MCNC: 9.5 G/DL (ref 11.7–15.7)
POTASSIUM SERPL-SCNC: 3.4 MMOL/L (ref 3.4–5.3)
SODIUM SERPL-SCNC: 134 MMOL/L (ref 133–144)

## 2017-11-21 PROCEDURE — 25000125 ZZHC RX 250: Performed by: HOSPITALIST

## 2017-11-21 PROCEDURE — 94640 AIRWAY INHALATION TREATMENT: CPT

## 2017-11-21 PROCEDURE — 85018 HEMOGLOBIN: CPT | Performed by: INTERNAL MEDICINE

## 2017-11-21 PROCEDURE — 97165 OT EVAL LOW COMPLEX 30 MIN: CPT | Mod: GO | Performed by: REHABILITATION PRACTITIONER

## 2017-11-21 PROCEDURE — 80048 BASIC METABOLIC PNL TOTAL CA: CPT | Performed by: INTERNAL MEDICINE

## 2017-11-21 PROCEDURE — 12000000 ZZH R&B MED SURG/OB

## 2017-11-21 PROCEDURE — 25000132 ZZH RX MED GY IP 250 OP 250 PS 637: Performed by: HOSPITALIST

## 2017-11-21 PROCEDURE — 25000132 ZZH RX MED GY IP 250 OP 250 PS 637: Performed by: INTERNAL MEDICINE

## 2017-11-21 PROCEDURE — 94640 AIRWAY INHALATION TREATMENT: CPT | Mod: 76

## 2017-11-21 PROCEDURE — 99232 SBSQ HOSP IP/OBS MODERATE 35: CPT | Performed by: INTERNAL MEDICINE

## 2017-11-21 PROCEDURE — 97535 SELF CARE MNGMENT TRAINING: CPT | Mod: GO | Performed by: REHABILITATION PRACTITIONER

## 2017-11-21 PROCEDURE — 40000133 ZZH STATISTIC OT WARD VISIT: Performed by: REHABILITATION PRACTITIONER

## 2017-11-21 PROCEDURE — 36415 COLL VENOUS BLD VENIPUNCTURE: CPT | Performed by: INTERNAL MEDICINE

## 2017-11-21 PROCEDURE — 40000275 ZZH STATISTIC RCP TIME EA 10 MIN

## 2017-11-21 RX ADMIN — SALMETEROL XINAFOATE 1 PUFF: 50 POWDER, METERED ORAL; RESPIRATORY (INHALATION) at 08:29

## 2017-11-21 RX ADMIN — OMEPRAZOLE 40 MG: 20 CAPSULE, DELAYED RELEASE ORAL at 09:09

## 2017-11-21 RX ADMIN — PREDNISONE 20 MG: 20 TABLET ORAL at 09:09

## 2017-11-21 RX ADMIN — CYANOCOBALAMIN TAB 1000 MCG 1000 MCG: 1000 TAB at 09:09

## 2017-11-21 RX ADMIN — ROFLUMILAST 500 MCG: 500 TABLET ORAL at 09:09

## 2017-11-21 RX ADMIN — LEVOTHYROXINE SODIUM 25 MCG: 25 TABLET ORAL at 09:09

## 2017-11-21 RX ADMIN — CALCIUM 1250 MG: 500 TABLET ORAL at 09:09

## 2017-11-21 RX ADMIN — ACETAMINOPHEN 975 MG: 325 TABLET, FILM COATED ORAL at 09:08

## 2017-11-21 RX ADMIN — LIDOCAINE 2 PATCH: 50 PATCH CUTANEOUS at 20:33

## 2017-11-21 RX ADMIN — GEMFIBROZIL 600 MG: 600 TABLET ORAL at 09:09

## 2017-11-21 RX ADMIN — MONTELUKAST SODIUM 10 MG: 10 TABLET, FILM COATED ORAL at 22:01

## 2017-11-21 RX ADMIN — LISINOPRIL 5 MG: 5 TABLET ORAL at 09:09

## 2017-11-21 RX ADMIN — DICLOFENAC SODIUM 4 G: 10 GEL TOPICAL at 12:39

## 2017-11-21 RX ADMIN — SALMETEROL XINAFOATE 1 PUFF: 50 POWDER, METERED ORAL; RESPIRATORY (INHALATION) at 20:23

## 2017-11-21 RX ADMIN — ACETAMINOPHEN 975 MG: 325 TABLET, FILM COATED ORAL at 17:18

## 2017-11-21 RX ADMIN — LEVALBUTEROL HYDROCHLORIDE 1.25 MG: 1.25 SOLUTION RESPIRATORY (INHALATION) at 15:15

## 2017-11-21 RX ADMIN — VITAMIN D, TAB 1000IU (100/BT) 1000 UNITS: 25 TAB at 09:09

## 2017-11-21 RX ADMIN — LEVALBUTEROL HYDROCHLORIDE 1.25 MG: 1.25 SOLUTION RESPIRATORY (INHALATION) at 08:37

## 2017-11-21 RX ADMIN — DICLOFENAC SODIUM 4 G: 10 GEL TOPICAL at 20:36

## 2017-11-21 RX ADMIN — LEVALBUTEROL HYDROCHLORIDE 1.25 MG: 1.25 SOLUTION RESPIRATORY (INHALATION) at 20:21

## 2017-11-21 RX ADMIN — UMECLIDINIUM 1 PUFF: 62.5 AEROSOL, POWDER ORAL at 08:29

## 2017-11-21 ASSESSMENT — ACTIVITIES OF DAILY LIVING (ADL): PREVIOUS_RESPONSIBILITIES: MEAL PREP

## 2017-11-21 ASSESSMENT — PAIN DESCRIPTION - DESCRIPTORS: DESCRIPTORS: ACHING;SORE

## 2017-11-21 NOTE — PROGRESS NOTES
11/21/17 1429   Quick Adds   Type of Visit Initial Occupational Therapy Evaluation   Living Environment   Lives With alone   Living Arrangements independent living facility   Home Accessibility grab bars present (toilet);grab bars present (bathtub)   Number of Stairs to Enter Home 0   Number of Stairs Within Home 0   Transportation Available car;family or friend will provide   Living Environment Comment Pt reported to be independent in mobility using a 4WW.  She has a homemaker 2x/wk, visiting nurse for medication set-up, HHA for bathing.   Self-Care   Dominant Hand right   Usual Activity Tolerance moderate   Current Activity Tolerance fair   Regular Exercise no   Equipment Currently Used at Home walker, rolling;shower chair;grab bar   Activity/Exercise/Self-Care Comment Pt reported to be independent in ADLs and cooking at baseline.   Functional Level Prior   Ambulation 1-->assistive equipment   Transferring 1-->assistive equipment   Toileting 1-->assistive equipment   Bathing 3-->assistive equipment and person   Dressing 0-->independent   Eating 0-->independent   Communication 0-->understands/communicates without difficulty   Swallowing 0-->swallows foods/liquids without difficulty   Cognition 1 - attention or memory deficits   Fall history within last six months no   Which of the above functional risks had a recent onset or change? ambulation;transferring;cognition       Present no   General Information   Onset of Illness/Injury or Date of Surgery - Date 11/19/17   Referring Physician Dr. Damien Johnson   Patient/Family Goals Statement Return to home.   Additional Occupational Profile Info/Pertinent History of Current Problem Pt is a 77-year-old female with history of steroid-dependent COPD, hypertension, hypothyroidism, chronic anemia, asthma, and aortic stenosis.  She presented to [facility] ED on 11/19/17 for evaluation of right hip pain.  MRI revealed R hamstring disruption with  MD recommending symptomatic treatment only.   Precautions/Limitations fall precautions   Cognitive Status Examination   Orientation person;place   Level of Consciousness alert   Able to Follow Commands WNL/WFL   Personal Safety (Cognitive) decreased awareness, need for assist;decreased awareness, need for safety   Memory impaired   Attention No deficits were identified   Organization/Problem Solving Problem solving impaired   Executive Function Working memory impaired, decreased storage of information for performing tasks;Planning ability impaired;Self awareness/monitoring impaired   Visual Perception   Visual Perception Wears glasses  (reading only)   Sensory Examination   Sensory Quick Adds No deficits were identified   Pain Assessment   Patient Currently in Pain Yes, see Vital Sign flowsheet   Posture   Posture forward head position   Range of Motion (ROM)   ROM Quick Adds No deficits were identified   ROM Comment B UE AROM WFL   Strength   Manual Muscle Testing Quick Adds Other   Strength Comments B UE strength grossly 4/5.   Hand Strength   Hand Strength Comments WFL   Muscle Tone Assessment   Muscle Tone Quick Adds No deficits were identified   Coordination   Upper Extremity Coordination No deficits were identified   Mobility   Bed Mobility Comments SBA   Transfer Skill: Bed to Chair/Chair to Bed   Level of Assumption: Bed to Chair contact guard   Physical Assist/Nonphysical Assist: Bed to Chair verbal cues   Assistive Device - Transfer Skill Bed to Chair Chair to Bed Rehab Eval rolling walker   Transfer Skill: Sit to Stand   Level of Assumption: Sit/Stand contact guard   Physical Assist/Nonphysical Assist: Sit/Stand verbal cues   Assistive Device for Transfer: Sit/Stand rolling walker   Transfer Skill: Toilet Transfer   Level of Assumption: Toilet contact guard   Physical Assist/Nonphysical Assist: Toilet verbal cues   Assistive Device rolling walker;grab bars   Upper Body Dressing   Level of  "Chestnut: Dress Upper Body stand-by assist   Physical Assist/Nonphysical Assist: Dress Upper Body set-up required   Lower Body Dressing   Level of Chestnut: Dress Lower Body minimum assist (75% patients effort)   Physical Assist/Nonphysical Assist: Dress Lower Body set-up required   Grooming   Level of Chestnut: Grooming stand-by assist   Physical Assist/Nonphysical Assist: Grooming set-up required  (sitting up in chair)   Eating/Self Feeding   Level of Chest[place]: Eating independent   Physical Assist/Nonphysical Assist: Eating set-up required   Instrumental Activities of Daily Living (IADL)   Previous Responsibilities meal prep   Activities of Daily Living Analysis   Impairments Contributing to Impaired Activities of Daily Living balance impaired;pain;strength decreased   General Therapy Interventions   Planned Therapy Interventions ADL retraining;transfer training   Clinical Impression   Criteria for Skilled Therapeutic Interventions Met yes, treatment indicated   OT Diagnosis decreased ADL performance   Influenced by the following impairments pain, weakness   Assessment of Occupational Performance 3-5 Performance Deficits   Identified Performance Deficits Pt with decreased ability to complete dressing, bathing, toileting, cooking.   Clinical Decision Making (Complexity) Low complexity   Therapy Frequency daily   Predicted Duration of Therapy Intervention (days/wks) 3 days   Anticipated Discharge Disposition Home with Home Therapy   Risks and Benefits of Treatment have been explained. Yes   Patient, Family & other staff in agreement with plan of care Yes   [facility] [employer] TM \"6 Clicks\"   2016, Trustees of [facility], under license to [employer].  All rights reserved.   6 Clicks Short Forms Daily Activity Inpatient Short Form   [facility]  \"6 Clicks\" Daily Activity Inpatient Short Form   1. Putting on and taking off regular lower body clothing? 3 - A Little   2. Bathing " (including washing, rinsing, drying)? 3 - A Little   3. Toileting, which includes using toilet, bedpan or urinal? 3 - A Little   4. Putting on and taking off regular upper body clothing? 4 - None   5. Taking care of personal grooming such as brushing teeth? 3 - A Little   6. Eating meals? 4 - None   Daily Activity Raw Score (Score out of 24.Lower scores equate to lower levels of function) 20   Total Evaluation Time   Total Evaluation Time (Minutes) 8

## 2017-11-21 NOTE — CONSULTS
"Care Transition Initial Assessment - RN    Reason For Consult: care coordination/care conference, discharge planning   Met with: Patient.    DATA   Active Problems:    Hip pain       Cognitive Status: awake, alert and oriented.  Primary Care Clinic Name: [facility]   Primary Care MD Name: celestino   Contact information and PCP information verified: Yes    Lives With: alone  Living Arrangements: independent living facility  Quality Of Family Relationships: supportive (Dtr )  Description of Support System: Supportive, Involved   Who is your support system?: Children (friends, home care staff )   Support Assessment: Adequate family and caregiver support, Adequate social supports     Insurance concerns: No Insurance issues identified and Insurance Plan    ASSESSMENT  Patient currently receives the following services:    Patient is currently open to home care services with [facility].  The patient is currently receiving RN/HHA services.       Identified issues/concerns regarding health management: Pt reports at baseline she is independent functioning in her own apartment. She is up with a walker, has a life alert, she orders her own groceries on line and has them delivered, she cooks her own meals, she takes her own medication but has help with set up. She has a friend that transports to MD appt's and \"little errands\" otherwise \"My dtr is available\"    Pt is followed by [facility] ROSARIO Savage (p) [phone] (f)[phone]), please update on discharge     PLAN  Financial costs for the patient include NA .  Patient given options and choices for discharge yes   Patient/family is agreeable to the plan?  Yes , which will be to return home [facility]   Patient anticipates discharging to back to Independent living        Patient anticipates needs for home equipment: No  Discharge Planner   Discharge Plans in progress: Awaiting MRI results and Ortho follow up for plan.   Barriers to discharge plan: MRI results and " identified plan.   Plan/Disposition: Home   Appointments:     Nov 27, 2017 11:00 AM CST   Office Visit with Jenny Hamilton MD   [facility]    [address]  [facility] [address] [phone]          Care  (CTS) will continue to follow as needed.

## 2017-11-21 NOTE — PROGRESS NOTES
MRI shows Hamstring disruption ( Likely chronic) and resultant hematoma/inflamation. No tumor. No fracture.    Recommend symptomatic treatment only.

## 2017-11-21 NOTE — PLAN OF CARE
Problem: Patient Care Overview  Goal: Plan of Care/Patient Progress Review  Outcome: Improving  Pt  Was up with assist of 1 and walker. Pt is taking tylenol for pain .  Pt was unable to void, Pt states she needs her lasix.  Pt was straight cathed  for 500 cc.

## 2017-11-21 NOTE — PLAN OF CARE
Problem: Patient Care Overview  Goal: Plan of Care/Patient Progress Review  Outcome: Improving  Vitals stable. Alert this morning but forgetful. Set VPM off x2. Up with assist of 1 to bedside commode. Voided. Slept comfortably most of the night.

## 2017-11-21 NOTE — PLAN OF CARE
Problem: Patient Care Overview  Goal: Plan of Care/Patient Progress Review  Outcome: Improving  Pt sleepy, but easily arousable. Answered questions appropriately. Lidoderm patches placed on Rt hip/thigh area. VPM at bedside. VSS except  /84. MRI done this evening, results pending. Continue w/plan of care.

## 2017-11-22 ENCOUNTER — APPOINTMENT (OUTPATIENT)
Dept: PHYSICAL THERAPY | Facility: CLINIC | Age: 77
DRG: 543 | End: 2017-11-22
Payer: COMMERCIAL

## 2017-11-22 ENCOUNTER — APPOINTMENT (OUTPATIENT)
Dept: OCCUPATIONAL THERAPY | Facility: CLINIC | Age: 77
DRG: 543 | End: 2017-11-22
Payer: COMMERCIAL

## 2017-11-22 VITALS
OXYGEN SATURATION: 99 % | BODY MASS INDEX: 23.66 KG/M2 | WEIGHT: 125.2 LBS | DIASTOLIC BLOOD PRESSURE: 83 MMHG | HEART RATE: 95 BPM | RESPIRATION RATE: 18 BRPM | SYSTOLIC BLOOD PRESSURE: 159 MMHG | TEMPERATURE: 98.1 F

## 2017-11-22 LAB — HGB BLD-MCNC: 9.2 G/DL (ref 11.7–15.7)

## 2017-11-22 PROCEDURE — 25000132 ZZH RX MED GY IP 250 OP 250 PS 637: Performed by: HOSPITALIST

## 2017-11-22 PROCEDURE — 25000132 ZZH RX MED GY IP 250 OP 250 PS 637

## 2017-11-22 PROCEDURE — 97110 THERAPEUTIC EXERCISES: CPT | Mod: GP

## 2017-11-22 PROCEDURE — 94640 AIRWAY INHALATION TREATMENT: CPT | Mod: 76

## 2017-11-22 PROCEDURE — 99239 HOSP IP/OBS DSCHRG MGMT >30: CPT | Performed by: INTERNAL MEDICINE

## 2017-11-22 PROCEDURE — 94640 AIRWAY INHALATION TREATMENT: CPT

## 2017-11-22 PROCEDURE — 36415 COLL VENOUS BLD VENIPUNCTURE: CPT | Performed by: INTERNAL MEDICINE

## 2017-11-22 PROCEDURE — 25000125 ZZHC RX 250: Performed by: HOSPITALIST

## 2017-11-22 PROCEDURE — 40000133 ZZH STATISTIC OT WARD VISIT: Performed by: REHABILITATION PRACTITIONER

## 2017-11-22 PROCEDURE — 97535 SELF CARE MNGMENT TRAINING: CPT | Mod: GO | Performed by: REHABILITATION PRACTITIONER

## 2017-11-22 PROCEDURE — 40000275 ZZH STATISTIC RCP TIME EA 10 MIN

## 2017-11-22 PROCEDURE — 97161 PT EVAL LOW COMPLEX 20 MIN: CPT | Mod: GP

## 2017-11-22 PROCEDURE — 40000193 ZZH STATISTIC PT WARD VISIT

## 2017-11-22 PROCEDURE — 85018 HEMOGLOBIN: CPT | Performed by: INTERNAL MEDICINE

## 2017-11-22 PROCEDURE — 97116 GAIT TRAINING THERAPY: CPT | Mod: GP

## 2017-11-22 PROCEDURE — 97530 THERAPEUTIC ACTIVITIES: CPT | Mod: GP

## 2017-11-22 PROCEDURE — 25000132 ZZH RX MED GY IP 250 OP 250 PS 637: Performed by: INTERNAL MEDICINE

## 2017-11-22 RX ORDER — OXYCODONE HYDROCHLORIDE 5 MG/1
5 TABLET ORAL EVERY 4 HOURS PRN
Qty: 20 TABLET | Refills: 0 | Status: SHIPPED | OUTPATIENT
Start: 2017-11-22 | End: 2018-01-02

## 2017-11-22 RX ORDER — ACETAMINOPHEN 325 MG/1
650 TABLET ORAL EVERY 4 HOURS PRN
Qty: 100 TABLET | COMMUNITY
Start: 2017-11-22 | End: 2018-04-27

## 2017-11-22 RX ORDER — HYDROXYZINE HYDROCHLORIDE 25 MG/1
25 TABLET, FILM COATED ORAL 3 TIMES DAILY PRN
Qty: 40 TABLET | Refills: 0 | Status: ON HOLD | OUTPATIENT
Start: 2017-11-22 | End: 2018-04-13

## 2017-11-22 RX ORDER — LISINOPRIL 5 MG/1
5 TABLET ORAL DAILY
Qty: 30 TABLET | Refills: 0 | Status: SHIPPED | OUTPATIENT
Start: 2017-11-23 | End: 2017-11-27

## 2017-11-22 RX ORDER — AMOXICILLIN 250 MG
1 CAPSULE ORAL DAILY
Qty: 20 TABLET | Refills: 0 | Status: SHIPPED | OUTPATIENT
Start: 2017-11-22 | End: 2018-01-02

## 2017-11-22 RX ADMIN — CALCIUM 1250 MG: 500 TABLET ORAL at 12:49

## 2017-11-22 RX ADMIN — LEVOTHYROXINE SODIUM 25 MCG: 25 TABLET ORAL at 08:10

## 2017-11-22 RX ADMIN — ACETAMINOPHEN 975 MG: 325 TABLET, FILM COATED ORAL at 01:18

## 2017-11-22 RX ADMIN — VITAMIN D, TAB 1000IU (100/BT) 1000 UNITS: 25 TAB at 08:28

## 2017-11-22 RX ADMIN — GEMFIBROZIL 600 MG: 600 TABLET ORAL at 08:28

## 2017-11-22 RX ADMIN — CYANOCOBALAMIN TAB 1000 MCG 1000 MCG: 1000 TAB at 08:28

## 2017-11-22 RX ADMIN — ACETAMINOPHEN 975 MG: 325 TABLET, FILM COATED ORAL at 08:28

## 2017-11-22 RX ADMIN — PREDNISONE 20 MG: 20 TABLET ORAL at 08:28

## 2017-11-22 RX ADMIN — DICLOFENAC SODIUM 4 G: 10 GEL TOPICAL at 12:49

## 2017-11-22 RX ADMIN — ROFLUMILAST 500 MCG: 500 TABLET ORAL at 08:28

## 2017-11-22 RX ADMIN — DICLOFENAC SODIUM 4 G: 10 GEL TOPICAL at 08:11

## 2017-11-22 RX ADMIN — LISINOPRIL 5 MG: 5 TABLET ORAL at 08:28

## 2017-11-22 RX ADMIN — FLUTICASONE FUROATE 1 PUFF: 100 POWDER RESPIRATORY (INHALATION) at 12:21

## 2017-11-22 RX ADMIN — UMECLIDINIUM 1 PUFF: 62.5 AEROSOL, POWDER ORAL at 10:45

## 2017-11-22 RX ADMIN — SALMETEROL XINAFOATE 1 PUFF: 50 POWDER, METERED ORAL; RESPIRATORY (INHALATION) at 10:45

## 2017-11-22 RX ADMIN — LEVALBUTEROL HYDROCHLORIDE 1.25 MG: 1.25 SOLUTION RESPIRATORY (INHALATION) at 10:45

## 2017-11-22 RX ADMIN — LEVALBUTEROL HYDROCHLORIDE 1.25 MG: 1.25 SOLUTION RESPIRATORY (INHALATION) at 01:38

## 2017-11-22 RX ADMIN — OMEPRAZOLE 40 MG: 20 CAPSULE, DELAYED RELEASE ORAL at 08:28

## 2017-11-22 ASSESSMENT — PAIN DESCRIPTION - DESCRIPTORS: DESCRIPTORS: SORE

## 2017-11-22 NOTE — PLAN OF CARE
Problem: Patient Care Overview  Goal: Plan of Care/Patient Progress Review    Discharge Planner OT   Patient plan for discharge: home with assist  Current status: Stating 5-6/10 pain. Pt with good ability to follow directions throughout session. Pt donned/doffed socks SBA. Donned LB clothing; in stance to pull up clothing SBA with use of 2ww for stability. Discussed use of reacher during dressing and other daily activities; pt stating she has reacher at home. Discussed EC/WS techniques during dressing and IADL (cooking) tasks; pt attentive and receptive. Pt stating concerns with transport upon leaving hospital; SW notified.   Barriers to return to prior living situation: Pt lives alone with decreased mobility and increased pain.  Recommendations for discharge: home with Home OT and continued support.  Rationale for recommendations: Pt would benefit from continued OT to maximize safety and independence in ADLs and functional transfers with improved strength and endurance.       Entered by: Kaitlyn Jurado 11/22/2017 11:54 AM

## 2017-11-22 NOTE — PLAN OF CARE
Problem: Patient Care Overview  Goal: Plan of Care/Patient Progress Review  Discharge Planner PT     PT: Orders received, chart reviewed, eval completed and treatment initiated. Pt is a 77-year-old female with history of steroid-dependent COPD, hypertension, hypothyroidism, chronic anemia, asthma, and aortic stenosis.  She presented to [facility] ED on 11/19/17 for evaluation of right hip pain.  MRI revealed R hamstring disruption with MD recommending symptomatic treatment only. Pt reports living in an independent living and reports being independent at baseline with 4ww with mobility. She reports havine a homemaker 2x/week, nurse for med set-up, and HHA for bathing. Pt does voice concerns about meals upon return home as she was responsible for her meals at baseline.     Patient plan for discharge: Pt open to TCU or home  Current status: Pt is mod I with bed mobility, SBA for sit to/from stand transfers, and SBA for gait with fww up to 150ft. Pt reports shortness of breath due to co-morbidities at baseline and reports feeling short of breath following gait.   Barriers to return to prior living situation: Lives alone  Recommendations for discharge: Home with home PT and continued supportive assistance  Rationale for recommendations: Pt would benefit from continued skilled PT interventions prior to discharge and upon return home to address functional limitations.        Entered by: Betty Webb 11/22/2017 9:50 AM         Physical Therapy Discharge Summary    Reason for therapy discharge:    Discharged to home with home therapy.    Progress towards therapy goal(s). See goals on Care Plan in [facility] electronic health record for goal details.  Goals partially met.  Barriers to achieving goals:   discharge on same date as initial evaluation.    Therapy recommendation(s):    Continued therapy is recommended.  Rationale/Recommendations:  Eval and tx Home PT.

## 2017-11-22 NOTE — PLAN OF CARE
Problem: Patient Care Overview  Goal: Plan of Care/Patient Progress Review  Outcome: Improving  A&O. Took scheduled Tylenol. PRN nebs x1. Up with assist of 1, walker and gait belt. Voided

## 2017-11-22 NOTE — PLAN OF CARE
Problem: Patient Care Overview  Goal: Plan of Care/Patient Progress Review  Outcome: Improving  A/O. Up SBA w/ walker. Pain managed with tylenol. Nebs PRN, pt has intermittent cough. CMS intact. No edema noted. DTV see bladder scans, encouraged fluids. Tolerating reg diet.

## 2017-11-22 NOTE — DISCHARGE SUMMARY
Discharge Summary  Hospitalist Service    Ana Luisa Lee MRN# [id number]   YOB: 1940 Age: 77 year old     Date of Admission:  11/19/2017  Date of Discharge:  11/22/2017  Admitting Physician:  Damien Johnson DO  Discharge Physician: Fuentes Hopson DO  Discharging Service: Hospitalist Service     Primary Provider: Jenny Hamilton  Primary Care Physician Phone Number: [phone]         Discharge Diagnoses/Problem Oriented Hospital Course (Providers):    Ana Luisa Lee was admitted on 11/19/2017 by Damien Johnson DO and I would refer you to their history and physical.  The following problems were addressed during her hospitalization:  1.  Acute pain due to Sacral insufficiency fracture, hematoma in right gluteus and thigh, and hamstring disruption.  Ortho consulted during hospital stay.  Management is non operative support at this time.  Pain meds PRN.  Therapy.     2.  Steroid-dependent chronic obstructive pulmonary disease:  Continue PTA Prednisone 20 mg daily, bronchodilators.  Symptoms back to baseline by time of discharge.       3.  Hypertension:  Continue Lisinopril 5 mg/Day.  Will need BMP in 7 days.  HTN may have been due to pain from issues in #1.  May be able to come off Lisinopril in future.  Will need to continue to follow with Primary Care Provider as outpatient.      5.  Hypothyroidism:  Continue Levothyroxine.       6.  Gastroesophageal reflux disease:  Continue PPI.      7.  Hyponatremia:  Resolved.  Recheck BMP in 7 days as outpatient.         Code Status:      DNR / DNI          Pending Results:        Unresulted Labs Ordered in the Past 30 Days of this Admission     No orders found from 9/20/2017 to 11/20/2017.            Discharge Instructions and Follow-Up:      Follow-up Appointments     Follow-up and recommended labs and tests        Follow up with primary care provider, Jenny Hamilton,   within 7 days for hospital follow-  up.  The following labs/tests are   recommended: BMP and CBC in 7 days.                      Discharge Disposition:      Discharged to home         Discharge Medications:        Current Discharge Medication List      START taking these medications    Details   oxyCODONE IR (ROXICODONE) 5 MG tablet Take 1 tablet (5 mg) by mouth every 4 hours as needed for moderate to severe pain  Qty: 20 tablet, Refills: 0    Associated Diagnoses: Sacral insufficiency fracture, initial encounter      acetaminophen (TYLENOL) 325 MG tablet Take 2 tablets (650 mg) by mouth every 4 hours as needed for mild pain  Qty: 100 tablet    Associated Diagnoses: Sacral insufficiency fracture, initial encounter      hydrOXYzine (ATARAX) 25 MG tablet Take 1 tablet (25 mg) by mouth 3 times daily as needed for other (pain)  Qty: 40 tablet, Refills: 0    Associated Diagnoses: Sacral insufficiency fracture, initial encounter      lisinopril (PRINIVIL/ZESTRIL) 5 MG tablet Take 1 tablet (5 mg) by mouth daily  Qty: 30 tablet, Refills: 0    Associated Diagnoses: Sacral insufficiency fracture, initial encounter      senna-docusate (SENOKOT-S;PERICOLACE) 8.6-50 MG per tablet Take 1 tablet by mouth daily  Qty: 20 tablet, Refills: 0    Comments: Use daily while taking oxycodone.  Stop once oxycodone completed, or if diarrhea develops.  Associated Diagnoses: Sacral insufficiency fracture, initial encounter         CONTINUE these medications which have NOT CHANGED    Details   PREDNISONE PO See Admin Instructions 11/17/17 started prednisone taper.   20 mg daily for 1 week then taper as directed      umeclidinium (INCRUSE ELLIPTA) 62.5 MCG/INH oral inhaler Inhale 1 puff into the lungs daily      gemfibrozil (LOPID) 600 MG tablet Take 1 tablet (600 mg) by mouth daily  Qty: 90 tablet, Refills: 0    Associated Diagnoses: Hyperlipidemia with target LDL less than 130      B-12 TR 1000 MCG TBCR TAKE 1 TABLET BY MOUTH DAILY  Qty: 100 tablet, Refills: 0    Associated  Diagnoses: Pernicious anemia      prochlorperazine (COMPAZINE) 5 MG tablet Take 1 tablet (5 mg) by mouth every 6 hours as needed for nausea or vomiting  Qty: 120 tablet, Refills: 0    Associated Diagnoses: Nausea      levothyroxine (SYNTHROID/LEVOTHROID) 25 MCG tablet Take 1 tablet (25 mcg) by mouth daily  Qty: 90 tablet, Refills: 0    Associated Diagnoses: Hypothyroidism due to acquired atrophy of thyroid      levalbuterol (XOPENEX) 1.25 MG/3ML neb solution Take 3 mLs (1.25 mg) by nebulization every 4 hours as needed for shortness of breath / dyspnea or wheezing  Qty: 1584 mL, Refills: 1    Associated Diagnoses: COPD, moderate (H); COPD exacerbation (H)      omeprazole (PRILOSEC) 40 MG capsule Take 1 capsule (40 mg) by mouth daily Take 30-60 minutes before a meal.  Qty: 90 capsule, Refills: 3    Comments: 20 mg are not controlling the GERD symptoms  Associated Diagnoses: Gastroesophageal reflux disease, esophagitis presence not specified      roflumilast (DALIRESP) 500 MCG TABS tablet Take 500 mcg by mouth daily       polyethylene glycol (MIRALAX/GLYCOLAX) Packet Take 17 g by mouth daily as needed for constipation      albuterol (ALBUTEROL) 108 (90 BASE) MCG/ACT Inhaler Inhale 2 puffs into the lungs every 6 hours as needed    Associated Diagnoses: Asthma, persistent      budesonide (PULMICORT FLEXHALER) 180 MCG/ACT inhaler Inhale 1 puff into the lungs 2 times daily    Associated Diagnoses: Pulmonary emphysema, unspecified emphysema type (H)      montelukast (SINGULAIR) 10 MG tablet Take 1 tablet (10 mg) by mouth At Bedtime  Qty: 30 tablet    Associated Diagnoses: Pulmonary emphysema, unspecified emphysema type (H)      salmeterol (SEREVENT) 50 MCG/DOSE diskus inhaler Inhale 1 puff into the lungs 2 times daily    Associated Diagnoses: Pulmonary emphysema, unspecified emphysema type (H)      ferrous sulfate (IRON) 325 (65 FE) MG tablet Take 1 tablet (325 mg) by mouth daily (with breakfast)  Qty: 100 tablet     Associated Diagnoses: Pulmonary emphysema, unspecified emphysema type (H)      calcium 600 MG tablet Take 1 tablet (600 mg) by mouth daily  Qty: 60 tablet    Associated Diagnoses: Pulmonary emphysema, unspecified emphysema type (H)      LANsoprazole (PREVACID) 30 MG CR capsule Take 1 capsule (30 mg) by mouth daily  Qty: 90 capsule, Refills: 3    Associated Diagnoses: Gastroesophageal reflux disease without esophagitis      cholecalciferol (VITAMIN D) 1000 UNIT tablet Take 1 tablet (1,000 Units) by mouth daily  Qty: 30 tablet    Associated Diagnoses: COPD, moderate (H)      Multiple Vitamins-Minerals (MULTIVITAMIN OR) Take 1 tablet by mouth daily      ASPIRIN NOT PRESCRIBED, INTENTIONAL, 1 each continuous prn. Antiplatelet medication not prescribed intentionally due to Use of NSAIDS  Qty: 0 each, Refills: 0         STOP taking these medications       levofloxacin (LEVAQUIN) 500 MG tablet Comments:   Reason for Stopping:         Ibuprofen (ADVIL PO) Comments:   Reason for Stopping:                 Allergies:         Allergies   Allergen Reactions     Hydralazine Anxiety     Penicillin G Hives     Meloxicam      dizziness       Metoprolol      ? Skin rash on the back     Norvasc [Amlodipine Besylate] Hives           Condition and Physical on Discharge:      Discharge condition: Stable   Vitals: Blood pressure 159/83, pulse 95, temperature 98.1  F (36.7  C), temperature source Oral, resp. rate 18, weight 56.8 kg (125 lb 3.2 oz), SpO2 99 %, not currently breastfeeding.   Gen:  NAD, A&Ox3.  Eyes:  PERRL, sclera anicteric.  OP:  MMM, no lesions.  Neck:  Supple.  CV:  Regular, no murmurs.  Lung:  CTA b/l, normal effort.  Ab:  +BS, soft.  Skin:  Warm, dry to touch.  No rash.  Ext:  No pitting edema LE b/l.        Discharge Time:      I spent 40 minutes with Ms. Lee and working on discharge on 11/22/17.        Image Results From This Hospital Stay (For Non-EPIC Providers):        Results for orders placed or performed  during the hospital encounter of 11/19/17   Femur XR, 2 views, right    Narrative    XR FEMUR RT 2 VW   11/19/2017 10:01 PM     HISTORY:  femur pain;       Impression    IMPRESSION: Unremarkable exam.    JUAN OROZCO MD   XR Pelvis 1/2 Views    Narrative    PELVIS ONE-TWO VIEWS   11/19/2017 10:01 PM     HISTORY: Right hip pain, on chronic prednisone, evaluate for  pathologic fracture.    COMPARISON: 10/31/2017      Impression    IMPRESSION: Unremarkable exam.    JUAN OROZCO MD   CT Pelvis w/o Contrast    Narrative    CT PELVIS WITHOUT CONTRAST November 19, 2017 11:30 PM     HISTORY: Right hip pain, unable to walk, on chronic steroids, evaluate  for occult pathologic fracture.     TECHNIQUE: Axial scans were performed through the pelvis with sagittal  and coronal reconstruction. Radiation dose for this scan was reduced  using automated exposure control, adjustment of the mA and/or kV  according to patient size, or iterative reconstruction technique.    COMPARISON: X-ray from 11/19/2017.    FINDINGS: No evidence of hip fracture. Probable left sacral  insufficiency fracture. There is some sclerosis on the right as well  and an old right sacral insufficiency fracture not excluded. Grade 1  spondylolisthesis at L5-S1 with at least moderate bilateral foraminal  stenosis.    There is a lobulated mixed attenuation mass measuring approximately  7.0 x 6.5 x 10.0 cm in the right gluteal region. Mass extends just  medial to the medial acetabular wall as well. This most likely  represents a hematoma. Recommend follow-up imaging to allow for  resolution of the hematoma to exclude hematoma obscuring an underlying  mass. MRI would be the best imaging modality for this.    There are two small sclerotic foci in the right iliac bone and left  superior acetabular region likely representing bone islands.      Impression    IMPRESSION:  1. Large lobulated mass right gluteal region with heterogeneous signal  intensity most likely  representing a hematoma. Recommend follow-up MRI  after resolution of the hematoma to exclude hematoma obscuring an  underlying mass.  2. Probable bilateral sacral insufficiency fractures age indeterminate  but likely chronic.  3. There are few sclerotic foci in the pelvis likely representing bone  islands.    MARYCARMEN PORTILLO MD   MR Pelvis w/o & w Contrast    Narrative    MR PELVIS WITHOUT AND WITH CONTRAST  11/20/2017 5:02 PM     HISTORY: Evaluate findings on CT, mass seen on CT.    TECHNIQUE:  Multiplanar, multisequence without and with contrast. 6 mL  Gadavist.    COMPARISON:  Comparison CT from yesterday.    FINDINGS: There is a lobulated complex fluid collection centered  around the right common hamstring tendon insertion. This has extension  to the posterior aspect of the hip, obturator internus region, and  proximal thigh. It measures about 14.6 x 8.6 x 5.8 cm. This could  represent hematoma or complex adventitial bursitis (pseudobursitis).  There appears to be complete avulsion of both common hamstring  tendons. Mild adventitial bursitis at the left common hamstring  insertion region. Moderate right trochanteric bursitis. There is some  nonspecific soft tissue edema on the right extending along the  gluteal, hip, and proximal thigh regions; this could relate to recent  injury or reactive edema. Incidentally noted colonic diverticulosis.      Impression    IMPRESSION:  1. 14.6 cm complex fluid collection centered at the right common  hamstring tendon insertion region. This could represent hematoma or  complex adventitial bursitis. Avulsion of the right common hamstring  tendon.  2. Avulsion of the left common hamstring tendon with mild adventitial  bursitis.  3. Moderate right trochanteric bursitis.    JUAN OROZCO MD           Most Recent Lab Results In EPIC (For Non-EPIC Providers):    Most Recent 3 CBC's:  Recent Labs   Lab Test  11/22/17   0709  11/21/17   0700  11/20/17   0550  11/19/17   2250  11/06/17    1403  10/31/17   1428   WBC   --    --    --   11.0  9.6  8.9   HGB  9.2*  9.5*  9.9*  10.2*  9.7*  8.9*   MCV   --    --    --   93  100  98   PLT   --    --    --   347  417  350      Most Recent 3 BMP's:  Recent Labs   Lab Test  11/21/17   0700  11/20/17   0550  11/19/17   2250   NA  134  130*  128*   POTASSIUM  3.4  3.9  3.6   CHLORIDE  100  97  97   CO2  26  25  23   BUN  11  10  10   CR  0.81  0.84  0.79   ANIONGAP  8  8  8   DIANA  9.3  9.2  9.2   GLC  97  107*  121*     Most Recent 3 Troponin's:No lab results found.  Most Recent 3 INR's:  Recent Labs   Lab Test  10/31/17   1428  12/31/16   1510  09/20/14   1550   INR  1.13  1.09  1.12     Most Recent 2 LFT's:  Recent Labs   Lab Test  11/19/17   2250  12/22/16   1135   AST  12  14   ALT  14  17   ALKPHOS  89  50   BILITOTAL  0.4  0.4     Most Recent Cholesterol Panel:  Recent Labs   Lab Test  06/01/15   1026   CHOL  154   LDL  77   HDL  48*   TRIG  144     Most Recent 6 Bacteria Isolates From Any Culture (See EPIC Reports for Culture Details):  Recent Labs   Lab Test  05/04/17   0035  12/31/16   1730  12/31/16   1605  12/31/16   1552  03/04/16   2118  03/04/16   2110   CULT  No growth  No growth  Cultured on the 1st day of incubation: Staphylococcus epidermidis This isolate is   presumed to be clindamycin resistant based on detection of inducible   clindamycin resistance. Erythromycin and clindamycin are resistant, therefore,   they are not recommended for use.  Critical Value/Significant Value, preliminary result only, called to and read   back by Ivis GUERRERO @ 8232 01/01/17 LENA  (Note)  POSITIVE for STAPHYLOCOCCUS EPIDERMIDIS and NEGATIVE for the mecA  gene (not resistant to methicillin) by [employer] nucleic acid test.  The mecA gene was not detected. Final identification and  antimicrobial susceptibility testing will be verified by standard  methods.    Specimen tested with [employer] multiplex, gram-positive blood culture  nucleic acid test for the  following targets: Staph aureus, Staph  epidermidis, Staph lugdunensis, other Staph species, Enterococcus  faecalis, Enterococcus faecium, Streptococcus species, S. agalactiae,  S. anginosus grp., S. pneumoniae, S. pyogenes,  Listeria sp., mecA  (methicillin resistance) and Poonam/B (vancomycin resistance).    Critical Value/Significant Value called to and read back by Carlos GUERRERO @ 7822 1/1/17 CS  *  No growth  No growth  No growth     Most Recent TSH, T4 and HgbA1c:   Recent Labs   Lab Test  05/05/17   0950   01/04/17   0727  01/03/17   0726   TSH  1.34   < >  0.35*   --    T4   --    --   1.09   --    A1C   --    --    --   6.8*    < > = values in this interval not displayed.

## 2017-11-22 NOTE — PROGRESS NOTES
Rec'd vm from client stating that she is in the hospital and might need a ride to take her home. [phone]  Call placed to client who states that she is unsure if she will go home today. Client states that she feels ready to go home, walking with her walker, continues to have pain. Client states that she feels likes she is ready to go home.  Explained that the hospital will make arrangements for her transportation, stating that she her insurance covers all transportation. Client states that she will inform the staff.   CM to follow.  Yeni Savage RN, BC  Supervisor [facility]   [phone] (Fax)

## 2017-11-22 NOTE — PLAN OF CARE
Problem: Patient Care Overview  Goal: Plan of Care/Patient Progress Review  OT:  eval complete and treatment initiated.  Pt is a 77-year-old female with history of steroid-dependent COPD, hypertension, hypothyroidism, chronic anemia, asthma, and aortic stenosis.  She presented to [facility] ED on 11/19/17 for evaluation of right hip pain.  MRI revealed R hamstring disruption with MD recommending symptomatic treatment only.  She reported to be independent in ADLs and mobility using a 4WW PTA.  She reported to have a homemaker and home health aide to assist with bathing.    Discharge Planner OT   Patient plan for discharge: home with assist  Current status: Pt alert, oriented to self/place and able to follow directions.  Supine<>sit<>stand completed with SBA.  Functional mobility in room with CGA using FWW with cueing for safety.  OT provided education in safe technique for toilet transfer and Pt able to complete with CGA using grab bar.  Min A needed for LE dressing.  OT provided education in possible use of reacher for LE dressing to increase independence and decrease pain, but Pt hopeful that she will not need to use equipment.  She tolerated standing at sink for grooming/hygiene with CGA.  OT recommended alternating sit<>stand for safety and EC/WS.  Barriers to return to prior living situation: Pt lives alone with decreased mobility and increased pain.  Recommendations for discharge: home with Home OT and continued support.  Rationale for recommendations: Pt would benefit from continued OT to maximize safety and independence in ADLs and functional transfers with improved strength and endurance.       Entered by: Heidi Melendez 11/22/2017 5:53 AM

## 2017-11-22 NOTE — PROGRESS NOTES
"Care Transitions Team: Following for CC, discharge planning, and disposition.        Per Chart review and PT/OT recommendations Home with resumption of current home car orders RN and HHA.     Met with pt. at bedside who relays that she is understanding that recommendations have been for Home with home PT and continued supportive assistance. \" I hope its today\"   Pt reports no concerns only that \"I will not have a ride as my daughter is out of town and unable  to be provide transport, as it is the holiday \"I cant think of anyone who would be able to today or tomorrow.     Discussed, Pt does voice concerns about meals upon return home as she was responsible for her meals at baseline (documented in PT notes) Pt relays no immediate concerns as she has groceries and she is happy to prepare her meals.   This information  Was discussed and shared with Yeni Vasquez pt [facility] CM, who relied that they just discontinued her Meals on wheels, \"but I will talk with her once again about this.\" Apparently pt did not find value in that service.     Friend, Chinedu able to  around 3pm    Pt friend Windy will leave walker in doorway and apt keys are on \"ledge\" pt is aware.    Pt reports that this would be fine and is in agreement with this plan for discharge.     Will await MD determination of DC readiness.   Will need resumption orders RN/HHA with the addition of Home PT.      FYI update provided to [facility].     Meg Wood RN   Care Transitions Team  [phone]      "

## 2017-11-22 NOTE — PROGRESS NOTES
VSS. IV removed. Belongings with patient in room. Discharge instructions and medications reviewed with patient. Patient left unit in wheelchair with NA.

## 2017-11-22 NOTE — PROGRESS NOTES
Rec'd vm from Meg [facility] to report that client is at baseline with mobility and mentation,  Likely d/c today or tomorrow.   Meg states that client is interested in maybe receiving assistance with some meals at [facility].  [phone]  Call placed to Meg, she will place orders for resumption of [facility] orders and will order [facility] for transportation.  Shared that client recently requested to d/c [facility], CM to f/u with her.  Call placed to [facility] agency) to inform that client will be d/c today or tomorrow.  E-mail sent to Windy BARTHOLOMEW [facility] to inform of d/c plans.  Yeni Savage RN, BC  Supervisor [facility]   [phone] (Fax)

## 2017-11-22 NOTE — PROGRESS NOTES
11/22/17 0931   Quick Adds   Type of Visit Initial PT Evaluation   Living Environment   Lives With alone   Living Arrangements independent living facility   Home Accessibility grab bars present (toilet);grab bars present (bathtub)   Number of Stairs to Enter Home 0   Number of Stairs Within Home 0   Transportation Available car;family or friend will provide   Living Environment Comment Pt reports being independent at baseline with 4ww with mobility. She reports havine a homemaker 2x/week, nurse for med set-up, and HHA for bathing.    Self-Care   Usual Activity Tolerance moderate   Current Activity Tolerance fair   Regular Exercise no   Equipment Currently Used at Home walker, rolling   Functional Level Prior   Ambulation 1-->assistive equipment   Transferring 1-->assistive equipment   Toileting 1-->assistive equipment   Bathing 3-->assistive equipment and person   Dressing 0-->independent   Eating 0-->independent   Communication 0-->understands/communicates without difficulty   Swallowing 0-->swallows foods/liquids without difficulty   Cognition 1 - attention or memory deficits   Fall history within last six months no   Which of the above functional risks had a recent onset or change? ambulation;transferring;cognition   General Information   Onset of Illness/Injury or Date of Surgery - Date 11/19/17   Referring Physician Dr. Damien Johnson   Patient/Family Goals Statement to return home   Pertinent History of Current Problem (include personal factors and/or comorbidities that impact the POC) Pt is a 77-year-old female with history of steroid-dependent COPD, hypertension, hypothyroidism, chronic anemia, asthma, and aortic stenosis.  She presented to [facility] ED on 11/19/17 for evaluation of right hip pain.  MRI revealed R hamstring disruption with MD recommending symptomatic treatment only.   Weight-Bearing Status - LLE full weight-bearing   Weight-Bearing Status - RLE full weight-bearing   Cognitive  "Status Examination   Orientation orientation to person, place and time   Level of Consciousness alert   Follows Commands and Answers Questions 100% of the time;able to follow multistep instructions   Personal Safety and Judgment intact   Memory intact   Pain Assessment   Patient Currently in Pain Yes, see Vital Sign flowsheet   Posture    Posture Forward head position;Protracted shoulders   Range of Motion (ROM)   ROM Comment Generally WFL   Strength   Strength Comments Generally WFL   Bed Mobility   Bed Mobility Comments Pt is mod I with bed mobility   Transfer Skills   Transfer Comments Pt performs sit to/from stand with SBA   Gait   Gait Comments Pt ambulates 10ft with fww and SBA   Balance   Balance Comments Good seated, good/fair standing without UE support   Modality Interventions   Planned Modality Interventions Cryotherapy   General Therapy Interventions   Planned Therapy Interventions bed mobility training;gait training;neuromuscular re-education;strengthening;transfer training;home program guidelines;progressive activity/exercise;ROM;stretching   Clinical Impression   Criteria for Skilled Therapeutic Intervention yes, treatment indicated   PT Diagnosis Difficulty with gait   Influenced by the following impairments Pt presents with increased R hamstring pain, impaired balance, decreased strength, decreased functional capacity.    Functional limitations due to impairments Pt demonstrates increased difficulty with gait and transfers.    Clinical Presentation Stable/Uncomplicated   Clinical Presentation Rationale clinical observation   Clinical Decision Making (Complexity) Low complexity   Therapy Frequency` daily   Predicted Duration of Therapy Intervention (days/wks) 2 days   Anticipated Discharge Disposition Home with Assist;Home with Home Therapy   Risk & Benefits of therapy have been explained Yes   Patient, Family & other staff in agreement with plan of care Yes   [facility] AM-PAC TM \"6 Clicks\"   " "2016, Trustees of [facility], under license to [employer].  All rights reserved.   6 Clicks Short Forms Basic Mobility Inpatient Short Form   [facility] AM-PAC  \"6 Clicks\" V.2 Basic Mobility Inpatient Short Form   1. Turning from your back to your side while in a flat bed without using bedrails? 4 - None   2. Moving from lying on your back to sitting on the side of a flat bed without using bedrails? 4 - None   3. Moving to and from a bed to a chair (including a wheelchair)? 4 - None   4. Standing up from a chair using your arms (e.g., wheelchair, or bedside chair)? 3 - A Little   5. To walk in hospital room? 3 - A Little   6. Climbing 3-5 steps with a railing? 3 - A Little   Basic Mobility Raw Score (Score out of 24.Lower scores equate to lower levels of function) 21   Total Evaluation Time   Total Evaluation Time (Minutes) 15      11/22/17 0931   Quick Adds   Type of Visit Initial PT Evaluation   Living Environment   Lives With alone   Living Arrangements independent living facility   Home Accessibility grab bars present (toilet);grab bars present (bathtub)   Number of Stairs to Enter Home 0   Number of Stairs Within Home 0   Transportation Available car;family or friend will provide   Living Environment Comment Pt reports being independent at baseline with 4ww with mobility. She reports havine a homemaker 2x/week, nurse for med set-up, and HHA for bathing.    Self-Care   Usual Activity Tolerance moderate   Current Activity Tolerance fair   Regular Exercise no   Equipment Currently Used at Home walker, rolling   Functional Level Prior   Ambulation 1-->assistive equipment   Transferring 1-->assistive equipment   Toileting 1-->assistive equipment   Bathing 3-->assistive equipment and person   Dressing 0-->independent   Eating 0-->independent   Communication 0-->understands/communicates without difficulty   Swallowing 0-->swallows foods/liquids without difficulty   Cognition 1 - attention or memory " deficits   Fall history within last six months no   Which of the above functional risks had a recent onset or change? ambulation;transferring;cognition   General Information   Onset of Illness/Injury or Date of Surgery - Date 11/19/17   Referring Physician Dr. Damien Johnson   Patient/Family Goals Statement to return home   Pertinent History of Current Problem (include personal factors and/or comorbidities that impact the POC) Pt is a 77-year-old female with history of steroid-dependent COPD, hypertension, hypothyroidism, chronic anemia, asthma, and aortic stenosis.  She presented to [facility] ED on 11/19/17 for evaluation of right hip pain.  MRI revealed R hamstring disruption with MD recommending symptomatic treatment only.   Weight-Bearing Status - LLE full weight-bearing   Weight-Bearing Status - RLE full weight-bearing   Cognitive Status Examination   Orientation orientation to person, place and time   Level of Consciousness alert   Follows Commands and Answers Questions 100% of the time;able to follow multistep instructions   Personal Safety and Judgment intact   Memory intact   Pain Assessment   Patient Currently in Pain Yes, see Vital Sign flowsheet   Posture    Posture Forward head position;Protracted shoulders   Range of Motion (ROM)   ROM Comment Generally WFL   Strength   Strength Comments Generally WFL   Bed Mobility   Bed Mobility Comments Pt is mod I with bed mobility   Transfer Skills   Transfer Comments Pt performs sit to/from stand with SBA   Gait   Gait Comments Pt ambulates 10ft with fww and SBA   Balance   Balance Comments Good seated, good/fair standing without UE support   Modality Interventions   Planned Modality Interventions Cryotherapy   General Therapy Interventions   Planned Therapy Interventions bed mobility training;gait training;neuromuscular re-education;strengthening;transfer training;home program guidelines;progressive activity/exercise;ROM;stretching   Clinical  "Impression   Criteria for Skilled Therapeutic Intervention yes, treatment indicated   PT Diagnosis Difficulty with gait   Influenced by the following impairments Pt presents with increased R hamstring pain, impaired balance, decreased strength, decreased functional capacity.    Functional limitations due to impairments Pt demonstrates increased difficulty with gait and transfers.    Clinical Presentation Stable/Uncomplicated   Clinical Presentation Rationale clinical observation   Clinical Decision Making (Complexity) Low complexity   Therapy Frequency` daily   Predicted Duration of Therapy Intervention (days/wks) 2 days   Anticipated Discharge Disposition Home with Assist;Home with Home Therapy   Risk & Benefits of therapy have been explained Yes   Patient, Family & other staff in agreement with plan of care Yes   [facility] [employer] TM \"6 Clicks\"   2016, Trustees of [facility], under license to [employer].  All rights reserved.   6 Clicks Short Forms Basic Mobility Inpatient Short Form   [facility]  \"6 Clicks\" V.2 Basic Mobility Inpatient Short Form   1. Turning from your back to your side while in a flat bed without using bedrails? 4 - None   2. Moving from lying on your back to sitting on the side of a flat bed without using bedrails? 4 - None   3. Moving to and from a bed to a chair (including a wheelchair)? 4 - None   4. Standing up from a chair using your arms (e.g., wheelchair, or bedside chair)? 3 - A Little   5. To walk in hospital room? 3 - A Little   6. Climbing 3-5 steps with a railing? 3 - A Little   Basic Mobility Raw Score (Score out of 24.Lower scores equate to lower levels of function) 21   Total Evaluation Time   Total Evaluation Time (Minutes) 15     "

## 2017-11-22 NOTE — PROGRESS NOTES
[facility]  Hospitalist Progress Note  Fuentes Hopson, DO 11/21/2017    Reason for Stay (Diagnosis): Right hip pain         Assessment and Plan:      Summary:  Ana Luisa Lee is a 77-year-old female with history of steroid-dependent COPD, hypertension, hypothyroidism, chronic anemia, asthma, and aortic stenosis.  She presented to [facility] ED on 11/19/17 for evaluation of right hip pain.  She had hip pain without obvious trauma one month before, with unexplained bruising in her right leg.  She had abrupt worsening of pain so came to the ED on 11/19/17.  CT was obtained and suggested right upper thigh and gluteal fluid collection concerning for hematoma versus soft tissue mass.  It also showed multiple sacral insufficiency fractures. ED work up also showed modest hyponatremia with Na of 128 (this seems somewhat chronic).  She was admitted to the hospital for pain control and further evaluation and treatment.      Problem list:     1.  Right hip pain thought to be due to spontaneous sacral insufficiency fracture and hematoma in right gluteus and thigh. MRI also shows hamstring disruption.  Ortho following.  Pain meds PRN.  Therapy.     2.  Steroid-dependent chronic obstructive pulmonary disease:  Continue PTA Prednisone 20 mg daily, bronchodilators, and O2 as needed.       3.  Hypertension:  Continue Lisinopril 5 mg/Day.     5.  Hypothyroidism:  Continue PTA Levothyroxine.      6.  Gastroesophageal reflux disease:  Continue PTA Prevacid     7.  Hyponatremia:  Stable, improved.  Monitor.      DVT Prophylaxis:  PCD's  Code Status: DNR / DNI  Discharge Dispo: home versus TCU depending on progress with mobility  Estimated Disch Date / # of Days until Discharge: 2-3        Interval History (Subjective):      Still having right hip pain.  Short of breath at times.  No CP, F/C, N/V, or diarrhea.                  Physical Exam:      Last Vital Signs:  /48 (BP Location: Left arm)   Pulse 109  Temp 98.5  F (36.9  C) (Axillary)  Resp 18  Wt 55.6 kg (122 lb 9.6 oz)  SpO2 99%  BMI 23.17 kg/m2    Gen:  NAD, A&Ox3.  Eyes:  PERRL, sclera anicteric.  OP:  MMM, no lesions.  Neck:  Supple.  CV:  Regular, no murmurs.  Lung:  Occasional wheeze b/l, normal effort.  Ab:  +BS, soft.  Skin:  Warm, dry to touch.  No rash.  Ext:  No pitting edema LE b/l.           Medications:      All current medications were reviewed with changes reflected in problem list.         Data:      All new lab and imaging data was reviewed.   Labs:    Recent Labs  Lab 11/21/17  0700      POTASSIUM 3.4   CHLORIDE 100   CO2 26   ANIONGAP 8   GLC 97   BUN 11   CR 0.81   GFRESTIMATED 68   GFRESTBLACK 82   DIANA 9.3       Recent Labs  Lab 11/21/17  0700  11/19/17  2250   WBC  --   --  11.0   HGB 9.5*  < > 10.2*   HCT  --   --  31.0*   MCV  --   --  93   PLT  --   --  347   < > = values in this interval not displayed.   Imaging:   No results found for this or any previous visit (from the past 24 hour(s)).

## 2017-11-22 NOTE — PLAN OF CARE
Problem: Patient Care Overview  Goal: Plan of Care/Patient Progress Review       Occupational Therapy Discharge Summary    Reason for therapy discharge:    Discharged to home with home therapy.    Progress towards therapy goal(s). See goals on Care Plan in [facility] electronic health record for goal details.  Goals partially met.  Barriers to achieving goals:   discharge from facility.    Therapy recommendation(s):    Continued therapy is recommended.  Rationale/Recommendations:  Pt to benefit from continued OT services to maximize safety and independence in ADL/IADLs.

## 2017-11-23 ENCOUNTER — NURSE TRIAGE (OUTPATIENT)
Dept: NURSING | Facility: CLINIC | Age: 77
End: 2017-11-23

## 2017-11-23 NOTE — TELEPHONE ENCOUNTER
Reason for Disposition    [1] Caller requesting NON-URGENT health information AND [2] PCP's office is the best resource     [facility] home care nurse calling for orders needed for tomorrow am. Prefers MD paged to [phone]. Order include OT, PT eval and treat, nursing and home health aid. Paged on call Dr. Tierney ([facility]) thought page op at 11:21 am to call Dina at [phone].    Additional Information    Negative: [1] Caller is not with the adult (patient) AND [2] reporting urgent symptoms    Negative: Lab result questions    Negative: Medication questions    Negative: Caller cannot be reached by phone    Negative: Caller has already spoken to PCP or another triager    Negative: RN needs further essential information from caller in order to complete triage    Negative: Requesting regular office appointment    Protocols used: INFORMATION ONLY [facility]

## 2017-11-24 ENCOUNTER — TELEPHONE (OUTPATIENT)
Dept: INTERNAL MEDICINE | Facility: CLINIC | Age: 77
End: 2017-11-24

## 2017-11-24 NOTE — TELEPHONE ENCOUNTER
IP F/U    Date: 11/22/17  Diagnosis: hematoma  Is patient active in care coordination? Yes - Route to Care Coordination (P [id number])  Was patient in TCU? No    Next 5 appointments (look out 90 days)     Nov 27, 2017 11:00 AM CST   [facility]    [address]  [facility] [id number] [phone]

## 2017-11-24 NOTE — PROGRESS NOTES
Transition Communication Hand-off for Care Transitions to Next Level of Care Provider    Name: Ana Luisa Lee  MRN #: [id number]  Primary Care Provider: Jenny Hamilton  Primary Care MD Name: celestino   Primary Clinic: [address]  [facility] [address]  Primary Care Clinic Name: [facility]   Reason for Hospitalization:  Hematoma [T14.8XXA]  Unable to ambulate [R26.2]  Sacral insufficiency fracture, initial encounter [M84.48XA]  Admit Date/Time: 11/19/2017  8:39 PM  Discharge Date: 11/22/17  Payor Source: Payor: MEDICA / Plan: [employer] [facility] [employer] / Product Type: HMO /     Readmission Assessment Measure (COURTNEY) Risk Score/category: AVERAGE      Reason for Communication Hand-off Referral: Fragility  Difficulty understanding plan of care    Discharge Plan:       Concern for non-adherence with plan of care:   NO  Discharge Needs Assessment:  Needs       Most Recent Value    Equipment Currently Used at Home walker, rolling    Transportation Available car, family or friend will provide    # of Referrals Placed by [facility], MTM, Scheduled Follow-up appointments, Communication hand-offs to next level of Care Providers          Already enrolled in Tele-monitoring program and name of program:  na  Follow-up specialty is recommended: No    Follow-up plan:  Future Appointments  Date Time Provider Department Center   11/27/2017 11:00 AM Jenny Hamilton MD [facility]       Any outstanding tests or procedures:        Referrals     Future Labs/Procedures    Home care nursing referral     Comments:    RN skilled nursing visit. RN to assess respiratory and cardiac status.    Your provider has ordered home care nursing services. If you have not been contacted within 2 days of your discharge please call the inpatient department phone number at [phone] .    Home Care OT Referral for Hospital Discharge     Comments:    OT to eval and treat    Your provider has  ordered home care - occupational therapy. If you have not been contacted within 2 days of your discharge please call the department phone number listed on the top of this document.    Home Care PT Referral for Hospital Discharge     Comments:    PT to eval and treat    Your provider has ordered home care - physical therapy. If you have not been contacted within 2 days of your discharge please call the department phone number listed on the top of this document.          Follow-up and recommended labs and tests        Follow up with primary care provider, Jenny Hamilton, within 7 days for hospital follow- up.  The following labs/tests are recommended: BMP and CBC in 7 days             Key Recommendations:  Frail, from the arbors.  Follows with [facility] CM.  RN CTS did upate her on resources requested.  Dc with [facility] support.      Katie Ellis    AVS/Discharge Summary is the source of truth; this is a helpful guide for improved communication of patient story

## 2017-11-25 ENCOUNTER — TELEPHONE (OUTPATIENT)
Dept: INTERNAL MEDICINE | Facility: CLINIC | Age: 77
End: 2017-11-25

## 2017-11-25 ENCOUNTER — NURSE TRIAGE (OUTPATIENT)
Dept: NURSING | Facility: CLINIC | Age: 77
End: 2017-11-25

## 2017-11-25 NOTE — TELEPHONE ENCOUNTER
Home Care RN Ama called through [facility] asking for message to be sent to PCP regarding low BP, today 92/60 and patient states dizziness, and would like provider to discuss possible change in lisinopril maybe to half the dose or possible discontinue if appropriate, she has educated patient to on fluid/hydration and taking medications as directed until appointment on Monday. RN has also done fall precaution education, patient continues to have pain issues currently rated 8/10 and concerned about giving the oxycodone as that makes patient dizzy and increased risk for falls as well. [facility] RN is requesting PCP to address and discuss BP medications and pain management with patient at 11/27/17 appointment.    Carmenza Perez, RN, BSN  [facility]

## 2017-11-26 ENCOUNTER — DOCUMENTATION ONLY (OUTPATIENT)
Dept: CARE COORDINATION | Facility: CLINIC | Age: 77
End: 2017-11-26

## 2017-11-27 ENCOUNTER — OFFICE VISIT (OUTPATIENT)
Dept: INTERNAL MEDICINE | Facility: CLINIC | Age: 77
End: 2017-11-27
Payer: COMMERCIAL

## 2017-11-27 ENCOUNTER — CARE COORDINATION (OUTPATIENT)
Dept: GERIATRIC MEDICINE | Facility: CLINIC | Age: 77
End: 2017-11-27

## 2017-11-27 VITALS
OXYGEN SATURATION: 96 % | HEIGHT: 61 IN | SYSTOLIC BLOOD PRESSURE: 88 MMHG | TEMPERATURE: 98.3 F | BODY MASS INDEX: 23.35 KG/M2 | WEIGHT: 123.7 LBS | DIASTOLIC BLOOD PRESSURE: 52 MMHG | HEART RATE: 90 BPM

## 2017-11-27 DIAGNOSIS — Z09 HOSPITAL DISCHARGE FOLLOW-UP: Primary | ICD-10-CM

## 2017-11-27 DIAGNOSIS — I35.0 NONRHEUMATIC AORTIC VALVE STENOSIS: ICD-10-CM

## 2017-11-27 DIAGNOSIS — S32.10XA CLOSED FRACTURE OF SACRUM, UNSPECIFIED PORTION OF SACRUM, INITIAL ENCOUNTER (H): ICD-10-CM

## 2017-11-27 DIAGNOSIS — I10 ESSENTIAL HYPERTENSION WITH GOAL BLOOD PRESSURE LESS THAN 140/90: Chronic | ICD-10-CM

## 2017-11-27 DIAGNOSIS — Z76.89 HEALTH CARE HOME: ICD-10-CM

## 2017-11-27 DIAGNOSIS — M80.00XA OSTEOPOROSIS WITH CURRENT PATHOLOGICAL FRACTURE, UNSPECIFIED OSTEOPOROSIS TYPE, INITIAL ENCOUNTER: ICD-10-CM

## 2017-11-27 DIAGNOSIS — J44.9 COPD, MODERATE (H): Chronic | ICD-10-CM

## 2017-11-27 PROCEDURE — 99495 TRANSJ CARE MGMT MOD F2F 14D: CPT | Performed by: INTERNAL MEDICINE

## 2017-11-27 RX ORDER — PREDNISONE 5 MG/1
10 TABLET ORAL SEE ADMIN INSTRUCTIONS
Status: ON HOLD | COMMUNITY
Start: 2017-11-27 | End: 2018-04-13

## 2017-11-27 NOTE — MR AVS SNAPSHOT
After Visit Summary   11/27/2017    Ana Luisa Lee    MRN: [id number]           Patient Information     Date Of Birth          1940        Visit Information        Provider Department      11/27/2017 11:00 AM Jenny Hamilton MD [facility]        Today's Diagnoses     Osteoporosis with current pathological fracture, unspecified osteoporosis type, initial encounter    -  1      Care Instructions    Plan:  1. Stop Lisinopril   2. Bone density scan - DEXA - To schedule this test you may call Scheduling center at [phone]    3. Continue same meds, same doses for now   4. Make an appointment with endocrinologist   5.  Follow up in January or sooner as needed             Follow-ups after your visit        Additional Services     ENDOCRINOLOGY ADULT REFERRAL       Your provider has referred you to:   Dr Will  FM[place]: [facility] [phone]   [email or web address]  FMG: [facility] [phone]   [email or web address]      Please be aware that coverage of these services is subject to the terms and limitations of your health insurance plan.  Call member services at your health plan with any benefit or coverage questions.      Please bring the following to your appointment:    >>   Any x-rays, CTs or MRIs which have been performed.  Contact the facility where they were done to arrange for  prior to your scheduled appointment.    >>   List of current medications   >>   This referral request   >>   Any documents/labs given to you for this referral                  Future tests that were ordered for you today     Open Future Orders        Priority Expected Expires Ordered    DX Hip/Pelvis/Spine Routine  11/27/2018 11/27/2017            Who to contact     If you have questions or need follow up information about today's clinic visit or your schedule please contact  "[facility] directly at [phone].  Normal or non-critical lab and imaging results will be communicated to you by MyChart, letter or phone within 4 business days after the clinic has received the results. If you do not hear from us within 7 days, please contact the clinic through [email or web address] or phone. If you have a critical or abnormal lab result, we will notify you by phone as soon as possible.  Submit refill requests through [facility] or call your pharmacy and they will forward the refill request to us. Please allow 3 business days for your refill to be completed.          Additional Information About Your Visit        [email or web address] Information     [facility] gives you secure access to your electronic health record. If you see a primary care provider, you can also send messages to your care team and make appointments. If you have questions, please call your primary care clinic.  If you do not have a primary care provider, please call [phone] and they will assist you.        Care EveryWhere ID     This is your Care EveryWhere ID. This could be used by other organizations to access your [facility] medical records  [id number]        Your Vitals Were     Pulse Temperature Height Pulse Oximetry Breastfeeding? BMI (Body Mass Index)    90 98.3  F (36.8  C) (Oral) 5' 1\" (1.549 m) 96% No 23.37 kg/m2       Blood Pressure from Last 3 Encounters:   11/27/17 (!) 88/52   11/22/17 159/83   11/06/17 122/70    Weight from Last 3 Encounters:   11/27/17 123 lb 11.2 oz (56.1 kg)   11/22/17 125 lb 3.2 oz (56.8 kg)   11/06/17 133 lb 12.8 oz (60.7 kg)              We Performed the Following     ENDOCRINOLOGY ADULT REFERRAL          Today's Medication Changes          These changes are accurate as of: 11/27/17 11:35 AM.  If you have any questions, ask your nurse or doctor.               These medicines have changed or have updated prescriptions.        Dose/Directions    ferrous sulfate 325 (65 FE) MG tablet   Commonly known as: "  IRON   This may have changed:  when to take this   Used for:  Pulmonary emphysema, unspecified emphysema type (H)        Dose:  1 tablet   Take 1 tablet (325 mg) by mouth daily (with breakfast)   Quantity:  100 tablet   Refills:  0       predniSONE 5 MG tablet   Commonly known as:  DELTASONE   Indication:  Chronic Obstructive Lung Disease   This may have changed:    - medication strength  - how much to take  - how to take this   Changed by:  Jenny Hamilton MD        Dose:  15 mg   Take 3 tablets (15 mg) by mouth See Admin Instructions 11/17/17 started prednisone taper.  20 mg daily for 1 week then taper as directed   Refills:  0                Primary Care Provider Office Phone # Fax #    Jenny Hamilton [phone]       [address] [facility] [address]        Equal Access to Services     [facility] : Hadii aad ku hadasho Somerry, waaxda luqadaha, qaybta kaalmada adepattiyaearlene, holly garcia . So [facility] [phone].    ATENCIÓN: Si habla español, tiene a martinez disposición servicios gratuitos de asistencia lingüística. [facility] [phone].    We comply with applicable federal civil rights laws and [place] laws. We do not discriminate on the basis of race, color, national origin, age, disability, sex, sexual orientation, or gender identity.            Thank you!     Thank you for choosing [facility]  for your care. Our goal is always to provide you with excellent care. Hearing back from our patients is one way we can continue to improve our services. Please take a few minutes to complete the written survey that you may receive in the mail after your visit with us. Thank you!             Your Updated Medication List - Protect others around you: Learn how to safely use, store and throw away your medicines at www.disposemymeds.org.          This list is accurate as of: 11/27/17 11:35 AM.  Always use your most recent med  list.                   Brand Name Dispense Instructions for use Diagnosis    acetaminophen 325 MG tablet    TYLENOL    100 tablet    Take 2 tablets (650 mg) by mouth every 4 hours as needed for mild pain    Sacral insufficiency fracture, initial encounter       albuterol 108 (90 BASE) MCG/ACT Inhaler    PROAIR HFA     Inhale 2 puffs into the lungs every 6 hours as needed    Asthma, persistent       ASPIRIN NOT PRESCRIBED    INTENTIONAL    0 each    1 each continuous prn. Antiplatelet medication not prescribed intentionally due to Use of NSAIDS        B-12 TR 1000 MCG Tbcr   Generic drug:  cyanocobalamin     100 tablet    TAKE 1 TABLET BY MOUTH DAILY    Pernicious anemia       budesonide 180 MCG/ACT inhaler    PULMICORT FLEXHALER     Inhale 1 puff into the lungs 2 times daily    Pulmonary emphysema, unspecified emphysema type (H)       calcium 600 MG tablet     60 tablet    Take 1 tablet (600 mg) by mouth daily    Pulmonary emphysema, unspecified emphysema type (H)       cholecalciferol 1000 UNIT tablet    vitamin D3    30 tablet    Take 1 tablet (1,000 Units) by mouth daily    COPD, moderate (H)       ferrous sulfate 325 (65 FE) MG tablet    IRON    100 tablet    Take 1 tablet (325 mg) by mouth daily (with breakfast)    Pulmonary emphysema, unspecified emphysema type (H)       gemfibrozil 600 MG tablet    LOPID    90 tablet    Take 1 tablet (600 mg) by mouth daily    Hyperlipidemia with target LDL less than 130       hydrOXYzine 25 MG tablet    ATARAX    40 tablet    Take 1 tablet (25 mg) by mouth 3 times daily as needed for other (pain)    Sacral insufficiency fracture, initial encounter       INCRUSE ELLIPTA 62.5 MCG/INH oral inhaler   Generic drug:  umeclidinium      Inhale 1 puff into the lungs daily        LANsoprazole 30 MG CR capsule    PREVACID    90 capsule    Take 1 capsule (30 mg) by mouth daily    Gastroesophageal reflux disease without esophagitis       levalbuterol 1.25 MG/3ML neb solution    XOPENEX     1584 mL    Take 3 mLs (1.25 mg) by nebulization every 4 hours as needed for shortness of breath / dyspnea or wheezing    COPD, moderate (H), COPD exacerbation (H)       levothyroxine 25 MCG tablet    SYNTHROID/LEVOTHROID    90 tablet    Take 1 tablet (25 mcg) by mouth daily    Hypothyroidism due to acquired atrophy of thyroid       montelukast 10 MG tablet    SINGULAIR    30 tablet    Take 1 tablet (10 mg) by mouth At Bedtime    Pulmonary emphysema, unspecified emphysema type (H)       MULTIVITAMIN PO      Take 1 tablet by mouth daily        omeprazole 40 MG capsule    priLOSEC    90 capsule    Take 1 capsule (40 mg) by mouth daily Take 30-60 minutes before a meal.    Gastroesophageal reflux disease, esophagitis presence not specified       oxyCODONE IR 5 MG tablet    ROXICODONE    20 tablet    Take 1 tablet (5 mg) by mouth every 4 hours as needed for moderate to severe pain    Sacral insufficiency fracture, initial encounter       polyethylene glycol Packet    MIRALAX/GLYCOLAX     Take 17 g by mouth daily as needed for constipation        predniSONE 5 MG tablet    DELTASONE     Take 3 tablets (15 mg) by mouth See Admin Instructions 11/17/17 started prednisone taper.  20 mg daily for 1 week then taper as directed        prochlorperazine 5 MG tablet    COMPAZINE    120 tablet    Take 1 tablet (5 mg) by mouth every 6 hours as needed for nausea or vomiting    Nausea       roflumilast 500 MCG Tabs tablet    DALIRESP     Take 500 mcg by mouth daily        salmeterol 50 MCG/DOSE diskus inhaler    SEREVENT     Inhale 1 puff into the lungs 2 times daily    Pulmonary emphysema, unspecified emphysema type (H)       senna-docusate 8.6-50 MG per tablet    SENOKOT-S;PERICOLACE    20 tablet    Take 1 tablet by mouth daily    Sacral insufficiency fracture, initial encounter

## 2017-11-27 NOTE — NURSING NOTE
"Chief Complaint   Patient presents with     Hospital F/U       Initial BP (!) 88/52 (BP Location: Right arm, Patient Position: Chair, Cuff Size: Adult Regular)  Pulse 90  Temp 98.3  F (36.8  C) (Oral)  Ht 5' 1\" (1.549 m)  Wt 123 lb 11.2 oz (56.1 kg)  SpO2 96%  Breastfeeding? No  BMI 23.37 kg/m2 Estimated body mass index is 23.37 kg/(m^2) as calculated from the following:    Height as of this encounter: 5' 1\" (1.549 m).    Weight as of this encounter: 123 lb 11.2 oz (56.1 kg).  Medication Reconciliation: complete   Mariluz Ibrahim CMA      "

## 2017-11-27 NOTE — PATIENT INSTRUCTIONS
Plan:  1. Stop Lisinopril   2. Bone density scan - DEXA - To schedule this test you may call Scheduling center at [phone]    3. Continue same meds, same doses for now   4. Make an appointment with endocrinologist   5.  Follow up in January or sooner as needed

## 2017-11-27 NOTE — PROGRESS NOTES
Dr Tierney's note      Patient's instructions / PLAN:                                                        Plan:  1. Stop Lisinopril   2. Bone density scan - DEXA - To schedule this test you may call Scheduling center at [phone]    3. Continue same meds, same doses for now   4. Make an appointment with endocrinologist   5.  Follow up in January or sooner as needed         ASSESSMENT & PLAN:                                                      (Z09) Hospital discharge follow-up  (primary encounter diagnosis)    (S32.10XA) Closed fracture of sacrum, unspecified portion of sacrum, initial encounter (H)  Comment:   Plan: obs, uses a walker     (M80.00XA) Osteoporosis with current pathological fracture, unspecified osteoporosis type, initial encounter  Comment:   Plan: DX Hip/Pelvis/Spine, ENDOCRINOLOGY ADULT         REFERRAL            (I35.0) Nonrheumatic aortic valve stenosis  Comment:   Plan:     (J44.9) COPD, moderate (HCC)  Comment: stable   Plan: Continue same meds, same doses for now     (I10) HTN, goal <  140/90  Comment: Blood pressure too low, increases the risk for falls  Plan: as above        Chief complaint:                                                      Hospital follow-up    SUBJECTIVE:   Ana Luisa Lee is a 77 year old female who presents to clinic today for the following health issues:    She hasn't taken Oxy    She was started on Lisinopril     She feels very sleepy     Prednisone 15 and plan to taper as per prior schedule     *[facility] F[facility] 11/6/17. She feels like one of her new medications is making her really tired. Has not taken any of the oxycodone yet (she was wondering if this is ok to take during the day when she goes out? Advised her not if she is driving, and she should try it at home first because it may make her very tired).   Still coughing a lot and still has quite a bit of pain in right hamstring area down her right leg.     Discharge diagnoses  1.  Acute pain due  "to Sacral insufficiency fracture, hematoma in right gluteus and thigh, and hamstring disruption.  Ortho consulted during hospital stay.  Management is non operative support at this time.  Pain meds PRN.  Therapy.      2.  Steroid-dependent chronic obstructive pulmonary disease:  Continue PTA Prednisone 20 mg daily, bronchodilators.  Symptoms back to baseline by time of discharge.        3.  Hypertension:  Continue Lisinopril 5 mg/Day.  Will need BMP in 7 days.  HTN may have been due to pain from issues in #1.  May be able to come off Lisinopril in future.  Will need to continue to follow with Primary Care Provider as outpatient.      5.  Hypothyroidism:  Continue Levothyroxine.       6.  Gastroesophageal reflux disease:  Continue PPI.      7.  Hyponatremia:  Resolved.  Recheck BMP in 7 days as outpatient.      Review of Systems:                                                      ROS: negative for fever, chills, cough, wheezes, chest pain, shortness of breath, vomiting, abdominal pain, leg swelling     A 10-point review of systems was obtained.  Those pertinent are above and in the in the Subjective section.  The rest of the systems are negative.           OBJECTIVE:             Physical exam:  Blood pressure (!) 88/52, pulse 90, temperature 98.3  F (36.8  C), temperature source Oral, height 5' 1\" (1.549 m), weight 123 lb 11.2 oz (56.1 kg), SpO2 96 %, not currently breastfeeding.   NAD, appears comfortable  Skin: no rashes   Chest: clear to auscultation bilaterally, good respiratory effort  Heart: S1 S2, RRR, 3/6 systolic murmur   Abdomen: soft, not tender,   Extremities: no edema,  Neurologic: A, Ox3, no focal signs appreciated    PMHx: reviewed  Past Medical History:   Diagnosis Date     Anemia Dec 2011     Arthritis of hand      Asthma, persistent     f/U dr Brock at [facility]     Asthma, persistent      Chronic intermittent steroid use     for asthma     COPD exacerbation (H) 10/25/2013     Esophageal " stricture 2007    s/p dilation     Foot deformity     Left     HTN, goal below 140/90      Hyperlipidemia      Hyperlipidemia LDL goal < 130      Hypothyroidism Dec 2011     Hypothyroidism      Left foot pain Nov 2011     Left shoulder pain Nov 2011     Medication side effects      Osteoporosis      Paroxysmal supraventricular tachycardia (H)      PVC (premature ventricular contraction) May 2012     Skin cyst Dec 2011    L thumb     SOB (shortness of breath) on exertion May 2012     SVT (supraventricular tachycardia) (H)       PSHx: reviewed  Past Surgical History:   Procedure Laterality Date     BUNIONECTOMY LAPIDUS WITH TARSAL METATARSAL (TMT) FUSION  1990's     EP STUDY, MODIFIED  7/2012    SVT not induced, PVC's     GYN SURGERY  1980     HYSTERECTOMY TOTAL ABDOMINAL       HYSTERECTOMY, PAP NO LONGER INDICATED       TONSILLECTOMY          Meds: reviewed  Current Outpatient Prescriptions   Medication Sig Dispense Refill     predniSONE (DELTASONE) 5 MG tablet Take 3 tablets (15 mg) by mouth See Admin Instructions 11/17/17 started prednisone taper.   20 mg daily for 1 week then taper as directed       oxyCODONE IR (ROXICODONE) 5 MG tablet Take 1 tablet (5 mg) by mouth every 4 hours as needed for moderate to severe pain 20 tablet 0     acetaminophen (TYLENOL) 325 MG tablet Take 2 tablets (650 mg) by mouth every 4 hours as needed for mild pain 100 tablet      hydrOXYzine (ATARAX) 25 MG tablet Take 1 tablet (25 mg) by mouth 3 times daily as needed for other (pain) 40 tablet 0     lisinopril (PRINIVIL/ZESTRIL) 5 MG tablet Take 1 tablet (5 mg) by mouth daily 30 tablet 0     senna-docusate (SENOKOT-S;PERICOLACE) 8.6-50 MG per tablet Take 1 tablet by mouth daily 20 tablet 0     umeclidinium (INCRUSE ELLIPTA) 62.5 MCG/INH oral inhaler Inhale 1 puff into the lungs daily       gemfibrozil (LOPID) 600 MG tablet Take 1 tablet (600 mg) by mouth daily 90 tablet 0     B-12 TR 1000 MCG TBCR TAKE 1 TABLET BY MOUTH DAILY 100 tablet 0      prochlorperazine (COMPAZINE) 5 MG tablet Take 1 tablet (5 mg) by mouth every 6 hours as needed for nausea or vomiting 120 tablet 0     levothyroxine (SYNTHROID/LEVOTHROID) 25 MCG tablet Take 1 tablet (25 mcg) by mouth daily 90 tablet 0     levalbuterol (XOPENEX) 1.25 MG/3ML neb solution Take 3 mLs (1.25 mg) by nebulization every 4 hours as needed for shortness of breath / dyspnea or wheezing 1584 mL 1     omeprazole (PRILOSEC) 40 MG capsule Take 1 capsule (40 mg) by mouth daily Take 30-60 minutes before a meal. 90 capsule 3     roflumilast (DALIRESP) 500 MCG TABS tablet Take 500 mcg by mouth daily        polyethylene glycol (MIRALAX/GLYCOLAX) Packet Take 17 g by mouth daily as needed for constipation       albuterol (ALBUTEROL) 108 (90 BASE) MCG/ACT Inhaler Inhale 2 puffs into the lungs every 6 hours as needed       budesonide (PULMICORT FLEXHALER) 180 MCG/ACT inhaler Inhale 1 puff into the lungs 2 times daily       montelukast (SINGULAIR) 10 MG tablet Take 1 tablet (10 mg) by mouth At Bedtime 30 tablet      salmeterol (SEREVENT) 50 MCG/DOSE diskus inhaler Inhale 1 puff into the lungs 2 times daily       ferrous sulfate (IRON) 325 (65 FE) MG tablet Take 1 tablet (325 mg) by mouth daily (with breakfast) (Patient taking differently: Take 1 tablet by mouth 2 times daily ) 100 tablet      calcium 600 MG tablet Take 1 tablet (600 mg) by mouth daily 60 tablet      LANsoprazole (PREVACID) 30 MG CR capsule Take 1 capsule (30 mg) by mouth daily 90 capsule 3     cholecalciferol (VITAMIN D) 1000 UNIT tablet Take 1 tablet (1,000 Units) by mouth daily 30 tablet      Multiple Vitamins-Minerals (MULTIVITAMIN OR) Take 1 tablet by mouth daily       ASPIRIN NOT PRESCRIBED, INTENTIONAL, 1 each continuous prn. Antiplatelet medication not prescribed intentionally due to Use of NSAIDS 0 each 0       Soc Hx: reviewed  Fam Hx: reviewed          Jenny Tierney MD  Internal Medicine            Hospital Follow-up  Visit:    Hospital/Nursing Home/IP Rehab Facility: [facility]  Date of Admission: 11/19/17  Date of Discharge: 11/22/17  Reason(s) for Admission:   1.Acute pain due to sacral insufficiency fracture, hematoma in right gluteus and thigh, and hamstring disruption.   2. Steroid-dependent COPD.   3. Hypertension  4.Hypothyroidism  5.GERD  6.Hyponatremia            Problems taking medications regularly:  None       Medication changes since discharge: Was started on Lisinopril, hydroxyzine, senna, oxycodone, acetaminophen.        Problems adhering to non-medication therapy:  None    Summary of hospitalization:  [facility] discharge summary reviewed  Diagnostic Tests/Treatments reviewed.  Follow up needed: as above   Other Healthcare Providers Involved in Patient s Care:         Pulmonary  Update since discharge: stable.     Post Discharge Medication Reconciliation: discharge medications reconciled and changed, per note/orders (see AVS).  Plan of care communicated with patient     Coding guidelines for this visit:  Type of Medical   Decision Making Face-to-Face Visit       within 7 Days of discharge Face-to-Face Visit        within 14 days of discharge   Moderate Complexity [id number]   High Complexity 13096 [id number]

## 2017-11-27 NOTE — PROGRESS NOTES
"Client d/c from [facility] 11/22/2017 to home.  EPIC notes reviewed, d/c summary reviewed.  Client has been seen by [facility], has f/u appt with PCP today.  Call placed to client who states that she is not feeling good, \"I'm hurting all over, I'm feeling dizzy and my breathing is not good.\"  Client states that all of her medications are set up by [facility]. Client states that she feels comfortable being in her apt, able to get in/out of bed indep, dress self indep.  Reviewed current services, client is interested in [facility], agreeable to  [facility] [employer] [facility].  (client has tried [employer], [facility] [facility] which were recently d/c per her request).  Client does not self monitor b/p at home, expressed concern being on Lisinopril and will discuss with PCP.   Client will f/u with CM with questions/concerns.  CM to follow.  Yeni Savage RN, BC  Supervisor [facility]   [phone] (Fax)      "

## 2017-11-28 ENCOUNTER — TELEPHONE (OUTPATIENT)
Dept: INTERNAL MEDICINE | Facility: CLINIC | Age: 77
End: 2017-11-28

## 2017-11-28 NOTE — PROGRESS NOTES
Call placed to client to inform her that M[facility] will begin their first delivery tomorrow at 11:30  Yeni Savage RN, BC  Supervisor [facility]   [phone] (Fax)

## 2017-11-30 ENCOUNTER — TELEPHONE (OUTPATIENT)
Dept: INTERNAL MEDICINE | Facility: CLINIC | Age: 77
End: 2017-11-30

## 2017-12-02 PROBLEM — S32.10XA CLOSED FRACTURE OF SACRUM, UNSPECIFIED PORTION OF SACRUM, INITIAL ENCOUNTER (H): Status: ACTIVE | Noted: 2017-12-02

## 2017-12-04 ENCOUNTER — TELEPHONE (OUTPATIENT)
Dept: INTERNAL MEDICINE | Facility: CLINIC | Age: 77
End: 2017-12-04

## 2017-12-04 ENCOUNTER — NURSE TRIAGE (OUTPATIENT)
Dept: NURSING | Facility: CLINIC | Age: 77
End: 2017-12-04

## 2017-12-04 DIAGNOSIS — D51.0 PERNICIOUS ANEMIA: Chronic | ICD-10-CM

## 2017-12-04 NOTE — TELEPHONE ENCOUNTER
Form received from: [facility]    Form requesting following info/need: PT orders    PHYLICIA needed?: No    Location of form: Dr. Tierney's in basket    When completed the route for return: Fax

## 2017-12-05 NOTE — TELEPHONE ENCOUNTER
"Clinic Action Needed:YES please check with MD and call both patient and pharmacy  Reason for Call:\"I need my doctor to call me in the am (12/5) regarding a refill on B-12 TR 1000 MCG TBCR 100 tablet 0 8/14/2017  No  Sig: TAKE 1 TABLET BY MOUTH DAILY    Patient Recommendations/Teaching:I will route message to PCP.  Routed to:PCP  Nadine Ward RN [facility]          "

## 2017-12-05 NOTE — TELEPHONE ENCOUNTER
"  Reason for Disposition    Caller has NON-URGENT medication question about med that PCP prescribed and triager unable to answer question     \"I need my doctor to call me in the am (12/5) regarding a refill on B-12 TR 1000 MCG TBCR 100 tablet 0 8/14/2017  No  Sig: TAKE 1 TABLET BY MOUTH DAILY    I will route message to PCP.    Additional Information    Negative: Drug overdose and nurse unable to answer question    Negative: Caller requesting information not related to medicine    Negative: Caller requesting a prescription for Strep throat and has a positive culture result    Negative: Rash while taking a medication or within 3 days of stopping it    Negative: Immunization reaction suspected    Negative: [1] Asthma and [2] having symptoms of asthma (cough, wheezing, etc)    Negative: MORE THAN A DOUBLE DOSE of a prescription or over-the-counter (OTC) drug    Negative: [1] DOUBLE DOSE (an extra dose or lesser amount) of over-the-counter (OTC) drug AND [2] any symptoms (e.g., dizziness, nausea, pain, sleepiness)    Negative: [1] DOUBLE DOSE (an extra dose or lesser amount) of prescription drug AND [2] any symptoms (e.g., dizziness, nausea, pain, sleepiness)    Negative: Took another person's prescription drug    Negative: [1] DOUBLE DOSE (an extra dose or lesser amount) of prescription drug AND [2] NO symptoms (Exception: a double dose of antibiotics)    Negative: Diabetes drug error or overdose (e.g., insulin or extra dose)    Negative: [1] Request for URGENT new prescription or refill of \"essential\" medication (i.e., likelihood of harm to patient if not taken) AND [2] triager unable to fill per unit policy    Negative: [1] Prescription not at pharmacy AND [2] was prescribed today by PCP    Negative: Pharmacy calling with prescription questions and triager unable to answer question    Negative: Caller has URGENT medication question about med that PCP prescribed and triager unable to answer question    Protocols used: " MEDICATION QUESTION CALL-ADULT-AH

## 2017-12-06 ENCOUNTER — CARE COORDINATION (OUTPATIENT)
Dept: GERIATRIC MEDICINE | Facility: CLINIC | Age: 77
End: 2017-12-06

## 2017-12-06 NOTE — PROGRESS NOTES
Voice message left with January SHORE to f/u on , request a return call.  Yeni Savage RN, BC  Supervisor [facility]   [phone] (Fax)

## 2017-12-06 NOTE — TELEPHONE ENCOUNTER
Requested Prescriptions   Pending Prescriptions Disp Refills     cyanocobalamin (B-12 TR) 1000 MCG TBCR 100 tablet      Sig: Take 1 tablet by mouth daily    There is no refill protocol information for this order        Routing refill request to provider for review/approval because:  Labs out of range:  B12 level was high

## 2017-12-07 RX ORDER — UBIDECARENONE 75 MG
CAPSULE ORAL
Qty: 100 TABLET | Refills: 0 | OUTPATIENT
Start: 2017-12-07

## 2017-12-08 ENCOUNTER — CARE COORDINATION (OUTPATIENT)
Dept: GERIATRIC MEDICINE | Facility: CLINIC | Age: 77
End: 2017-12-08

## 2017-12-08 NOTE — PROGRESS NOTES
12/7/17 Rec'd vm from Chica OT [facility] that she is recommending another grab bar to be installed in client's shower and would also recommend that the towel bar be replaced with a grab bar.   [phone]  12/7/17 Message left with Renetta, management at [facility]'s with the above information.  12/7/17 Rec'd tele call from Renetta that grab bars cannot be installed in the shower. Renetta states that they can add a grab in the bathroom area, but is not recommending that the towel bar be removed. Per Renetta the facility will place the grab bars. Explained that CM will provide the information to OT and reqmohsenst OT f/u with Renetta on recommendations.  12/7/17 left vm with Chica with the above information, request that she contact or meet with Renetta.  Yeni Savage RN, BC  Supervisor [facility]   [phone] (Fax)

## 2017-12-08 NOTE — PROGRESS NOTES
12/7/17 Left vm with client that CM left message with [facility] requesting a return call on status of  referral.   Yeni Savage RN, BC  Supervisor [facility]   [phone] (Fax)

## 2017-12-11 ENCOUNTER — TELEPHONE (OUTPATIENT)
Dept: INTERNAL MEDICINE | Facility: CLINIC | Age: 77
End: 2017-12-11

## 2017-12-11 NOTE — PROGRESS NOTES
Secure e-mail sent to January at [facility] to f/u on  referral.  Yeni Savage RN, BC  Supervisor [facility]   [phone] (Fax)

## 2017-12-11 NOTE — TELEPHONE ENCOUNTER
EVANGELINA Sanchez nurse reporting BP this morning, took  Orthos, nurse reports  Sitting 96/60, standing 80/58, not on any HTN medications. Dehydration previously discussed with the client, pt has tenting of skin, pt informed nurse of 3 loose stools this morning, nurse talked to pt about BRAT diet and Imodium. Patient does not have Imodium currently but will get some. Pt has reported dizziness at times. Advised home care nurse to encourage pt to drink plenty of fluids and electrolyte replacing fluids if available.  FYI to provider and Home care nurse asks if any further recommendations by provider.

## 2017-12-14 NOTE — PROGRESS NOTES
12/12/2017 Rec'd e-mail from January at [facility], that Krystal Benitez is assigned to [facility] and she forwarded my e-mail to her.  12/14/17 Secure e-mail sent to Krystal inquiring if she rec'd the  referral for this client, if not CM will resend.   Yeni Savage RN, BC  Supervisor [facility]   [phone] (Fax)

## 2017-12-18 ENCOUNTER — TELEPHONE (OUTPATIENT)
Dept: INTERNAL MEDICINE | Facility: CLINIC | Age: 77
End: 2017-12-18

## 2017-12-18 NOTE — PROGRESS NOTES
Information rec'd from Krystal at [facility] 12/13/17, she is in the process of setting up a match meeting and will f/u with CM, if it is a good fit.   Yeni Savage RN, BC  Supervisor [facility]   [phone] (Fax)

## 2017-12-18 NOTE — TELEPHONE ENCOUNTER
Laura, RN with [facility] ([phone]) calling to report that patient's heart rate is 130.  She usually runs no higher than the low 100's.  Some of her recent heart rates have been 89, 94,102, 103.  Pulmonary is weaning her off of Prednisone which seems to cause her some anxiety.  Resp rate is 28, O2 sat is 98% on room air, temp 98.1  Lung sounds are clear, pt has a dry cough.    Laura is asking for parameters for heart rate indicating when to call PCP.  Do you want to be notified of anything over 120 or ?.  CAROL Flores R.N.

## 2017-12-21 NOTE — TELEPHONE ENCOUNTER
Patient cannot tolerate medications to decrease the heart rate.  No parameters so far  This has been a chronic problem, evaluated by cardiologist in the past    If home health RN has questions, she should ask the cardiologist

## 2017-12-26 DIAGNOSIS — E78.5 HYPERLIPIDEMIA WITH TARGET LDL LESS THAN 130: ICD-10-CM

## 2017-12-27 ENCOUNTER — TELEPHONE (OUTPATIENT)
Dept: INTERNAL MEDICINE | Facility: CLINIC | Age: 77
End: 2017-12-27

## 2017-12-27 DIAGNOSIS — E03.9 HYPOTHYROIDISM, UNSPECIFIED TYPE: Primary | Chronic | ICD-10-CM

## 2017-12-27 DIAGNOSIS — I10 ESSENTIAL HYPERTENSION WITH GOAL BLOOD PRESSURE LESS THAN 140/90: Chronic | ICD-10-CM

## 2017-12-27 DIAGNOSIS — J44.9 COPD, MODERATE (H): Chronic | ICD-10-CM

## 2017-12-27 DIAGNOSIS — E56.9 VITAMIN DEFICIENCY: ICD-10-CM

## 2017-12-27 DIAGNOSIS — M62.81 GENERALIZED MUSCLE WEAKNESS: ICD-10-CM

## 2017-12-27 DIAGNOSIS — D64.9 ANEMIA, UNSPECIFIED TYPE: ICD-10-CM

## 2017-12-27 RX ORDER — GEMFIBROZIL 600 MG/1
600 TABLET, FILM COATED ORAL DAILY
Qty: 90 TABLET | Refills: 0 | Status: SHIPPED | OUTPATIENT
Start: 2017-12-27 | End: 2018-03-26

## 2017-12-27 NOTE — TELEPHONE ENCOUNTER
"Windy RN with [facility] ([phone]) calling to report that pt feels \"lousy\" without any energy for the past week, see previous phone message about increased heart rate.  Patient has been slowly weaning down on Prednisone per Pulmonary and by 10 mg she was having a fair amount of SOB.  Pulmonary told her to go back up to 15 mg and stay there until feeling better, then continue weaning.  Patient was probably expecting to feel significantly better as she is used to a burst of Prednisone.  Pt called Pulmonary yesterday, Windy called them today regarding patient's SOB and general malaise.  They won't increase Prednisone any further and Windy doesn't want IM to touch her Prednisone but maybe try an antibiotic like Levaquin.  They just said to increase Xoepenex nebs to 5-6 times a day.  Patient not doing anything, just sits on her couch.      Lung sounds diminished, no wheezing.  126/68-P 102-T 98.8 , O2 sat 95-97%.  Has her usual chronic cough, not productive of any discolored phlegm.  CAROL Flores R.N.    "

## 2017-12-27 NOTE — TELEPHONE ENCOUNTER
Lopid   Last Written Prescription Date: 9/21/17  Last Fill Quantity: 90, # refills: 0    Last Office Visit with [facility] or [facility] prescribing provider:  11/27/17   Future Office Visit:      Cholesterol   Date Value Ref Range Status   06/01/2015 154 <200 mg/dL Final     Comment:     LDL Cholesterol is the primary guide to therapy.   The NCEP recommends further evaluation of: patients with cholesterol greater   than 200 mg/dL if additional risk factors are present, cholesterol greater   than   240 mg/dL, triglycerides greater than 150 mg/dL, or HDL less than 40 mg/dL.       HDL Cholesterol   Date Value Ref Range Status   06/01/2015 48 (L) >50 mg/dL Final     LDL Cholesterol Calculated   Date Value Ref Range Status   06/01/2015 77 0 - 129 mg/dL Final     Comment:     LDL Cholesterol is the primary guide to therapy: LDL-cholesterol goal in high   risk patients is <100 mg/dL and in very high risk patients is <70 mg/dL.       Triglycerides   Date Value Ref Range Status   06/01/2015 144 0 - 150 mg/dL Final     Cholesterol/HDL Ratio   Date Value Ref Range Status   06/01/2015 3.2 0.0 - 5.0 Final     ALT   Date Value Ref Range Status   11/19/2017 14 0 - 50 U/L Final      Routing refill request to provider for review/approval because:  Needs fasting labs

## 2017-12-28 ENCOUNTER — TELEPHONE (OUTPATIENT)
Dept: INTERNAL MEDICINE | Facility: CLINIC | Age: 77
End: 2017-12-28

## 2017-12-28 ENCOUNTER — CARE COORDINATION (OUTPATIENT)
Dept: GERIATRIC MEDICINE | Facility: CLINIC | Age: 77
End: 2017-12-28

## 2017-12-28 ENCOUNTER — NURSE TRIAGE (OUTPATIENT)
Dept: NURSING | Facility: CLINIC | Age: 77
End: 2017-12-28

## 2017-12-28 LAB
ALBUMIN SERPL-MCNC: 3.3 G/DL (ref 3.4–5)
ALP SERPL-CCNC: 55 U/L (ref 40–150)
ALT SERPL W P-5'-P-CCNC: 13 U/L (ref 0–50)
ANION GAP SERPL CALCULATED.3IONS-SCNC: 8 MMOL/L (ref 3–14)
AST SERPL W P-5'-P-CCNC: 7 U/L (ref 0–45)
BASOPHILS # BLD AUTO: 0 10E9/L (ref 0–0.2)
BASOPHILS NFR BLD AUTO: 0.1 %
BILIRUB SERPL-MCNC: 0.3 MG/DL (ref 0.2–1.3)
BUN SERPL-MCNC: 9 MG/DL (ref 7–30)
CALCIUM SERPL-MCNC: 8.9 MG/DL (ref 8.5–10.1)
CHLORIDE SERPL-SCNC: 101 MMOL/L (ref 94–109)
CK SERPL-CCNC: 82 U/L (ref 30–225)
CO2 SERPL-SCNC: 24 MMOL/L (ref 20–32)
CREAT SERPL-MCNC: 0.65 MG/DL (ref 0.52–1.04)
DEPRECATED CALCIDIOL+CALCIFEROL SERPL-MC: 41 UG/L (ref 20–75)
DIFFERENTIAL METHOD BLD: ABNORMAL
EOSINOPHIL # BLD AUTO: 0 10E9/L (ref 0–0.7)
EOSINOPHIL NFR BLD AUTO: 0.3 %
ERYTHROCYTE [DISTWIDTH] IN BLOOD BY AUTOMATED COUNT: 15.1 % (ref 10–15)
GFR SERPL CREATININE-BSD FRML MDRD: 88 ML/MIN/1.7M2
GLUCOSE SERPL-MCNC: 125 MG/DL (ref 70–99)
HCT VFR BLD AUTO: 33.3 % (ref 35–47)
HGB BLD-MCNC: 11 G/DL (ref 11.7–15.7)
IMM GRANULOCYTES # BLD: 0.1 10E9/L (ref 0–0.4)
IMM GRANULOCYTES NFR BLD: 1.1 %
LYMPHOCYTES # BLD AUTO: 0.6 10E9/L (ref 0.8–5.3)
LYMPHOCYTES NFR BLD AUTO: 5.4 %
MCH RBC QN AUTO: 30 PG (ref 26.5–33)
MCHC RBC AUTO-ENTMCNC: 33 G/DL (ref 31.5–36.5)
MCV RBC AUTO: 91 FL (ref 78–100)
MONOCYTES # BLD AUTO: 0.5 10E9/L (ref 0–1.3)
MONOCYTES NFR BLD AUTO: 4 %
NEUTROPHILS # BLD AUTO: 10.3 10E9/L (ref 1.6–8.3)
NEUTROPHILS NFR BLD AUTO: 89.1 %
NRBC # BLD AUTO: 0 10*3/UL
NRBC BLD AUTO-RTO: 0 /100
PLATELET # BLD AUTO: 290 10E9/L (ref 150–450)
POTASSIUM SERPL-SCNC: 4.2 MMOL/L (ref 3.4–5.3)
PROT SERPL-MCNC: 6.6 G/DL (ref 6.8–8.8)
RBC # BLD AUTO: 3.67 10E12/L (ref 3.8–5.2)
SODIUM SERPL-SCNC: 133 MMOL/L (ref 133–144)
TSH SERPL DL<=0.005 MIU/L-ACNC: 1.24 MU/L (ref 0.4–4)
WBC # BLD AUTO: 11.5 10E9/L (ref 4–11)

## 2017-12-28 PROCEDURE — 82306 VITAMIN D 25 HYDROXY: CPT | Performed by: INTERNAL MEDICINE

## 2017-12-28 PROCEDURE — 82180 ASSAY OF ASCORBIC ACID: CPT | Performed by: INTERNAL MEDICINE

## 2017-12-28 PROCEDURE — 82550 ASSAY OF CK (CPK): CPT | Performed by: INTERNAL MEDICINE

## 2017-12-28 PROCEDURE — 84425 ASSAY OF VITAMIN B-1: CPT | Performed by: INTERNAL MEDICINE

## 2017-12-28 PROCEDURE — 80053 COMPREHEN METABOLIC PANEL: CPT | Performed by: INTERNAL MEDICINE

## 2017-12-28 PROCEDURE — 85025 COMPLETE CBC W/AUTO DIFF WBC: CPT | Performed by: INTERNAL MEDICINE

## 2017-12-28 PROCEDURE — 84443 ASSAY THYROID STIM HORMONE: CPT | Performed by: INTERNAL MEDICINE

## 2017-12-28 PROCEDURE — 84207 ASSAY OF VITAMIN B-6: CPT | Performed by: INTERNAL MEDICINE

## 2017-12-28 RX ORDER — CEFDINIR 300 MG/1
300 CAPSULE ORAL 2 TIMES DAILY
Qty: 20 CAPSULE | Refills: 0 | Status: SHIPPED | OUTPATIENT
Start: 2017-12-28 | End: 2018-03-21

## 2017-12-28 NOTE — TELEPHONE ENCOUNTER
Windy's mailbox is full, unable to leave message.  Call placed to  Home Care office, left message with staff there to have Windy call our office regarding this patient.  CAROL Flores R.N.

## 2017-12-28 NOTE — TELEPHONE ENCOUNTER
Attempted to contact Windy BARTHOLOMEW but her mailbox is full, unable to leave a message.  Called pt, reviewed labs and advised her that Omnicef rx had been sent.  Told her to keep the appt as scheduled 1/2/18 and to let Windy know we had attempted to contact her.  CAROL Flores R.N.

## 2017-12-28 NOTE — TELEPHONE ENCOUNTER
If pulmonary thought she needs antibiotic, they would have had prescribed it.  As I mentioned in my prior messages, she cannot take medications for the heart rate.  She has seen cardiology in the past for this reason.    Last hemoglobin was 9.2, she had low sodium in the past.  For now I think we should do blood test and off for the next available appointment.     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  She might have depression.

## 2017-12-28 NOTE — TELEPHONE ENCOUNTER
Anemia is better.  Electrolytes are normal.  White blood cells little elevated.  Let us start antibiotic, Omnicef 300 mg twice a day.  Keep the follow-up appointment January 2

## 2017-12-28 NOTE — PROGRESS NOTES
Call placed to client to f/u on . Client states that she has an appt on 1/8/18, stating that the previous appt's had to be cancelled.  CM to follow.  Yeni Savage RN, BC  Supervisor [facility]   [phone] (Fax)

## 2017-12-29 NOTE — TELEPHONE ENCOUNTER
Call out via answering service to on call IM for [facility]. Dr. Tierney is on call and she is the prescribing MD. Omnicef is ordered and there is a PCN allergy listed that pharmacist pointed out to patient.    Dr. Tierney returned my page immediately and she said that patient has taken this medication in the past without any problem She advised that patient should go ahead and start the medication    Patient called back and reassured than Dr. Tierney said it is OK for her to start this medication.  Vira Chavarria RN  [facility]

## 2017-12-29 NOTE — TELEPHONE ENCOUNTER
Patient called nurse advisor.  I prescribed Omnicef earlier today.  The pharmacy told her she is allergic to penicillin.  She received several times cephalosporins in 2014.    I discussed with Joyce, nurse  advisor

## 2017-12-30 LAB — VIT B1 BLD-MCNC: 171 NMOL/L (ref 70–180)

## 2017-12-31 LAB
VIT B6 SERPL-MCNC: 34 NMOL/L (ref 20–125)
VIT C SERPL-MCNC: 74 UMOL/L (ref 23–114)

## 2018-01-02 ENCOUNTER — OFFICE VISIT (OUTPATIENT)
Dept: INTERNAL MEDICINE | Facility: CLINIC | Age: 78
End: 2018-01-02
Payer: COMMERCIAL

## 2018-01-02 VITALS
HEIGHT: 61 IN | BODY MASS INDEX: 23.28 KG/M2 | OXYGEN SATURATION: 98 % | SYSTOLIC BLOOD PRESSURE: 104 MMHG | DIASTOLIC BLOOD PRESSURE: 70 MMHG | TEMPERATURE: 98.2 F | HEART RATE: 84 BPM | WEIGHT: 123.3 LBS

## 2018-01-02 DIAGNOSIS — J44.9 COPD, SEVERE (H): ICD-10-CM

## 2018-01-02 DIAGNOSIS — I35.0 NONRHEUMATIC AORTIC VALVE STENOSIS: ICD-10-CM

## 2018-01-02 DIAGNOSIS — R53.83 OTHER FATIGUE: Primary | ICD-10-CM

## 2018-01-02 DIAGNOSIS — H61.23 BILATERAL IMPACTED CERUMEN: ICD-10-CM

## 2018-01-02 DIAGNOSIS — I47.10 SVT (SUPRAVENTRICULAR TACHYCARDIA) (H): ICD-10-CM

## 2018-01-02 PROCEDURE — 99215 OFFICE O/P EST HI 40 MIN: CPT | Performed by: INTERNAL MEDICINE

## 2018-01-02 NOTE — NURSING NOTE
"Chief Complaint   Patient presents with     Shortness of Breath       Initial /70 (BP Location: Right arm, Patient Position: Chair, Cuff Size: Adult Large)  Pulse 84  Temp 98.2  F (36.8  C) (Oral)  Ht 5' 1\" (1.549 m)  Wt 123 lb 4.8 oz (55.9 kg)  SpO2 98%  Breastfeeding? No  BMI 23.3 kg/m2 Estimated body mass index is 23.3 kg/(m^2) as calculated from the following:    Height as of this encounter: 5' 1\" (1.549 m).    Weight as of this encounter: 123 lb 4.8 oz (55.9 kg).  Medication Reconciliation: complete   Mariluz Ibrahim CMA      "

## 2018-01-02 NOTE — PROGRESS NOTES
Dr Tierney's note      Patient's instructions / PLAN:                                                        Plan:  1. Echo - To schedule this test please call [facility] at: [phone]   2. Continue same meds, same doses for now   3. Follow up in 1-3 months  4. Debrox for few days then appointment to recheck years ( bilat impacted cerumen        ASSESSMENT & PLAN:                                                      77-year-old woman with complicated medical problems: She has severe COPD/asthma, prednisone dependent.  She has tachycardia, she had allergic reactions to beta-blockers and calcium channel blocker.  She has seen cardiology in the past for this and no treatment was offered.    Most recently she had pelvic hairline fracture and she is recovering well.    She has been struggling now with fatigue, with no good explanation.  Labs are in acceptable range.  It does not seem she has COPD exacerbation.  I wonder if she had steroid myopathy.  She has history of aortic stenosis and we will repeat echo    (R53.83) Other fatigue  (primary encounter diagnosis)  Comment:   Plan:     (I35.0) Nonrheumatic aortic valve stenosis  Comment:   Plan: Echocardiogram Complete            (J44.9) COPD, severe (H)  Comment:   Plan:     (I47.1) SVT (supraventricular tachycardia) (H)  Comment:   Plan:     (H61.23) Bilateral impacted cerumen  Comment:   Plan:        Chief complaint:                                                      COPD/fever  Generalized weakness  Decreased hearing    SUBJECTIVE:   Ana Luisa Lee is a 77 year old female who presents to clinic today for the following health issues:    Called last week because she was feeling very tired. Low grade fever. Started Omnicef. She doesn't feel much better.   Biggest complained: feeling tired all the time and feels she needs to sleep all the time.   The breathing is stable, no worsening of chr SOB.    We did extensive blood tests - acceptable range.     Hasn't had  "appetite and lost wt. Now she eats better     Moderate to severe valvular aortic stenosis on her last echo    Steroid myopathy     Review of Systems:                                                      ROS: negative for fever, chills, cough, wheezes, chest pain,, vomiting, abdominal pain, leg swelling positive for chronic shortness of breath    A 10-point review of systems was obtained.  Those pertinent are above and in the in the Subjective section.  The rest of the systems are negative.           OBJECTIVE:             Physical exam:  Blood pressure 104/70, pulse 84, temperature 98.2  F (36.8  C), temperature source Oral, height 5' 1\" (1.549 m), weight 123 lb 4.8 oz (55.9 kg), SpO2 98 %, not currently breastfeeding.   NAD, appears comfortable, she looks fragile  Skin: no rashes   HEENT: PERRLA, EOMI, pink conjunctiva, anicteric sclerae, oropharynx is normal color, bilateral ears with impacted wax  Neck: supple, no JVD, No thyroidmegaly. Lymph nodes nonpalpable cervical and supraclavicular.  Chest: clear to auscultation bilaterally, good respiratory effort  Heart: S1 S2, RRR, 2/6 systolic murmur  Abdomen: soft, not tender, no hepatosplenomegaly or masses appreciated, no abdominal bruit, present bowel sounds  Extremities: no edema,   Neurologic: A, Ox3, no focal signs appreciated    PMHx: reviewed  Past Medical History:   Diagnosis Date     Anemia Dec 2011     Arthritis of hand      Asthma, persistent     f/U dr Brock at [facility]     Asthma, persistent      Chronic intermittent steroid use     for asthma     COPD exacerbation (H) 10/25/2013     Esophageal stricture 2007    s/p dilation     Foot deformity     Left     HTN, goal below 140/90      Hyperlipidemia      Hyperlipidemia LDL goal < 130      Hypothyroidism Dec 2011     Hypothyroidism      Left foot pain Nov 2011     Left shoulder pain Nov 2011     Medication side effects      Osteoporosis      Paroxysmal supraventricular tachycardia (H)      PVC " (premature ventricular contraction) May 2012     Skin cyst Dec 2011    L thumb     SOB (shortness of breath) on exertion May 2012     SVT (supraventricular tachycardia) (H)       PSHx: reviewed  Past Surgical History:   Procedure Laterality Date     BUNIONECTOMY LAPIDUS WITH TARSAL METATARSAL (TMT) FUSION  1990's     EP STUDY, MODIFIED  7/2012    SVT not induced, PVC's     GYN SURGERY  1980     HYSTERECTOMY TOTAL ABDOMINAL       HYSTERECTOMY, PAP NO LONGER INDICATED       TONSILLECTOMY          Meds: reviewed  Current Outpatient Prescriptions   Medication Sig Dispense Refill     cefdinir (OMNICEF) 300 MG capsule Take 1 capsule (300 mg) by mouth 2 times daily 20 capsule 0     gemfibrozil (LOPID) 600 MG tablet Take 1 tablet (600 mg) by mouth daily 90 tablet 0     cyanocobalamin (B-12 TR) 1000 MCG TBCR Take 1 tablet by mouth daily 100 tablet 3     predniSONE (DELTASONE) 5 MG tablet Take 3 tablets (15 mg) by mouth See Admin Instructions 11/17/17 started prednisone taper.   20 mg daily for 1 week then taper as directed       acetaminophen (TYLENOL) 325 MG tablet Take 2 tablets (650 mg) by mouth every 4 hours as needed for mild pain 100 tablet      hydrOXYzine (ATARAX) 25 MG tablet Take 1 tablet (25 mg) by mouth 3 times daily as needed for other (pain) 40 tablet 0     umeclidinium (INCRUSE ELLIPTA) 62.5 MCG/INH oral inhaler Inhale 1 puff into the lungs daily       prochlorperazine (COMPAZINE) 5 MG tablet Take 1 tablet (5 mg) by mouth every 6 hours as needed for nausea or vomiting 120 tablet 0     levothyroxine (SYNTHROID/LEVOTHROID) 25 MCG tablet Take 1 tablet (25 mcg) by mouth daily 90 tablet 0     levalbuterol (XOPENEX) 1.25 MG/3ML neb solution Take 3 mLs (1.25 mg) by nebulization every 4 hours as needed for shortness of breath / dyspnea or wheezing 1584 mL 1     omeprazole (PRILOSEC) 40 MG capsule Take 1 capsule (40 mg) by mouth daily Take 30-60 minutes before a meal. 90 capsule 3     roflumilast (DALIRESP) 500 MCG  TABS tablet Take 500 mcg by mouth daily        polyethylene glycol (MIRALAX/GLYCOLAX) Packet Take 17 g by mouth daily as needed for constipation       albuterol (ALBUTEROL) 108 (90 BASE) MCG/ACT Inhaler Inhale 2 puffs into the lungs every 6 hours as needed       budesonide (PULMICORT FLEXHALER) 180 MCG/ACT inhaler Inhale 1 puff into the lungs 2 times daily       montelukast (SINGULAIR) 10 MG tablet Take 1 tablet (10 mg) by mouth At Bedtime 30 tablet      salmeterol (SEREVENT) 50 MCG/DOSE diskus inhaler Inhale 1 puff into the lungs 2 times daily       ferrous sulfate (IRON) 325 (65 FE) MG tablet Take 1 tablet (325 mg) by mouth daily (with breakfast) (Patient taking differently: Take 1 tablet by mouth 2 times daily ) 100 tablet      calcium 600 MG tablet Take 1 tablet (600 mg) by mouth daily 60 tablet      LANsoprazole (PREVACID) 30 MG CR capsule Take 1 capsule (30 mg) by mouth daily 90 capsule 3     cholecalciferol (VITAMIN D) 1000 UNIT tablet Take 1 tablet (1,000 Units) by mouth daily 30 tablet      Multiple Vitamins-Minerals (MULTIVITAMIN OR) Take 1 tablet by mouth daily       ASPIRIN NOT PRESCRIBED, INTENTIONAL, 1 each continuous prn. Antiplatelet medication not prescribed intentionally due to Use of NSAIDS 0 each 0       Soc Hx: reviewed  Fam Hx: reviewed    Time spent with the patient  40 min, more than 50% in counseling and coordinating care, Re above medical problems fatigue, COPD, tachycardia, aortic stenosis, recent labs      Jenny Tierney MD  Internal Medicine

## 2018-01-02 NOTE — PATIENT INSTRUCTIONS
Plan:  1. Echo - To schedule this test please call [facility] at: [phone]   2. Continue same meds, same doses for now   3. Follow up in 1-3 months

## 2018-01-02 NOTE — MR AVS SNAPSHOT
After Visit Summary   1/2/2018    Ana Luisa Lee    MRN: [id number]           Patient Information     Date Of Birth          1940        Visit Information        Provider Department      1/2/2018 3:00 PM Jenny Hamilton MD [facility]        Today's Diagnoses     Nonrheumatic aortic valve stenosis    -  1      Care Instructions    Plan:  1. Echo - To schedule this test please call [facility] at: [phone]   2. Continue same meds, same doses for now   3. Follow up in 1-3 months          Follow-ups after your visit        Your next 10 appointments already scheduled     Jan 11, 2018 11:30 AM CST   DX HIP/PELVIS/SPINE with RIDX1   [facility]    [address]  [address]  [facility] [address]              Please do not take any of the following 24 hours prior to the day of your exam: vitamins, calcium tablets, antacids.  If possible, please wear clothes without metal (snaps, zippers). A sweatsuit works well.              Future tests that were ordered for you today     Open Future Orders        Priority Expected Expires Ordered    Echocardiogram Complete Routine  1/2/2019 1/2/2018            Who to contact     If you have questions or need follow up information about today's clinic visit or your schedule please contact [facility] directly at [phone].  Normal or non-critical lab and imaging results will be communicated to you by MyChart, letter or phone within 4 business days after the clinic has received the results. If you do not hear from us within 7 days, please contact the clinic through MyChart or phone. If you have a critical or abnormal lab result, we will notify you by phone as soon as possible.  Submit refill requests through [employer] or call your pharmacy and they will forward the refill request to us. Please allow 3 business days for your refill to be completed.        "   Additional Information About Your Visit        MyChart Information     [employer] gives you secure access to your electronic health record. If you see a primary care provider, you can also send messages to your care team and make appointments. If you have questions, please call your primary care clinic.  If you do not have a primary care provider, please call [phone] and they will assist you.        Care EveryWhere ID     This is your Care EveryWhere ID. This could be used by other organizations to access your [place] medical records  [id number]        Your Vitals Were     Pulse Temperature Height Pulse Oximetry Breastfeeding? BMI (Body Mass Index)    84 98.2  F (36.8  C) (Oral) 5' 1\" (1.549 m) 98% No 23.3 kg/m2       Blood Pressure from Last 3 Encounters:   01/02/18 104/70   11/27/17 (!) 88/52   11/22/17 159/83    Weight from Last 3 Encounters:   01/02/18 123 lb 4.8 oz (55.9 kg)   11/27/17 123 lb 11.2 oz (56.1 kg)   11/22/17 125 lb 3.2 oz (56.8 kg)                 Today's Medication Changes          These changes are accurate as of: 1/2/18  3:41 PM.  If you have any questions, ask your nurse or doctor.               These medicines have changed or have updated prescriptions.        Dose/Directions    ferrous sulfate 325 (65 FE) MG tablet   Commonly known as:  IRON   This may have changed:  when to take this   Used for:  Pulmonary emphysema, unspecified emphysema type (H)        Dose:  1 tablet   Take 1 tablet (325 mg) by mouth daily (with breakfast)   Quantity:  100 tablet   Refills:  0                Primary Care Provider Office Phone # Fax #    Jenny Hamilton [phone]       [address] [facility] [address]        Equal Access to Services     SOBEIDA GIFFORD : Allyson Fernandes, shannan hodge, qaybta kaalholly moses. So [facility] [phone].    ATENCIÓN: Si habla español, tiene a martinez disposición servicios " cuca de asistencia lingüística. Robert oropeza [phone].    We comply with applicable federal civil rights laws and [place] laws. We do not discriminate on the basis of race, color, national origin, age, disability, sex, sexual orientation, or gender identity.            Thank you!     Thank you for choosing [facility]  for your care. Our goal is always to provide you with excellent care. Hearing back from our patients is one way we can continue to improve our services. Please take a few minutes to complete the written survey that you may receive in the mail after your visit with us. Thank you!             Your Updated Medication List - Protect others around you: Learn how to safely use, store and throw away your medicines at www.disposemymeds.org.          This list is accurate as of: 1/2/18  3:41 PM.  Always use your most recent med list.                   Brand Name Dispense Instructions for use Diagnosis    acetaminophen 325 MG tablet    TYLENOL    100 tablet    Take 2 tablets (650 mg) by mouth every 4 hours as needed for mild pain    Sacral insufficiency fracture, initial encounter       albuterol 108 (90 BASE) MCG/ACT Inhaler    PROAIR HFA     Inhale 2 puffs into the lungs every 6 hours as needed    Asthma, persistent       ASPIRIN NOT PRESCRIBED    INTENTIONAL    0 each    1 each continuous prn. Antiplatelet medication not prescribed intentionally due to Use of NSAIDS        budesonide 180 MCG/ACT inhaler    PULMICORT FLEXHALER     Inhale 1 puff into the lungs 2 times daily    Pulmonary emphysema, unspecified emphysema type (H)       calcium 600 MG tablet     60 tablet    Take 1 tablet (600 mg) by mouth daily    Pulmonary emphysema, unspecified emphysema type (H)       cefdinir 300 MG capsule    OMNICEF    20 capsule    Take 1 capsule (300 mg) by mouth 2 times daily    COPD, moderate (H)       cholecalciferol 1000 UNIT tablet    vitamin D3    30 tablet    Take 1 tablet (1,000 Units) by  mouth daily    COPD, moderate (H)       cyanocobalamin 1000 MCG Tbcr    B-12 TR    100 tablet    Take 1 tablet by mouth daily    Pernicious anemia       ferrous sulfate 325 (65 FE) MG tablet    IRON    100 tablet    Take 1 tablet (325 mg) by mouth daily (with breakfast)    Pulmonary emphysema, unspecified emphysema type (H)       gemfibrozil 600 MG tablet    LOPID    90 tablet    Take 1 tablet (600 mg) by mouth daily    Hyperlipidemia with target LDL less than 130       hydrOXYzine 25 MG tablet    ATARAX    40 tablet    Take 1 tablet (25 mg) by mouth 3 times daily as needed for other (pain)    Sacral insufficiency fracture, initial encounter       INCRUSE ELLIPTA 62.5 MCG/INH oral inhaler   Generic drug:  umeclidinium      Inhale 1 puff into the lungs daily        LANsoprazole 30 MG CR capsule    PREVACID    90 capsule    Take 1 capsule (30 mg) by mouth daily    Gastroesophageal reflux disease without esophagitis       levalbuterol 1.25 MG/3ML neb solution    XOPENEX    1584 mL    Take 3 mLs (1.25 mg) by nebulization every 4 hours as needed for shortness of breath / dyspnea or wheezing    COPD, moderate (H), COPD exacerbation (H)       levothyroxine 25 MCG tablet    SYNTHROID/LEVOTHROID    90 tablet    Take 1 tablet (25 mcg) by mouth daily    Hypothyroidism due to acquired atrophy of thyroid       montelukast 10 MG tablet    SINGULAIR    30 tablet    Take 1 tablet (10 mg) by mouth At Bedtime    Pulmonary emphysema, unspecified emphysema type (H)       MULTIVITAMIN PO      Take 1 tablet by mouth daily        omeprazole 40 MG capsule    priLOSEC    90 capsule    Take 1 capsule (40 mg) by mouth daily Take 30-60 minutes before a meal.    Gastroesophageal reflux disease, esophagitis presence not specified       polyethylene glycol Packet    MIRALAX/GLYCOLAX     Take 17 g by mouth daily as needed for constipation        predniSONE 5 MG tablet    DELTASONE     Take 3 tablets (15 mg) by mouth See Admin Instructions 11/17/17  started prednisone taper.  20 mg daily for 1 week then taper as directed        prochlorperazine 5 MG tablet    COMPAZINE    120 tablet    Take 1 tablet (5 mg) by mouth every 6 hours as needed for nausea or vomiting    Nausea       roflumilast 500 MCG Tabs tablet    DALIRESP     Take 500 mcg by mouth daily        salmeterol 50 MCG/DOSE diskus inhaler    SEREVENT     Inhale 1 puff into the lungs 2 times daily    Pulmonary emphysema, unspecified emphysema type (H)

## 2018-01-03 ASSESSMENT — ASTHMA QUESTIONNAIRES: ACT_TOTALSCORE: 16

## 2018-01-07 PROBLEM — J44.9 COPD, SEVERE (H): Status: ACTIVE | Noted: 2018-01-07

## 2018-01-09 ENCOUNTER — TELEPHONE (OUTPATIENT)
Dept: INTERNAL MEDICINE | Facility: CLINIC | Age: 78
End: 2018-01-09

## 2018-01-09 DIAGNOSIS — M25.50 MULTIPLE JOINT PAIN: Primary | ICD-10-CM

## 2018-01-09 NOTE — TELEPHONE ENCOUNTER
"Patient called requesting pain medication.  She is in the process of being weaned off Prednisone (15 mg daily) and is having a lot of pain.  She is taking 6 ES Tylenol per day but that is not touching the pain and she would like something stronger.  Rates pain a 7-8/10. Joints are really hurting all over, \"very intense\" in the mornings.  Patient does have Oxycodone on hand but has only used twice since getting discharged from hospital in Nov.  States she doesn't want to \"get addicted\".  CAROL Flores R.N.    "

## 2018-01-10 RX ORDER — TRAMADOL HYDROCHLORIDE 50 MG/1
50-100 TABLET ORAL 2 TIMES DAILY PRN
Qty: 40 TABLET | Refills: 0 | Status: SHIPPED | OUTPATIENT
Start: 2018-01-10 | End: 2018-01-29

## 2018-01-11 ENCOUNTER — TELEPHONE (OUTPATIENT)
Dept: INTERNAL MEDICINE | Facility: CLINIC | Age: 78
End: 2018-01-11

## 2018-01-11 DIAGNOSIS — Z53.9 DIAGNOSIS NOT YET DEFINED: Primary | ICD-10-CM

## 2018-01-11 PROCEDURE — G0180 MD CERTIFICATION HHA PATIENT: HCPCS | Performed by: INTERNAL MEDICINE

## 2018-01-24 ENCOUNTER — HOSPITAL ENCOUNTER (OUTPATIENT)
Dept: CARDIOLOGY | Facility: CLINIC | Age: 78
Discharge: HOME | End: 2018-01-24
Attending: INTERNAL MEDICINE | Admitting: INTERNAL MEDICINE
Payer: COMMERCIAL

## 2018-01-24 DIAGNOSIS — I35.0 NONRHEUMATIC AORTIC VALVE STENOSIS: ICD-10-CM

## 2018-01-24 PROCEDURE — 93306 TTE W/DOPPLER COMPLETE: CPT

## 2018-01-24 PROCEDURE — 93306 TTE W/DOPPLER COMPLETE: CPT | Mod: 26 | Performed by: INTERNAL MEDICINE

## 2018-01-25 ENCOUNTER — CARE COORDINATION (OUTPATIENT)
Dept: GERIATRIC MEDICINE | Facility: CLINIC | Age: 78
End: 2018-01-25

## 2018-01-25 NOTE — PROGRESS NOTES
1/24/18 Rec'd vm from Krystal [facility]  to share that she met with client to do a match meeting. Krystal states that the new  and client should work well.   Krystal states start date was to be 1/22/2018, with visit dates planned for Thursday afternoons.  Krystal requests auth to be completed  Yeni Savage RN, BC  Supervisor [facility]   [phone] (Fax)

## 2018-01-29 ENCOUNTER — CARE COORDINATION (OUTPATIENT)
Dept: GERIATRIC MEDICINE | Facility: CLINIC | Age: 78
End: 2018-01-29

## 2018-01-29 DIAGNOSIS — E03.4 HYPOTHYROIDISM DUE TO ACQUIRED ATROPHY OF THYROID: ICD-10-CM

## 2018-01-29 DIAGNOSIS — M25.50 MULTIPLE JOINT PAIN: ICD-10-CM

## 2018-01-29 NOTE — PROGRESS NOTES
Rec'd vm from client stating that she has a question with her insurance and changing clinics.  Call placed to client who states that she was told by her PCP that she needs to have the waxed cleaned out of her ears. Client inquired if she could go to [facility] clinic where she rec'd her hearing aides.

## 2018-01-29 NOTE — PATIENT INSTRUCTIONS
Rec'd vm from client stating that she has a question with her insurance and changing clinics  Call placed to client who states that she was told by her PCP that her ears need to be cleaned due to wax. Client inquired if she could go to [facility] where she got her hearing aides.   Enc'd her to contact the [facility] dept where she obtained her h/a and inquire if they are able to clean her ears, if not she should schedule a f/u appt with PCP.   Yeni Savage RN, BC  Supervisor [facility]   [phone] (Fax)

## 2018-01-29 NOTE — TELEPHONE ENCOUNTER
tramadol      Last Written Prescription Date:  1/10/18  Last Fill Quantity: 40,   # refills: 0  Last Office Visit: 1/2/18  Future Office visit:       Routing refill request to provider for review/approval because:  Drug not on the [facility], P or [facility] refill protocol or controlled substance

## 2018-01-30 RX ORDER — LEVOTHYROXINE SODIUM 25 UG/1
25 TABLET ORAL DAILY
Qty: 90 TABLET | Refills: 3 | Status: SHIPPED | OUTPATIENT
Start: 2018-01-30

## 2018-01-30 RX ORDER — TRAMADOL HYDROCHLORIDE 50 MG/1
50-100 TABLET ORAL 2 TIMES DAILY PRN
Qty: 40 TABLET | Refills: 0 | Status: SHIPPED | OUTPATIENT
Start: 2018-01-30 | End: 2018-02-19

## 2018-02-05 ENCOUNTER — TELEPHONE (OUTPATIENT)
Dept: INTERNAL MEDICINE | Facility: CLINIC | Age: 78
End: 2018-02-05

## 2018-02-05 ENCOUNTER — CARE COORDINATION (OUTPATIENT)
Dept: GERIATRIC MEDICINE | Facility: CLINIC | Age: 78
End: 2018-02-05

## 2018-02-05 DIAGNOSIS — R63.4 LOSS OF WEIGHT: Primary | ICD-10-CM

## 2018-02-05 NOTE — PROGRESS NOTES
Rec'd tele call from client stating that she has had a weight loss (EPIC reviewed). Client states that at her last PCP appt she reviewed with PCP. Client states that she would like to cont MOW and her daughter offered to pay for an additional meal that is offered at the AL facility. Explained that this would be appropriate, explained that the AL cannot bill for waiver services for members not living in their facility. Client states that she has been walking the hallways more often. Client states that she is interested in learning more about foods that have more protein and calories. CM shared [employer] 2018 additional benefits information on Nutritional counseling. Client states that she may contact her PCP to review.  Client inquired if she should receive a new [employer] card, explained that CM is unsure and will f/u with [employer].  Yeni Savage RN, BC  Supervisor [facility]   [phone] (Fax)

## 2018-02-05 NOTE — TELEPHONE ENCOUNTER
Patient called with concerns about weight loss.  She had spoken with her  Yeni Barrera ([phone]) about seeing someone for nutritional counseling so that she can take things that will help her gain weight or keep weight on a little better.  New this year her insurance will cover nutritionist or dietician as long as the following CPT codes are used: 05032 or 88018.  Pt would like to pursue this.  CAROL Flores R.N.

## 2018-02-09 ENCOUNTER — CARE COORDINATION (OUTPATIENT)
Dept: GERIATRIC MEDICINE | Facility: CLINIC | Age: 78
End: 2018-02-09

## 2018-02-09 NOTE — PROGRESS NOTES
2/8/18 Rec'd notification from Sofia Cruz that client's MA will end at the end of 2/28/18 -not receiving documents for unearned income.   2/8/18 Call placed to client to inform her of the above, request a call back.  2/9/18 Spoke with client who states that she rec'd the same letter. Client states that the apt manager Rneetta called and left a message for Sofia to inquire on missing documentation.  Client states that she rec'd the codes to use for her dietician appt. Explained that if the clinic has questions, to provide CM name and contact information.  Yeni Savage RN, BC  Supervisor [facility]   [phone] (Fax)

## 2018-02-16 ENCOUNTER — TELEPHONE (OUTPATIENT)
Dept: INTERNAL MEDICINE | Facility: CLINIC | Age: 78
End: 2018-02-16

## 2018-02-16 NOTE — TELEPHONE ENCOUNTER
Windy, RN with [facility] ([phone]) Patient continues to have a lot of low back pain and continues to lose weight. Windy does not believe pt has seen dietician yet.  Weight was 130 lb in the fall and now 121 lb (total of 30# loss in 2 years)  Patient feels she is eating well.  VSS.  Weaning off her Prednisone and is doing well.  Needs to discuss anxiety medications and has been instructed to make appt with PCP.      Needs orders for continued SN visits once a week, HHA to assist with bathing twice a week.  Approved this per RN protocol.

## 2018-02-19 DIAGNOSIS — M25.50 MULTIPLE JOINT PAIN: ICD-10-CM

## 2018-02-19 RX ORDER — TRAMADOL HYDROCHLORIDE 50 MG/1
50-100 TABLET ORAL 2 TIMES DAILY PRN
Qty: 40 TABLET | Refills: 0 | Status: SHIPPED | OUTPATIENT
Start: 2018-02-19 | End: 2018-03-13

## 2018-02-19 NOTE — TELEPHONE ENCOUNTER
tramadol      Last Written Prescription Date:  1/30/18  Last Fill Quantity: 40,   # refills: 0  Last Office Visit: 1/2/18  Future Office visit:       Routing refill request to provider for review/approval because:  Drug not on the [facility], P or [facility] refill protocol or controlled substance

## 2018-02-22 ENCOUNTER — TELEPHONE (OUTPATIENT)
Dept: INTERNAL MEDICINE | Facility: CLINIC | Age: 78
End: 2018-02-22

## 2018-02-24 DIAGNOSIS — Z53.9 DIAGNOSIS NOT YET DEFINED: Primary | ICD-10-CM

## 2018-02-24 PROCEDURE — G0179 MD RECERTIFICATION HHA PT: HCPCS | Performed by: INTERNAL MEDICINE

## 2018-03-06 NOTE — PROGRESS NOTES
Rec'd notification from [facility] that MA case will be reopened as of 3/1/2018  Yeni Savage RN, BC  Supervisor [facility]   [phone] (Fax)

## 2018-03-13 DIAGNOSIS — M25.50 MULTIPLE JOINT PAIN: ICD-10-CM

## 2018-03-15 DIAGNOSIS — M25.50 MULTIPLE JOINT PAIN: ICD-10-CM

## 2018-03-15 RX ORDER — TRAMADOL HYDROCHLORIDE 50 MG/1
TABLET ORAL
Qty: 40 TABLET | Refills: 0 | Status: SHIPPED | OUTPATIENT
Start: 2018-03-15 | End: 2018-03-29

## 2018-03-15 RX ORDER — TRAMADOL HYDROCHLORIDE 50 MG/1
TABLET ORAL
Qty: 40 TABLET | Refills: 0 | OUTPATIENT
Start: 2018-03-15

## 2018-03-21 ENCOUNTER — CARE COORDINATION (OUTPATIENT)
Dept: GERIATRIC MEDICINE | Facility: CLINIC | Age: 78
End: 2018-03-21

## 2018-03-21 ENCOUNTER — OFFICE VISIT (OUTPATIENT)
Dept: INTERNAL MEDICINE | Facility: CLINIC | Age: 78
End: 2018-03-21
Payer: COMMERCIAL

## 2018-03-21 VITALS
HEART RATE: 102 BPM | OXYGEN SATURATION: 98 % | TEMPERATURE: 98.4 F | WEIGHT: 122 LBS | HEIGHT: 61 IN | DIASTOLIC BLOOD PRESSURE: 78 MMHG | BODY MASS INDEX: 23.03 KG/M2 | SYSTOLIC BLOOD PRESSURE: 112 MMHG

## 2018-03-21 DIAGNOSIS — J44.9 COPD, MODERATE (H): Chronic | ICD-10-CM

## 2018-03-21 DIAGNOSIS — H61.23 BILATERAL IMPACTED CERUMEN: Primary | ICD-10-CM

## 2018-03-21 DIAGNOSIS — J44.1 COPD EXACERBATION (H): ICD-10-CM

## 2018-03-21 PROCEDURE — 69209 REMOVE IMPACTED EAR WAX UNI: CPT | Mod: 50 | Performed by: NURSE PRACTITIONER

## 2018-03-21 NOTE — PROGRESS NOTES
.  SUBJECTIVE:   Ana Luisa Lee is a 78 year old female who presents to clinic today for the following health issues:  Chief Complaint   Patient presents with     Plugged Ears     pt states no pain just cannot hear. has used debrox only this a.m.             Problem list and histories reviewed & adjusted, as indicated.  Additional history: as documented    Patient Active Problem List   Diagnosis     Osteoporosis     Hyperlipidemia with target LDL less than 130     Asthma, persistent     Pes planus     Advance Care Planning     Skin cyst     Pain in joint, shoulder region     PVC (premature ventricular contraction)     Arthritis of hand     Pulmonary nodule seen on imaging study     Health Care Home     Hypercalcemia     Other fatigue     Thrush of mouth and esophagus (H)     Steroid-dependent chronic obstructive pulmonary disease (H)     SVT (supraventricular tachycardia) (H)     Hypothyroidism, unspecified type     HTN, goal <  140/90     Pernicious anemia     COPD exacerbation (H)     COPD (chronic obstructive pulmonary disease) (H)     ALEKSANDRA (acute kidney injury) (H)     Uncontrolled hypertension     Paroxysmal supraventricular tachycardia (H)     PAC (premature atrial contraction)     Syncope and collapse     Malignant hypertension     Weakness     Nonrheumatic aortic valve stenosis     Hip pain     Closed fracture of sacrum, unspecified portion of sacrum, initial encounter (H)     COPD, severe (H)     Past Surgical History:   Procedure Laterality Date     BUNIONECTOMY LAPIDUS WITH TARSAL METATARSAL (TMT) FUSION  1990's     EP STUDY, MODIFIED  7/2012    SVT not induced, PVC's     GYN SURGERY  1980     HYSTERECTOMY TOTAL ABDOMINAL       HYSTERECTOMY, PAP NO LONGER INDICATED       TONSILLECTOMY         Social History   Substance Use Topics     Smoking status: Former Smoker     Packs/day: 1.00     Years: 15.00     Quit date: 1/1/1980     Smokeless tobacco: Never Used     Alcohol use No      Comment: Occasional  "drink     Family History   Problem Relation Age of Onset     CEREBROVASCULAR DISEASE Mother      Hypertension Mother      Family History Negative Father      Unknown/Adopted Maternal Grandmother      Unknown/Adopted Maternal Grandfather      Unknown/Adopted Paternal Grandmother      Unknown/Adopted Paternal Grandfather      Family History Negative Brother      Family History Negative Sister      Family History Negative Son      Family History Negative Daughter      Asthma No family hx of      C.A.D. No family hx of      DIABETES No family hx of      CANCER No family hx of            Reviewed and updated as needed this visit by clinical staff  Tobacco  Allergies  Meds  Med Hx  Surg Hx  Fam Hx  Soc Hx      Reviewed and updated as needed this visit by Provider         ROS:  Constitutional, HEENT, cardiovascular, pulmonary, gi and gu systems are negative, except as otherwise noted.    OBJECTIVE:     /78 (BP Location: Left arm, Patient Position: Sitting, Cuff Size: Adult Regular)  Pulse 102  Temp 98.4  F (36.9  C) (Oral)  Ht 5' 1\" (1.549 m)  Wt 122 lb (55.3 kg)  SpO2 98%  BMI 23.05 kg/m2  Body mass index is 23.05 kg/(m^2).  GENERAL: alert and no distress  HENT: ear canals and TM's normal with flaky wax but open to TM after ear wash   Able to hear after ear wash   RESP: lungs clear   CV: regular rate and rhythm  PSYCH: mentation appears normal, affect normal/bright    Diagnostic Test Results:  none     ASSESSMENT/PLAN:             1. Bilateral impacted cerumen  Removed   Discussed debrox        Patient Instructions   [place] transportation   [phone]       Debrox to ears 1-2 times per week       IVÁN Ramirez [facility]    "

## 2018-03-21 NOTE — MR AVS SNAPSHOT
After Visit Summary   3/21/2018    Ana Luisa Lee    MRN: [id number]           Patient Information     Date Of Birth          1940        Visit Information        Provider Department      3/21/2018 2:00 PM Nelia Macdonald APRN CNP [facility]        Care Instructions    [place] transportation   [phone]       Debrox to ears 1-2 times per week           Follow-ups after your visit        Your next 10 appointments already scheduled     Mar 29, 2018  2:20 PM CDT   Office Visit with Jenny Hamilton MD   [facility] ([facility])    [address]  [facility] [id number] [phone]           Bring a current list of meds and any records pertaining to this visit. For Physicals, please bring immunization records and any forms needing to be filled out. Please arrive 10 minutes early to complete paperwork.              Who to contact     If you have questions or need follow up information about today's clinic visit or your schedule please contact [facility] directly at [phone].  Normal or non-critical lab and imaging results will be communicated to you by [employer], letter or phone within 4 business days after the clinic has received the results. If you do not hear from us within 7 days, please contact the clinic through ECOt or phone. If you have a critical or abnormal lab result, we will notify you by phone as soon as possible.  Submit refill requests through [employer] or call your pharmacy and they will forward the refill request to us. Please allow 3 business days for your refill to be completed.          Additional Information About Your Visit        [employer] Information     [employer] gives you secure access to your electronic health record. If you see a primary care provider, you can also send messages to your care team and make appointments. If you have questions, please call your primary  "care clinic.  If you do not have a primary care provider, please call [phone] and they will assist you.        Care EveryWhere ID     This is your Care EveryWhere ID. This could be used by other organizations to access your [place] medical records  [id number]        Your Vitals Were     Pulse Temperature Height Pulse Oximetry BMI (Body Mass Index)       102 98.4  F (36.9  C) (Oral) 5' 1\" (1.549 m) 98% 23.05 kg/m2        Blood Pressure from Last 3 Encounters:   03/21/18 112/78   01/02/18 104/70   11/27/17 (!) 88/52    Weight from Last 3 Encounters:   03/21/18 122 lb (55.3 kg)   01/02/18 123 lb 4.8 oz (55.9 kg)   11/27/17 123 lb 11.2 oz (56.1 kg)              Today, you had the following     No orders found for display         Today's Medication Changes          These changes are accurate as of 3/21/18  2:50 PM.  If you have any questions, ask your nurse or doctor.               These medicines have changed or have updated prescriptions.        Dose/Directions    ferrous sulfate 325 (65 FE) MG tablet   Commonly known as:  IRON   This may have changed:  when to take this   Used for:  Pulmonary emphysema, unspecified emphysema type (H)        Dose:  1 tablet   Take 1 tablet (325 mg) by mouth daily (with breakfast)   Quantity:  100 tablet   Refills:  0                Primary Care Provider Office Phone # Fax #    Jenny Padmini Hamilton [phone]       [address] [facility] [address]        Equal Access to Services     SOBEIDA GIFFORD AH: Hadronit gallegos Somerry, waaxda luqadaha, qaybta kaalmaholly casas. So [facility] [phone].    ATENCIÓN: Si habla español, tiene a martinez disposición servicios gratuitos de asistencia lingüística. Llame al [phone].    We comply with applicable federal civil rights laws and [place] laws. We do not discriminate on the basis of race, color, national origin, age, disability, sex, sexual orientation, or " gender identity.            Thank you!     Thank you for choosing [facility]  for your care. Our goal is always to provide you with excellent care. Hearing back from our patients is one way we can continue to improve our services. Please take a few minutes to complete the written survey that you may receive in the mail after your visit with us. Thank you!             Your Updated Medication List - Protect others around you: Learn how to safely use, store and throw away your medicines at www.disposemymeds.org.          This list is accurate as of 3/21/18  2:50 PM.  Always use your most recent med list.                   Brand Name Dispense Instructions for use Diagnosis    acetaminophen 325 MG tablet    TYLENOL    100 tablet    Take 2 tablets (650 mg) by mouth every 4 hours as needed for mild pain    Sacral insufficiency fracture, initial encounter       albuterol 108 (90 BASE) MCG/ACT Inhaler    PROAIR HFA     Inhale 2 puffs into the lungs every 6 hours as needed    Asthma, persistent       ASPIRIN NOT PRESCRIBED    INTENTIONAL    0 each    1 each continuous prn. Antiplatelet medication not prescribed intentionally due to Use of NSAIDS        budesonide 180 MCG/ACT inhaler    PULMICORT FLEXHALER     Inhale 1 puff into the lungs 2 times daily    Pulmonary emphysema, unspecified emphysema type (H)       calcium 600 MG tablet     60 tablet    Take 1 tablet (600 mg) by mouth daily    Pulmonary emphysema, unspecified emphysema type (H)       cholecalciferol 1000 UNIT tablet    vitamin D3    30 tablet    Take 1 tablet (1,000 Units) by mouth daily    COPD, moderate (H)       cyanocobalamin 1000 MCG Tbcr    B-12 TR    100 tablet    Take 1 tablet by mouth daily    Pernicious anemia       ferrous sulfate 325 (65 FE) MG tablet    IRON    100 tablet    Take 1 tablet (325 mg) by mouth daily (with breakfast)    Pulmonary emphysema, unspecified emphysema type (H)       gemfibrozil 600 MG tablet    LOPID    90  tablet    Take 1 tablet (600 mg) by mouth daily    Hyperlipidemia with target LDL less than 130       hydrOXYzine 25 MG tablet    ATARAX    40 tablet    Take 1 tablet (25 mg) by mouth 3 times daily as needed for other (pain)    Sacral insufficiency fracture, initial encounter       INCRUSE ELLIPTA 62.5 MCG/INH oral inhaler   Generic drug:  umeclidinium      Inhale 1 puff into the lungs daily        LANsoprazole 30 MG CR capsule    PREVACID    90 capsule    Take 1 capsule (30 mg) by mouth daily    Gastroesophageal reflux disease without esophagitis       levalbuterol 1.25 MG/3ML neb solution    XOPENEX    1584 mL    Take 3 mLs (1.25 mg) by nebulization every 4 hours as needed for shortness of breath / dyspnea or wheezing    COPD, moderate (H), COPD exacerbation (H)       levothyroxine 25 MCG tablet    SYNTHROID/LEVOTHROID    90 tablet    Take 1 tablet (25 mcg) by mouth daily    Hypothyroidism due to acquired atrophy of thyroid       montelukast 10 MG tablet    SINGULAIR    30 tablet    Take 1 tablet (10 mg) by mouth At Bedtime    Pulmonary emphysema, unspecified emphysema type (H)       MULTIVITAMIN PO      Take 1 tablet by mouth daily        omeprazole 40 MG capsule    priLOSEC    90 capsule    Take 1 capsule (40 mg) by mouth daily Take 30-60 minutes before a meal.    Gastroesophageal reflux disease, esophagitis presence not specified       polyethylene glycol Packet    MIRALAX/GLYCOLAX     Take 17 g by mouth daily as needed for constipation        predniSONE 5 MG tablet    DELTASONE     Take 3 tablets (15 mg) by mouth See Admin Instructions 11/17/17 started prednisone taper.  20 mg daily for 1 week then taper as directed        prochlorperazine 5 MG tablet    COMPAZINE    120 tablet    Take 1 tablet (5 mg) by mouth every 6 hours as needed for nausea or vomiting    Nausea       roflumilast 500 MCG Tabs tablet    DALIRESP     Take 500 mcg by mouth daily        salmeterol 50 MCG/DOSE diskus inhaler    SEREVENT      Inhale 1 puff into the lungs 2 times daily    Pulmonary emphysema, unspecified emphysema type (H)       traMADol 50 MG tablet    ULTRAM    40 tablet    TAKE 1 TO 2 TABLETS BY MOUTH TWICE DAILY AS NEEDED FOR PAIN. MAX 2 TABLETS PER DAY    Multiple joint pain

## 2018-03-21 NOTE — NURSING NOTE
"Chief Complaint   Patient presents with     Plugged Ears     pt states no pain just cannot hear. has used debrox only this a.m.       Initial /78 (BP Location: Left arm, Patient Position: Sitting, Cuff Size: Adult Regular)  Pulse 102  Temp 98.4  F (36.9  C) (Oral)  Ht 5' 1\" (1.549 m)  Wt 122 lb (55.3 kg)  SpO2 98%  BMI 23.05 kg/m2 Estimated body mass index is 23.05 kg/(m^2) as calculated from the following:    Height as of this encounter: 5' 1\" (1.549 m).    Weight as of this encounter: 122 lb (55.3 kg).  Medication Reconciliation: complete    "

## 2018-03-21 NOTE — PROGRESS NOTES
[facility] for the below appt:  Appt Date & Time: 3/21/18 @ 2:00pm  Clinic Name & Address:  [facility]  Transportation Provider: [employer]   time:  1:15pm - 1:45pm    Notified member of  time.    Carmenza Griffin  Case Management Specialist  [facility]  [phone]

## 2018-03-23 RX ORDER — LEVALBUTEROL INHALATION SOLUTION 1.25 MG/3ML
1 SOLUTION RESPIRATORY (INHALATION) EVERY 4 HOURS PRN
Qty: 1584 ML | Refills: 1 | Status: SHIPPED | OUTPATIENT
Start: 2018-03-23 | End: 2018-06-27 | Stop reason: DRUGHIGH

## 2018-03-26 DIAGNOSIS — E78.5 HYPERLIPIDEMIA WITH TARGET LDL LESS THAN 130: ICD-10-CM

## 2018-03-27 ENCOUNTER — PATIENT OUTREACH (OUTPATIENT)
Dept: GERIATRIC MEDICINE | Facility: CLINIC | Age: 78
End: 2018-03-27

## 2018-03-27 NOTE — PROGRESS NOTES
3/26/18 Rec'd tele call from client stating that her current homemaker has taken another job and cannot get to her home until 4 PM. Client states that her preference is to have her homemaker come in the morning. Client inquired if she can receive a new homemaker. Client states that her main concern is getting her laundry done.  Explained that CM can make referrals, but recent experience is that it can take up to a couple months or longer to obtain a homemaker. Client did reach out to the agency who stated that they do not have any additional staffing at this time.   Client states that she will cont to keep current homemaker but would this CM to place referrals for new agency, shane day is fine, morning between 10-33 but not on Tuesday.  Yeni Savage RN, BC  Supervisor [facility]   [phone] (Fax)

## 2018-03-28 NOTE — TELEPHONE ENCOUNTER
"Pt calls to check on refill request. Pt would like to pick this up at [facility] after her appt tomorrow with Dr. Tierney.     Requested Prescriptions   Pending Prescriptions Disp Refills     gemfibrozil (LOPID) 600 MG tablet  Last Written Prescription Date:  12/27/17  Last Fill Quantity: 90,  # refills: 0   Last office visit: 3/21/2018 with prescribing provider   Future Office Visit:   Next 5 appointments (look out 90 days)     Mar 29, 2018  2:20 PM CDT   [facility]    [address]  [facility] [id number] [phone]                 90 tablet 0     Sig: Take 1 tablet (600 mg) by mouth daily    Fibrates Failed    3/26/2018  4:06 PM       Failed - Lipid panel on file in past 12 months    Recent Labs   Lab Test  06/01/15   1026   CHOL  154   TRIG  144   HDL  48*   LDL  77   VLDL  29   CHOLHDLRATIO  3.2              Passed - No abnormal creatine kinase in past 12 months    Recent Labs   Lab Test  12/28/17   1000   CKT  82               Passed - Recent (12 mo) or future (30 days) visit within the authorizing provider's specialty    Patient had office visit in the last 12 months or has a visit in the next 30 days with authorizing provider or within the authorizing provider's specialty.  See \"Patient Info\" tab in inbasket, or \"Choose Columns\" in Meds & Orders section of the refill encounter.           Passed - Patient is age 18 or older       Passed - No active pregnancy on record       Passed - No positive pregnancy test in past 12 months     Routing refill request to provider for review/approval because:  Labs not current:  Lipid has not been drawn since 2015           "

## 2018-03-28 NOTE — PROGRESS NOTES
Voice message left with [facility] to report that client is requesting a new homemaker,. Explained that client's current homemaker is not able to come until later in the day, client's preference is morning.  Secure e-mail sent to [facility] to inquire on homemaking availability.  CM to follow.  Yeni Savage RN, BC  Supervisor [facility]   [phone] (Fax)

## 2018-03-29 ENCOUNTER — OFFICE VISIT (OUTPATIENT)
Dept: INTERNAL MEDICINE | Facility: CLINIC | Age: 78
End: 2018-03-29
Payer: COMMERCIAL

## 2018-03-29 VITALS
OXYGEN SATURATION: 97 % | RESPIRATION RATE: 16 BRPM | DIASTOLIC BLOOD PRESSURE: 60 MMHG | HEART RATE: 100 BPM | TEMPERATURE: 97.6 F | HEIGHT: 61 IN | SYSTOLIC BLOOD PRESSURE: 110 MMHG | WEIGHT: 124.7 LBS | BODY MASS INDEX: 23.54 KG/M2

## 2018-03-29 DIAGNOSIS — J44.9 CHRONIC OBSTRUCTIVE PULMONARY DISEASE, UNSPECIFIED COPD TYPE (H): ICD-10-CM

## 2018-03-29 DIAGNOSIS — R35.0 URINARY FREQUENCY: ICD-10-CM

## 2018-03-29 DIAGNOSIS — M25.50 MULTIPLE JOINT PAIN: Primary | ICD-10-CM

## 2018-03-29 PROCEDURE — 99214 OFFICE O/P EST MOD 30 MIN: CPT | Performed by: INTERNAL MEDICINE

## 2018-03-29 RX ORDER — GEMFIBROZIL 600 MG/1
600 TABLET, FILM COATED ORAL DAILY
Qty: 90 TABLET | Refills: 0 | Status: SHIPPED | OUTPATIENT
Start: 2018-03-29 | End: 2018-10-13

## 2018-03-29 RX ORDER — TRAMADOL HYDROCHLORIDE 50 MG/1
50 TABLET ORAL EVERY 6 HOURS PRN
Qty: 100 TABLET | Refills: 0 | Status: ON HOLD | OUTPATIENT
Start: 2018-03-29 | End: 2018-04-13

## 2018-03-29 ASSESSMENT — ANXIETY QUESTIONNAIRES
3. WORRYING TOO MUCH ABOUT DIFFERENT THINGS: SEVERAL DAYS
GAD7 TOTAL SCORE: 2
6. BECOMING EASILY ANNOYED OR IRRITABLE: NOT AT ALL
7. FEELING AFRAID AS IF SOMETHING AWFUL MIGHT HAPPEN: SEVERAL DAYS
5. BEING SO RESTLESS THAT IT IS HARD TO SIT STILL: NOT AT ALL
2. NOT BEING ABLE TO STOP OR CONTROL WORRYING: NOT AT ALL
1. FEELING NERVOUS, ANXIOUS, OR ON EDGE: NOT AT ALL
IF YOU CHECKED OFF ANY PROBLEMS ON THIS QUESTIONNAIRE, HOW DIFFICULT HAVE THESE PROBLEMS MADE IT FOR YOU TO DO YOUR WORK, TAKE CARE OF THINGS AT HOME, OR GET ALONG WITH OTHER PEOPLE: NOT DIFFICULT AT ALL

## 2018-03-29 ASSESSMENT — PATIENT HEALTH QUESTIONNAIRE - PHQ9: 5. POOR APPETITE OR OVEREATING: NOT AT ALL

## 2018-03-29 NOTE — PROGRESS NOTES
Communication History     User: Cassy Savage RN Date/time: 3/29/2018  8:57 AM    Context:  Outcome:     Phone number: [phone] Phone Type:     Comm. type: Telephone Call type: Incoming    Contact: [facility]  Relation to patient: Other     Rec'd tele call from [facility] that they have hired 2 new homemakers and will reach out to client.  Yeni Savage RN, BC  Supervisor [facility]   [phone] (Fax)

## 2018-03-29 NOTE — MR AVS SNAPSHOT
After Visit Summary   3/29/2018    Ana Luisa Lee    MRN: [id number]           Patient Information     Date Of Birth          1940        Visit Information        Provider Department      3/29/2018 2:20 PM Jenny Hamilton MD [facility]        Today's Diagnoses     Urinary frequency    -  1    Multiple joint pain          Care Instructions    Plan:  1. Ortho referral for possible cortisone shots  2. Increase Tramadol to 3 times a day. 4 times a day on the worst days  3. Urine test today   4. Continue the other meds, same doses for now.  5. follow up in a month          Follow-ups after your visit        Additional Services     ORTHO  REFERRAL       [facility] is referring you to the Orthopedic  Services at [facility].       The  Representative will assist you in the coordination of your Orthopedic and Musculoskeletal Care as prescribed by your physician.    The  Representative will call you within 1 business day to help schedule your appointment, or you may contact the  Representative at:    All areas ~ [phone]     Type of Referral : Surgical / Specialist       Timeframe requested: Routine    Coverage of these services is subject to the terms and limitations of your health insurance plan.  Please call member services at your health plan with any benefit or coverage questions.      If X-rays, CT or MRI's have been performed, please contact the facility where they were done to arrange for , prior to your scheduled appointment.  Please bring this referral request to your appointment and present it to your specialist.                  Who to contact     If you have questions or need follow up information about today's clinic visit or your schedule please contact [facility] directly at [phone].  Normal or non-critical lab and imaging results will  "be communicated to you by [employer], letter or phone within 4 business days after the clinic has received the results. If you do not hear from us within 7 days, please contact the clinic through [employer] or phone. If you have a critical or abnormal lab result, we will notify you by phone as soon as possible.  Submit refill requests through [employer] or call your pharmacy and they will forward the refill request to us. Please allow 3 business days for your refill to be completed.          Additional Information About Your Visit        [employer] Information     [employer] gives you secure access to your electronic health record. If you see a primary care provider, you can also send messages to your care team and make appointments. If you have questions, please call your primary care clinic.  If you do not have a primary care provider, please call [phone] and they will assist you.        Care EveryWhere ID     This is your Care EveryWhere ID. This could be used by other organizations to access your [place] medical records  [id number]        Your Vitals Were     Pulse Temperature Respirations Height Pulse Oximetry Breastfeeding?    100 97.6  F (36.4  C) (Oral) 16 5' 1\" (1.549 m) 97% No    BMI (Body Mass Index)                   23.56 kg/m2            Blood Pressure from Last 3 Encounters:   03/29/18 110/60   03/21/18 112/78   01/02/18 104/70    Weight from Last 3 Encounters:   03/29/18 124 lb 11.2 oz (56.6 kg)   03/21/18 122 lb (55.3 kg)   01/02/18 123 lb 4.8 oz (55.9 kg)              We Performed the Following     ORTHO  REFERRAL     UA with Microscopic reflex to Culture          Today's Medication Changes          These changes are accurate as of 3/29/18  2:53 PM.  If you have any questions, ask your nurse or doctor.               These medicines have changed or have updated prescriptions.        Dose/Directions    ferrous sulfate 325 (65 FE) MG tablet   Commonly known as:  IRON   This may have changed:  when " to take this   Used for:  Pulmonary emphysema, unspecified emphysema type (H)        Dose:  1 tablet   Take 1 tablet (325 mg) by mouth daily (with breakfast)   Quantity:  100 tablet   Refills:  0       traMADol 50 MG tablet   Commonly known as:  ULTRAM   This may have changed:  See the new instructions.   Used for:  Multiple joint pain   Changed by:  Jenny Hamilton MD        Dose:  50 mg   Take 1 tablet (50 mg) by mouth every 6 hours as needed for severe pain   Quantity:  100 tablet   Refills:  0            Where to get your medicines      Some of these will need a paper prescription and others can be bought over the counter.  Ask your nurse if you have questions.     Bring a paper prescription for each of these medications     traMADol 50 MG tablet                Primary Care Provider Office Phone # Fax #    Jenny Hamilton [phone]       [address] [facility] [address]        Equal Access to Services     [facility]: Hadii aad ku hadasho Soomaali, waaxda luqadaha, qaybta kaalmada adeegyada, waxay mychalin haykoffin lucero garcia . So [facility] [phone].    ATENCIÓN: Si habla español, tiene a martinez disposición servicios gratuitos de asistencia lingüística. [facility] [phone].    We comply with applicable federal civil rights laws and [place] laws. We do not discriminate on the basis of race, color, national origin, age, disability, sex, sexual orientation, or gender identity.            Thank you!     Thank you for choosing [facility]  for your care. Our goal is always to provide you with excellent care. Hearing back from our patients is one way we can continue to improve our services. Please take a few minutes to complete the written survey that you may receive in the mail after your visit with us. Thank you!             Your Updated Medication List - Protect others around you: Learn how to safely use, store and throw away your  medicines at www.disposemymeds.org.          This list is accurate as of 3/29/18  2:53 PM.  Always use your most recent med list.                   Brand Name Dispense Instructions for use Diagnosis    acetaminophen 325 MG tablet    TYLENOL    100 tablet    Take 2 tablets (650 mg) by mouth every 4 hours as needed for mild pain    Sacral insufficiency fracture, initial encounter       albuterol 108 (90 BASE) MCG/ACT Inhaler    PROAIR HFA     Inhale 2 puffs into the lungs every 6 hours as needed    Asthma, persistent       ASPIRIN NOT PRESCRIBED    INTENTIONAL    0 each    1 each continuous prn. Antiplatelet medication not prescribed intentionally due to Use of NSAIDS        budesonide 180 MCG/ACT inhaler    PULMICORT FLEXHALER     Inhale 1 puff into the lungs 2 times daily    Pulmonary emphysema, unspecified emphysema type (H)       calcium 600 MG tablet     60 tablet    Take 1 tablet (600 mg) by mouth daily    Pulmonary emphysema, unspecified emphysema type (H)       cholecalciferol 1000 UNIT tablet    vitamin D3    30 tablet    Take 1 tablet (1,000 Units) by mouth daily    COPD, moderate (H)       cyanocobalamin 1000 MCG Tbcr    B-12 TR    100 tablet    Take 1 tablet by mouth daily    Pernicious anemia       ferrous sulfate 325 (65 FE) MG tablet    IRON    100 tablet    Take 1 tablet (325 mg) by mouth daily (with breakfast)    Pulmonary emphysema, unspecified emphysema type (H)       gemfibrozil 600 MG tablet    LOPID    90 tablet    Take 1 tablet (600 mg) by mouth daily    Hyperlipidemia with target LDL less than 130       hydrOXYzine 25 MG tablet    ATARAX    40 tablet    Take 1 tablet (25 mg) by mouth 3 times daily as needed for other (pain)    Sacral insufficiency fracture, initial encounter       INCRUSE ELLIPTA 62.5 MCG/INH oral inhaler   Generic drug:  umeclidinium      Inhale 1 puff into the lungs daily        LANsoprazole 30 MG CR capsule    PREVACID    90 capsule    Take 1 capsule (30 mg) by mouth daily     Gastroesophageal reflux disease without esophagitis       levalbuterol 1.25 MG/3ML neb solution    XOPENEX    1584 mL    Take 3 mLs (1.25 mg) by nebulization every 4 hours as needed for shortness of breath / dyspnea or wheezing    COPD, moderate (H), COPD exacerbation (H)       levothyroxine 25 MCG tablet    SYNTHROID/LEVOTHROID    90 tablet    Take 1 tablet (25 mcg) by mouth daily    Hypothyroidism due to acquired atrophy of thyroid       montelukast 10 MG tablet    SINGULAIR    30 tablet    Take 1 tablet (10 mg) by mouth At Bedtime    Pulmonary emphysema, unspecified emphysema type (H)       MULTIVITAMIN PO      Take 1 tablet by mouth daily        omeprazole 40 MG capsule    priLOSEC    90 capsule    Take 1 capsule (40 mg) by mouth daily Take 30-60 minutes before a meal.    Gastroesophageal reflux disease, esophagitis presence not specified       polyethylene glycol Packet    MIRALAX/GLYCOLAX     Take 17 g by mouth daily as needed for constipation        predniSONE 5 MG tablet    DELTASONE     Take 3 tablets (15 mg) by mouth See Admin Instructions 11/17/17 started prednisone taper.  20 mg daily for 1 week then taper as directed        prochlorperazine 5 MG tablet    COMPAZINE    120 tablet    Take 1 tablet (5 mg) by mouth every 6 hours as needed for nausea or vomiting    Nausea       roflumilast 500 MCG Tabs tablet    DALIRESP     Take 500 mcg by mouth daily        salmeterol 50 MCG/DOSE diskus inhaler    SEREVENT     Inhale 1 puff into the lungs 2 times daily    Pulmonary emphysema, unspecified emphysema type (H)       traMADol 50 MG tablet    ULTRAM    100 tablet    Take 1 tablet (50 mg) by mouth every 6 hours as needed for severe pain    Multiple joint pain

## 2018-03-29 NOTE — PROGRESS NOTES
Call placed to client to report information below on [place] [employer]/homemaking.  Client states that she received a letter from Social Security 2 weeks ago stating that they were no longer going to cover her Medicare. Client states that she left a vm with Sofia, financial worker.  Explained that she is active with [facility], which manages Medicare and [place] benefits.  Explained that if she receives another letter in April, to contact ROSARIO or Sofia. Client stated understanding.  Yeni Savage RN, BC  Supervisor [facility]   [phone] (Fax)

## 2018-03-29 NOTE — PROGRESS NOTES
Dr Tierney's note      Patient's instructions / PLAN:                                                        Plan:  1. Ortho referral for possible cortisone shots  2. Increase Tramadol to 3 times a day. 4 times a day on the worst days  3. Urine test today   4. Continue the other meds, same doses for now.  5. follow up in a month        ASSESSMENT & PLAN:                                                      (M25.50) Multiple joint pain  (primary encounter diagnosis)  Comment:   Plan: traMADol (ULTRAM) 50 MG tablet, ORTHO         REFERRAL            (R35.0) Urinary frequency  Comment:   Plan: UA reflex to Microscopic and Culture, CANCELED:        UA with Microscopic reflex to Culture            (J44.9) Chronic obstructive pulmonary disease, unspecified COPD type (H)  Comment: stable   Plan: Continue same meds, same doses for now        Chief complaint:                                                      Joint pain     SUBJECTIVE:   Ana Luisa Lee is a 78 year old female who presents to clinic today for the following health issues:    Shoulder pain   She gets tears in the eyes because of the pain in shoulders.  Tramadol and Tylenol are not enough  She wants stronger pain pills, but she prefers to avoid opioids  *Shoulder Pain-Both shoulders and left hip painful. She feels like the hip pain may be muscular, not the joint itself. Has been taking 2 tramadol and 6 tylenol each day, but this doesn't seem to be helping. She suspects her pain is increasing because she has been going down on the prednisone. Would like to discuss changin pain medication because her current regimen isn't working.   Also wondering about having cortisone injection?     Lost wt because poor appetite.  She started eating in the dining room now.         *Depression-Daughter thinks she might be depressed, was told she should talk to her PCP about medication and a test  She doesn't feel depressed.  The pain bothers her  *Urinary  "Problem-Was up 4 times last night to urinate, along with abdominal cramping. Wondering if she might have UTI?     COPD/asthma  --Has been stable for the last few weeks.  Working on decreasing the prednisone    Hypertension Follow-up      Outpatient blood pressures nurse checks it two times per week, has been on the low side, but says that is where they want it.     Low Salt Diet: low salt      Amount of exercise or physical activity: Minimal    Problems taking medications regularly: No    Medication side effects: none    Diet: low salt. Has been trying to eat more because she was told she needed to gain some weight.           Review of Systems:                                                      ROS: negative for fever, chills, cough, wheezes, chest pain, vomiting, abdominal pain, leg swelling positive for chronic shortness of breath         OBJECTIVE:             Physical exam:  Blood pressure 110/60, pulse 100, temperature 97.6  F (36.4  C), temperature source Oral, resp. rate 16, height 5' 1\" (1.549 m), weight 124 lb 11.2 oz (56.6 kg), SpO2 97 %, not currently breastfeeding.   NAD, appears comfortable  Skin: no rashes   Neck: supple, no JVD,  No thyroidmegaly. Lymph nodes nonpalpable cervical and supraclavicular.  Chest: clear to auscultation bilaterally, good respiratory effort  Heart: S1 S2, RRR, no mgr appreciated  Abdomen: soft, not tender,   Extremities: no edema,   Neurologic: A, Ox3, no focal signs appreciated shoulders:  No inflammation, no effusion.  Very decreased range of motion actively and passively because of pain    PMHx: reviewed  Past Medical History:   Diagnosis Date     Anemia Dec 2011     Arthritis of hand      Asthma, persistent     f/U dr Brock at [facility]     Asthma, persistent      Chronic intermittent steroid use     for asthma     COPD exacerbation (H) 10/25/2013     Esophageal stricture 2007    s/p dilation     Foot deformity     Left     HTN, goal below 140/90      " Hyperlipidemia      Hyperlipidemia LDL goal < 130      Hypothyroidism Dec 2011     Hypothyroidism      Left foot pain Nov 2011     Left shoulder pain Nov 2011     Medication side effects      Osteoporosis      Paroxysmal supraventricular tachycardia (H)      PVC (premature ventricular contraction) May 2012     Skin cyst Dec 2011    L thumb     SOB (shortness of breath) on exertion May 2012     SVT (supraventricular tachycardia) (H)       PSHx: reviewed  Past Surgical History:   Procedure Laterality Date     BUNIONECTOMY LAPIDUS WITH TARSAL METATARSAL (TMT) FUSION  1990's     EP STUDY, MODIFIED  7/2012    SVT not induced, PVC's     GYN SURGERY  1980     HYSTERECTOMY TOTAL ABDOMINAL       HYSTERECTOMY, PAP NO LONGER INDICATED       TONSILLECTOMY          Meds: reviewed  Current Outpatient Prescriptions   Medication Sig Dispense Refill     gemfibrozil (LOPID) 600 MG tablet Take 1 tablet (600 mg) by mouth daily 90 tablet 0     levalbuterol (XOPENEX) 1.25 MG/3ML neb solution Take 3 mLs (1.25 mg) by nebulization every 4 hours as needed for shortness of breath / dyspnea or wheezing 1584 mL 1     traMADol (ULTRAM) 50 MG tablet TAKE 1 TO 2 TABLETS BY MOUTH TWICE DAILY AS NEEDED FOR PAIN. MAX 2 TABLETS PER DAY 40 tablet 0     levothyroxine (SYNTHROID/LEVOTHROID) 25 MCG tablet Take 1 tablet (25 mcg) by mouth daily 90 tablet 3     cyanocobalamin (B-12 TR) 1000 MCG TBCR Take 1 tablet by mouth daily 100 tablet 3     predniSONE (DELTASONE) 5 MG tablet Take 3 tablets (15 mg) by mouth See Admin Instructions 11/17/17 started prednisone taper.   20 mg daily for 1 week then taper as directed       acetaminophen (TYLENOL) 325 MG tablet Take 2 tablets (650 mg) by mouth every 4 hours as needed for mild pain 100 tablet      hydrOXYzine (ATARAX) 25 MG tablet Take 1 tablet (25 mg) by mouth 3 times daily as needed for other (pain) 40 tablet 0     umeclidinium (INCRUSE ELLIPTA) 62.5 MCG/INH oral inhaler Inhale 1 puff into the lungs daily        prochlorperazine (COMPAZINE) 5 MG tablet Take 1 tablet (5 mg) by mouth every 6 hours as needed for nausea or vomiting 120 tablet 0     omeprazole (PRILOSEC) 40 MG capsule Take 1 capsule (40 mg) by mouth daily Take 30-60 minutes before a meal. 90 capsule 3     roflumilast (DALIRESP) 500 MCG TABS tablet Take 500 mcg by mouth daily        polyethylene glycol (MIRALAX/GLYCOLAX) Packet Take 17 g by mouth daily as needed for constipation       albuterol (ALBUTEROL) 108 (90 BASE) MCG/ACT Inhaler Inhale 2 puffs into the lungs every 6 hours as needed       budesonide (PULMICORT FLEXHALER) 180 MCG/ACT inhaler Inhale 1 puff into the lungs 2 times daily       montelukast (SINGULAIR) 10 MG tablet Take 1 tablet (10 mg) by mouth At Bedtime 30 tablet      salmeterol (SEREVENT) 50 MCG/DOSE diskus inhaler Inhale 1 puff into the lungs 2 times daily       ferrous sulfate (IRON) 325 (65 FE) MG tablet Take 1 tablet (325 mg) by mouth daily (with breakfast) (Patient taking differently: Take 1 tablet by mouth 2 times daily ) 100 tablet      calcium 600 MG tablet Take 1 tablet (600 mg) by mouth daily 60 tablet      LANsoprazole (PREVACID) 30 MG CR capsule Take 1 capsule (30 mg) by mouth daily 90 capsule 3     cholecalciferol (VITAMIN D) 1000 UNIT tablet Take 1 tablet (1,000 Units) by mouth daily 30 tablet      Multiple Vitamins-Minerals (MULTIVITAMIN OR) Take 1 tablet by mouth daily       ASPIRIN NOT PRESCRIBED, INTENTIONAL, 1 each continuous prn. Antiplatelet medication not prescribed intentionally due to Use of NSAIDS 0 each 0       Soc Hx: reviewed  Fam Hx: reviewed          Jenny Tierney MD  Internal Medicine

## 2018-03-30 ASSESSMENT — PATIENT HEALTH QUESTIONNAIRE - PHQ9: SUM OF ALL RESPONSES TO PHQ QUESTIONS 1-9: 4

## 2018-03-30 ASSESSMENT — ANXIETY QUESTIONNAIRES: GAD7 TOTAL SCORE: 2

## 2018-04-05 ENCOUNTER — RADIANT APPOINTMENT (OUTPATIENT)
Dept: GENERAL RADIOLOGY | Facility: CLINIC | Age: 78
End: 2018-04-05
Attending: ORTHOPAEDIC SURGERY
Payer: COMMERCIAL

## 2018-04-05 ENCOUNTER — OFFICE VISIT (OUTPATIENT)
Dept: ORTHOPEDICS | Facility: CLINIC | Age: 78
End: 2018-04-05
Payer: COMMERCIAL

## 2018-04-05 VITALS
HEIGHT: 62 IN | BODY MASS INDEX: 22.82 KG/M2 | WEIGHT: 124 LBS | DIASTOLIC BLOOD PRESSURE: 84 MMHG | SYSTOLIC BLOOD PRESSURE: 126 MMHG

## 2018-04-05 DIAGNOSIS — G89.29 CHRONIC PAIN OF BOTH SHOULDERS: ICD-10-CM

## 2018-04-05 DIAGNOSIS — M25.512 CHRONIC PAIN OF BOTH SHOULDERS: ICD-10-CM

## 2018-04-05 DIAGNOSIS — M54.42 CHRONIC LEFT-SIDED LOW BACK PAIN WITH LEFT-SIDED SCIATICA: ICD-10-CM

## 2018-04-05 DIAGNOSIS — G89.29 CHRONIC LEFT-SIDED LOW BACK PAIN WITH LEFT-SIDED SCIATICA: ICD-10-CM

## 2018-04-05 DIAGNOSIS — M25.511 CHRONIC PAIN OF BOTH SHOULDERS: ICD-10-CM

## 2018-04-05 DIAGNOSIS — M54.42 CHRONIC LEFT-SIDED LOW BACK PAIN WITH LEFT-SIDED SCIATICA: Primary | ICD-10-CM

## 2018-04-05 DIAGNOSIS — G89.29 CHRONIC LEFT-SIDED LOW BACK PAIN WITH LEFT-SIDED SCIATICA: Primary | ICD-10-CM

## 2018-04-05 PROCEDURE — 20610 DRAIN/INJ JOINT/BURSA W/O US: CPT | Mod: 50 | Performed by: ORTHOPAEDIC SURGERY

## 2018-04-05 PROCEDURE — 72100 X-RAY EXAM L-S SPINE 2/3 VWS: CPT

## 2018-04-05 PROCEDURE — 73030 X-RAY EXAM OF SHOULDER: CPT | Mod: LT

## 2018-04-05 PROCEDURE — 99203 OFFICE O/P NEW LOW 30 MIN: CPT | Mod: 25 | Performed by: ORTHOPAEDIC SURGERY

## 2018-04-05 NOTE — PATIENT INSTRUCTIONS
1. Chronic left-sided low back pain with left-sided sciatica    2. Chronic pain of both shoulders      Steroid injection of the bilateral shoulder: subacromial space was performed today in clinic    - Would not soak in a hot tub, bath or swimming pool for 48 hours  - Ok to shower  - The lidocaine (what is giving you pain relief right now) will likely stop working in 1-2 hours.  You will then have pain again, similar to before you received the injection. The corticosteroid will not start working until approximately 1-2 weeks from now.  - Ice today and only do your normal amounts of activity    Follow up as needed. Call direct clinic number [[phone]] at any time with questions or concerns.

## 2018-04-05 NOTE — LETTER
4/5/2018         RE: Ana Luisa Lee  [id number]  [place] [address]        Dear Colleague,    Thank you for referring your patient, Ana Luisa Lee, to the [facility]. Please see a copy of my visit note below.    HISTORY OF PRESENT ILLNESS:    Ana Luisa Lee is a 78 year old female who is seen in consultation at the request of Dr. Hamilton for bilateral shoulder pain and left hip pain of about 5-6 months each area with no specific injury to any area. She reports her hip pain beginning the left low back area with pain shooting to the posterior thigh.    Present symptoms: no mechanical symptoms for either location, no new bowel or bladder incontinence/saddle anesthesia/distal radicular pain/weakness, she has reduced motion of the shoulders (RT>LT), no radicular shoulder pain, she does report some grinding in the shoulder with motion.  Pain scale peak: shoulder 10/10, hip/back 6/10  Pain scale current shoulder 0/10 without motion 5-6/10 with movement, hip/back 4/10  Treatments tried to this point: prednisone (tapering per PCP), ice, tramadol, tylenol, ice, only the tramadol seems to help  Orthopedic PMH: none     Past Medical History:   Diagnosis Date     Anemia Dec 2011     Arthritis of hand      Asthma, persistent     f/U dr Brock at [facility]     Asthma, persistent      Chronic intermittent steroid use     for asthma     COPD exacerbation (H) 10/25/2013     Esophageal stricture 2007    s/p dilation     Foot deformity     Left     HTN, goal below 140/90      Hyperlipidemia      Hyperlipidemia LDL goal < 130      Hypothyroidism Dec 2011     Hypothyroidism      Left foot pain Nov 2011     Left shoulder pain Nov 2011     Medication side effects      Osteoporosis      Paroxysmal supraventricular tachycardia (H)      PVC (premature ventricular contraction) May 2012     Skin cyst Dec 2011    L thumb     SOB (shortness of breath) on exertion  May 2012     SVT (supraventricular tachycardia) (H)        Past Surgical History:   Procedure Laterality Date     BUNIONECTOMY LAPIDUS WITH TARSAL METATARSAL (TMT) FUSION  1990's     EP STUDY, MODIFIED  7/2012    SVT not induced, PVC's     GYN SURGERY  1980     HYSTERECTOMY TOTAL ABDOMINAL       HYSTERECTOMY, PAP NO LONGER INDICATED       TONSILLECTOMY         Family History   Problem Relation Age of Onset     CEREBROVASCULAR DISEASE Mother      Hypertension Mother      Family History Negative Father      Unknown/Adopted Maternal Grandmother      Unknown/Adopted Maternal Grandfather      Unknown/Adopted Paternal Grandmother      Unknown/Adopted Paternal Grandfather      Family History Negative Brother      Family History Negative Sister      Family History Negative Son      Family History Negative Daughter      Asthma No family hx of      C.A.D. No family hx of      DIABETES No family hx of      CANCER No family hx of        Social History     Social History     Marital status:      Spouse name: N/A     Number of children: N/A     Years of education: N/A     Occupational History     Not on file.     Social History Main Topics     Smoking status: Former Smoker     Packs/day: 1.00     Years: 15.00     Quit date: 1/1/1980     Smokeless tobacco: Never Used     Alcohol use No      Comment: Occasional drink     Drug use: No     Sexual activity: No     Other Topics Concern     Caffeine Concern No     1 cup of tea in the morning     Sleep Concern No     Stress Concern No     Weight Concern No     Special Diet No     Exercise No     some walking     Seat Belt Yes     Social History Narrative       Current Outpatient Prescriptions   Medication Sig Dispense Refill     gemfibrozil (LOPID) 600 MG tablet Take 1 tablet (600 mg) by mouth daily 90 tablet 0     traMADol (ULTRAM) 50 MG tablet Take 1 tablet (50 mg) by mouth every 6 hours as needed for severe pain 100 tablet 0     levalbuterol (XOPENEX) 1.25 MG/3ML neb solution  Take 3 mLs (1.25 mg) by nebulization every 4 hours as needed for shortness of breath / dyspnea or wheezing 1584 mL 1     levothyroxine (SYNTHROID/LEVOTHROID) 25 MCG tablet Take 1 tablet (25 mcg) by mouth daily 90 tablet 3     cyanocobalamin (B-12 TR) 1000 MCG TBCR Take 1 tablet by mouth daily 100 tablet 3     predniSONE (DELTASONE) 5 MG tablet Take 3 tablets (15 mg) by mouth See Admin Instructions 11/17/17 started prednisone taper.   20 mg daily for 1 week then taper as directed       acetaminophen (TYLENOL) 325 MG tablet Take 2 tablets (650 mg) by mouth every 4 hours as needed for mild pain 100 tablet      hydrOXYzine (ATARAX) 25 MG tablet Take 1 tablet (25 mg) by mouth 3 times daily as needed for other (pain) 40 tablet 0     umeclidinium (INCRUSE ELLIPTA) 62.5 MCG/INH oral inhaler Inhale 1 puff into the lungs daily       prochlorperazine (COMPAZINE) 5 MG tablet Take 1 tablet (5 mg) by mouth every 6 hours as needed for nausea or vomiting 120 tablet 0     omeprazole (PRILOSEC) 40 MG capsule Take 1 capsule (40 mg) by mouth daily Take 30-60 minutes before a meal. 90 capsule 3     roflumilast (DALIRESP) 500 MCG TABS tablet Take 500 mcg by mouth daily        polyethylene glycol (MIRALAX/GLYCOLAX) Packet Take 17 g by mouth daily as needed for constipation       albuterol (ALBUTEROL) 108 (90 BASE) MCG/ACT Inhaler Inhale 2 puffs into the lungs every 6 hours as needed       budesonide (PULMICORT FLEXHALER) 180 MCG/ACT inhaler Inhale 1 puff into the lungs 2 times daily       montelukast (SINGULAIR) 10 MG tablet Take 1 tablet (10 mg) by mouth At Bedtime 30 tablet      salmeterol (SEREVENT) 50 MCG/DOSE diskus inhaler Inhale 1 puff into the lungs 2 times daily       ferrous sulfate (IRON) 325 (65 FE) MG tablet Take 1 tablet (325 mg) by mouth daily (with breakfast) (Patient taking differently: Take 1 tablet by mouth 2 times daily ) 100 tablet      calcium 600 MG tablet Take 1 tablet (600 mg) by mouth daily 60 tablet       "LANsoprazole (PREVACID) 30 MG CR capsule Take 1 capsule (30 mg) by mouth daily 90 capsule 3     cholecalciferol (VITAMIN D) 1000 UNIT tablet Take 1 tablet (1,000 Units) by mouth daily 30 tablet      Multiple Vitamins-Minerals (MULTIVITAMIN OR) Take 1 tablet by mouth daily       ASPIRIN NOT PRESCRIBED, INTENTIONAL, 1 each continuous prn. Antiplatelet medication not prescribed intentionally due to Use of NSAIDS 0 each 0       Allergies   Allergen Reactions     Hydralazine Anxiety     Penicillin G Hives     Tolerated cephalosporines 2017     Meloxicam      dizziness       Metoprolol      ? Skin rash on the back     Norvasc [Amlodipine Besylate] Hives       REVIEW OF SYSTEMS:  CONSTITUTIONAL:  NEGATIVE for fever, chills, change in weight  INTEGUMENTARY/SKIN:  NEGATIVE for worrisome rashes, moles or lesions  EYES:  NEGATIVE for vision changes or irritation  ENT/MOUTH:  NEGATIVE for ear, mouth and throat problems  RESP:  NEGATIVE for significant cough or SOB  BREAST:  NEGATIVE for masses, tenderness or discharge  CV:  NEGATIVE for chest pain, palpitations or peripheral edema  GI:  NEGATIVE for nausea, abdominal pain, heartburn, or change in bowel habits  :  Negative   MUSCULOSKELETAL:  See HPI above  NEURO:  NEGATIVE for weakness, dizziness or paresthesias  ENDOCRINE:  NEGATIVE for temperature intolerance, skin/hair changes  HEME/ALLERGY/IMMUNE:  NEGATIVE for bleeding problems  PSYCHIATRIC:  NEGATIVE for changes in mood or affect      PHYSICAL EXAM:  /84  Ht 5' 2\" (1.575 m)  Wt 124 lb (56.2 kg)  BMI 22.68 kg/m2  Body mass index is 22.68 kg/(m^2).   GENERAL APPEARANCE: healthy, alert and no distress   SKIN: no suspicious lesions or rashes  NEURO: Normal strength and tone, mentation intact and speech normal  VASCULAR: Good pulses, and capillary refill   LYMPH: no lymphadenopathy   PSYCH:  mentation appears normal and affect normal/bright    MSK:  Examination of the neck and shoulder girdle musculature reveals no " asymmetry, or atrophy to the muscle masses.  There is no erythema, ecchymosis or edema.  There is a normal lordosis to the cervical and lumbar spine regions.  There is a normal kyphosis to the thoracic spine.  There is no clinical evidence of scoliosis. There is no tenderness to palpation or percussion.  There is no paraspinal muscle spasm present.  There is a Normal range of motion to the cervical spine. Forward flexion to 45, extension to 55, R and L lateral bending to 45, and rotation to 70, both R and L.    Strength : Bicep 5/5   C5-6       Sensation :     Deltoid 5/5   C5    Lateral Arm    Wrist extensors 5/5  C6    Thumb    Tricep  5/5   C7    Middle Finger    Finger flexors  5/5  C8    Little Finger    Interossei  5/5   T1    Medial Arm    Reflexes :  Bicep   Symmetric  C5-6    BR Symmetric  C6    Tricep Symmetric  C7    Shoulder:  Examination of the bilateral shoulder reveals a partial active ROM and partial passive ROM.    Forward Flexion : 120 degrees Pain  YES --   Abduction  :  100 degrees  YES --   Internal Rotation : lumbar sacral   YES --  Subscap Lift-off test negative  External Rotation :    0 Deg-70  90 Deg- 45  Contralateral side symmetric    There is no tenderness to palpation about the AC joint, anterior capsule or Bicep tendon.  There is mild tenderness to palpation in the subacromial space.  There is rotator cuff weakness.  Speed's an Yergason's Tests for Bicep pathology are negative. Arm adduction does not cause pain in the AC joint.  Apprehension sign is negative. Scapular winging is not present. ROM of the elbow and wrist is normal and CMS is intact to fingertips.      MSK LEFT HIP:  A&OX3, NAD  Neck supple, no lymphadenopathy  The patient ambulates without an antalgic gait.  The patient is able to get on and off the exam table without difficulty.    Examination of the spine reveals a normal lordosis to the cervical and lumbar spine, and a normal kyphosis to the thoracic spine.  There is no  clinical evidence of scoliosis. There is a normal fluid motion to the spine.  There is no erythema, ecchymosis, not edema.  There is no paraspinal muscle spasm present.  There is no tenderness to palpation, nor percussion.  There are 0/5 Cedric's signs.    Distally, into the lower extremities:  5/5 strengths of Quadriceps, EHL and Gastrocsoleus of the right and left.  Patellar tendon and achilles reflexes are symmetric.  Two point discrimination is symmetric and normal between 5-10 mm.               ASSESSMENT / PLAN: Bilateral cuff arthropathy.  I offered her corticosteroid injections at this point.  If these do not give her significant symptomatic relief, I will refer her to Dr. Garcia for consideration of arthroplasties.        Imaging Interpretation:     Procedure Note:  After risks and benefits were explained and questions answered, pt agreed to undergo a bilateral subacromial shoulder injection. Using aseptic technique and a posterior approach, the subacromial space was infiltrated with 4 mg of Dexamethasone, 40 mg of Depo Medrol, and 3 cc of 1.0% Lidocaine.  There were no complications and the patient tolerated the procedure well.        Dani Baeza MD  Department of Orthopedic Surgery          Again, thank you for allowing me to participate in the care of your patient.        Sincerely,        Russell Baeza MD

## 2018-04-05 NOTE — PROGRESS NOTES
HISTORY OF PRESENT ILLNESS:    Ana Luisa Lee is a 78 year old female who is seen in consultation at the request of Dr. Hamilton for bilateral shoulder pain and left hip pain of about 5-6 months each area with no specific injury to any area. She reports her hip pain beginning the left low back area with pain shooting to the posterior thigh.    Present symptoms: no mechanical symptoms for either location, no new bowel or bladder incontinence/saddle anesthesia/distal radicular pain/weakness, she has reduced motion of the shoulders (RT>LT), no radicular shoulder pain, she does report some grinding in the shoulder with motion.  Pain scale peak: shoulder 10/10, hip/back 6/10  Pain scale current shoulder 0/10 without motion 5-6/10 with movement, hip/back 4/10  Treatments tried to this point: prednisone (tapering per PCP), ice, tramadol, tylenol, ice, only the tramadol seems to help  Orthopedic PMH: none     Past Medical History:   Diagnosis Date     Anemia Dec 2011     Arthritis of hand      Asthma, persistent     f/U dr Brock at [facility]     Asthma, persistent      Chronic intermittent steroid use     for asthma     COPD exacerbation (H) 10/25/2013     Esophageal stricture 2007    s/p dilation     Foot deformity     Left     HTN, goal below 140/90      Hyperlipidemia      Hyperlipidemia LDL goal < 130      Hypothyroidism Dec 2011     Hypothyroidism      Left foot pain Nov 2011     Left shoulder pain Nov 2011     Medication side effects      Osteoporosis      Paroxysmal supraventricular tachycardia (H)      PVC (premature ventricular contraction) May 2012     Skin cyst Dec 2011    L thumb     SOB (shortness of breath) on exertion May 2012     SVT (supraventricular tachycardia) (H)        Past Surgical History:   Procedure Laterality Date     BUNIONECTOMY LAPIDUS WITH TARSAL METATARSAL (TMT) FUSION  1990's     EP STUDY, MODIFIED  7/2012    SVT not induced, PVC's     GYN SURGERY  1980      HYSTERECTOMY TOTAL ABDOMINAL       HYSTERECTOMY, PAP NO LONGER INDICATED       TONSILLECTOMY         Family History   Problem Relation Age of Onset     CEREBROVASCULAR DISEASE Mother      Hypertension Mother      Family History Negative Father      Unknown/Adopted Maternal Grandmother      Unknown/Adopted Maternal Grandfather      Unknown/Adopted Paternal Grandmother      Unknown/Adopted Paternal Grandfather      Family History Negative Brother      Family History Negative Sister      Family History Negative Son      Family History Negative Daughter      Asthma No family hx of      C.A.D. No family hx of      DIABETES No family hx of      CANCER No family hx of        Social History     Social History     Marital status:      Spouse name: N/A     Number of children: N/A     Years of education: N/A     Occupational History     Not on file.     Social History Main Topics     Smoking status: Former Smoker     Packs/day: 1.00     Years: 15.00     Quit date: 1/1/1980     Smokeless tobacco: Never Used     Alcohol use No      Comment: Occasional drink     Drug use: No     Sexual activity: No     Other Topics Concern     Caffeine Concern No     1 cup of tea in the morning     Sleep Concern No     Stress Concern No     Weight Concern No     Special Diet No     Exercise No     some walking     Seat Belt Yes     Social History Narrative       Current Outpatient Prescriptions   Medication Sig Dispense Refill     gemfibrozil (LOPID) 600 MG tablet Take 1 tablet (600 mg) by mouth daily 90 tablet 0     traMADol (ULTRAM) 50 MG tablet Take 1 tablet (50 mg) by mouth every 6 hours as needed for severe pain 100 tablet 0     levalbuterol (XOPENEX) 1.25 MG/3ML neb solution Take 3 mLs (1.25 mg) by nebulization every 4 hours as needed for shortness of breath / dyspnea or wheezing 1584 mL 1     levothyroxine (SYNTHROID/LEVOTHROID) 25 MCG tablet Take 1 tablet (25 mcg) by mouth daily 90 tablet 3     cyanocobalamin (B-12 TR) 1000 MCG  TBCR Take 1 tablet by mouth daily 100 tablet 3     predniSONE (DELTASONE) 5 MG tablet Take 3 tablets (15 mg) by mouth See Admin Instructions 11/17/17 started prednisone taper.   20 mg daily for 1 week then taper as directed       acetaminophen (TYLENOL) 325 MG tablet Take 2 tablets (650 mg) by mouth every 4 hours as needed for mild pain 100 tablet      hydrOXYzine (ATARAX) 25 MG tablet Take 1 tablet (25 mg) by mouth 3 times daily as needed for other (pain) 40 tablet 0     umeclidinium (INCRUSE ELLIPTA) 62.5 MCG/INH oral inhaler Inhale 1 puff into the lungs daily       prochlorperazine (COMPAZINE) 5 MG tablet Take 1 tablet (5 mg) by mouth every 6 hours as needed for nausea or vomiting 120 tablet 0     omeprazole (PRILOSEC) 40 MG capsule Take 1 capsule (40 mg) by mouth daily Take 30-60 minutes before a meal. 90 capsule 3     roflumilast (DALIRESP) 500 MCG TABS tablet Take 500 mcg by mouth daily        polyethylene glycol (MIRALAX/GLYCOLAX) Packet Take 17 g by mouth daily as needed for constipation       albuterol (ALBUTEROL) 108 (90 BASE) MCG/ACT Inhaler Inhale 2 puffs into the lungs every 6 hours as needed       budesonide (PULMICORT FLEXHALER) 180 MCG/ACT inhaler Inhale 1 puff into the lungs 2 times daily       montelukast (SINGULAIR) 10 MG tablet Take 1 tablet (10 mg) by mouth At Bedtime 30 tablet      salmeterol (SEREVENT) 50 MCG/DOSE diskus inhaler Inhale 1 puff into the lungs 2 times daily       ferrous sulfate (IRON) 325 (65 FE) MG tablet Take 1 tablet (325 mg) by mouth daily (with breakfast) (Patient taking differently: Take 1 tablet by mouth 2 times daily ) 100 tablet      calcium 600 MG tablet Take 1 tablet (600 mg) by mouth daily 60 tablet      LANsoprazole (PREVACID) 30 MG CR capsule Take 1 capsule (30 mg) by mouth daily 90 capsule 3     cholecalciferol (VITAMIN D) 1000 UNIT tablet Take 1 tablet (1,000 Units) by mouth daily 30 tablet      Multiple Vitamins-Minerals (MULTIVITAMIN OR) Take 1 tablet by mouth  "daily       ASPIRIN NOT PRESCRIBED, INTENTIONAL, 1 each continuous prn. Antiplatelet medication not prescribed intentionally due to Use of NSAIDS 0 each 0       Allergies   Allergen Reactions     Hydralazine Anxiety     Penicillin G Hives     Tolerated cephalosporines 2017     Meloxicam      dizziness       Metoprolol      ? Skin rash on the back     Norvasc [Amlodipine Besylate] Hives       REVIEW OF SYSTEMS:  CONSTITUTIONAL:  NEGATIVE for fever, chills, change in weight  INTEGUMENTARY/SKIN:  NEGATIVE for worrisome rashes, moles or lesions  EYES:  NEGATIVE for vision changes or irritation  ENT/MOUTH:  NEGATIVE for ear, mouth and throat problems  RESP:  NEGATIVE for significant cough or SOB  BREAST:  NEGATIVE for masses, tenderness or discharge  CV:  NEGATIVE for chest pain, palpitations or peripheral edema  GI:  NEGATIVE for nausea, abdominal pain, heartburn, or change in bowel habits  :  Negative   MUSCULOSKELETAL:  See HPI above  NEURO:  NEGATIVE for weakness, dizziness or paresthesias  ENDOCRINE:  NEGATIVE for temperature intolerance, skin/hair changes  HEME/ALLERGY/IMMUNE:  NEGATIVE for bleeding problems  PSYCHIATRIC:  NEGATIVE for changes in mood or affect      PHYSICAL EXAM:  /84  Ht 5' 2\" (1.575 m)  Wt 124 lb (56.2 kg)  BMI 22.68 kg/m2  Body mass index is 22.68 kg/(m^2).   GENERAL APPEARANCE: healthy, alert and no distress   SKIN: no suspicious lesions or rashes  NEURO: Normal strength and tone, mentation intact and speech normal  VASCULAR: Good pulses, and capillary refill   LYMPH: no lymphadenopathy   PSYCH:  mentation appears normal and affect normal/bright    MSK:  Examination of the neck and shoulder girdle musculature reveals no asymmetry, or atrophy to the muscle masses.  There is no erythema, ecchymosis or edema.  There is a normal lordosis to the cervical and lumbar spine regions.  There is a normal kyphosis to the thoracic spine.  There is no clinical evidence of scoliosis. There is no " tenderness to palpation or percussion.  There is no paraspinal muscle spasm present.  There is a Normal range of motion to the cervical spine. Forward flexion to 45, extension to 55, R and L lateral bending to 45, and rotation to 70, both R and L.    Strength : Bicep 5/5   C5-6       Sensation :     Deltoid 5/5   C5    Lateral Arm    Wrist extensors 5/5  C6    Thumb    Tricep  5/5   C7    Middle Finger    Finger flexors  5/5  C8    Little Finger    Interossei  5/5   T1    Medial Arm    Reflexes :  Bicep   Symmetric  C5-6    BR Symmetric  C6    Tricep Symmetric  C7    Shoulder:  Examination of the bilateral shoulder reveals a partial active ROM and partial passive ROM.    Forward Flexion : 120 degrees Pain  YES --   Abduction  :  100 degrees  YES --   Internal Rotation : lumbar sacral   YES --  Subscap Lift-off test negative  External Rotation :    0 Deg-70  90 Deg- 45  Contralateral side symmetric    There is no tenderness to palpation about the AC joint, anterior capsule or Bicep tendon.  There is mild tenderness to palpation in the subacromial space.  There is rotator cuff weakness.  Speed's an Yergason's Tests for Bicep pathology are negative. Arm adduction does not cause pain in the AC joint.  Apprehension sign is negative. Scapular winging is not present. ROM of the elbow and wrist is normal and CMS is intact to fingertips.      MSK LEFT HIP:  A&OX3, NAD  Neck supple, no lymphadenopathy  The patient ambulates without an antalgic gait.  The patient is able to get on and off the exam table without difficulty.    Examination of the spine reveals a normal lordosis to the cervical and lumbar spine, and a normal kyphosis to the thoracic spine.  There is no clinical evidence of scoliosis. There is a normal fluid motion to the spine.  There is no erythema, ecchymosis, not edema.  There is no paraspinal muscle spasm present.  There is no tenderness to palpation, nor percussion.  There are 0/5 Cedric's  signs.    Distally, into the lower extremities:  5/5 strengths of Quadriceps, EHL and Gastrocsoleus of the right and left.  Patellar tendon and achilles reflexes are symmetric.  Two point discrimination is symmetric and normal between 5-10 mm.               ASSESSMENT / PLAN: Bilateral cuff arthropathy.  I offered her corticosteroid injections at this point.  If these do not give her significant symptomatic relief, I will refer her to Dr. Garcia for consideration of arthroplasties.        Imaging Interpretation:     Procedure Note:  After risks and benefits were explained and questions answered, pt agreed to undergo a bilateral subacromial shoulder injection. Using aseptic technique and a posterior approach, the subacromial space was infiltrated with 4 mg of Dexamethasone, 40 mg of Depo Medrol, and 3 cc of 1.0% Lidocaine.  There were no complications and the patient tolerated the procedure well.        Dani Baeza MD  Department of Orthopedic Surgery

## 2018-04-05 NOTE — MR AVS SNAPSHOT
After Visit Summary   4/5/2018    Ana Luisa Lee    MRN: [id number]           Patient Information     Date Of Birth          1940        Visit Information        Provider Department      4/5/2018 1:40 PM Russell Baeza MD [facility]        Today's Diagnoses     Chronic left-sided low back pain with left-sided sciatica    -  1    Chronic pain of both shoulders          Care Instructions    1. Chronic left-sided low back pain with left-sided sciatica    2. Chronic pain of both shoulders      Steroid injection of the bilateral shoulder: subacromial space was performed today in clinic    - Would not soak in a hot tub, bath or swimming pool for 48 hours  - Ok to shower  - The lidocaine (what is giving you pain relief right now) will likely stop working in 1-2 hours.  You will then have pain again, similar to before you received the injection. The corticosteroid will not start working until approximately 1-2 weeks from now.  - Ice today and only do your normal amounts of activity    Follow up as needed. Call direct clinic number [[phone]] at any time with questions or concerns.            Follow-ups after your visit        Who to contact     If you have questions or need follow up information about today's clinic visit or your schedule please contact [facility] directly at [phone].  Normal or non-critical lab and imaging results will be communicated to you by MyChart, letter or phone within 4 business days after the clinic has received the results. If you do not hear from us within 7 days, please contact the clinic through [employer] or phone. If you have a critical or abnormal lab result, we will notify you by phone as soon as possible.  Submit refill requests through [employer] or call your pharmacy and they will forward the refill request to us. Please allow 3 business days for your refill to be completed.          Additional  "Information About Your Visit        MyChart Information     [employer] gives you secure access to your electronic health record. If you see a primary care provider, you can also send messages to your care team and make appointments. If you have questions, please call your primary care clinic.  If you do not have a primary care provider, please call [phone] and they will assist you.        Care EveryWhere ID     This is your Care EveryWhere ID. This could be used by other organizations to access your [facility] medical records  [id number]        Your Vitals Were     Height BMI (Body Mass Index)                5' 2\" (1.575 m) 22.68 kg/m2           Blood Pressure from Last 3 Encounters:   04/05/18 126/84   03/29/18 110/60   03/21/18 112/78    Weight from Last 3 Encounters:   04/05/18 124 lb (56.2 kg)   03/29/18 124 lb 11.2 oz (56.6 kg)   03/21/18 122 lb (55.3 kg)                 Today's Medication Changes          These changes are accurate as of 4/5/18  2:28 PM.  If you have any questions, ask your nurse or doctor.               These medicines have changed or have updated prescriptions.        Dose/Directions    ferrous sulfate 325 (65 FE) MG tablet   Commonly known as:  IRON   This may have changed:  when to take this   Used for:  Pulmonary emphysema, unspecified emphysema type (H)        Dose:  1 tablet   Take 1 tablet (325 mg) by mouth daily (with breakfast)   Quantity:  100 tablet   Refills:  0                Primary Care Provider Office Phone # Fax #    Jenny Padmini Hamilton [phone]       [facility] [address] [facility] [address]        Equal Access to Services     [facility]: Hadii ankit valera hadasho Somerry, waaxda luqadaha, qaybta kaalmaholly casas. So [facility] [phone].    ATENCIÓN: Si habla español, tiene a martinez disposición servicios gratuitos de asistencia lingüística. Llame al [phone].    We comply with applicable federal civil " rights laws and [place] laws. We do not discriminate on the basis of race, color, national origin, age, disability, sex, sexual orientation, or gender identity.            Thank you!     Thank you for choosing [facility]  for your care. Our goal is always to provide you with excellent care. Hearing back from our patients is one way we can continue to improve our services. Please take a few minutes to complete the written survey that you may receive in the mail after your visit with us. Thank you!             Your Updated Medication List - Protect others around you: Learn how to safely use, store and throw away your medicines at www.disposemymeds.org.          This list is accurate as of 4/5/18  2:28 PM.  Always use your most recent med list.                   Brand Name Dispense Instructions for use Diagnosis    acetaminophen 325 MG tablet    TYLENOL    100 tablet    Take 2 tablets (650 mg) by mouth every 4 hours as needed for mild pain    Sacral insufficiency fracture, initial encounter       albuterol 108 (90 BASE) MCG/ACT Inhaler    PROAIR HFA     Inhale 2 puffs into the lungs every 6 hours as needed    Asthma, persistent       ASPIRIN NOT PRESCRIBED    INTENTIONAL    0 each    1 each continuous prn. Antiplatelet medication not prescribed intentionally due to Use of NSAIDS        budesonide 180 MCG/ACT inhaler    PULMICORT FLEXHALER     Inhale 1 puff into the lungs 2 times daily    Pulmonary emphysema, unspecified emphysema type (H)       calcium 600 MG tablet     60 tablet    Take 1 tablet (600 mg) by mouth daily    Pulmonary emphysema, unspecified emphysema type (H)       cholecalciferol 1000 UNIT tablet    vitamin D3    30 tablet    Take 1 tablet (1,000 Units) by mouth daily    COPD, moderate (H)       cyanocobalamin 1000 MCG Tbcr    B-12 TR    100 tablet    Take 1 tablet by mouth daily    Pernicious anemia       ferrous sulfate 325 (65 FE) MG tablet    IRON    100 tablet    Take 1  tablet (325 mg) by mouth daily (with breakfast)    Pulmonary emphysema, unspecified emphysema type (H)       gemfibrozil 600 MG tablet    LOPID    90 tablet    Take 1 tablet (600 mg) by mouth daily    Hyperlipidemia with target LDL less than 130       hydrOXYzine 25 MG tablet    ATARAX    40 tablet    Take 1 tablet (25 mg) by mouth 3 times daily as needed for other (pain)    Sacral insufficiency fracture, initial encounter       INCRUSE ELLIPTA 62.5 MCG/INH oral inhaler   Generic drug:  umeclidinium      Inhale 1 puff into the lungs daily        LANsoprazole 30 MG CR capsule    PREVACID    90 capsule    Take 1 capsule (30 mg) by mouth daily    Gastroesophageal reflux disease without esophagitis       levalbuterol 1.25 MG/3ML neb solution    XOPENEX    1584 mL    Take 3 mLs (1.25 mg) by nebulization every 4 hours as needed for shortness of breath / dyspnea or wheezing    COPD, moderate (H), COPD exacerbation (H)       levothyroxine 25 MCG tablet    SYNTHROID/LEVOTHROID    90 tablet    Take 1 tablet (25 mcg) by mouth daily    Hypothyroidism due to acquired atrophy of thyroid       montelukast 10 MG tablet    SINGULAIR    30 tablet    Take 1 tablet (10 mg) by mouth At Bedtime    Pulmonary emphysema, unspecified emphysema type (H)       MULTIVITAMIN PO      Take 1 tablet by mouth daily        omeprazole 40 MG capsule    priLOSEC    90 capsule    Take 1 capsule (40 mg) by mouth daily Take 30-60 minutes before a meal.    Gastroesophageal reflux disease, esophagitis presence not specified       polyethylene glycol Packet    MIRALAX/GLYCOLAX     Take 17 g by mouth daily as needed for constipation        predniSONE 5 MG tablet    DELTASONE     Take 3 tablets (15 mg) by mouth See Admin Instructions 11/17/17 started prednisone taper.  20 mg daily for 1 week then taper as directed        prochlorperazine 5 MG tablet    COMPAZINE    120 tablet    Take 1 tablet (5 mg) by mouth every 6 hours as needed for nausea or vomiting     Nausea       roflumilast 500 MCG Tabs tablet    DALIRESP     Take 500 mcg by mouth daily        salmeterol 50 MCG/DOSE diskus inhaler    SEREVENT     Inhale 1 puff into the lungs 2 times daily    Pulmonary emphysema, unspecified emphysema type (H)       traMADol 50 MG tablet    ULTRAM    100 tablet    Take 1 tablet (50 mg) by mouth every 6 hours as needed for severe pain    Multiple joint pain

## 2018-04-10 ENCOUNTER — PATIENT OUTREACH (OUTPATIENT)
Dept: GERIATRIC MEDICINE | Facility: CLINIC | Age: 78
End: 2018-04-10

## 2018-04-12 NOTE — PROGRESS NOTES
[facility] Care Coordination Contact  Communication History     User: Cassy Savage RN Date/time: 4/10/2018  7:58 AM    Context:  Outcome: Talked with Patient    Phone number: [phone] Phone Type: Home Phone    Comm. type: Telephone Call type: Outgoing    Contact: Ana Luisa Lee Relation to patient: Self    User: Cassy Savage RN Date/time: 4/10/2018  7:57 AM    Comment: VM left with Maryan to report that client's homemaker did not show up as planned, request a return call.     Context:  Outcome: Left Message    Phone number: [phone] Phone Type:     Comm. type: Telephone Call type: Outgoing    Contact: Maryan [facility]  Relation to patient: Other    User: Cassy Savage RN Date/time: 4/10/2018  7:56 AM    Comment: Rec'd vm from client stating that her new homemaker did not show up.  Client states that she needs assistance with laundry and would like CM to pursue a new agency.     Context:  Outcome:     Phone number: [phone] Phone Type: Home Phone    Comm. type: Telephone Call type: Incoming    Contact: Ana Luisa Lee Relation to patient: Self        4/10/18 f/u call to client regarding no show for homemaker. Client states that she needs assistance and would like CM to contact other agencies. Explained that CM can, but recent experience is about 2-3 months for a homemaker in [place]. Client states that she would pay privately, explained that CM can provide contact information to her, but CM cannot assist with private pay arrangements. CM did provide contact information for [facility]. Explained that CM would prefer to contact EW providers first.  Client states that she rec'd a list of agencies from [place],  in her building.   CM offered client to participate in conference calls to the agencies.  [facility]-left vm   [facility]-left vm   [facility]-not accepting EW at this time.   Client states  that she did speak with Maryan at [facility] and they are in the process of interviewing more staff.   CM to f/u with client  4/11/18 Rec'd tele call from [facility], they do not have staffing in [place] at this time.  4/11/18 Rec'd tele call from Roberto Carlos at [facility] -Roberto Carlos states that there is a caregiver in the building providing services already and will reach out to her.  CM to follow, if [facility] not able to provide, CM to f/u with [facility] and [facility].  Yeni Savage RN, BC  Supervisor [facility]   [phone] (Fax)

## 2018-04-13 ENCOUNTER — PATIENT OUTREACH (OUTPATIENT)
Dept: GERIATRIC MEDICINE | Facility: CLINIC | Age: 78
End: 2018-04-13

## 2018-04-13 ENCOUNTER — APPOINTMENT (OUTPATIENT)
Dept: GENERAL RADIOLOGY | Facility: CLINIC | Age: 78
DRG: 872 | End: 2018-04-13
Attending: EMERGENCY MEDICINE
Payer: COMMERCIAL

## 2018-04-13 ENCOUNTER — HOSPITAL ENCOUNTER (INPATIENT)
Facility: CLINIC | Age: 78
LOS: 4 days | Discharge: NURSING, REHAB OR LONG-TERM CARE | DRG: 872 | End: 2018-04-17
Attending: EMERGENCY MEDICINE | Admitting: INTERNAL MEDICINE
Payer: COMMERCIAL

## 2018-04-13 DIAGNOSIS — K59.00 CONSTIPATION, UNSPECIFIED CONSTIPATION TYPE: ICD-10-CM

## 2018-04-13 DIAGNOSIS — N39.0 URINARY TRACT INFECTION WITHOUT HEMATURIA, SITE UNSPECIFIED: ICD-10-CM

## 2018-04-13 DIAGNOSIS — R50.9 FEBRILE ILLNESS, ACUTE: ICD-10-CM

## 2018-04-13 DIAGNOSIS — G47.00 INSOMNIA, UNSPECIFIED TYPE: Primary | ICD-10-CM

## 2018-04-13 DIAGNOSIS — J44.9 STEROID-DEPENDENT CHRONIC OBSTRUCTIVE PULMONARY DISEASE (H): ICD-10-CM

## 2018-04-13 DIAGNOSIS — Z92.241 STEROID-DEPENDENT CHRONIC OBSTRUCTIVE PULMONARY DISEASE (H): ICD-10-CM

## 2018-04-13 DIAGNOSIS — J44.1 COPD EXACERBATION (H): ICD-10-CM

## 2018-04-13 DIAGNOSIS — R52 PAIN: ICD-10-CM

## 2018-04-13 DIAGNOSIS — R78.81 BACTEREMIA DUE TO GRAM-NEGATIVE BACTERIA: ICD-10-CM

## 2018-04-13 DIAGNOSIS — I10 ESSENTIAL HYPERTENSION: ICD-10-CM

## 2018-04-13 PROBLEM — A41.9 SEPSIS (H): Status: ACTIVE | Noted: 2018-04-13

## 2018-04-13 LAB
ALBUMIN SERPL-MCNC: 3.9 G/DL (ref 3.4–5)
ALBUMIN UR-MCNC: NEGATIVE MG/DL
ALP SERPL-CCNC: 66 U/L (ref 40–150)
ALT SERPL W P-5'-P-CCNC: 14 U/L (ref 0–50)
ANION GAP SERPL CALCULATED.3IONS-SCNC: 8 MMOL/L (ref 3–14)
APPEARANCE UR: ABNORMAL
AST SERPL W P-5'-P-CCNC: 14 U/L (ref 0–45)
BACTERIA #/AREA URNS HPF: ABNORMAL /HPF
BASOPHILS # BLD AUTO: 0 10E9/L (ref 0–0.2)
BASOPHILS NFR BLD AUTO: 0.1 %
BILIRUB SERPL-MCNC: 0.5 MG/DL (ref 0.2–1.3)
BILIRUB UR QL STRIP: NEGATIVE
BUN SERPL-MCNC: 14 MG/DL (ref 7–30)
CALCIUM SERPL-MCNC: 9.5 MG/DL (ref 8.5–10.1)
CHLORIDE SERPL-SCNC: 98 MMOL/L (ref 94–109)
CO2 BLDCOV-SCNC: 25 MMOL/L (ref 21–28)
CO2 SERPL-SCNC: 25 MMOL/L (ref 20–32)
COLOR UR AUTO: YELLOW
CREAT SERPL-MCNC: 0.75 MG/DL (ref 0.52–1.04)
CREAT SERPL-MCNC: 0.77 MG/DL (ref 0.52–1.04)
DIFFERENTIAL METHOD BLD: ABNORMAL
EOSINOPHIL # BLD AUTO: 0 10E9/L (ref 0–0.7)
EOSINOPHIL NFR BLD AUTO: 0.3 %
ERYTHROCYTE [DISTWIDTH] IN BLOOD BY AUTOMATED COUNT: 13.9 % (ref 10–15)
FLUAV+FLUBV AG SPEC QL: NEGATIVE
FLUAV+FLUBV AG SPEC QL: NEGATIVE
GFR SERPL CREATININE-BSD FRML MDRD: 72 ML/MIN/1.7M2
GFR SERPL CREATININE-BSD FRML MDRD: 75 ML/MIN/1.7M2
GLUCOSE SERPL-MCNC: 118 MG/DL (ref 70–99)
GLUCOSE UR STRIP-MCNC: NEGATIVE MG/DL
HCT VFR BLD AUTO: 34.9 % (ref 35–47)
HGB BLD-MCNC: 11.7 G/DL (ref 11.7–15.7)
HGB UR QL STRIP: ABNORMAL
HYALINE CASTS #/AREA URNS LPF: 3 /LPF (ref 0–2)
IMM GRANULOCYTES # BLD: 0.1 10E9/L (ref 0–0.4)
IMM GRANULOCYTES NFR BLD: 0.6 %
KETONES UR STRIP-MCNC: NEGATIVE MG/DL
LACTATE BLD-SCNC: 1.6 MMOL/L (ref 0.7–2)
LACTATE BLD-SCNC: 1.6 MMOL/L (ref 0.7–2.1)
LACTATE BLD-SCNC: 2 MMOL/L (ref 0.7–2)
LEUKOCYTE ESTERASE UR QL STRIP: ABNORMAL
LYMPHOCYTES # BLD AUTO: 0.6 10E9/L (ref 0.8–5.3)
LYMPHOCYTES NFR BLD AUTO: 5.1 %
MAGNESIUM SERPL-MCNC: 1.7 MG/DL (ref 1.6–2.3)
MCH RBC QN AUTO: 31.1 PG (ref 26.5–33)
MCHC RBC AUTO-ENTMCNC: 33.5 G/DL (ref 31.5–36.5)
MCV RBC AUTO: 93 FL (ref 78–100)
MONOCYTES # BLD AUTO: 0.1 10E9/L (ref 0–1.3)
MONOCYTES NFR BLD AUTO: 1.1 %
NEUTROPHILS # BLD AUTO: 10.3 10E9/L (ref 1.6–8.3)
NEUTROPHILS NFR BLD AUTO: 92.8 %
NITRATE UR QL: NEGATIVE
NRBC # BLD AUTO: 0 10*3/UL
NRBC BLD AUTO-RTO: 0 /100
PCO2 BLDV: 36 MM HG (ref 40–50)
PH BLDV: 7.45 PH (ref 7.32–7.43)
PH UR STRIP: 6 PH (ref 5–7)
PLATELET # BLD AUTO: 204 10E9/L (ref 150–450)
PLATELET # BLD AUTO: 230 10E9/L (ref 150–450)
PO2 BLDV: 28 MM HG (ref 25–47)
POTASSIUM SERPL-SCNC: 3.7 MMOL/L (ref 3.4–5.3)
PROT SERPL-MCNC: 7.4 G/DL (ref 6.8–8.8)
RBC # BLD AUTO: 3.76 10E12/L (ref 3.8–5.2)
RBC #/AREA URNS AUTO: 7 /HPF (ref 0–2)
SAO2 % BLDV FROM PO2: 56 %
SODIUM SERPL-SCNC: 131 MMOL/L (ref 133–144)
SOURCE: ABNORMAL
SP GR UR STRIP: 1.01 (ref 1–1.03)
SPECIMEN SOURCE: NORMAL
UROBILINOGEN UR STRIP-MCNC: 0 MG/DL (ref 0–2)
WBC # BLD AUTO: 11.1 10E9/L (ref 4–11)
WBC #/AREA URNS AUTO: >182 /HPF (ref 0–5)
WBC CLUMPS #/AREA URNS HPF: PRESENT /HPF

## 2018-04-13 PROCEDURE — 85025 COMPLETE CBC W/AUTO DIFF WBC: CPT | Performed by: EMERGENCY MEDICINE

## 2018-04-13 PROCEDURE — 87181 SC STD AGAR DILUTION PER AGT: CPT | Performed by: EMERGENCY MEDICINE

## 2018-04-13 PROCEDURE — 83735 ASSAY OF MAGNESIUM: CPT | Performed by: INTERNAL MEDICINE

## 2018-04-13 PROCEDURE — 96365 THER/PROPH/DIAG IV INF INIT: CPT

## 2018-04-13 PROCEDURE — 40000275 ZZH STATISTIC RCP TIME EA 10 MIN

## 2018-04-13 PROCEDURE — 96361 HYDRATE IV INFUSION ADD-ON: CPT

## 2018-04-13 PROCEDURE — 80053 COMPREHEN METABOLIC PANEL: CPT | Performed by: EMERGENCY MEDICINE

## 2018-04-13 PROCEDURE — 83605 ASSAY OF LACTIC ACID: CPT | Performed by: EMERGENCY MEDICINE

## 2018-04-13 PROCEDURE — 71046 X-RAY EXAM CHEST 2 VIEWS: CPT

## 2018-04-13 PROCEDURE — 87077 CULTURE AEROBIC IDENTIFY: CPT | Performed by: EMERGENCY MEDICINE

## 2018-04-13 PROCEDURE — 36415 COLL VENOUS BLD VENIPUNCTURE: CPT | Performed by: EMERGENCY MEDICINE

## 2018-04-13 PROCEDURE — 36415 COLL VENOUS BLD VENIPUNCTURE: CPT | Performed by: INTERNAL MEDICINE

## 2018-04-13 PROCEDURE — 87088 URINE BACTERIA CULTURE: CPT | Performed by: EMERGENCY MEDICINE

## 2018-04-13 PROCEDURE — 87086 URINE CULTURE/COLONY COUNT: CPT | Performed by: EMERGENCY MEDICINE

## 2018-04-13 PROCEDURE — 99285 EMERGENCY DEPT VISIT HI MDM: CPT | Mod: 25

## 2018-04-13 PROCEDURE — 12000000 ZZH R&B MED SURG/OB

## 2018-04-13 PROCEDURE — 99222 1ST HOSP IP/OBS MODERATE 55: CPT | Mod: AI | Performed by: INTERNAL MEDICINE

## 2018-04-13 PROCEDURE — 25000128 H RX IP 250 OP 636: Performed by: INTERNAL MEDICINE

## 2018-04-13 PROCEDURE — 82803 BLOOD GASES ANY COMBINATION: CPT

## 2018-04-13 PROCEDURE — 94640 AIRWAY INHALATION TREATMENT: CPT

## 2018-04-13 PROCEDURE — 25000132 ZZH RX MED GY IP 250 OP 250 PS 637: Performed by: INTERNAL MEDICINE

## 2018-04-13 PROCEDURE — 87186 SC STD MICRODIL/AGAR DIL: CPT | Performed by: EMERGENCY MEDICINE

## 2018-04-13 PROCEDURE — 25000132 ZZH RX MED GY IP 250 OP 250 PS 637: Performed by: EMERGENCY MEDICINE

## 2018-04-13 PROCEDURE — 82565 ASSAY OF CREATININE: CPT | Performed by: INTERNAL MEDICINE

## 2018-04-13 PROCEDURE — 94640 AIRWAY INHALATION TREATMENT: CPT | Mod: 76

## 2018-04-13 PROCEDURE — 25000125 ZZHC RX 250: Performed by: EMERGENCY MEDICINE

## 2018-04-13 PROCEDURE — 99207 ZZC APP CREDIT; MD BILLING SHARED VISIT: CPT | Performed by: INTERNAL MEDICINE

## 2018-04-13 PROCEDURE — 85049 AUTOMATED PLATELET COUNT: CPT | Performed by: INTERNAL MEDICINE

## 2018-04-13 PROCEDURE — 87804 INFLUENZA ASSAY W/OPTIC: CPT | Performed by: EMERGENCY MEDICINE

## 2018-04-13 PROCEDURE — 87040 BLOOD CULTURE FOR BACTERIA: CPT | Performed by: EMERGENCY MEDICINE

## 2018-04-13 PROCEDURE — 83605 ASSAY OF LACTIC ACID: CPT

## 2018-04-13 PROCEDURE — 25000128 H RX IP 250 OP 636: Performed by: EMERGENCY MEDICINE

## 2018-04-13 PROCEDURE — 83605 ASSAY OF LACTIC ACID: CPT | Performed by: INTERNAL MEDICINE

## 2018-04-13 PROCEDURE — 81001 URINALYSIS AUTO W/SCOPE: CPT | Performed by: EMERGENCY MEDICINE

## 2018-04-13 PROCEDURE — 25000125 ZZHC RX 250: Performed by: INTERNAL MEDICINE

## 2018-04-13 RX ORDER — POTASSIUM CHLORIDE 1.5 G/1.58G
20-40 POWDER, FOR SOLUTION ORAL
Status: DISCONTINUED | OUTPATIENT
Start: 2018-04-13 | End: 2018-04-17 | Stop reason: HOSPADM

## 2018-04-13 RX ORDER — FERROUS SULFATE 325(65) MG
325 TABLET ORAL
COMMUNITY

## 2018-04-13 RX ORDER — LEVOFLOXACIN 5 MG/ML
500 INJECTION, SOLUTION INTRAVENOUS EVERY 24 HOURS
Status: DISCONTINUED | OUTPATIENT
Start: 2018-04-14 | End: 2018-04-15

## 2018-04-13 RX ORDER — BISACODYL 10 MG
10 SUPPOSITORY, RECTAL RECTAL DAILY PRN
Status: DISCONTINUED | OUTPATIENT
Start: 2018-04-13 | End: 2018-04-17 | Stop reason: HOSPADM

## 2018-04-13 RX ORDER — NALOXONE HYDROCHLORIDE 0.4 MG/ML
.1-.4 INJECTION, SOLUTION INTRAMUSCULAR; INTRAVENOUS; SUBCUTANEOUS
Status: DISCONTINUED | OUTPATIENT
Start: 2018-04-13 | End: 2018-04-17 | Stop reason: HOSPADM

## 2018-04-13 RX ORDER — HYDROXYZINE HYDROCHLORIDE 25 MG/1
25 TABLET, FILM COATED ORAL 3 TIMES DAILY PRN
Status: DISCONTINUED | OUTPATIENT
Start: 2018-04-13 | End: 2018-04-13 | Stop reason: CLARIF

## 2018-04-13 RX ORDER — MONTELUKAST SODIUM 10 MG/1
10 TABLET ORAL AT BEDTIME
Status: DISCONTINUED | OUTPATIENT
Start: 2018-04-13 | End: 2018-04-17 | Stop reason: HOSPADM

## 2018-04-13 RX ORDER — GEMFIBROZIL 600 MG/1
600 TABLET, FILM COATED ORAL DAILY
Status: DISCONTINUED | OUTPATIENT
Start: 2018-04-13 | End: 2018-04-17 | Stop reason: HOSPADM

## 2018-04-13 RX ORDER — IPRATROPIUM BROMIDE AND ALBUTEROL SULFATE 2.5; .5 MG/3ML; MG/3ML
3 SOLUTION RESPIRATORY (INHALATION) ONCE
Status: COMPLETED | OUTPATIENT
Start: 2018-04-13 | End: 2018-04-13

## 2018-04-13 RX ORDER — PREDNISONE 10 MG/1
10 TABLET ORAL DAILY
Status: DISCONTINUED | OUTPATIENT
Start: 2018-04-13 | End: 2018-04-17 | Stop reason: HOSPADM

## 2018-04-13 RX ORDER — LEVOTHYROXINE SODIUM 25 UG/1
25 TABLET ORAL DAILY
Status: DISCONTINUED | OUTPATIENT
Start: 2018-04-13 | End: 2018-04-17 | Stop reason: HOSPADM

## 2018-04-13 RX ORDER — ACETAMINOPHEN 500 MG
1000 TABLET ORAL ONCE
Status: COMPLETED | OUTPATIENT
Start: 2018-04-13 | End: 2018-04-13

## 2018-04-13 RX ORDER — PROCHLORPERAZINE MALEATE 5 MG
5 TABLET ORAL EVERY 6 HOURS PRN
Status: DISCONTINUED | OUTPATIENT
Start: 2018-04-13 | End: 2018-04-17 | Stop reason: HOSPADM

## 2018-04-13 RX ORDER — POLYETHYLENE GLYCOL 3350 17 G/17G
17 POWDER, FOR SOLUTION ORAL DAILY PRN
Status: DISCONTINUED | OUTPATIENT
Start: 2018-04-13 | End: 2018-04-17 | Stop reason: HOSPADM

## 2018-04-13 RX ORDER — LEVALBUTEROL INHALATION SOLUTION 1.25 MG/3ML
1 SOLUTION RESPIRATORY (INHALATION) EVERY 4 HOURS PRN
Status: DISCONTINUED | OUTPATIENT
Start: 2018-04-13 | End: 2018-04-17 | Stop reason: HOSPADM

## 2018-04-13 RX ORDER — ROFLUMILAST 500 UG/1
500 TABLET ORAL DAILY
Status: DISCONTINUED | OUTPATIENT
Start: 2018-04-13 | End: 2018-04-17 | Stop reason: HOSPADM

## 2018-04-13 RX ORDER — TRAMADOL HYDROCHLORIDE 50 MG/1
50 TABLET ORAL EVERY 6 HOURS PRN
Status: DISCONTINUED | OUTPATIENT
Start: 2018-04-13 | End: 2018-04-17 | Stop reason: HOSPADM

## 2018-04-13 RX ORDER — POTASSIUM CL/LIDO/0.9 % NACL 10MEQ/0.1L
10 INTRAVENOUS SOLUTION, PIGGYBACK (ML) INTRAVENOUS
Status: DISCONTINUED | OUTPATIENT
Start: 2018-04-13 | End: 2018-04-17 | Stop reason: HOSPADM

## 2018-04-13 RX ORDER — ONDANSETRON 4 MG/1
4 TABLET, ORALLY DISINTEGRATING ORAL EVERY 6 HOURS PRN
Status: DISCONTINUED | OUTPATIENT
Start: 2018-04-13 | End: 2018-04-17 | Stop reason: HOSPADM

## 2018-04-13 RX ORDER — SODIUM CHLORIDE AND POTASSIUM CHLORIDE 150; 900 MG/100ML; MG/100ML
INJECTION, SOLUTION INTRAVENOUS CONTINUOUS
Status: DISPENSED | OUTPATIENT
Start: 2018-04-13 | End: 2018-04-13

## 2018-04-13 RX ORDER — POTASSIUM CHLORIDE 29.8 MG/ML
20 INJECTION INTRAVENOUS
Status: DISCONTINUED | OUTPATIENT
Start: 2018-04-13 | End: 2018-04-13

## 2018-04-13 RX ORDER — MAGNESIUM SULFATE HEPTAHYDRATE 40 MG/ML
4 INJECTION, SOLUTION INTRAVENOUS EVERY 4 HOURS PRN
Status: DISCONTINUED | OUTPATIENT
Start: 2018-04-13 | End: 2018-04-17 | Stop reason: HOSPADM

## 2018-04-13 RX ORDER — ONDANSETRON 2 MG/ML
4 INJECTION INTRAMUSCULAR; INTRAVENOUS EVERY 6 HOURS PRN
Status: DISCONTINUED | OUTPATIENT
Start: 2018-04-13 | End: 2018-04-17 | Stop reason: HOSPADM

## 2018-04-13 RX ORDER — POTASSIUM CHLORIDE 7.45 MG/ML
10 INJECTION INTRAVENOUS
Status: DISCONTINUED | OUTPATIENT
Start: 2018-04-13 | End: 2018-04-17 | Stop reason: HOSPADM

## 2018-04-13 RX ORDER — ACETAMINOPHEN 325 MG/1
650 TABLET ORAL EVERY 4 HOURS PRN
Status: DISCONTINUED | OUTPATIENT
Start: 2018-04-13 | End: 2018-04-17 | Stop reason: HOSPADM

## 2018-04-13 RX ORDER — SODIUM CHLORIDE 9 MG/ML
1000 INJECTION, SOLUTION INTRAVENOUS CONTINUOUS
Status: DISCONTINUED | OUTPATIENT
Start: 2018-04-13 | End: 2018-04-13

## 2018-04-13 RX ORDER — PREDNISONE 10 MG/1
10 TABLET ORAL ONCE
Status: COMPLETED | OUTPATIENT
Start: 2018-04-13 | End: 2018-04-13

## 2018-04-13 RX ORDER — LEVOFLOXACIN 5 MG/ML
750 INJECTION, SOLUTION INTRAVENOUS ONCE
Status: COMPLETED | OUTPATIENT
Start: 2018-04-13 | End: 2018-04-13

## 2018-04-13 RX ORDER — POTASSIUM CHLORIDE 1500 MG/1
20-40 TABLET, EXTENDED RELEASE ORAL
Status: DISCONTINUED | OUTPATIENT
Start: 2018-04-13 | End: 2018-04-17 | Stop reason: HOSPADM

## 2018-04-13 RX ADMIN — SALMETEROL XINAFOATE 1 PUFF: 50 POWDER, METERED ORAL; RESPIRATORY (INHALATION) at 19:28

## 2018-04-13 RX ADMIN — PREDNISONE 10 MG: 10 TABLET ORAL at 07:29

## 2018-04-13 RX ADMIN — LEVALBUTEROL HYDROCHLORIDE 1.25 MG: 1.25 SOLUTION RESPIRATORY (INHALATION) at 10:05

## 2018-04-13 RX ADMIN — POTASSIUM CHLORIDE AND SODIUM CHLORIDE: 900; 150 INJECTION, SOLUTION INTRAVENOUS at 09:39

## 2018-04-13 RX ADMIN — ROFLUMILAST 500 MCG: 500 TABLET ORAL at 11:13

## 2018-04-13 RX ADMIN — LEVALBUTEROL HYDROCHLORIDE 1.25 MG: 1.25 SOLUTION RESPIRATORY (INHALATION) at 19:28

## 2018-04-13 RX ADMIN — ENOXAPARIN SODIUM 40 MG: 40 INJECTION SUBCUTANEOUS at 10:38

## 2018-04-13 RX ADMIN — MONTELUKAST SODIUM 10 MG: 10 TABLET, FILM COATED ORAL at 21:34

## 2018-04-13 RX ADMIN — OMEPRAZOLE 40 MG: 20 CAPSULE, DELAYED RELEASE ORAL at 10:38

## 2018-04-13 RX ADMIN — SODIUM CHLORIDE 1000 ML: 9 INJECTION, SOLUTION INTRAVENOUS at 04:48

## 2018-04-13 RX ADMIN — ACETAMINOPHEN 1000 MG: 500 TABLET, FILM COATED ORAL at 04:38

## 2018-04-13 RX ADMIN — ONDANSETRON 4 MG: 4 TABLET, ORALLY DISINTEGRATING ORAL at 15:04

## 2018-04-13 RX ADMIN — PREDNISONE 10 MG: 10 TABLET ORAL at 10:39

## 2018-04-13 RX ADMIN — LEVOTHYROXINE SODIUM 25 MCG: 25 TABLET ORAL at 10:38

## 2018-04-13 RX ADMIN — SALMETEROL XINAFOATE 1 PUFF: 50 POWDER, METERED ORAL; RESPIRATORY (INHALATION) at 11:46

## 2018-04-13 RX ADMIN — LEVOFLOXACIN 750 MG: 5 INJECTION, SOLUTION INTRAVENOUS at 06:07

## 2018-04-13 RX ADMIN — TRAMADOL HYDROCHLORIDE 50 MG: 50 TABLET, COATED ORAL at 15:25

## 2018-04-13 RX ADMIN — IPRATROPIUM BROMIDE AND ALBUTEROL SULFATE 3 ML: .5; 3 SOLUTION RESPIRATORY (INHALATION) at 06:15

## 2018-04-13 RX ADMIN — TRAMADOL HYDROCHLORIDE 50 MG: 50 TABLET, COATED ORAL at 21:33

## 2018-04-13 RX ADMIN — GEMFIBROZIL 600 MG: 600 TABLET ORAL at 11:15

## 2018-04-13 RX ADMIN — LEVALBUTEROL HYDROCHLORIDE 1.25 MG: 1.25 SOLUTION RESPIRATORY (INHALATION) at 15:38

## 2018-04-13 RX ADMIN — FLUTICASONE FUROATE 1 PUFF: 100 POWDER RESPIRATORY (INHALATION) at 11:50

## 2018-04-13 RX ADMIN — ACETAMINOPHEN 650 MG: 325 TABLET, FILM COATED ORAL at 10:38

## 2018-04-13 ASSESSMENT — ENCOUNTER SYMPTOMS
FEVER: 1
COUGH: 1
FREQUENCY: 1
SHORTNESS OF BREATH: 0
ABDOMINAL PAIN: 0
RHINORRHEA: 1
HEMATURIA: 0
WEAKNESS: 1
DYSURIA: 0

## 2018-04-13 ASSESSMENT — ACTIVITIES OF DAILY LIVING (ADL)
TOILETING: 1-->ASSISTIVE EQUIPMENT
SWALLOWING: 0-->SWALLOWS FOODS/LIQUIDS WITHOUT DIFFICULTY
FALL_HISTORY_WITHIN_LAST_SIX_MONTHS: NO
TRANSFERRING: 3 - ASSISTIVE EQUIPMENT AND PERSON
DRESS: 0-->INDEPENDENT
COGNITION: 0 - NO COGNITION ISSUES REPORTED
TRANSFERRING: 1-->ASSISTIVE EQUIPMENT
CHANGE_IN_FUNCTIONAL_STATUS_SINCE_ONSET_OF_CURRENT_ILLNESS/INJURY: YES
COMMUNICATION: 0 - UNDERSTANDS/COMMUNICATES WITHOUT DIFFICULTY
BATHING: 0-->INDEPENDENT
RETIRED_COMMUNICATION: 0-->UNDERSTANDS/COMMUNICATES WITHOUT DIFFICULTY
SWALLOWING: 0 - SWALLOWS FOODS/LIQUIDS WITHOUT DIFFICULTY
BATHING: 2 - ASSISTIVE PERSON
AMBULATION: 1 - ASSISTIVE EQUIPMENT
TOILETING: 3 - ASSISTIVE EQUIPMENT AND PERSON
RETIRED_EATING: 0-->INDEPENDENT
CURRENT_FUNCTIONAL_LEVEL_COMMENT: WEAK
AMBULATION: 1-->ASSISTIVE EQUIPMENT
EATING: 0 - INDEPENDENT
DRESS: 2 - ASSISTIVE PERSON

## 2018-04-13 NOTE — LETTER
Warm Hand-Off to Next Level of Care    Name: Ana Luisa Lee  MRN #: [id number]  :  1940  Reason for Hospitalization:  Febrile illness, acute [R50.9]  Urinary tract infection without hematuria, site unspecified [N39.0]  Admit Date/Time: 2018  4:20 AM  Discharge Date:  2018  PCP: Jenny Hamilton    Patient will receive the following: TCU  EBC  Key Recommendations: Pt admitted for UTI (quinolone resistant e coli versus moraxella catharalis bacteremia), weakness and deconditioning. She was followed by ID, treated w/ Levaquin and rocephin and discharged to TCU for strengthening.

## 2018-04-13 NOTE — ED PROVIDER NOTES
History     Chief Complaint:  Fever and Nausea    HPI   Ana Luisa Lee is a 78 year old female with a history of steroid dependent COPD, hypertension and anemia who presents via EMS with fever and nausea. The patient complains of global weakness, cough, rhinorrhea, bilateral ear pain, nausea and urinary frequency. The patient is a poor historian and she is unable to recall when her symptoms started.  Per report, patient pressed her alert button earlier this evening with associated weakness and nausea and requested to be seen in the ER.  Patient does note that she received Cortisone shots in her shoulder last week. Patient is noted to be febrile on arrival. Patient denies chest pain, shortness of breath, abdominal pain.  Does report urinary frequency.  Patient denies all other complaints.     Allergies:  Hydralazine  Penicillin G  Meloxicam  Metoprolol  Norvasc [Amlodipine Besylate]      Medications:    Lopid  Ultram  Xopenex  Synthroid  B-12  Prednisone  Atarax  Incruse Ellipta  Compazine  Prilosec  Daliresp  Miralax  Albuterol Inhaler  Pulmicort Flexhaler  Singulair  Iron  Aspirin     Past Medical History:    Anemia  Arthritis  Asthma  Chronic Intermittent Steroid Use  COPD  Esophageal Stricture  Foot Deformity  Hypertension  Hyperlipidemia  Hypothyroidism  Osteoporosis  Paroxysmal Supraventricular Tachycardia  PVC  Skin Cyst  Pulmonary Nodule   Hypercalcemia  Thrust of Mouth and Esophagus  Pernicious Anemia  Premature Atrial Contraction  Acute Kidney Injury  Syncope  Nonrheumatic Aortic Valve Stenosis     Past Surgical History:    Bunionectomy Lapidus with Tarsal Metatarsal Fusion  EP Study, Modified  Gyn Surgery   Hysterectomy  Tonsillectomy     Family History:    Cerebrovascular Disease  Hypertension    Social History:  Presents via EMS from assisted living   Tobacco use: Former 1.00 ppd smoker for 15 years, QD: 1980  Alcohol use: Occasional   PCP: Jenny Hamilton    Marital Status:         Review of Systems   Constitutional: Positive for fever.   HENT: Positive for ear pain and rhinorrhea.    Respiratory: Positive for cough. Negative for shortness of breath.    Cardiovascular: Negative for chest pain.   Gastrointestinal: Negative for abdominal pain.   Genitourinary: Positive for frequency. Negative for dysuria, hematuria and urgency.   Neurological: Positive for weakness.   All other systems reviewed and are negative.    Physical Exam     Patient Vitals for the past 24 hrs:   BP Temp Temp src Pulse Resp SpO2 Weight   04/13/18 0549 - 102  F (38.9  C) Oral - - - -   04/13/18 0545 135/65 - - - - 98 % -   04/13/18 0530 145/69 - - - - 97 % -   04/13/18 0515 144/74 - - - - 96 % -   04/13/18 0445 174/86 - - - - - -   04/13/18 0433 - - - - - 96 % -   04/13/18 0432 (!) 186/99 102.4  F (39.1  C) Oral 129 22 - 59 kg (130 lb)   04/13/18 0430 (!) 186/99 - - - - 94 % -      Physical Exam  General:                        Well-nourished, limited historian                        Speaking in full sentences  Eyes:                        Conjunctiva without injection or scleral icterus                        PERRL  ENT:                        Moist mucous membranes                        Posterior oropharynx clear without erythema or exudate                        Nares patent                        Pinnae normal  Neck:                        Full ROM                        No stiffness appreciated  Resp:                        Lungs CTAB                        No audible wheezing             Unremarkable respiratory effort  CV:                                        Tachycardic rate, regular rhythm                        S1 and S2 present                        No murmur, gallop or rub  GI:                        BS present                        Abdomen soft without distention                        Non-tender to light and deep palpation                        No guarding or rebound tenderness  Skin:                         Warm, dry, well perfused                        No rashes or open wounds on exposed skin  MSK:                        Moves all extremities                        No focal deformities or swelling  Neuro:                        Alert                        Answers questions appropriately                        Moves all extremities equally  Psych:                        Normal affect, normal mood    Emergency Department Course   Imaging:  Radiographic findings were communicated with the patient who voiced understanding of the findings.    Chest XR, PA & LAT:   IMPRESSION: No acute abnormality.     Results per radiology.     Laboratory:  CBC: WBC 11.1 (H) o/w WNL (HGB 11.7, )    CMP:  (L), Glucose 118 (H) o/w WNL (Creatinine 0.77)   Lactic Acid: 1.6  ISTAT Gases: pH 7.45 (H), PCO2 36 (L) o/w WNL  Blood Culture: Pending    Influenza A/B Antigen: Negative     UA with micro: Blood Small (A), Leukocyte Esterase Large (A), WBC >182 (H), RBC 7 (H), WBC Clumps Present (A), Bacteria many (A), Hyaline Casts 3 (H) o/w negative     Interventions:  0438: Tylenol 1,000 mg PO   0448: NS 1L IV Bolus   0607: Levaquin Infusion 750 mg IV     Emergency Department Course:  Past medical records, nursing notes, and vitals reviewed.  0437: I performed an exam of the patient and obtained history, as documented above.  IV inserted and blood drawn.   Above interventions provided.   The patient was sent for a chest xray while in the emergency department, findings above.   0532: I rechecked the patient and updated her on findings.   I personally reviewed the laboratory results with the Patient and answered all related questions prior to admission.  Findings and plan explained to the Patient who consents to admission.   0612: Discussed the patient with Dr. Curry, who will admit the patient to a med/surg bed for further monitoring, evaluation, and treatment.        Impression & Plan    Medical Decision  Making:  Ana Luisa Lee is a 78 yr old F with a past medical history significant for steroid-dependent COPD, hypertension, presenting to the ER for evaluation of fever, and nausea.  VS on presentation reveal a temperature of 102.4, and elevated heart rate.  Workup included imaging and laboratory studies.  Symptoms at present are most consistent with underlying urinary tract infection.  UA demonstrates large leukocyte esterase, greater than 182 WBCs, 7 RBCs, WBC clumps, and many bacteria.  Urine culture pending.  Given penicillin allergy, patient provided 1 dose of Levaquin in the ED.  Other sources for fever were considered.  Pulmonary exam reveals no significant wheezing or increased work of breathing.  Chest x-ray negative for infiltrate.  Rapid influenza swab negative.  Abdominal exam is soft without localizing tenderness.  No localizing joint swelling pain to suggest septic arthritis.  Lactate presently normal at 1.6.  Blood cultures ×2 obtained and are presently pending.  Patient will be admitted to the hospitalist service under the care of Dr. Curry for further treatment and care.  Questions answered prior to admission.    Diagnosis:    ICD-10-CM    1. Febrile illness, acute R50.9    2. Urinary tract infection without hematuria, site unspecified N39.0        Disposition:  Admitted to a med/surg bed.     4/13/2018   [facility] EMERGENCY DEPARTMENT  ISarina am serving as a scribe at 4:37 AM on 4/13/2018 to document services personally performed by Henri Hou MD based on my observations and the provider's statements to me.       Henri Hou MD  04/13/18 0675

## 2018-04-13 NOTE — IP AVS SNAPSHOT
"    [facility]: [phone]                                              INTERAGENCY TRANSFER FORM - PHYSICIAN ORDERS   2018                    Hospital Admission Date: 2018  NAM DAS   : 1940  Sex: Female        Attending Provider: Paulo Curry MD     Allergies:  Hydralazine, Penicillin G, Meloxicam, Metoprolol, Norvasc [Amlodipine Besylate]    Infection:  None   Service:  GENERAL MEDI    Ht:  1.575 m (5' 2\")   Wt:  55.2 kg (121 lb 11.2 oz)   Admission Wt:  59 kg (130 lb)    BMI:  22.26 kg/m 2   BSA:  1.55 m 2            Patient PCP Information     Provider PCP Type    Jenny Hamilton MD General      ED Clinical Impression     Diagnosis Description Comment Added By Time Added    Febrile illness, acute [R50.9] Febrile illness, acute [R50.9]  Henri Hou MD 2018  5:47 AM    Urinary tract infection without hematuria, site unspecified [N39.0] Urinary tract infection without hematuria, site unspecified [N39.0]  Henri Hou MD 2018  5:47 AM      Hospital Problems as of 2018              Priority Class Noted POA    Sepsis (H) Medium  2018 Unknown    Urinary tract infection Medium  2018 Unknown    Bacteremia due to Gram-negative bacteria Medium  2018 Unknown      Non-Hospital Problems as of 2018              Priority Class Noted    COPD, severe (H) Medium  2010    Malignant hypertension Medium  3/3/2011    Osteoporosis Medium  Unknown    Hyperlipidemia with target LDL less than 130 Medium  Unknown    Asthma, persistent Medium  Unknown    Pes planus Medium  2011    Skin cyst Medium  2011    Advance Care Planning Medium  2011    Pain in joint, shoulder region Medium  1/10/2012    PVC (premature ventricular contraction) Medium  Unknown    Arthritis of hand Medium  Unknown    Pulmonary nodule seen on imaging study Medium  2014    Health Care Home Medium  12/3/2015    " Hypercalcemia Medium  12/4/2015    Other fatigue Medium  12/4/2015    Thrush of mouth and esophagus (H) Medium  3/19/2016    Steroid-dependent chronic obstructive pulmonary disease (H) Medium  3/19/2016    SVT (supraventricular tachycardia) (H) Medium  5/17/2016    Hypothyroidism, unspecified type Medium  10/1/2016    HTN, goal <  140/90 Medium  10/1/2016    Pernicious anemia Medium  11/3/2016    COPD exacerbation (H) Medium  11/29/2016    COPD (chronic obstructive pulmonary disease) (H) Medium  11/30/2016    ALEKSANDRA (acute kidney injury) (H) Medium  1/12/2017    Uncontrolled hypertension Medium  1/12/2017    Paroxysmal supraventricular tachycardia (H) Medium  1/12/2017    PAC (premature atrial contraction) Medium  1/12/2017    Syncope and collapse Medium  5/4/2017    Weakness Medium  5/27/2017    Nonrheumatic aortic valve stenosis Medium  7/2/2017    Hip pain Medium  11/20/2017    Closed fracture of sacrum, unspecified portion of sacrum, initial encounter (H) Medium  12/2/2017      Code Status History     Date Active Date Inactive Code Status Order ID Comments User Context    4/17/2018 10:51 AM  DNR/DNI [id number]  Paulo Curry MD Outpatient    4/13/2018  7:53 AM 4/17/2018 10:51 AM DNR/DNI [id number]  Paulo Curry MD Inpatient    11/22/2017 12:47 PM 4/13/2018  7:53 AM DNR/DNI [id number]  Fuentes Hopson, DO Outpatient    11/20/2017  1:02 AM 11/22/2017 12:47 PM DNR/DNI [id number]  Damien Johnson,  Inpatient    5/27/2017  8:00 PM 5/28/2017  3:40 PM DNR/DNI [id number]  Speedy Yu MD Inpatient    5/5/2017  9:37 AM 5/27/2017  8:00 PM Full Code [id number]  Dea Riley,  Outpatient    5/4/2017  2:07 AM 5/5/2017  9:37 AM Full Code [id number]  Sonido Spears MD Inpatient    1/6/2017 10:35 AM 5/4/2017  2:07 AM Full Code [id number]  Hallie Barroso MD Outpatient    1/2/2017  9:59 AM 1/6/2017 10:35 AM Full Code [id number]  Moses Calle MD ED    12/24/2016 11:48 AM 1/2/2017  9:59  AM Full Code [id number]  MirianKesha SHANNAN Santiago Outpatient    12/22/2016  5:04 PM 12/24/2016 11:48 AM Full Code [id number]  Mimi Mullins PA-C ED    11/29/2016  5:41 PM 12/1/2016  3:24 PM DNR/DNI [id number]  Kyra OLE CalderonC ED    3/19/2016 10:42 AM 11/29/2016  5:41 PM Full Code [id number]  Sonido Spears MD Outpatient    3/4/2016  5:36 PM 3/19/2016 10:42 AM Full Code [id number]  Meg Leger PA-C ED    12/13/2014  4:34 PM 12/19/2014  4:26 PM Full Code [id number]  Betty Roca MD Inpatient    11/4/2013  9:09 PM 12/13/2014  4:34 PM Full Code [id number]  Valentine Lundberg Outpatient    10/25/2013  1:07 PM 10/30/2013  3:25 PM Full Code [id number]  Libia Arevalo PA Inpatient         Medication Review      START taking        Dose / Directions Comments    bisacodyl 10 MG Suppository   Commonly known as:  DULCOLAX   Used for:  Constipation, unspecified constipation type        Dose:  10 mg   Place 1 suppository (10 mg) rectally daily as needed for constipation   Quantity:  30 suppository   Refills:  0        levofloxacin 500 MG tablet   Commonly known as:  LEVAQUIN   Used for:  Urinary tract infection without hematuria, site unspecified        Dose:  500 mg   Take 1 tablet (500 mg) by mouth daily for 10 days   Quantity:  10 tablet   Refills:  0        lisinopril 5 MG tablet   Commonly known as:  PRINIVIL/ZESTRIL   Used for:  Essential hypertension        Dose:  5 mg   Start taking on:  4/18/2018   Take 1 tablet (5 mg) by mouth daily   Quantity:  30 tablet   Refills:  0        magnesium hydroxide 400 MG/5ML suspension   Commonly known as:  MILK OF MAGNESIA   Used for:  Constipation, unspecified constipation type        Dose:  30 mL   Take 30 mLs by mouth daily as needed for constipation   Quantity:  105 mL   Refills:  0        melatonin 1 MG Tabs tablet   Used for:  Insomnia, unspecified type        Dose:  1 mg   Take 1 tablet (1 mg) by mouth nightly as needed for sleep   Refills:   0          CONTINUE these medications which may have CHANGED, or have new prescriptions. If we are uncertain of the size of tablets/capsules you have at home, strength may be listed as something that might have changed.        Dose / Directions Comments    predniSONE 2.5 MG tablet   Commonly known as:  DELTASONE   This may have changed:    - medication strength  - additional instructions   Used for:  COPD exacerbation (H), Steroid-dependent chronic obstructive pulmonary disease (H)        Dose:  10 mg   Take 4 tablets (10 mg) by mouth See Admin Instructions   Refills:  0        traMADol 50 MG tablet   Commonly known as:  ULTRAM   This may have changed:  how much to take   Used for:  Pain        Dose:  50 mg   Take 1 tablet (50 mg) by mouth 2 times daily as needed for moderate to severe pain Max two tablets a day   Quantity:  20 tablet   Refills:  0          CONTINUE these medications which have NOT CHANGED        Dose / Directions Comments    acetaminophen 325 MG tablet   Commonly known as:  TYLENOL   Used for:  Sacral insufficiency fracture, initial encounter        Dose:  650 mg   Take 2 tablets (650 mg) by mouth every 4 hours as needed for mild pain   Quantity:  100 tablet   Refills:  0        albuterol 108 (90 Base) MCG/ACT Inhaler   Commonly known as:  PROAIR HFA   Used for:  Asthma, persistent        Dose:  2 puff   Inhale 2 puffs into the lungs every 6 hours as needed   Refills:  0        ASPIRIN NOT PRESCRIBED   Commonly known as:  INTENTIONAL        Dose:  1 each   1 each continuous prn. Antiplatelet medication not prescribed intentionally due to Use of NSAIDS   Quantity:  0 each   Refills:  0        BREO ELLIPTA IN        Inhale into the lungs daily   Refills:  0        budesonide 180 MCG/ACT inhaler   Commonly known as:  PULMICORT FLEXHALER   Used for:  Pulmonary emphysema, unspecified emphysema type (H)        Dose:  1 puff   Inhale 1 puff into the lungs 2 times daily   Refills:  0        calcium 600 MG  tablet   Used for:  Pulmonary emphysema, unspecified emphysema type (H)        Dose:  1 tablet   Take 1 tablet (600 mg) by mouth daily   Quantity:  60 tablet   Refills:  0        cholecalciferol 1000 UNIT tablet   Commonly known as:  vitamin D3   Used for:  COPD, moderate (H)        Dose:  1000 Units   Take 1 tablet (1,000 Units) by mouth daily   Quantity:  30 tablet   Refills:  0        cyanocobalamin 1000 MCG Tbcr   Commonly known as:  B-12 TR   Used for:  Pernicious anemia        Dose:  1 tablet   Take 1 tablet by mouth daily   Quantity:  100 tablet   Refills:  3        ferrous sulfate 325 (65 Fe) MG tablet   Commonly known as:  IRON        Dose:  325 mg   Take 325 mg by mouth daily (with breakfast)   Refills:  0        gemfibrozil 600 MG tablet   Commonly known as:  LOPID   Used for:  Hyperlipidemia with target LDL less than 130        Dose:  600 mg   Take 1 tablet (600 mg) by mouth daily   Quantity:  90 tablet   Refills:  0        INCRUSE ELLIPTA 62.5 MCG/INH oral inhaler   Generic drug:  umeclidinium        Dose:  1 puff   Inhale 1 puff into the lungs daily   Refills:  0        levalbuterol 1.25 MG/3ML neb solution   Commonly known as:  XOPENEX   Used for:  COPD, moderate (H), COPD exacerbation (H)        Dose:  1 ampule   Take 3 mLs (1.25 mg) by nebulization every 4 hours as needed for shortness of breath / dyspnea or wheezing   Quantity:  1584 mL   Refills:  1        levothyroxine 25 MCG tablet   Commonly known as:  SYNTHROID/LEVOTHROID   Used for:  Hypothyroidism due to acquired atrophy of thyroid        Dose:  25 mcg   Take 1 tablet (25 mcg) by mouth daily   Quantity:  90 tablet   Refills:  3        montelukast 10 MG tablet   Commonly known as:  SINGULAIR   Used for:  Pulmonary emphysema, unspecified emphysema type (H)        Dose:  10 mg   Take 1 tablet (10 mg) by mouth At Bedtime   Quantity:  30 tablet   Refills:  0        MULTIVITAMIN PO        Dose:  1 tablet   Take 1 tablet by mouth daily   Refills:   0        omeprazole 40 MG capsule   Commonly known as:  priLOSEC   Used for:  Gastroesophageal reflux disease, esophagitis presence not specified        Dose:  40 mg   Take 1 capsule (40 mg) by mouth daily Take 30-60 minutes before a meal.   Quantity:  90 capsule   Refills:  3    20 mg are not controlling the GERD symptoms       polyethylene glycol Packet   Commonly known as:  MIRALAX/GLYCOLAX        Dose:  17 g   Take 17 g by mouth daily as needed for constipation   Refills:  0        prochlorperazine 5 MG tablet   Commonly known as:  COMPAZINE   Used for:  Nausea        Dose:  5 mg   Take 1 tablet (5 mg) by mouth every 6 hours as needed for nausea or vomiting   Quantity:  120 tablet   Refills:  0        roflumilast 500 MCG Tabs tablet   Commonly known as:  DALIRESP        Dose:  500 mcg   Take 500 mcg by mouth daily   Refills:  0        salmeterol 50 MCG/DOSE diskus inhaler   Commonly known as:  SEREVENT   Used for:  Pulmonary emphysema, unspecified emphysema type (H)        Dose:  1 puff   Inhale 1 puff into the lungs 2 times daily   Refills:  0                Summary of Visit     Reason for your hospital stay       E coli UTI and moraxella catarhalis bacteremia             After Care     Activity - Up with nursing assistance           Advance Diet as Tolerated       Follow this diet upon discharge: Regular       Fall precautions           General info for SNF       Length of Stay Estimate: Short Term Care: Estimated # of Days <30  Condition at Discharge: Improving  Level of care:skilled   Rehabilitation Potential: Good  Admission H&P remains valid and up-to-date: Yes  Recent Chemotherapy: N/A  Use Nursing Home Standing Orders: Yes       Mantoux instructions       Give two-step Mantoux (PPD) Per Facility Policy Yes             Referrals     Occupational Therapy Adult Consult       Evaluate and treat as clinically indicated.    Reason:  Deconditioning and ADL's       Physical Therapy Adult Consult       Evaluate  and treat as clinically indicated.    Reason:  deconditioning             Statement of Approval     Ordered          04/17/18 1053  I have reviewed and agree with all the recommendations and orders detailed in this document.  EFFECTIVE NOW     Approved and electronically signed by:  Paulo Curry MD

## 2018-04-13 NOTE — PROGRESS NOTES
Patient seen and examined.  Agree with assessment and plan as outlined by my colleague, Dr. Curry this morning.  Notable history of hypertension, COPD, and asthma for which she is chronically on prednisone but not on supplemental O2.  She presents here with sepsis as evidenced by fever, leukocytosis, and tachycardia secondary to a urinary tract infection.  Still mildly confused but oriented and pleasant.  Physical exam is otherwise unremarkable.  Continue Levaquin for now and await cultures.  Mild hyponatremia which is felt to be secondary to hypovolemia and will continue with normal saline hydration.  Follow sodium levels for now and resume chronic medications.

## 2018-04-13 NOTE — PLAN OF CARE
Problem: Patient Care Overview  Goal: Plan of Care/Patient Progress Review  Outcome: No Change  DAy RN  Arrived from ER around 830 am A and O but at times has difficulty following directions with the walker and using phone seems a little off. Alarms on uses call light not impulsive.   Temps through out shift highest 101.4 lowest 99.7 lactic 2.0 Sepsis triggered at 1300 or so, vss  Baseline cough   Up with A2 belt and walker unsteady at times. Unable to void this shift, straight cathed for 900cc wanted to try multiple times first no success, urine freya odorous and cloudy.   Nausea agreed to zofran in the afternoon. Able to take sips of water and some crackers.   Ultram given in afternoon per request states takes at home a couple times a day for her arm pain.

## 2018-04-13 NOTE — PHARMACY-ADMISSION MEDICATION HISTORY
Admission medication history interview status for this patient is complete. See [facility] admission navigator for allergy information, prior to admission medications and immunization status.     Medication history interview source(s):Patient  Medication history resources (including written lists, pill bottles, clinic record):Home care med list  Primary pharmacy:[facility]    Changes made to PTA medication list:  Added: Breo- a possible inhaler the pt is using  Deleted: Atarax, Pevacid  Changed: Iron from BID to daily, prednisone from 15 mg to 10 mg daily (tapering to off), tramadol from 50 mg q6h to  mg BID prn    Actions taken by pharmacist (provider contacted, etc):[facility]      Additional medication history information:Pt states that she takes an inhaler called Breo, this would most likely be Breo Ellipta but she doesn't know the dose and it was not filled at her normal pharmacy. Pt's home care health aid list states that she takes Incruse Ellipta but this name didn't sound familiar to the pt. It is unknown if the pt is taking Breo Ellipta or Incruse Ellipta.     Medication reconciliation/reorder completed by provider prior to medication history? Yes    Do you take OTC medications (eg tylenol, ibuprofen, fish oil, eye/ear drops, etc)? Y(Y/N)    For patients on insulin therapy: N (Y/N)    Time spent in this activity: 40 min    Prior to Admission medications    Medication Sig Last Dose Taking? Auth Provider   PREDNISONE PO Take 10 mg by mouth See Admin Instructions Taper off from 20 mg. Currently taking 10 mg daily. 4/12/2018 at Unknown time Yes Unknown, Entered By History   TRAMADOL HCL PO Take  mg by mouth 2 times daily as needed for moderate to severe pain Max two tablets a day 4/12/2018 at prn Yes Unknown, Entered By History   ferrous sulfate (IRON) 325 (65 Fe) MG tablet Take 325 mg by mouth daily (with breakfast) 4/12/2018 at Unknown time Yes Unknown, Entered By History    gemfibrozil (LOPID) 600 MG tablet Take 1 tablet (600 mg) by mouth daily 4/12/2018 at Unknown time Yes Jenny Hamilton MD   levalbuterol (XOPENEX) 1.25 MG/3ML neb solution Take 3 mLs (1.25 mg) by nebulization every 4 hours as needed for shortness of breath / dyspnea or wheezing 4/12/2018 at Unknown time Yes Jenny Hamilton MD   levothyroxine (SYNTHROID/LEVOTHROID) 25 MCG tablet Take 1 tablet (25 mcg) by mouth daily 4/12/2018 at Unknown time Yes Jenny Hamilton MD   cyanocobalamin (B-12 TR) 1000 MCG TBCR Take 1 tablet by mouth daily 4/12/2018 at Unknown time Yes Jenny Hamilton MD   acetaminophen (TYLENOL) 325 MG tablet Take 2 tablets (650 mg) by mouth every 4 hours as needed for mild pain 4/12/2018 at Unknown time Yes Fuentes Hopson,    prochlorperazine (COMPAZINE) 5 MG tablet Take 1 tablet (5 mg) by mouth every 6 hours as needed for nausea or vomiting  at prn Yes Jenny Hamilton MD   omeprazole (PRILOSEC) 40 MG capsule Take 1 capsule (40 mg) by mouth daily Take 30-60 minutes before a meal. 4/12/2018 at Unknown time Yes Jenny Hamilton MD   roflumilast (DALIRESP) 500 MCG TABS tablet Take 500 mcg by mouth daily  4/12/2018 at Unknown time Yes Cole Carrasco MD   polyethylene glycol (MIRALAX/GLYCOLAX) Packet Take 17 g by mouth daily as needed for constipation  at prn Yes Reported, Patient   albuterol (ALBUTEROL) 108 (90 BASE) MCG/ACT Inhaler Inhale 2 puffs into the lungs every 6 hours as needed 4/12/2018 at Unknown time Yes Hallie Barroso MD   budesonide (PULMICORT FLEXHALER) 180 MCG/ACT inhaler Inhale 1 puff into the lungs 2 times daily 4/12/2018 at Unknown time Yes Hallie Barroso MD   montelukast (SINGULAIR) 10 MG tablet Take 1 tablet (10 mg) by mouth At Bedtime 4/11/2018 Yes Hallie Barroso MD   salmeterol (SEREVENT) 50 MCG/DOSE diskus inhaler Inhale 1 puff into the lungs 2 times  daily 4/12/2018 at Unknown time Yes Hallie Barroso MD   calcium 600 MG tablet Take 1 tablet (600 mg) by mouth daily 4/12/2018 at Unknown time Yes Hallie Barroso MD   cholecalciferol (VITAMIN D) 1000 UNIT tablet Take 1 tablet (1,000 Units) by mouth daily 4/12/2018 at Unknown time Yes Hallie Barroso MD   Multiple Vitamins-Minerals (MULTIVITAMIN OR) Take 1 tablet by mouth daily 4/12/2018 at Unknown time Yes Unknown, Entered By History   Fluticasone Furoate-Vilanterol (BREO ELLIPTA IN) Inhale into the lungs daily  at pt unsure  Unknown, Entered By History   umeclidinium (INCRUSE ELLIPTA) 62.5 MCG/INH oral inhaler Inhale 1 puff into the lungs daily  at pt unsure  Cole Carrasco MD   ASPIRIN NOT PRESCRIBED, INTENTIONAL, 1 each continuous prn. Antiplatelet medication not prescribed intentionally due to Use of NSAIDS   Jenny Hamilton MD

## 2018-04-13 NOTE — PROGRESS NOTES
[facility]  Patient is currently open to home care services with [place].  The patient is currently receiving RN/HHA services.  [facility]  and team have been notified of patient admission.  [facility] liaison will continue to follow patient during stay.  If appropriate provide orders to resume home care at time of discharge.

## 2018-04-13 NOTE — ED NOTES
Brought in by ems with c/o weakness nauseated no vomiting  Temp in 102.4  Runny nose c/o of ears hurting  occasional cough   Here for evaluation of  Not feeling well

## 2018-04-13 NOTE — IP AVS SNAPSHOT
` ` Patient Information     Patient Name Sex Ana Luisa Murdock ([id number]) Female 1940       Room Bed    [facility] 06-      Patient Demographics     Address Phone E-mail Address    [address] [place]  [place] [address] [phone] (Home) *Preferred*  none (Work)  [phone] (Mobile) [email or web address]      Patient Ethnicity & Race     Ethnic Group Patient Race    American White      Emergency Contact(s)     Name Relation Home Work Mobile    Windy Kowalski Daughter [phone] none [phone]    Jake Patel Son [phone] none [phone]      Documents on File        Status Date Received Description       Documents for the Patient    Insurance Card Received () 12/10/09     Face Sheet Received () 12/10/09     Privacy Notice - [place] Received 12/10/09     External Medication Information Consent Accepted () 12/10/09     Patient ID Received 14     Consent for Services - Hospital/Clinic Received () 10/25/11     Consent for Services - Hospital/Clinic  ()      External Medication Information Consent Accepted () 11     Insurance Card Received () 01/10/12     HIM PHYLICIA Authorization - File Only   PHYLICIA to Obtain From: [facility]-2012    HIM PHYLICIA Authorization - File Only   Records released to patient 12    HIM PHYLICIA Authorization   Release of Information: [facility] 2012    HIM PHYLICIA Authorization   [facility]    Consent for Services - Hospital/Clinic Received () 12     Insurance Card Received () 12     External Medication Information Consent Accepted 12     Insurance Card Received () 12     Consent for EHR Access  13 Copied from existing Consent for services - C/HOD collected on 2012    [facility] Specified Other       Insurance Card Received 13 Medicare    Insurance Card Received 13 [employer]    Consent for EHR Access  Received 09/10/13     Consent for Services - Hospital/Clinic Received () 09/10/13     Consent for Services - Geriatrics Received 13     HIM PHYLICIA Authorization - File Only   Radiology CD released to patient 13    Advance Directives and Living Will Received  POLST 13 -    HIM PHYLICIA Authorization - File Only  14 NAM DAS 2013    HIM PHYLICIA Authorization  14     Insurance Card Received 14     Insurance Card Received 09/15/14     Consent for Services - Hospital/Clinic Received () 09/15/14     Physical Therapy Certification Received 10/09/14 2014    Insurance Card Received 05/21/15 [employer]    Consent for Services - Informed Received 05/21/15 [facility]    Consent to Communicate Received 05/21/15 Consent to Communicate    HIM PHYLICIA Authorization - File Only Patient Refused 05/21/15 Email Consent    Insurance Card Received 08/11/15 MA    Consent for Services - Hospital/Clinic Received () 09/09/15     Insurance Card Received 11/13/15     Insurance Card Received 18 [employer]-no 2018 card    Consent for Services/Privacy Notice - Hospital/Clinic Received 18     Consent for Services/Privacy Notice - Hospital/Clinic Received () 16     Business/Insurance/Care Coordination/Health Form - Patient   Acceptance of Responsibility form - 2016    Patient ID Received 18 EXP 2019    Business/Insurance/Care Coordination/Health Form - Patient  16 BLOOD PRESSURE UNIT, [facility] - 3/17/2016    Consent to Communicate  16 AUTHORIZATION TO DISCUSS PROTECTED  HEALTH INFORMATION 16    Business/Insurance/Care Coordination/Health Form - Patient  16 [employer] PROGRAM FORM- 2016    Consent for Services/Privacy Notice - Hospital/Clinic Received () 16     Advance Directives and Living Will Received 17 POLST 2017    Business/Insurance/Care Coordination/Health Form - Patient   03/14/17 MEDICA LETTER REGARDING LANSOPRAZOLE SUPPLY    HIM PHYLICIA Authorization - File Only  05/24/17 AUTHORIZATION FOR ELECTRONIC COMMUNICATION    Consent to Communicate Received 05/24/17     HIM PHYLICIA Authorization - File Only  06/12/17 MYCHART ACCESS    Advance Directives and Living Will Received 06/28/17 Health Care Directive 6-24-17    Advance Directives and Living Will Not Received  Validation of AD 6-24-17    Business/Insurance/Care Coordination/Health Form - Patient  07/17/17 MEDICA APPROVAL OF DICLOFENAC SODIUM GEL 04/01/17 - 06/30/18    Care Everywhere Prospective Auth Received 10/31/17     Consent to Communicate  11/16/17 AUTHORIZATION TO DISCUSS PROTECTED HEALTH INFORMATION 11/16/17    Business/Insurance/Care Coordination/Health Form - Patient  02/15/18 ELIGIBILITY FOR NO COST BONE DENSITY TEST 1/11/18 MEDICA       Documents for the Encounter    CMS IM for Patient Signature Received 04/13/18     CMS IM for Patient Signature Received 04/17/18 2nd      Admission Information     Attending Provider Admitting Provider Admission Type Admission Date/Time    Paulo Curry MD [place], Daniel L, MD Emergency 04/13/18  0420    Discharge Date Hospital Service Auth/Cert Status Service Area     General Medicine [facility]    Unit Room/Bed Admission Status        [facility] 0625/0625-01 Admission (Confirmed)       Admission     Complaint    Sepsis (H)      Hospital Account     Name Acct ID Class Status Primary Coverage    Ana Luisa Lee [id number] Inpatient Open MEDICA - MEDICA DUAL SOL[employer]IONS [facility] PARTNERS            Guarantor Account (for Hospital Account #[id number])     Name Relation to Pt Service Area Active? Acct Type    Ana Luisa Lee  FCS Yes Personal/Family    Address Phone          [address]  [place] [address] [phone](H)              Coverage Information (for Hospital Account #[id number])     F/O Payor/Plan Precert #    MEDICA/MEDICA  [facility] [facility]     Subscriber Subscriber #    Ana Luisa Lee [id number]    Address Phone    [address] [address]  [place] [address] [phone]

## 2018-04-13 NOTE — H&P
[facility]  History and Physical   Hospitalist Service    Paulo Curry MD    Ana Luisa Lee MRN# [id number]   YOB: 1940 Age: 78 year old      Date of Admission:  4/13/2018           Assessment and Plan:   Ana Luisa Lee is a 78-year-old female with history of hypothyroidism, hypercholesterolemia, hypertension, COPD, asthma for which she is dependent on daily prednisone but not supplemental oxygen, GERD, supraventricular tachycardia, and premature ventricular contractions.  She lives at [facility] in independent living.  She presented to the emergency department early in the morning on 4/13/18 for evaluation of fever and nausea.  She also had become weak.  She is not sure how long she has felt ill but thinks it has probably been for the last 2 days.  Of note, she had a steroid injection in her shoulder last week.  Emergency department evaluation showed tachycardia with heart rate in the 110s and fever with temperature 102.4.  White blood cell count was elevated at 11.1.  The remainder of the CBC was unremarkable.  Sodium was 131 with the remainder of the basic metabolic panel being unremarkable.  Liver function tests were normal.  Influenza testing was negative.  Lactic acid was normal at 1.6.  Urinalysis was obtained and suggested urinary tract infection with greater than 182 white blood cells, many bacteria, and large leukocyte esterase.  She was given Levaquin in the emergency department due to penicillin allergy.  I was asked to admit her to the hospital for treatment of urinary tract infection and sepsis.    Problem list:    1.  Sepsis (fever, leukocytosis, and tachycardia )due to urinary tract infection.  I will continue Levaquin.  Normal saline and with potassium at 100 cc/h for 10 hours.  Repeat CBC tomorrow.    2.  Mild hyponatremia.  Repeat basic metabolic panel tomorrow after some normal saline hydration.    3.  COPD and asthma without acute exacerbation.   Continue daily prednisone 10 mg daily.  Continue inhaled medications as prior to admission.    4.  Stable medical conditions include hypothyroidism, hypercholesterolemia, hypertension, and GERD.  Continue prior to admission medications.    DNR/DNI.  POLST reviewed.  Lovenox for DVT prophylaxis  Disposition: Admit as inpatient as 2 nights of hospitalization are expected.           Code Status:   DNR / DNI         Primary Care Physician:   Jenny Hamilton [phone]         Chief Complaint:   Weakness, nausea, and fever    History is obtained from Dr. Savi Orosco, and the medical record         History of Present Illness:   Ana Luisa Lee is a 78-year-old female with history of hypothyroidism, hypercholesterolemia, hypertension, COPD, asthma for which she is dependent on daily prednisone but not supplemental oxygen, GERD, supraventricular tachycardia, and premature ventricular contractions.  She lives at [facility] in independent living.  She presented to the emergency department early in the morning on 4/13/18 for evaluation of fever and nausea.  She also had become weak.  She is not sure how long she has felt ill but thinks it has probably been for the last 2 days.  Of note, she had a steroid injection in her shoulder last week.  Emergency department evaluation showed tachycardia with heart rate in the 110s and fever with temperature 102.4.  White blood cell count was elevated at 11.1.  The remainder of the CBC was unremarkable.  Sodium was 131 with the remainder of the basic metabolic panel being unremarkable.  Liver function tests were normal.  Influenza testing was negative.  Lactic acid was normal at 1.6.  Urinalysis was obtained and suggested urinary tract infection with greater than 182 white blood cells, many bacteria, and large leukocyte esterase.  She was given Levaquin in the emergency department due to penicillin allergy.  I was asked to admit her to the hospital for treatment  of urinary tract infection and sepsis.           Past Medical History:     Patient Active Problem List   Diagnosis     Osteoporosis     Hyperlipidemia with target LDL less than 130     Asthma, persistent     Pes planus     Advance Care Planning     Skin cyst     Pain in joint, shoulder region     PVC (premature ventricular contraction)     Arthritis of hand     Pulmonary nodule seen on imaging study     Health Care Home     Hypercalcemia     Other fatigue     Thrush of mouth and esophagus (H)     Steroid-dependent chronic obstructive pulmonary disease (H)     SVT (supraventricular tachycardia) (H)     Hypothyroidism, unspecified type     HTN, goal <  140/90     Pernicious anemia     COPD exacerbation (H)     COPD (chronic obstructive pulmonary disease) (H)     ALEKSANDRA (acute kidney injury) (H)     Uncontrolled hypertension     Paroxysmal supraventricular tachycardia (H)     PAC (premature atrial contraction)     Syncope and collapse     Malignant hypertension     Weakness     Nonrheumatic aortic valve stenosis     Hip pain     Closed fracture of sacrum, unspecified portion of sacrum, initial encounter (H)     COPD, severe (H)     Sepsis (H)     Urinary tract infection      Past Medical History:   Diagnosis Date     Anemia Dec 2011     Arthritis of hand      Asthma, persistent     f/U dr Brock at [facility]     Asthma, persistent      Chronic intermittent steroid use     for asthma     COPD exacerbation (H) 10/25/2013     Esophageal stricture 2007    s/p dilation     Foot deformity     Left     HTN, goal below 140/90      Hyperlipidemia      Hyperlipidemia LDL goal < 130      Hypothyroidism Dec 2011     Hypothyroidism      Left foot pain Nov 2011     Left shoulder pain Nov 2011     Medication side effects      Osteoporosis      Paroxysmal supraventricular tachycardia (H)      PVC (premature ventricular contraction) May 2012     Skin cyst Dec 2011    L thumb     SOB (shortness of breath) on exertion May 2012      SVT (supraventricular tachycardia) (H)              Past Surgical History:     Past Surgical History:   Procedure Laterality Date     BUNIONECTOMY LAPIDUS WITH TARSAL METATARSAL (TMT) FUSION  1990's     EP STUDY, MODIFIED  7/2012    SVT not induced, PVC's     GYN SURGERY  1980     HYSTERECTOMY TOTAL ABDOMINAL       HYSTERECTOMY, PAP NO LONGER INDICATED       TONSILLECTOMY              Home Medications:     Prior to Admission medications    Medication Sig Last Dose Taking? Auth Provider   gemfibrozil (LOPID) 600 MG tablet Take 1 tablet (600 mg) by mouth daily   Jenny Hamilton MD   traMADol (ULTRAM) 50 MG tablet Take 1 tablet (50 mg) by mouth every 6 hours as needed for severe pain   Jenny Hamilton MD   levalbuterol (XOPENEX) 1.25 MG/3ML neb solution Take 3 mLs (1.25 mg) by nebulization every 4 hours as needed for shortness of breath / dyspnea or wheezing   Jenny Hamilton MD   levothyroxine (SYNTHROID/LEVOTHROID) 25 MCG tablet Take 1 tablet (25 mcg) by mouth daily   Jenny Hamilton MD   cyanocobalamin (B-12 TR) 1000 MCG TBCR Take 1 tablet by mouth daily   Jenny Hamilton MD   predniSONE (DELTASONE) 5 MG tablet Take 3 tablets (15 mg) by mouth See Admin Instructions 11/17/17 started prednisone taper.   20 mg daily for 1 week then taper as directed   Doctor, Antionette, MD   acetaminophen (TYLENOL) 325 MG tablet Take 2 tablets (650 mg) by mouth every 4 hours as needed for mild pain   Fuentes Hopson DO   hydrOXYzine (ATARAX) 25 MG tablet Take 1 tablet (25 mg) by mouth 3 times daily as needed for other (pain)   Fuentes Hopson, DO   umeclidinium (INCRUSE ELLIPTA) 62.5 MCG/INH oral inhaler Inhale 1 puff into the lungs daily   Cole Carrasco MD   prochlorperazine (COMPAZINE) 5 MG tablet Take 1 tablet (5 mg) by mouth every 6 hours as needed for nausea or vomiting   Jenny Hamilton MD   omeprazole (PRILOSEC) 40 MG capsule  Take 1 capsule (40 mg) by mouth daily Take 30-60 minutes before a meal.   Jenny Hamilton MD   roflumilast (DALIRESP) 500 MCG TABS tablet Take 500 mcg by mouth daily    Cole Carrasco MD   polyethylene glycol (MIRALAX/GLYCOLAX) Packet Take 17 g by mouth daily as needed for constipation   Reported, Patient   albuterol (ALBUTEROL) 108 (90 BASE) MCG/ACT Inhaler Inhale 2 puffs into the lungs every 6 hours as needed   Hallie Barroso MD   budesonide (PULMICORT FLEXHALER) 180 MCG/ACT inhaler Inhale 1 puff into the lungs 2 times daily   Hallie Barroso MD   montelukast (SINGULAIR) 10 MG tablet Take 1 tablet (10 mg) by mouth At Bedtime   Hallie Barroso MD   salmeterol (SEREVENT) 50 MCG/DOSE diskus inhaler Inhale 1 puff into the lungs 2 times daily   Hallie Barroso MD   ferrous sulfate (IRON) 325 (65 FE) MG tablet Take 1 tablet (325 mg) by mouth daily (with breakfast)  Patient taking differently: Take 1 tablet by mouth 2 times daily    Hallie Barroso MD   calcium 600 MG tablet Take 1 tablet (600 mg) by mouth daily   Hallie Barroso MD   LANsoprazole (PREVACID) 30 MG CR capsule Take 1 capsule (30 mg) by mouth daily   Hallie Barroso MD   cholecalciferol (VITAMIN D) 1000 UNIT tablet Take 1 tablet (1,000 Units) by mouth daily   Hallie Barroso MD   Multiple Vitamins-Minerals (MULTIVITAMIN OR) Take 1 tablet by mouth daily   Unknown, Entered By History   ASPIRIN NOT PRESCRIBED, INTENTIONAL, 1 each continuous prn. Antiplatelet medication not prescribed intentionally due to Use of NSAIDS   Jenny Hamilton MD            Allergies:     Allergies   Allergen Reactions     Hydralazine Anxiety     Penicillin G Hives     Tolerated cephalosporines 2017     Meloxicam      dizziness       Metoprolol      ? Skin rash on the back     Norvasc [Amlodipine Besylate] Hives            Social History:     Social History   Substance Use Topics      Smoking status: Former Smoker     Packs/day: 1.00     Years: 15.00     Quit date: 1/1/1980     Smokeless tobacco: Never Used     Alcohol use No      Comment: Occasional drink             Family History:     Family History   Problem Relation Age of Onset     CEREBROVASCULAR DISEASE Mother      Hypertension Mother      Family History Negative Father      Unknown/Adopted Maternal Grandmother      Unknown/Adopted Maternal Grandfather      Unknown/Adopted Paternal Grandmother      Unknown/Adopted Paternal Grandfather      Family History Negative Brother      Family History Negative Sister      Family History Negative Son      Family History Negative Daughter      Asthma No family hx of      C.A.D. No family hx of      DIABETES No family hx of      CANCER No family hx of               Review of Systems:   The 10 point Review of Systems is negative other than as noted in the HPI.           Physical Exam:   Blood pressure 113/61, pulse 129, temperature 102  F (38.9  C), temperature source Oral, resp. rate 22, weight 59 kg (130 lb), SpO2 96 %, not currently breastfeeding.  130 lbs 0 oz      GENERAL: Pleasant and cooperative. No acute distress.  EYES: Pupils equal and round. No scleral erythema or icterus.  ENT: External ears are normal without deformity. Posterior oropharynx is without erythem, swelling, or exudate.  NECK: Supple. No masses or swelling. No tenderness. Thyroid is normal without mass or tenderness.  CHEST: Clear to auscultation. Normal breath sounds. No retractions.   CV: Regular rate and rhythm. No JVD. Pulses normal.  ABDOMEN: Bowel sounds present. No tenderness. No masses or hernia.  EXTREMETIES: No clubbing, cyanosis, or ischemia.  SKIN: Warm and dry to touch. No wounds or rashes.  NEUROLOGIC: Strength and sensation are normal. Deep tendon reflexes are normal. Cranial nerves are normal.             Data:   All new lab and imaging data was reviewed.     Results for orders placed or performed during the  hospital encounter of 04/13/18 (from the past 24 hour(s))   CBC with platelets differential   Result Value Ref Range    WBC 11.1 (H) 4.0 - 11.0 10e9/L    RBC Count 3.76 (L) 3.8 - 5.2 10e12/L    Hemoglobin 11.7 11.7 - 15.7 g/dL    Hematocrit 34.9 (L) 35.0 - 47.0 %    MCV 93 78 - 100 fl    MCH 31.1 26.5 - 33.0 pg    MCHC 33.5 31.5 - 36.5 g/dL    RDW 13.9 10.0 - 15.0 %    Platelet Count 230 150 - 450 10e9/L    Diff Method Automated Method     % Neutrophils 92.8 %    % Lymphocytes 5.1 %    % Monocytes 1.1 %    % Eosinophils 0.3 %    % Basophils 0.1 %    % Immature Granulocytes 0.6 %    Nucleated RBCs 0 0 /100    Absolute Neutrophil 10.3 (H) 1.6 - 8.3 10e9/L    Absolute Lymphocytes 0.6 (L) 0.8 - 5.3 10e9/L    Absolute Monocytes 0.1 0.0 - 1.3 10e9/L    Absolute Eosinophils 0.0 0.0 - 0.7 10e9/L    Absolute Basophils 0.0 0.0 - 0.2 10e9/L    Abs Immature Granulocytes 0.1 0 - 0.4 10e9/L    Absolute Nucleated RBC 0.0    Comprehensive metabolic panel   Result Value Ref Range    Sodium 131 (L) 133 - 144 mmol/L    Potassium 3.7 3.4 - 5.3 mmol/L    Chloride 98 94 - 109 mmol/L    Carbon Dioxide 25 20 - 32 mmol/L    Anion Gap 8 3 - 14 mmol/L    Glucose 118 (H) 70 - 99 mg/dL    Urea Nitrogen 14 7 - 30 mg/dL    Creatinine 0.77 0.52 - 1.04 mg/dL    GFR Estimate 72 >60 mL/min/1.7m2    GFR Estimate If Black 88 >60 mL/min/1.7m2    Calcium 9.5 8.5 - 10.1 mg/dL    Bilirubin Total 0.5 0.2 - 1.3 mg/dL    Albumin 3.9 3.4 - 5.0 g/dL    Protein Total 7.4 6.8 - 8.8 g/dL    Alkaline Phosphatase 66 40 - 150 U/L    ALT 14 0 - 50 U/L    AST 14 0 - 45 U/L   Lactic acid whole blood   Result Value Ref Range    Lactic Acid 1.6 0.7 - 2.0 mmol/L   Influenza A/B antigen   Result Value Ref Range    Influenza A/B Agn Specimen Nose     Influenza A Negative NEG^Negative    Influenza B Negative NEG^Negative   ISTAT gases lactate hugo POCT   Result Value Ref Range    Ph Venous 7.45 (H) 7.32 - 7.43 pH    PCO2 Venous 36 (L) 40 - 50 mm Hg    PO2 Venous 28 25 - 47 mm  Hg    Bicarbonate Venous 25 21 - 28 mmol/L    O2 Sat Venous 56 %    Lactic Acid 1.6 0.7 - 2.1 mmol/L   UA with Microscopic   Result Value Ref Range    Color Urine Yellow     Appearance Urine Slightly Cloudy     Glucose Urine Negative NEG^Negative mg/dL    Bilirubin Urine Negative NEG^Negative    Ketones Urine Negative NEG^Negative mg/dL    Specific Gravity Urine 1.009 1.003 - 1.035    Blood Urine Small (A) NEG^Negative    pH Urine 6.0 5.0 - 7.0 pH    Protein Albumin Urine Negative NEG^Negative mg/dL    Urobilinogen mg/dL 0.0 0.0 - 2.0 mg/dL    Nitrite Urine Negative NEG^Negative    Leukocyte Esterase Urine Large (A) NEG^Negative    Source Catheterized Urine     WBC Urine >182 (H) 0 - 5 /HPF    RBC Urine 7 (H) 0 - 2 /HPF    WBC Clumps Present (A) NEG^Negative /HPF    Bacteria Urine Many (A) NEG^Negative /HPF    Hyaline Casts 3 (H) 0 - 2 /LPF   XR Chest 2 Views    Narrative    XR CHEST 2 VW  4/13/2018 5:03 AM     HISTORY: Fever.     COMPARISON: 5/27/2017.    FINDINGS: The heart is at the upper limits of normal in size without  pulmonary edema. There is mild scarring at the left lung base. The  lungs are otherwise clear. No pneumothorax or pleural effusion.  Moderate-sized hiatal hernia. Degenerative disease in the thoracic  spine.      Impression    IMPRESSION: No acute abnormality.    SABA KOVACS MD

## 2018-04-13 NOTE — PROGRESS NOTES
[facility] Care Coordination Contact  Rec'd [facility] notification of hospital admission.  EPIC notes reviewed. Secure e-mail sent to Windy BARTHOLOMEW [facility] to inform.  VM left with Rosio RINALDI at [facility] to introduce myself and share community POC.  CM to f/u with [facility],  and [facility] 4/16/18 if still in hospital.  TOG log initiated.  Yeni Savage RN, BC  Supervisor [facility]   [phone] (Fax)

## 2018-04-13 NOTE — ED NOTES
[facility]  ED Nurse Handoff Report    Ana Luisa Lee is a 78 year old female   ED Chief complaint: Fever and Nausea  . ED Diagnosis:   Final diagnoses:   Febrile illness, acute   Urinary tract infection without hematuria, site unspecified     Allergies:   Allergies   Allergen Reactions     Hydralazine Anxiety     Penicillin G Hives     Tolerated cephalosporines 2017     Meloxicam      dizziness       Metoprolol      ? Skin rash on the back     Norvasc [Amlodipine Besylate] Hives       Code Status: Full Code  Activity level - Baseline/Home:  Stand with Assist. Activity Level - Current:   Stand with Assist. Lift room needed: No. Bariatric: No   Needed: No   Isolation: No. Infection: Not Applicable.     Vital Signs:   Vitals:    04/13/18 0515 04/13/18 0530 04/13/18 0545 04/13/18 0549   BP: 144/74 145/69 135/65    Pulse:       Resp:       Temp:    102  F (38.9  C)   TempSrc:    Oral   SpO2: 96% 97% 98%    Weight:           Cardiac Rhythm:  ,      Pain level:    Patient confused: No. Patient Falls Risk: Yes.   Elimination Status: Has voided   Patient Report - Initial Complaint: c/o nausea and general weakness . Focused Assessment: fever  Cough runny nose  Lungs diminished at bases has been having trouble urinating  Feels like she has  To has been up to BSC but then unable to  Pt states nebs herself every 4 hours  Copd    Tests Performed:  Labs Ordered and Resulted from Time of ED Arrival Up to the Time of Departure from the ED   CBC WITH PLATELETS DIFFERENTIAL - Abnormal; Notable for the following:        Result Value    WBC 11.1 (*)     RBC Count 3.76 (*)     Hematocrit 34.9 (*)     Absolute Neutrophil 10.3 (*)     Absolute Lymphocytes 0.6 (*)     All other components within normal limits   COMPREHENSIVE METABOLIC PANEL - Abnormal; Notable for the following:     Sodium 131 (*)     Glucose 118 (*)     All other components within normal limits   ROUTINE UA WITH MICROSCOPIC - Abnormal;  Notable for the following:     Blood Urine Small (*)     Leukocyte Esterase Urine Large (*)     WBC Urine >182 (*)     RBC Urine 7 (*)     WBC Clumps Present (*)     Bacteria Urine Many (*)     Hyaline Casts 3 (*)     All other components within normal limits   ISTAT  GASES LACTATE RADHA POCT - Abnormal; Notable for the following:     Ph Venous 7.45 (*)     PCO2 Venous 36 (*)     All other components within normal limits   LACTIC ACID WHOLE BLOOD   PULSE OXIMETRY NURSING   CARDIAC CONTINUOUS MONITORING   MEASURE URINE OUTPUT   PATIENT CARE ORDER   BLOOD CULTURE   BLOOD CULTURE   INFLUENZA A/B ANTIGEN   URINE CULTURE AEROBIC BACTERIAL   . Abnormal Results: urine .   Treatments provided: iv labsx  Fever treatment  Iv antibiotic   Family Comments: no family here  OBS brochure/video discussed/provided to patient:  Yes  ED Medications:   Medications   0.9% sodium chloride BOLUS (0 mLs Intravenous Stopped 4/13/18 0550)     Followed by   sodium chloride 0.9% infusion (not administered)   levofloxacin (LEVAQUIN) infusion 750 mg (not administered)   acetaminophen (TYLENOL) tablet 1,000 mg (1,000 mg Oral Given 4/13/18 0438)     Drips infusing:  No  For the majority of the shift, the patient's behavior Green. Interventions performed were none     Severe Sepsis OR Septic Shock Diagnosis Present: No      ED Nurse Name/Phone Number: Chica Crockett,   5:50 AM  Pt states at home uses walker  Also does nebs every 4 hours  For her copd  RECEIVING UNIT ED HANDOFF REVIEW    Above ED Nurse Handoff Report was reviewed: Yes  Reviewed by: Rosa M Woods on April 13, 2018 at 7:28 AM

## 2018-04-14 LAB
ANION GAP SERPL CALCULATED.3IONS-SCNC: 8 MMOL/L (ref 3–14)
BACTERIA SPEC CULT: ABNORMAL
BUN SERPL-MCNC: 15 MG/DL (ref 7–30)
CALCIUM SERPL-MCNC: 8.7 MG/DL (ref 8.5–10.1)
CHLORIDE SERPL-SCNC: 103 MMOL/L (ref 94–109)
CO2 SERPL-SCNC: 22 MMOL/L (ref 20–32)
CREAT SERPL-MCNC: 0.75 MG/DL (ref 0.52–1.04)
ERYTHROCYTE [DISTWIDTH] IN BLOOD BY AUTOMATED COUNT: 14.4 % (ref 10–15)
GFR SERPL CREATININE-BSD FRML MDRD: 75 ML/MIN/1.7M2
GLUCOSE SERPL-MCNC: 85 MG/DL (ref 70–99)
HCT VFR BLD AUTO: 30.5 % (ref 35–47)
HGB BLD-MCNC: 10 G/DL (ref 11.7–15.7)
Lab: ABNORMAL
MCH RBC QN AUTO: 30.6 PG (ref 26.5–33)
MCHC RBC AUTO-ENTMCNC: 32.8 G/DL (ref 31.5–36.5)
MCV RBC AUTO: 93 FL (ref 78–100)
PLATELET # BLD AUTO: 180 10E9/L (ref 150–450)
POTASSIUM SERPL-SCNC: 3.7 MMOL/L (ref 3.4–5.3)
RBC # BLD AUTO: 3.27 10E12/L (ref 3.8–5.2)
SODIUM SERPL-SCNC: 133 MMOL/L (ref 133–144)
SPECIMEN SOURCE: ABNORMAL
WBC # BLD AUTO: 15.1 10E9/L (ref 4–11)

## 2018-04-14 PROCEDURE — 25000132 ZZH RX MED GY IP 250 OP 250 PS 637: Performed by: INTERNAL MEDICINE

## 2018-04-14 PROCEDURE — 94640 AIRWAY INHALATION TREATMENT: CPT

## 2018-04-14 PROCEDURE — 36415 COLL VENOUS BLD VENIPUNCTURE: CPT | Performed by: INTERNAL MEDICINE

## 2018-04-14 PROCEDURE — 94640 AIRWAY INHALATION TREATMENT: CPT | Mod: 76

## 2018-04-14 PROCEDURE — 25000128 H RX IP 250 OP 636: Performed by: INTERNAL MEDICINE

## 2018-04-14 PROCEDURE — 12000000 ZZH R&B MED SURG/OB

## 2018-04-14 PROCEDURE — 99232 SBSQ HOSP IP/OBS MODERATE 35: CPT | Performed by: INTERNAL MEDICINE

## 2018-04-14 PROCEDURE — 25000125 ZZHC RX 250: Performed by: INTERNAL MEDICINE

## 2018-04-14 PROCEDURE — 80048 BASIC METABOLIC PNL TOTAL CA: CPT | Performed by: INTERNAL MEDICINE

## 2018-04-14 PROCEDURE — 85027 COMPLETE CBC AUTOMATED: CPT | Performed by: INTERNAL MEDICINE

## 2018-04-14 PROCEDURE — 40000275 ZZH STATISTIC RCP TIME EA 10 MIN

## 2018-04-14 RX ADMIN — LEVALBUTEROL HYDROCHLORIDE 1.25 MG: 1.25 SOLUTION RESPIRATORY (INHALATION) at 07:15

## 2018-04-14 RX ADMIN — ENOXAPARIN SODIUM 40 MG: 40 INJECTION SUBCUTANEOUS at 09:16

## 2018-04-14 RX ADMIN — TRAMADOL HYDROCHLORIDE 50 MG: 50 TABLET, COATED ORAL at 22:02

## 2018-04-14 RX ADMIN — OMEPRAZOLE 40 MG: 20 CAPSULE, DELAYED RELEASE ORAL at 09:14

## 2018-04-14 RX ADMIN — TRAMADOL HYDROCHLORIDE 50 MG: 50 TABLET, COATED ORAL at 15:47

## 2018-04-14 RX ADMIN — FLUTICASONE FUROATE AND VILANTEROL TRIFENATATE 1 PUFF: 100; 25 POWDER RESPIRATORY (INHALATION) at 08:36

## 2018-04-14 RX ADMIN — GEMFIBROZIL 600 MG: 600 TABLET ORAL at 09:16

## 2018-04-14 RX ADMIN — MONTELUKAST SODIUM 10 MG: 10 TABLET, FILM COATED ORAL at 22:02

## 2018-04-14 RX ADMIN — LEVOTHYROXINE SODIUM 25 MCG: 25 TABLET ORAL at 09:15

## 2018-04-14 RX ADMIN — LEVOFLOXACIN 500 MG: 5 INJECTION, SOLUTION INTRAVENOUS at 05:54

## 2018-04-14 RX ADMIN — ACETAMINOPHEN 650 MG: 325 TABLET, FILM COATED ORAL at 09:45

## 2018-04-14 RX ADMIN — ACETAMINOPHEN 650 MG: 325 TABLET, FILM COATED ORAL at 22:02

## 2018-04-14 RX ADMIN — ACETAMINOPHEN 650 MG: 325 TABLET, FILM COATED ORAL at 15:47

## 2018-04-14 RX ADMIN — ROFLUMILAST 500 MCG: 500 TABLET ORAL at 09:15

## 2018-04-14 RX ADMIN — LEVALBUTEROL HYDROCHLORIDE 1.25 MG: 1.25 SOLUTION RESPIRATORY (INHALATION) at 20:44

## 2018-04-14 RX ADMIN — LEVALBUTEROL HYDROCHLORIDE 1.25 MG: 1.25 SOLUTION RESPIRATORY (INHALATION) at 11:12

## 2018-04-14 RX ADMIN — PREDNISONE 10 MG: 10 TABLET ORAL at 09:15

## 2018-04-14 RX ADMIN — LEVALBUTEROL HYDROCHLORIDE 1.25 MG: 1.25 SOLUTION RESPIRATORY (INHALATION) at 15:21

## 2018-04-14 RX ADMIN — TRAMADOL HYDROCHLORIDE 50 MG: 50 TABLET, COATED ORAL at 09:45

## 2018-04-14 NOTE — PROGRESS NOTES
[facility]  Hospitalist Progress Note  Patient Name: Ana Luisa Lee    MRN: [id number]  Provider: Paulo Curry MD  04/14/18    Initial presenting complaint/issue to hospital (Diagnosis): fever and nausea         Assessment and Plan:      Ana Luisa Lee is a 78-year-old female with history of hypothyroidism, hypercholesterolemia, hypertension, COPD and asthma for which she is dependent on daily prednisone but not supplemental oxygen, GERD, supraventricular tachycardia, and premature ventricular contractions.  She lives at [facility] in independent living.  She presented to the emergency department early in the morning on 4/13/18 for evaluation of fever and nausea.  She also had become weak.  She had felt ill for 2 days.  Of note, she had a steroid injection in her shoulder the week before presentation.  Emergency department evaluation showed tachycardia with heart rate in the 110s and fever with temperature 102.4.  White blood cell count was elevated at 11.1.  The remainder of the CBC was unremarkable.  Sodium was 131 with the remainder of the basic metabolic panel being unremarkable.  Liver function tests were normal.  Influenza testing was negative.  Lactic acid was normal at 1.6.  Urinalysis was obtained and suggested urinary tract infection with greater than 182 white blood cells, many bacteria, and large leukocyte esterase.  She was given Levaquin in the emergency department due to penicillin allergy.  I was asked to admit her to the hospital for treatment of urinary tract infection and sepsis.     Problem list:     1.  Sepsis (fever, leukocytosis, and tachycardia )due to urinary tract infection.  Interestingly, 2 of 2 blood cultures are growing gram negative cocci and urine culture is growing E. coli.  These preliminary results are somewhat discordant.  Continue IV Levaquin.  Follow culture identification and susceptibilities.  If these are in fact a different organisms infectious  "disease consult might be indicated.     2.  Mild hyponatremia.    Resolved     3.  COPD and asthma without acute exacerbation.  Continue daily prednisone 10 mg daily.  Continue inhaled medications as prior to admission.     4.  Stable medical conditions include hypothyroidism, hypercholesterolemia, hypertension, and GERD.  Continue prior to admission medications.    5.  Physical deconditioning.  PT consult.    6.  Pain management.  Patient denies pain is not receiving pain medication.     DNR/DNI.  POLST reviewed.  Lovenox for DVT prophylaxis  Disposition: Continue inpatient cares.  I suspect she will require at least 2 more nights of hospitalization.        Interval History:      Patient is feeling well.  No new problems.                  Physical Exam:      Last Vital Signs:  /90  Pulse 106  Temp 99  F (37.2  C) (Oral)  Resp 16  Ht 1.575 m (5' 2\")  Wt 56 kg (123 lb 6.4 oz)  SpO2 98%  BMI 22.57 kg/m2    Intake/Output Summary (Last 24 hours) at 04/14/18 1445  Last data filed at 04/14/18 0947   Gross per 24 hour   Intake              620 ml   Output              625 ml   Net               -5 ml       GENERAL:  Comfortable. Cooperative.  PSYCH: pleasant, oriented, No acute distress.  EYES: PERRLA, Normal conjunctiva.  HEART:  Regular rate and rhythm. No JVD. Pulses normal. No edema.  LUNGS:  Clear to auscultation, normal Respiratory effort.  ABDOMEN:  Soft, no hepatosplenomegaly, normal bowel sounds.  EXTREMETIES: No clubbing, cyanosis or ischemia  SKIN:  Dry to touch, No rash.           Medications:      All current medications were reviewed.         Data:      All new lab and imaging data was reviewed.   Labs:    Recent Labs  Lab 04/13/18  0511 04/13/18  0445 04/13/18  0429   CULT Cultured on the 1st day of incubation:Moraxella (Branhamella) catarrhalisSusceptibility testing done on previous specimen*  Critical Value/Significant Value, preliminary result only, called to and read back byBrenna Eid, " RN at 2302 [id number].DK >100,000 colonies/mLEscherichia coliSusceptibility testing in progress* Cultured on the 1st day of incubation:Moraxella (Branhamella) catarrhalisSusceptibility testing in progress*  Critical Value/Significant Value, preliminary result only, called to and read back byBrenna Eid RN at 2323 [id number].DK          Lab Results   Component Value Date     04/14/2018     04/13/2018     12/28/2017    Lab Results   Component Value Date    CHLORIDE 103 04/14/2018    CHLORIDE 98 04/13/2018    CHLORIDE 101 12/28/2017    Lab Results   Component Value Date    BUN 15 04/14/2018    BUN 14 04/13/2018    BUN 9 12/28/2017      Lab Results   Component Value Date    POTASSIUM 3.7 04/14/2018    POTASSIUM 3.7 04/13/2018    POTASSIUM 4.2 12/28/2017    Lab Results   Component Value Date    CO2 22 04/14/2018    CO2 25 04/13/2018    CO2 24 12/28/2017    Lab Results   Component Value Date    CR 0.75 04/14/2018    CR 0.75 04/13/2018    CR 0.77 04/13/2018          Recent Labs  Lab 04/14/18  0723 04/13/18  0836 04/13/18  0429   WBC 15.1*  --  11.1*   HGB 10.0*  --  11.7   HCT 30.5*  --  34.9*   MCV 93  --  93    204 230

## 2018-04-14 NOTE — PROGRESS NOTES
Pola from Lab called with positive blood culture results from 4/13 0511 spec. Lt hand- gram pos cocci  2313: Baseline shoulder pain controlled with ultram. Up to BSC with assist of 1- slightly unsteady & moving slowly. Reports no appetite- drinking water & had 1 pudding. Incontinent of urine. No success with voiding on commode. Elevated post void residuals- will cont to monitor.  2327: positive blood culture 4/13 0429 rt arm- gram neg cocci  2348: hospitalist paged with blood culture results

## 2018-04-14 NOTE — PROGRESS NOTES
X-cover    Called for 2/2 BCx + for [facility]    Unusual finding in one with UTI    On levofloxacin-continue, await further ID and S

## 2018-04-14 NOTE — PLAN OF CARE
Problem: Patient Care Overview  Goal: Plan of Care/Patient Progress Review  Outcome: Improving   Patient alert but disoriented,  VSS, On Levaquin for UTI. Baseline shoulder pain controlled with ultram. Up to BSC with assist of 2- slightly unsteady & moving slowly Void and Had a bowel movement. . Incontinent of urine. Urine if foul smelling and  Cloudy. Will continue to monitor

## 2018-04-15 ENCOUNTER — APPOINTMENT (OUTPATIENT)
Dept: PHYSICAL THERAPY | Facility: CLINIC | Age: 78
DRG: 872 | End: 2018-04-15
Payer: COMMERCIAL

## 2018-04-15 LAB
BACTERIA SPEC CULT: ABNORMAL
ERYTHROCYTE [DISTWIDTH] IN BLOOD BY AUTOMATED COUNT: 14.1 % (ref 10–15)
HCT VFR BLD AUTO: 32.1 % (ref 35–47)
HGB BLD-MCNC: 10.5 G/DL (ref 11.7–15.7)
Lab: ABNORMAL
Lab: ABNORMAL
MAGNESIUM SERPL-MCNC: 2.1 MG/DL (ref 1.6–2.3)
MCH RBC QN AUTO: 30.4 PG (ref 26.5–33)
MCHC RBC AUTO-ENTMCNC: 32.7 G/DL (ref 31.5–36.5)
MCV RBC AUTO: 93 FL (ref 78–100)
PLATELET # BLD AUTO: 171 10E9/L (ref 150–450)
POTASSIUM SERPL-SCNC: 3.3 MMOL/L (ref 3.4–5.3)
POTASSIUM SERPL-SCNC: 4.7 MMOL/L (ref 3.4–5.3)
RBC # BLD AUTO: 3.45 10E12/L (ref 3.8–5.2)
SPECIMEN SOURCE: ABNORMAL
SPECIMEN SOURCE: ABNORMAL
WBC # BLD AUTO: 11.3 10E9/L (ref 4–11)

## 2018-04-15 PROCEDURE — 83735 ASSAY OF MAGNESIUM: CPT | Performed by: INTERNAL MEDICINE

## 2018-04-15 PROCEDURE — 97110 THERAPEUTIC EXERCISES: CPT | Mod: GP | Performed by: PHYSICAL THERAPIST

## 2018-04-15 PROCEDURE — 25000132 ZZH RX MED GY IP 250 OP 250 PS 637: Performed by: INTERNAL MEDICINE

## 2018-04-15 PROCEDURE — 94640 AIRWAY INHALATION TREATMENT: CPT | Mod: 76

## 2018-04-15 PROCEDURE — 94640 AIRWAY INHALATION TREATMENT: CPT

## 2018-04-15 PROCEDURE — 97116 GAIT TRAINING THERAPY: CPT | Mod: GP | Performed by: PHYSICAL THERAPIST

## 2018-04-15 PROCEDURE — 97530 THERAPEUTIC ACTIVITIES: CPT | Mod: GP | Performed by: PHYSICAL THERAPIST

## 2018-04-15 PROCEDURE — 12000000 ZZH R&B MED SURG/OB

## 2018-04-15 PROCEDURE — 40000193 ZZH STATISTIC PT WARD VISIT: Performed by: PHYSICAL THERAPIST

## 2018-04-15 PROCEDURE — 97161 PT EVAL LOW COMPLEX 20 MIN: CPT | Mod: GP | Performed by: PHYSICAL THERAPIST

## 2018-04-15 PROCEDURE — 25000125 ZZHC RX 250: Performed by: INTERNAL MEDICINE

## 2018-04-15 PROCEDURE — 99232 SBSQ HOSP IP/OBS MODERATE 35: CPT | Performed by: INTERNAL MEDICINE

## 2018-04-15 PROCEDURE — 36415 COLL VENOUS BLD VENIPUNCTURE: CPT | Performed by: INTERNAL MEDICINE

## 2018-04-15 PROCEDURE — 40000275 ZZH STATISTIC RCP TIME EA 10 MIN

## 2018-04-15 PROCEDURE — 25000128 H RX IP 250 OP 636: Performed by: INTERNAL MEDICINE

## 2018-04-15 PROCEDURE — 84132 ASSAY OF SERUM POTASSIUM: CPT | Performed by: INTERNAL MEDICINE

## 2018-04-15 PROCEDURE — 85027 COMPLETE CBC AUTOMATED: CPT | Performed by: INTERNAL MEDICINE

## 2018-04-15 RX ORDER — CEFTRIAXONE 1 G/1
1 INJECTION, POWDER, FOR SOLUTION INTRAMUSCULAR; INTRAVENOUS EVERY 24 HOURS
Status: DISCONTINUED | OUTPATIENT
Start: 2018-04-15 | End: 2018-04-17 | Stop reason: HOSPADM

## 2018-04-15 RX ADMIN — ROFLUMILAST 500 MCG: 500 TABLET ORAL at 08:14

## 2018-04-15 RX ADMIN — TRAMADOL HYDROCHLORIDE 50 MG: 50 TABLET, COATED ORAL at 21:56

## 2018-04-15 RX ADMIN — POTASSIUM CHLORIDE 20 MEQ: 1500 TABLET, EXTENDED RELEASE ORAL at 13:58

## 2018-04-15 RX ADMIN — ACETAMINOPHEN 650 MG: 325 TABLET, FILM COATED ORAL at 08:35

## 2018-04-15 RX ADMIN — SALMETEROL XINAFOATE 1 PUFF: 50 POWDER, METERED ORAL; RESPIRATORY (INHALATION) at 11:04

## 2018-04-15 RX ADMIN — OMEPRAZOLE 40 MG: 20 CAPSULE, DELAYED RELEASE ORAL at 08:14

## 2018-04-15 RX ADMIN — CEFTRIAXONE SODIUM 1 G: 1 INJECTION, POWDER, FOR SOLUTION INTRAMUSCULAR; INTRAVENOUS at 09:32

## 2018-04-15 RX ADMIN — LEVALBUTEROL HYDROCHLORIDE 1.25 MG: 1.25 SOLUTION RESPIRATORY (INHALATION) at 11:04

## 2018-04-15 RX ADMIN — FLUTICASONE FUROATE AND VILANTEROL TRIFENATATE 1 PUFF: 100; 25 POWDER RESPIRATORY (INHALATION) at 11:04

## 2018-04-15 RX ADMIN — GEMFIBROZIL 600 MG: 600 TABLET ORAL at 08:14

## 2018-04-15 RX ADMIN — TRAMADOL HYDROCHLORIDE 50 MG: 50 TABLET, COATED ORAL at 15:56

## 2018-04-15 RX ADMIN — PREDNISONE 10 MG: 10 TABLET ORAL at 08:15

## 2018-04-15 RX ADMIN — LEVALBUTEROL HYDROCHLORIDE 1.25 MG: 1.25 SOLUTION RESPIRATORY (INHALATION) at 05:27

## 2018-04-15 RX ADMIN — ENOXAPARIN SODIUM 40 MG: 40 INJECTION SUBCUTANEOUS at 08:14

## 2018-04-15 RX ADMIN — LEVOTHYROXINE SODIUM 25 MCG: 25 TABLET ORAL at 08:14

## 2018-04-15 RX ADMIN — MONTELUKAST SODIUM 10 MG: 10 TABLET, FILM COATED ORAL at 21:56

## 2018-04-15 RX ADMIN — TRAMADOL HYDROCHLORIDE 50 MG: 50 TABLET, COATED ORAL at 08:35

## 2018-04-15 RX ADMIN — LEVOFLOXACIN 500 MG: 5 INJECTION, SOLUTION INTRAVENOUS at 06:38

## 2018-04-15 RX ADMIN — POTASSIUM CHLORIDE 40 MEQ: 1500 TABLET, EXTENDED RELEASE ORAL at 08:35

## 2018-04-15 RX ADMIN — LEVALBUTEROL HYDROCHLORIDE 1.25 MG: 1.25 SOLUTION RESPIRATORY (INHALATION) at 20:19

## 2018-04-15 RX ADMIN — SALMETEROL XINAFOATE 1 PUFF: 50 POWDER, METERED ORAL; RESPIRATORY (INHALATION) at 20:12

## 2018-04-15 NOTE — CONSULTS
Consult Date:  04/15/2018      INFECTIOUS DISEASE CONSULTATION      REFERRING PHYSICIAN:  Dr. Curry.       IMPRESSIONS:   1.  A 78-year-old female admitted with a fall, signs of low-grade sepsis, abnormal urinalysis without other symptoms and urine culture positive, improved on Levaquin.   2.  Surprise positive blood cultures 2 out of 2 for Branhamella catarrhalis.  Virtually always when this bacteremia occurs, there is some type of respiratory infection.  She has some slight left ear discomfort, history of chronic lung disease but no infiltrates, would still suspect this is a respiratory infection rather than other deep seated infection, of note, clinically improved on antibiotics.   3.  Positive urine culture, may just be colonization but is covered.   4.  Chronic lung disease.   5.  Left ear discomfort; no obvious infection visible.      RECOMMENDATIONS:     1.  Ceftriaxone for now, but actually the Levaquin she is on is a very effective agent against this organism, probably acceptable to simply do an oral course of Levaquin, assume it was a respiratory origin organism and follow up how she does.   2.  Has no significant artificial material.  This organism rarely causes endocarditis or other deep seated infection, so initial treatment simply to treat, no commitment to IV antibiotics with this organism, probably no need for followup cultures either.      HISTORY:  This 78-year-old female is seen in consultation due to bacteremia.  She has an underlying history of chronic lung disease, lives in a group home.  She came in with fall and malaise, fatigue, some degree of fever, temperature as high as 102 degrees and tachycardic at initial evaluation.  She was found to have a positive urinalysis and urine culture positive and was on antibiotics for that with Levaquin.  She does not really describe much in the way of respiratory symptoms, although she has a chronic cough and some mild COPD.  She did not have any  urinary tract symptoms either, and her urine culture ended up showing an E. coli that was resistant to the Levaquin she was on, sensitive to ceftriaxone, which she is now on based on that.  Of note, it was a fairly sensitive organism including cephalosporins.  She clinically has improved, but then surprise positive blood cultures came back.  Of note, she has had some left ear discomfort but exam is unremarkable.  She has underlying lung disease but chest x-ray is negative.      PAST MEDICAL HISTORY:  Chronic lung disease, history of some urine infection problems but not a major issue long-term.  Other medical history as per the chart I have reviewed.      SOCIAL AND FAMILY HISTORY:  Lives at [facility].  No known recent resistant pathogens.      ALLERGIES:  PENICILLIN WITH A RASH; HAS TOLERATED CEPHALOSPORINS, BOTH HISTORICALLY AND NOW.      REVIEW OF SYSTEMS:  Feels quite well.  Some left ear discomfort, but otherwise no major respiratory symptoms.  No other focal or localizing symptoms including no notable urinary tract symptoms throughout.  No flank discomfort or abdominal symptoms.      PHYSICAL EXAMINATION:   GENERAL:  The patient appears her stated age.  She looks relatively well.   VITAL SIGNS:  She is afebrile.  She does not look toxic or ill.   HEENT:  Left ear unremarkable.  No other sinus tenderness.  No facial skin lesions in general.   NECK:  Supple and nontender with no lymphadenopathy, thyromegaly, or meningismus.   HEART:  Slight tachycardia at 100, no significant murmur.   LUNGS:  Clear bilaterally, good air movement.   ABDOMEN:  Soft and nontender.   EXTREMITIES:  No rashes or skin lesions of note.      LABORATORY DATA:  Blood cultures 2 out of 2 growing Moraxella catarrhalis, urine culture growing a fairly resistant E. coli.      Thank you very much for this consultation.  We will follow the patient with you.         MARYCARMEN DUARTE MD             D: 04/15/2018   T: 04/15/2018   MT: TIFF      Name:      NAM DAS   MRN:      [id number]        Account:       [id number]   :      1940           Consult Date:  04/15/2018      Document: [id number]       cc: Paulo Hamilton MD

## 2018-04-15 NOTE — PLAN OF CARE
"Problem: Patient Care Overview  Goal: Plan of Care/Patient Progress Review  BP (!) 171/99 (BP Location: Left arm)  Pulse 104  Temp 97  F (36.1  C) (Oral)  Resp 18  Ht 1.575 m (5' 2\")  Wt 55.4 kg (122 lb 1.6 oz)  SpO2 97%  BMI 22.33 kg/m2  Neuro: A&O x4, some forgetfulness  Pain: c/o chronic pain in shoulders, controlled with PO ultram and tylenol  Resp: LS clear, no SOB noted, scheduled inhalers given  Cardiac: WDL, BP and pulse on higher ends  GI/: bowel sounds positive, no BM, Pt unable to void this AM, straight cathed at 0840 for 600cc yellow cloudy urine  Diet: tolerating regular diet, appetite improving   Skin/mobility: intact, up with Ax1 and walker, able to walk short distance to chair  Tx/plan:  Switched to IV rocephin, PT following, possible discharge to TCU when medically cleared.   Will continue to monitor and provide supportive care.          "

## 2018-04-15 NOTE — CONSULTS
ID consult dictated  yo female vague initial illness, found + UC, fever, L ear pain, lung disease neg CXR, found to have moraxella bacteremia, ??    REc ceftriaxone, usually this org is from obvious resp tract symptomatic illness, rare cause of endocarditis etc    REc ceftriaxone for now, likely just tx and po(prob lev she was on) soon

## 2018-04-15 NOTE — PROGRESS NOTES
" 04/15/18 0923   Quick Adds   Type of Visit Initial PT Evaluation   Living Environment   Lives With facility resident   Living Arrangements independent living facility   Home Accessibility grab bars present (toilet);grab bars present (bathtub)   Number of Stairs to Enter Home 0   Number of Stairs Within Home 0   Transportation Available family or friend will provide   Self-Care   Dominant Hand right   Usual Activity Tolerance moderate   Current Activity Tolerance fair   Regular Exercise no   Equipment Currently Used at Home walker, rolling   Functional Level Prior   Ambulation 1-->assistive equipment   Transferring 1-->assistive equipment   Toileting 1-->assistive equipment   Bathing 0-->independent   Dressing 0-->independent   Eating 0-->independent   Fall history within last six months no   Which of the above functional risks had a recent onset or change? ambulation;transferring;toileting;bathing;dressing  (activity tolerance)   General Information   Onset of Illness/Injury or Date of Surgery - Date 04/13/18   Referring Physician Paulo Curry MD   Patient/Family Goals Statement \"I might need TCU\"   Pertinent History of Current Problem (include personal factors and/or comorbidities that impact the POC) Ana Luisa Lee is a 78-year-old female with history of hypothyroidism, hypercholesterolemia, hypertension, COPD and asthma for which she is dependent on daily prednisone but not supplemental oxygen, GERD, supraventricular tachycardia, and premature ventricular contractions.  She lives at [facility] in independent living.  She presented to the emergency department early in the morning on 4/13/18 for evaluation of fever and nausea.  She also had become weak. I was asked to admit her to the hospital for treatment of urinary tract infection and sepsis.   Precautions/Limitations fall precautions   Weight-Bearing Status - LLE full weight-bearing   Weight-Bearing Status - RLE full weight-bearing   General " Observations Pt supine upon initiation. Agreeable to session.    Cognitive Status Examination   Orientation orientation to person, place and time   Level of Consciousness alert   Follows Commands and Answers Questions 100% of the time   Personal Safety and Judgment intact   Memory intact   Pain Assessment   Patient Currently in Pain Yes, see Vital Sign flowsheet  (B shoulder pain)   Integumentary/Edema   Integumentary/Edema no deficits were identifed   Posture    Posture Forward head position;Protracted shoulders   Range of Motion (ROM)   ROM Comment LE ROM WNL   Strength   Strength Comments 4-/5 B knee strength, 3/5 B hip strength   Bed Mobility   Bed Mobility Comments sit<>supine with Gilmer   Transfer Skills   Transfer Comments sit<>stand with CGA   Gait   Gait Comments CGA with FWW   Sensory Examination   Sensory Perception no deficits were identified   Coordination   Coordination no deficits were identified   Muscle Tone   Muscle Tone no deficits were identified   General Therapy Interventions   Planned Therapy Interventions bed mobility training;gait training;strengthening;stretching;transfer training;home program guidelines;progressive activity/exercise   Clinical Impression   Criteria for Skilled Therapeutic Intervention yes, treatment indicated   PT Diagnosis Impaired functional mobility   Influenced by the following impairments Weakness, deconditioning, B shoulder pain/weakness   Functional limitations due to impairments Difficulty with bed mobility, transfers, ambulation   Clinical Presentation Stable/Uncomplicated   Clinical Presentation Rationale Progressing as expected medically   Clinical Decision Making (Complexity) Low complexity   Therapy Frequency` 5 times/week   Predicted Duration of Therapy Intervention (days/wks) 5 days   Anticipated Discharge Disposition Transitional Care Facility   Risk & Benefits of therapy have been explained Yes   Patient, Family & other staff in agreement with plan of care  "Yes   [facility] TM \"6 Clicks\"   2016, Trustees of [facility], under license to [employer].  All rights reserved.   6 Clicks Short Forms Basic Mobility Inpatient Short Form   [facility]  \"6 Clicks\" V.2 Basic Mobility Inpatient Short Form   1. Turning from your back to your side while in a flat bed without using bedrails? 4 - None   2. Moving from lying on your back to sitting on the side of a flat bed without using bedrails? 3 - A Little   3. Moving to and from a bed to a chair (including a wheelchair)? 3 - A Little   4. Standing up from a chair using your arms (e.g., wheelchair, or bedside chair)? 3 - A Little   5. To walk in hospital room? 2 - A Lot   6. Climbing 3-5 steps with a railing? 2 - A Lot   Basic Mobility Raw Score (Score out of 24.Lower scores equate to lower levels of function) 17   Total Evaluation Time   Total Evaluation Time (Minutes) 8     "

## 2018-04-15 NOTE — PLAN OF CARE
Problem: Patient Care Overview  Goal: Plan of Care/Patient Progress Review  Outcome: No Change  Alert, but forgetful at times. VSS. RA. Prn neb tx available. CMS intact. Up w/ A-2 gait belt and walker to bedside commode. Pt. had tremors in BUE and at times is weak. BS active x4, tolerating regular diet well, passing flatus. Small BM earlier today. Voiding in adequate amounts, incontinent at times. Urine is cloudy, odorous. Continues IV Levaquin. Bilateral shoulder pain managed w po prn Ultram and Tylenol. Will continue to monitor.

## 2018-04-15 NOTE — PLAN OF CARE
"Problem: Patient Care Overview  Goal: Plan of Care/Patient Progress Review  PT: Orders received. PT evaluation completed and treatment initiated. Pt lives in an ILF and ambulates with a 4WW. Pt reports ambulating to<>from dining room for one meal/day. Pt also has house keeping. Pt denies having anyone to help at home after discharge.     Discharge Planner PT   Patient plan for discharge: \"I might need TCU\"  Current status: Pt completes sit<>supine with Gilmer at shoulders. Pt completes sit<>stand with CGA and cues for safe hand placement. Pt ambulates ~10' in room with use of FWW and CGA with cues for technique. Pt participates in seated LE TE.   Barriers to return to prior living situation: Poor activity tolerance, requires assist for mobility, lives alone.   Recommendations for discharge: TCU  Rationale for recommendations: Pt is not currently at baseline for mobility, and is unsafe to discharge home. With continued PT, both IP and after discharge, pt is likely to obtain mobility goals.        Entered by: Aminta Castillo 04/15/2018 9:49 AM           "

## 2018-04-15 NOTE — PROGRESS NOTES
[facility]    Hospitalist Progress Note      Assessment & Plan     Ana Luisa Lee is a 78-year-old female with history of hypothyroidism, hypercholesterolemia, hypertension, COPD and asthma for which she is dependent on daily prednisone but not supplemental oxygen, GERD, supraventricular tachycardia, and premature ventricular contractions.  She lives at [facility] in independent living.  She presented to the emergency department early in the morning on 4/13/18 for evaluation of fever and nausea.  She also had become weak.  She had felt ill for 2 days.  Of note, she had a steroid injection in her shoulder the week before presentation.  Emergency department evaluation showed tachycardia with heart rate in the 110s and fever with temperature 102.4.  White blood cell count was elevated at 11.1.  The remainder of the CBC was unremarkable.  Sodium was 131 with the remainder of the basic metabolic panel being unremarkable.  Liver function tests were normal.  Influenza testing was negative.  Lactic acid was normal at 1.6.  Urinalysis was obtained and suggested urinary tract infection with greater than 182 white blood cells, many bacteria, and large leukocyte esterase.  She was given Levaquin in the emergency department due to penicillin allergy and admitted for further treatment.  After admission urine culture grew e coli and blood cultures (2 of 2) grew moraxella catharalis.       Problem list:      1.  Sepsis (fever, leukocytosis, and tachycardia ) due to urinary tract infection with quinolone resistant e coli versus moraxella catharalis bacteremia.  No clear source of moraxella.  No respiratory symptoms or pneumona on CXR.  She does have some bilateral ear pain but no clear infection on exam.  No signs of sinusitis on exam.  I will change Levaquin to Rocephin to cover quinolone resistant e coli and moraxella (probably susceptible- studies pending).  Since moraxella is a relatively unusual organism to  find in blood I will request ID consult- Dr. Zacarias can see her today or tomorrow if he has already rounded here.     2.  Mild hyponatremia.    Resolved      3.  COPD and asthma without acute exacerbation.  Continue daily prednisone 10 mg daily.  Continue inhaled medications as prior to admission.      4.  Stable medical conditions include hypothyroidism, hypercholesterolemia, hypertension, and GERD.  Continue prior to admission medications.     5.  Physical deconditioning.  PT consult appreciated.     6.  Pain management.  Patient denies pain is not receiving pain medication.    # Pain Assessment:  Current Pain Score 4/15/2018   Patient currently in pain? -   Pain score (0-10) 8   Pain location -   Pain descriptors -   - Ana Luisa is experiencing pain due to chronic shoulder pain. Pain management was discussed and the plan was created in a collaborative fashion.  Ana Luisa's response to the current recommendations: compliant  - continue tylenol and ultram as prior to admission        DVT Prophylaxis: Enoxaparin (Lovenox) SQ  Code Status: DNR/DNI    Disposition: Expected discharge in 1-2 days once plan in place for treatment of bacteremia.    Pauol Curry    Interval History   Some bilateral ear pain- but for 2 weeks. No new problems.      -Data reviewed today: I reviewed all new labs and imaging results over the last 24 hours. I personally reviewed no images or EKG's today.    Physical Exam   Temp: 98.9  F (37.2  C) Temp src: Oral BP: (!) 164/97 Pulse: 108   Resp: 18 SpO2: 96 % O2 Device: None (Room air)    Vitals:    04/13/18 0754 04/14/18 0300 04/15/18 0517   Weight: 56.8 kg (125 lb 3.2 oz) 56 kg (123 lb 6.4 oz) 55.4 kg (122 lb 1.6 oz)     Vital Signs with Ranges  Temp:  [97.5  F (36.4  C)-98.9  F (37.2  C)] 98.9  F (37.2  C)  Pulse:  [] 108  Resp:  [16-20] 18  BP: (155-164)/(84-99) 164/97  SpO2:  [96 %-99 %] 96 %  I/O last 3 completed shifts:  In: 600 [P.O.:600]  Out: 775 [Urine:775]    GENERAL:   Comfortable. Cooperative.  PSYCH: pleasant, oriented, No acute distress.  EYES: PERRLA, Normal conjunctiva.  EARS:  No ottitis external or media in either eare  HEART:  Regular rate and rhythm. No JVD. Pulses normal. No edema.  LUNGS:  Clear to auscultation, normal Respiratory effort.  ABDOMEN:  Soft, no hepatosplenomegaly, normal bowel sounds.  EXTREMETIES: No clubbing, cyanosis or ischemia  SKIN:  Dry to touch, No rash.      Medications       cefTRIAXone  1 g Intravenous Q24H     gemfibrozil  600 mg Oral Daily     levothyroxine  25 mcg Oral Daily     montelukast  10 mg Oral At Bedtime     omeprazole  40 mg Oral Daily     predniSONE  10 mg Oral Daily     roflumilast  500 mcg Oral Daily     salmeterol  1 puff Inhalation BID     enoxaparin  40 mg Subcutaneous Q24H     fluticasone-vilanterol  1 puff Inhalation Daily     sodium chloride (PF)  3 mL Intracatheter Q8H       Data     Recent Labs  Lab 04/15/18  0633 04/14/18  0723 04/13/18  0836 04/13/18  0429   WBC 11.3* 15.1*  --  11.1*   HGB 10.5* 10.0*  --  11.7   MCV 93 93  --  93    180 204 230   NA  --  133  --  131*   POTASSIUM 3.3* 3.7  --  3.7   CHLORIDE  --  103  --  98   CO2  --  22  --  25   BUN  --  15  --  14   CR  --  0.75 0.75 0.77   ANIONGAP  --  8  --  8   DIANA  --  8.7  --  9.5   GLC  --  85  --  118*   ALBUMIN  --   --   --  3.9   PROTTOTAL  --   --   --  7.4   BILITOTAL  --   --   --  0.5   ALKPHOS  --   --   --  66   ALT  --   --   --  14   AST  --   --   --  14       No results found for this or any previous visit (from the past 24 hour(s)).

## 2018-04-16 ENCOUNTER — APPOINTMENT (OUTPATIENT)
Dept: GENERAL RADIOLOGY | Facility: CLINIC | Age: 78
DRG: 872 | End: 2018-04-16
Attending: INTERNAL MEDICINE
Payer: COMMERCIAL

## 2018-04-16 ENCOUNTER — APPOINTMENT (OUTPATIENT)
Dept: PHYSICAL THERAPY | Facility: CLINIC | Age: 78
DRG: 872 | End: 2018-04-16
Payer: COMMERCIAL

## 2018-04-16 LAB
CREAT SERPL-MCNC: 0.63 MG/DL (ref 0.52–1.04)
GFR SERPL CREATININE-BSD FRML MDRD: >90 ML/MIN/1.7M2
MAGNESIUM SERPL-MCNC: 2 MG/DL (ref 1.6–2.3)
PLATELET # BLD AUTO: 167 10E9/L (ref 150–450)
POTASSIUM SERPL-SCNC: 3.9 MMOL/L (ref 3.4–5.3)

## 2018-04-16 PROCEDURE — 82565 ASSAY OF CREATININE: CPT | Performed by: INTERNAL MEDICINE

## 2018-04-16 PROCEDURE — 25000128 H RX IP 250 OP 636: Performed by: INTERNAL MEDICINE

## 2018-04-16 PROCEDURE — 84132 ASSAY OF SERUM POTASSIUM: CPT | Performed by: INTERNAL MEDICINE

## 2018-04-16 PROCEDURE — 25000132 ZZH RX MED GY IP 250 OP 250 PS 637: Performed by: INTERNAL MEDICINE

## 2018-04-16 PROCEDURE — 94640 AIRWAY INHALATION TREATMENT: CPT | Mod: 76

## 2018-04-16 PROCEDURE — 40000193 ZZH STATISTIC PT WARD VISIT: Performed by: PHYSICAL THERAPY ASSISTANT

## 2018-04-16 PROCEDURE — 94640 AIRWAY INHALATION TREATMENT: CPT

## 2018-04-16 PROCEDURE — 71046 X-RAY EXAM CHEST 2 VIEWS: CPT

## 2018-04-16 PROCEDURE — 85049 AUTOMATED PLATELET COUNT: CPT | Performed by: INTERNAL MEDICINE

## 2018-04-16 PROCEDURE — 83735 ASSAY OF MAGNESIUM: CPT | Performed by: INTERNAL MEDICINE

## 2018-04-16 PROCEDURE — 40000275 ZZH STATISTIC RCP TIME EA 10 MIN

## 2018-04-16 PROCEDURE — 25000125 ZZHC RX 250: Performed by: INTERNAL MEDICINE

## 2018-04-16 PROCEDURE — 12000000 ZZH R&B MED SURG/OB

## 2018-04-16 PROCEDURE — 36415 COLL VENOUS BLD VENIPUNCTURE: CPT | Performed by: INTERNAL MEDICINE

## 2018-04-16 PROCEDURE — 97110 THERAPEUTIC EXERCISES: CPT | Mod: GP | Performed by: PHYSICAL THERAPY ASSISTANT

## 2018-04-16 PROCEDURE — 99232 SBSQ HOSP IP/OBS MODERATE 35: CPT | Performed by: INTERNAL MEDICINE

## 2018-04-16 RX ORDER — LISINOPRIL 5 MG/1
5 TABLET ORAL DAILY
Status: DISCONTINUED | OUTPATIENT
Start: 2018-04-16 | End: 2018-04-17 | Stop reason: HOSPADM

## 2018-04-16 RX ADMIN — OMEPRAZOLE 40 MG: 20 CAPSULE, DELAYED RELEASE ORAL at 09:14

## 2018-04-16 RX ADMIN — MONTELUKAST SODIUM 10 MG: 10 TABLET, FILM COATED ORAL at 20:48

## 2018-04-16 RX ADMIN — FLUTICASONE FUROATE AND VILANTEROL TRIFENATATE 1 PUFF: 100; 25 POWDER RESPIRATORY (INHALATION) at 08:32

## 2018-04-16 RX ADMIN — CEFTRIAXONE SODIUM 1 G: 1 INJECTION, POWDER, FOR SOLUTION INTRAMUSCULAR; INTRAVENOUS at 09:11

## 2018-04-16 RX ADMIN — ROFLUMILAST 500 MCG: 500 TABLET ORAL at 09:14

## 2018-04-16 RX ADMIN — TRAMADOL HYDROCHLORIDE 50 MG: 50 TABLET, COATED ORAL at 20:52

## 2018-04-16 RX ADMIN — TRAMADOL HYDROCHLORIDE 50 MG: 50 TABLET, COATED ORAL at 09:24

## 2018-04-16 RX ADMIN — LEVALBUTEROL HYDROCHLORIDE 1.25 MG: 1.25 SOLUTION RESPIRATORY (INHALATION) at 20:26

## 2018-04-16 RX ADMIN — GEMFIBROZIL 600 MG: 600 TABLET ORAL at 09:14

## 2018-04-16 RX ADMIN — SALMETEROL XINAFOATE 1 PUFF: 50 POWDER, METERED ORAL; RESPIRATORY (INHALATION) at 20:27

## 2018-04-16 RX ADMIN — ONDANSETRON 4 MG: 4 TABLET, ORALLY DISINTEGRATING ORAL at 11:31

## 2018-04-16 RX ADMIN — LISINOPRIL 5 MG: 5 TABLET ORAL at 11:31

## 2018-04-16 RX ADMIN — LEVALBUTEROL HYDROCHLORIDE 1.25 MG: 1.25 SOLUTION RESPIRATORY (INHALATION) at 00:50

## 2018-04-16 RX ADMIN — ENOXAPARIN SODIUM 40 MG: 40 INJECTION SUBCUTANEOUS at 09:14

## 2018-04-16 RX ADMIN — LEVOTHYROXINE SODIUM 25 MCG: 25 TABLET ORAL at 09:14

## 2018-04-16 RX ADMIN — LEVALBUTEROL HYDROCHLORIDE 1.25 MG: 1.25 SOLUTION RESPIRATORY (INHALATION) at 08:31

## 2018-04-16 RX ADMIN — SALMETEROL XINAFOATE 1 PUFF: 50 POWDER, METERED ORAL; RESPIRATORY (INHALATION) at 08:32

## 2018-04-16 RX ADMIN — PREDNISONE 10 MG: 10 TABLET ORAL at 09:14

## 2018-04-16 NOTE — PLAN OF CARE
Problem: Patient Care Overview  Goal: Plan of Care/Patient Progress Review  Outcome: Improving  Pt alert and oriented. Calls appropriately. Denies pain overnight. Up to bedside commode with 1 assist, gait belt, walker. BP elevated, all other VSS. K replaced yesterday, recheck again this morning. Continues on IV rocephin for UTI. Regular diet. ID and PT following. Has a heart murmur. Voiding adequately. No incontinence overnight.

## 2018-04-16 NOTE — CONSULTS
.Care Transition Initial Assessment -   Reason For Consult: discharge planning, per discussion with MD anticipate pt's transfer to rehab facility 1-2 days. Noted PT assessment and recommendation for transfer to TCU to discharge.   Met with: Patient    Active Problems:    Sepsis (H)    Urinary tract infection       DATA  Lives With: alone  Living Arrangements: independent living facility, apartment  Description of Support System: Supportive, Involved  Who is your support system?: Children, Facility resident(s)/Staff, Other (specify) ([employer]- Yeni Aparicio ( [phone])) Per call received from Yeni pt has home care ( [facility]) for weekly visit with med set-up, [employer],  services through [employer] , housekeeping ( although currently a staffing issue for pt on this), medical alert.   Support Assessment: Adequate family and caregiver support.        Resources List: Skilled Nursing Facility     Quality Of Family Relationships: involved, supportive  Transportation Available:  (to be determined)    INTERVENTION    Met with pt who affirms planning for her discharge to rehab facility, identifying [facility] as facility of preference with also consideration of [facility] as needed. Pt would prefer a private room, discussed with pt the $38.40/day cost for private room at [facility] which she acknowledges. Referrals made, pt anticipating her return back to her residence at [facility] when able. Good family support, daughter, Windy lives in [place], son Jake lives in the [facility].     ASSESSMENT  Cognitive Status:  alert and oriented   PLAN  Financial costs for the patient includes: private room fees at [facility]   Patient given options and choices for discharge: Yes  Patient/family is agreeable to the plan?  Yes:    Patient Goals and Preferences: independence with ambulation and ADLs.   Patient anticipates discharging to:  Rehab facility     Will await MD determination of  discharge date, facility availability and continue planning accordingly.       Addendum:      D: [facility] has assessed and would be able to accept pt tomorrow, however uncertain of bed availability on Wednesday. SW will update pt and follow for continued planning per MD determination of discharge date.

## 2018-04-16 NOTE — PROGRESS NOTES
[facility] Care Coordination Contact  EPIC notes reviewed, possible transition to TCU  Voice message left with [employer] to hold MOW, call placed to SILVIANO and Maryan at [facility] to inform of hospital admission.  CM to follow.    Per client's earlier request for CM to place referrals with new homemaking agencies, CM rec'd call from Roberto Carlos at [facility] to report that the hmkr she had planned is not able to fulfill any additional hours. Roberto Carlos states that they are in the process of hiring additional staff and requests CM to complete referral. Referral completed and securely sent to Roberto Carlos via e-mail.    Yeni Savage RN, BC  Supervisor [facility]   [phone] (Fax)

## 2018-04-16 NOTE — PROGRESS NOTES
[facility]    Hospitalist Progress Note      Assessment & Plan     Ana Luisa Lee is a 78-year-old female with history of hypothyroidism, hypercholesterolemia, hypertension, COPD and asthma for which she is dependent on daily prednisone but not supplemental oxygen, GERD, supraventricular tachycardia, and premature ventricular contractions.  She lives at [facility] in independent living.  She presented to the emergency department early in the morning on 4/13/18 for evaluation of fever and nausea.  She also had become weak.  She had felt ill for 2 days.  Of note, she had a steroid injection in her shoulder the week before presentation.  Emergency department evaluation showed tachycardia with heart rate in the 110s and fever with temperature 102.4.  White blood cell count was elevated at 11.1.  The remainder of the CBC was unremarkable.  Sodium was 131 with the remainder of the basic metabolic panel being unremarkable.  Liver function tests were normal.  Influenza testing was negative.  Lactic acid was normal at 1.6.  Urinalysis was obtained and suggested urinary tract infection with greater than 182 white blood cells, many bacteria, and large leukocyte esterase.  She was given Levaquin in the emergency department due to penicillin allergy and admitted for further treatment.  After admission urine culture grew e coli and blood cultures (2 of 2) grew moraxella catharalis.     Problem list:      1.  Sepsis (fever, leukocytosis, and tachycardia ) due to urinary tract infection with quinolone resistant e coli versus moraxella catharalis bacteremia. ID consult appreciated.  No clear source of moraxella- but respiratory source seems most likely.  Ana Luisa is coughing now so will obtain CXR to see if PNA is now present.  She is still weak and with low grade fever.  Continue Rocephin.  If improved, consider changing to oral Levaquin tomorrow.        2.  Mild hyponatremia.    Resolved      3.  COPD and  asthma without acute exacerbation.  Continue daily prednisone 10 mg daily.  Continue inhaled medications as prior to admission.      4.  Stable medical conditions include hypothyroidism, hypercholesterolemia, hypertension, and GERD.  Continue prior to admission medications.      5.  Physical deconditioning.  PT consult appreciated. TCU on discharge has been recommended.        # Pain Assessment:  Current Pain Score 4/16/2018   Patient currently in pain? sleeping: patient not able to self report   Pain score (0-10) -   Pain location -   Pain descriptors -   Ana Luisa granados pain level was assessed and she currently denies pain.      DVT Prophylaxis: Enoxaparin (Lovenox) SQ  Code Status: DNR/DNI    Disposition: Expected discharge in 1-2 days once fever gone.    Paulo DAVID Curry    Interval History   Coughing some today.  No other new problems.  Still feels weak.     -Data reviewed today: I reviewed all new labs and imaging results over the last 24 hours. I personally reviewed no images or EKG's today.    Physical Exam   Temp: 99.7  F (37.6  C) Temp src: Oral BP: (!) 159/94 Pulse: 101 Heart Rate: 100 Resp: 16 SpO2: 96 % O2 Device: None (Room air)    Vitals:    04/15/18 0517 04/16/18 0520 04/16/18 1118   Weight: 55.4 kg (122 lb 1.6 oz) 55 kg (121 lb 4.8 oz) 52.6 kg (116 lb)     Vital Signs with Ranges  Temp:  [98.3  F (36.8  C)-99.7  F (37.6  C)] 99.7  F (37.6  C)  Pulse:  [] 101  Heart Rate:  [] 100  Resp:  [16-20] 16  BP: (142-179)/() 159/94  SpO2:  [94 %-98 %] 96 %  I/O last 3 completed shifts:  In: 940 [P.O.:840; I.V.:100]  Out: 1750 [Urine:1750]    GENERAL:  Comfortable. Cooperative.  PSYCH: pleasant, oriented, No acute distress.  EYES: PERRLA, Normal conjunctiva.  HEART:  Regular rate and rhythm. No JVD. Pulses normal. No edema.  LUNGS:  Clear to auscultation, normal Respiratory effort.  ABDOMEN:  Soft, no hepatosplenomegaly, normal bowel sounds.  EXTREMETIES: No clubbing, cyanosis or ischemia  SKIN:   Dry to touch, No rash.         Medications       lisinopril  5 mg Oral Daily     cefTRIAXone  1 g Intravenous Q24H     gemfibrozil  600 mg Oral Daily     levothyroxine  25 mcg Oral Daily     montelukast  10 mg Oral At Bedtime     omeprazole  40 mg Oral Daily     predniSONE  10 mg Oral Daily     roflumilast  500 mcg Oral Daily     salmeterol  1 puff Inhalation BID     enoxaparin  40 mg Subcutaneous Q24H     fluticasone-vilanterol  1 puff Inhalation Daily     sodium chloride (PF)  3 mL Intracatheter Q8H       Data     Recent Labs  Lab 04/16/18  0600 04/15/18  1800 04/15/18  0633 04/14/18  0723 04/13/18  0836 04/13/18  0429   WBC  --   --  11.3* 15.1*  --  11.1*   HGB  --   --  10.5* 10.0*  --  11.7   MCV  --   --  93 93  --  93     --  171 180 204 230   NA  --   --   --  133  --  131*   POTASSIUM 3.9 4.7 3.3* 3.7  --  3.7   CHLORIDE  --   --   --  103  --  98   CO2  --   --   --  22  --  25   BUN  --   --   --  15  --  14   CR 0.63  --   --  0.75 0.75 0.77   ANIONGAP  --   --   --  8  --  8   DIANA  --   --   --  8.7  --  9.5   GLC  --   --   --  85  --  118*   ALBUMIN  --   --   --   --   --  3.9   PROTTOTAL  --   --   --   --   --  7.4   BILITOTAL  --   --   --   --   --  0.5   ALKPHOS  --   --   --   --   --  66   ALT  --   --   --   --   --  14   AST  --   --   --   --   --  14       No results found for this or any previous visit (from the past 24 hour(s)).

## 2018-04-16 NOTE — PROGRESS NOTES
CTS identifies pt as high risk due to elevated COURTNEY. SW following for TCU placement. Rounding provider at TCU will follow pt upon discharge.     Handoff will be given to PCP clinic at discharge.     CM will continue to follow patient for any additional discharge needs.     Palak Morales RN BSN CTS   [phone]  Care Transitions Team  [facility]

## 2018-04-16 NOTE — PROGRESS NOTES
[facility]  Infectious Disease Progress Note          Assessment and Plan:   IMPRESSIONS:   1.  A 78-year-old female admitted with a fall, signs of low-grade sepsis, abnormal urinalysis without other symptoms and urine culture positive, improved on Levaquin.   2.  Surprise positive blood cultures 2 out of 2 for Branhamella catarrhalis.  Virtually always when this bacteremia occurs, there is some type of respiratory infection.  She has some slight left ear discomfort, history of chronic lung disease but no infiltrates, would still suspect this is a respiratory infection rather than other deep seated infection, of note, clinically improved on antibiotics.   3.  Positive urine culture, may just be colonization but is covered.   4.  Chronic lung disease.   5.  Left ear discomfort; no obvious infection visible.       RECOMMENDATIONS:     1.  Ceftriaxone , but actually the Levaquin she is on is a very effective agent against this organism, probably acceptable to simply do an oral course of Levaquin, assume it was a respiratory origin organism and follow up how she does. About 10 days, UC R, but adequately tx already  2.  Has no significant artificial material.  This organism rarely causes endocarditis or other deep seated infection, so initial treatment simply to treat, no commitment to IV antibiotics with this organism, probably no need for followup cultures either.         Interval History:   no new complaints and doing well; no cp, sob, n/v/d, or abd pain. Feels well              Medications:       lisinopril  5 mg Oral Daily     cefTRIAXone  1 g Intravenous Q24H     gemfibrozil  600 mg Oral Daily     levothyroxine  25 mcg Oral Daily     montelukast  10 mg Oral At Bedtime     omeprazole  40 mg Oral Daily     predniSONE  10 mg Oral Daily     roflumilast  500 mcg Oral Daily     salmeterol  1 puff Inhalation BID     enoxaparin  40 mg Subcutaneous Q24H     fluticasone-vilanterol  1 puff Inhalation  "Daily     sodium chloride (PF)  3 mL Intracatheter Q8H                  Physical Exam:   Blood pressure 123/78, pulse 101, temperature 99.1  F (37.3  C), temperature source Oral, resp. rate 16, height 1.575 m (5' 2\"), weight 52.6 kg (116 lb), SpO2 95 %, not currently breastfeeding.  Wt Readings from Last 2 Encounters:   04/16/18 52.6 kg (116 lb)   04/05/18 56.2 kg (124 lb)     Vital Signs with Ranges  Temp:  [98.3  F (36.8  C)-99.7  F (37.6  C)] 99.1  F (37.3  C)  Pulse:  [101-114] 101  Heart Rate:  [] 99  Resp:  [16-20] 16  BP: (123-179)/(71-96) 123/78  SpO2:  [94 %-97 %] 95 %    Constitutional: Awake, alert, cooperative, no apparent distress   Lungs: Clear to auscultation bilaterally, no crackles or wheezing   Cardiovascular: Regular rate and rhythm, normal S1 and S2, and no murmur noted   Abdomen: Normal bowel sounds, soft, non-distended, non-tender   Skin: No rashes, no cyanosis, no edema   Other:           Data:   All microbiology laboratory data reviewed.  Recent Labs   Lab Test  04/16/18   0600  04/15/18   0633  04/14/18   0723   04/13/18   0429   WBC   --   11.3*  15.1*   --   11.1*   HGB   --   10.5*  10.0*   --   11.7   HCT   --   32.1*  30.5*   --   34.9*   MCV   --   93  93   --   93   PLT  167  171  180   < >  230    < > = values in this interval not displayed.     Recent Labs   Lab Test  04/16/18   0600  04/14/18   0723  04/13/18   0836   CR  0.63  0.75  0.75     Recent Labs   Lab Test  08/26/16   1515   SED  13     Recent Labs   Lab Test  04/13/18   0511  04/13/18   0445  04/13/18   0429  05/04/17   0035  12/31/16   1730  12/31/16   1605  12/31/16   1552  03/04/16 2118 03/04/16 2110   CULT  Cultured on the 1st day of incubation:  Moraxella (Branhamella) catarrhalis  Susceptibility testing done on previous specimen  *  Critical Value/Significant Value, preliminary result only, called to and read back by  Brenna Eid RN at 2302 [id number]    >100,000 colonies/mL  Escherichia coli  *  " Cultured on the 1st day of incubation:  Moraxella (Branhamella) catarrhalis  *  Critical Value/Significant Value, preliminary result only, called to and read back by  Brenna Eid RN at 2323 [id number]    No growth  No growth  Cultured on the 1st day of incubation: Staphylococcus epidermidis This isolate is   presumed to be clindamycin resistant based on detection of inducible   clindamycin resistance. Erythromycin and clindamycin are resistant, therefore,   they are not recommended for use.  Critical Value/Significant Value, preliminary result only, called to and read   back by Ivis Brenner Rn RHOBS @ 1652 01/01/17 CS  (Note)  POSITIVE for STAPHYLOCOCCUS EPIDERMIDIS and NEGATIVE for the mecA  gene (not resistant to methicillin) by Ninuaigene nucleic acid test.  The mecA gene was not detected. Final identification and  antimicrobial susceptibility testing will be verified by standard  methods.    Specimen tested with Ninuaigene multiplex, gram-positive blood culture  nucleic acid test for the following targets: Staph aureus, Staph  epidermidis, Staph lugdunensis, other Staph species, Enterococcus  faecalis, Enterococcus faecium, Streptococcus species, S. agalactiae,  S. anginosus grp., S. pneumoniae, S. pyogenes,  Listeria sp., mecA  (methicillin resistance) and Poonam/B (vancomycin resistance).    Critical Value/Significant Value called to and read back by Carlos GUERRERO @ 2037 1/1/17 CS  *  No growth  No growth  No growth

## 2018-04-16 NOTE — PLAN OF CARE
Problem: Patient Care Overview  Goal: Plan of Care/Patient Progress Review  Outcome: Improving  Alert, but forgetful at times. Hypertensive, tachycardic (HR 90-100s). RA. Prn neb tx available. CMS intact. Up w/ Ax1,  gait belt and walker to bedside commode. Temors in BUE and at times is weak. BS active x4, tolerating regular diet well, passing flatus. Pt straight cath'd earlier today d/t not being able to void, but now voiding in adequate amounts this evening. Incontinent at times. IV Rocephin. Bilateral shoulder pain managed w po prn Ultram and Tylenol. Will continue to monitor.

## 2018-04-16 NOTE — PLAN OF CARE
"Problem: Patient Care Overview  Goal: Plan of Care/Patient Progress Review    Discharge Planner PT   Patient plan for discharge: \"I might need TCU\"  Current status:Pt declined d/t feeling too weak. Nursing was notified. Pt performed seated thera exs to both LEs x15 reps: heel/toe raises, marching, LAQs and hip add/abd. Pt tolerated fairly well  Barriers to return to prior living situation: Poor activity tolerance, requires assist for mobility, lives alone.   Recommendations for discharge: TCU  Rationale for recommendations: Pt is not currently at baseline for mobility, and is unsafe to discharge home. With continued PT, both IP and after discharge, pt is likely to obtain mobility goals.        Entered by: Najma Gamez 04/16/2018 10:08 AM           "

## 2018-04-16 NOTE — PLAN OF CARE
"Problem: Patient Care Overview  Goal: Plan of Care/Patient Progress Review  Outcome: Improving  Alert, orientated x4, mild forgetful. Pleasant mood. Normally lives at independent living at [facility]. She is needing 1 assist for transfer and 1 assist with walker for ambulating a few steps in room. Generalized weakness. She is willing to go to a TCU if recommended at d/c. Lungs diminished. No cough noted. No SOB. BP elevated, tachycardia. Lisinopril started. No edema. Voiding- continent and incontinent. Neb treatments, room air. Food appetite poor. Encouraged fluids. Resting between cares. \"seeing dots\" when getting out of bed at first. Pain to left shoulder, ultram provided with relief. Bed alarm on for safety. Rocephin. Saline locked.       "

## 2018-04-17 ENCOUNTER — PATIENT OUTREACH (OUTPATIENT)
Dept: GERIATRIC MEDICINE | Facility: CLINIC | Age: 78
End: 2018-04-17

## 2018-04-17 ENCOUNTER — APPOINTMENT (OUTPATIENT)
Dept: PHYSICAL THERAPY | Facility: CLINIC | Age: 78
DRG: 872 | End: 2018-04-17
Payer: COMMERCIAL

## 2018-04-17 VITALS
RESPIRATION RATE: 16 BRPM | DIASTOLIC BLOOD PRESSURE: 69 MMHG | WEIGHT: 121.7 LBS | BODY MASS INDEX: 22.39 KG/M2 | OXYGEN SATURATION: 98 % | HEART RATE: 102 BPM | HEIGHT: 62 IN | TEMPERATURE: 98.9 F | SYSTOLIC BLOOD PRESSURE: 129 MMHG

## 2018-04-17 PROBLEM — R78.81 BACTEREMIA DUE TO GRAM-NEGATIVE BACTERIA: Status: ACTIVE | Noted: 2018-04-17

## 2018-04-17 LAB
ANION GAP SERPL CALCULATED.3IONS-SCNC: 9 MMOL/L (ref 3–14)
BUN SERPL-MCNC: 12 MG/DL (ref 7–30)
CALCIUM SERPL-MCNC: 9.1 MG/DL (ref 8.5–10.1)
CHLORIDE SERPL-SCNC: 100 MMOL/L (ref 94–109)
CO2 SERPL-SCNC: 24 MMOL/L (ref 20–32)
CREAT SERPL-MCNC: 0.66 MG/DL (ref 0.52–1.04)
ERYTHROCYTE [DISTWIDTH] IN BLOOD BY AUTOMATED COUNT: 13.9 % (ref 10–15)
GFR SERPL CREATININE-BSD FRML MDRD: 87 ML/MIN/1.7M2
GLUCOSE SERPL-MCNC: 98 MG/DL (ref 70–99)
HCT VFR BLD AUTO: 30.5 % (ref 35–47)
HGB BLD-MCNC: 10.1 G/DL (ref 11.7–15.7)
MAGNESIUM SERPL-MCNC: 2.2 MG/DL (ref 1.6–2.3)
MCH RBC QN AUTO: 30.7 PG (ref 26.5–33)
MCHC RBC AUTO-ENTMCNC: 33.1 G/DL (ref 31.5–36.5)
MCV RBC AUTO: 93 FL (ref 78–100)
PLATELET # BLD AUTO: 201 10E9/L (ref 150–450)
POTASSIUM SERPL-SCNC: 3.4 MMOL/L (ref 3.4–5.3)
RBC # BLD AUTO: 3.29 10E12/L (ref 3.8–5.2)
SODIUM SERPL-SCNC: 133 MMOL/L (ref 133–144)
WBC # BLD AUTO: 9.3 10E9/L (ref 4–11)

## 2018-04-17 PROCEDURE — 40000193 ZZH STATISTIC PT WARD VISIT: Performed by: PHYSICAL THERAPY ASSISTANT

## 2018-04-17 PROCEDURE — 80048 BASIC METABOLIC PNL TOTAL CA: CPT | Performed by: INTERNAL MEDICINE

## 2018-04-17 PROCEDURE — 99239 HOSP IP/OBS DSCHRG MGMT >30: CPT | Performed by: INTERNAL MEDICINE

## 2018-04-17 PROCEDURE — 94640 AIRWAY INHALATION TREATMENT: CPT

## 2018-04-17 PROCEDURE — 83735 ASSAY OF MAGNESIUM: CPT | Performed by: INTERNAL MEDICINE

## 2018-04-17 PROCEDURE — 94640 AIRWAY INHALATION TREATMENT: CPT | Mod: 76

## 2018-04-17 PROCEDURE — 25000128 H RX IP 250 OP 636: Performed by: INTERNAL MEDICINE

## 2018-04-17 PROCEDURE — 97530 THERAPEUTIC ACTIVITIES: CPT | Mod: GP | Performed by: PHYSICAL THERAPY ASSISTANT

## 2018-04-17 PROCEDURE — 25000125 ZZHC RX 250: Performed by: INTERNAL MEDICINE

## 2018-04-17 PROCEDURE — 36415 COLL VENOUS BLD VENIPUNCTURE: CPT | Performed by: INTERNAL MEDICINE

## 2018-04-17 PROCEDURE — 25000132 ZZH RX MED GY IP 250 OP 250 PS 637: Performed by: INTERNAL MEDICINE

## 2018-04-17 PROCEDURE — 85027 COMPLETE CBC AUTOMATED: CPT | Performed by: INTERNAL MEDICINE

## 2018-04-17 PROCEDURE — 40000275 ZZH STATISTIC RCP TIME EA 10 MIN

## 2018-04-17 PROCEDURE — 97116 GAIT TRAINING THERAPY: CPT | Mod: GP | Performed by: PHYSICAL THERAPY ASSISTANT

## 2018-04-17 RX ORDER — PREDNISONE 2.5 MG/1
10 TABLET ORAL SEE ADMIN INSTRUCTIONS
DISCHARGE
Start: 2018-04-17 | End: 2018-06-17

## 2018-04-17 RX ORDER — LEVOFLOXACIN 500 MG/1
500 TABLET, FILM COATED ORAL DAILY
Qty: 10 TABLET | Refills: 0 | DISCHARGE
Start: 2018-04-17 | End: 2018-04-27

## 2018-04-17 RX ORDER — LISINOPRIL 5 MG/1
5 TABLET ORAL DAILY
Qty: 30 TABLET | DISCHARGE
Start: 2018-04-18 | End: 2018-06-14

## 2018-04-17 RX ORDER — TRAMADOL HYDROCHLORIDE 50 MG/1
50 TABLET ORAL 2 TIMES DAILY PRN
Qty: 20 TABLET | Refills: 0 | Status: SHIPPED | OUTPATIENT
Start: 2018-04-17 | End: 2018-04-27

## 2018-04-17 RX ORDER — BISACODYL 10 MG
10 SUPPOSITORY, RECTAL RECTAL DAILY PRN
Qty: 30 SUPPOSITORY | DISCHARGE
Start: 2018-04-17 | End: 2018-06-17

## 2018-04-17 RX ADMIN — PREDNISONE 10 MG: 10 TABLET ORAL at 09:01

## 2018-04-17 RX ADMIN — ENOXAPARIN SODIUM 40 MG: 40 INJECTION SUBCUTANEOUS at 09:00

## 2018-04-17 RX ADMIN — ROFLUMILAST 500 MCG: 500 TABLET ORAL at 09:00

## 2018-04-17 RX ADMIN — TRAMADOL HYDROCHLORIDE 50 MG: 50 TABLET, COATED ORAL at 04:05

## 2018-04-17 RX ADMIN — LEVALBUTEROL HYDROCHLORIDE 1.25 MG: 1.25 SOLUTION RESPIRATORY (INHALATION) at 03:57

## 2018-04-17 RX ADMIN — CEFTRIAXONE SODIUM 1 G: 1 INJECTION, POWDER, FOR SOLUTION INTRAMUSCULAR; INTRAVENOUS at 09:00

## 2018-04-17 RX ADMIN — GEMFIBROZIL 600 MG: 600 TABLET ORAL at 09:00

## 2018-04-17 RX ADMIN — OMEPRAZOLE 40 MG: 20 CAPSULE, DELAYED RELEASE ORAL at 09:00

## 2018-04-17 RX ADMIN — LEVALBUTEROL HYDROCHLORIDE 1.25 MG: 1.25 SOLUTION RESPIRATORY (INHALATION) at 08:21

## 2018-04-17 RX ADMIN — LISINOPRIL 5 MG: 5 TABLET ORAL at 09:01

## 2018-04-17 RX ADMIN — SALMETEROL XINAFOATE 1 PUFF: 50 POWDER, METERED ORAL; RESPIRATORY (INHALATION) at 08:21

## 2018-04-17 RX ADMIN — LEVOTHYROXINE SODIUM 25 MCG: 25 TABLET ORAL at 09:01

## 2018-04-17 RX ADMIN — FLUTICASONE FUROATE AND VILANTEROL TRIFENATATE 1 PUFF: 100; 25 POWDER RESPIRATORY (INHALATION) at 08:18

## 2018-04-17 NOTE — PLAN OF CARE
Problem: Patient Care Overview  Goal: Plan of Care/Patient Progress Review  Physical Therapy Discharge Summary    Reason for therapy discharge:    Discharged to transitional care facility.    Progress towards therapy goal(s). See goals on Care Plan in [facility] electronic health record for goal details.  Goals not met.  Barriers to achieving goals:   min A for transfers and ambulation.    Therapy recommendation(s):    Continued therapy is recommended.  Rationale/Recommendations:  .. Pt is below baseline for functional mobility and strength. Pt would benefit from continued skilled therapy to address these deficits.

## 2018-04-17 NOTE — PLAN OF CARE
Problem: Patient Care Overview  Goal: Plan of Care/Patient Progress Review  Outcome: Adequate for Discharge Date Met: 04/17/18  Pt discharged to TCU.

## 2018-04-17 NOTE — DISCHARGE SUMMARY
"Discharge Summary    Ana Luisa Lee MRN# [id number]   YOB: 1940 Age: 78 year old     Date of Admission:  4/13/2018  Date of Discharge:  4/17/2018  Admitting Physician:  Paulo Curry MD  Discharge Physician:  Paulo Curry MD  Discharging Service:  Hospitalist     Home clinic: [facility]  Primary Provider: Jenny Hamilton          Discharge Diagnosis:   1.  Sepsis (fever, leukocytosis, and tachycardia ) due to urinary tract infection with quinolone resistant e coli and moraxella catharalis bacteremia (presumably due to a respiratory infection).      2.  Mild hyponatremia.  Resolved      3.  COPD and asthma without acute exacerbation.  Continue prior to admission prednisone 10 mg daily and inhaled medications.      4.  Stable medical conditions include hypothyroidism, hypercholesterolemia, hypertension, and GERD.  Continue prior to admission medications.      5.  Physical deconditioning. PT and OT at transitional care on discharge ([facility]).             Discharge Disposition:   Discharged to nursing home           Allergies:   Allergies   Allergen Reactions     Hydralazine Anxiety     Penicillin G Hives     Tolerated cephalosporines 2017     Meloxicam      dizziness       Metoprolol      ? Skin rash on the back     Norvasc [Amlodipine Besylate] Hives                Condition on Discharge:   Discharge condition: Stable   Discharge vitals: Blood pressure 129/69, pulse 102, temperature 98.9  F (37.2  C), temperature source Oral, resp. rate 16, height 1.575 m (5' 2\"), weight 55.2 kg (121 lb 11.2 oz), SpO2 98 %, not currently breastfeeding.   Code status on discharge: DNR / DNI   Physical exam on day of discharge:   GENERAL:  Comfortable. Cooperative.  PSYCH: pleasant, oriented, No acute distress.  EYES: PERRLA, Normal conjunctiva.  HEART:  Regular rate and rhythm. No JVD. Pulses normal. No edema.  LUNGS:  Clear to auscultation, normal " Respiratory effort.  ABDOMEN:  Soft, no hepatosplenomegaly, normal bowel sounds.  EXTREMETIES: No clubbing, cyanosis or ischemia  SKIN:  Dry to touch, No rash.         History of Present Illness and Hospital Course:     See detailed admission note for full details.  Ana Luisa Lee is a 78-year-old female with history of hypothyroidism, hypercholesterolemia, hypertension, COPD and asthma for which she is dependent on daily prednisone but not supplemental oxygen, GERD, supraventricular tachycardia, and premature ventricular contractions.  She lives at [facility] in independent living.  She presented to the emergency department early in the morning on 4/13/18 for evaluation of fever and nausea.  She also had become weak.  She had felt ill for 2 days.  Of note, she had a steroid injection in her shoulder the week before presentation.  Emergency department evaluation showed tachycardia with heart rate in the 110s and fever with temperature 102.4.  White blood cell count was elevated at 11.1.  The remainder of the CBC was unremarkable.  Sodium was 131 with the remainder of the basic metabolic panel being unremarkable.  Liver function tests were normal.  Influenza testing was negative.  Lactic acid was normal at 1.6.  Urinalysis was obtained and suggested urinary tract infection with greater than 182 white blood cells, many bacteria, and large leukocyte esterase.  She was given Levaquin in the emergency department due to penicillin allergy and admitted for further treatment.  After admission urine culture grew e coli and blood cultures (2 of 2) grew moraxella catharalis. Infectious Disease was consulted and recommended 10 days of Levaquin after discharge.  Ana Luisa has improved and will discharge to TCU at [facility] today. Plan was discussed with Ana Luisa's daughter prior to discharge.            Procedures / Imaging:   CXR           Consultations:   Consultation during this admission received from infectious  disease             Pending Results:   None           Discharge Instructions and Follow-Up:   Discharge diet: Regular   Discharge activity: Activity as tolerated   Discharge follow-up: Follow up with nursing home physician in <7 days   Outpatient therapy: PT and OT    Other instructions: None             Discharge Medications:   Current Discharge Medication List      START taking these medications    Details   melatonin 1 MG TABS tablet Take 1 tablet (1 mg) by mouth nightly as needed for sleep    Associated Diagnoses: Insomnia, unspecified type      lisinopril (PRINIVIL/ZESTRIL) 5 MG tablet Take 1 tablet (5 mg) by mouth daily  Qty: 30 tablet    Associated Diagnoses: Essential hypertension      bisacodyl (DULCOLAX) 10 MG Suppository Place 1 suppository (10 mg) rectally daily as needed for constipation  Qty: 30 suppository    Associated Diagnoses: Constipation, unspecified constipation type      magnesium hydroxide (MILK OF MAGNESIA) 400 MG/5ML suspension Take 30 mLs by mouth daily as needed for constipation  Qty: 105 mL    Associated Diagnoses: Constipation, unspecified constipation type      levofloxacin (LEVAQUIN) 500 MG tablet Take 1 tablet (500 mg) by mouth daily for 10 days  Qty: 10 tablet, Refills: 0    Associated Diagnoses: Urinary tract infection without hematuria, site unspecified; Bacteremia due to Gram-negative bacteria         CONTINUE these medications which have CHANGED    Details   traMADol (ULTRAM) 50 MG tablet Take 1 tablet (50 mg) by mouth 2 times daily as needed for moderate to severe pain Max two tablets a day  Qty: 20 tablet, Refills: 0    Associated Diagnoses: Pain      predniSONE (DELTASONE) 2.5 MG tablet Take 4 tablets (10 mg) by mouth See Admin Instructions    Associated Diagnoses: COPD exacerbation (H); Steroid-dependent chronic obstructive pulmonary disease (H)         CONTINUE these medications which have NOT CHANGED    Details   ferrous sulfate (IRON) 325 (65 Fe) MG tablet Take 325 mg by  mouth daily (with breakfast)      gemfibrozil (LOPID) 600 MG tablet Take 1 tablet (600 mg) by mouth daily  Qty: 90 tablet, Refills: 0    Associated Diagnoses: Hyperlipidemia with target LDL less than 130      levalbuterol (XOPENEX) 1.25 MG/3ML neb solution Take 3 mLs (1.25 mg) by nebulization every 4 hours as needed for shortness of breath / dyspnea or wheezing  Qty: 1584 mL, Refills: 1    Associated Diagnoses: COPD, moderate (H); COPD exacerbation (H)      levothyroxine (SYNTHROID/LEVOTHROID) 25 MCG tablet Take 1 tablet (25 mcg) by mouth daily  Qty: 90 tablet, Refills: 3    Associated Diagnoses: Hypothyroidism due to acquired atrophy of thyroid      cyanocobalamin (B-12 TR) 1000 MCG TBCR Take 1 tablet by mouth daily  Qty: 100 tablet, Refills: 3    Associated Diagnoses: Pernicious anemia      acetaminophen (TYLENOL) 325 MG tablet Take 2 tablets (650 mg) by mouth every 4 hours as needed for mild pain  Qty: 100 tablet    Associated Diagnoses: Sacral insufficiency fracture, initial encounter      prochlorperazine (COMPAZINE) 5 MG tablet Take 1 tablet (5 mg) by mouth every 6 hours as needed for nausea or vomiting  Qty: 120 tablet, Refills: 0    Associated Diagnoses: Nausea      omeprazole (PRILOSEC) 40 MG capsule Take 1 capsule (40 mg) by mouth daily Take 30-60 minutes before a meal.  Qty: 90 capsule, Refills: 3    Comments: 20 mg are not controlling the GERD symptoms  Associated Diagnoses: Gastroesophageal reflux disease, esophagitis presence not specified      roflumilast (DALIRESP) 500 MCG TABS tablet Take 500 mcg by mouth daily       polyethylene glycol (MIRALAX/GLYCOLAX) Packet Take 17 g by mouth daily as needed for constipation      albuterol (ALBUTEROL) 108 (90 BASE) MCG/ACT Inhaler Inhale 2 puffs into the lungs every 6 hours as needed    Associated Diagnoses: Asthma, persistent      budesonide (PULMICORT FLEXHALER) 180 MCG/ACT inhaler Inhale 1 puff into the lungs 2 times daily    Associated Diagnoses: Pulmonary  emphysema, unspecified emphysema type (H)      montelukast (SINGULAIR) 10 MG tablet Take 1 tablet (10 mg) by mouth At Bedtime  Qty: 30 tablet    Associated Diagnoses: Pulmonary emphysema, unspecified emphysema type (H)      salmeterol (SEREVENT) 50 MCG/DOSE diskus inhaler Inhale 1 puff into the lungs 2 times daily    Associated Diagnoses: Pulmonary emphysema, unspecified emphysema type (H)      calcium 600 MG tablet Take 1 tablet (600 mg) by mouth daily  Qty: 60 tablet    Associated Diagnoses: Pulmonary emphysema, unspecified emphysema type (H)      cholecalciferol (VITAMIN D) 1000 UNIT tablet Take 1 tablet (1,000 Units) by mouth daily  Qty: 30 tablet    Associated Diagnoses: COPD, moderate (H)      Multiple Vitamins-Minerals (MULTIVITAMIN OR) Take 1 tablet by mouth daily      Fluticasone Furoate-Vilanterol (BREO ELLIPTA IN) Inhale into the lungs daily      umeclidinium (INCRUSE ELLIPTA) 62.5 MCG/INH oral inhaler Inhale 1 puff into the lungs daily      ASPIRIN NOT PRESCRIBED, INTENTIONAL, 1 each continuous prn. Antiplatelet medication not prescribed intentionally due to Use of NSAIDS  Qty: 0 each, Refills: 0                Total time spent in face to face contact with the patient and coordinating discharge was:  35 Minutes

## 2018-04-17 NOTE — PLAN OF CARE
Problem: Patient Care Overview  Goal: Plan of Care/Patient Progress Review  Outcome: No Change  Pt A/Ox4, but can be forgetful at times.  Requested neb x1 this evening as she felt short of breath, O2 sats 97% on RA, no outward signs of distress.  Reports occasional dry cough.  CXR results paged to Dr. Carvalho-no changes needed at this time.  Continues on IV Rocephin.  Up to commode with Ax1 et walker.  Will likely need TCU at discharge.  Will continue to monitor.

## 2018-04-17 NOTE — PROGRESS NOTES
Your information has been submitted on April 17th, 2018 at 11:59:34 AM CDT. The confirmation number is [id number]

## 2018-04-17 NOTE — PLAN OF CARE
Problem: Patient Care Overview  Goal: Plan of Care/Patient Progress Review  Outcome: Improving  Alert. Forgetful. Assist of 1 with a walker and gait belt to bedside commode. Patient requested for neb treatment stating she felt short of breath.  Felt better after treatment and went back to sleep Voiding, incontinent at times. Pain is managed with tramadol.

## 2018-04-17 NOTE — PROGRESS NOTES
Warm Hand-Off to Next Level of Care    Name: Ana Luisa Lee  MRN #: [id number]  :  1940  Reason for Hospitalization:  Febrile illness, acute [R50.9]  Urinary tract infection without hematuria, site unspecified [N39.0]  Admit Date/Time: 2018  4:20 AM  Discharge Date:  2018  PCP: Jenny Hamilton    Patient will receive the following: TCU  EBC  Key Recommendations: Pt admitted for UTI (quinolone resistant e coli versus moraxella catharalis bacteremia), weakness and deconditioning. She was followed by ID, treated w/ Levaquin and rocephin and discharged to TCU for strengthening.

## 2018-04-17 NOTE — PLAN OF CARE
Problem: Patient Care Overview  Goal: Plan of Care/Patient Progress Review  Discharge Planner PT   Patient plan for discharge: TCU  Current status: 1 assist for sit-stand and gait with RW (uses 4ww)to 50 feet was shaky after walking  Barriers to return to prior living situation: mobility and safety  Recommendations for discharge: PT- Per plan established by the Physical Therapist, according to functional mobility the discharge recommendation is TCU    Rationale for recommendations: given extra time and skilled PT at TCU will met goals for more ind return to community.       Entered by: Gay Elena 04/17/2018 9:54 AM         PT- now has orders for discharge, recommend to PT for discharge.

## 2018-04-17 NOTE — PROGRESS NOTES
SWS     D: Discharge planning continuing.. Per discussion with MD pt will be ready for discharge today to rehab facility. [facility] has been contacted and they would be able to accept pt today, private room available as pt had requested.      I: Met with pt and also spoke to pt's daughter, Windy by phone, discussed above. It has been requested that SW continue planning arrangements for pt's transfer to [facility], discussed with them the $38.40/day private room fee which has been acknowledged and accepted. Medical transport has been requested by pt, daughter also in support of this.. [facility], w/c arranged for 1300. Discussed with them that w.c transport will be billed to pt's [employer] [facility] but that SW unable to guarantee coverage, discussed cost of $70/base and $4.50/mi if the insurance does not cover which was acknowledged and accepted by daughter. Additional questions were addressed.     A/P: Anticipate no problem with arrangements made for transfer today, with discharge no further SWS.

## 2018-04-17 NOTE — PROGRESS NOTES
"[facility]  Infectious Disease Progress Note          Assessment and Plan:   IMPRESSIONS:   1.  A 78-year-old female admitted with a fall, signs of low-grade sepsis, abnormal urinalysis without other symptoms and urine culture positive, improved on Levaquin.   2.  Surprise positive blood cultures 2 out of 2 for Branhamella catarrhalis.  Virtually always when this bacteremia occurs, there is some type of respiratory infection.  She has some slight left ear discomfort, history of chronic lung disease but no infiltrates, would still suspect this is a respiratory infection rather than other deep seated infection, of note, clinically improved on antibiotics.   3.  Positive urine culture, may just be colonization but is covered.   4.  Chronic lung disease.   5.  Left ear discomfort; no obvious infection visible.       RECOMMENDATIONS:     1.  OK dispositon , agree with Dr brizuela plan        Interval History:   no new complaints and doing well; no cp, sob, n/v/d, or abd pain. Feels well              Medications:       lisinopril  5 mg Oral Daily     cefTRIAXone  1 g Intravenous Q24H     gemfibrozil  600 mg Oral Daily     levothyroxine  25 mcg Oral Daily     montelukast  10 mg Oral At Bedtime     omeprazole  40 mg Oral Daily     predniSONE  10 mg Oral Daily     roflumilast  500 mcg Oral Daily     salmeterol  1 puff Inhalation BID     enoxaparin  40 mg Subcutaneous Q24H     fluticasone-vilanterol  1 puff Inhalation Daily     sodium chloride (PF)  3 mL Intracatheter Q8H                  Physical Exam:   Blood pressure 129/69, pulse 102, temperature 98.9  F (37.2  C), temperature source Oral, resp. rate 16, height 1.575 m (5' 2\"), weight 55.2 kg (121 lb 11.2 oz), SpO2 98 %, not currently breastfeeding.  Wt Readings from Last 2 Encounters:   04/17/18 55.2 kg (121 lb 11.2 oz)   04/05/18 56.2 kg (124 lb)     Vital Signs with Ranges  Temp:  [98  F (36.7  C)-99.8  F (37.7  C)] 98.9  F (37.2  C)  Pulse:  " [101-102] 102  Heart Rate:  [96-99] 96  Resp:  [16] 16  BP: (123-151)/(69-84) 129/69  SpO2:  [95 %-99 %] 98 %    Constitutional: Awake, alert, cooperative, no apparent distress   Lungs: Clear to auscultation bilaterally, no crackles or wheezing   Cardiovascular: Regular rate and rhythm, normal S1 and S2, and no murmur noted   Abdomen: Normal bowel sounds, soft, non-distended, non-tender   Skin: No rashes, no cyanosis, no edema   Other:           Data:   All microbiology laboratory data reviewed.  Recent Labs   Lab Test  04/17/18   0626  04/16/18   0600  04/15/18   0633  04/14/18   0723   WBC  9.3   --   11.3*  15.1*   HGB  10.1*   --   10.5*  10.0*   HCT  30.5*   --   32.1*  30.5*   MCV  93   --   93  93   PLT  201  167  171  180     Recent Labs   Lab Test  04/17/18   0626  04/16/18   0600  04/14/18   0723   CR  0.66  0.63  0.75     Recent Labs   Lab Test  08/26/16   1515   SED  13     Recent Labs   Lab Test  04/13/18   0511  04/13/18   0445  04/13/18   0429  05/04/17   0035  12/31/16   1730  12/31/16   1605  12/31/16   1552  03/04/16   2118  03/04/16   2110   CULT  Cultured on the 1st day of incubation:  Moraxella (Branhamella) catarrhalis  Susceptibility testing done on previous specimen  *  Critical Value/Significant Value, preliminary result only, called to and read back by  Brenna Eid RN at 2302 [id number]    >100,000 colonies/mL  Escherichia coli  *  Cultured on the 1st day of incubation:  Moraxella (Branhamella) catarrhalis  *  Critical Value/Significant Value, preliminary result only, called to and read back by  Brenna Eid RN at 2323 [id number]    No growth  No growth  Cultured on the 1st day of incubation: Staphylococcus epidermidis This isolate is   presumed to be clindamycin resistant based on detection of inducible   clindamycin resistance. Erythromycin and clindamycin are resistant, therefore,   they are not recommended for use.  Critical Value/Significant Value, preliminary result only,  called to and read   back by Ivis GUERRERO @ 1652 01/01/17 CS  (Note)  POSITIVE for STAPHYLOCOCCUS EPIDERMIDIS and NEGATIVE for the mecA  gene (not resistant to methicillin) by Verigene nucleic acid test.  The mecA gene was not detected. Final identification and  antimicrobial susceptibility testing will be verified by standard  methods.    Specimen tested with Jelasticigene multiplex, gram-positive blood culture  nucleic acid test for the following targets: Staph aureus, Staph  epidermidis, Staph lugdunensis, other Staph species, Enterococcus  faecalis, Enterococcus faecium, Streptococcus species, S. agalactiae,  S. anginosus grp., S. pneumoniae, S. pyogenes,  Listeria sp., mecA  (methicillin resistance) and Poonam/B (vancomycin resistance).    Critical Value/Significant Value called to and read back by Carlos GUERRERO @ 6243 1/1/17 CS  *  No growth  No growth  No growth

## 2018-04-18 ENCOUNTER — PATIENT OUTREACH (OUTPATIENT)
Dept: CARE COORDINATION | Facility: CLINIC | Age: 78
End: 2018-04-18

## 2018-04-18 VITALS
BODY MASS INDEX: 22.23 KG/M2 | HEIGHT: 62 IN | WEIGHT: 120.8 LBS | SYSTOLIC BLOOD PRESSURE: 110 MMHG | RESPIRATION RATE: 18 BRPM | DIASTOLIC BLOOD PRESSURE: 76 MMHG | TEMPERATURE: 99.5 F | OXYGEN SATURATION: 92 % | HEART RATE: 97 BPM

## 2018-04-18 NOTE — PROGRESS NOTES
[facility]  PRIMARY CARE PROVIDER AND CLINIC:  Jenny Hamitlon [address] [facility] [address]  Chief Complaint   Patient presents with     Hospital F/U       HPI:    Ana Luisa Lee is a 78 year old  (1940),admitted to the [facility] from Hospital  [facility].  Hospital stay 04/13/2018 through 04/17/2018.  Admitted to this facility for  rehab, medical management and nursing care.  HPI information obtained from: facility chart records.  Current issues are:         Sepsis (H)  Hyponatremia  COPD exacerbation (H)  Asthma  Hypothyroidism, unspecified type  Hypercholesteremia  Hypertension  GERD (gastroesophageal reflux disease)  Physical deconditioning     Today denies any discomfort. Clarifying code status is DNR.     History of Present Illness and Hospital Course:      See detailed admission note for full details.  Ana Luisa Lee is a 78-year-old female with history of hypothyroidism, hypercholesterolemia, hypertension, COPD and asthma for which she is dependent on daily prednisone but not supplemental oxygen, GERD, supraventricular tachycardia, and premature ventricular contractions.  She lives at [facility] in independent living.  She presented to the emergency department early in the morning on 4/13/18 for evaluation of fever and nausea.  She also had become weak.  She had felt ill for 2 days.  Of note, she had a steroid injection in her shoulder the week before presentation.  Emergency department evaluation showed tachycardia with heart rate in the 110s and fever with temperature 102.4.  White blood cell count was elevated at 11.1.  The remainder of the CBC was unremarkable.  Sodium was 131 with the remainder of the basic metabolic panel being unremarkable.  Liver function tests were normal.  Influenza testing was negative.  Lactic acid was normal at 1.6.  Urinalysis was obtained and suggested urinary tract  infection with greater than 182 white blood cells, many bacteria, and large leukocyte esterase.  She was given Levaquin in the emergency department due to penicillin allergy and admitted for further treatment.  After admission urine culture grew e coli and blood cultures (2 of 2) grew moraxella catharalis. Infectious Disease was consulted and recommended 10 days of Levaquin after discharge.  Ana Luisa has improved and will discharge to TCU [facility]. Plan was discussed with Ana Luisa's daughter prior to discharge.        CODE STATUS/ADVANCE DIRECTIVES DISCUSSION:   DNR / DNI  Patient's living condition: lives in an independent living facility    ALLERGIES:Hydralazine; Penicillin g; Meloxicam; Metoprolol; and Norvasc [amlodipine besylate]  PAST MEDICAL HISTORY:  has a past medical history of Anemia (Dec 2011); Arthritis of hand; Asthma, persistent; Asthma, persistent; Chronic intermittent steroid use; COPD exacerbation (H) (10/25/2013); Esophageal stricture (2007); Foot deformity; HTN, goal below 140/90; Hyperlipidemia; Hyperlipidemia LDL goal < 130; Hypothyroidism (Dec 2011); Hypothyroidism; Left foot pain (Nov 2011); Left shoulder pain (Nov 2011); Medication side effects; Osteoporosis; Paroxysmal supraventricular tachycardia (H); PVC (premature ventricular contraction) (May 2012); Skin cyst (Dec 2011); SOB (shortness of breath) on exertion (May 2012); and SVT (supraventricular tachycardia) (H). She also has no past medical history of Blood transfusion; Congestive heart failure, unspecified; Coronary artery disease; Diabetes mellitus (H); Malignant neoplasm (H); or Unspecified cerebral artery occlusion with cerebral infarction.  PAST SURGICAL HISTORY:  has a past surgical history that includes GYN surgery (1980); Bunionectomy lapidus with tarsal metatarsal (TMT) fusion (1990's); Tonsillectomy; hysterectomy, pap no longer indicated; Hysterectomy total abdominal; and EP study, modified (7/2012).  FAMILY  HISTORY: family history includes CEREBROVASCULAR DISEASE in her mother; Family History Negative in her brother, daughter, father, sister, and son; Hypertension in her mother; Unknown/Adopted in her maternal grandfather, maternal grandmother, paternal grandfather, and paternal grandmother. There is no history of Asthma, C.A.D., DIABETES, or CANCER.  SOCIAL HISTORY:  reports that she quit smoking about 38 years ago. She has a 15.00 pack-year smoking history. She has never used smokeless tobacco. She reports that she does not drink alcohol or use illicit drugs.    Post Discharge Medication Reconciliation Status: discharge medications reconciled and changed, per note/orders (see AVS).  Current Outpatient Prescriptions   Medication Sig Dispense Refill     albuterol (ALBUTEROL) 108 (90 BASE) MCG/ACT Inhaler Inhale 2 puffs into the lungs every 6 hours as needed       ASPIRIN NOT PRESCRIBED, INTENTIONAL, 1 each continuous prn. Antiplatelet medication not prescribed intentionally due to Use of NSAIDS 0 each 0     bisacodyl (DULCOLAX) 10 MG Suppository Place 1 suppository (10 mg) rectally daily as needed for constipation 30 suppository      budesonide (PULMICORT FLEXHALER) 180 MCG/ACT inhaler Inhale 1 puff into the lungs 2 times daily       calcium 600 MG tablet Take 1 tablet (600 mg) by mouth daily 60 tablet      cholecalciferol (VITAMIN D) 1000 UNIT tablet Take 1 tablet (1,000 Units) by mouth daily 30 tablet      cyanocobalamin (B-12 TR) 1000 MCG TBCR Take 1 tablet by mouth daily 100 tablet 3     ferrous sulfate (IRON) 325 (65 Fe) MG tablet Take 325 mg by mouth daily (with breakfast)       gemfibrozil (LOPID) 600 MG tablet Take 1 tablet (600 mg) by mouth daily 90 tablet 0     levalbuterol (XOPENEX) 1.25 MG/3ML neb solution Take 3 mLs (1.25 mg) by nebulization every 4 hours as needed for shortness of breath / dyspnea or wheezing 1584 mL 1     levothyroxine (SYNTHROID/LEVOTHROID) 25 MCG tablet Take 1 tablet (25 mcg) by mouth  "daily 90 tablet 3     lisinopril (PRINIVIL/ZESTRIL) 5 MG tablet Take 1 tablet (5 mg) by mouth daily 30 tablet      magnesium hydroxide (MILK OF MAGNESIA) 400 MG/5ML suspension Take 30 mLs by mouth daily as needed for constipation 105 mL      melatonin 1 MG TABS tablet Take 1 tablet (1 mg) by mouth nightly as needed for sleep       montelukast (SINGULAIR) 10 MG tablet Take 1 tablet (10 mg) by mouth At Bedtime 30 tablet      Multiple Vitamins-Minerals (MULTIVITAMIN OR) Take 1 tablet by mouth daily       omeprazole (PRILOSEC) 40 MG capsule Take 1 capsule (40 mg) by mouth daily Take 30-60 minutes before a meal. 90 capsule 3     polyethylene glycol (MIRALAX/GLYCOLAX) Packet Take 17 g by mouth daily as needed for constipation       predniSONE (DELTASONE) 2.5 MG tablet Take 4 tablets (10 mg) by mouth See Admin Instructions       prochlorperazine (COMPAZINE) 5 MG tablet Take 1 tablet (5 mg) by mouth every 6 hours as needed for nausea or vomiting 120 tablet 0     roflumilast (DALIRESP) 500 MCG TABS tablet Take 500 mcg by mouth daily        salmeterol (SEREVENT) 50 MCG/DOSE diskus inhaler Inhale 1 puff into the lungs 2 times daily       umeclidinium (INCRUSE ELLIPTA) 62.5 MCG/INH oral inhaler Inhale 1 puff into the lungs daily       ACETAMINOPHEN PO Take 500 mg by mouth every 8 hours AND give 500 mg by mouth every 6 hours PRN       dexamethasone (DECADRON) 4 MG/ML injection Inject 1 mL (4 mg) as directed once for 1 dose 2 mL 0     fluticasone-vilanterol (BREO ELLIPTA) 100-25 MCG/INH oral inhaler Inhale 1 puff into the lungs every morning       TRAMADOL HCL PO Take 50 mg by mouth every 8 hours         ROS:  4 point ROS including Respiratory, CV, GI and , other than that noted in the HPI,  is negative    Exam:  /76  Pulse 97  Temp 99.5  F (37.5  C)  Resp 18  Ht 5' 2\" (1.575 m)  Wt 120 lb 12.8 oz (54.8 kg)  SpO2 92%  BMI 22.09 kg/m2  GENERAL APPEARANCE:  Alert, in no distress, oriented, cooperative, laying in " bed  ENT:  Mouth and posterior oropharynx normal, moist mucous membranes, normal hearing acuity  EYES:  EOM, conjunctivae, lids, pupils and irises normal, EOM normal, conjunctiva and lids normal  RESP:  respiratory effort and palpation of chest normal, lungs clear to auscultation , no respiratory distress  CV:  Palpation and auscultation of heart done , regular rate and rhythm, + murmur, rub, or gallop, no edema  ABDOMEN:  normal bowel sounds, soft, nontender, no hepatosplenomegaly or other masses, no guarding or rebound  M/S:   Gait and station abnormal unsteady with walker  SKIN:  Inspection of skin and subcutaneous tissue baseline, Palpation of skin and subcutaneous tissue baseline, fragile skin with old bruising dorsum right hand  NEURO:   Cranial nerves 2-12 are normal tested and grossly at patient's baseline  PSYCH:  oriented X 3, insight and judgement impaired, memory impaired , affect and mood normal    Lab/Diagnostic data:    CBC RESULTS:   Recent Labs   Lab Test  04/17/18   0626 04/16/18   0600  04/15/18   0633   WBC  9.3   --   11.3*   RBC  3.29*   --   3.45*   HGB  10.1*   --   10.5*   HCT  30.5*   --   32.1*   MCV  93   --   93   MCH  30.7   --   30.4   MCHC  33.1   --   32.7   RDW  13.9   --   14.1   PLT  201  167  171       Last Basic Metabolic Panel:  Recent Labs   Lab Test  04/17/18   0626 04/16/18   0600 04/14/18   0723   NA  133   --    --   133   POTASSIUM  3.4  3.9   < >  3.7   CHLORIDE  100   --    --   103   DIANA  9.1   --    --   8.7   CO2  24   --    --   22   BUN  12   --    --   15   CR  0.66  0.63   --   0.75   GLC  98   --    --   85    < > = values in this interval not displayed.       Liver Function Studies -   Recent Labs   Lab Test  04/13/18   0429  12/28/17   1000   PROTTOTAL  7.4  6.6*   ALBUMIN  3.9  3.3*   BILITOTAL  0.5  0.3   ALKPHOS  66  55   AST  14  7   ALT  14  13       TSH   Date Value Ref Range Status   12/28/2017 1.24 0.40 - 4.00 mU/L Final   05/05/2017 1.34 0.40 -  4.00 mU/L Final       Lab Results   Component Value Date    A1C 6.8 01/03/2017    A1C 6.5 03/16/2016       ASSESSMENT/PLAN:  (A41.9) Sepsis (H)  (primary encounter diagnosis)  Comment: s/s UTI. Afebrile  Plan: cont levofloxacin 500 mg daily x 10 days    (E87.1) Hyponatremia  Comment: resolved    (J44.1) COPD exacerbation (H)  (J45.909) Asthma  Comment:  Stable. Follows with Dr Carrasco ~ 6 months. No symptoms today.  Plan:  Cont prednisone 10 mg daily,  budesonide 180 mcg inhaler bid,   -Incruse ellipta 62.5 mcg daily  Montelukast 10 mg daily  roflumilast 500 mcg daily  Salmeterol 50 mcg bid  breo ellipta 100/25 mcg daily    (E03.9) Hypothyroidism, unspecified type  Comment: no symptoms.  Plan:  Cont levothyroxine 25 mcg    (E78.00) Hypercholesteremia  Comment: stable  Plan: cont gemfibrozil    (I10) Hypertension  Comment: started on lisinopril inpatient. BP wnl  Plan: cont lisinopril 5 mg daily    (K21.9) GERD (gastroesophageal reflux disease)  Comment: no symptoms of reflux.  Plan: omeprazole 40 mg daily    (R53.81) Physical deconditioning  Comment: s/s to above. Lives in [facility]  Plan: PT/OT  -SW for discharge planning    Orders:  Weights 3 times weekly  Bmp, cbc 4/23  breo ellipta 100/25 mcg 1 puff daily    Total time spent with patient visit at the skilled nursing facility was 45 min including patient visit, review of past records. Greater than 50% of total time spent with counseling and coordinating care due to  hydration, COPD..    Electronically signed by:  IVÁN Connell CNP

## 2018-04-18 NOTE — PROGRESS NOTES
Clinic Care Coordination Contact  Care Coordination Transition Communication         Clinical Data: Patient was hospitalized at [facility]  from 4/13/18 to 4/17/18 with diagnosis of Sepsis due to urinary tract infection.     Transition to Facility:              [facility]: [facility]              Contact name and phone number/fax: Damion RINALDI [phone]    Plan: [facility] RN/SW Care Coordinator will await notification from facility staff informing RN/SW Care Coordinator of patient's discharge plans/needs. RN/SW Care Coordinator will review chart and outreach to facility staff every 4 weeks and as needed.     Patient is followed by [facility] care coordination.       Georgiana Mak RN, CCM - Care Coordinator     4/18/2018    8:25 AM  [phone]

## 2018-04-19 ENCOUNTER — NURSING HOME VISIT (OUTPATIENT)
Dept: GERIATRICS | Facility: CLINIC | Age: 78
End: 2018-04-19
Payer: COMMERCIAL

## 2018-04-19 DIAGNOSIS — E03.9 HYPOTHYROIDISM, UNSPECIFIED TYPE: ICD-10-CM

## 2018-04-19 DIAGNOSIS — A41.51 SEPSIS DUE TO ESCHERICHIA COLI (H): Primary | ICD-10-CM

## 2018-04-19 DIAGNOSIS — E78.00 HYPERCHOLESTEREMIA: ICD-10-CM

## 2018-04-19 DIAGNOSIS — E87.1 HYPONATREMIA: ICD-10-CM

## 2018-04-19 DIAGNOSIS — K21.9 GASTROESOPHAGEAL REFLUX DISEASE, ESOPHAGITIS PRESENCE NOT SPECIFIED: ICD-10-CM

## 2018-04-19 DIAGNOSIS — I10 HYPERTENSION, UNSPECIFIED TYPE: ICD-10-CM

## 2018-04-19 DIAGNOSIS — J44.1 COPD EXACERBATION (H): ICD-10-CM

## 2018-04-19 DIAGNOSIS — J45.909 ASTHMA, UNSPECIFIED ASTHMA SEVERITY, UNSPECIFIED WHETHER COMPLICATED, UNSPECIFIED WHETHER PERSISTENT: ICD-10-CM

## 2018-04-19 DIAGNOSIS — R53.81 PHYSICAL DECONDITIONING: ICD-10-CM

## 2018-04-19 PROCEDURE — 99310 SBSQ NF CARE HIGH MDM 45: CPT | Performed by: NURSE PRACTITIONER

## 2018-04-19 NOTE — PROGRESS NOTES
[facility] Care Coordination Contact  Communication History     User: Cassy Savage RN Date/time: 4/18/2018 12:51 PM    Comment: Spoke with Meg to inquire on new referral for homemaking. Meg states that she will be interviewing new staff this Friday and will f/u with CM.    Context:  Outcome:     Phone number: [phone] Phone Type:     Comm. type: Telephone Call type: Incoming    Contact: Vinnie Weaver  Relation to patient: Other   Yeni Savage RN, BC  Supervisor [facility]   [phone] (Fax)

## 2018-04-19 NOTE — PROGRESS NOTES
[facility] Care Coordination Contact  Communication History     User: Cassy Savage RN Date/time: 4/19/2018 11:42 AM    Comment: 4/17/18 secure e-mail sent to Filomena NIMCO to introduce myself as CM, provided community support POC, reqmohsenst invite to CC.     Context:  Outcome:     Phone number:  Phone Type:     Comm. type: Email Call type: Outgoing    Contact: Filomena RINALDI  Relation to patient: Transitional Care Unit    User: Cassy Savage RN Date/time: 4/17/2018 11:44 AM    Comment: EPIC stafff message to inform on TCU admission for this client.  CM shared info on Community support plan.     Context:  Outcome:     Phone number:  Phone Type:     Comm. type: In Basket Call type: Outgoing    Contact: Ama EPPS Relation to patient: Other        4/17/17 Client d/c from [facility], transition to [facility] for TCU.  CM did speak with Kaya,  at [facility] to inform of transition.   4/19/18 Call placed to client, who stated that she was told she will be in TCU for 10 days to 2 weeks. Client informed that CM contacted providers to inform. CM to follow.   Yeni Savage RN, BC  Supervisor [facility]   [phone] (Fax)

## 2018-04-23 ENCOUNTER — NURSING HOME VISIT (OUTPATIENT)
Dept: GERIATRICS | Facility: CLINIC | Age: 78
End: 2018-04-23
Payer: COMMERCIAL

## 2018-04-23 ENCOUNTER — HOSPITAL LABORATORY (OUTPATIENT)
Dept: OTHER | Facility: CLINIC | Age: 78
End: 2018-04-23

## 2018-04-23 VITALS
TEMPERATURE: 98.7 F | OXYGEN SATURATION: 97 % | HEART RATE: 101 BPM | WEIGHT: 119.8 LBS | DIASTOLIC BLOOD PRESSURE: 68 MMHG | RESPIRATION RATE: 18 BRPM | HEIGHT: 62 IN | BODY MASS INDEX: 22.05 KG/M2 | SYSTOLIC BLOOD PRESSURE: 102 MMHG

## 2018-04-23 DIAGNOSIS — I10 UNCONTROLLED HYPERTENSION: ICD-10-CM

## 2018-04-23 DIAGNOSIS — J44.9 COPD, SEVERE (H): ICD-10-CM

## 2018-04-23 DIAGNOSIS — E03.9 HYPOTHYROIDISM, UNSPECIFIED TYPE: Chronic | ICD-10-CM

## 2018-04-23 DIAGNOSIS — R53.81 PHYSICAL DECONDITIONING: ICD-10-CM

## 2018-04-23 DIAGNOSIS — A41.9 SEPSIS, DUE TO UNSPECIFIED ORGANISM: Primary | ICD-10-CM

## 2018-04-23 LAB
ANION GAP SERPL CALCULATED.3IONS-SCNC: 9 MMOL/L (ref 3–14)
BUN SERPL-MCNC: 14 MG/DL (ref 7–30)
CALCIUM SERPL-MCNC: 10 MG/DL (ref 8.5–10.1)
CHLORIDE SERPL-SCNC: 101 MMOL/L (ref 94–109)
CO2 SERPL-SCNC: 24 MMOL/L (ref 20–32)
CREAT SERPL-MCNC: 0.77 MG/DL (ref 0.52–1.04)
ERYTHROCYTE [DISTWIDTH] IN BLOOD BY AUTOMATED COUNT: 14.3 % (ref 10–15)
GFR SERPL CREATININE-BSD FRML MDRD: 73 ML/MIN/1.7M2
GLUCOSE SERPL-MCNC: 92 MG/DL (ref 70–99)
HCT VFR BLD AUTO: 34.1 % (ref 35–47)
HGB BLD-MCNC: 11.2 G/DL (ref 11.7–15.7)
MCH RBC QN AUTO: 30.2 PG (ref 26.5–33)
MCHC RBC AUTO-ENTMCNC: 32.8 G/DL (ref 31.5–36.5)
MCV RBC AUTO: 92 FL (ref 78–100)
PLATELET # BLD AUTO: 349 10E9/L (ref 150–450)
POTASSIUM SERPL-SCNC: 4.3 MMOL/L (ref 3.4–5.3)
RBC # BLD AUTO: 3.71 10E12/L (ref 3.8–5.2)
SODIUM SERPL-SCNC: 134 MMOL/L (ref 133–144)
WBC # BLD AUTO: 11.5 10E9/L (ref 4–11)

## 2018-04-23 PROCEDURE — 99306 1ST NF CARE HIGH MDM 50: CPT | Performed by: INTERNAL MEDICINE

## 2018-04-23 NOTE — PROGRESS NOTES
PRIMARY CARE PROVIDER AND CLINIC RESPONSIBLE:  Jenny Hamilton, [address] [facility] / [facility] [address]        ADMISSION HISTORY AND PHYSICAL EXAMINATION     Chief Complaint   Patient presents with     Hospital F/U         HISTORY OF PRESENT ILLNESS:  78 year old female, (1940), admitted to the [facility] TCU for continuation of medical care and rehab.    Pt admitted [facility] 4/13 to 4/17 for sepsis due to UTI and URI.    Pt denies any chest pain/fever/chills/productive cough/N/V. She feels better post discharge.     Please see Ama Burrows's CNP admit noted dated 4/18 for details of admission, past medical history, family history, allergies, medication list, social history and other details pertinent with this admission. Hospital admission and dc summary reviewed.      Past Medical History:   Diagnosis Date     Anemia Dec 2011     Arthritis of hand      Asthma, persistent     f/U dr Brock at [facility]     Asthma, persistent      Chronic intermittent steroid use     for asthma     COPD exacerbation (H) 10/25/2013     Esophageal stricture 2007    s/p dilation     Foot deformity     Left     HTN, goal below 140/90      Hyperlipidemia      Hyperlipidemia LDL goal < 130      Hypothyroidism Dec 2011     Hypothyroidism      Left foot pain Nov 2011     Left shoulder pain Nov 2011     Medication side effects      Osteoporosis      Paroxysmal supraventricular tachycardia (H)      PVC (premature ventricular contraction) May 2012     Skin cyst Dec 2011    L thumb     SOB (shortness of breath) on exertion May 2012     SVT (supraventricular tachycardia) (H)        Past Surgical History:   Procedure Laterality Date     BUNIONECTOMY LAPIDUS WITH TARSAL METATARSAL (TMT) FUSION  1990's     EP STUDY, MODIFIED  7/2012    SVT not induced, PVC's     GYN SURGERY  1980     HYSTERECTOMY TOTAL ABDOMINAL       HYSTERECTOMY, PAP NO LONGER INDICATED       TONSILLECTOMY         Current Outpatient Prescriptions    Medication Sig     acetaminophen (TYLENOL) 325 MG tablet Take 2 tablets (650 mg) by mouth every 4 hours as needed for mild pain     albuterol (ALBUTEROL) 108 (90 BASE) MCG/ACT Inhaler Inhale 2 puffs into the lungs every 6 hours as needed     ASPIRIN NOT PRESCRIBED, INTENTIONAL, 1 each continuous prn. Antiplatelet medication not prescribed intentionally due to Use of NSAIDS     bisacodyl (DULCOLAX) 10 MG Suppository Place 1 suppository (10 mg) rectally daily as needed for constipation     budesonide (PULMICORT FLEXHALER) 180 MCG/ACT inhaler Inhale 1 puff into the lungs 2 times daily     calcium 600 MG tablet Take 1 tablet (600 mg) by mouth daily     cholecalciferol (VITAMIN D) 1000 UNIT tablet Take 1 tablet (1,000 Units) by mouth daily     cyanocobalamin (B-12 TR) 1000 MCG TBCR Take 1 tablet by mouth daily     ferrous sulfate (IRON) 325 (65 Fe) MG tablet Take 325 mg by mouth daily (with breakfast)     fluticasone-vilanterol (BREO ELLIPTA) 100-25 MCG/INH oral inhaler Inhale 1 puff into the lungs every morning     gemfibrozil (LOPID) 600 MG tablet Take 1 tablet (600 mg) by mouth daily     levalbuterol (XOPENEX) 1.25 MG/3ML neb solution Take 3 mLs (1.25 mg) by nebulization every 4 hours as needed for shortness of breath / dyspnea or wheezing     levofloxacin (LEVAQUIN) 500 MG tablet Take 1 tablet (500 mg) by mouth daily for 10 days     levothyroxine (SYNTHROID/LEVOTHROID) 25 MCG tablet Take 1 tablet (25 mcg) by mouth daily     lisinopril (PRINIVIL/ZESTRIL) 5 MG tablet Take 1 tablet (5 mg) by mouth daily     magnesium hydroxide (MILK OF MAGNESIA) 400 MG/5ML suspension Take 30 mLs by mouth daily as needed for constipation     melatonin 1 MG TABS tablet Take 1 tablet (1 mg) by mouth nightly as needed for sleep     montelukast (SINGULAIR) 10 MG tablet Take 1 tablet (10 mg) by mouth At Bedtime     Multiple Vitamins-Minerals (MULTIVITAMIN OR) Take 1 tablet by mouth daily     omeprazole (PRILOSEC) 40 MG capsule Take 1  capsule (40 mg) by mouth daily Take 30-60 minutes before a meal.     polyethylene glycol (MIRALAX/GLYCOLAX) Packet Take 17 g by mouth daily as needed for constipation     predniSONE (DELTASONE) 2.5 MG tablet Take 4 tablets (10 mg) by mouth See Admin Instructions     prochlorperazine (COMPAZINE) 5 MG tablet Take 1 tablet (5 mg) by mouth every 6 hours as needed for nausea or vomiting     roflumilast (DALIRESP) 500 MCG TABS tablet Take 500 mcg by mouth daily      salmeterol (SEREVENT) 50 MCG/DOSE diskus inhaler Inhale 1 puff into the lungs 2 times daily     traMADol (ULTRAM) 50 MG tablet Take 1 tablet (50 mg) by mouth 2 times daily as needed for moderate to severe pain Max two tablets a day     umeclidinium (INCRUSE ELLIPTA) 62.5 MCG/INH oral inhaler Inhale 1 puff into the lungs daily     No current facility-administered medications for this visit.        Allergies   Allergen Reactions     Hydralazine Anxiety     Penicillin G Hives     Tolerated cephalosporines 2017     Meloxicam      dizziness       Metoprolol      ? Skin rash on the back     Norvasc [Amlodipine Besylate] Hives       Social History     Social History     Marital status:      Spouse name: N/A     Number of children: N/A     Years of education: N/A     Occupational History     Not on file.     Social History Main Topics     Smoking status: Former Smoker     Packs/day: 1.00     Years: 15.00     Quit date: 1/1/1980     Smokeless tobacco: Never Used     Alcohol use No      Comment: Occasional drink     Drug use: No     Sexual activity: No     Other Topics Concern     Caffeine Concern No     1 cup of tea in the morning     Sleep Concern No     Stress Concern No     Weight Concern No     Special Diet No     Exercise No     some walking     Seat Belt Yes     Social History Narrative          Information reviewed:  Medications, vital signs, orders, nursing notes, problem list, hospital information.     ROS: All 10 point review of system completed,  "those pertinent positive, please see H&P, the remaining ROS is negative.    /68  Pulse 101  Temp 98.7  F (37.1  C)  Resp 18  Ht 5' 2\" (1.575 m)  Wt 119 lb 12.8 oz (54.3 kg)  SpO2 97%  BMI 21.91 kg/m2    PHYSICAL EXAMINATION:   GENERAL:  No acute distress. Laying in bed.  SKIN:  Dry and warm.  There is no rash, lesions, ulcers or juandice at area of skin examined.  HEENT:  Head without trauma.  Pupils round, reactive. Exam of conjunctiva and lids are normal. Sclera without icterus. There is no oral thrush.  NECK:  Supple.  There is no cervical adenopathy, no thyromegaly. No jugular venous distension.  CHEST: No reproducible chest tenderness.   LUNGS:  Normal respiratory effort. Lungs are Clear on ascultation.  HEART:  Regular rate and rhythm.  No murmur, gallops or rubs auscultated.  ABDOMEN:  Soft, bowel sounds positive.  There is no tenderness or guarding.   EXTREMITIES: No edema.   NEUROLOGIC:  Alert and oriented x3.      Lab/Diagnostic data:  Reviewed    Lab Results   Component Value Date    WBC 9.6 04/24/2018     Lab Results   Component Value Date    RBC 3.50 04/24/2018     Lab Results   Component Value Date    HGB 10.6 04/24/2018     Lab Results   Component Value Date    HCT 32.2 04/24/2018     Lab Results   Component Value Date    MCV 92 04/24/2018     Lab Results   Component Value Date    MCH 30.3 04/24/2018     Lab Results   Component Value Date    MCHC 32.9 04/24/2018     Lab Results   Component Value Date    RDW 14.3 04/24/2018     Lab Results   Component Value Date     04/24/2018       Last Basic Metabolic Panel:  Lab Results   Component Value Date     04/24/2018      Lab Results   Component Value Date    POTASSIUM 4.0 04/24/2018     Lab Results   Component Value Date    CHLORIDE 101 04/24/2018     Lab Results   Component Value Date    DIANA 9.6 04/24/2018     Lab Results   Component Value Date    CO2 24 04/24/2018     Lab Results   Component Value Date    BUN 15 04/24/2018     Lab " Results   Component Value Date    CR 0.71 04/24/2018     Lab Results   Component Value Date    GLC 84 04/24/2018         ASSESSMENT / PLAN:     Sepsis, due to unspecified organism (H)  - UCx grew e.coli and blood Cx grew moraxella.  - On PO levaquin.  - CXR 4/2018 showed pneumonia.  - Needs f/u with PCP to ensure resolution.    COPD, severe (H)  - FEV 1 < 1 L per spirometry 3/2017.  - On pulmicort, breo ellipta, prednisone, dalirep, serevent, xopenex nebs and incruse ellipta.  - follow up with pulmonary.    Benign essential hypertension  - On lisinopril.    Hypothyroidism, unspecified type  - On synthroid.    Hyperlipidemia, unspecified.  - On lopid.    Physical deconditioning  -Plan: PT/OT, fall precautions. Care conference with patient and family for the progress of rehab and disposition issues will be discussed as planned. Rehab evaluation and other evaluations including CPT are at rehab logs, to be reviewed separately.  Fall risk assessment as well as cognitive evaluation will be formed during rehab stay if indicated.      Other problems with same care. Primary care doctor and other specialists to address those chronic problems in next clinic appointment to be scheduled upon discharge from the TCU.    Total time spent with patient visit was 39 min including patient visit, review of past records, 1/2 time on patients counseling and coordinating care.        Latesha Cao MD

## 2018-04-24 ENCOUNTER — HOSPITAL LABORATORY (OUTPATIENT)
Dept: OTHER | Facility: CLINIC | Age: 78
End: 2018-04-24

## 2018-04-24 ENCOUNTER — PATIENT OUTREACH (OUTPATIENT)
Dept: GERIATRIC MEDICINE | Facility: CLINIC | Age: 78
End: 2018-04-24

## 2018-04-24 LAB
ANION GAP SERPL CALCULATED.3IONS-SCNC: 10 MMOL/L (ref 3–14)
BUN SERPL-MCNC: 15 MG/DL (ref 7–30)
CALCIUM SERPL-MCNC: 9.6 MG/DL (ref 8.5–10.1)
CHLORIDE SERPL-SCNC: 101 MMOL/L (ref 94–109)
CO2 SERPL-SCNC: 24 MMOL/L (ref 20–32)
CREAT SERPL-MCNC: 0.71 MG/DL (ref 0.52–1.04)
ERYTHROCYTE [DISTWIDTH] IN BLOOD BY AUTOMATED COUNT: 14.3 % (ref 10–15)
GFR SERPL CREATININE-BSD FRML MDRD: 80 ML/MIN/1.7M2
GLUCOSE SERPL-MCNC: 84 MG/DL (ref 70–99)
HCT VFR BLD AUTO: 32.2 % (ref 35–47)
HGB BLD-MCNC: 10.6 G/DL (ref 11.7–15.7)
MCH RBC QN AUTO: 30.3 PG (ref 26.5–33)
MCHC RBC AUTO-ENTMCNC: 32.9 G/DL (ref 31.5–36.5)
MCV RBC AUTO: 92 FL (ref 78–100)
PLATELET # BLD AUTO: 341 10E9/L (ref 150–450)
POTASSIUM SERPL-SCNC: 4 MMOL/L (ref 3.4–5.3)
RBC # BLD AUTO: 3.5 10E12/L (ref 3.8–5.2)
SODIUM SERPL-SCNC: 135 MMOL/L (ref 133–144)
WBC # BLD AUTO: 9.6 10E9/L (ref 4–11)

## 2018-04-24 RX ORDER — DEXAMETHASONE SODIUM PHOSPHATE 4 MG/ML
4 INJECTION, SOLUTION INTRA-ARTICULAR; INTRALESIONAL; INTRAMUSCULAR; INTRAVENOUS; SOFT TISSUE ONCE
Qty: 2 ML | Refills: 0 | OUTPATIENT
Start: 2018-04-24 | End: 2018-05-24

## 2018-04-24 RX ORDER — METHYLPREDNISOLONE ACETATE 40 MG/ML
40 INJECTION, SUSPENSION INTRA-ARTICULAR; INTRALESIONAL; INTRAMUSCULAR; SOFT TISSUE ONCE
Qty: 2 ML | Refills: 0 | OUTPATIENT
Start: 2018-04-24 | End: 2018-04-24

## 2018-04-25 NOTE — PROGRESS NOTES
[facility] Care Coordination Contact  4/24/18 E-mail sent to Lela RINALDI at [facility] to inquire on how client is doing.  4/24/18 Rec'd e-mail from Lela that they are planning a care conference for this Friday.  4/24/18 e-mail sent to Lela that this CM will not be able to be in attendance at the CC. Explained that per client's choice CM had placed referrals with alternative homemaking agencies, CM to f/u.   Explained that CM can resume homecare services: MOW, LSS  and hmkg. Reqeust referral to [facility] for resumption of cares.  CM to follow.  Yeni Savage RN, BC  Supervisor [facility]   [phone] (Fax)

## 2018-04-25 NOTE — PROGRESS NOTES
[facility] Care Coordination Contact  Voice message left with Maryan at [facility] to inquire on homemaking referral.  Request a call back.  Yeni Savage RN, BC  Supervisor [facility]   [phone] (Fax)

## 2018-04-26 ENCOUNTER — NURSING HOME VISIT (OUTPATIENT)
Dept: GERIATRICS | Facility: CLINIC | Age: 78
End: 2018-04-26
Payer: COMMERCIAL

## 2018-04-26 DIAGNOSIS — M25.511 PAIN OF BOTH SHOULDER JOINTS: ICD-10-CM

## 2018-04-26 DIAGNOSIS — M25.512 PAIN OF BOTH SHOULDER JOINTS: ICD-10-CM

## 2018-04-26 DIAGNOSIS — R52 PAIN: Primary | ICD-10-CM

## 2018-04-26 PROCEDURE — 99308 SBSQ NF CARE LOW MDM 20: CPT | Performed by: NURSE PRACTITIONER

## 2018-04-27 VITALS
SYSTOLIC BLOOD PRESSURE: 122 MMHG | TEMPERATURE: 98.5 F | OXYGEN SATURATION: 97 % | BODY MASS INDEX: 22.31 KG/M2 | WEIGHT: 121.2 LBS | HEART RATE: 88 BPM | HEIGHT: 62 IN | DIASTOLIC BLOOD PRESSURE: 78 MMHG | RESPIRATION RATE: 18 BRPM

## 2018-04-27 NOTE — PROGRESS NOTES
[facility] Care Coordination Contact  4/26/18 Rec'd tele call from Lyudmila at [facility] stating that they have been trying to reach client. Explained that client is currently in TCU, CM provided client's room number at [facility]  4/26/18 CM made 2 attempts to reach client without success  4/2718 Call placed to client, shared that CM is not able to be present for the care conference today. Explained that CM did inform Lela to obtain an order to resume [facility] upon d/c.  Shared info above regarding [facility]. CM to follow  Yeni Savage RN, BC  Supervisor [facility]   [phone] (Fax)

## 2018-04-27 NOTE — PROGRESS NOTES
[facility]    Chief Complaint   Patient presents with     Pain       HPI:    Ana Luisa Lee is a 78 year old  (1940), who is being seen today for an episodic care visit at [facility].  HPI information obtained from: facility chart records.Today's concern is:     Pain  Pain of both shoulder joints   - received 4th party communication that patient's shoulder pain not controlled on current regimen. Discussed with patient who stated the same. Reports bilateral shoulder pain > left approx 7/10 not well relieved with current Tramadol or Tylenol. States requires more frequently then available and that pain best relieved when both Tramadol and Tylenol are given at once. Denies dizziness, drowsiness, constipation or noting other side effects from Tramadol. Renal fx is stable.    ALLERGIES: Hydralazine; Penicillin g; Meloxicam; Metoprolol; and Norvasc [amlodipine besylate]  Past Medical, Surgical, Family and Social History reviewed and updated in EPIC.    Current Outpatient Prescriptions   Medication Sig Dispense Refill     ACETAMINOPHEN PO Take 500 mg by mouth every 8 hours AND give 500 mg by mouth every 6 hours PRN       albuterol (ALBUTEROL) 108 (90 BASE) MCG/ACT Inhaler Inhale 2 puffs into the lungs every 6 hours as needed       ASPIRIN NOT PRESCRIBED, INTENTIONAL, 1 each continuous prn. Antiplatelet medication not prescribed intentionally due to Use of NSAIDS 0 each 0     bisacodyl (DULCOLAX) 10 MG Suppository Place 1 suppository (10 mg) rectally daily as needed for constipation 30 suppository      budesonide (PULMICORT FLEXHALER) 180 MCG/ACT inhaler Inhale 1 puff into the lungs 2 times daily       calcium 600 MG tablet Take 1 tablet (600 mg) by mouth daily 60 tablet      cholecalciferol (VITAMIN D) 1000 UNIT tablet Take 1 tablet (1,000 Units) by mouth daily 30 tablet      cyanocobalamin (B-12 TR) 1000 MCG TBCR Take 1 tablet by mouth daily 100 tablet 3     ferrous  sulfate (IRON) 325 (65 Fe) MG tablet Take 325 mg by mouth daily (with breakfast)       fluticasone-vilanterol (BREO ELLIPTA) 100-25 MCG/INH oral inhaler Inhale 1 puff into the lungs every morning       gemfibrozil (LOPID) 600 MG tablet Take 1 tablet (600 mg) by mouth daily 90 tablet 0     levalbuterol (XOPENEX) 1.25 MG/3ML neb solution Take 3 mLs (1.25 mg) by nebulization every 4 hours as needed for shortness of breath / dyspnea or wheezing 1584 mL 1     levofloxacin (LEVAQUIN) 500 MG tablet Take 1 tablet (500 mg) by mouth daily for 10 days 10 tablet 0     levothyroxine (SYNTHROID/LEVOTHROID) 25 MCG tablet Take 1 tablet (25 mcg) by mouth daily 90 tablet 3     lisinopril (PRINIVIL/ZESTRIL) 5 MG tablet Take 1 tablet (5 mg) by mouth daily 30 tablet      magnesium hydroxide (MILK OF MAGNESIA) 400 MG/5ML suspension Take 30 mLs by mouth daily as needed for constipation 105 mL      melatonin 1 MG TABS tablet Take 1 tablet (1 mg) by mouth nightly as needed for sleep       montelukast (SINGULAIR) 10 MG tablet Take 1 tablet (10 mg) by mouth At Bedtime 30 tablet      Multiple Vitamins-Minerals (MULTIVITAMIN OR) Take 1 tablet by mouth daily       omeprazole (PRILOSEC) 40 MG capsule Take 1 capsule (40 mg) by mouth daily Take 30-60 minutes before a meal. 90 capsule 3     polyethylene glycol (MIRALAX/GLYCOLAX) Packet Take 17 g by mouth daily as needed for constipation       predniSONE (DELTASONE) 2.5 MG tablet Take 4 tablets (10 mg) by mouth See Admin Instructions       prochlorperazine (COMPAZINE) 5 MG tablet Take 1 tablet (5 mg) by mouth every 6 hours as needed for nausea or vomiting 120 tablet 0     roflumilast (DALIRESP) 500 MCG TABS tablet Take 500 mcg by mouth daily        salmeterol (SEREVENT) 50 MCG/DOSE diskus inhaler Inhale 1 puff into the lungs 2 times daily       TRAMADOL HCL PO Take 50 mg by mouth every 8 hours       umeclidinium (INCRUSE ELLIPTA) 62.5 MCG/INH oral inhaler Inhale 1 puff into the lungs daily        "dexamethasone (DECADRON) 4 MG/ML injection Inject 1 mL (4 mg) as directed once for 1 dose 2 mL 0     Medications reviewed:  Medications reconciled to facility chart and changes were made to reflect current medications as identified as above med list. Below are the changes that were made:   Medications stopped since last EPIC medication reconciliation:   Medications Discontinued During This Encounter   Medication Reason     traMADol (ULTRAM) 50 MG tablet Discontinued by another Health Care Provider     acetaminophen (TYLENOL) 325 MG tablet Discontinued by another Health Care Provider       Medications started since last [facility] medication reconciliation:  Orders Placed This Encounter   Medications     ACETAMINOPHEN PO     Sig: Take 500 mg by mouth every 8 hours AND give 500 mg by mouth every 6 hours PRN     TRAMADOL HCL PO     Sig: Take 50 mg by mouth every 8 hours         REVIEW OF SYSTEMS:  4 point ROS including Respiratory, CV, GI and , other than that noted in the HPI,  is negative    Physical Exam:  /78  Pulse 88  Temp 98.5  F (36.9  C)  Resp 18  Ht 5' 2\" (1.575 m)  Wt 121 lb 3.2 oz (55 kg)  SpO2 97%  BMI 22.17 kg/m2      Resp: Effort WNL  CV: VSS as above  Abd- soft, nontender, BS +  Musc- V[place]LEGAS  Psych- alert, calm, pleasant    BP 04/20-04/27: /63-81 mmHg    Recent Labs:     CBC RESULTS:   Recent Labs   Lab Test  04/24/18   0611  04/23/18   0735   WBC  9.6  11.5*   RBC  3.50*  3.71*   HGB  10.6*  11.2*   HCT  32.2*  34.1*   MCV  92  92   MCH  30.3  30.2   MCHC  32.9  32.8   RDW  14.3  14.3   PLT  341  349       Last Basic Metabolic Panel:  Recent Labs   Lab Test  04/24/18   0611  04/23/18   0735   NA  135  134   POTASSIUM  4.0  4.3   CHLORIDE  101  101   DIANA  9.6  10.0   CO2  24  24   BUN  15  14   CR  0.71  0.77   GLC  84  92       Liver Function Studies -   Recent Labs   Lab Test  04/13/18   0429  12/28/17   1000   PROTTOTAL  7.4  6.6*   ALBUMIN  3.9  3.3*   BILITOTAL  0.5  0.3   ALKPHOS  66  " 55   AST  14  7   ALT  14  13       TSH   Date Value Ref Range Status   12/28/2017 1.24 0.40 - 4.00 mU/L Final   05/05/2017 1.34 0.40 - 4.00 mU/L Final       Assessment/Plan:  Pain  Pain of both shoulder joints  - increase Tramadol to 50 mg q 8 and schedule and schedule with Tylenol  - follow clinically      Electronically signed by  IVÁN Bowden CNP

## 2018-04-30 NOTE — PROGRESS NOTES
[facility] Care Coordination Contact  4/27/2018  Rec'd the following e-mail from Lela RINALDI following care conference:  Lela reports that during the care conference there were concerns about client's living environment. Lela reports that client is worried that she will not have enough assistance. Lela inquired if client is maxed out on services or if there is room to increase help?  E-mail sent to Lela to share that client has availability in EW budget, explained that client becomes anxious with changes in service/caregivers and frustrated over [facility] agency at this time. inquired if there is an ADL change  4/27/18 Rec'd vm from client's daughter Windy requesting a return call. Windy states that she participated in her mother's care conference. Widny states that her mother has made some progress but is not as strong as she used to be. Windy states that her mother admitted that she would need a new living situation. Windy inquired if she could contribute private funds that could allow for more options.  4/27/18 Call placed to Windy, reviewed above info. Windy states that she made several calls to AL facilities in the past, but her mother was not interested, did not tour or place her name on any wait lists.  Windy states that Lela was going to call [facility] to inquire if they would accept EW. Explained that CM will f/u with [facility] [place], unsure of EW availability.  Windy states that her mother is not interested in [facility], stating that her mother would not want to share living space. Explained that [facility] will have openings. Windy states that this would not be her mother's first choice, but may have to be an option initially and will place her mother on alternative AL wait lists.   Explained that CM is unsure on private funds, stating that no money should be given to her mother as this would be considered income/assets.   4/27/18 VM left with Nery at [facility] [place]  requesting a return call.  4/30/18 VM and e-mal f/u sent to Lela to provide current community POC, client's concern re [facility]. Explained that CM did place referrals to [facility]. Explained that CM did place a call to [facility], no call back yet.  Explained that [facility] will have openings. Request a call back.   Yeni Savage RN, BC  Supervisor [facility]   [phone] (Fax)

## 2018-05-01 ENCOUNTER — NURSING HOME VISIT (OUTPATIENT)
Dept: GERIATRICS | Facility: CLINIC | Age: 78
End: 2018-05-01
Payer: COMMERCIAL

## 2018-05-01 VITALS
RESPIRATION RATE: 18 BRPM | OXYGEN SATURATION: 99 % | WEIGHT: 118.6 LBS | DIASTOLIC BLOOD PRESSURE: 73 MMHG | SYSTOLIC BLOOD PRESSURE: 116 MMHG | BODY MASS INDEX: 21.83 KG/M2 | HEIGHT: 62 IN | TEMPERATURE: 97.9 F | HEART RATE: 98 BPM

## 2018-05-01 DIAGNOSIS — I10 HYPERTENSION, UNSPECIFIED TYPE: ICD-10-CM

## 2018-05-01 DIAGNOSIS — J44.1 COPD EXACERBATION (H): ICD-10-CM

## 2018-05-01 DIAGNOSIS — R53.81 PHYSICAL DECONDITIONING: ICD-10-CM

## 2018-05-01 DIAGNOSIS — A41.9 SEPSIS, DUE TO UNSPECIFIED ORGANISM: Primary | ICD-10-CM

## 2018-05-01 DIAGNOSIS — J45.909 ASTHMA, UNSPECIFIED ASTHMA SEVERITY, UNSPECIFIED WHETHER COMPLICATED, UNSPECIFIED WHETHER PERSISTENT: ICD-10-CM

## 2018-05-01 DIAGNOSIS — E78.00 HYPERCHOLESTEREMIA: ICD-10-CM

## 2018-05-01 DIAGNOSIS — B37.0 CANDIDIASIS OF MOUTH: ICD-10-CM

## 2018-05-01 DIAGNOSIS — E03.9 HYPOTHYROIDISM, UNSPECIFIED TYPE: ICD-10-CM

## 2018-05-01 DIAGNOSIS — R52 PAIN: ICD-10-CM

## 2018-05-01 DIAGNOSIS — K21.9 GASTROESOPHAGEAL REFLUX DISEASE, ESOPHAGITIS PRESENCE NOT SPECIFIED: ICD-10-CM

## 2018-05-01 DIAGNOSIS — E87.1 HYPONATREMIA: ICD-10-CM

## 2018-05-01 PROCEDURE — 99309 SBSQ NF CARE MODERATE MDM 30: CPT | Performed by: NURSE PRACTITIONER

## 2018-05-01 NOTE — PROGRESS NOTES
[facility] Care Coordination Contact  4/30/18 Rec'd tele call from Lela SW to report that they do not have a d/c date at this time, at the care conference therapy indicated one more week. Lela states that client is ambulating 200-250 feet with assistance, CGA with grooming and hygiene, [place] 25/56, therapy will repeat.   Lela states that per therapy it would be ok for client to return back to her home, possibly with increased services. Lela states that client is more anxious/fearful.  Reviewed info below, could increase HHA to 3 days/wk. Explained that CM will f/u with [facility] in regards to hmkg.   Lela did place client on the wait list for [facility].  Lela aware that [facility] will likely have EW availability   4/30/18 Rec'd vm from client's daughter Windy questioning what the next steps are or potential options. Windy requests a return call  5/1/18 VM left with Nery at [facility] requesting a return call  5/1/18 Rec'd tele call from Nery who states that client/family will need to schedule a tour first, complete an application and be placed on a waiting list. Nery does not have availability information at this time to share.    5/1/18 Spoke with Roberto Carlos at [facility] for [facility], Roberto Carlos states that they do have a caregiver that is ready to start with client, but Roberto Carlos has to meet with client first. CM provided client's contact number at [facility].   5/1/18 Call placed to client's daughter, reviewed above information. CM offered to f/u with other AL facilities. Windy states that she is planning to travel to [place] to assist with tours/paperwork etc, but would like to know if there are any immediate openings. Previous AL facilities discussed: [facility]-client is not interested in sharing a living space. Windy expressed concern that her mother has increased shoulder pain and would benefit from occasional assistance with getting dressed and needing possible  assistance at night with toileting due her mother's report of shakiness. CM agreed that transitioning to an AL that can provide intermittent cares is the best plan.  Explained that CM will be at the TCU tomorrow and will plan to meet with her mother to talk about her wishes and how she would like to proceed: go home with resumption of services, possibly increased services until AL is available, or stay LTC until AL is available. CM to f/u with Windy after meeting with her mother on 5/2/18  Attempted to reach client by telephone, no answer.   Yeni Savage RN, BC  Supervisor [facility]   [phone] (Fax)

## 2018-05-01 NOTE — PROGRESS NOTES
[facility]    Chief Complaint   Patient presents with     LISHA       [place] Medical Record Number:  [id number]    HPI:    Ana Luisa Lee is a 78 year old  (1940), who is being seen today for an episodic care visit at [facility].  HPI information obtained from: facility chart records.Today's concern is:     Sepsis, due to unspecified organism (H)  Hyponatremia  COPD exacerbation (H)  Asthma  Hypothyroidism, unspecified type  Hypercholesteremia  Hypertension  GERD (gastroesophageal reflux disease)  Physical deconditioning  Pain  Candidiasis of mouth    Poor appetite, complain of intermittent  Lower abdominal cramping with feeling of evacuating B/B without any results. Wondering why she is loosing weight over the past year, poor appetite. Mentions her prednisone is being weaned off by pulmonary; no Breo Ellipta for now.     ALLERGIES: Hydralazine; Penicillin g; Meloxicam; Metoprolol; and Norvasc [amlodipine besylate]  Past Medical, Surgical, Family and Social History reviewed and updated in [facility].    Current Outpatient Prescriptions   Medication Sig Dispense Refill     ACETAMINOPHEN PO Take 500 mg by mouth every 8 hours AND give 500 mg by mouth every 6 hours PRN       albuterol (ALBUTEROL) 108 (90 BASE) MCG/ACT Inhaler Inhale 2 puffs into the lungs every 6 hours as needed       ASPIRIN NOT PRESCRIBED, INTENTIONAL, 1 each continuous prn. Antiplatelet medication not prescribed intentionally due to Use of NSAIDS 0 each 0     bisacodyl (DULCOLAX) 10 MG Suppository Place 1 suppository (10 mg) rectally daily as needed for constipation 30 suppository      budesonide (PULMICORT FLEXHALER) 180 MCG/ACT inhaler Inhale 1 puff into the lungs 2 times daily       calcium 600 MG tablet Take 1 tablet (600 mg) by mouth daily 60 tablet      cholecalciferol (VITAMIN D) 1000 UNIT tablet Take 1 tablet (1,000 Units) by mouth daily 30 tablet      cyanocobalamin (B-12 TR) 1000 MCG  TBCR Take 1 tablet by mouth daily 100 tablet 3     ferrous sulfate (IRON) 325 (65 Fe) MG tablet Take 325 mg by mouth daily (with breakfast)       fluticasone-vilanterol (BREO ELLIPTA) 100-25 MCG/INH oral inhaler Inhale 1 puff into the lungs every morning       gemfibrozil (LOPID) 600 MG tablet Take 1 tablet (600 mg) by mouth daily 90 tablet 0     levalbuterol (XOPENEX) 1.25 MG/3ML neb solution Take 3 mLs (1.25 mg) by nebulization every 4 hours as needed for shortness of breath / dyspnea or wheezing 1584 mL 1     levothyroxine (SYNTHROID/LEVOTHROID) 25 MCG tablet Take 1 tablet (25 mcg) by mouth daily 90 tablet 3     lisinopril (PRINIVIL/ZESTRIL) 5 MG tablet Take 1 tablet (5 mg) by mouth daily 30 tablet      magnesium hydroxide (MILK OF MAGNESIA) 400 MG/5ML suspension Take 30 mLs by mouth daily as needed for constipation 105 mL      melatonin 1 MG TABS tablet Take 1 tablet (1 mg) by mouth nightly as needed for sleep       montelukast (SINGULAIR) 10 MG tablet Take 1 tablet (10 mg) by mouth At Bedtime 30 tablet      Multiple Vitamins-Minerals (MULTIVITAMIN OR) Take 1 tablet by mouth daily       omeprazole (PRILOSEC) 40 MG capsule Take 1 capsule (40 mg) by mouth daily Take 30-60 minutes before a meal. 90 capsule 3     polyethylene glycol (MIRALAX/GLYCOLAX) Packet Take 17 g by mouth daily as needed for constipation       predniSONE (DELTASONE) 2.5 MG tablet Take 4 tablets (10 mg) by mouth See Admin Instructions       prochlorperazine (COMPAZINE) 5 MG tablet Take 1 tablet (5 mg) by mouth every 6 hours as needed for nausea or vomiting 120 tablet 0     roflumilast (DALIRESP) 500 MCG TABS tablet Take 500 mcg by mouth daily        salmeterol (SEREVENT) 50 MCG/DOSE diskus inhaler Inhale 1 puff into the lungs 2 times daily       TRAMADOL HCL PO Take 50 mg by mouth every 8 hours       umeclidinium (INCRUSE ELLIPTA) 62.5 MCG/INH oral inhaler Inhale 1 puff into the lungs daily       dexamethasone (DECADRON) 4 MG/ML injection Inject  "1 mL (4 mg) as directed once for 1 dose 2 mL 0     Medications reviewed:  Medications reconciled to facility chart and changes were made to reflect current medications as identified as above med list. Below are the changes that were made:   Medications stopped since last EPIC medication reconciliation:   There are no discontinued medications.    Medications started since last [facility] medication reconciliation:  No orders of the defined types were placed in this encounter.    REVIEW OF SYSTEMS:  4 point ROS including Respiratory, CV, GI and , other than that noted in the HPI,  is negative    Physical Exam:  /73  Pulse 98  Temp 97.9  F (36.6  C)  Resp 18  Ht 5' 2\" (1.575 m)  Wt 118 lb 9.6 oz (53.8 kg)  SpO2 99%  BMI 21.69 kg/m2  GENERAL APPEARANCE:  Alert, in no distress, oriented, cooperative, laying in bed  ENT:  Mouth and posterior oropharynx normal, moist mucous membranes, white specks on tongue  EYES:  EOM, conjunctivae, lids, pupils and irises normal, EOM normal, conjunctiva and lids normal  RESP:  respiratory effort and palpation of chest normal, lungs clear to auscultation , no respiratory distress  CV:  Palpation and auscultation of heart done , regular rate and rhythm, no murmur, rub, or gallop, no edema  ABDOMEN:  normal bowel sounds, soft, nontender, no hepatosplenomegaly or other masses, no guarding or rebound  M/S:   Gait and station abnormal moving all 4 extremities  Digits and nails normal  SKIN:  Inspection of skin and subcutaneous tissue baseline, Palpation of skin and subcutaneous tissue baseline    Recent Labs:     CBC RESULTS:   Recent Labs   Lab Test  04/24/18   0611  04/23/18   0735   WBC  9.6  11.5*   RBC  3.50*  3.71*   HGB  10.6*  11.2*   HCT  32.2*  34.1*   MCV  92  92   MCH  30.3  30.2   MCHC  32.9  32.8   RDW  14.3  14.3   PLT  341  349       Last Basic Metabolic Panel:  Recent Labs   Lab Test  04/24/18   0611  04/23/18   0735   NA  135  134   POTASSIUM  4.0  4.3   CHLORIDE  " 101  101   DIANA  9.6  10.0   CO2  24  24   BUN  15  14   CR  0.71  0.77   GLC  84  92       Liver Function Studies -   Recent Labs   Lab Test  04/13/18   0429  12/28/17   1000   PROTTOTAL  7.4  6.6*   ALBUMIN  3.9  3.3*   BILITOTAL  0.5  0.3   ALKPHOS  66  55   AST  14  7   ALT  14  13       TSH   Date Value Ref Range Status   12/28/2017 1.24 0.40 - 4.00 mU/L Final   05/05/2017 1.34 0.40 - 4.00 mU/L Final         Assessment/Plan:    (A41.9) Sepsis (H)  (primary encounter diagnosis)  Comment:improving.  s/s UTI. Afebrile  Plan: cont levofloxacin 500 mg daily x 10 days, completed 4/27  -encourage fluids     (E87.1) Hyponatremia  Comment: resolved     (J44.1) COPD exacerbation (H)  (J45.909) Asthma  Comment:  Stable. Follows with Dr Carrasco ~ 6 months. No symptoms today.  Plan:  Cont prednisone 10 mg daily,  budesonide 180 mcg inhaler bid,   -Incruse ellipta 62.5 mcg daily  Montelukast 10 mg daily  roflumilast 500 mcg daily  Salmeterol 50 mcg bid  breo ellipta 100/25 mcg daily on hold while on prednisone     (E03.9) Hypothyroidism, unspecified type  Comment: no symptoms.  Plan:  Cont levothyroxine 25 mcg     (E78.00) Hypercholesteremia  Comment: stable  Plan: cont gemfibrozil     (I10) Hypertension  Comment: started on lisinopril inpatient. BP wnl  Plan: cont lisinopril 5 mg daily     (K21.9) GERD (gastroesophageal reflux disease)  Comment: no symptoms of reflux.  Plan: omeprazole 40 mg daily     Pain  Comment: bilateral shoulders improving.  Plan: cont tramadol and tylenol q 8 hrs     Oropharyngeal candidiasis  New onset. Poor appetite, abdominal cramping    (R53.81) Physical deconditioning  Comment: s/s to above. Lives in intermediate  Plan: PT/OT  -SW for discharge planning    Orders:  Fluconazole 200 mg po today, then fluconazole 100 mg po daily x 3 days  Bmp, cbc 5/9    Total time spent with patient visit at the skilled nursing facility was 35 min including patient visit and review of past records. Greater than 50% of total  time spent with counseling and coordinating care due to UTI, hydration, thrush    Electronically signed by  IVÁN Connell CNP

## 2018-05-02 ENCOUNTER — PATIENT OUTREACH (OUTPATIENT)
Dept: GERIATRIC MEDICINE | Facility: CLINIC | Age: 78
End: 2018-05-02

## 2018-05-03 NOTE — PROGRESS NOTES
"[facility] Care Coordination Contact  5/2/18 met with client at [facility] to inquire on d/c planning and her thoughts/wishes.  Client states that she would like to go back to her apt, stating that she knows that her daughter Windy would like her to move to an Assisted Living.  Shared that the rehab team at [facility] did say that it would be ok for client to return home with possibly increased services. Client stated that she agreed with this plan, would like to increase her HHA visit to 3 days/wk.  Client stated that she will be meeting with Roberto Carlos from [facility] tomorrow, 5/3/18. Client is hoping that the hmkr can begin next week.  Client states that she feels safe going back to her apt, \"I like it there.\"   Reviewed current POC: [facility] med set up, HHA, LSS , MOW 7 days/wk and hmkg. Client also shared that she is paying privately for an evening meal at [facility] nightly.  Explained that CM will contact the above agencies to inform of d/c plans for 5/9/18, stating that Lela will send orders to [facility].   Had conversation on future planning, as this CM would recommend AL. Client agrees that she needs to tour AL facilities and put her name on a waiting list.  Explained that ROSARIO has been in contact with many agencies (17), to inquire if they accept EW/wait list. CM will provide this information to her daughter Windy.   CM to f/u with client post d/c  CM unable to meet with Karen, e-mail sent to Lela with the above info.  5/2/18 Rec'd tele call from Windy, shared above info. ROSARIO provided info on several AL facilities that have returned calls.  PLAN: CM to forward AL information to Windy next week.  Refer to AL facility contact list in [address] of chart.  5/2/18 contact MARISSA SHORE to inform of d/c 5/9/18, e-mail sent to Windy BARTHOLOMEW [facility], noting that client would like to increase HHA 3 to 3 days/wk  Yeni Savage RN, BC  Supervisor [facility] Case " Manager  [phone] (Fax)

## 2018-05-04 ENCOUNTER — DISCHARGE SUMMARY NURSING HOME (OUTPATIENT)
Dept: GERIATRICS | Facility: CLINIC | Age: 78
End: 2018-05-04
Payer: COMMERCIAL

## 2018-05-04 VITALS
RESPIRATION RATE: 18 BRPM | OXYGEN SATURATION: 96 % | DIASTOLIC BLOOD PRESSURE: 72 MMHG | TEMPERATURE: 98.2 F | WEIGHT: 118.6 LBS | HEIGHT: 62 IN | BODY MASS INDEX: 21.83 KG/M2 | SYSTOLIC BLOOD PRESSURE: 136 MMHG | HEART RATE: 86 BPM

## 2018-05-04 DIAGNOSIS — J45.909 ASTHMA, UNSPECIFIED ASTHMA SEVERITY, UNSPECIFIED WHETHER COMPLICATED, UNSPECIFIED WHETHER PERSISTENT: ICD-10-CM

## 2018-05-04 DIAGNOSIS — I10 HYPERTENSION, UNSPECIFIED TYPE: ICD-10-CM

## 2018-05-04 DIAGNOSIS — E03.9 HYPOTHYROIDISM, UNSPECIFIED TYPE: ICD-10-CM

## 2018-05-04 DIAGNOSIS — E87.1 HYPONATREMIA: ICD-10-CM

## 2018-05-04 DIAGNOSIS — K21.9 GASTROESOPHAGEAL REFLUX DISEASE, ESOPHAGITIS PRESENCE NOT SPECIFIED: ICD-10-CM

## 2018-05-04 DIAGNOSIS — J44.1 COPD EXACERBATION (H): ICD-10-CM

## 2018-05-04 DIAGNOSIS — E78.00 HYPERCHOLESTEREMIA: ICD-10-CM

## 2018-05-04 DIAGNOSIS — R53.81 PHYSICAL DECONDITIONING: ICD-10-CM

## 2018-05-04 DIAGNOSIS — A41.9 SEPSIS, DUE TO UNSPECIFIED ORGANISM: Primary | ICD-10-CM

## 2018-05-04 PROCEDURE — 99316 NF DSCHRG MGMT 30 MIN+: CPT | Performed by: NURSE PRACTITIONER

## 2018-05-04 NOTE — PROGRESS NOTES
[facility] DISCHARGE SUMMARY    PATIENT'S NAME: Ana Luisa Lee  YOB: 1940  MEDICAL RECORD NUMBER:  [id number]    PRIMARY CARE PROVIDER AND CLINIC RESPONSIBLE AFTER TRANSFER: Jenny Hamilton [address] [facility] [address]     CODE STATUS/ADVANCE DIRECTIVES DISCUSSION:   DNR / DNI       Allergies   Allergen Reactions     Hydralazine Anxiety     Penicillin G Hives     Tolerated cephalosporines 2017     Meloxicam      dizziness       Metoprolol      ? Skin rash on the back     Norvasc [Amlodipine Besylate] Hives       TRANSFERRING PROVIDERS: IVÁN Kunz CNP, Renato Cao MD  DATE OF SNF ADMISSION:  April / 17 / 2018  DATE OF SNF (anticipated) DISCHARGE: May / 09 / 2018  DISCHARGE DISPOSITION: [facility] Facility: [facility] stay 04/13/2018 through 04/17/2018.     Condition on Discharge:  Improving.  Function:  Ambulating independently with walker   Cognitive Scores: SLUMS 15/30    Equipment: walker    DISCHARGE DIAGNOSIS:   1. Sepsis, due to unspecified organism (H)    2. Hyponatremia    3. COPD exacerbation (H)    4. Asthma, unspecified asthma severity, unspecified whether complicated, unspecified whether persistent    5. Hypothyroidism, unspecified type    6. Hypercholesteremia    7. Hypertension, unspecified type    8. Gastroesophageal reflux disease, esophagitis presence not specified    9. Physical deconditioning        HPI Nursing Facility Course:  HPI information obtained from: facility chart records, facility staff, patient report and [facility] chart review   Sepsis secondary to UTI   Admitted to the hospital with severe sepsis secondary to UTI. She completed a 10 day course of levofloxacin 500 mg daily on 4/27. Denies fever or chills.  --monitor for return symptoms of UTI    Oropharyngeal candidiasis  Was treated with fluconazole  X 4 days.   --continue  to monitor.      Hyponatremia-resolved  Na 135 on 4/24.      COPD exacerbation/Asthma  Stable. No symptoms of exacerbation today. Follows with Dr Carrasco ~ 6 months.   --Cont prednisone 10 mg daily, budesonide 180 mcg inhaler bid, Incruse ellipta 62.5 mcg daily, Montelukast 10 mg daily, roflumilast 500 mcg daily, Salmeterol 50 mcg bid, and breo ellipta 100/25 mcg daily.      Hypothyroidism  --Continue with levothyroxine 25 mcg  TSH   Date Value Ref Range Status   12/28/2017 1.24 0.40 - 4.00 mU/L Final     Hypercholesteremia/Hypertension  Was started on lisinopril while in the hospital.   -- continues with lisinopril 5 mg daily  --continues with gemfibrozil 600 mg daily.  Weights 04/17: 120.6lbs & 05/03: 118.6lbs  BP: /63-91 mmHg  P:  bpm  O2:      GERD (gastroesophageal reflux disease)  --continues on omeprazole 40 mg daily     Physical deconditioning  Will be returning to prior independent living.   --Ongoing PT/OT/RN/HHA at home.       PAST MEDICAL HISTORY:  has a past medical history of Anemia (Dec 2011); Arthritis of hand; Asthma, persistent; Asthma, persistent; Chronic intermittent steroid use; COPD exacerbation (H) (10/25/2013); Esophageal stricture (2007); Foot deformity; HTN, goal below 140/90; Hyperlipidemia; Hyperlipidemia LDL goal < 130; Hypothyroidism (Dec 2011); Hypothyroidism; Left foot pain (Nov 2011); Left shoulder pain (Nov 2011); Medication side effects; Osteoporosis; Paroxysmal supraventricular tachycardia (H); PVC (premature ventricular contraction) (May 2012); Skin cyst (Dec 2011); SOB (shortness of breath) on exertion (May 2012); and SVT (supraventricular tachycardia) (H). She also has no past medical history of Blood transfusion; Congestive heart failure, unspecified; Coronary artery disease; Diabetes mellitus (H); Malignant neoplasm (H); or Unspecified cerebral artery occlusion with cerebral infarction.    DISCHARGE MEDICATIONS:  Current Outpatient Prescriptions   Medication  Sig Dispense Refill     ACETAMINOPHEN PO Take 500 mg by mouth every 8 hours AND give 500 mg by mouth every 6 hours PRN       albuterol (ALBUTEROL) 108 (90 BASE) MCG/ACT Inhaler Inhale 2 puffs into the lungs every 6 hours as needed       ASPIRIN NOT PRESCRIBED, INTENTIONAL, 1 each continuous prn. Antiplatelet medication not prescribed intentionally due to Use of NSAIDS 0 each 0     bisacodyl (DULCOLAX) 10 MG Suppository Place 1 suppository (10 mg) rectally daily as needed for constipation 30 suppository      budesonide (PULMICORT FLEXHALER) 180 MCG/ACT inhaler Inhale 1 puff into the lungs 2 times daily       calcium 600 MG tablet Take 1 tablet (600 mg) by mouth daily 60 tablet      cholecalciferol (VITAMIN D) 1000 UNIT tablet Take 1 tablet (1,000 Units) by mouth daily 30 tablet      cyanocobalamin (B-12 TR) 1000 MCG TBCR Take 1 tablet by mouth daily 100 tablet 3     ferrous sulfate (IRON) 325 (65 Fe) MG tablet Take 325 mg by mouth daily (with breakfast)       fluticasone-vilanterol (BREO ELLIPTA) 100-25 MCG/INH oral inhaler Inhale 1 puff into the lungs every morning       gemfibrozil (LOPID) 600 MG tablet Take 1 tablet (600 mg) by mouth daily 90 tablet 0     levalbuterol (XOPENEX) 1.25 MG/3ML neb solution Take 3 mLs (1.25 mg) by nebulization every 4 hours as needed for shortness of breath / dyspnea or wheezing 1584 mL 1     levothyroxine (SYNTHROID/LEVOTHROID) 25 MCG tablet Take 1 tablet (25 mcg) by mouth daily 90 tablet 3     lisinopril (PRINIVIL/ZESTRIL) 5 MG tablet Take 1 tablet (5 mg) by mouth daily 30 tablet      magnesium hydroxide (MILK OF MAGNESIA) 400 MG/5ML suspension Take 30 mLs by mouth daily as needed for constipation 105 mL      melatonin 1 MG TABS tablet Take 1 tablet (1 mg) by mouth nightly as needed for sleep       montelukast (SINGULAIR) 10 MG tablet Take 1 tablet (10 mg) by mouth At Bedtime 30 tablet      Multiple Vitamins-Minerals (MULTIVITAMIN OR) Take 1 tablet by mouth daily       omeprazole  "(PRILOSEC) 40 MG capsule Take 1 capsule (40 mg) by mouth daily Take 30-60 minutes before a meal. 90 capsule 3     polyethylene glycol (MIRALAX/GLYCOLAX) Packet Take 17 g by mouth daily as needed for constipation       predniSONE (DELTASONE) 2.5 MG tablet Take 4 tablets (10 mg) by mouth See Admin Instructions       prochlorperazine (COMPAZINE) 5 MG tablet Take 1 tablet (5 mg) by mouth every 6 hours as needed for nausea or vomiting 120 tablet 0     roflumilast (DALIRESP) 500 MCG TABS tablet Take 500 mcg by mouth daily        salmeterol (SEREVENT) 50 MCG/DOSE diskus inhaler Inhale 1 puff into the lungs 2 times daily       TRAMADOL HCL PO Take 50 mg by mouth every 8 hours       umeclidinium (INCRUSE ELLIPTA) 62.5 MCG/INH oral inhaler Inhale 1 puff into the lungs daily       dexamethasone (DECADRON) 4 MG/ML injection Inject 1 mL (4 mg) as directed once for 1 dose 2 mL 0       Controlled medications sent with patient:   Script for Tramadol  medication for 20 tabs and 0 refills given to patient at dischage to have them fill at their out patient pharmacy     ROS:    4 point ROS including Respiratory, CV, GI and , other than that noted in the HPI,  is negative    Physical Exam:   Vitals: /72  Pulse 86  Temp 98.2  F (36.8  C)  Resp 18  Ht 5' 2\" (1.575 m)  Wt 118 lb 9.6 oz (53.8 kg)  SpO2 96%  BMI 21.69 kg/m2  BMI= Body mass index is 21.69 kg/(m^2).  GENERAL APPEARANCE:  Alert, in no distress, pleasant, cooperative, oriented  EYES:  EOM, lids, pupils and irises normal, sclera clear and conjunctiva normal, no discharge or mattering on lids or lashes noted  ENT:  Mouth normal, moist mucous membranes, nose without drainage or crusting, external ears without lesions, hearing acuity intact  NECK:  Nontender, supple, symmetrical; no adenopathy, masses or thyromegaly, trachea midline  RESP:  respiratory effort and palpation of chest normal, no chest wall tenderness, no respiratory distress, Lung sounds clear, patient " is on room air  CV:  Palpation and auscultation of heart done, rate and rhythm regular, no murmur, no rub or gallop. Edema none pitting bilateral lower extremities. ABDOMEN:  normal bowel sounds, soft, nontender, no grimacing or guarding with palpation, no hepatosplenomegaly or other masses  M/S:   Gait and station ambulates independently with walker, no tenderness or swelling of the joints; able to move all extremities  SKIN:  Inspection and palpation of skin and subcutaneous tissue: skin warm, dry and intact without rashes  NEURO: cranial nerves 2-12 grossly intact, no facial asymmetry, no speech deficits and able to follow directions, moves all extremities symmetrically  PSYCH:  insight and judgement intact, affect and mood normal      DISCHARGE PLAN:  Occupational Therapy, Physical Therapy, Speech Therapy , Registered Nurse and From:  [facility]  Patient instructed to follow-up with:  PCP in 7 days      MTM referral needed and placed by this provider: No    Pending labs: NONE  SNF labs   CBC RESULTS:   Recent Labs   Lab Test  04/24/18   0611  04/23/18   0735   WBC  9.6  11.5*   RBC  3.50*  3.71*   HGB  10.6*  11.2*   HCT  32.2*  34.1*   MCV  92  92   MCH  30.3  30.2   MCHC  32.9  32.8   RDW  14.3  14.3   PLT  341  349       Last Basic Metabolic Panel:  Recent Labs   Lab Test  04/24/18   0611  04/23/18   0735   NA  135  134   POTASSIUM  4.0  4.3   CHLORIDE  101  101   DIANA  9.6  10.0   CO2  24  24   BUN  15  14   CR  0.71  0.77   GLC  84  92       Liver Function Studies -   Recent Labs   Lab Test  04/13/18   0429  12/28/17   1000   PROTTOTAL  7.4  6.6*   ALBUMIN  3.9  3.3*   BILITOTAL  0.5  0.3   ALKPHOS  66  55   AST  14  7   ALT  14  13       TSH   Date Value Ref Range Status   12/28/2017 1.24 0.40 - 4.00 mU/L Final   05/05/2017 1.34 0.40 - 4.00 mU/L Final       Discharge Treatments: NONE    TOTAL DISCHARGE TIME:   Greater than 30 minutes  Electronically signed by:  IVÁN Kunz CNP          Documentation of Face-to-Face and Certification for Home Health Services     Patient: Ana Luisa Lee   YOB: 1940  MR Number: [id number]  Today's Date: 5/5/2018    I certify that patient: Ana Luisa Lee is under my care and that I, or a nurse practitioner or physician's assistant working with me, had a face-to-face encounter that meets the physician face-to-face encounter requirements with this patient on: 5/4/2018.    This encounter with the patient was in whole, or in part, for the following medical condition, which is the primary reason for home health care: Sepsis secondary to UTI.    I certify that, based on my findings, the following services are medically necessary home health services: Nursing, Occupational Therapy and Physical Therapy.    My clinical findings support the need for the above services because: Nurse is needed: To assess respiratory status and medication management after changes in medications or other medical regimen.., Occupational Therapy Services are needed to assess and treat cognitive ability and address ADL safety due to impairment in upper body strength. and Physical Therapy Services are needed to assess and treat the following functional impairments: gait training and physical strengthening. .    Further, I certify that my clinical findings support that this patient is homebound (i.e. absences from home require considerable and taxing effort and are for medical reasons or Gnosticism services or infrequently or of short duration when for other reasons) because: Requires assistance of another person or specialized equipment to access medical services because patient: Requires supervision of another for safe transfer...    Based on the above findings. I certify that this patient is confined to the home and needs intermittent skilled nursing care, physical therapy and/or speech therapy.  The patient is under my care, and I have initiated the establishment  of the plan of care.  This patient will be followed by a physician who will periodically review the plan of care.  Physician/Provider to provide follow up care: Jenny Hamilton    Responsible Medicare certified PECOS Physician: Dr Latesha Cao  Physician Signature: See electronic signature associated with these discharge orders.  Date: 5/5/2018

## 2018-05-07 ENCOUNTER — NURSING HOME VISIT (OUTPATIENT)
Dept: GERIATRICS | Facility: CLINIC | Age: 78
End: 2018-05-07
Payer: COMMERCIAL

## 2018-05-07 VITALS
DIASTOLIC BLOOD PRESSURE: 67 MMHG | HEIGHT: 62 IN | WEIGHT: 117.2 LBS | SYSTOLIC BLOOD PRESSURE: 101 MMHG | TEMPERATURE: 97.5 F | HEART RATE: 95 BPM | OXYGEN SATURATION: 98 % | BODY MASS INDEX: 21.57 KG/M2 | RESPIRATION RATE: 18 BRPM

## 2018-05-07 DIAGNOSIS — R53.81 PHYSICAL DECONDITIONING: ICD-10-CM

## 2018-05-07 DIAGNOSIS — E03.9 HYPOTHYROIDISM, UNSPECIFIED TYPE: ICD-10-CM

## 2018-05-07 DIAGNOSIS — M15.0 PRIMARY GENERALIZED (OSTEO)ARTHRITIS: ICD-10-CM

## 2018-05-07 DIAGNOSIS — J45.909 ASTHMA, UNSPECIFIED ASTHMA SEVERITY, UNSPECIFIED WHETHER COMPLICATED, UNSPECIFIED WHETHER PERSISTENT: ICD-10-CM

## 2018-05-07 DIAGNOSIS — I10 HYPERTENSION, UNSPECIFIED TYPE: ICD-10-CM

## 2018-05-07 DIAGNOSIS — E78.00 HYPERCHOLESTEREMIA: ICD-10-CM

## 2018-05-07 DIAGNOSIS — K21.9 GASTROESOPHAGEAL REFLUX DISEASE, ESOPHAGITIS PRESENCE NOT SPECIFIED: ICD-10-CM

## 2018-05-07 DIAGNOSIS — A41.9 SEPSIS, DUE TO UNSPECIFIED ORGANISM: Primary | ICD-10-CM

## 2018-05-07 DIAGNOSIS — J44.1 COPD EXACERBATION (H): ICD-10-CM

## 2018-05-07 DIAGNOSIS — B37.0 CANDIDIASIS OF MOUTH: ICD-10-CM

## 2018-05-07 PROCEDURE — 99309 SBSQ NF CARE MODERATE MDM 30: CPT | Performed by: NURSE PRACTITIONER

## 2018-05-07 NOTE — PROGRESS NOTES
[facility]    Chief Complaint   Patient presents with     LISHA       [facility] Medical Record Number:  [id number]    HPI:    Ana Luisa Lee is a 78 year old  (1940), who is being seen today for an episodic care visit at [facility].  HPI information obtained from: facility chart records.Today's concern is:     Sepsis, due to unspecified organism (H)  COPD exacerbation (H)  Asthma, unspecified asthma severity, unspecified whether complicated, unspecified whether persistent  Hypothyroidism, unspecified type  Hypercholesteremia  Hypertension, unspecified type  Gastroesophageal reflux disease, esophagitis presence not specified  Primary generalized (osteo)arthritis  Candidiasis of mouth  Physical deconditioning    Patient complaining of dizziness when upright, no spinning. Orthostatic bp 88/55 form 115/70 per nursing.     ALLERGIES: Hydralazine; Penicillin g; Meloxicam; Metoprolol; and Norvasc [amlodipine besylate]  Past Medical, Surgical, Family and Social History reviewed and updated in [facility].    Current Outpatient Prescriptions   Medication Sig Dispense Refill     ACETAMINOPHEN PO Take 500 mg by mouth every 8 hours AND give 500 mg by mouth every 6 hours PRN       albuterol (ALBUTEROL) 108 (90 BASE) MCG/ACT Inhaler Inhale 2 puffs into the lungs every 6 hours as needed       ASPIRIN NOT PRESCRIBED, INTENTIONAL, 1 each continuous prn. Antiplatelet medication not prescribed intentionally due to Use of NSAIDS 0 each 0     bisacodyl (DULCOLAX) 10 MG Suppository Place 1 suppository (10 mg) rectally daily as needed for constipation 30 suppository      budesonide (PULMICORT FLEXHALER) 180 MCG/ACT inhaler Inhale 1 puff into the lungs 2 times daily       calcium 600 MG tablet Take 1 tablet (600 mg) by mouth daily 60 tablet      cholecalciferol (VITAMIN D) 1000 UNIT tablet Take 1 tablet (1,000 Units) by mouth daily 30 tablet      cyanocobalamin (B-12 TR) 1000 MCG TBCR Take  1 tablet by mouth daily 100 tablet 3     ferrous sulfate (IRON) 325 (65 Fe) MG tablet Take 325 mg by mouth daily (with breakfast)       fluticasone-vilanterol (BREO ELLIPTA) 100-25 MCG/INH oral inhaler Inhale 1 puff into the lungs every morning       gemfibrozil (LOPID) 600 MG tablet Take 1 tablet (600 mg) by mouth daily 90 tablet 0     levalbuterol (XOPENEX) 1.25 MG/3ML neb solution Take 3 mLs (1.25 mg) by nebulization every 4 hours as needed for shortness of breath / dyspnea or wheezing 1584 mL 1     levothyroxine (SYNTHROID/LEVOTHROID) 25 MCG tablet Take 1 tablet (25 mcg) by mouth daily 90 tablet 3     lisinopril (PRINIVIL/ZESTRIL) 5 MG tablet Take 1 tablet (5 mg) by mouth daily 30 tablet      magnesium hydroxide (MILK OF MAGNESIA) 400 MG/5ML suspension Take 30 mLs by mouth daily as needed for constipation 105 mL      melatonin 1 MG TABS tablet Take 1 tablet (1 mg) by mouth nightly as needed for sleep       montelukast (SINGULAIR) 10 MG tablet Take 1 tablet (10 mg) by mouth At Bedtime 30 tablet      Multiple Vitamins-Minerals (MULTIVITAMIN OR) Take 1 tablet by mouth daily       omeprazole (PRILOSEC) 40 MG capsule Take 1 capsule (40 mg) by mouth daily Take 30-60 minutes before a meal. 90 capsule 3     polyethylene glycol (MIRALAX/GLYCOLAX) Packet Take 17 g by mouth daily as needed for constipation       predniSONE (DELTASONE) 2.5 MG tablet Take 4 tablets (10 mg) by mouth See Admin Instructions       prochlorperazine (COMPAZINE) 5 MG tablet Take 1 tablet (5 mg) by mouth every 6 hours as needed for nausea or vomiting 120 tablet 0     roflumilast (DALIRESP) 500 MCG TABS tablet Take 500 mcg by mouth daily        salmeterol (SEREVENT) 50 MCG/DOSE diskus inhaler Inhale 1 puff into the lungs 2 times daily       TRAMADOL HCL PO Take 50 mg by mouth every 8 hours       umeclidinium (INCRUSE ELLIPTA) 62.5 MCG/INH oral inhaler Inhale 1 puff into the lungs daily       dexamethasone (DECADRON) 4 MG/ML injection Inject 1 mL (4  "mg) as directed once for 1 dose 2 mL 0     Medications reviewed:  Medications reconciled to facility chart and changes were made to reflect current medications as identified as above med list. Below are the changes that were made:   Medications stopped since last EPIC medication reconciliation:   There are no discontinued medications.    Medications started since last [facility] medication reconciliation:  No orders of the defined types were placed in this encounter.    REVIEW OF SYSTEMS:  4 point ROS including Respiratory, CV, GI and , other than that noted in the HPI,  is negative    Physical Exam:  /67  Pulse 95  Temp 97.5  F (36.4  C)  Resp 18  Ht 5' 2\" (1.575 m)  Wt 117 lb 3.2 oz (53.2 kg)  SpO2 98%  BMI 21.44 kg/m2  GENERAL APPEARANCE:  Alert, in no distress, oriented, cooperative, laying in bed  ENT:  Mouth and posterior oropharynx normal, moist mucous membranes, normal hearing acuity, tongue is clear, no whitish areas  NECK:  No adenopathy,masses or thyromegaly  RESP:  respiratory effort and palpation of chest normal, lungs clear to auscultation , no respiratory distress  CV:  Palpation and auscultation of heart done , regular rate and rhythm, + murmur, no rub, or gallop, no edema  ABDOMEN:  normal bowel sounds, soft, nontender, no hepatosplenomegaly or other masses  M/S:   Gait and station abnormal unsteady with walker, ambulating with assist of 1  SKIN:  Inspection of skin and subcutaneous tissue baseline, Palpation of skin and subcutaneous tissue baseline  NEURO:   Cranial nerves 2-12 are normal tested and grossly at patient's baseline  PSYCH:  oriented X 3, insight and judgement impaired, memory impaired , affect and mood normal    Recent Labs:     CBC RESULTS:   Recent Labs   Lab Test  04/24/18   0611  04/23/18   0735   WBC  9.6  11.5*   RBC  3.50*  3.71*   HGB  10.6*  11.2*   HCT  32.2*  34.1*   MCV  92  92   MCH  30.3  30.2   MCHC  32.9  32.8   RDW  14.3  14.3   PLT  341  349       Last Basic " Metabolic Panel:  Recent Labs   Lab Test  04/24/18   0611  04/23/18   0735   NA  135  134   POTASSIUM  4.0  4.3   CHLORIDE  101  101   DIANA  9.6  10.0   CO2  24  24   BUN  15  14   CR  0.71  0.77   GLC  84  92       Liver Function Studies -   Recent Labs   Lab Test  04/13/18   0429  12/28/17   1000   PROTTOTAL  7.4  6.6*   ALBUMIN  3.9  3.3*   BILITOTAL  0.5  0.3   ALKPHOS  66  55   AST  14  7   ALT  14  13       TSH   Date Value Ref Range Status   12/28/2017 1.24 0.40 - 4.00 mU/L Final   05/05/2017 1.34 0.40 - 4.00 mU/L Final         Assessment/Plan:    (A41.9) Sepsis (H)  (primary encounter diagnosis)  Comment:improving.  s/s UTI. Afebrile, asymptomatic  Plan: cont levofloxacin 500 mg daily x 10 days, completed 4/27  -encourage fluids      (E87.1) Hyponatremia  Comment: resolved      (J44.1) COPD exacerbation (H)  (J45.909) Asthma  Comment:  improved. Follows with Dr Carrasco ~ 6 months. Reported baseline sob, lung sounds clear.  Plan:  Cont prednisone 10 mg daily,  budesonide 180 mcg inhaler bid,   -Incruse ellipta 62.5 mcg daily  Montelukast 10 mg daily  roflumilast 500 mcg daily  Salmeterol 50 mcg bid  breo ellipta 100/25 mcg daily on hold while on prednisone      (E03.9) Hypothyroidism, unspecified type  Comment: no symptoms.  Plan:  Cont levothyroxine 25 mcg      (E78.00) Hypercholesteremia  Comment: stable  Plan: cont gemfibrozil      (I10) Hypertension  Comment: started on lisinopril inpatient. Hypotension noted in AM  -experiencing dizziness and hypotension when upright  Plan:decrease lisinopril to 2.5 mg daily      (K21.9) GERD (gastroesophageal reflux disease)  Comment: no symptoms of reflux.  Plan: omeprazole 40 mg daily    Generalized osteo-arthritis    Pain  Comment: bilateral shoulders improving, attributes pain to decrease in her prednisone.  Plan: cont tramadol and tylenol q 8 hrs      Oropharyngeal candidiasis  improved. Appetite back to baseline, asymptomatic. At risk for reoccurence s/s steroids use  orally and via inhalation.  Completed fluconazole for 4 days  -encourage to perform mouth care after nebulizer/inhaler use     (R53.81) Physical deconditioning  Comment: s/s to above. Lives in long-term  Plan: PT/OT  -SW for discharge planning    Orders:  Decrease lisinopril to 2.5 mg daily  Give ultram with food    Total time spent with patient visit at the [facility] nursing facility was 25 min including patient visit and review of past records. Greater than 50% of total time spent with counseling and coordinating care due to copd, hypotension, hydration    Electronically signed by  IVÁN Connell CNP

## 2018-05-08 ENCOUNTER — HOSPITAL LABORATORY (OUTPATIENT)
Dept: OTHER | Facility: CLINIC | Age: 78
End: 2018-05-08

## 2018-05-08 ENCOUNTER — PATIENT OUTREACH (OUTPATIENT)
Dept: GERIATRIC MEDICINE | Facility: CLINIC | Age: 78
End: 2018-05-08

## 2018-05-08 DIAGNOSIS — Z76.89 HEALTH CARE HOME: ICD-10-CM

## 2018-05-08 LAB
ANION GAP SERPL CALCULATED.3IONS-SCNC: 11 MMOL/L (ref 3–14)
BUN SERPL-MCNC: 20 MG/DL (ref 7–30)
CALCIUM SERPL-MCNC: 9.8 MG/DL (ref 8.5–10.1)
CHLORIDE SERPL-SCNC: 99 MMOL/L (ref 94–109)
CO2 SERPL-SCNC: 21 MMOL/L (ref 20–32)
CREAT SERPL-MCNC: 0.79 MG/DL (ref 0.52–1.04)
ERYTHROCYTE [DISTWIDTH] IN BLOOD BY AUTOMATED COUNT: 14.2 % (ref 10–15)
GFR SERPL CREATININE-BSD FRML MDRD: 70 ML/MIN/1.7M2
GLUCOSE SERPL-MCNC: 84 MG/DL (ref 70–99)
HCT VFR BLD AUTO: 33.9 % (ref 35–47)
HGB BLD-MCNC: 11.1 G/DL (ref 11.7–15.7)
MCH RBC QN AUTO: 29.9 PG (ref 26.5–33)
MCHC RBC AUTO-ENTMCNC: 32.7 G/DL (ref 31.5–36.5)
MCV RBC AUTO: 91 FL (ref 78–100)
PLATELET # BLD AUTO: 343 10E9/L (ref 150–450)
POTASSIUM SERPL-SCNC: 4.1 MMOL/L (ref 3.4–5.3)
RBC # BLD AUTO: 3.71 10E12/L (ref 3.8–5.2)
SODIUM SERPL-SCNC: 131 MMOL/L (ref 133–144)
WBC # BLD AUTO: 10.5 10E9/L (ref 4–11)

## 2018-05-08 NOTE — PROGRESS NOTES
"[facility] Care Coordination Contact  5/7/18 Rec'd vm from Faith, [facility] to inquire if client will be home in time for a noon delivery 5/9/18, or should they start 5/10/18  5/7/18 e-mail sent to Oscar to inquire on d/c time. Per Oscar, d/c time is usually in the afternoon.  5/7/18 VM left with Faith to request MOW to start on 5/10/18  5/8/2018 Call placed to client's daughter Windy to state that CM will send e-mail to her with listing of AL facilities that CM had been in contact with.  Windy expressed concern that once her mother returns home she will not want to tour or put her name on any AL wait list.   Explained that CM plans to contact client today to f/u on d/c plans tomorrow.   Secure e-mail sent to Krystal at [facility] to inquire if client will be able to resume  services  VM left with Roberto Carlos at [facility] to inquire on SOC date.   Call placed to client who states that she is not having a good day, upset stomach and felt dizzy during PT. Client states that she is planning on going home, \"I want to go home.\"  Inquired if she met with Roberto Carlos last week, client stated that she did and was impressed, \"professional.\" Client states that the plan is to start hmkg the week of 5/14/18. CM will not cancel [facility], but would like to confirm with Roberto Carlos.  Explained that client has an option to send [facility] a copy of her care plan or a summary or nothing. Per client, not to send any care plan information.   Client also stated that she met with [facility] today and was informed that she will have a new nurse, stating that they have changed the territory for [facility]. CM will f/u with [facility] to ensure HHA visit 3x/wk.   Call placed to [facility], spoke with Shira who stated that it should work 3x/wk, may cannot guarantee that it will work every week.  Rec'd e-mail from Krystal at [facility], that client just needs to contact her  to resume services.  Call placed to " Windy, shared that CM misplaced the AL call list and will need to f/u with her. CM did e-mail her the [facility] vacancy report from 5/2/18. Shared  above information. Windy is aware that homecare providers may change, and this may cause increased anxiety for her mother.  Windy will be in [place] the week of 6/4/18.  5/8/18 secure e-mail sent to Windy with list of AL facilities that CM was in contact with re EW availability.   5/8/18 Rec'd vm from Lalita at [facility] that client's homemaker will be Trixie, Trixie will schedule a home visit with client. Roberto Carlos states that she will contact CM with the SOC date.   5/9/18 Call placed to Maryan at [facility] to cancel services. Left vm.   5/10/18 Rec'd vm from Lyudmila at [facility] -SOC date for homemaking 5/11/2018  Yeni Savage RN, BC  Supervisor [facility]   [phone] (Fax)

## 2018-05-10 ENCOUNTER — DOCUMENTATION ONLY (OUTPATIENT)
Dept: CARE COORDINATION | Facility: CLINIC | Age: 78
End: 2018-05-10

## 2018-05-10 ENCOUNTER — PATIENT OUTREACH (OUTPATIENT)
Dept: GERIATRIC MEDICINE | Facility: CLINIC | Age: 78
End: 2018-05-10

## 2018-05-10 NOTE — PROGRESS NOTES
"[facility] Care Coordination Contact  Call placed to client to Transition f/u  Client states that she is not feeling well, stating that she hurts and feels nauseated and dizzy at times. Client states that her homecare nurse came to visit her this morning, \"all my numbers are normal.\"  Client stated that her meal was delivered, but she had not eaten yet.  Reviewed services: Ana Luisa stated that she spoke with her  who will begin next, her homemaker Trixie resides in the same building will start tomorrow and [facility] is working on staffing a HHA for tomorrow.   Client became tearful, stating that she does not feel motivated, \"bags are still unpacked since I returned from [facility].\"  CM allowed client to talk. Client is aware that her HHA or hmkr will assist with emptying her bags.  Client states that her daughter Windy will be visiting the first week of June.   Client will schedule PCP appt for f/u  CM offered Relaxation Kit -essential oil aroma therapy and adult coloring books, client agreeable.  CM to see client later this afternoon.  Yeni Savage RN, BC  Supervisor [facility]   [phone] (Fax)    "

## 2018-05-10 NOTE — PROGRESS NOTES
[facility] Care Coordination Contact  Member Signature - POC Change:  Per CC, member has made a change to their POC.  Care Plan Change Letter mailed to member for signature with a self-addressed return envelope.     Carmenza Griffin  Case Management Specialist  [facility]  [phone]

## 2018-05-10 NOTE — PROGRESS NOTES
[facility] now requests orders and shares plan of care/discharge summaries for some patients through [employer].  Please REPLY TO THIS MESSAGE in order to give authorization for orders when needed.  This is considered a verbal order, you will still receive a faxed copy of orders for signature.  Thank you for your assistance in improving collaboration for our patients.    ORDER  Skilled nursinw1, 2w1, 1w5, 3 prn for disease assessment and management. Medication management, assessment and education.  PT: eval and treat to include LE strengthening and falls assessment  OT: eval and treat to include UE strengthening and ADL safety  HHA: 1w1, 2w6 for bathing assistance

## 2018-05-11 ENCOUNTER — DOCUMENTATION ONLY (OUTPATIENT)
Dept: CARE COORDINATION | Facility: CLINIC | Age: 78
End: 2018-05-11

## 2018-05-11 ENCOUNTER — TELEPHONE (OUTPATIENT)
Dept: GERIATRICS | Facility: CLINIC | Age: 78
End: 2018-05-11

## 2018-05-11 NOTE — PROGRESS NOTES
[facility] now requests orders and shares plan of care/discharge summaries for some patients through [employer].  Please REPLY TO THIS MESSAGE in order to give authorization for orders when needed.  This is considered a verbal order, you will still receive a faxed copy of orders for signature.  Thank you for your assistance in improving collaboration for our patients.    ORDER    2w1, 1w1    OT services for ADLs, BUE HEP training and education and equipment recommendations as needed.         MD SUMMARY/PLAN OF CARE

## 2018-05-14 ENCOUNTER — DOCUMENTATION ONLY (OUTPATIENT)
Dept: CARE COORDINATION | Facility: CLINIC | Age: 78
End: 2018-05-14

## 2018-05-14 NOTE — PROGRESS NOTES
Nancy with [facility] OT calling ([phone]).  States her message below is a treatment request--informing PCP of what is specifically needed.    (Nancy is aware pt needs a f/u appt and will help her schedule this.)

## 2018-05-14 NOTE — PROGRESS NOTES
Dear Dr. Jenny Forrest  [facility] process is that all ordered disciplines will be involved in the development of the plan of care.  The following disciplines were unable to see Ana Luisa Lee; MRN [id number] within the 5 day evaluation window.  There will be a delay in the evalation visit by  PT      We will notify you when the evaluation is completed.  The patient has been notified.      Sincerely [facility]  Caren Griffin  [phone]

## 2018-05-15 ENCOUNTER — PATIENT OUTREACH (OUTPATIENT)
Dept: GERIATRIC MEDICINE | Facility: CLINIC | Age: 78
End: 2018-05-15

## 2018-05-15 NOTE — PROGRESS NOTES
"[facility] Care Coordination Contact  Rec'd tele call from client stating that she rec'd a letter in the mail from CM. Shared that this is a required notification of the change in hmkg agencies from [facility] to [facility].   Client acknowledged.   Inquired on how she is doing, client stated, \"ok.\"   Client will begin hmkg services tomorrow. Client inquired if she will receive a HHA 3x/wk, explained that this is what CM requested and will f/u with new homecare nurse.  E-mail sent to Pauline BARTHOLOMEW to introduce myself and provide community POC services.  Explained that client has requested 3 HHA visits per week, this CM will support but inquired on the current HHA (Mae) availability.  CM also provided contact info of client's dtr Windy who would like contact info on newly assigned [facility] nurse.   CM to follow.  Yeni Savage RN, BC  Supervisor [facility]   [phone] (Fax)    "

## 2018-05-15 NOTE — PROGRESS NOTES
[facility] Care Coordination Contact  Arranged transportation thru [employer] for the below appt:  Appt Date & Time: 5/16/18 @ 1:00PM  Clinic Name & Address:  [facility] - [address] [place], [place]   Transportation Provider: [employer]   time:  12:15 - 12:45pm    Notified member of  time.    Carmenza Griffin  Case Management Specialist  [facility]  [phone]

## 2018-05-16 ENCOUNTER — TRANSFERRED RECORDS (OUTPATIENT)
Dept: HEALTH INFORMATION MANAGEMENT | Facility: CLINIC | Age: 78
End: 2018-05-16

## 2018-05-17 ENCOUNTER — TELEPHONE (OUTPATIENT)
Dept: INTERNAL MEDICINE | Facility: CLINIC | Age: 78
End: 2018-05-17

## 2018-05-17 ENCOUNTER — PATIENT OUTREACH (OUTPATIENT)
Dept: GERIATRIC MEDICINE | Facility: CLINIC | Age: 78
End: 2018-05-17

## 2018-05-17 NOTE — TELEPHONE ENCOUNTER
[facility]  OT services for ADLs, falls prevention, increase strength etc.  DC's office  Fax back

## 2018-05-22 ENCOUNTER — TELEPHONE (OUTPATIENT)
Dept: INTERNAL MEDICINE | Facility: CLINIC | Age: 78
End: 2018-05-22

## 2018-05-22 NOTE — TELEPHONE ENCOUNTER
Pauline RN from [facility] calls, asking for delay of care orders for PT as they are not able to see pt this week. Will start PT next week. Gave verbal authorization.

## 2018-05-22 NOTE — PROGRESS NOTES
[facility] Care Coordination Contact  Voice message left with client to f/u on how she is doing, new homemaker. Reqeust a return call.  Yeni Savage RN, BC  Supervisor [facility]   [phone] (Fax)

## 2018-05-22 NOTE — PROGRESS NOTES
[facility] Care Coordination Contact  Rec'd tele call from client. Client states that she is doing good, states that her new homemaker is working out well, states that she is hoping that Mae (HHA) will be able to come 3 days/wk.   Client states that she saw Dr. Carrasco (Pulmonology) and they are decreasing her Prednisone.   Client has f/u with PCP on 5/24/18  Yeni Savage RN, BC  Supervisor [facility]   [phone] (Fax)

## 2018-05-23 ENCOUNTER — TELEPHONE (OUTPATIENT)
Dept: INTERNAL MEDICINE | Facility: CLINIC | Age: 78
End: 2018-05-23

## 2018-05-23 NOTE — TELEPHONE ENCOUNTER
Form received from: [facility]    Form requesting following info/need: Certification and POC    PHYLICIA needed?: No    Location of form: Dr. Tierney's in basket    When completed the route for return: Fax

## 2018-05-24 ENCOUNTER — MEDICAL CORRESPONDENCE (OUTPATIENT)
Dept: HEALTH INFORMATION MANAGEMENT | Facility: CLINIC | Age: 78
End: 2018-05-24

## 2018-05-24 ENCOUNTER — OFFICE VISIT (OUTPATIENT)
Dept: INTERNAL MEDICINE | Facility: CLINIC | Age: 78
End: 2018-05-24
Payer: COMMERCIAL

## 2018-05-24 VITALS
RESPIRATION RATE: 18 BRPM | DIASTOLIC BLOOD PRESSURE: 68 MMHG | WEIGHT: 119.6 LBS | TEMPERATURE: 98.2 F | BODY MASS INDEX: 22.01 KG/M2 | HEIGHT: 62 IN | OXYGEN SATURATION: 98 % | SYSTOLIC BLOOD PRESSURE: 112 MMHG | HEART RATE: 100 BPM

## 2018-05-24 DIAGNOSIS — R35.0 URINARY FREQUENCY: ICD-10-CM

## 2018-05-24 DIAGNOSIS — I10 ESSENTIAL HYPERTENSION WITH GOAL BLOOD PRESSURE LESS THAN 140/90: Chronic | ICD-10-CM

## 2018-05-24 DIAGNOSIS — J44.9 COPD, SEVERE (H): ICD-10-CM

## 2018-05-24 DIAGNOSIS — E53.8 VITAMIN B 12 DEFICIENCY: ICD-10-CM

## 2018-05-24 DIAGNOSIS — E03.9 HYPOTHYROIDISM, UNSPECIFIED TYPE: Chronic | ICD-10-CM

## 2018-05-24 DIAGNOSIS — M25.50 MULTIPLE JOINT PAIN: ICD-10-CM

## 2018-05-24 DIAGNOSIS — F33.41 RECURRENT MAJOR DEPRESSIVE DISORDER, IN PARTIAL REMISSION (H): Primary | ICD-10-CM

## 2018-05-24 DIAGNOSIS — I35.0 AORTIC STENOSIS, MODERATE: ICD-10-CM

## 2018-05-24 DIAGNOSIS — D64.9 ANEMIA, UNSPECIFIED TYPE: ICD-10-CM

## 2018-05-24 PROCEDURE — 99215 OFFICE O/P EST HI 40 MIN: CPT | Performed by: INTERNAL MEDICINE

## 2018-05-24 RX ORDER — DULOXETIN HYDROCHLORIDE 20 MG/1
20 CAPSULE, DELAYED RELEASE ORAL 2 TIMES DAILY
Qty: 60 CAPSULE | Refills: 1 | Status: SHIPPED | OUTPATIENT
Start: 2018-05-24 | End: 2018-06-14

## 2018-05-24 NOTE — PATIENT INSTRUCTIONS
Plan:  1. Write us back if you take the Lisinopril or not. We will not change   2. Duloxetine 20 mg daily for a week then take 1 capsule twice a day - for depression   3. Take the Tylenol 500 mg 3 times a day with the Tramadol   4. Continue the other meds, same doses for now.  5. follow up in about a month

## 2018-05-24 NOTE — PROGRESS NOTES
Patient's instructions / PLAN:                                                        Plan:  1. Write us back if you take the Lisinopril or not. We will not change   2. Duloxetine 20 mg daily for a week then take 1 capsule twice a day - for depression   3. Take the Tylenol 500 mg 3 times a day with the Tramadol   4. Continue the other meds, same doses for now.  5. follow up in about a month       ASSESSMENT & PLAN:                                                      78-year-old woman with PMHx significant for mod-severe Ao Stenosis, severe Asthma steroid dependent, tachycardia/PVCs ( intol to BB and Ca gillian block), hypothyroidism, hypothyroidism, HTN, osteoporosis, HLipidemia, depression       (F33.41) Recurrent major depressive disorder, in partial remission (H)  (primary encounter diagnosis)  Comment: We discussed about the new meds, advantages and potential side effects. The patient will read also the info from the pharmacy and call back if questions.   Plan: DULoxetine (CYMBALTA) 20 MG EC capsule            (J44.9) COPD, severe (H)  Comment: stable   Plan: Continue same meds, same doses for now     (I35.0) Aortic stenosis, moderate  Comment:   Plan: f/u echo yearly     (M25.50) Multiple joint pain  Comment:   Plan: DULoxetine (CYMBALTA) 20 MG EC capsule        Continue tramadol as needed    (R35.0) Urinary frequency  Comment:   Plan: UA reflex to Microscopic and Culture            (I10) HTN, goal <  140/90  Comment: Controlled    Plan: as above     (E03.9) Hypothyroidism, unspecified type  Comment: Controlled    Plan: Continue same meds, same doses for now     (D64.9) Anemia, unspecified type  Comment: stable.  Possible secondary chronic disease.  Last B12 level was high  Plan: f/u     (E53.8) Vitamin B 12 deficiency  Comment:   Plan: Continue B12 supplements        Chief Complaint:                                                        Follow up chronic medical problems       SUBJECTIVE:                                                     History of present illness     At the beginning of this month she was admitted in the hospital with sepsis and hyponatremia.  She was discharged to TCU because physical deconditioning.  She went home about 2 weeks ago.    Deconditioning, unstable gait  She feels stable at home.  She still feels unsteady on her feet if she does not use the walker.  She needs to use the walker all the time otherwise she feels she will loses her balance.  She continues with physical therapy.  Head CT 2017: IMPRESSION:  1. No acute intracranial findings.  2. Left anterior scalp soft tissue swelling. No underlying skull  fractures.  3. Opacification of multiple left-sided mastoid air cells.      Her asthma/COPD has been stable.  It is prednisone dependent.    HTN  --She thinks her lisinopril was stopped during the hospital stay.  She has questions if she needs to take it again.  Her blood pressure is controlled with the present medications.  We will not change it.  If she takes lisinopril she will continue it, if she does not take it we will not add see above it.   --She is not sure about medications, because RN does the meds       L ear mils sore  --On exam it looks normal    Depression  --She feels depressed secondary multiple medical problems and recent hospitalization  --Family encourage her to start medications and she agrees  --I will choose Cymbalta because she also has chronic pain    Chronic joint pain  --Not controlled  --I advised her to take Tylenol with the tramadol in the same time  --I hope the insurance cover Cymbalta        Question if URine infection   She would like and urine test       Aortic stenosis  --No syncope, no leg edema, shortness of breath has been stable  --Echocardiogram January 2014:The left ventricle is normal in size.  The visual ejection fraction is estimated at 60-65%.  No regional wall motion abnormalities noted.  Moderate to severe valvular aortic stenosis, mean  "gradient 24mmHg.  Compared to previous echo 5/4/2017, the mean aortic valve gradient is  essentially unchanged.       ROS:                                                      ROS: negative for fever, chills, cough, wheezes, chest pain,  vomiting, abdominal pain, leg swelling positive for chronic shortness of breath, stable    OBJECTIVE:                                                    Physical Exam :    Blood pressure 112/68, pulse 100, temperature 98.2  F (36.8  C), temperature source Oral, resp. rate 18, height 5' 2\" (1.575 m), weight 119 lb 9.6 oz (54.3 kg), SpO2 98 %, not currently breastfeeding.   NAD, appears comfortable, fragile  Skin: no rashes   HEENT: PERRLA, EOMI, pink conjunctiva, anicteric sclerae, bilateral tympanic membranes are clinically normal, oropharynx is normal color  Neck: supple, no JVD, No thyroidmegaly. Lymph nodes nonpalpable cervical and supraclavicular.  Chest: clear to auscultation bilaterally, good respiratory effort  Heart: S1 S2, RRR,   3/6 sys murmur   Abdomen: soft, not tender,   Extremities: trace  edema,  Neurologic: A, Ox3, no focal signs appreciated    PMHx: reviewed  Past Medical History:   Diagnosis Date     Anemia Dec 2011     Arthritis of hand      Asthma, persistent     f/U dr Brock at [facility]     Asthma, persistent      Chronic intermittent steroid use     for asthma     COPD exacerbation (H) 10/25/2013     Esophageal stricture 2007    s/p dilation     Foot deformity     Left     HTN, goal below 140/90      Hyperlipidemia      Hyperlipidemia LDL goal < 130      Hypothyroidism Dec 2011     Hypothyroidism      Left foot pain Nov 2011     Left shoulder pain Nov 2011     Medication side effects      Osteoporosis      Paroxysmal supraventricular tachycardia (H)      PVC (premature ventricular contraction) May 2012     Skin cyst Dec 2011    L thumb     SOB (shortness of breath) on exertion May 2012     SVT (supraventricular tachycardia) (H)       PSHx: " reviewed  Past Surgical History:   Procedure Laterality Date     BUNIONECTOMY LAPIDUS WITH TARSAL METATARSAL (TMT) FUSION  1990's     EP STUDY, MODIFIED  7/2012    SVT not induced, PVC's     GYN SURGERY  1980     HYSTERECTOMY TOTAL ABDOMINAL       HYSTERECTOMY, PAP NO LONGER INDICATED       TONSILLECTOMY          Meds: reviewed  Current Outpatient Prescriptions   Medication Sig Dispense Refill     ACETAMINOPHEN PO Take 500 mg by mouth every 8 hours AND give 500 mg by mouth every 6 hours PRN       albuterol (ALBUTEROL) 108 (90 BASE) MCG/ACT Inhaler Inhale 2 puffs into the lungs every 6 hours as needed       ASPIRIN NOT PRESCRIBED, INTENTIONAL, 1 each continuous prn. Antiplatelet medication not prescribed intentionally due to Use of NSAIDS 0 each 0     bisacodyl (DULCOLAX) 10 MG Suppository Place 1 suppository (10 mg) rectally daily as needed for constipation 30 suppository      budesonide (PULMICORT FLEXHALER) 180 MCG/ACT inhaler Inhale 1 puff into the lungs 2 times daily       calcium 600 MG tablet Take 1 tablet (600 mg) by mouth daily 60 tablet      cholecalciferol (VITAMIN D) 1000 UNIT tablet Take 1 tablet (1,000 Units) by mouth daily 30 tablet      cyanocobalamin (B-12 TR) 1000 MCG TBCR Take 1 tablet by mouth daily 100 tablet 3     ferrous sulfate (IRON) 325 (65 Fe) MG tablet Take 325 mg by mouth daily (with breakfast)       gemfibrozil (LOPID) 600 MG tablet Take 1 tablet (600 mg) by mouth daily 90 tablet 0     levalbuterol (XOPENEX) 1.25 MG/3ML neb solution Take 3 mLs (1.25 mg) by nebulization every 4 hours as needed for shortness of breath / dyspnea or wheezing 1584 mL 1     levothyroxine (SYNTHROID/LEVOTHROID) 25 MCG tablet Take 1 tablet (25 mcg) by mouth daily 90 tablet 3     lisinopril (PRINIVIL/ZESTRIL) 5 MG tablet Take 1 tablet (5 mg) by mouth daily 30 tablet      magnesium hydroxide (MILK OF MAGNESIA) 400 MG/5ML suspension Take 30 mLs by mouth daily as needed for constipation 105 mL      melatonin 1 MG  TABS tablet Take 1 tablet (1 mg) by mouth nightly as needed for sleep       montelukast (SINGULAIR) 10 MG tablet Take 1 tablet (10 mg) by mouth At Bedtime 30 tablet      Multiple Vitamins-Minerals (MULTIVITAMIN OR) Take 1 tablet by mouth daily       omeprazole (PRILOSEC) 40 MG capsule Take 1 capsule (40 mg) by mouth daily Take 30-60 minutes before a meal. 90 capsule 3     polyethylene glycol (MIRALAX/GLYCOLAX) Packet Take 17 g by mouth daily as needed for constipation       predniSONE (DELTASONE) 2.5 MG tablet Take 4 tablets (10 mg) by mouth See Admin Instructions       prochlorperazine (COMPAZINE) 5 MG tablet Take 1 tablet (5 mg) by mouth every 6 hours as needed for nausea or vomiting 120 tablet 0     roflumilast (DALIRESP) 500 MCG TABS tablet Take 500 mcg by mouth daily        salmeterol (SEREVENT) 50 MCG/DOSE diskus inhaler Inhale 1 puff into the lungs 2 times daily       TRAMADOL HCL PO Take 50 mg by mouth every 8 hours       umeclidinium (INCRUSE ELLIPTA) 62.5 MCG/INH oral inhaler Inhale 1 puff into the lungs daily         Soc Hx: reviewed  Fam Hx: reviewed    Time spent with the patient  40 min, more than 50% in counseling and coordinating care, Re above medical problems : Depression, chronic joint pain, pain medications, COPD/asthma, recent hospital stay, reviewing hospital records and recent labs, answering her questions      Jenny Tierney MD  Internal Medicine

## 2018-05-24 NOTE — MR AVS SNAPSHOT
After Visit Summary   5/24/2018    Ana Luisa Lee    MRN: [id number]           Patient Information     Date Of Birth          1940        Visit Information        Provider Department      5/24/2018 12:40 PM Jenny Hamilton MD [facility]        Today's Diagnoses     Recurrent major depressive disorder, in partial remission (H)    -  1    Multiple joint pain          Care Instructions    Plan:  1. Write us back if you take the Lisinopril or not. We will not change   2. Duloxetine 20 mg daily for a week then take 1 capsule twice a day - for depression   3. Take the Tylenol 500 mg 3 times a day with the Tramadol   4. Continue the other meds, same doses for now.  5. follow up in about a month           Follow-ups after your visit        Who to contact     If you have questions or need follow up information about today's clinic visit or your schedule please contact [facility] directly at [phone].  Normal or non-critical lab and imaging results will be communicated to you by Metacafehart, letter or phone within 4 business days after the clinic has received the results. If you do not hear from us within 7 days, please contact the clinic through [employer] Mobile[employer] or phone. If you have a critical or abnormal lab result, we will notify you by phone as soon as possible.  Submit refill requests through CasaHop or call your pharmacy and they will forward the refill request to us. Please allow 3 business days for your refill to be completed.          Additional Information About Your Visit        MyChart Information     CasaHop gives you secure access to your electronic health record. If you see a primary care provider, you can also send messages to your care team and make appointments. If you have questions, please call your primary care clinic.  If you do not have a primary care provider, please call [phone] and they will assist you.        Care EveryWhere ID  "    This is your Care EveryWhere ID. This could be used by other organizations to access your [place] medical records  [id number]        Your Vitals Were     Pulse Temperature Respirations Height Pulse Oximetry Breastfeeding?    100 98.2  F (36.8  C) (Oral) 18 5' 2\" (1.575 m) 98% No    BMI (Body Mass Index)                   21.88 kg/m2            Blood Pressure from Last 3 Encounters:   05/24/18 112/68   05/07/18 101/67   05/04/18 136/72    Weight from Last 3 Encounters:   05/24/18 119 lb 9.6 oz (54.3 kg)   05/07/18 117 lb 3.2 oz (53.2 kg)   05/04/18 118 lb 9.6 oz (53.8 kg)              Today, you had the following     No orders found for display         Today's Medication Changes          These changes are accurate as of 5/24/18  1:25 PM.  If you have any questions, ask your nurse or doctor.               Start taking these medicines.        Dose/Directions    DULoxetine 20 MG EC capsule   Commonly known as:  CYMBALTA   Used for:  Recurrent major depressive disorder, in partial remission (H), Multiple joint pain   Started by:  Jenny Hamilton MD        Dose:  20 mg   Take 1 capsule (20 mg) by mouth 2 times daily Start with 1 tablet daily for 1 week then take 1 tablet twice a day   Quantity:  60 capsule   Refills:  1            Where to get your medicines      These medications were sent to [facility] [address] [place] [address] [address] AT [place] [address] [address]  [address], [facility] [address]     Phone:  [phone]     DULoxetine 20 MG EC capsule                Primary Care Provider Office Phone # Fax #    Jenny Hamilton [phone]       [address]  [facility] [address]        Equal Access to Services     SOBEIDA GIFFORD : Allyson gallegos Somerry, waaxda luqadaha, qaybta kaalmada adeegyaearlene, holly braga. [facility] [phone].    ATENCIÓN: Si cleve friasañol, tiene a martinez disposición " servicios gratuitos de asistencia lingüística. Robert oropeza [phone].    We comply with applicable federal civil rights laws and [place] laws. We do not discriminate on the basis of race, color, national origin, age, disability, sex, sexual orientation, or gender identity.            Thank you!     Thank you for choosing [facility]  for your care. Our goal is always to provide you with excellent care. Hearing back from our patients is one way we can continue to improve our services. Please take a few minutes to complete the written survey that you may receive in the mail after your visit with us. Thank you!             Your Updated Medication List - Protect others around you: Learn how to safely use, store and throw away your medicines at www.disposemymeds.org.          This list is accurate as of 5/24/18  1:25 PM.  Always use your most recent med list.                   Brand Name Dispense Instructions for use Diagnosis    ACETAMINOPHEN PO      Take 500 mg by mouth every 8 hours AND give 500 mg by mouth every 6 hours PRN        albuterol 108 (90 Base) MCG/ACT Inhaler    PROAIR HFA     Inhale 2 puffs into the lungs every 6 hours as needed    Asthma, persistent       ASPIRIN NOT PRESCRIBED    INTENTIONAL    0 each    1 each continuous prn. Antiplatelet medication not prescribed intentionally due to Use of NSAIDS        bisacodyl 10 MG Suppository    DULCOLAX    30 suppository    Place 1 suppository (10 mg) rectally daily as needed for constipation    Constipation, unspecified constipation type       budesonide 180 MCG/ACT inhaler    PULMICORT FLEXHALER     Inhale 1 puff into the lungs 2 times daily    Pulmonary emphysema, unspecified emphysema type (H)       calcium 600 MG tablet     60 tablet    Take 1 tablet (600 mg) by mouth daily    Pulmonary emphysema, unspecified emphysema type (H)       cholecalciferol 1000 UNIT tablet    vitamin D3    30 tablet    Take 1 tablet (1,000 Units) by mouth daily     COPD, moderate (H)       cyanocobalamin 1000 MCG Tbcr    B-12 TR    100 tablet    Take 1 tablet by mouth daily    Pernicious anemia       DULoxetine 20 MG EC capsule    CYMBALTA    60 capsule    Take 1 capsule (20 mg) by mouth 2 times daily Start with 1 tablet daily for 1 week then take 1 tablet twice a day    Recurrent major depressive disorder, in partial remission (H), Multiple joint pain       ferrous sulfate 325 (65 Fe) MG tablet    IRON     Take 325 mg by mouth daily (with breakfast)        gemfibrozil 600 MG tablet    LOPID    90 tablet    Take 1 tablet (600 mg) by mouth daily    Hyperlipidemia with target LDL less than 130       INCRUSE ELLIPTA 62.5 MCG/INH oral inhaler   Generic drug:  umeclidinium      Inhale 1 puff into the lungs daily        levalbuterol 1.25 MG/3ML neb solution    XOPENEX    1584 mL    Take 3 mLs (1.25 mg) by nebulization every 4 hours as needed for shortness of breath / dyspnea or wheezing    COPD, moderate (H), COPD exacerbation (H)       levothyroxine 25 MCG tablet    SYNTHROID/LEVOTHROID    90 tablet    Take 1 tablet (25 mcg) by mouth daily    Hypothyroidism due to acquired atrophy of thyroid       lisinopril 5 MG tablet    PRINIVIL/ZESTRIL    30 tablet    Take 1 tablet (5 mg) by mouth daily    Essential hypertension       montelukast 10 MG tablet    SINGULAIR    30 tablet    Take 1 tablet (10 mg) by mouth At Bedtime    Pulmonary emphysema, unspecified emphysema type (H)       MULTIVITAMIN PO      Take 1 tablet by mouth daily        omeprazole 40 MG capsule    priLOSEC    90 capsule    Take 1 capsule (40 mg) by mouth daily Take 30-60 minutes before a meal.    Gastroesophageal reflux disease, esophagitis presence not specified       polyethylene glycol Packet    MIRALAX/GLYCOLAX     Take 17 g by mouth daily as needed for constipation        predniSONE 2.5 MG tablet    DELTASONE     Take 4 tablets (10 mg) by mouth See Admin Instructions    COPD exacerbation (H), Steroid-dependent  chronic obstructive pulmonary disease (H)       prochlorperazine 5 MG tablet    COMPAZINE    120 tablet    Take 1 tablet (5 mg) by mouth every 6 hours as needed for nausea or vomiting    Nausea       roflumilast 500 MCG Tabs tablet    DALIRESP     Take 500 mcg by mouth daily        salmeterol 50 MCG/DOSE diskus inhaler    SEREVENT     Inhale 1 puff into the lungs 2 times daily    Pulmonary emphysema, unspecified emphysema type (H)       TRAMADOL HCL PO      Take 50 mg by mouth every 8 hours

## 2018-05-25 ENCOUNTER — TELEPHONE (OUTPATIENT)
Dept: INTERNAL MEDICINE | Facility: CLINIC | Age: 78
End: 2018-05-25

## 2018-05-25 NOTE — TELEPHONE ENCOUNTER
Tika BARTHOLOMEW with [facility] ([phone]) calling to report that patient has not taken her Lisinopril since discharge from TCU 5/4/18.  Med was apparently stopped while in TCU due to low readings.  No need to call her back.    Home care is checking BP twice a week and these are recent readings:  5/11/18 106/56  5/14/18 122/80  5/17/18 106/70  5/21/18 110/64  Today              110/70

## 2018-05-28 PROBLEM — I35.0 AORTIC STENOSIS, MODERATE: Status: ACTIVE | Noted: 2018-05-28

## 2018-05-28 PROBLEM — Z76.89 ENCOUNTER TO ESTABLISH CARE: Chronic | Status: ACTIVE | Noted: 2018-05-28

## 2018-05-28 PROBLEM — D64.9 ANEMIA, UNSPECIFIED TYPE: Status: ACTIVE | Noted: 2018-05-28

## 2018-05-31 ENCOUNTER — TELEPHONE (OUTPATIENT)
Dept: INTERNAL MEDICINE | Facility: CLINIC | Age: 78
End: 2018-05-31

## 2018-05-31 DIAGNOSIS — M25.50 MULTIPLE JOINT PAIN: Primary | ICD-10-CM

## 2018-05-31 RX ORDER — TRAMADOL HYDROCHLORIDE 50 MG/1
TABLET ORAL
Qty: 100 TABLET | Refills: 0 | Status: SHIPPED | OUTPATIENT
Start: 2018-05-31 | End: 2018-06-17

## 2018-06-01 ENCOUNTER — OFFICE VISIT (OUTPATIENT)
Dept: INTERNAL MEDICINE | Facility: CLINIC | Age: 78
End: 2018-06-01
Payer: COMMERCIAL

## 2018-06-01 ENCOUNTER — PATIENT OUTREACH (OUTPATIENT)
Dept: GERIATRIC MEDICINE | Facility: CLINIC | Age: 78
End: 2018-06-01

## 2018-06-01 VITALS
HEIGHT: 62 IN | SYSTOLIC BLOOD PRESSURE: 118 MMHG | RESPIRATION RATE: 16 BRPM | BODY MASS INDEX: 21.88 KG/M2 | DIASTOLIC BLOOD PRESSURE: 70 MMHG | HEART RATE: 108 BPM | WEIGHT: 118.9 LBS | TEMPERATURE: 98.8 F | OXYGEN SATURATION: 97 %

## 2018-06-01 DIAGNOSIS — H69.93 DYSFUNCTION OF BOTH EUSTACHIAN TUBES: ICD-10-CM

## 2018-06-01 DIAGNOSIS — H61.23 BILATERAL IMPACTED CERUMEN: Primary | ICD-10-CM

## 2018-06-01 PROCEDURE — 69210 REMOVE IMPACTED EAR WAX UNI: CPT | Performed by: INTERNAL MEDICINE

## 2018-06-01 PROCEDURE — 99213 OFFICE O/P EST LOW 20 MIN: CPT | Mod: 25 | Performed by: INTERNAL MEDICINE

## 2018-06-01 NOTE — PROGRESS NOTES
[facility] Care Coordination Contact  Rec'd vm from client's daughter Windy to report that she rec'd a call from [facility] that they have an EW opening. Windy inquired if CM would be able to assist her mother with scheduling a tour next week. Windy states that she did call her mother to inform her. Windy states that she is scheduled to fly to [place] 6/17, but can make changes.  Call placed to Windy to explain that CM can assist with scheduling an EW ride through [employer] and be present for the tour  Plan: CM to contact client on Monday 6/4/18.  Yeni Savage RN, BC  Supervisor [facility]   [phone] (Fax)

## 2018-06-01 NOTE — PROGRESS NOTES
SUBJECTIVE:   Ana Luisa Lee is a 78 year old female who presents to clinic today for the following health issues:      Complaints of plugged ears bilaterally. This has been going on about 6 months. The left has been sore occasionally. She has not really had nasal congestion. She has some decrease in hearing.         Patient Active Problem List   Diagnosis     Osteoporosis     Hyperlipidemia with target LDL less than 130     Asthma, persistent     Pes planus     Advance Care Planning     Skin cyst     Pain in joint, shoulder region     PVC (premature ventricular contraction)     Arthritis of hand     Pulmonary nodule seen on imaging study     Health Care Home     Hypercalcemia     Other fatigue     Steroid-dependent chronic obstructive pulmonary disease (H)     SVT (supraventricular tachycardia) (H)     Hypothyroidism, unspecified type     HTN, goal <  140/90     Pernicious anemia     Paroxysmal supraventricular tachycardia (H)     PAC (premature atrial contraction)     Closed fracture of sacrum, unspecified portion of sacrum, initial encounter (H)     COPD, severe (H)     Anemia, unspecified type     [id number]: mod-severe Ao Stenosis, severe Asthma/COPD steroid dependent, tachycardia/PVCs ( intol to BB and Ca gillian block), hypothyroidism,  HTN, osteoporosis, HLipidemia, depression      Aortic stenosis, moderate     Current Outpatient Prescriptions   Medication Sig Dispense Refill     budesonide (PULMICORT FLEXHALER) 180 MCG/ACT inhaler Inhale 1 puff into the lungs 2 times daily       calcium 600 MG tablet Take 1 tablet (600 mg) by mouth daily 60 tablet      cholecalciferol (VITAMIN D) 1000 UNIT tablet Take 1 tablet (1,000 Units) by mouth daily 30 tablet      cyanocobalamin (B-12 TR) 1000 MCG TBCR Take 1 tablet by mouth daily 100 tablet 3     DULoxetine (CYMBALTA) 20 MG EC capsule Take 1 capsule (20 mg) by mouth 2 times daily Start with 1 tablet daily for 1 week then take 1 tablet twice a day 60 capsule 1      ferrous sulfate (IRON) 325 (65 Fe) MG tablet Take 325 mg by mouth daily (with breakfast)       gemfibrozil (LOPID) 600 MG tablet Take 1 tablet (600 mg) by mouth daily 90 tablet 0     levalbuterol (XOPENEX) 1.25 MG/3ML neb solution Take 3 mLs (1.25 mg) by nebulization every 4 hours as needed for shortness of breath / dyspnea or wheezing 1584 mL 1     levothyroxine (SYNTHROID/LEVOTHROID) 25 MCG tablet Take 1 tablet (25 mcg) by mouth daily 90 tablet 3     lisinopril (PRINIVIL/ZESTRIL) 5 MG tablet Take 1 tablet (5 mg) by mouth daily 30 tablet      montelukast (SINGULAIR) 10 MG tablet Take 1 tablet (10 mg) by mouth At Bedtime 30 tablet      Multiple Vitamins-Minerals (MULTIVITAMIN OR) Take 1 tablet by mouth daily       omeprazole (PRILOSEC) 40 MG capsule Take 1 capsule (40 mg) by mouth daily Take 30-60 minutes before a meal. 90 capsule 3     predniSONE (DELTASONE) 2.5 MG tablet Take 4 tablets (10 mg) by mouth See Admin Instructions       roflumilast (DALIRESP) 500 MCG TABS tablet Take 500 mcg by mouth daily        traMADol (ULTRAM) 50 MG tablet TAKE 1 TABLET BY MOUTH EVERY 6 HOURS AS NEEDED FOR SEVERE PAIN 100 tablet 0     umeclidinium (INCRUSE ELLIPTA) 62.5 MCG/INH oral inhaler Inhale 1 puff into the lungs daily       ACETAMINOPHEN PO Take 500 mg by mouth every 8 hours AND give 500 mg by mouth every 6 hours PRN       albuterol (ALBUTEROL) 108 (90 BASE) MCG/ACT Inhaler Inhale 2 puffs into the lungs every 6 hours as needed       ASPIRIN NOT PRESCRIBED, INTENTIONAL, 1 each continuous prn. Antiplatelet medication not prescribed intentionally due to Use of NSAIDS 0 each 0     bisacodyl (DULCOLAX) 10 MG Suppository Place 1 suppository (10 mg) rectally daily as needed for constipation 30 suppository      polyethylene glycol (MIRALAX/GLYCOLAX) Packet Take 17 g by mouth daily as needed for constipation       prochlorperazine (COMPAZINE) 5 MG tablet Take 1 tablet (5 mg) by mouth every 6 hours as needed for nausea or vomiting  "120 tablet 0     salmeterol (SEREVENT) 50 MCG/DOSE diskus inhaler Inhale 1 puff into the lungs 2 times daily        Social History   Substance Use Topics     Smoking status: Former Smoker     Packs/day: 1.00     Years: 15.00     Quit date: 1/1/1980     Smokeless tobacco: Never Used     Alcohol use No      Comment: Occasional drink      Reviewed and updated as needed this visit by clinical staff  Tobacco  Allergies  Meds  Problems  Med Hx  Surg Hx  Fam Hx  Soc Hx        Reviewed and updated as needed this visit by Provider         ROS:  No URI symptoms     OBJECTIVE:     /70  Pulse 108  Temp 98.8  F (37.1  C) (Oral)  Resp 16  Ht 5' 2\" (1.575 m)  Wt 118 lb 14.4 oz (53.9 kg)  SpO2 97%  BMI 21.75 kg/m2  Body mass index is 21.75 kg/(m^2).      Ears: moderate amount of wax in both ears, small to moderate amounts were removed manually with curette from both ears and the rest was flushed by nursing. There was improvement in the plugging sensation and hearing. There is mild dullness of TMs.     ASSESSMENT/PLAN:             1. Bilateral impacted cerumen  Cleared ears with improvement in symptoms  - REMOVE IMPACTED CERUMEN    2. Dysfunction of both eustachian tubes  Advised may have some ETD also, can try blowing exercises prn.           Mae Barrera MD  [facility]    "

## 2018-06-01 NOTE — NURSING NOTE
"Vital signs:  Temp: 98.8  F (37.1  C) Temp src: Oral BP: 118/70 Pulse: 108   Resp: 16 SpO2: 97 %     Height: 5' 2\" (157.5 cm) Weight: 118 lb 14.4 oz (53.9 kg)  Estimated body mass index is 21.75 kg/(m^2) as calculated from the following:    Height as of this encounter: 5' 2\" (1.575 m).    Weight as of this encounter: 118 lb 14.4 oz (53.9 kg).          "

## 2018-06-01 NOTE — MR AVS SNAPSHOT
After Visit Summary   6/1/2018    Ana Luisa Lee    MRN: [id number]           Patient Information     Date Of Birth          1940        Visit Information        Provider Department      6/1/2018 3:40 PM Mae Barrera MD [facility]        Today's Diagnoses     Bilateral impacted cerumen    -  1    Dysfunction of both eustachian tubes           Follow-ups after your visit        Your next 10 appointments already scheduled     Jun 21, 2018  1:00 PM CDT   SHORT with Jenny Hamilton MD   [facility]    [address]  [facility] [id number] [phone]              Who to contact     If you have questions or need follow up information about today's clinic visit or your schedule please contact [facility] directly at [phone].  Normal or non-critical lab and imaging results will be communicated to you by MyChart, letter or phone within 4 business days after the clinic has received the results. If you do not hear from us within 7 days, please contact the clinic through MyChart or phone. If you have a critical or abnormal lab result, we will notify you by phone as soon as possible.  Submit refill requests through minicabit or call your pharmacy and they will forward the refill request to us. Please allow 3 business days for your refill to be completed.          Additional Information About Your Visit        MyChart Information     [employer]icabit gives you secure access to your electronic health record. If you see a primary care provider, you can also send messages to your care team and make appointments. If you have questions, please call your primary care clinic.  If you do not have a primary care provider, please call [phone] and they will assist you.        Care EveryWhere ID     This is your Care EveryWhere ID. This could be used by other organizations to access your [facility]  "records  [id number]        Your Vitals Were     Pulse Temperature Respirations Height Pulse Oximetry BMI (Body Mass Index)    108 98.8  F (37.1  C) (Oral) 16 5' 2\" (1.575 m) 97% 21.75 kg/m2       Blood Pressure from Last 3 Encounters:   06/01/18 118/70   05/24/18 112/68   05/07/18 101/67    Weight from Last 3 Encounters:   06/01/18 118 lb 14.4 oz (53.9 kg)   05/24/18 119 lb 9.6 oz (54.3 kg)   05/07/18 117 lb 3.2 oz (53.2 kg)              We Performed the Following     REMOVE SYMONE CERUMEN        Primary Care Provider Office Phone # Fax #    Jenny Hamilton [phone]       [address] [facility] [address]        Equal Access to Services     [facility]: Hadii aad ku hadasho Soomaali, waaxda luqadaha, qaybta kaalmada adeegyada, waxay idiin haykoffin lucero garcia . So [facility] [phone].    ATENCIÓN: Si habla español, tiene a martinez disposición servicios gratuitos de asistencia lingüística. Vikinicole al [phone].    We comply with applicable federal civil rights laws and [place] laws. We do not discriminate on the basis of race, color, national origin, age, disability, sex, sexual orientation, or gender identity.            Thank you!     Thank you for choosing [facility]  for your care. Our goal is always to provide you with excellent care. Hearing back from our patients is one way we can continue to improve our services. Please take a few minutes to complete the written survey that you may receive in the mail after your visit with us. Thank you!             Your Updated Medication List - Protect others around you: Learn how to safely use, store and throw away your medicines at www.disposemymeds.org.          This list is accurate as of 6/1/18 11:59 PM.  Always use your most recent med list.                   Brand Name Dispense Instructions for use Diagnosis    ACETAMINOPHEN PO      Take 500 mg by mouth every 8 hours AND give 500 mg by mouth every 6 " hours PRN        albuterol 108 (90 Base) MCG/ACT Inhaler    PROAIR HFA     Inhale 2 puffs into the lungs every 6 hours as needed    Asthma, persistent       ASPIRIN NOT PRESCRIBED    INTENTIONAL    0 each    1 each continuous prn. Antiplatelet medication not prescribed intentionally due to Use of NSAIDS        bisacodyl 10 MG Suppository    DULCOLAX    30 suppository    Place 1 suppository (10 mg) rectally daily as needed for constipation    Constipation, unspecified constipation type       budesonide 180 MCG/ACT inhaler    PULMICORT FLEXHALER     Inhale 1 puff into the lungs 2 times daily    Pulmonary emphysema, unspecified emphysema type (H)       calcium 600 MG tablet     60 tablet    Take 1 tablet (600 mg) by mouth daily    Pulmonary emphysema, unspecified emphysema type (H)       cholecalciferol 1000 UNIT tablet    vitamin D3    30 tablet    Take 1 tablet (1,000 Units) by mouth daily    COPD, moderate (H)       cyanocobalamin 1000 MCG Tbcr    B-12 TR    100 tablet    Take 1 tablet by mouth daily    Pernicious anemia       DULoxetine 20 MG EC capsule    CYMBALTA    60 capsule    Take 1 capsule (20 mg) by mouth 2 times daily Start with 1 tablet daily for 1 week then take 1 tablet twice a day    Recurrent major depressive disorder, in partial remission (H), Multiple joint pain       ferrous sulfate 325 (65 Fe) MG tablet    IRON     Take 325 mg by mouth daily (with breakfast)        gemfibrozil 600 MG tablet    LOPID    90 tablet    Take 1 tablet (600 mg) by mouth daily    Hyperlipidemia with target LDL less than 130       INCRUSE ELLIPTA 62.5 MCG/INH oral inhaler   Generic drug:  umeclidinium      Inhale 1 puff into the lungs daily        levalbuterol 1.25 MG/3ML neb solution    XOPENEX    1584 mL    Take 3 mLs (1.25 mg) by nebulization every 4 hours as needed for shortness of breath / dyspnea or wheezing    COPD, moderate (H), COPD exacerbation (H)       levothyroxine 25 MCG tablet    SYNTHROID/LEVOTHROID    90  tablet    Take 1 tablet (25 mcg) by mouth daily    Hypothyroidism due to acquired atrophy of thyroid       lisinopril 5 MG tablet    PRINIVIL/ZESTRIL    30 tablet    Take 1 tablet (5 mg) by mouth daily    Essential hypertension       montelukast 10 MG tablet    SINGULAIR    30 tablet    Take 1 tablet (10 mg) by mouth At Bedtime    Pulmonary emphysema, unspecified emphysema type (H)       MULTIVITAMIN PO      Take 1 tablet by mouth daily        omeprazole 40 MG capsule    priLOSEC    90 capsule    Take 1 capsule (40 mg) by mouth daily Take 30-60 minutes before a meal.    Gastroesophageal reflux disease, esophagitis presence not specified       polyethylene glycol Packet    MIRALAX/GLYCOLAX     Take 17 g by mouth daily as needed for constipation        predniSONE 2.5 MG tablet    DELTASONE     Take 4 tablets (10 mg) by mouth See Admin Instructions    COPD exacerbation (H), Steroid-dependent chronic obstructive pulmonary disease (H)       prochlorperazine 5 MG tablet    COMPAZINE    120 tablet    Take 1 tablet (5 mg) by mouth every 6 hours as needed for nausea or vomiting    Nausea       roflumilast 500 MCG Tabs tablet    DALIRESP     Take 500 mcg by mouth daily        salmeterol 50 MCG/DOSE diskus inhaler    SEREVENT     Inhale 1 puff into the lungs 2 times daily    Pulmonary emphysema, unspecified emphysema type (H)       traMADol 50 MG tablet    ULTRAM    100 tablet    TAKE 1 TABLET BY MOUTH EVERY 6 HOURS AS NEEDED FOR SEVERE PAIN    Multiple joint pain

## 2018-06-04 ENCOUNTER — PATIENT OUTREACH (OUTPATIENT)
Dept: GERIATRIC MEDICINE | Facility: CLINIC | Age: 78
End: 2018-06-04

## 2018-06-04 NOTE — PROGRESS NOTES
[facility] Care Coordination Contact  Arranged transportation thru [employer] for the below appt:  Appt Date & Time: 6/6/18 @ 1:45pm  Clinic Name & Address:  [facility] - [address] [place] [address]  Transportation Provider: [employer]   time:  12:45pm - 1:15pm    Notified CC of  time.    Carmenza Griffin  Case Management Specialist  [facility]  [phone]

## 2018-06-04 NOTE — PROGRESS NOTES
[facility] Care Coordination Contact  Rec'd cc' e-mail from client's daughter Windy that was sent to Nery at [facility]. Windy had questions re bathing assist, rental payment, medications/pharmacy, scheduling of medical appt's.  Nery from [facility] responded to Windy's questions. CM responded to questions on EW coverage in an AL facility.   Call placed to client, CM provided info AL facilities and [facility], answered client's questions.   Scheduled tour for 6/6/18 @ 1:45. Client stated that she would like her friend Windy to accompany.  Explained that CM will schedule EW transportation, client requests regular car stating that an SUV is too difficult to get in/out of.   Secure e-mail sent to Nery at [facility] and client's dtr Windy regarding the tour.   Secure e-mail sent to Nery, client's case mix is currently an L, but CM would like to schedule a new assessment. Explained that client becomes easily anxious with any change in her routine, would likely be a case mix B.   CM to follow.  Yeni Savage RN, BC  Supervisor [facility]   [phone] (Fax)

## 2018-06-07 ENCOUNTER — DOCUMENTATION ONLY (OUTPATIENT)
Dept: CARE COORDINATION | Facility: CLINIC | Age: 78
End: 2018-06-07

## 2018-06-07 NOTE — PROGRESS NOTES
[facility] now requests orders and shares plan of care/discharge summaries for some patients through [employer].  Please REPLY TO THIS MESSAGE OR ROUTE BACK TO THE AUTHOR in order to give authorization for orders when needed.  This is considered a verbal order, you will still receive a faxed copy of orders for signature.  Thank you for your assistance in improving collaboration for our patients.    ORDER  PT to continue 1wk3 for balance training.    Thank you,  [email or web address]    [phone]

## 2018-06-11 ENCOUNTER — TELEPHONE (OUTPATIENT)
Dept: INTERNAL MEDICINE | Facility: CLINIC | Age: 78
End: 2018-06-11

## 2018-06-11 ENCOUNTER — PATIENT OUTREACH (OUTPATIENT)
Dept: GERIATRIC MEDICINE | Facility: CLINIC | Age: 78
End: 2018-06-11

## 2018-06-11 NOTE — PROGRESS NOTES
[facility] Care Coordination Contact  Rec'd e-mail from client's dtr Windy to report that she will be coming from [place] this weekend to help her mother with the move to [facility], which is planned for 6/22/2018. Windy inquired if client's homemaker would be able to provide additional hours to assist    VM left with Roberto Carlos, Aging Services for [facility] to inquire if kr can provide additional hours this week. Request a call back.  Call placed to client, shared above info. Will see client 6/12/2018 for a assessment.  Yeni Savage RN, BC  Supervisor [facility]   [phone] (Fax)

## 2018-06-12 ENCOUNTER — PATIENT OUTREACH (OUTPATIENT)
Dept: GERIATRIC MEDICINE | Facility: CLINIC | Age: 78
End: 2018-06-12

## 2018-06-12 ASSESSMENT — ACTIVITIES OF DAILY LIVING (ADL): DEPENDENT_IADLS:: CLEANING;COOKING;LAUNDRY;SHOPPING;MEAL PREPARATION;MEDICATION MANAGEMENT;TRANSPORTATION

## 2018-06-12 NOTE — PROGRESS NOTES
6/12/18 Rec'd tele call from Shobha BARTHOLOMEW at [facility] requesting a return call. Shobha inquired if Ana Luisa is her own responsible party. Explained that client is alert/oriented and is able to make her own decisions. Explained that CM will complete new assessment, recommended the following AL services: bathing, med management, hmkg, laundry and occ AM cares due to pain/stiffness. Shobha stated that they do not do admission on Friday and client will need to reschedule for 6/19 or 6/21/18       [facility] Care Coordination Contact    [facility] Change in Condition Assessment    Home visit for Change in Condition Health Risk Assessment with Ana Luisa Lee completed on June 12, 2018    Reason for Early reassessment: Health Status Change  Yes, if yes explain increased stiffness/pain in joints, anxious with changes in health and caregiving needs.    Type of residence:: Apartment - handicap accessible, 1 bedroom apt.   Current living arrangement:: I live alone     Assessment completed with:: Patient and this CM.     Current Care Plan  Member currently receiving the following home care services: Home Health Aid, Skilled Nursing for med set up   Member currently receiving the following community resources: [employer] [facility], County Worker, DME, Housekeeping/Chore Agency, Meals on Wheels, Lifeline     Medication Review  Medication reconciliation completed in Epic: Yes  Medication set-up & administration: RN sets up  weekly.  Self-administers medications.  Medication understanding concerns (by member, family or CC):No   Medication adherence concerns (by member, family or CC): No    Mental/Behavioral Health   Depression Screening: See PHQ assessment flowsheet.   Mental health DX:: No          Falls Assessment:   Fallen 2 or more times in the past year?: No   Any fall with injury in the past year?: No    ADL/IADL Dependencies:   Dependent ADLs:: Ambulation-walker, Bathing  Dependent IADLs:: Cleaning,  Cooking, Laundry, Shopping, Meal Preparation, Medication Management, Transportation    [facility] sponsored benefits: Shared information re: Silver Sneakers/gym memberships, ASA, Calcium +D.    PCA Assessment completed at visit: Not applicable     Elderly Waiver Eligibility: Yes-will open to EW    Care Plan & Recommendations:   Ana Luisa will transition to [facility], tentative date set up 6/25/2018  Ana Luisa would like to continue LSS  for outings, errands.   Ana Luisa stated that she will would prefer the onsite medical team, [facility] to take over as her PCP     See LTCC for detailed assessment information.    Follow-Up Plan: Member informed of future contact, plan to f/u with member with a 6 month telephone assessment.  Contact information shared with member and family, encouraged member to call with any questions or concerns at any time.    [facility] continuum providers: Please refer to Health Care Home on the [facility] Problem List to view this patient's [facility] Care Plan Summary.    6/12/2018 Per Ana Luisa's request, DANELLE left with  [employer] [facility] to request last day of service to be 6/15/2018  Explained that CM did leave vm with Roberto Carlos at [email or web address] [facility] for [facility] to inquire if homemaker can provide additional time to assist with packing. Ana Luisa states that she will also call her caregiver. Ana Luisa is requesting 6 additional hours.   Ana Luisa would like to schedule an eye and dental appt with the onsite team at [facility]. Ana Luisa has dentures that no longer fit.   6/12/2018 Left danelle Black at [employer] to inform her that Ana Luisa will be moving to [facility], explained CM will complete [id number] when she moves. CM requests a return call, question if client will have a waiver obligation.  6/12/18 Secure e-mail sent to client's dtr Windy to report that her mother would like to utilize the onsite medical team for primary care.  Explained that per Shobha BARTHOLOMEW, her mother is not able to admit on 6/22. Explained that CM did leave a vm with Sofia at [employer] to inquire if her mother will stay have a waiver obligation.   6/12/18 Rec'd vm from Roberto Carlos at [facility] for [facility] that they are able to provide additional hmkg, request a call back with the amount of hours to be auth  6/13/18 Rec'd secure e-mail from Windy, client's dtr  My understanding that both meagans and Shobha's assessments to Jitendra who will then finalize how the financial arrangements will work. And that it's necessary for Jitendra to finish determining the financial details before mom can be admitted. Can you confirm? Will it be feasable for Jitendra to finish the financial stuff by the end of next week?  6/13/18 Secure e-mail to Windy to report that her mother is on MA and opened to EW, unsure of what additional information is required by [employer]. Enc'd Windy to talk with [place] Staff to clarify.   6/13/18 Rec'd tele call from client that her hmkg is able to provide additional assistance. Explained that CM will complete auth for 6 additional hrs homemaking 6/13/18-6/21/18.  VM left with Roberto Carlos to inform of hmkg auth.   6/14/18 Left voice message with Krystal at [facility] to report that client will be moving to [facility], explained that client would like to continue with her . Request a return call  6/18/18 Rec'd vm from Krystal at [facility] to report that client's  will be able to continue to provide services with move to [facility]. Krystal requests CM contact her with new address when she moves.  [phone]  Yeni Savage RN, BC  Supervisor [facility]   [phone] (Fax)

## 2018-06-13 ASSESSMENT — PATIENT HEALTH QUESTIONNAIRE - PHQ9: SUM OF ALL RESPONSES TO PHQ QUESTIONS 1-9: 4

## 2018-06-13 NOTE — PROGRESS NOTES
[facility] Care Coordination Contact  Late entry for 6/6/2018 CM met client at [facility] to take part of the tour. Client shared with CM that she would like to move to [facility]. Client will speak with her daughter Windy to review.  Yeni Savage RN, BC  Supervisor [facility]   [phone] (Fax)

## 2018-06-14 ENCOUNTER — PATIENT OUTREACH (OUTPATIENT)
Dept: GERIATRIC MEDICINE | Facility: CLINIC | Age: 78
End: 2018-06-14

## 2018-06-14 NOTE — PROGRESS NOTES
Dr. Tierney,  I am a  with [facility] working with Ana Luisa Lee. I recently completed annual assessment and home medication review. EPIC medication lists indicates Lisinopril and Duloxetine, Ana Luisa is currently not taking either one of these medications.   Ana Luisa has a home care nurse through [facility] that sets up medications weekly   Feel free to contact me if I can be of any assistance.  Yeni Savage RN, BC  Supervisor [facility]   [phone] (Fax)

## 2018-06-17 ENCOUNTER — APPOINTMENT (OUTPATIENT)
Dept: MRI IMAGING | Facility: CLINIC | Age: 78
End: 2018-06-17
Attending: EMERGENCY MEDICINE
Payer: COMMERCIAL

## 2018-06-17 ENCOUNTER — HOSPITAL ENCOUNTER (OUTPATIENT)
Facility: CLINIC | Age: 78
Setting detail: OBSERVATION
Discharge: NURSING, REHAB OR LONG-TERM CARE | End: 2018-06-19
Attending: EMERGENCY MEDICINE | Admitting: INTERNAL MEDICINE
Payer: COMMERCIAL

## 2018-06-17 ENCOUNTER — APPOINTMENT (OUTPATIENT)
Dept: CT IMAGING | Facility: CLINIC | Age: 78
End: 2018-06-17
Attending: EMERGENCY MEDICINE
Payer: COMMERCIAL

## 2018-06-17 DIAGNOSIS — R42 DIZZINESS: ICD-10-CM

## 2018-06-17 DIAGNOSIS — H65.92 MIDDLE EAR EFFUSION, LEFT: ICD-10-CM
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
## 2018-06-17 DIAGNOSIS — R33.9 URINARY RETENTION: ICD-10-CM

## 2018-06-17 DIAGNOSIS — M25.512 PAIN OF BOTH SHOULDER JOINTS: Primary | ICD-10-CM

## 2018-06-17 DIAGNOSIS — M25.511 PAIN OF BOTH SHOULDER JOINTS: Primary | ICD-10-CM

## 2018-06-17 DIAGNOSIS — S51.011A SKIN TEAR OF RIGHT ELBOW WITHOUT COMPLICATION, INITIAL ENCOUNTER: ICD-10-CM

## 2018-06-17 DIAGNOSIS — R20.0 NUMBNESS OF RIGHT FOOT: ICD-10-CM

## 2018-06-17 DIAGNOSIS — R53.1 GENERALIZED WEAKNESS: ICD-10-CM

## 2018-06-17 LAB
ANION GAP SERPL CALCULATED.3IONS-SCNC: 13 MMOL/L (ref 3–14)
BASOPHILS # BLD AUTO: 0 10E9/L (ref 0–0.2)
BASOPHILS NFR BLD AUTO: 0.2 %
BUN SERPL-MCNC: 14 MG/DL (ref 7–30)
CA-I SERPL ISE-MCNC: 4.8 MG/DL (ref 4.4–5.2)
CALCIUM SERPL-MCNC: 9.3 MG/DL (ref 8.5–10.1)
CHLORIDE SERPL-SCNC: 98 MMOL/L (ref 94–109)
CO2 SERPL-SCNC: 22 MMOL/L (ref 20–32)
CREAT SERPL-MCNC: 0.71 MG/DL (ref 0.52–1.04)
DIFFERENTIAL METHOD BLD: NORMAL
EOSINOPHIL # BLD AUTO: 0 10E9/L (ref 0–0.7)
EOSINOPHIL NFR BLD AUTO: 0.4 %
ERYTHROCYTE [DISTWIDTH] IN BLOOD BY AUTOMATED COUNT: 13.5 % (ref 10–15)
GFR SERPL CREATININE-BSD FRML MDRD: 79 ML/MIN/1.7M2
GLUCOSE SERPL-MCNC: 98 MG/DL (ref 70–99)
HCT VFR BLD AUTO: 36.4 % (ref 35–47)
HGB BLD-MCNC: 11.9 G/DL (ref 11.7–15.7)
IMM GRANULOCYTES # BLD: 0.1 10E9/L (ref 0–0.4)
IMM GRANULOCYTES NFR BLD: 0.6 %
LYMPHOCYTES # BLD AUTO: 1.1 10E9/L (ref 0.8–5.3)
LYMPHOCYTES NFR BLD AUTO: 12.5 %
MAGNESIUM SERPL-MCNC: 2.2 MG/DL (ref 1.6–2.3)
MCH RBC QN AUTO: 30.1 PG (ref 26.5–33)
MCHC RBC AUTO-ENTMCNC: 32.7 G/DL (ref 31.5–36.5)
MCV RBC AUTO: 92 FL (ref 78–100)
MONOCYTES # BLD AUTO: 0.7 10E9/L (ref 0–1.3)
MONOCYTES NFR BLD AUTO: 8.5 %
NEUTROPHILS # BLD AUTO: 6.6 10E9/L (ref 1.6–8.3)
NEUTROPHILS NFR BLD AUTO: 77.8 %
NRBC # BLD AUTO: 0 10*3/UL
NRBC BLD AUTO-RTO: 0 /100
PLATELET # BLD AUTO: 289 10E9/L (ref 150–450)
POTASSIUM SERPL-SCNC: 3.4 MMOL/L (ref 3.4–5.3)
RBC # BLD AUTO: 3.96 10E12/L (ref 3.8–5.2)
SODIUM SERPL-SCNC: 133 MMOL/L (ref 133–144)
WBC # BLD AUTO: 8.5 10E9/L (ref 4–11)

## 2018-06-17 PROCEDURE — 96361 HYDRATE IV INFUSION ADD-ON: CPT

## 2018-06-17 PROCEDURE — 82330 ASSAY OF CALCIUM: CPT | Performed by: EMERGENCY MEDICINE

## 2018-06-17 PROCEDURE — 70450 CT HEAD/BRAIN W/O DYE: CPT

## 2018-06-17 PROCEDURE — 25000128 H RX IP 250 OP 636: Performed by: EMERGENCY MEDICINE

## 2018-06-17 PROCEDURE — 80048 BASIC METABOLIC PNL TOTAL CA: CPT | Performed by: EMERGENCY MEDICINE

## 2018-06-17 PROCEDURE — 93005 ELECTROCARDIOGRAM TRACING: CPT

## 2018-06-17 PROCEDURE — 25000128 H RX IP 250 OP 636: Performed by: INTERNAL MEDICINE

## 2018-06-17 PROCEDURE — A9585 GADOBUTROL INJECTION: HCPCS | Performed by: EMERGENCY MEDICINE

## 2018-06-17 PROCEDURE — 96374 THER/PROPH/DIAG INJ IV PUSH: CPT

## 2018-06-17 PROCEDURE — 25000125 ZZHC RX 250: Performed by: INTERNAL MEDICINE

## 2018-06-17 PROCEDURE — 83735 ASSAY OF MAGNESIUM: CPT | Performed by: EMERGENCY MEDICINE

## 2018-06-17 PROCEDURE — 94640 AIRWAY INHALATION TREATMENT: CPT

## 2018-06-17 PROCEDURE — 70553 MRI BRAIN STEM W/O & W/DYE: CPT

## 2018-06-17 PROCEDURE — 96360 HYDRATION IV INFUSION INIT: CPT

## 2018-06-17 PROCEDURE — 25000132 ZZH RX MED GY IP 250 OP 250 PS 637: Performed by: INTERNAL MEDICINE

## 2018-06-17 PROCEDURE — 99285 EMERGENCY DEPT VISIT HI MDM: CPT | Mod: 25

## 2018-06-17 PROCEDURE — 85025 COMPLETE CBC W/AUTO DIFF WBC: CPT | Performed by: EMERGENCY MEDICINE

## 2018-06-17 PROCEDURE — G0378 HOSPITAL OBSERVATION PER HR: HCPCS

## 2018-06-17 PROCEDURE — 99219 ZZC INITIAL OBSERVATION CARE,LEVL II: CPT | Performed by: INTERNAL MEDICINE

## 2018-06-17 RX ORDER — FERROUS SULFATE 325(65) MG
325 TABLET ORAL
Status: DISCONTINUED | OUTPATIENT
Start: 2018-06-18 | End: 2018-06-19 | Stop reason: HOSPADM

## 2018-06-17 RX ORDER — ACETAMINOPHEN 325 MG/1
650 TABLET ORAL EVERY 4 HOURS PRN
Status: DISCONTINUED | OUTPATIENT
Start: 2018-06-17 | End: 2018-06-19 | Stop reason: HOSPADM

## 2018-06-17 RX ORDER — LIDOCAINE 40 MG/G
CREAM TOPICAL
Status: DISCONTINUED | OUTPATIENT
Start: 2018-06-17 | End: 2018-06-19 | Stop reason: HOSPADM

## 2018-06-17 RX ORDER — TRAMADOL HYDROCHLORIDE 50 MG/1
50 TABLET ORAL 3 TIMES DAILY
Status: DISCONTINUED | OUTPATIENT
Start: 2018-06-17 | End: 2018-06-18

## 2018-06-17 RX ORDER — ALBUTEROL SULFATE 90 UG/1
2 AEROSOL, METERED RESPIRATORY (INHALATION) EVERY 6 HOURS PRN
Status: DISCONTINUED | OUTPATIENT
Start: 2018-06-17 | End: 2018-06-19 | Stop reason: HOSPADM

## 2018-06-17 RX ORDER — ACETAMINOPHEN 650 MG/1
650 SUPPOSITORY RECTAL EVERY 4 HOURS PRN
Status: DISCONTINUED | OUTPATIENT
Start: 2018-06-17 | End: 2018-06-19 | Stop reason: HOSPADM

## 2018-06-17 RX ORDER — PREDNISONE 2.5 MG/1
2.5 TABLET ORAL DAILY
COMMUNITY
Start: 2018-06-14 | End: 2018-06-17

## 2018-06-17 RX ORDER — PREDNISONE 5 MG/1
5 TABLET ORAL DAILY
Status: DISCONTINUED | OUTPATIENT
Start: 2018-06-17 | End: 2018-06-19 | Stop reason: HOSPADM

## 2018-06-17 RX ORDER — LANOLIN ALCOHOL/MO/W.PET/CERES
1000 CREAM (GRAM) TOPICAL DAILY
Status: DISCONTINUED | OUTPATIENT
Start: 2018-06-17 | End: 2018-06-19 | Stop reason: HOSPADM

## 2018-06-17 RX ORDER — ACETAMINOPHEN 500 MG
1000 TABLET ORAL 3 TIMES DAILY
Status: ON HOLD | COMMUNITY
End: 2018-06-18

## 2018-06-17 RX ORDER — ROFLUMILAST 500 UG/1
500 TABLET ORAL DAILY
Status: DISCONTINUED | OUTPATIENT
Start: 2018-06-17 | End: 2018-06-19 | Stop reason: HOSPADM

## 2018-06-17 RX ORDER — HYDRALAZINE HYDROCHLORIDE 20 MG/ML
5 INJECTION INTRAMUSCULAR; INTRAVENOUS ONCE
Status: COMPLETED | OUTPATIENT
Start: 2018-06-17 | End: 2018-06-17

## 2018-06-17 RX ORDER — TRAMADOL HYDROCHLORIDE 50 MG/1
50 TABLET ORAL 3 TIMES DAILY
Status: ON HOLD | COMMUNITY
End: 2018-06-18

## 2018-06-17 RX ORDER — GEMFIBROZIL 600 MG/1
600 TABLET, FILM COATED ORAL EVERY EVENING
Status: DISCONTINUED | OUTPATIENT
Start: 2018-06-17 | End: 2018-06-19 | Stop reason: HOSPADM

## 2018-06-17 RX ORDER — FEXOFENADINE HCL 60 MG/1
60 TABLET, FILM COATED ORAL DAILY
Status: DISCONTINUED | OUTPATIENT
Start: 2018-06-17 | End: 2018-06-19 | Stop reason: HOSPADM

## 2018-06-17 RX ORDER — MONTELUKAST SODIUM 10 MG/1
10 TABLET ORAL AT BEDTIME
Status: DISCONTINUED | OUTPATIENT
Start: 2018-06-17 | End: 2018-06-19 | Stop reason: HOSPADM

## 2018-06-17 RX ORDER — DULOXETIN HYDROCHLORIDE 20 MG/1
20 CAPSULE, DELAYED RELEASE ORAL DAILY
Status: DISCONTINUED | OUTPATIENT
Start: 2018-06-17 | End: 2018-06-18

## 2018-06-17 RX ORDER — CALCIUM CARBONATE 500(1250)
1 TABLET ORAL DAILY
Status: DISCONTINUED | OUTPATIENT
Start: 2018-06-17 | End: 2018-06-19 | Stop reason: HOSPADM

## 2018-06-17 RX ORDER — DULOXETIN HYDROCHLORIDE 20 MG/1
20 CAPSULE, DELAYED RELEASE ORAL DAILY
Status: ON HOLD | COMMUNITY
Start: 2018-06-15 | End: 2018-06-18

## 2018-06-17 RX ORDER — SODIUM CHLORIDE, SODIUM LACTATE, POTASSIUM CHLORIDE, CALCIUM CHLORIDE 600; 310; 30; 20 MG/100ML; MG/100ML; MG/100ML; MG/100ML
1000 INJECTION, SOLUTION INTRAVENOUS CONTINUOUS
Status: DISCONTINUED | OUTPATIENT
Start: 2018-06-17 | End: 2018-06-17

## 2018-06-17 RX ORDER — LEVALBUTEROL INHALATION SOLUTION 1.25 MG/3ML
1.25 SOLUTION RESPIRATORY (INHALATION) EVERY 4 HOURS PRN
Status: DISCONTINUED | OUTPATIENT
Start: 2018-06-17 | End: 2018-06-19 | Stop reason: HOSPADM

## 2018-06-17 RX ORDER — FLUTICASONE PROPIONATE 50 MCG
1 SPRAY, SUSPENSION (ML) NASAL DAILY
Status: DISCONTINUED | OUTPATIENT
Start: 2018-06-17 | End: 2018-06-19 | Stop reason: HOSPADM

## 2018-06-17 RX ORDER — GADOBUTROL 604.72 MG/ML
7.5 INJECTION INTRAVENOUS ONCE
Status: COMPLETED | OUTPATIENT
Start: 2018-06-17 | End: 2018-06-17

## 2018-06-17 RX ORDER — ONDANSETRON 2 MG/ML
4 INJECTION INTRAMUSCULAR; INTRAVENOUS EVERY 6 HOURS PRN
Status: DISCONTINUED | OUTPATIENT
Start: 2018-06-17 | End: 2018-06-19 | Stop reason: HOSPADM

## 2018-06-17 RX ORDER — POLYETHYLENE GLYCOL 3350 17 G/17G
17 POWDER, FOR SOLUTION ORAL DAILY PRN
Status: DISCONTINUED | OUTPATIENT
Start: 2018-06-17 | End: 2018-06-19 | Stop reason: HOSPADM

## 2018-06-17 RX ORDER — ONDANSETRON 4 MG/1
4 TABLET, ORALLY DISINTEGRATING ORAL EVERY 6 HOURS PRN
Status: DISCONTINUED | OUTPATIENT
Start: 2018-06-17 | End: 2018-06-19 | Stop reason: HOSPADM

## 2018-06-17 RX ORDER — NALOXONE HYDROCHLORIDE 0.4 MG/ML
.1-.4 INJECTION, SOLUTION INTRAMUSCULAR; INTRAVENOUS; SUBCUTANEOUS
Status: DISCONTINUED | OUTPATIENT
Start: 2018-06-17 | End: 2018-06-19 | Stop reason: HOSPADM

## 2018-06-17 RX ORDER — LEVOTHYROXINE SODIUM 25 UG/1
25 TABLET ORAL DAILY
Status: DISCONTINUED | OUTPATIENT
Start: 2018-06-17 | End: 2018-06-19 | Stop reason: HOSPADM

## 2018-06-17 RX ORDER — MULTIPLE VITAMINS W/ MINERALS TAB 9MG-400MCG
1 TAB ORAL DAILY
Status: DISCONTINUED | OUTPATIENT
Start: 2018-06-17 | End: 2018-06-19 | Stop reason: HOSPADM

## 2018-06-17 RX ORDER — PREDNISONE 10 MG/1
10 TABLET ORAL DAILY
Status: ON HOLD | COMMUNITY
End: 2018-10-18

## 2018-06-17 RX ADMIN — MULTIPLE VITAMINS W/ MINERALS TAB 1 TABLET: TAB at 17:45

## 2018-06-17 RX ADMIN — Medication 500 MG: at 17:45

## 2018-06-17 RX ADMIN — TRAMADOL HYDROCHLORIDE 50 MG: 50 TABLET, COATED ORAL at 17:46

## 2018-06-17 RX ADMIN — LEVOTHYROXINE SODIUM 25 MCG: 25 TABLET ORAL at 17:45

## 2018-06-17 RX ADMIN — MONTELUKAST SODIUM 10 MG: 10 TABLET, FILM COATED ORAL at 21:10

## 2018-06-17 RX ADMIN — LEVALBUTEROL HYDROCHLORIDE 1.25 MG: 1.25 SOLUTION RESPIRATORY (INHALATION) at 20:14

## 2018-06-17 RX ADMIN — OMEPRAZOLE 40 MG: 20 CAPSULE, DELAYED RELEASE ORAL at 17:45

## 2018-06-17 RX ADMIN — CYANOCOBALAMIN TAB 1000 MCG 1000 MCG: 1000 TAB at 17:46

## 2018-06-17 RX ADMIN — HYDRALAZINE HYDROCHLORIDE 5 MG: 20 INJECTION INTRAMUSCULAR; INTRAVENOUS at 23:46

## 2018-06-17 RX ADMIN — VITAMIN D, TAB 1000IU (100/BT) 1000 UNITS: 25 TAB at 17:45

## 2018-06-17 RX ADMIN — PREDNISONE 5 MG: 5 TABLET ORAL at 17:45

## 2018-06-17 RX ADMIN — GADOBUTROL 5 ML: 604.72 INJECTION INTRAVENOUS at 11:38

## 2018-06-17 RX ADMIN — DULOXETINE HYDROCHLORIDE 20 MG: 20 CAPSULE, DELAYED RELEASE ORAL at 17:45

## 2018-06-17 RX ADMIN — ROFLUMILAST 500 MCG: 500 TABLET ORAL at 19:44

## 2018-06-17 RX ADMIN — SODIUM CHLORIDE, POTASSIUM CHLORIDE, SODIUM LACTATE AND CALCIUM CHLORIDE 1000 ML: 600; 310; 30; 20 INJECTION, SOLUTION INTRAVENOUS at 10:53

## 2018-06-17 RX ADMIN — FEXOFENADINE 60 MG: 60 TABLET, FILM COATED ORAL at 19:45

## 2018-06-17 RX ADMIN — GEMFIBROZIL 600 MG: 600 TABLET ORAL at 19:43

## 2018-06-17 RX ADMIN — ACETAMINOPHEN 650 MG: 325 TABLET, FILM COATED ORAL at 17:45

## 2018-06-17 ASSESSMENT — ENCOUNTER SYMPTOMS
WEAKNESS: 1
SHORTNESS OF BREATH: 0
DIZZINESS: 1
DYSURIA: 0
NAUSEA: 0
BACK PAIN: 0
FREQUENCY: 0
WOUND: 1
VOMITING: 0
HEADACHES: 0
NECK PAIN: 1
NUMBNESS: 1
COUGH: 0
DIARRHEA: 0
HEMATURIA: 0
SPEECH DIFFICULTY: 0
PALPITATIONS: 0
FEVER: 0

## 2018-06-17 ASSESSMENT — PAIN DESCRIPTION - DESCRIPTORS: DESCRIPTORS: ACHING

## 2018-06-17 NOTE — ED NOTES
[facility]  ED Nurse Handoff Report    Ana Luisa Lee is a 78 year old female   ED Chief complaint: Fall  . ED Diagnosis:   Final diagnoses:   Dizziness   Numbness of right foot   Generalized weakness   Fall, initial encounter   Skin tear of right elbow without complication, initial encounter     Allergies:   Allergies   Allergen Reactions     Hydralazine Anxiety     Penicillin G Hives     Tolerated cephalosporines 2017     Meloxicam      dizziness       Metoprolol      ? Skin rash on the back     Norvasc [Amlodipine Besylate] Hives       Code Status: DNR / DNI  Activity level - Baseline/Home:  Independent. Activity Level - Current:   Stand with Assist of 2. Lift room needed: No. Bariatric: No   Needed: No   Isolation: No. Infection: Not Applicable.     Vital Signs:   Vitals:    06/17/18 1030 06/17/18 1045 06/17/18 1100 06/17/18 1240   BP: 144/85  (!) 143/91    Resp:  14 22    Temp:       TempSrc:       SpO2:    98%       Cardiac Rhythm:  ,   Cardiac  Cardiac Rhythm: Normal sinus rhythm  Pain level: 0-10 Pain Scale: 6  Patient confused: No. Patient Falls Risk: Yes.   Elimination Status: Has voided   Patient Report - Initial Complaint: weak/dizzy. Focused Assessment:   Cardiac Review of Systems (Cardiac) - Cardiac Signs/Symptoms: denies; chest pain  Cardiac Monitoring - EKG Monitoring: Yes Cardiac Regularity: Regular Cardiac Rhythm: NSR   Neurological Cognitive/Perceptual/Neuro - Level Of Consciousness: alert Arousal Level: opens eyes spontaneously Orientation: oriented x 4 Best Language: 0 - No aphasia Mood/Behavior: cooperative; calm Headache: None General Motor Response: purposeful motor response Memory Deficit: forgetful Neurological Comment: dizzy Motor Response: General Speech: clear; spontaneous; logical  Pupils (CN II) - Pupil PERRLA: yes  Motor Strength - Left Upper: 4 - active movement against gravity and some resistance Right Upper: 4 - active movement against gravity and  some resistance Left Lower: 3 - active movement against gravity Right Lower: 3 - active movement against gravity   Skin Color/Condition Skin - Skin Comment: skin tear right arm near elbow       Tests Performed: labs, imaging. Abnormal Results:   Labs Ordered and Resulted from Time of ED Arrival Up to the Time of Departure from the ED   CBC WITH PLATELETS DIFFERENTIAL   BASIC METABOLIC PANEL   MAGNESIUM   CALCIUM IONIZED   PULSE OXIMETRY NURSING   CARDIAC CONTINUOUS MONITORING   ORTHOSTATIC BLOOD PRESSURE AND PULSE   PERIPHERAL IV CATHETER   WOUND CARE     MR Brain w/o & w Contrast   Final Result   IMPRESSION:   1. No evidence of acute infarct, hemorrhage, or mass.   2. Brain atrophy and mild white matter changes consistent with   sequelae of small vessel ischemic disease.   3. Opacified left mastoid air cells and middle ear suggesting effusion   versus otomastoiditis.         MAEVE OWEN MD      CT Head w/o Contrast   Final Result   IMPRESSION:    1. No evidence of acute trauma.   2.  Atrophy of the brain.  White matter changes consistent with   sequelae of small vessel ischemic disease. This is unchanged.   3. Worsening opacification of the left mastoid air cells and of the   left middle ear since the previous exam.         MAEVE OWEN MD      .   Treatments provided: IVF  Family Comments: none at bedside, spoke with daughter  OBS brochure/video discussed/provided to patient:  Yes  ED Medications:   Medications   lidocaine 1 % 1 mL (not administered)   lidocaine (LMX4) kit (not administered)   sodium chloride (PF) 0.9% PF flush 3 mL (not administered)   sodium chloride (PF) 0.9% PF flush 3 mL (not administered)   lactated ringers BOLUS 1,000 mL (0 mLs Intravenous Stopped 6/17/18 1403)     Followed by   lactated ringers infusion (not administered)   gadobutrol (GADAVIST) injection 7.5 mL (5 mLs Intravenous Given 6/17/18 1138)     Drips infusing:  No  For the majority of the shift, the patient's behavior Green.  Interventions performed were NA.     Severe Sepsis OR Septic Shock Diagnosis Present: No      ED Nurse Name/Phone Number: Mila Cortes,   2:04 PM    RECEIVING UNIT ED HANDOFF REVIEW    Above ED Nurse Handoff Report was reviewed: Yes  Reviewed by: Zoey Aparicio on June 17, 2018 at 2:26 PM

## 2018-06-17 NOTE — IP AVS SNAPSHOT
MRN:[id number]                      After Visit Summary   6/17/2018    Ana Luisa Lee    MRN: [id number]           Thank you!     Thank you for choosing [facility] for your care. Our goal is always to provide you with excellent care. Hearing back from our patients is one way we can continue to improve our services. Please take a few minutes to complete the written survey that you may receive in the mail after you visit. If you would like to speak to someone directly about your visit please contact Patient Relations at [phone]. Thank you!          Patient Information     Date Of Birth          1940        About your hospital stay     You were admitted on:  June 17, 2018 You last received care in the:  [facility] Observation Department    You were discharged on:  June 19, 2018        Reason for your hospital stay       You were admitted after a fall yesterday and generalized progressive weakness. Your workup was unremarkable for an acute cause to your fall and was likely mechanical due to loosing balance when trying to reach across a counter. A CT and MRI of your brain was performed with no acute findings except for a left middle ear effusion vs mastoiditis (inflammation of middle ear). You were started on Flonase and Allegra to your potential ear effusion but will need outpatient ENT follow up. In regards to your progressive weakness in your feet and right leg in particular, suspect this is related to some deconditioning as well your ongoing Prednisone use. With your reported ongoing numbness, your imaging was negative for a stroke causing this and otherwise you do not endorse any acute new symptoms that would likely be contributing to this. Recommend further workup as an outpatient if your numbness continues to worsen. During your stay you were evaluated by physical therapy who recommended going to rehab for ongoing therapy. Social work was consulted and  assisted with finding a rehab facility for you at discharge. Due to urinary retention prior to discharging you were started on Flomax as well your Duloxetine which could be contributing was discontinued. You did not want a joseph catheter placed so instead we would like bladder scans and intermittent straight caths performed.                  Who to Call     For medical emergencies, please call 911.  For non-urgent questions about your medical care, please call your primary care provider or clinic, [phone]          Attending Provider     Provider Specialty    Bri Thompson MD Emergency Medicine    [facility], Jodi Brown MD Internal Medicine       Primary Care Provider Office Phone # Fax #    Jenny Padmini Hamilton [phone]      After Care Instructions     Activity - Up with assistive device           Activity - Up with nursing assistance           Additional Discharge Instructions       Patient is retaining urine. UA was done and negative for infection. Likely related to recent start on antidepressant which was stopped.    Recommend every 3 hour bladder scans if not urinating and if scan shows greater than 300 ccs in bladder then straight cath as needed.            Advance Diet as Tolerated       Follow this diet upon discharge: Orders Placed This Encounter      Regular Diet Adult            Fall precautions           General info for SNF       Length of Stay Estimate: Short Term Care: Estimated # of Days <30  Condition at Discharge: Improving  Level of care:skilled   Rehabilitation Potential: Fair  Admission H&P remains valid and up-to-date: Yes  Recent Chemotherapy: N/A  Use Nursing Home Standing Orders: Yes            Mantoux instructions       Give two-step Mantoux (PPD) Per Facility Policy Yes            Wound care (specify)       Site:   Right elbow  Instructions:  Apply bandage to open skin tear from fall                  Follow-up Appointments     Follow Up and  recommended labs and tests       Follow up with primary care provider within one week for hospital follow up and discuss workup for numbness.  No follow up labs or test are needed.  Referral sent to set up follow up with ENT as outpatient.                  Your next 10 appointments already scheduled     Jun 21, 2018  1:00 PM CDT   SHORT with Jenny Hamilton MD   [facility]    [address]  [facility] [id number] [phone]              Additional Services     OTOLARYNGOLOGY REFERRAL       Your provider has referred you to: N: [facility] [phone]   [email or web address]    Please be aware that coverage of these services is subject to the terms and limitations of your health insurance plan.  Call member services at your health plan with any benefit or coverage questions.      Please bring the following with you to your appointment:    (1) Any X-Rays, CTs or MRIs which have been performed.  Contact the facility where they were done to arrange for  prior to your scheduled appointment.   (2) List of current medications  (3) This referral request   (4) Any documents/labs given to you for this referral            Physical Therapy Adult Consult       Evaluate and treat as clinically indicated.    Reason: mobility, unsteady gait, and recent fall            UROLOGY ADULT REFERRAL       Your provider has referred you to: [facility]: [facility] [place] [phone]   [email or web address]    Please be aware that coverage of these services is subject to the terms and limitations of your health insurance plan.  Call member services at your health plan with any benefit or coverage questions.      Please bring the following with you to your appointment:    (1) Any X-Rays, CTs or MRIs which have been performed.  Contact the  facility where they were done to arrange for  prior to your scheduled appointment.    (2) List of current medications  (3) This referral request   (4) Any documents/labs given to you for this referral                             Pending Results     No orders found from 6/15/2018 to 6/18/2018.            Statement of Approval     Ordered          06/19/18 1707  I have reviewed and agree with all the recommendations and orders detailed in this document.  EFFECTIVE NOW     Approved and electronically signed by:  Bhavana Griffin PA-C           06/18/18 1405  I have reviewed and agree with all the recommendations and orders detailed in this document.  EFFECTIVE NOW     Approved and electronically signed by:  Kesha Vela PA-C             Admission Information     Date & Time Provider Department Dept. Phone    6/17/2018 Jodi Lopes MD [facility] Observation Department [phone]      Your Vitals Were     Blood Pressure Pulse Temperature Respirations Pulse Oximetry       165/87 (BP Location: Left arm) 82 98  F (36.7  C) (Oral) 20 97%       MyChart Information     [email or web address] gives you secure access to your electronic health record. If you see a primary care provider, you can also send messages to your care team and make appointments. If you have questions, please call your primary care clinic.  If you do not have a primary care provider, please call [phone] and they will assist you.        Care EveryWhere ID     This is your Care EveryWhere ID. This could be used by other organizations to access your [facility] medical records  [id number]        Equal Access to Services     SOBEIDA GIFFORD AH: Allyson gallegos Somerry, waaxda luqadaha, qaybta kaalmada [place]. So [facility] [phone].    ATENCIÓN: Si habla español, tiene a martinez disposición servicios gratuitos de asistencia lingüística. Llame al [phone].    We comply with  applicable federal civil rights laws and [place] laws. We do not discriminate on the basis of race, color, national origin, age, disability, sex, sexual orientation, or gender identity.               Review of your medicines      START taking        Dose / Directions    fexofenadine 60 MG tablet   Commonly known as:  ALLEGRA   Used for:  Middle ear effusion, left        Dose:  60 mg   Take 1 tablet (60 mg) by mouth daily   Quantity:  60 tablet   Refills:  0       fluticasone 50 MCG/ACT spray   Commonly known as:  FLONASE   Used for:  Middle ear effusion, left        Dose:  1 spray   Spray 1 spray into both nostrils daily   Quantity:  1 Bottle   Refills:  0       tamsulosin 0.4 MG capsule   Commonly known as:  FLOMAX   Used for:  Urinary retention        Dose:  0.4 mg   Take 1 capsule (0.4 mg) by mouth daily   Quantity:  14 capsule   Refills:  0         CONTINUE these medicines which may have CHANGED, or have new prescriptions. If we are uncertain of the size of tablets/capsules you have at home, strength may be listed as something that might have changed.        Dose / Directions    * traMADol 50 MG tablet   Commonly known as:  ULTRAM   This may have changed:  Another medication with the same name was added. Make sure you understand how and when to take each.   Used for:  Pain of both shoulder joints        Dose:  50 mg   Take 1 tablet (50 mg) by mouth 3 times daily   Quantity:  12 tablet   Refills:  0       * traMADol 50 MG tablet   Commonly known as:  ULTRAM   This may have changed:  You were already taking a medication with the same name, and this prescription was added. Make sure you understand how and when to take each.   Used for:  Pain of both shoulder joints        Dose:  50 mg   Take 1 tablet (50 mg) by mouth 3 times daily as needed for moderate pain or moderate to severe pain   Quantity:  15 tablet   Refills:  0       * Notice:  This list has 2 medication(s) that are the same as other medications  prescribed for you. Read the directions carefully, and ask your doctor or other care provider to review them with you.      CONTINUE these medicines which have NOT CHANGED        Dose / Directions    acetaminophen 500 MG tablet   Commonly known as:  TYLENOL   Used for:  Pain of both shoulder joints        Dose:  1000 mg   Take 2 tablets (1,000 mg) by mouth 3 times daily   Refills:  0       albuterol 108 (90 Base) MCG/ACT Inhaler   Commonly known as:  PROAIR HFA   Used for:  Asthma, persistent        Dose:  2 puff   Inhale 2 puffs into the lungs every 6 hours as needed   Refills:  0       ASPIRIN NOT PRESCRIBED   Commonly known as:  INTENTIONAL        Dose:  1 each   1 each continuous prn. Antiplatelet medication not prescribed intentionally due to Use of NSAIDS   Quantity:  0 each   Refills:  0       budesonide 180 MCG/ACT inhaler   Commonly known as:  PULMICORT FLEXHALER   Used for:  Pulmonary emphysema, unspecified emphysema type (H)        Dose:  1 puff   Inhale 1 puff into the lungs 2 times daily   Refills:  0       calcium 600 MG tablet   Used for:  Pulmonary emphysema, unspecified emphysema type (H)        Dose:  1 tablet   Take 1 tablet (600 mg) by mouth daily   Quantity:  60 tablet   Refills:  0       cholecalciferol 1000 UNIT tablet   Commonly known as:  vitamin D3   Used for:  COPD, moderate (H)        Dose:  1000 Units   Take 1 tablet (1,000 Units) by mouth daily   Quantity:  30 tablet   Refills:  0       cyanocobalamin 1000 MCG Tbcr   Commonly known as:  B-12 TR   Used for:  Pernicious anemia        Dose:  1 tablet   Take 1 tablet by mouth daily   Quantity:  100 tablet   Refills:  3       ferrous sulfate 325 (65 Fe) MG tablet   Commonly known as:  IRON        Dose:  325 mg   Take 325 mg by mouth daily (with breakfast)   Refills:  0       gemfibrozil 600 MG tablet   Commonly known as:  LOPID   Used for:  Hyperlipidemia with target LDL less than 130        Dose:  600 mg   Take 1 tablet (600 mg) by mouth  daily   Quantity:  90 tablet   Refills:  0       INCRUSE ELLIPTA 62.5 MCG/INH oral inhaler   Generic drug:  umeclidinium        Dose:  1 puff   Inhale 1 puff into the lungs daily   Refills:  0       levalbuterol 1.25 MG/3ML neb solution   Commonly known as:  XOPENEX   Used for:  COPD, moderate (H), COPD exacerbation (H)        Dose:  1 ampule   Take 3 mLs (1.25 mg) by nebulization every 4 hours as needed for shortness of breath / dyspnea or wheezing   Quantity:  1584 mL   Refills:  1       levothyroxine 25 MCG tablet   Commonly known as:  SYNTHROID/LEVOTHROID   Used for:  Hypothyroidism due to acquired atrophy of thyroid        Dose:  25 mcg   Take 1 tablet (25 mcg) by mouth daily   Quantity:  90 tablet   Refills:  3       montelukast 10 MG tablet   Commonly known as:  SINGULAIR   Used for:  Pulmonary emphysema, unspecified emphysema type (H)        Dose:  10 mg   Take 1 tablet (10 mg) by mouth At Bedtime   Quantity:  30 tablet   Refills:  0       MULTIVITAMIN PO        Dose:  1 tablet   Take 1 tablet by mouth daily   Refills:  0       omeprazole 40 MG capsule   Commonly known as:  priLOSEC   Used for:  Gastroesophageal reflux disease, esophagitis presence not specified        Dose:  40 mg   Take 1 capsule (40 mg) by mouth daily Take 30-60 minutes before a meal.   Quantity:  90 capsule   Refills:  3       polyethylene glycol Packet   Commonly known as:  MIRALAX/GLYCOLAX        Dose:  17 g   Take 17 g by mouth daily as needed for constipation   Refills:  0       predniSONE 10 MG tablet   Commonly known as:  DELTASONE        Dose:  10 mg   Take 10 mg by mouth daily   Refills:  0       prochlorperazine 5 MG tablet   Commonly known as:  COMPAZINE   Used for:  Nausea        Dose:  5 mg   Take 1 tablet (5 mg) by mouth every 6 hours as needed for nausea or vomiting   Quantity:  120 tablet   Refills:  0       roflumilast 500 MCG Tabs tablet   Commonly known as:  DALIRESP        Dose:  500 mcg   Take 500 mcg by mouth daily    Refills:  0         STOP taking     DULoxetine 20 MG EC capsule   Commonly known as:  CYMBALTA                Where to get your medicines      Some of these will need a paper prescription and others can be bought over the counter. Ask your nurse if you have questions.     You don't need a prescription for these medications     acetaminophen 500 MG tablet    fexofenadine 60 MG tablet    fluticasone 50 MCG/ACT spray    tamsulosin 0.4 MG capsule         Information about where to get these medications is not yet available     ! Ask your nurse or doctor about these medications     traMADol 50 MG tablet    traMADol 50 MG tablet                Protect others around you: Learn how to safely use, store and throw away your medicines at www.disposemymeds.org.        Information about OPIOIDS     PRESCRIPTION OPIOIDS: WHAT YOU NEED TO KNOW   We gave you an opioid (narcotic) pain medicine. It is important to manage your pain, but opioids are not always the best choice. You should first try all the other options your care team gave you. Take this medicine for as short a time (and as few doses) as possible.     These medicines have risks:    DO NOT drive when on new or higher doses of pain medicine. These medicines can affect your alertness and reaction times, and you could be arrested for driving under the influence (DUI). If you need to use opioids long-term, talk to your care team about driving.    DO NOT operate heave machinery    DO NOT do any other dangerous activities while taking these medicines.     DO NOT drink any alcohol while taking these medicines.      If the opioid prescribed includes acetaminophen, DO NOT take with any other medicines that contain acetaminophen. Read all labels carefully. Look for the word  acetaminophen  or  Tylenol.  Ask your pharmacist if you have questions or are unsure.    You can get addicted to pain medicines, especially if you have a history of addiction (chemical, alcohol or substance  dependence). Talk to your care team about ways to reduce this risk.    Store your pills in a secure place, locked if possible. We will not replace any lost or stolen medicine. If you don t finish your medicine, please throw away (dispose) as directed by your pharmacist. The [place] Pollution Control Agency has more information about safe disposal: https://www.pca.Count includes the [facility].mn.us/living-green/managing-unwanted-medications.     All opioids tend to cause constipation. Drink plenty of water and eat foods that have a lot of fiber, such as fruits, vegetables, prune juice, apple juice and high-fiber cereal. Take a laxative (Miralax, milk of magnesia, Colace, Senna) if you don t move your bowels at least every other day.              Medication List: This is a list of all your medications and when to take them. Check marks below indicate your daily home schedule. Keep this list as a reference.      Medications           Morning Afternoon Evening Bedtime As Needed    acetaminophen 500 MG tablet   Commonly known as:  TYLENOL   Take 2 tablets (1,000 mg) by mouth 3 times daily   Last time this was given:  650 mg on 6/19/2018 11:49 AM                                albuterol 108 (90 Base) MCG/ACT Inhaler   Commonly known as:  PROAIR HFA   Inhale 2 puffs into the lungs every 6 hours as needed                                ASPIRIN NOT PRESCRIBED   Commonly known as:  INTENTIONAL   1 each continuous prn. Antiplatelet medication not prescribed intentionally due to Use of NSAIDS                                budesonide 180 MCG/ACT inhaler   Commonly known as:  PULMICORT FLEXHALER   Inhale 1 puff into the lungs 2 times daily                                calcium 600 MG tablet   Take 1 tablet (600 mg) by mouth daily   Last time this was given:  500 mg on 6/19/2018 10:16 AM                                cholecalciferol 1000 UNIT tablet   Commonly known as:  vitamin D3   Take 1 tablet (1,000 Units) by mouth daily   Last time this was  given:  1,000 Units on 6/19/2018 10:17 AM                                cyanocobalamin 1000 MCG Tbcr   Commonly known as:  B-12 TR   Take 1 tablet by mouth daily                                ferrous sulfate 325 (65 Fe) MG tablet   Commonly known as:  IRON   Take 325 mg by mouth daily (with breakfast)   Last time this was given:  325 mg on 6/19/2018 10:17 AM                                fexofenadine 60 MG tablet   Commonly known as:  ALLEGRA   Take 1 tablet (60 mg) by mouth daily   Last time this was given:  60 mg on 6/19/2018 10:16 AM                                fluticasone 50 MCG/ACT spray   Commonly known as:  FLONASE   Spray 1 spray into both nostrils daily                                gemfibrozil 600 MG tablet   Commonly known as:  LOPID   Take 1 tablet (600 mg) by mouth daily   Last time this was given:  600 mg on 6/18/2018  8:42 PM                                INCRUSE ELLIPTA 62.5 MCG/INH oral inhaler   Inhale 1 puff into the lungs daily   Generic drug:  umeclidinium                                levalbuterol 1.25 MG/3ML neb solution   Commonly known as:  XOPENEX   Take 3 mLs (1.25 mg) by nebulization every 4 hours as needed for shortness of breath / dyspnea or wheezing   Last time this was given:  1.25 mg on 6/19/2018 11:09 AM                                levothyroxine 25 MCG tablet   Commonly known as:  SYNTHROID/LEVOTHROID   Take 1 tablet (25 mcg) by mouth daily   Last time this was given:  25 mcg on 6/19/2018 10:17 AM                                montelukast 10 MG tablet   Commonly known as:  SINGULAIR   Take 1 tablet (10 mg) by mouth At Bedtime   Last time this was given:  10 mg on 6/18/2018  9:47 PM                                MULTIVITAMIN PO   Take 1 tablet by mouth daily                                omeprazole 40 MG capsule   Commonly known as:  priLOSEC   Take 1 capsule (40 mg) by mouth daily Take 30-60 minutes before a meal.   Last time this was given:  40 mg on 6/19/2018 10:17  AM                                polyethylene glycol Packet   Commonly known as:  MIRALAX/GLYCOLAX   Take 17 g by mouth daily as needed for constipation                                predniSONE 10 MG tablet   Commonly known as:  DELTASONE   Take 10 mg by mouth daily   Last time this was given:  5 mg on 6/19/2018 10:17 AM                                prochlorperazine 5 MG tablet   Commonly known as:  COMPAZINE   Take 1 tablet (5 mg) by mouth every 6 hours as needed for nausea or vomiting                                roflumilast 500 MCG Tabs tablet   Commonly known as:  DALIRESP   Take 500 mcg by mouth daily   Last time this was given:  500 mcg on 6/19/2018 10:16 AM                                tamsulosin 0.4 MG capsule   Commonly known as:  FLOMAX   Take 1 capsule (0.4 mg) by mouth daily   Last time this was given:  0.4 mg on 6/19/2018 10:17 AM                                * traMADol 50 MG tablet   Commonly known as:  ULTRAM   Take 1 tablet (50 mg) by mouth 3 times daily   Last time this was given:  50 mg on 6/19/2018 11:49 AM                                * traMADol 50 MG tablet   Commonly known as:  ULTRAM   Take 1 tablet (50 mg) by mouth 3 times daily as needed for moderate pain or moderate to severe pain   Last time this was given:  50 mg on 6/19/2018 11:49 AM                                * Notice:  This list has 2 medication(s) that are the same as other medications prescribed for you. Read the directions carefully, and ask your doctor or other care provider to review them with you.

## 2018-06-17 NOTE — IP AVS SNAPSHOT
` ` Patient Information     Patient Name Sex Ana Luisa Murdock ([id number]) Female 1940       Room Bed    [address] [place] [address]      Patient Demographics     Address Phone E-mail Address    [address] [place]  [place] [facility] [address] [phone] (Home) *Preferred*  none (Work)  [phone] (Mobile) [email or web address]      Patient Ethnicity & Race     Ethnic Group Patient Race    American White      Emergency Contact(s)     Name Relation Home Work Mobile    Windy Kowalski Daughter [phone] none [phone]    Jake Patel Son [phone] none [phone]      Documents on File        Status Date Received Description       Documents for the Patient    Insurance Card Received () 12/10/09     Face Sheet Received () 12/10/09     Privacy Notice - [place] Received 12/10/09     External Medication Information Consent Accepted () 12/10/09     Patient ID Received 14     Consent for Services - Hospital/Clinic Received () 10/25/11     Consent for Services - Hospital/Clinic  ()      External Medication Information Consent Accepted () 11     Insurance Card Received () 01/10/12     HIM PHYLICIA Authorization - File Only   PHYLICIA to Obtain From: [facility]-2012    HIM PHYLICIA Authorization - File Only   Records released to patient 12    HIM PHYLICIA Authorization   Release of Information: [facility] 2012    HIM PHYLICIA Authorization   [facility]    Consent for Services - Hospital/Clinic Received () 12     Insurance Card Received () 12     External Medication Information Consent Accepted 12     Insurance Card Received () 12     Consent for EHR Access  13 Copied from existing Consent for services - C/HOD collected on 2012    [facility] Specified Other       Insurance Card Received 13 Medicare    Insurance Card Received 13 [employer]    Consent for EHR Access  Received 09/10/13     Consent for Services - Hospital/Clinic Received () 09/10/13     Consent for Services - Geriatrics Received 13     HIM PHYLICIA Authorization - File Only   Radiology CD released to patient 13    Advance Directives and Living Will Received  POLST 13 -    HIM PHYLICIA Authorization - File Only  14 NAM DAS 2013    HIM PHYLICIA Authorization  14     Insurance Card Received 14     Insurance Card Received 09/15/14     Consent for Services - Hospital/Clinic Received () 09/15/14     Physical Therapy Certification Received 10/09/14 2014    Insurance Card Received 05/21/15 [employer]    Consent for Services - Informed Received 05/21/15 [facility]    Consent to Communicate Received 05/21/15 Consent to Communicate    HIM PHYLICIA Authorization - File Only Patient Refused 05/21/15 Email Consent    Insurance Card Received 08/11/15 MA    Consent for Services - Hospital/Clinic Received () 09/09/15     Insurance Card Received 11/13/15     Insurance Card Received 18 [employer]-no 2018 card    Consent for Services/Privacy Notice - Hospital/Clinic Received 18     Consent for Services/Privacy Notice - Hospital/Clinic Received () 16     Business/Insurance/Care Coordination/Health Form - Patient   Acceptance of Responsibility form - 2016    Patient ID Received 18 EXP 2019    Business/Insurance/Care Coordination/Health Form - Patient  16 BLOOD PRESSURE UNIT, [facility] - 3/17/2016    Consent to Communicate  16 AUTHORIZATION TO DISCUSS PROTECTED  HEALTH INFORMATION 16    Business/Insurance/Care Coordination/Health Form - Patient  16 c-[place] PROGRAM FORM- 2016    Consent for Services/Privacy Notice - Hospital/Clinic Received () 16     Advance Directives and Living Will Received 17 POLST 2017    Business/Insurance/Care Coordination/Health Form - Patient   03/14/17 MEDICA LETTER REGARDING LANSOPRAZOLE SUPPLY    HIM PHYLICIA Authorization - File Only  05/24/17 AUTHORIZATION FOR ELECTRONIC COMMUNICATION    Consent to Communicate Received 05/24/17     HIM PHYLICIA Authorization - File Only  06/12/17 MYCHART ACCESS    Advance Directives and Living Will Received 06/28/17 Health Care Directive 6-24-17    Advance Directives and Living Will Not Received  Validation of AD 6-24-17    Business/Insurance/Care Coordination/Health Form - Patient  07/17/17 MEDICA APPROVAL OF DICLOFENAC SODIUM GEL 04/01/17 - 06/30/18    Care Everywhere Prospective Auth Received 10/31/17     Consent to Communicate  11/16/17 AUTHORIZATION TO DISCUSS PROTECTED HEALTH INFORMATION 11/16/17    Business/Insurance/Care Coordination/Health Form - Patient  02/15/18 ELIGIBILITY FOR NO COST BONE DENSITY TEST 1/11/18 MEDICA    Consent for Services - Hospital and Clinic Received 05/24/18     HIE Auth Received 05/24/18        Documents for the Encounter    CMS IM for Patient Signature Received 06/17/18     Observation Notice Received 06/17/18 RN RANDAL    CMS AGUIRRE for Patient Signature Received 06/19/18       Admission Information     Attending Provider Admitting Provider Admission Type Admission Date/Time    Jodi Lopes MD Frechette, Daniel Bret, MD Emergency 06/17/18  0842    Discharge Date Hospital Service Auth/Cert Status Service Area     Observation Incomplete [facility] SERVICES    Unit Room/Bed Admission Status        OBSERVATION DEPT 0220/0220-01 Admission (Confirmed)       Admission     Complaint    Fall      Hospital Account     Name Acct ID Class Status Primary Coverage    Ana Luisa Lee [id number] Observation Open MEDICA - MEDICA D[employer]IONS [facility] PARTNERS            Guarantor Account (for Hospital Account #[id number])     Name Relation to Pt Service Area Active? Acct Type    Ana Luisa Lee  FCS Yes Personal/Family    Address Phone          [address]  [place] [address] [phone](H)              Coverage Information (for Hospital Account #[id number])     F/O Payor/Plan Precert #    MEDICA/[employer] [facility]     Subscriber Subscriber #    Ana Luisa Lee [id number]    Address Phone    [address] [address]  [place] [address] [phone]

## 2018-06-17 NOTE — PHARMACY-ADMISSION MEDICATION HISTORY
Admission medication history interview status for this patient is complete. See [facility] admission navigator for allergy information, prior to admission medications and immunization status.     Medication history interview source(s):Patient  Medication history resources (including written lists, pill bottles, clinic record):Med list  Primary pharmacy:[facility]    Changes made to PTA medication list:  Added: duloxetine  Deleted: dulcolax, Serevent  Changed: tramadol(every 6 hours to 3 times daily), tylenol(no prn dose, 1000mg TID)    Actions taken by pharmacist (provider contacted, etc):  -Called [facility] to attempt to verify prednisone taper     Additional medication history information:  -[facility] stated that patient dosing for prednisone was 10mg every other day, alternating with 15mg every other day for a daily average dose of 12.5 mg. However patient is unsure and their med list has a taper plan noted however current dose of 10mg in med list does not match up with any part of taper noted in it.    Medication reconciliation/reorder completed by provider prior to medication history? No    Do you take OTC medications (eg tylenol, ibuprofen, fish oil, eye/ear drops, etc)? Yes    For patients on insulin therapy: No        Prior to Admission medications    Medication Sig Last Dose Taking? Auth Provider   acetaminophen (TYLENOL) 500 MG tablet Take 1,000 mg by mouth 3 times daily 6/16/2018 at PM Yes Unknown, Entered By History   albuterol (ALBUTEROL) 108 (90 BASE) MCG/ACT Inhaler Inhale 2 puffs into the lungs every 6 hours as needed Past Week at N/A Yes Hallie Barroso MD   budesonide (PULMICORT FLEXHALER) 180 MCG/ACT inhaler Inhale 1 puff into the lungs 2 times daily 6/16/2018 at PM Yes Hallie Barroso MD   calcium 600 MG tablet Take 1 tablet (600 mg) by mouth daily 6/16/2018 at AM Yes Hallie Barroso MD   cholecalciferol (VITAMIN D) 1000 UNIT tablet Take 1 tablet (1,000 Units) by mouth  daily 6/16/2018 at AM Yes Hallie Barroso MD   cyanocobalamin (B-12 TR) 1000 MCG TBCR Take 1 tablet by mouth daily 6/16/2018 at AM Yes Jenny Hamilton MD   DULoxetine (CYMBALTA) 20 MG EC capsule Take 20 mg by mouth daily For one week, then increase to twice daily. 6/16/2018 at AM Yes Unknown, Entered By History   ferrous sulfate (IRON) 325 (65 Fe) MG tablet Take 325 mg by mouth daily (with breakfast) 6/16/2018 at AM Yes Unknown, Entered By History   gemfibrozil (LOPID) 600 MG tablet Take 1 tablet (600 mg) by mouth daily 6/16/2018 at PM Yes Jenny Hamilton MD   levalbuterol (XOPENEX) 1.25 MG/3ML neb solution Take 3 mLs (1.25 mg) by nebulization every 4 hours as needed for shortness of breath / dyspnea or wheezing Past Week at N/A Yes Jenny Hamilton MD   levothyroxine (SYNTHROID/LEVOTHROID) 25 MCG tablet Take 1 tablet (25 mcg) by mouth daily 6/16/2018 at AM Yes Jenny Hamilton MD   montelukast (SINGULAIR) 10 MG tablet Take 1 tablet (10 mg) by mouth At Bedtime 6/16/2018 at Bedtime Yes Hallie Barroso MD   Multiple Vitamins-Minerals (MULTIVITAMIN OR) Take 1 tablet by mouth daily 6/16/2018 at AM Yes Unknown, Entered By History   omeprazole (PRILOSEC) 40 MG capsule Take 1 capsule (40 mg) by mouth daily Take 30-60 minutes before a meal. 6/16/2018 at AM Yes Jenny Hamilton MD   polyethylene glycol (MIRALAX/GLYCOLAX) Packet Take 17 g by mouth daily as needed for constipation Past Week at N/A Yes Reported, Patient   predniSONE (DELTASONE) 10 MG tablet Take 10 mg by mouth daily 6/16/2018 at AM Yes Unknown, Entered By History   prochlorperazine (COMPAZINE) 5 MG tablet Take 1 tablet (5 mg) by mouth every 6 hours as needed for nausea or vomiting Past Month at N/A Yes Jenny Hamilton MD   roflumilast (DALIRESP) 500 MCG TABS tablet Take 500 mcg by mouth daily  6/16/2018 at AM Yes Cole Carrasco MD   traMADol (ULTRAM)  50 MG tablet Take 50 mg by mouth 3 times daily 6/16/2018 at PM Yes Unknown, Entered By History   umeclidinium (INCRUSE ELLIPTA) 62.5 MCG/INH oral inhaler Inhale 1 puff into the lungs daily 6/16/2018 at AM Yes Cole Carrasco MD   ASPIRIN NOT PRESCRIBED, INTENTIONAL, 1 each continuous prn. Antiplatelet medication not prescribed intentionally due to Use of NSAIDS   Jenny Hamilton MD

## 2018-06-17 NOTE — IP AVS SNAPSHOT
Ana Luisa Lee #[id number] (CSN: [id number])  (78 year old F)  (Adm: 18)     [id number]               [facility] OBSERVATION DEPARTMENT: [phone]            Patient Demographics     Patient Name Sex          Age SSN Address Phone    Rosa Ana Luisa Haynes Female 1940 (78 year old) [id number] [address]  [facility] [address] [phone] (Home) *Preferred*  none (Work)  [phone] (Mobile)      Emergency Contact(s)     Name Relation Home Work Mobile    Windy Kowalski Daughter [phone] none [phone]    Jake Patel Son [phone] none [phone]      Admission Information     Attending Provider Admitting Provider Admission Type Admission Date/Time    Jodi Lopes MD Frechette, Paulo Wick MD Emergency 18  0842    Discharge Date Hospital Service Auth/Cert Status Service Area     Observation Incomplete [facility]    Unit Room/Bed Admission Status        OBSERVATION DEPT  Admission (Confirmed)       Admission     Complaint    Fall      Hospital Account     Name Acct ID Class Status Primary Coverage    Ana Luisa Lee [id number] Observation Open MEDICA - MEDICA DUAL SOLUTIONS [facility]            Guarantor Account (for Hospital Account #[id number])     Name Relation to Pt Service Area Active? Acct Type    Ana Luisa Lee  FCS Yes Personal/Family    Address Phone          [address]  [place] [address] [phone](H)              Coverage Information (for Hospital Account #[id number])     F/O Payor/Plan Precert #    MEDICA/MEDICA DUAL SOLUTIONS [employer] PARTNERS     Subscriber Subscriber #    Ana Luisa Lee [id number]    Address Phone    [address] [address]  [place] [address] [phone]                                                INTERAGENCY TRANSFER FORM - PHYSICIAN ORDERS   2018                       [facility]  "OBSERVATION DEPARTMENT: [phone]            Attending Provider: Jodi Lopes MD     Allergies:  Hydralazine, Penicillin G, Meloxicam, Metoprolol, Norvasc [Amlodipine Besylate]    Infection:  None   Service:  Observation    Ht:  1.575 m (5' 2\")   Wt:  53.9 kg (118 lb 14.4 oz)   Admission Wt:  --    BMI:  21.75 kg/m 2   BSA:  1.54 m 2            ED Clinical Impression     Diagnosis Description Comment Added By Time Added    Dizziness [R42] Dizziness [R42]  Bri Thompson MD 6/17/2018 12:55 PM    Numbness of right foot [R20.0] Numbness of right foot [R20.0]  Bri Thompson MD 6/17/2018 12:55 PM    Generalized weakness [R53.1] Generalized weakness [R53.1]  Bri Thompson MD 6/17/2018 12:55 PM    Fall, initial encounter [W19.XXXA] Fall, initial encounter [W19.XXXA]  Bri Thompson MD 6/17/2018 12:55 PM    Skin tear of right elbow without complication, initial encounter [S51.011A] Skin tear of right elbow without complication, initial encounter [S51.011A]  Bri Thompson MD 6/17/2018  1:06 PM      Hospital Problems as of 6/19/2018              Priority Class Noted POA    Fall Medium  6/17/2018 Yes      Non-Hospital Problems as of 6/19/2018              Priority Class Noted    COPD, severe (H) Medium  1/7/2010    Osteoporosis Medium  Unknown    Hyperlipidemia with target LDL less than 130 Medium  Unknown    Asthma, persistent Medium  Unknown    Pes planus Medium  11/29/2011    Skin cyst Medium  12/1/2011    Advance Care Planning Medium  12/16/2011    Pain in joint, shoulder region Medium  1/10/2012    PVC (premature ventricular contraction) Medium  Unknown    Arthritis of hand Medium  Unknown    Pulmonary nodule seen on imaging study Medium  12/22/2014    Health Care Home Medium  12/3/2015    Hypercalcemia Medium  12/4/2015    Other fatigue Medium  12/4/2015    Steroid-dependent chronic obstructive pulmonary disease (H) Medium  3/19/2016    SVT (supraventricular " tachycardia) (H) Medium  5/17/2016    Hypothyroidism, unspecified type Medium  10/1/2016    HTN, goal <  140/90 Medium  10/1/2016    Pernicious anemia Medium  11/3/2016    Paroxysmal supraventricular tachycardia (H) Medium  1/12/2017    PAC (premature atrial contraction) Medium  1/12/2017    Closed fracture of sacrum, unspecified portion of sacrum, initial encounter (H) Medium  12/2/2017    Anemia, unspecified type Medium  5/28/2018    [id number]: mod-severe Ao Stenosis, severe Asthma/COPD steroid dependent, tachycardia/PVCs ( intol to BB and Ca gillian block), hypothyroidism,  HTN, osteoporosis, HLipidemia, depression  Medium  5/28/2018    Aortic stenosis, moderate Medium  5/28/2018      Code Status History     Date Active Date Inactive Code Status Order ID Comments User Context    6/18/2018  1:26 PM  DNR/DNI [id number]  Kesha Vela PA-C Outpatient    6/17/2018  4:55 PM 6/18/2018  1:26 PM DNR/DNI [id number]  Jodi Lopes MD Inpatient    4/17/2018 10:51 AM 6/17/2018  4:55 PM DNR/DNI [id number]  Paulo Curry MD Outpatient    4/13/2018  7:53 AM 4/17/2018 10:51 AM DNR/DNI [id number]  Paulo Curry MD Inpatient    11/22/2017 12:47 PM 4/13/2018  7:53 AM DNR/DNI [id number]  Fuentes Hopson, DO Outpatient    11/20/2017  1:02 AM 11/22/2017 12:47 PM DNR/DNI [id number]  Damien Johnson,  Inpatient    5/27/2017  8:00 PM 5/28/2017  3:40 PM DNR/DNI [id number]  Speedy Yu MD Inpatient    5/5/2017  9:37 AM 5/27/2017  8:00 PM Full Code [id number]  Dea Riley, DO Outpatient    5/4/2017  2:07 AM 5/5/2017  9:37 AM Full Code [id number]  Sonido Spears MD Inpatient    1/6/2017 10:35 AM 5/4/2017  2:07 AM Full Code [id number]  Hallie Barroso MD Outpatient    1/2/2017  9:59 AM 1/6/2017 10:35 AM Full Code [id number]  Moses Calle MD ED    12/24/2016 11:48 AM 1/2/2017  9:59 AM Full Code [id number]  Kesha Vela PA-C Outpatient    12/22/2016  5:04 PM 12/24/2016 11:48  AM Full Code [id number]  Mimi Mullins PA-C ED    11/29/2016  5:41 PM 12/1/2016  3:24 PM DNR/DNI [id number]  Patsy Rae PA-C ED    3/19/2016 10:42 AM 11/29/2016  5:41 PM Full Code [id number]  Sonido Spears MD Outpatient    3/4/2016  5:36 PM 3/19/2016 10:42 AM Full Code [id number]  Meg Leger PA-C ED    12/13/2014  4:34 PM 12/19/2014  4:26 PM Full Code [id number]  Betty Roca MD Inpatient    11/4/2013  9:09 PM 12/13/2014  4:34 PM Full Code [id number]  Valentine Lundberg Kaya Outpatient    10/25/2013  1:07 PM 10/30/2013  3:25 PM Full Code [id number]  Libia Arevalo PA Inpatient      Current Code Status     Date Active Code Status Order ID Comments User Context       Prior      Summary of Visit     Reason for your hospital stay       You were admitted after a fall yesterday and generalized progressive weakness. Your workup was unremarkable for an acute cause to your fall and was likely mechanical due to loosing balance when trying to reach across a counter. A CT and MRI of your brain was performed with no acute findings except for a left middle ear effusion vs mastoiditis (inflammation of middle ear). You were started on Flonase and Allegra to your potential ear effusion but will need outpatient ENT follow up. In regards to your progressive weakness in your feet and right leg in particular, suspect this is related to some deconditioning as well your ongoing Prednisone use. With your reported ongoing numbness, your imaging was negative for a stroke causing this and otherwise you do not endorse any acute new symptoms that would likely be contributing to this. Recommend further workup as an outpatient if your numbness continues to worsen. During your stay you were evaluated by physical therapy who recommended going to rehab for ongoing therapy. Social work was consulted and assisted with finding a rehab facility for you at discharge. Due to urinary retention prior to discharging you  were started on Flomax as well your Duloxetine which could be contributing was discontinued. You did not want a joseph catheter placed so instead we would like bladder scans and intermittent straight caths performed.                Medication Review      START taking        Dose / Directions Comments    fexofenadine 60 MG tablet   Commonly known as:  ALLEGRA   Used for:  Middle ear effusion, left        Dose:  60 mg   Take 1 tablet (60 mg) by mouth daily   Quantity:  60 tablet   Refills:  0        fluticasone 50 MCG/ACT spray   Commonly known as:  FLONASE   Used for:  Middle ear effusion, left        Dose:  1 spray   Spray 1 spray into both nostrils daily   Quantity:  1 Bottle   Refills:  0        tamsulosin 0.4 MG capsule   Commonly known as:  FLOMAX   Used for:  Urinary retention        Dose:  0.4 mg   Take 1 capsule (0.4 mg) by mouth daily   Quantity:  14 capsule   Refills:  0          CONTINUE these medications which may have CHANGED, or have new prescriptions. If we are uncertain of the size of tablets/capsules you have at home, strength may be listed as something that might have changed.        Dose / Directions Comments    * traMADol 50 MG tablet   Commonly known as:  ULTRAM   This may have changed:  Another medication with the same name was added. Make sure you understand how and when to take each.   Used for:  Pain of both shoulder joints        Dose:  50 mg   Take 1 tablet (50 mg) by mouth 3 times daily   Quantity:  12 tablet   Refills:  0        * traMADol 50 MG tablet   Commonly known as:  ULTRAM   This may have changed:  You were already taking a medication with the same name, and this prescription was added. Make sure you understand how and when to take each.   Used for:  Pain of both shoulder joints        Dose:  50 mg   Take 1 tablet (50 mg) by mouth 3 times daily as needed for moderate pain or moderate to severe pain   Quantity:  15 tablet   Refills:  0        * Notice:  This list has 2 medication(s)  that are the same as other medications prescribed for you. Read the directions carefully, and ask your doctor or other care provider to review them with you.      CONTINUE these medications which have NOT CHANGED        Dose / Directions Comments    acetaminophen 500 MG tablet   Commonly known as:  TYLENOL   Used for:  Pain of both shoulder joints        Dose:  1000 mg   Take 2 tablets (1,000 mg) by mouth 3 times daily   Refills:  0        albuterol 108 (90 Base) MCG/ACT Inhaler   Commonly known as:  PROAIR HFA   Used for:  Asthma, persistent        Dose:  2 puff   Inhale 2 puffs into the lungs every 6 hours as needed   Refills:  0        ASPIRIN NOT PRESCRIBED   Commonly known as:  INTENTIONAL        Dose:  1 each   1 each continuous prn. Antiplatelet medication not prescribed intentionally due to Use of NSAIDS   Quantity:  0 each   Refills:  0        budesonide 180 MCG/ACT inhaler   Commonly known as:  PULMICORT FLEXHALER   Used for:  Pulmonary emphysema, unspecified emphysema type (H)        Dose:  1 puff   Inhale 1 puff into the lungs 2 times daily   Refills:  0        calcium 600 MG tablet   Used for:  Pulmonary emphysema, unspecified emphysema type (H)        Dose:  1 tablet   Take 1 tablet (600 mg) by mouth daily   Quantity:  60 tablet   Refills:  0        cholecalciferol 1000 UNIT tablet   Commonly known as:  vitamin D3   Used for:  COPD, moderate (H)        Dose:  1000 Units   Take 1 tablet (1,000 Units) by mouth daily   Quantity:  30 tablet   Refills:  0        cyanocobalamin 1000 MCG Tbcr   Commonly known as:  B-12 TR   Used for:  Pernicious anemia        Dose:  1 tablet   Take 1 tablet by mouth daily   Quantity:  100 tablet   Refills:  3        ferrous sulfate 325 (65 Fe) MG tablet   Commonly known as:  IRON        Dose:  325 mg   Take 325 mg by mouth daily (with breakfast)   Refills:  0        gemfibrozil 600 MG tablet   Commonly known as:  LOPID   Used for:  Hyperlipidemia with target LDL less than 130         Dose:  600 mg   Take 1 tablet (600 mg) by mouth daily   Quantity:  90 tablet   Refills:  0        INCRUSE ELLIPTA 62.5 MCG/INH oral inhaler   Generic drug:  umeclidinium        Dose:  1 puff   Inhale 1 puff into the lungs daily   Refills:  0        levalbuterol 1.25 MG/3ML neb solution   Commonly known as:  XOPENEX   Used for:  COPD, moderate (H), COPD exacerbation (H)        Dose:  1 ampule   Take 3 mLs (1.25 mg) by nebulization every 4 hours as needed for shortness of breath / dyspnea or wheezing   Quantity:  1584 mL   Refills:  1        levothyroxine 25 MCG tablet   Commonly known as:  SYNTHROID/LEVOTHROID   Used for:  Hypothyroidism due to acquired atrophy of thyroid        Dose:  25 mcg   Take 1 tablet (25 mcg) by mouth daily   Quantity:  90 tablet   Refills:  3        montelukast 10 MG tablet   Commonly known as:  SINGULAIR   Used for:  Pulmonary emphysema, unspecified emphysema type (H)        Dose:  10 mg   Take 1 tablet (10 mg) by mouth At Bedtime   Quantity:  30 tablet   Refills:  0        MULTIVITAMIN PO        Dose:  1 tablet   Take 1 tablet by mouth daily   Refills:  0        omeprazole 40 MG capsule   Commonly known as:  priLOSEC   Used for:  Gastroesophageal reflux disease, esophagitis presence not specified        Dose:  40 mg   Take 1 capsule (40 mg) by mouth daily Take 30-60 minutes before a meal.   Quantity:  90 capsule   Refills:  3    20 mg are not controlling the GERD symptoms       polyethylene glycol Packet   Commonly known as:  MIRALAX/GLYCOLAX        Dose:  17 g   Take 17 g by mouth daily as needed for constipation   Refills:  0        predniSONE 10 MG tablet   Commonly known as:  DELTASONE        Dose:  10 mg   Take 10 mg by mouth daily   Refills:  0        prochlorperazine 5 MG tablet   Commonly known as:  COMPAZINE   Used for:  Nausea        Dose:  5 mg   Take 1 tablet (5 mg) by mouth every 6 hours as needed for nausea or vomiting   Quantity:  120 tablet   Refills:  0         roflumilast 500 MCG Tabs tablet   Commonly known as:  DALIRESP        Dose:  500 mcg   Take 500 mcg by mouth daily   Refills:  0          STOP taking     DULoxetine 20 MG EC capsule   Commonly known as:  CYMBALTA                   After Care     Activity - Up with assistive device           Activity - Up with nursing assistance           Additional Discharge Instructions       Patient is retaining urine. UA was done and negative for infection. Likely related to recent start on antidepressant which was stopped.    Recommend every 3 hour bladder scans if not urinating and if scan shows greater than 300 ccs in bladder then straight cath as needed.       Advance Diet as Tolerated       Follow this diet upon discharge: Orders Placed This Encounter      Regular Diet Adult       Fall precautions           General info for SNF       Length of Stay Estimate: Short Term Care: Estimated # of Days <30  Condition at Discharge: Improving  Level of care:skilled   Rehabilitation Potential: Fair  Admission H&P remains valid and up-to-date: Yes  Recent Chemotherapy: N/A  Use Nursing Home Standing Orders: Yes       Mantoux instructions       Give two-step Mantoux (PPD) Per Facility Policy Yes       Wound care (specify)       Site:   Right elbow  Instructions:  Apply bandage to open skin tear from fall             Referrals     OTOLARYNGOLOGY REFERRAL       Your provider has referred you to: N: [facility] [phone]   [email or web address]    Please be aware that coverage of these services is subject to the terms and limitations of your health insurance plan.  Call member services at your health plan with any benefit or coverage questions.      Please bring the following with you to your appointment:    (1) Any X-Rays, CTs or MRIs which have been performed.  Contact the facility where they were done to arrange for  prior to your scheduled  appointment.   (2) List of current medications  (3) This referral request   (4) Any documents/labs given to you for this referral       Physical Therapy Adult Consult       Evaluate and treat as clinically indicated.    Reason: mobility, unsteady gait, and recent fall       UROLOGY ADULT REFERRAL       Your provider has referred you to: [facility]: [facility] [place] [phone]   [email or web address]    Please be aware that coverage of these services is subject to the terms and limitations of your health insurance plan.  Call member services at your health plan with any benefit or coverage questions.      Please bring the following with you to your appointment:    (1) Any X-Rays, CTs or MRIs which have been performed.  Contact the facility where they were done to arrange for  prior to your scheduled appointment.    (2) List of current medications  (3) This referral request   (4) Any documents/labs given to you for this referral             Follow-Up Appointment Instructions     Follow Up and recommended labs and tests       Follow up with primary care provider within one week for hospital follow up and discuss workup for numbness.  No follow up labs or test are needed.  Referral sent to set up follow up with ENT as outpatient.             Your next 10 appointments already scheduled     Jun 21, 2018  1:00 PM CDT   SHORT with Jenny Hamilton MD   [facility]    [address]  [facility] [address]   [phone]              Statement of Approval     Ordered          06/19/18 1155  I have reviewed and agree with all the recommendations and orders detailed in this document.  EFFECTIVE NOW     Approved and electronically signed by:  Bhavana Griffin PA-C           06/18/18 4584  I have reviewed and agree with all the recommendations and orders detailed in this document.  EFFECTIVE NOW    "  Approved and electronically signed by:  Kesha Vela PA-C                                                 INTERAGENCY TRANSFER FORM - NURSING   6/17/2018                       [facility] OBSERVATION DEPARTMENT: [phone]            Attending Provider: Jodi Lopes MD     Allergies:  Hydralazine, Penicillin G, Meloxicam, Metoprolol, Norvasc [Amlodipine Besylate]    Infection:  None   Service:  Observation    Ht:  1.575 m (5' 2\")   Wt:  53.9 kg (118 lb 14.4 oz)   Admission Wt:  --    BMI:  21.75 kg/m 2   BSA:  1.54 m 2            Advance Directives        Scanned docmt in ACP Activity?           Yes, scanned ACP docmt        Immunizations     Name Date      Influenza (H1N1) 12/10/09     Influenza (High Dose) 3 valent vaccine 09/26/17     Influenza (High Dose) 3 valent vaccine 09/20/16     Influenza (High Dose) 3 valent vaccine 10/06/15     Influenza (High Dose) 3 valent vaccine 09/21/14     Influenza (High Dose) 3 valent vaccine 09/10/13     Influenza (IIV3) PF 10/01/12     Influenza (IIV3) PF 01/01/11     Pneumo Conj 13-V (2010&after) 08/26/16     Tdap (Adacel,Boostrix) 03/28/11     Tdap (Adacel,Boostrix) 04/13/07       ASSESSMENT     Discharge Profile Flowsheet     EXPECTED DISCHARGE     Patient's communication style  spoken language (English or Bilingual) 06/17/18 0859    Expected Discharge Date  06/18/18 (1.0 d > Obs > /[place] apts) 06/19/18 0959   FINAL RESOURCES      DISCHARGE NEEDS ASSESSMENT     Resources List  Transitional Care 06/18/18 1356    Concerns To Be Addressed  all concerns addressed in this encounter 06/18/18 1356   Other Resources  Transportation Services 06/18/18 1356    Readmission Within The Last 30 Days  no previous admission in last 30 days 06/18/18 1356   Transitional Care  [facility] [phone] 06/18/18 1356    Anticipated Changes Related to Illness  none 06/18/18 1356   PAS Number  [id number] " "06/18/18 1535    Equipment Currently Used at Home  grab bar;walker, rolling;tub bench 06/18/18 1356   SKIN      Transportation Available  agency transportation 06/18/18 1356   Inspection of bony prominences  Full 06/19/18 0914    # of Referrals Placed by CTS  Communication hand-offs to next level of Care Providers;Transportation;Post Acute Facilities 06/18/18 1356   Full except areas not inspected   Hip, left;Hip, right;Buttock, left;Buttock, right;Sacrum;Coccyx 06/18/18 2051    Does Patient Need a Referral for Clinic CC  No 05/05/17 1045   Skin WDL  ex 06/19/18 0914    New Steerage to  Clinics?  No 06/18/18 1356   Skin Temperature  warm 06/19/18 0914    Primary Care Clinic Name  [facility] 06/18/18 1356   Skin Moisture  dry 06/19/18 0914    Primary Care MD Name  Dr. Hamilton 06/18/18 1356   Skin Elasticity  quick return to original state 06/19/18 0914    Equipment Used at Home  cane, straight 03/15/16 1542   Skin Integrity  abrasion(s);bruise(s) 06/19/18 0914    GASTROINTESTINAL (ADULT,PEDIATRIC,OB)     Inspection under devices  Full 06/18/18 2051    GI WDL  WDL 06/19/18 0906   SAFETY      Last Bowel Movement  06/17/18 06/17/18 2351   Safety WDL  WDL 06/19/18 0914    Passing flatus  yes 06/18/18 2051   All Alarms  alarm(s) activated and audible 06/19/18 0211    COMMUNICATION ASSESSMENT                        Assessment WDL (Within Defined Limits) Definitions           Safety WDL     Effective: 09/28/15    Row Information: <b>WDL Definition:</b> Bed in low position, wheels locked; call light in reach; upper side rails up x 2; ID band on<br> <font color=\"gray\"><i>Item=AS safety wdl>>List=AS safety wdl>>Version=F14</i></font>      Skin WDL     Effective: 09/28/15    Row Information: <b>WDL Definition:</b> Warm; dry; intact; elastic; without discoloration; pressure points without redness<br> <font color=\"gray\"><i>Item=AS skin wdl>>List=AS skin wdl>>Version=F14</i></font>      Vitals     Vital Signs " Flowsheet     VITAL SIGNS     Response to Interventions  Relief 06/17/18 2020    Temp  98  F (36.7  C) 06/19/18 1354   ANALGESIA SIDE EFFECTS MONITORING      Temp src  Oral 06/19/18 1354   Side Effects Monitoring: Respiratory Quality  R 06/19/18 0349    Resp  20 06/19/18 1354   Side Effects Monitoring: Respiratory Depth  N 06/19/18 0349    Pulse  82 06/19/18 1115   Side Effects Monitoring: Sedation Level  1 06/19/18 0349    Heart Rate  92 06/19/18 1354   EKG MONITORING      Pulse/Heart Rate Source  Monitor 06/19/18 1354   Cardiac Regularity  Regular 06/17/18 1119    BP  165/87 06/19/18 1354   Cardiac Rhythm  NSR 06/17/18 1119    BP Location  Left arm 06/19/18 1354   EUSEBIA COMA SCALE      OXYGEN THERAPY     Best Eye Response  4-->(E4) spontaneous 06/18/18 0814    SpO2  97 % 06/19/18 1354   Best Motor Response  6-->(M6) obeys commands 06/18/18 0814    O2 Device  None (Room air) 06/19/18 1354   Best Verbal Response  5-->(V5) oriented 06/18/18 0814    PAIN/COMFORT     [place] Coma Scale Score  15 06/18/18 0814    Patient Currently in Pain  denies 06/19/18 0340   POSITIONING      Preferred Pain Scale  number (Numeric Rating Pain Scale) 06/19/18 0045   Body Position  independently positioning 06/19/18 0212    Patient's Stated Pain Goal  5 06/18/18 2045   Head of Bed (HOB)  HOB at 20 degrees 06/19/18 0212    0-10 Pain Scale  9 06/19/18 1150   Positioning/Transfer Devices  pillows;in use 06/18/18 2023    Pain Location  Shoulder 06/19/18 0045   DAILY CARE      Pain Orientation  Right;Left 06/19/18 0045   Activity Management  activity adjusted per tolerance 06/19/18 0610    Pain Descriptors  Aching 06/19/18 0045   Activity Assistance Provided  assistance, 2 people 06/19/18 0610    Pain Intervention(s)  Medication (See eMAR) 06/18/18 2045                 Patient Lines/Drains/Airways Status    Active LINES/DRAINS/AIRWAYS     Name: Placement date: Placement time: Site: Days: Last dressing change:    Peripheral IV 06/17/18 Left  06/17/18   0909      2             Patient Lines/Drains/Airways Status    Active PICC/CVC     None            Intake/Output Detail Report     Date Intake   Output   Net    Shift I.V. IV Piggyback Total Urine Stool Total       Noc 06/17/18 2300 - 06/18/18 0659 -- -- -- -- -- -- 0    Day 06/18/18 0700 - 06/18/18 1459 -- -- -- 700 -- 700 -700    Dottie 06/18/18 1500 - 06/18/18 2259 -- -- -- -- -- -- 0    Noc 06/18/18 2300 - 06/19/18 0659 -- -- -- 500 -- 500 -500    Day 06/19/18 0700 - 06/19/18 1459 -- -- -- 100 0 100 -100      Last Void/BM       Most Recent Value    Urine Occurrence 0 at 06/18/2018 1410    Stool Occurrence 1 at 06/19/2018 1354      Case Management/Discharge Planning     Case Management/Discharge Planning Flowsheet     REFERRAL INFORMATION     Major Change/Loss/Stressor  denies 04/14/18 1715    Did the Initial Social Work Assessment result in a Social Work Case?  Yes 06/18/18 1356   Patient Personal Strengths  expressive of needs;strong support system 04/16/18 1310    Admission Type  observation 06/18/18 1356   EXPECTED DISCHARGE      Arrived From  home or self-care 06/18/18 1356   Expected Discharge Date  06/18/18 (1.0 d > Obs > /[facility] [place] [facility]) 06/19/18 0959    Referral Source  case finding;high risk screening 06/18/18 1356   ASSESSMENT/CONCERNS TO BE ADDRESSED      New Steerage to  Clinics?  No 06/18/18 1356   Concerns To Be Addressed  all concerns addressed in this encounter 06/18/18 1356    # of Referrals Placed by [facility]  Communication hand-offs to next level of Care Providers;Transportation;Post Acute Facilities 06/18/18 1356   DISCHARGE PLANNING      Reason For Consult  care coordination/care conference;discharge planning 06/18/18 1356   Readmission Within The Last 30 Days  no previous admission in last 30 days 06/18/18 1356    Record Reviewed  clinical discipline documentation;history and physical;medical record;patient profile;plan of care 06/18/18 1356   Anticipated Changes  Related to Illness  none 06/18/18 1356    CTS Assigned to Case  Palak RN CTS 06/18/18 1356   Transportation Available  agency transportation 06/18/18 1356    Primary Care Clinic Name  [facility] 06/18/18 1356   Does Patient Need a Referral for Clinic CC  No 05/05/17 1045    Primary Care MD Name  Dr. Hamilton 06/18/18 1356   Equipment Used at Home  cane, straight 03/15/16 1542    LIVING ENVIRONMENT     FINAL RESOURCES      Lives With  alone 06/18/18 1356   Equipment Currently Used at Home  grab bar;walker, rolling;tub bench 06/18/18 1356    Living Arrangements  independent living facility;apartment 06/18/18 1356   Resources List  Transitional Care 06/18/18 1356    Quality Of Family Relationships  supportive;involved 06/18/18 1356   Other Resources  Transportation Services 06/18/18 1356    Able to Return to Prior Living Arrangements  no 06/18/18 1356   Transitional Care  [facility] [phone] 06/18/18 1356    ASSESSMENT OF FAMILY/SOCIAL SUPPORT     [facility] Number  [id number] 06/18/18 1535    Marital Status   06/18/18 1356   ABUSE RISK SCREEN      Who is your support system?  Children 06/18/18 1356   QUESTION TO PATIENT:  Has a member of your family or a partner(now or in the past) intimidated, hurt, manipulated, or controlled you in any way?  no 06/17/18 0902    Description of Support System  Supportive;Involved 06/18/18 1356   QUESTION TO PATIENT: Do you feel safe going back to the place where you are living?  no (describe) 06/17/18 0902    Support Assessment  Adequate family and caregiver support 06/18/18 1356   If no, describe the patient's reason  pt is moving to assisted living for extra assistance with ADLS 06/17/18 0902    COPING/STRESS     OBSERVATION: Is there reason to believe there has been maltreatment of a vulnerable adult (ie. Physical/Sexual/Emotional abuse, self neglect, lack of adequate food, shelter, medical care, or financial exploitation)?   no 06/17/18 0902                  [facility] OBSERVATION DEPARTMENT: [phone]            Medication Administration Report for Ana Luisa Lee as of 06/19/18 1402   Legend:    Given Hold Not Given Due Canceled Entry Other Actions    Time Time (Time) Time  Time-Action       Inactive    Active    Linked        Medications 06/13/18 06/14/18 06/15/18 06/16/18 06/17/18 06/18/18 06/19/18    acetaminophen (TYLENOL) Suppository 650 mg  Dose: 650 mg  Freq: EVERY 4 HOURS PRN Route: RE  PRN Reason: mild pain  Start: 06/17/18 1606   Admin Instructions: Alternate ibuprofen (if ordered) with acetaminophen.  Maximum acetaminophen dose from all sources = 75 mg/kg/day not to exceed 4 grams/day.    Admin. Amount: 1 suppository (1 × 650 mg suppository)  Dispense Loc:  Main Pharmacy               acetaminophen (TYLENOL) tablet 650 mg  Dose: 650 mg  Freq: EVERY 4 HOURS PRN Route: PO  PRN Reason: mild pain  Start: 06/17/18 1606   Admin Instructions: Alternate ibuprofen (if ordered) with acetaminophen.  Maximum acetaminophen dose from all sources = 75 mg/kg/day not to exceed 4 grams/day.    Admin. Amount: 2 tablet (2 × 325 mg tablet)  Last Admin: 06/19/18 1149  Dispense Loc:  ADS MS2B OBS         1745 (650 mg)-Given        1417 (650 mg)-Given        0048 (650 mg)-Given       1149 (650 mg)-Given           albuterol (PROAIR HFA/PROVENTIL HFA/VENTOLIN HFA) Inhaler 2 puff  Dose: 2 puff  Freq: EVERY 6 HOURS PRN Route: IN  PRN Reason: shortness of breath / dyspnea  Start: 06/17/18 1602   Admin Instructions: Inpatient pharmacy will not dispense this medication while in OBS status unless requested. Patient may be responsible for cost of medication    Admin. Amount: 2 puff  Dispense Loc: [facility]               calcium carbonate (OS-DIANA 500 mg [place]. Ca) tablet 500 mg  Dose: 1 tablet  Freq: DAILY Route: PO  Start: 06/17/18 1610   Admin Instructions: Each tablet = 500 mg elemental calcium = 1250 mg calcium  carboate.    Admin. Amount: 1 tablet (1 × 500 mg tablet)  Last Admin: 06/19/18 1016  Dispense Loc: [facility] ADS MS2B OBS         1745 (500 mg)-Given        0751 (500 mg)-Given        1016 (500 mg)-Given           cholecalciferol (vitamin D3) tablet 1,000 Units  Dose: 1,000 Units  Freq: DAILY Route: PO  Start: 06/17/18 1610   Admin. Amount: 1 tablet (1 × 1,000 Units tablet)  Last Admin: 06/19/18 1017  Dispense Loc: [facility] ADS MS2B OBS         1745 (1,000 Units)-Given        0751 (1,000 Units)-Given        1017 (1,000 Units)-Given           cyanocobalamin (vitamin  B-12) tablet 1,000 mcg  Dose: 1,000 mcg  Freq: DAILY Route: PO  Start: 06/17/18 1610   Admin. Amount: 1 tablet (1 × 1,000 mcg tablet)  Last Admin: 06/19/18 1017  Dispense Loc: [facility] ADS MS2B OBS         1746 (1,000 mcg)-Given        0751 (1,000 mcg)-Given        1017 (1,000 mcg)-Given           ferrous sulfate (IRON) tablet 325 mg  Dose: 325 mg  Freq: DAILY WITH BREAKFAST Route: PO  Start: 06/18/18 0800   Admin Instructions: Absorbed best on an empty stomach. If stomach upset occurs, can take with meals.    Admin. Amount: 1 tablet (1 × 325 mg tablet)  Last Admin: 06/19/18 1017  Dispense Loc: [facility] ADS MS2B OBS          0751 (325 mg)-Given        1017 (325 mg)-Given           fexofenadine (ALLEGRA) tablet 60 mg  Dose: 60 mg  Freq: DAILY Route: PO  Start: 06/17/18 1630   Admin. Amount: 1 tablet (1 × 60 mg tablet)  Last Admin: 06/19/18 1016  Dispense Loc: [facility] ADS MS2B OBS         1945 (60 mg)-Given        0751 (60 mg)-Given        1016 (60 mg)-Given           fluticasone (FLONASE) 50 MCG/ACT spray 1 spray  Dose: 1 spray  Freq: DAILY Route: BOTH NOSTRIL  Start: 06/17/18 1700   Admin Instructions: Inpatient pharmacy will not dispense this medication while in OBS status unless requested. Patient may be responsible for cost of medication.    Admin. Amount: 1 spray  Dispense Loc:  Main Pharmacy         (2777)-Not Given        (5491)-Not Given        (2545)-Not Given [C]            fluticasone furoate (ARNUITY ELLIPTA) 100 MCG/ACT inhalation powder 1 puff [autosub for Pulmicort]  Dose: 1 puff  Freq: DAILY Route: IN  Start: 06/18/18 0800   Admin Instructions: Inpatient pharmacy will not dispense this medication while in OBS status unless requested. Patient may be responsible for cost of medication.    Admin. Amount: 1 puff  Dispense Loc:  Main Pharmacy          (0755)-Not Given        (0735)-Not Given           gemfibrozil (LOPID) tablet 600 mg  Dose: 600 mg  Freq: EVERY EVENING Route: PO  Start: 06/17/18 2000   Admin Instructions: Take before meals.    Admin. Amount: 1 tablet (1 × 600 mg tablet)  Last Admin: 06/18/18 2042  Dispense Loc:  Main Pharmacy         1943 (600 mg)-Given        2042 (600 mg)-Given        [ ] 2000           levalbuterol (XOPENEX) neb solution 1.25 mg  Dose: 1.25 mg  Freq: EVERY 4 HOURS PRN Route: NEBULIZATION  PRN Reasons: shortness of breath / dyspnea,wheezing  Start: 06/17/18 1603   Order specific questions:  Levalbuterol orders will be substituted to albuterol unless intolerance is documented here Pharmacy may substitute     Admin. Amount: 1.25 mg = 3 mL Conc: 1.25 mg/3 mL  Last Admin: 06/19/18 1109  Dispense Loc:  ADS MS2B OBS  Volume: 3 mL         2014 (1.25 mg)-Given        0847 (1.25 mg)-Given       1116 (1.25 mg)-Given       2125 (1.25 mg)-Given        0729 (1.25 mg)-Given       1109 (1.25 mg)-Given           levothyroxine (SYNTHROID/LEVOTHROID) tablet 25 mcg  Dose: 25 mcg  Freq: DAILY Route: PO  Start: 06/17/18 1630   Admin Instructions: Separate oral administration of iron- or calcium-containing products and levothyroxine by at least 4 hours.    Admin. Amount: 1 tablet (1 × 25 mcg tablet)  Last Admin: 06/19/18 1017  Dispense Loc:  ADS MS2B OBS         1745 (25 mcg)-Given        0751 (25 mcg)-Given        1017 (25 mcg)-Given           lidocaine (LMX4) kit  Freq: EVERY 1 HOUR PRN Route: Top  PRN Reason: pain  PRN Comment: with VAD insertion or accessing  "implanted port.  Start: 06/17/18 0846   Admin Instructions: Do NOT give if patient has a history of allergy to any local anesthetic or any \"eleazar\" product.   Apply 30 minutes prior to VAD insertion or port access.  MAX Dose:  2.5 g (  of 5 g tube)    Dispense Loc:  Main Pharmacy               lidocaine 1 % 1 mL  Dose: 1 mL  Freq: EVERY 1 HOUR PRN Route: OTHER  PRN Comment: mild pain with VAD insertion or accessing implanted port  Start: 06/17/18 0846   Admin Instructions: Do NOT give if patient has a history of allergy to any local anesthetic or any \"eleazar\" product. MAX dose 1 mL subcutaneous OR intradermal in divided doses.    Admin. Amount: 1 mL  Dispense Loc: [facility] Floor Stock  Volume: 2 mL               melatonin tablet 1 mg  Dose: 1 mg  Freq: AT BEDTIME PRN Route: PO  PRN Reason: sleep  Start: 06/17/18 1606   Admin Instructions: Do not give unless at least 6 hours of uninterrupted sleep is expected.    Admin. Amount: 1 tablet (1 × 1 mg tablet)  Dispense Loc:  ADS MS2B OBS               montelukast (SINGULAIR) tablet 10 mg  Dose: 10 mg  Freq: AT BEDTIME Route: PO  Start: 06/17/18 2200   Admin. Amount: 1 tablet (1 × 10 mg tablet)  Last Admin: 06/18/18 2147  Dispense Loc:  ADS MS2B OBS         2110 (10 mg)-Given        2147 (10 mg)-Given        [ ] 2200           multivitamin, therapeutic with minerals (THERA-VIT-M) tablet 1 tablet  Dose: 1 tablet  Freq: DAILY Route: PO  Start: 06/17/18 1630   Admin. Amount: 1 tablet  Last Admin: 06/19/18 1017  Dispense Loc:  ADS MS2B OBS         1745 (1 tablet)-Given        0751 (1 tablet)-Given        1017 (1 tablet)-Given           naloxone (NARCAN) injection 0.1-0.4 mg  Dose: 0.1-0.4 mg  Freq: EVERY 2 MIN PRN Route: IV  PRN Reason: opioid reversal  Start: 06/17/18 1606   Admin Instructions: For respiratory rate LESS than or EQUAL to 8.  Partial reversal dose:  0.1 mg titrated q 2 minutes for Analgesia Side Effects Monitoring Sedation Level of 3 (frequently drowsy, " arousable, drifts to sleep during conversation).Full reversal dose:  0.4 mg bolus for Analgesia Side Effects Monitoring Sedation Level of 4 (somnolent, minimal or no response to stimulation).  For ordered doses up to 2mg give IVP. Give each 0.4mg over 15 seconds in emergency situations. For non-emergent situations further dilute in 9mL of NS to facilitate titration of response.    Admin. Amount: 0.1-0.4 mg = 0.25-1 mL Conc: 0.4 mg/mL  Dispense Loc: [facility] ADS MS2B OBS  Volume: 1 mL               omeprazole (priLOSEC) CR capsule 40 mg  Dose: 40 mg  Freq: DAILY WITH BREAKFAST Route: PO  Start: 06/17/18 1622   Admin. Amount: 2 capsule (2 × 20 mg capsule)  Last Admin: 06/19/18 1017  Dispense Loc: [facility] ADS MS2B OBS         1745 (40 mg)-Given        0751 (40 mg)-Given        1017 (40 mg)-Given           ondansetron (ZOFRAN-ODT) ODT tab 4 mg  Dose: 4 mg  Freq: EVERY 6 HOURS PRN Route: PO  PRN Reasons: nausea,vomiting  Start: 06/17/18 1606   Admin Instructions: This is Step 1 of nausea and vomiting management.  If nausea not resolved in 15 minutes, go to Step 2 prochlorperazine (COMPAZINE). Do not push through foil backing. Peel back foil and gently remove. Place on tongue immediately. Administration with liquid unnecessary  With dry hands, peel back foil backing and gently remove tablet; do not push oral disintegrating tablet through foil backing; administer immediately on tongue and oral disintegrating tablet dissolves in seconds; then swallow with saliva; liquid not required.    Admin. Amount: 1 tablet (1 × 4 mg tablet)  Dispense Loc: [facility] ADS MS2B OBS              Or  ondansetron (ZOFRAN) injection 4 mg  Dose: 4 mg  Freq: EVERY 6 HOURS PRN Route: IV  PRN Reasons: nausea,vomiting  Start: 06/17/18 1606   Admin Instructions: This is Step 1 of nausea and vomiting management.  If nausea not resolved in 15 minutes, go to Step 2 prochlorperazine (COMPAZINE).  Irritant. For ordered doses up to 4 mg, give IV Push undiluted over 2-5  minutes.    Admin. Amount: 4 mg = 2 mL Conc: 4 mg/2 mL  Dispense Loc: [facility] ADS MS2B OBS  Infused Over: 2-5 Minutes  Volume: 2 mL               polyethylene glycol (MIRALAX/GLYCOLAX) Packet 17 g  Dose: 17 g  Freq: DAILY PRN Route: PO  PRN Reason: constipation  Start: 06/17/18 1604   Admin Instructions: 1 Packet = 17 grams. Mixed prescribed dose in 8 ounces of water. Follow with 8 oz. of water.    Admin. Amount: 17 g  Dispense Loc: [facility] ADS MS2B OBS               predniSONE (DELTASONE) tablet 5 mg  Dose: 5 mg  Freq: DAILY Route: PO  Start: 06/17/18 1630   Admin. Amount: 1 tablet (1 × 5 mg tablet)  Last Admin: 06/19/18 1017  Dispense Loc: [facility] ADS MS2B OBS         1745 (5 mg)-Given        0751 (5 mg)-Given        1017 (5 mg)-Given           roflumilast (DALIRESP) tablet 500 mcg  Dose: 500 mcg  Freq: DAILY Route: PO  Start: 06/17/18 1700   Admin. Amount: 1 tablet (1 × 500 mcg tablet)  Last Admin: 06/19/18 1016  Dispense Loc: [facility] ADS MS2B OBS         1944 (500 mcg)-Given        0751 (500 mcg)-Given        1016 (500 mcg)-Given           sodium chloride (PF) 0.9% PF flush 3 mL  Dose: 3 mL  Freq: EVERY 8 HOURS Route: IK  Start: 06/17/18 0848   Admin Instructions: And Q1H PRN, to lock peripheral IV dormant line.    Admin. Amount: 3 mL  Last Admin: 06/19/18 1151  Dispense Loc: [facility] Floor Stock  Volume: 4 mL   Current Line: Peripheral IV 06/17/18 Left        1946 (3 mL)-Given       (2203)-Not Given [C]       2348 (3 mL)-Given        1605 (3 mL)-Given               (0233)-Not Given       1151 (3 mL)-Given       [ ] 1648           sodium chloride (PF) 0.9% PF flush 3 mL  Dose: 3 mL  Freq: EVERY 1 HOUR PRN Route: IK  PRN Reason: line flush  PRN Comment: for peripheral IV flush post IV meds  Start: 06/17/18 0846   Admin. Amount: 3 mL  Dispense Loc: [facility] Floor Stock  Volume: 4 mL               tamsulosin (FLOMAX) capsule 0.4 mg  Dose: 0.4 mg  Freq: DAILY Route: PO  Start: 06/18/18 1504   Admin Instructions: Administer 30 minutes after the  same meal each day.  Capsules should be swallowed whole; do not crush chew or open.    Admin. Amount: 1 capsule (1 × 0.4 mg capsule)  Last Admin: 18 1017  Dispense Loc: [facility] ADS MS2B OBS          1604 (0.4 mg)-Given        1017 (0.4 mg)-Given           traMADol (ULTRAM) tablet 50 mg  Dose: 50 mg  Freq: 3 TIMES DAILY PRN Route: PO  PRN Reason: moderate pain  Start: 18 1509   Admin. Amount: 1 tablet (1 × 50 mg tablet)  Last Admin: 18 1149  Dispense Loc: [facility] ADS MS2B OBS          2044 (50 mg)-Given        1149 (50 mg)-Given           umeclidinium (INCRUSE ELLIPTA) 62.5 MCG/INH oral inhaler 1 puff  Dose: 1 puff  Freq: DAILY Route: IN  Start: 18 0800   Admin Instructions: Inpatient pharmacy will not dispense this medication while in OBS status unless requested. Patient may be responsible for cost of medication.    Admin. Amount: 1 puff  Dispense Loc:  Main Pharmacy          (0880)-Not Given [C]        ()-Not Given          Completed Medications  Medications 06/13/18 06/14/18 06/15/18 06/16/18 06/17/18 06/18/18 06/19/18         Dose: 7.5 mL  Freq: ONCE Route: IV  Start: 18 1130   End: 18 113   Admin. Amount: 7.5 mL  Last Admin: 18 113  Dispense Loc: [facility] Floor Stock  Administrations Remainin  Volume: 7.5 mL   Current Line: Peripheral IV 18 Left        1138 (5 mL)-Given               Dose: 5 mg  Freq: ONCE Route: IV  Start: 18 2331   End: 18 2347   Admin Instructions: For ordered doses up to 40 mg, give IV Push undiluted over 1 minute.    Admin. Amount: 5 mg = 0.25 mL Conc: 20 mg/mL  Last Admin: 18  Dispense Loc: [facility] ADS MS2B OBS  Infused Over: 1 Minutes  Administrations Remainin  Volume: 0.25 mL   Current Line: Peripheral IV 18 Left        2346 (5 mg)-Given               Dose: 1,000 mL  Freq: ONCE Route: IV  Last Dose: Stopped (18)  Start: 18   End: 18   Admin. Amount: 1,000 mL  Last Admin: 18  1053  Dispense Loc: [facility] ER  Infused Over: 1 Hours  Administrations Remainin  Volume: 1,000 mL   Current Line: Peripheral IV 18 Left        1053 (1,000 mL)-New Bag       1403-Stopped            Discontinued Medications  Medications 06/13/18 06/14/18 06/15/18 06/16/18 06/17/18 06/18/18 06/19/18         Dose: 20 mg  Freq: DAILY Route: PO  Start: 18 1630   End: 18 1434   Admin. Amount: 1 capsule (1 × 20 mg capsule)  Last Admin: 18 0751  Dispense Loc: [facility] ADS MS2B OBS         1745 (20 mg)-Given        0751 (20 mg)-Given       1434-Med Discontinued          Rate: 125 mL/hr Dose: 1000 mL  Freq: CONTINUOUS Route: IV  Start: 18 0848   End: 18 1929   Admin Instructions: Administer after the bolus.    Admin. Amount: 1,000 mL  Dispense Loc: [facility] Floor Stock  Volume: 1,000 mL                1929-Med Discontinued           Dose: 50 mg  Freq: 3 TIMES DAILY Route: PO  Start: 18 1741   End: 18 1508   Admin. Amount: 1 tablet (1 × 50 mg tablet)  Last Admin: 18 1416  Dispense Loc: [facility] ADS MS2B OBS         1746 (50 mg)-Given        0751 (50 mg)-Given       1416 (50 mg)-Given       1508-Med Discontinued                 INTERAGENCY TRANSFER FORM - NOTES (H&P, Discharge Summary, Consults, Procedures, Therapies)   2018                       [facility] OBSERVATION DEPARTMENT: [phone]            History & Physicals     No notes of this type exist for this encounter.      Discharge Summaries     No notes of this type exist for this encounter.         Consult Notes      Consults by Palak Morales RN at 2018  2:09 PM     Author:  Palak Morales RN Service:  (none) Author Type:      Filed:  2018  2:55 PM Date of Service:  2018  2:09 PM Creation Time:  2018  1:57 PM    Status:  Addendum :  Palak Morales RN ()     Consult Orders:    1. Social Work IP Consult [[id number]] ordered by Jodi Lopes MD at  06/17/18 1609                Care Transition Initial Assessment - RN    Reason For Consult: care coordination/care conference, discharge planning   Met with: Patient and dtr via phone.  DATA   Active Problems:    Fall       Cognitive Status: awake, alert and oriented.  Primary Care Clinic Name: [facility]  Primary Care MD Name: Dr. Hamilton  Contact information and PCP information verified: Yes  Lives With: alone  Living Arrangements: independent living facility, apartment  Quality Of Family Relationships: supportive, involved  Description of Support System: Supportive, Involved   Who is your support system?: Children   Support Assessment: Adequate family and caregiver support   Insurance concerns: No Insurance issues identified  ASSESSMENT  Patient currently receives the following services:  Pt lives in [facility] w/ housekeeping services and [facility] RN for medication set-up. Pt is scheduled to move into [facility] assisted living on 6/25.        Identified issues/concerns regarding health management: PT evaluated and recommends that pt d/c to home w/ 24/7 assist or TCU. Per chart review w/ SW, pt should have TCU coverage. Met w/ pt at bedside, she is agreeable to TCU and would like to have referrals sent to 1)[facility] 2)[facility] 3)[place] 4)[facility]. Pt would like HE WC transport and a private room, she is aware of cost and is agreeable.     Pt has been accepted at [facility], HE WC transport arranged for 1600 today. Pt will need to provide $400 deposit check for private room fees upon admission. Pt and dtr updated and agreeable. Orders entered and faxed to [facility].     PLAN  Patient given options and choices for discharge Yes .  Patient/family is agreeable to the plan?  Yes  Patient anticipates discharging to TCU .  Resources List: Transitional Care  Other Resources: Transportation Services  Patient anticipates needs for home equipment: No  Plan/Disposition: TCU   Appointments: Will be seen by  rounding provider upon admission.[AC1.1]       Update 1450: Pt having difficulty voiding, staff straight cathing pt at this time. Plan to see if pt is able to urinate on her own prior to d/c. D/c time and transport changed to 1900, HE, [facility] and dtr Windy updated. Staff will continue to monitor and cancel d/c if needed.[AC1.2]     Care  (CTS) will continue to follow as needed.    Palak Morales RN BSN CTS   [phone]  Care Transitions Team  [facility][AC1.1]                 Revision History        User Key Date/Time User Provider Type Action    > AC1.2 6/18/2018  2:55 PM Palak Morales RN Case Manager Addend     AC1.1 6/18/2018  2:09 PM Palak Morales RN Case Manager Sign                     Progress Notes - Physician (Notes for yesterday and today)      Progress Notes by Kesha Vela PA-C at 6/18/2018  2:45 PM     Author:  Kesha Vela PA-C Service:  Hospitalist Author Type:  Physician Assistant - MOHINDER    Filed:  6/18/2018  3:30 PM Date of Service:  6/18/2018  2:45 PM Creation Time:  6/18/2018  2:45 PM    Status:  Signed :  Kesha Vela PA-C (Physician Assistant - C)         [facility]    Hospitalist Progress Note  Name: Ana Luisa Lee    MRN: [id number]  Provider:  Ariella Vela PA-C  Date of Service:[KB1.1] 06/18/2018    Assessment & Plan[KB1.2]   Summary of Stay: Ana Luisa Lee is a 78-year-old female with a history of hypertension, dyslipidemia, COPD, hypothyroidism, ambulatory dysfunction using a walker for the past year associated with increasing generalized weakness for which she was planning to move into an assisted living facility  next Monday from an independent living facility. The patient was admitted on 6/17/18 for evaluation after an accidental fall.     1.  Accidental fall/closed head injury, superficial skin laceration on her right elbow.     2. Progressive generalized weakness: CT of head and MRI of brain  negative for stroke. 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 Evaluated by PT with recommendations for TCU. SW consulted and assisting with discharge planning. Patient was accepted to a TCU this afternoon.     3. Urinary retention: new onset this afternoon with difficulty voiding twice over a few hours with bladder scan showing >400 cc present. Patient was straight cathed for 700 ccs. Only new medication recently is Duloxetine which does have a side effect profile of urinary retention, which could be the cause.    - Start Flomax  - Hold Duloxetine   - Bladder scan and straight cath PRN  - Monitor I&Os    4. COPD/asthma: on tapering dose of prednisone, no current exacerbation.  We will start tapering dose of prednisone at 5 mg today with and continue her plan for taper as per PCP.     5.  History of hypertension, dyslipidemia, hypothyroidism, stable.  Continue prior to admission medications.     6.  Superficial skin tear, continue local treatment, keeping it covered.     7.  Past abnormal CT, MRI showing possible left ear effusion versus mastoiditis.  The patient has negative examination.  I will start her on Flonase and Allegra and have her followup with ENT for further recommendations.  Hold off on antibiotics at present due to her negative exam.     # Pain Assessment:[KB1.1]  Current Pain Score 6/18/2018   Patient currently in pain? -   Pain score (0-10) 9   Pain location -   Pain descriptors -[KB1.2]   - Ana Luisa is experiencing pain due to generalized pain from arthritis. Pain management was discussed and the plan was created in a collaborative fashion.  Ana Luisa's response to the current recommendations: engaged  - Continue PTA PRN Tylenol and Tramadol      DVT Prophylaxis: Ambulate every shift  Code Status:[KB1.1] Prior[KB1.2]  Disposition: Expected discharge later today if able to void or tomorrow to TCU[KB1.1]    Interval History[KB1.2]   Patient reports generalized weakness that has been present for months. She reports aches in multiple joints. She notes numbness in her bilateral feet for the last few months that she believes is contributing to her gait. Patient is in agreement to discharging to TCU.     -Data reviewed today: I reviewed all new labs and imaging reports over the last 24 hours. I personally reviewed all labs and imaging from this visit.[KB1.1]    Physical Exam   Temp: 98.2  F (36.8  C) Temp src: Oral BP: 141/83   Heart Rate: 90 Resp: 18 SpO2: 96 % O2 Device: None (Room air)    There were no vitals filed for this visit.[KB1.2]  Vital Signs with Ranges[KB1.1]  Temp:  [97.8  F (36.6  C)-98.7  F (37.1  C)] 98.2  F (36.8  C)  Heart Rate:  [80-96] 90  Resp:  [16-18] 18  BP: (127-179)/() 141/83  SpO2:  [96 %-100 %] 96 %[KB1.2]       GEN:  Alert, oriented x 3, appears comfortable, NAD.  HEENT:  Normocephalic/atraumatic, no scleral icterus, no nasal discharge, mouth moist.  CV:  Regular rate and rhythm, no murmur or JVD.  S1 + S2 noted, no S3 or S4.  LUNGS:  Clear to auscultation bilaterally without rales/rhonchi/wheezing/retractions.  Symmetric chest rise on inhalation noted.  ABD:  Active bowel sounds, soft, non-tender/non-distended.  No rebound/guarding/rigidity.  EXT:  No edema.  No cyanosis.  No acute joint synovitis noted.  SKIN:  Dry to touch, no exanthems noted in the visualized areas.  Neuro: CN II_ XII grossly intact, strength equal in bilateral UE. Right LE strength decreased at 4/5 compared to left LE at 5/5. Sensation grossly normal bilaterally.[KB1.1]     Medications       calcium carbonate  1 tablet Oral Daily     cholecalciferol  1,000 Units Oral Daily     cyanocobalamin  1,000 mcg Oral Daily     ferrous sulfate  325 mg Oral Daily  with breakfast     fexofenadine  60 mg Oral Daily     fluticasone  1 spray Both Nostrils Daily     fluticasone furoate  1 puff Inhalation Daily     gemfibrozil  600 mg Oral QPM     levothyroxine  25 mcg Oral Daily     montelukast  10 mg Oral At Bedtime     multivitamin, therapeutic with minerals  1 tablet Oral Daily     omeprazole  40 mg Oral Daily with breakfast     predniSONE  5 mg Oral Daily     roflumilast  500 mcg Oral Daily     sodium chloride (PF)  3 mL Intracatheter Q8H     traMADol  50 mg Oral TID     umeclidinium  1 puff Inhalation Daily     Data     Results for orders placed or performed during the hospital encounter of 06/17/18   CT Head w/o Contrast    Narrative    CT SCAN OF THE HEAD WITHOUT CONTRAST   6/17/2018 9:48 AM     HISTORY: Trauma, head injury.     TECHNIQUE:  Axial images of the head and coronal reformations without  IV contrast material. Radiation dose for this scan was reduced using  automated exposure control, adjustment of the mA and/or kV according  to patient size, or iterative reconstruction technique.    COMPARISON: 5/3/2017.    FINDINGS:  There is generalized atrophy of the brain.  There is low  attenuation in the white matter of the cerebral hemispheres consistent  with sequelae of small vessel ischemic disease. There is no evidence  of intracranial hemorrhage, mass, acute infarct or anomaly.     Visualized sinuses are clear. Left mastoid air cells and middle ear  are completely opacified, worse than on the previous exam. However, no  fracture is seen in the left temporal bone. There is no evidence of  trauma.      Impression    IMPRESSION:   1. No evidence of acute trauma.  2.  Atrophy of the brain.  White matter changes consistent with  sequelae of small vessel ischemic disease. This is unchanged.  3. Worsening opacification of the left mastoid air cells and of the  left middle ear since the previous exam.      MAEVE OWEN MD   MR Brain w/o & w Contrast    Narrative    MRI  BRAIN WITHOUT AND WITH CONTRAST  6/17/2018 11:51 AM    HISTORY:  Right foot numbness; fell this morning. Right foot drop.     TECHNIQUE:  Multiplanar, multisequence MRI of the brain without and  with 5 mL Gadavist.    COMPARISON: CT scan today.    FINDINGS:  There is generalized atrophy of the brain. White matter T2  hyperintensities are seen in the cerebral hemispheres consistent with  sequelae of small vessel ischemic disease.  There is no evidence of  hemorrhage, mass, acute infarct, or anomaly.  There are no gadolinium  enhancing lesions.    The facial structures appear normal. Left mastoid air cells and middle  ear are opacified. The arteries at the base of the brain and the dural  venous sinuses appear patent.       Impression    IMPRESSION:  1. No evidence of acute infarct, hemorrhage, or mass.  2. Brain atrophy and mild white matter changes consistent with  sequelae of small vessel ischemic disease.  3. Opacified left mastoid air cells and middle ear suggesting effusion  versus otomastoiditis.      MAEVE OWEN MD   CBC with platelets differential   Result Value Ref Range    WBC 8.5 4.0 - 11.0 10e9/L    RBC Count 3.96 3.8 - 5.2 10e12/L    Hemoglobin 11.9 11.7 - 15.7 g/dL    Hematocrit 36.4 35.0 - 47.0 %    MCV 92 78 - 100 fl    MCH 30.1 26.5 - 33.0 pg    MCHC 32.7 31.5 - 36.5 g/dL    RDW 13.5 10.0 - 15.0 %    Platelet Count 289 150 - 450 10e9/L    Diff Method Automated Method     % Neutrophils 77.8 %    % Lymphocytes 12.5 %    % Monocytes 8.5 %    % Eosinophils 0.4 %    % Basophils 0.2 %    % Immature Granulocytes 0.6 %    Nucleated RBCs 0 0 /100    Absolute Neutrophil 6.6 1.6 - 8.3 10e9/L    Absolute Lymphocytes 1.1 0.8 - 5.3 10e9/L    Absolute Monocytes 0.7 0.0 - 1.3 10e9/L    Absolute Eosinophils 0.0 0.0 - 0.7 10e9/L    Absolute Basophils 0.0 0.0 - 0.2 10e9/L    Abs Immature Granulocytes 0.1 0 - 0.4 10e9/L    Absolute Nucleated RBC 0.0    Basic metabolic panel   Result Value Ref Range    Sodium 133 133  - 144 mmol/L    Potassium 3.4 3.4 - 5.3 mmol/L    Chloride 98 94 - 109 mmol/L    Carbon Dioxide 22 20 - 32 mmol/L    Anion Gap 13 3 - 14 mmol/L    Glucose 98 70 - 99 mg/dL    Urea Nitrogen 14 7 - 30 mg/dL    Creatinine 0.71 0.52 - 1.04 mg/dL    GFR Estimate 79 >60 mL/min/1.7m2    GFR Estimate If Black >90 >60 mL/min/1.7m2    Calcium 9.3 8.5 - 10.1 mg/dL   Magnesium   Result Value Ref Range    Magnesium 2.2 1.6 - 2.3 mg/dL   Calcium ionized   Result Value Ref Range    Calcium Ionized 4.8 4.4 - 5.2 mg/dL   EKG 12-lead, tracing only   Result Value Ref Range    Interpretation ECG Click View Image link to view waveform and result    Social Work IP Consult    Narrative    Palak Morales, RN     6/18/2018  2:09 PM  Care Transition Initial Assessment - RN    Reason For Consult: care coordination/care conference, discharge   planning   Met with: Patient and dtr via phone.  DATA   Active Problems:    Fall       Cognitive Status: awake, alert and oriented.  Primary Care Clinic Name: [facility]  Primary Care MD Name: Dr. Hamilton  Contact information and PCP information verified: Yes  Lives With: alone  Living Arrangements: independent living facility, apartment  Quality Of Family Relationships: supportive, involved  Description of Support System: Supportive, Involved   Who is your support system?: Children   Support Assessment: Adequate family and caregiver support   Insurance concerns: No Insurance issues identified  ASSESSMENT  Patient currently receives the following services:  Pt lives in   [facility] w/ housekeeping services and [facility] RN for   medication set-up. Pt is scheduled to move into [facility]   assisted living on 6/25.        Identified issues/concerns regarding health management: PT   evaluated and recommends that pt d/c to home w/ 24/7 assist or   TCU. Per chart review w/ SW, pt should have TCU coverage. Met w/   pt at bedside, she is agreeable to TCU and would like to have    referrals sent to 1)[facility] 2)[facility] 3)[place] 4)[facility]. Pt   would like HE WC transport and a private room, she is aware of   cost and is agreeable.     Pt has been accepted at [facility], HE WC transport arranged for 1600   today. Pt will need to provide $400 deposit check for private   room fees upon admission. Pt and dtr updated and agreeable.   Orders entered and faxed to [facility].     PLAN  Patient given options and choices for discharge Yes .  Patient/family is agreeable to the plan?  Yes  Patient anticipates discharging to TCU .  Resources List: Transitional Care  Other Resources: Transportation Services  Patient anticipates needs for home equipment: No  Plan/Disposition: TCU   Appointments: Will be seen by rounding provider upon admission.         Care  (CTS) will continue to follow as   needed.    Palak Morales RN BSN CTS   [phone]  Care Transitions Team  [facility][KB1.2]                 Ariella Vela PA-C[KB1.1]       Revision History        User Key Date/Time User Provider Type Action    > KB1.2 6/18/2018  3:30 PM Kesha Vela PA-C Physician Assistant - MOHINDER Sign     KB1.1 6/18/2018  2:45 PM Kesha Vela PA-C Physician Assistant - MOHINDER                   Procedure Notes     No notes of this type exist for this encounter.         Progress Notes - Therapies (Notes from 06/16/18 through 06/19/18)      Progress Notes by Hubert Gonzalez PT at 6/18/2018 10:09 AM     Author:  Hubert Gonzalez PT Service:  (none) Author Type:  Physical Therapist    Filed:  6/18/2018 10:09 AM Date of Service:  6/18/2018 10:09 AM Creation Time:  6/18/2018 10:09 AM    Status:  Signed :  Hubert Gonzalez PT (Physical Therapist)            06/18/18 0900   Quick Adds   Type of Visit Initial PT Evaluation   Living Environment   Lives With alone   Living Arrangements independent living facility;apartment   Number of Stairs to Enter Home 0   Living Environment Comment Patient reports she  has assist with bathing and cleaning.    Self-Care   Usual Activity Tolerance good  (Able to walk to community dinners with 4WW)   Current Activity Tolerance poor   Equipment Currently Used at Home grab bar;walker, rolling;tub bench   Functional Level Prior   Ambulation 1-->assistive equipment  (4WW)   Transferring 0-->independent   Toileting 0-->independent   Fall history within last six months yes   Number of times patient has fallen within last six months 1   General Information   Onset of Illness/Injury or Date of Surgery - Date 06/17/18   Referring Physician MD Moses   Patient/Family Goals Statement To get stronger; to go to TCU   Pertinent History of Current Problem (include personal factors and/or comorbidities that impact the POC) Patient admitted s/p fall with progressive generalized weakness.   Precautions/Limitations fall precautions   Cognitive Status Examination   Orientation orientation to person, place and time   Level of Consciousness alert   Follows Commands and Answers Questions able to follow single-step instructions   Personal Safety and Judgment impaired  (Needs assist and encouragement for safe mobility)   Pain Assessment   Patient Currently in Pain Yes, see Vital Sign flowsheet   Posture    Posture Forward head position;Protracted shoulders;Kyphosis   Range of Motion (ROM)   ROM Comment Decreased bilateral UE AROM; pt reports bilateral shoulder pain from fall.    Strength   Strength Comments Decreased generally; difficulty with ambulating functional distances   Bed Mobility   Bed Mobility Comments Supine to/from sit with SBA; increased time. Able to perform supine bridge to reposition without assist.    Transfer Skills   Transfer Comments Sit to/from stand with CGA/min A depending on surface,.    Gait   Gait Comments Ambulates 12' x 2 reps with FWW and min A x1, CGA of another. Pt demonstrating decreased step length, gait speed. Shaking; no ozzie LE buckling. Pt very emotional and scared to  "participate in ambulation.   Balance   Balance Comments Needs FWW and external A to perform dynamic ambulation. Needs total A for standing pericares and brief managment as she requires bilat UE support on FWW for safe static standing balance.    Clinical Impression   Criteria for Skilled Therapeutic Intervention evaluation only  (OBS status. )   Clinical Presentation Stable/Uncomplicated   Clinical Presentation Rationale Stable.    Clinical Decision Making (Complexity) Low complexity   Anticipated Discharge Disposition Transitional Care Facility   Risk & Benefits of therapy have been explained Yes   Patient, Family & other staff in agreement with plan of care Yes   [facility] AM-PAC TM \"6 Clicks\"   2016, Trustees of [facility], under license to [employer].  All rights reserved.   6 Clicks Short Forms Basic Mobility Inpatient Short Form   [facility] AM-PAC  \"6 Clicks\" V.2 Basic Mobility Inpatient Short Form   1. Turning from your back to your side while in a flat bed without using bedrails? 4 - None   2. Moving from lying on your back to sitting on the side of a flat bed without using bedrails? 4 - None   3. Moving to and from a bed to a chair (including a wheelchair)? 3 - A Little   4. Standing up from a chair using your arms (e.g., wheelchair, or bedside chair)? 3 - A Little   5. To walk in hospital room? 3 - A Little   6. Climbing 3-5 steps with a railing? 3 - A Little   Basic Mobility Raw Score (Score out of 24.Lower scores equate to lower levels of function) 20   Total Evaluation Time   Total Evaluation Time (Minutes) 19[BB1.1]        Revision History        User Key Date/Time User Provider Type Action    > BB1.1 6/18/2018 10:09 AM Hubert Gonzalez PT Physical Therapist Sign                                                      INTERAGENCY TRANSFER FORM - LAB / IMAGING / EKG / EMG RESULTS   6/17/2018                       [facility] OBSERVATION DEPARTMENT: [phone]          "   Unresulted Labs     None         Lab Results - 3 Days      UA with Microscopic [[id number]] (Abnormal)  Resulted: 06/19/18 0917, Result status: Final result    Ordering provider: Bhavana Griffin PA-C  06/19/18 0714 Resulting lab: [facility]    Specimen Information    Type Source Collected On   Unspecified Urine Urine catheter 06/19/18 0850          Components       Value Reference Range Flag Lab   Color Urine Yellow   FrRdHs   Appearance Urine Clear   FrRdHs   Glucose Urine Negative NEG^Negative mg/dL  FrRdHs   Bilirubin Urine Negative NEG^Negative  FrRdHs   Ketones Urine Negative NEG^Negative mg/dL  FrRdHs   Specific Gravity Urine 1.014 1.003 - 1.035  FrRdHs   Blood Urine Negative NEG^Negative  FrRdHs   pH Urine 6.0 5.0 - 7.0 pH  FrRdHs   Protein Albumin Urine Negative NEG^Negative mg/dL  FrRdHs   Urobilinogen mg/dL 0.0 0.0 - 2.0 mg/dL  FrRdHs   Nitrite Urine Negative NEG^Negative  FrRdHs   Leukocyte Esterase Urine Negative NEG^Negative  FrRdHs   Source Unspecified Urine   FrRdHs   WBC Urine 1 0 - 5 /HPF  FrRdHs   RBC Urine 6 0 - 2 /HPF H FrRdHs   Mucous Urine Present NEG^Negative /LPF A FrRdHs            Magnesium [[id number]]  Resulted: 06/17/18 0938, Result status: Final result    Ordering provider: Bri Thompson MD  06/17/18 0844 Resulting lab: [facility]    Specimen Information    Type Source Collected On   Blood  06/17/18 0909          Components       Value Reference Range Flag Lab   Magnesium 2.2 1.6 - 2.3 mg/dL  FrRdHs            Basic metabolic panel [[id number]]  Resulted: 06/17/18 0938, Result status: Final result    Ordering provider: Bri Thompson MD  06/17/18 0897 Resulting lab: [facility]    Specimen Information    Type Source Collected On   Blood  06/17/18 0909          Components       Value Reference Range Flag Lab   Sodium 133 133 - 144 mmol/L  FrRdHs   Potassium 3.4 3.4 - 5.3 mmol/L  FrRdHs   Chloride 98 94 - 109 mmol/L  FrRdHs    Carbon Dioxide 22 20 - 32 mmol/L  FrRdHs   Anion Gap 13 3 - 14 mmol/L  FrRdHs   Glucose 98 70 - 99 mg/dL  FrRdHs   Urea Nitrogen 14 7 - 30 mg/dL  FrRdHs   Creatinine 0.71 0.52 - 1.04 mg/dL  FrRdHs   GFR Estimate 79 >60 mL/min/1.7m2  FrRdHs   Comment:  Non  GFR Calc   GFR Estimate If Black >90 >60 mL/min/1.7m2  FrRdHs   Comment:  African American GFR Calc   Calcium 9.3 8.5 - 10.1 mg/dL  FrRdHs            Calcium ionized [[id number]]  Resulted: 06/17/18 0923, Result status: Final result    Ordering provider: Bri Thompson MD  06/17/18 0848 Resulting lab: [facility]    Specimen Information    Type Source Collected On   Blood  06/17/18 0909          Components       Value Reference Range Flag Lab   Calcium Ionized 4.8 4.4 - 5.2 mg/dL  FrRdHs            CBC with platelets differential [[id number]]  Resulted: 06/17/18 0922, Result status: Final result    Ordering provider: Bri Thompson MD  06/17/18 0899 Resulting lab: [facility]    Specimen Information    Type Source Collected On   Blood  06/17/18 0909          Components       Value Reference Range Flag Lab   WBC 8.5 4.0 - 11.0 10e9/L  FrRdHs   RBC Count 3.96 3.8 - 5.2 10e12/L  FrRdHs   Hemoglobin 11.9 11.7 - 15.7 g/dL  FrRdHs   Hematocrit 36.4 35.0 - 47.0 %  FrRdHs   MCV 92 78 - 100 fl  FrRdHs   MCH 30.1 26.5 - 33.0 pg  FrRdHs   MCHC 32.7 31.5 - 36.5 g/dL  FrRdHs   RDW 13.5 10.0 - 15.0 %  FrRdHs   Platelet Count 289 150 - 450 10e9/L  FrRdHs   Diff Method Automated Method   FrRdHs   % Neutrophils 77.8 %  FrRdHs   % Lymphocytes 12.5 %  FrRdHs   % Monocytes 8.5 %  FrRdHs   % Eosinophils 0.4 %  FrRdHs   % Basophils 0.2 %  FrRdHs   % Immature Granulocytes 0.6 %  FrRdHs   Nucleated RBCs 0 0 /100  FrRdHs   Absolute Neutrophil 6.6 1.6 - 8.3 10e9/L  FrRdHs   Absolute Lymphocytes 1.1 0.8 - 5.3 10e9/L  FrRdHs   Absolute Monocytes 0.7 0.0 - 1.3 10e9/L  FrRdHs   Absolute Eosinophils 0.0 0.0 - 0.7 10e9/L  FrRdHs    Absolute Basophils 0.0 0.0 - 0.2 10e9/L  FrRdHs   Abs Immature Granulocytes 0.1 0 - 0.4 10e9/L  FrRdHs   Absolute Nucleated RBC 0.0   FrRdHs            Testing Performed By     Lab - Abbreviation Name Director Address Valid Date Range    12 - FrRdHs [facility] Unknown [address] [place] [address] 05/08/15 1057 - Present               Imaging Results - 3 Days      MR Brain w/o & w Contrast [[id number]]  Resulted: 06/17/18 1253, Result status: Final result    Ordering provider: Bri Thompson MD  06/17/18 1019 Resulted by: John Owen MD    Performed: 06/17/18 1117 - 06/17/18 1151 Resulting lab: RADIOLOGY RESULTS    Narrative:       MRI BRAIN WITHOUT AND WITH CONTRAST  6/17/2018 11:51 AM    HISTORY:  Right foot numbness; fell this morning. Right foot drop.     TECHNIQUE:  Multiplanar, multisequence MRI of the brain without and  with 5 mL Gadavist.    COMPARISON: CT scan today.    FINDINGS:  There is generalized atrophy of the brain. White matter T2  hyperintensities are seen in the cerebral hemispheres consistent with  sequelae of small vessel ischemic disease.  There is no evidence of  hemorrhage, mass, acute infarct, or anomaly.  There are no gadolinium  enhancing lesions.    The facial structures appear normal. Left mastoid air cells and middle  ear are opacified. The arteries at the base of the brain and the dural  venous sinuses appear patent.       Impression:       IMPRESSION:  1. No evidence of acute infarct, hemorrhage, or mass.  2. Brain atrophy and mild white matter changes consistent with  sequelae of small vessel ischemic disease.  3. Opacified left mastoid air cells and middle ear suggesting effusion  versus otomastoiditis.      JOHN OWEN MD      CT Head w/o Contrast [[id number]]  Resulted: 06/17/18 1009, Result status: Final result    Ordering provider: Bri Thompson MD  06/17/18 0858 Resulted by: John Owen MD    Performed: 06/17/18 0943 -  06/17/18 0948 Resulting lab: RADIOLOGY RESULTS    Narrative:       CT SCAN OF THE HEAD WITHOUT CONTRAST   6/17/2018 9:48 AM     HISTORY: Trauma, head injury.     TECHNIQUE:  Axial images of the head and coronal reformations without  IV contrast material. Radiation dose for this scan was reduced using  automated exposure control, adjustment of the mA and/or kV according  to patient size, or iterative reconstruction technique.    COMPARISON: 5/3/2017.    FINDINGS:  There is generalized atrophy of the brain.  There is low  attenuation in the white matter of the cerebral hemispheres consistent  with sequelae of small vessel ischemic disease. There is no evidence  of intracranial hemorrhage, mass, acute infarct or anomaly.     Visualized sinuses are clear. Left mastoid air cells and middle ear  are completely opacified, worse than on the previous exam. However, no  fracture is seen in the left temporal bone. There is no evidence of  trauma.      Impression:       IMPRESSION:   1. No evidence of acute trauma.  2.  Atrophy of the brain.  White matter changes consistent with  sequelae of small vessel ischemic disease. This is unchanged.  3. Worsening opacification of the left mastoid air cells and of the  left middle ear since the previous exam.      MAEVE OWEN MD      Testing Performed By     Lab - Abbreviation Name Director Address Valid Date Range    104 - Rad Rslts RADIOLOGY RESULTS Unknown Unknown 02/16/05 1553 - Present            Encounter-Level Documents:     There are no encounter-level documents.      Order-Level Documents:     There are no order-level documents.

## 2018-06-17 NOTE — IP AVS SNAPSHOT
[facility] Observation Department    [address]    [facility] [address]    Phone:  [phone]                                       After Visit Summary   6/17/2018    Ana Luisa Lee    MRN: [id number]           After Visit Summary Signature Page     I have received my discharge instructions, and my questions have been answered. I have discussed any challenges I see with this plan with the nurse or doctor.    ..........................................................................................................................................  Patient/Patient Representative Signature      ..........................................................................................................................................  Patient Representative Print Name and Relationship to Patient    ..................................................               ................................................  Date                                            Time    ..........................................................................................................................................  Reviewed by Signature/Title    ...................................................              ..............................................  Date                                                            Time

## 2018-06-17 NOTE — ED TRIAGE NOTES
Patient BIBA for evaluation of fall this morning. Patient lives in independent living and fell this morning, c/o weakness and dizziness and was unable to get herself up. Denies injury. States she does have chronic pain.

## 2018-06-17 NOTE — PROGRESS NOTES
ROOM # [id number]    Living Situation (if not independent, order SW consult): Independent apartment  Facility name: [facility]  : Windy (daughter)    Activity level at baseline: Independent with walker  Activity level on admit: A2, unable to walk in ED per patient      Patient registered to observation; given Patient Bill of Rights; given the opportunity to ask questions about observation status and their plan of care.  Patient has been oriented to the observation room, bathroom and call light is in place.    Discussed discharge goals and expectations with patient/family.

## 2018-06-17 NOTE — H&P
Admitted:     06/17/2018      PRIMARY CARE PHYSICIAN:  Candis Hamilton MD      Information is obtained from the patient.      CHIEF COMPLAINT:  Fall and generalized weakness.      HISTORY OF PRESENT ILLNESS:  Ana Luisa Lee is a 78-year-old female with a history of hypertension, dyslipidemia, COPD, hypothyroidism, ambulatory dysfunction using a walker for the past year associated with increasing generalized weakness for which she was planning to move into an assisted living facility  next Monday from an independent living facility.  This morning she felt her usual self.  She had an accidental fall while reaching for her medications on kitchen cabinet.  She hit her head against a cabinet, had no loss of consciousness and then also hit her shoulder.  She was unable to get up and therefore called EMS.  She denied any new focal weakness.  She has not had any recent fevers, cough or illnesses.  She has been on a tapering dose of prednisone for many months.  She was started on duloxetine on 06/14, but states that she has had intermittent dizziness even prove prior to starting this medication.        In the emergency room, she was evaluated and had a head CT and MRI of the head that showed no acute intracranial process, but showed brain atrophy and mild white matter changes and opacification of the left mastoid air cells and middle ear, suggesting effusion versus order mastoiditis.  Due to her inability to ambulate independently, the hospitalist called in to admit this patient.      PAST MEDICAL HISTORY:   1.  COPD/asthma on a tapering dose of prednisone that she has been on for many years with paroxysmal SVT and PVCs.   2.  Hypertension.   3.  Hypothyroidism.   4.  Foot deformity.   5.  Esophageal stricture.   6.  Arthritis.   7.  Anemia.      PAST SURGICAL HISTORY:   1.  Tonsillectomy.   2.  Hysterectomy.   3.  Status post EP study that showed SVT not induced and PVCs.     4.  Bunionectomy and metatarsal  effusion on the left foot.      SOCIAL HISTORY:  She is , has 2 children, lives alone in independent living, with plans to move to an assisted living next Monday.  She uses a walker to ambulate and has been using this for a year and a half.  There is a past history of tobacco use, quit in the 1980s.  Denies alcohol use and is DNR/DNI.      MEDICATIONS:  Waiting for Pharmacy to update the medications.      ALLERGIES:  HYDRALAZINE, PENICILLIN, MELOXICAM, METOPROLOL AND AMLODIPINE.      REVIEW OF SYSTEMS:  As in history of present illness, otherwise the 10-point review of systems was reviewed and negative.      PHYSICAL EXAMINATION:   GENERAL:  She is a delightful female, awake, alert, oriented x3, speech intact, lying in the ER gurney in no acute respiratory distress.   VITAL SIGNS:  Temperature is 98.3, pulse is 87, respirations 14, blood pressure 133/83, O2 saturation initially was 81%, transiently and then increased to 99%.  She is a frail-appearing female.   NECK:  Supple, no elevated JVD, thyromegaly or lymphadenopathy.  Carotids present without any bruits.   HEENT:  Eyes anicteric.  Ears:  She has wax in both ears, left more than the right and unable to see anything further.  No tenderness over the ear or the TMJ joint.  Oral mucosa is moist without ulcerations.   SKIN:  She has a superficial skin tear above her right elbow.  Otherwise, no other rash noted.   LUNGS:  Diminished breath sounds diffusely without any rhonchi, rales or wheezing.   HEART:  Regular S1, S2 with a systolic murmur heard all over the precordium.   ABDOMEN:  Nontender, nondistended, soft, bowel sounds present, no organomegaly or masses felt.  Bowel sounds are present.   EXTREMITIES:  With no significant pitting edema.  She has a well-healed surgical scar on her left first toe.  She has deformity of her right foot at the first toe.  There is no tenderness.   NEUROLOGIC:  Speech is coherent, awake, alert, oriented x3.  Strength is  equal in bilateral upper extremities.  There is slight decreased strength in her right foot compared to the left.  DTRs are normal.  Sensation is normal, symmetrical bilaterally.  Gait was not tested.      LABORATORY DATA:  CBC and BMP are normal.  CT of the head without contrast today showed no acute evidence of trauma, atrophy of the brain, white matter changes consistent with small vessel ischemic disease, which is unchanged.  There is worsening opacification of the left mastoid air cells and of the left middle ear since previous exam.  An MRI of the brain with and without contrast today showed no evidence for acute infarct, hemorrhage or mass.  There is brain atrophy and small vessel ischemic disease changes and opacification of the left mastoid cells and middle ear suggestive of effusion versus mastoiditis.  EKG showed sinus rhythm, rate at 95 per minute with nonspecific ST-T wave changes.      IMPRESSION AND PLAN:  Ms. Ana Luisa Lee is a 78-year-old female who has hypertension, COPD, dyslipidemia, on a tapering dose of prednisone, intermittent dizziness who was started on duloxetine recently and presented with an unwitnessed mechanical fall this morning while reaching for her medications in the kitchen counter.  It is unable to get herself up and was brought in by EMS for further evaluation.      In the emergency room, workup included a negative examination except for her complaints of numbness in her right foot for which she underwent an MRI of the brain and also CT of the head, which was unremarkable for any acute intracranial event.  She was found to have evidence for possible left mastoid air cell effusion versus otomastoiditis.  As the patient was unable to discharge home due to inability to ambulate, the patient was admitted under observation.   1.  Accidental fall/closed head injury, superficial skin laceration on her right elbow.   2.  Generalized weakness progressively increasing.   -- Patient  evaluated with a negative workup for a stroke.  She does have some right foot weakness more than the left.  Question if she has some steroid myopathy as a cause.  The patient will be admitted under observation.  We will have physical therapy evaluate her and have  consulted for disposition.     3.  COPD/asthma on a tapering dose of prednisone, stable.  We will start tapering dose of prednisone at 5 mg today with and continue her plan for taper as per PCP.   4.  History of hypertension, dyslipidemia, hypothyroidism, stable.  Continue prior to admission medications.   5.  Superficial skin tear, continue local treatment, keeping it covered.   6.  Past abnormal CT, MRI showing possible left ear effusion versus mastoiditis.  The patient has negative examination.  I will start her on Flonase and Allegra and have her followup with ENT for further recommendations.  Hold off on antibiotics at present due to her negative exam.      CODE STATUS:  DNR/DNI, as discussed with the patient.         RAFI WHITLEY MD             D: 2018   T: 2018   MT: BESSY      Name:     NAM DAS   MRN:      [id number]        Account:      [id number]   :      1940        Admitted:     2018                   Document: [id number]

## 2018-06-17 NOTE — ED PROVIDER NOTES
History     Chief Complaint:  Fall and Weakness     HPI   Ana Luisa Lee is a 78 year old female with a history of COPD, hypertension, and hyperlipidemia who presents via EMS for evaluation of a fall and weakness. The patient reports that about a week ago she started to develop intermittent dizziness, and she has felt somewhat unsteady on her feet since then. Several days ago, the patient started to develop new generalized weakness, and she feels that she has had more difficulty getting herself up since then. She does use a walker at baseline and over the last few days she has had difficulty walking even with this. This morning, the patient was standing up and reaching for her medications when she accidentally fell to the ground. She did strike her head on the ground but did not lose consciousness. She did sustain a skin tear to her right elbow but denies any significant pain in the right arm or elbow. After the fall, the patient was unable to get up off the ground and called EMS to help her get up. She believes that she was on the ground for about an hour before EMS arrived to help her. The patient denies any new injury or pain since the fall, however, due to this fall and weakness EMS brought her into the ED for evaluation. Currently in the ED, the patient complains only of some pain in her neck, but she notes that she has had this at baseline and it has not been worse since the fall. Otherwise, the patient denies any recent fever, cough, shortness of breath, chest pain, palpitations, abdominal pain, nausea, vomiting, diarrhea, dysuria, hematuria, urinary frequency, back pain, headache, speech difficulties, or visual disturbance. Notably, the patient reports that she started Duloxetine on 6/14, and she expresses concern that her symptoms could be related to this, although she notes that her dizziness did begin before she started this. Additionally, the patient reports that she currently lives  independently but is moving into assisted living next week.     Later in the patient's ED course, the patient complained of numbness in her right foot that began approximately a week ago, and today she felt like she could not use her right foot properly. She denies foot trauma, rash, discoloration, swelling.     Allergies:  Hydralazine  Penicillin G   Meloxicam  Metoprolol  Norvasc      Medications:    ACETAMINOPHEN PO  albuterol (ALBUTEROL) 108 (90 BASE) MCG/ACT Inhaler  bisacodyl (DULCOLAX) 10 MG Suppository  budesonide (PULMICORT FLEXHALER) 180 MCG/ACT inhaler  calcium 600 MG tablet  cholecalciferol (VITAMIN D) 1000 UNIT tablet  cyanocobalamin (B-12 TR) 1000 MCG TBCR  ferrous sulfate (IRON) 325 (65 Fe) MG tablet  gemfibrozil (LOPID) 600 MG tablet  levalbuterol (XOPENEX) 1.25 MG/3ML neb solution  levothyroxine (SYNTHROID/LEVOTHROID) 25 MCG tablet  montelukast (SINGULAIR) 10 MG tablet  Multiple Vitamins-Minerals (MULTIVITAMIN OR)  omeprazole (PRILOSEC) 40 MG capsule  polyethylene glycol (MIRALAX/GLYCOLAX) Packet  predniSONE (DELTASONE) 2.5 MG tablet  prochlorperazine (COMPAZINE) 5 MG tablet  roflumilast (DALIRESP) 500 MCG TABS tablet  salmeterol (SEREVENT) 50 MCG/DOSE diskus inhaler  traMADol (ULTRAM) 50 MG tablet  umeclidinium (INCRUSE ELLIPTA) 62.5 MCG/INH oral inhaler  Duloxetine    Past Medical History:    Anemia  Arthritis of hand  Asthma   Chronic intermittent steroid use  COPD   Esophageal stricture  Foot deformity  Hypertension  Hyperlipidemia  Hypothyroidism  Osteoporosis  Paroxysmal supraventricular tachycardia  Premature ventricular contraction     Past Surgical History:    Bunionectomy lapidus with tarsal metatarsal fusion  EP study, modified  Gyn surgery  Hysterectomy, total abdominal  Tonsillectomy     Family History:    Cerebrovascular disease - Mother  Hypertension - Mother     Social History:  Tobacco use:    Former smoker - 1.00 packs / day for 15 years, quit 1980  Alcohol use:    Positive,  occasionally  Marital status:       Accompanied to ED by:  EMS   Living situation:   The patient lives independently and is moving into assisted living next week.      Review of Systems   Constitutional: Negative for fever.   Eyes: Negative for visual disturbance.   Respiratory: Negative for cough and shortness of breath.    Cardiovascular: Negative for chest pain and palpitations.   Gastrointestinal: Negative for diarrhea, nausea and vomiting.   Genitourinary: Negative for dysuria, frequency and hematuria.   Musculoskeletal: Positive for neck pain (baseline). Negative for back pain.   Skin: Positive for wound (skin tear, right elbow).   Neurological: Positive for dizziness, weakness (generalized) and numbness (right foot). Negative for syncope, speech difficulty and headaches.   All other systems reviewed and are negative.    Physical Exam   First Vitals:  BP: 147/89  Heart Rate: 96  Temp: 98.3  F (36.8  C)  Resp: 20  SpO2: 96 %      Physical Exam  General: Elderly female sitting upright.   Eyes: PERRL, Conjunctive within normal limits  HENT: Moist mucous membranes, oropharynx clear. No palpable scalp hematoma or tenderness.   Neck: Non-tender.   CV: Normal S1S2, no murmur, rub or gallop. Regular rate and rhythm  Resp: Clear to auscultation bilaterally, no wheezes, rales or rhonchi. Normal respiratory effort.  GI: Abdomen is soft, nontender and nondistended. No palpable masses. No rebound or guarding.  MSK: No edema. Superficial skin tear of the right elbow with ecchymosis. Non-tender to palpation of the right upper extremity including the elbow. Normal active range of motion.  Skin: Warm and dry. No rashes or lesions or ecchymoses on visible skin.  Neuro: GCS 15. Alert and oriented. Responds appropriately to all questions and commands. No focal findings appreciated. Normal muscle tone.  Psych: Normal mood and affect. Pleasant.     Emergency Department Course   ECG (8:53:17):  Indication: Screening for  cardiovascular disease.   Rate 95 bpm. WA interval 172 ms. QRS duration 88 ms. QT/QTc 376/472 ms. P-R-T axes 58 13 64.   Interpretation: Normal sinus rhythm, Normal ECG  Agree with computer interpretation. Yes   Interpreted at 0855 by Dr. Thompson.      Imaging:  Radiographic findings were communicated with the patient who voiced understanding of the findings.    CT Head w/o Contrast:  IMPRESSION:   1. No evidence of acute trauma.  2.  Atrophy of the brain.  White matter changes consistent with sequelae of small vessel ischemic disease. This is unchanged.  3. Worsening opacification of the left mastoid air cells and of the left middle ear since the previous exam.  Per radiology.     MR Brain w/o & w Contrast:  IMPRESSION:  1. No evidence of acute infarct, hemorrhage, or mass.  2. Brain atrophy and mild white matter changes consistent with sequelae of small vessel ischemic disease.  3. Opacified left mastoid air cells and middle ear suggesting effusion versus otomastoiditis.  Per radiology.     Laboratory:  CBC: WNL (WBC 8.5, HGB 11.9, )  BMP: WNL (Creatinine 0.71)  Magnesium: 2.2  Calcium ionized: 4.8       Interventions:  1053 Lactated ringers 1,000 mL IV     Emergency Department Course:  Patient was brought to the ED via EMS.     Nursing notes and vitals reviewed.  0850: I performed an exam of the patient as documented above.     1015: I updated and reassessed the patient. Patient now noting that she has had right foot numbness for the past few days. Examination reveals slightly decreased sensation diffusely to light touch over the entire dorsum of the right foot. Strength equal and intact.     1206: I updated and reassessed the patient. When I asked her about her ear based on MRI findings, she reported intermittent ear pain in the past but none now. Her left TM is not currently opacified or erythematous on exam.     1317: I spoke with Dr. Lopes of the hospitalist service regarding patient's presentation,  "findings, and plan of care.     Patient reassessed. Steri-strip closure of elbow skin tear. Patient denies any new concerns.    Findings and plan explained to the Patient who consents to admission. Discussed the patient with Dr. Lopes, who will admit the patient to a obs bed for further monitoring, evaluation, and treatment.     Impression & Plan      Medical Decision Making:  Ana Luisa \"Bitty\" Ivy Lee is a 78 year old female who lives independently and is scheduled for assisted living in one week who presents to the emergency department for evaluation dizziness which has been bothering her for one week with a generalized weak feeling and a fall today. She noted later that she has also been having right foot numbness and there was decreased sensation to light touch of the dorsum of the right foot compared to the left without obvious dermatomal pattern. Ultimately, advanced imaging of the brain did not show any acute abnormalities such as CVA or bleed. She did have findings on imaging of what could be consistent with otomastoiditis but clinically she is not having ear pain, hearing change, or obvious inflammation on examination. She had reported in the recent past intermittent ear pain but none currently. Ultimately, she was unable to walk without assistance and felt ongoing weakness and dizziness as well as the somewhat vague sensation in the foot that now seemed to include the left foot as well. She was not noting any back pain or tenderness, and I do not suspect acute spinal cord stenosis as the cause of her symptoms at this time. She will be admitted for further care given her inability to walk in the setting of recent fall. All questions are answered prior to admission.     Diagnosis:    ICD-10-CM   1. Dizziness R42   2. Numbness of right foot R20.0   3. Generalized weakness R53.1   4. Fall, initial encounter W19.XXXA   5. Skin tear of right elbow without complication, initial encounter S51.011A "       Disposition:  Admitted to Dr. Lopes of the hospitalist service in stable condition.       I, Beau Villarreal, am serving as a scribe at 8:50 AM on 6/17/2018 to document services personally performed by Dr. Thompson, based on my observations and the provider's statements to me.    [facility] EMERGENCY DEPARTMENT       Bri Thompson MD  06/17/18 0642

## 2018-06-17 NOTE — LETTER
Key Recommendations:    Pt admitted for falls and weakness. Pt lives in an apartment w/ [facility] RN for med set-up, she is scheduled to move into [facility] on 6/25. She is discharging to [facility] TCU for strengthening with plan to move into senior living from TCU.     Palak Morales    AVS/Discharge Summary is the source of truth; this is a helpful guide for improved communication of patient story

## 2018-06-17 NOTE — ED NOTES
Bed: ED29  Expected date: 6/17/18  Expected time:   Means of arrival:   Comments:  BV-3  78F  ETA5

## 2018-06-18 ENCOUNTER — APPOINTMENT (OUTPATIENT)
Dept: PHYSICAL THERAPY | Facility: CLINIC | Age: 78
End: 2018-06-18
Attending: INTERNAL MEDICINE
Payer: COMMERCIAL

## 2018-06-18 ENCOUNTER — PATIENT OUTREACH (OUTPATIENT)
Dept: GERIATRIC MEDICINE | Facility: CLINIC | Age: 78
End: 2018-06-18

## 2018-06-18 LAB — INTERPRETATION ECG - MUSE: NORMAL

## 2018-06-18 PROCEDURE — 25000125 ZZHC RX 250: Performed by: INTERNAL MEDICINE

## 2018-06-18 PROCEDURE — 99225 ZZC SUBSEQUENT OBSERVATION CARE,LEVEL II: CPT | Performed by: PHYSICIAN ASSISTANT

## 2018-06-18 PROCEDURE — 97161 PT EVAL LOW COMPLEX 20 MIN: CPT | Mod: GP

## 2018-06-18 PROCEDURE — G0378 HOSPITAL OBSERVATION PER HR: HCPCS

## 2018-06-18 PROCEDURE — 25000132 ZZH RX MED GY IP 250 OP 250 PS 637: Performed by: PHYSICIAN ASSISTANT

## 2018-06-18 PROCEDURE — 40000275 ZZH STATISTIC RCP TIME EA 10 MIN

## 2018-06-18 PROCEDURE — 94640 AIRWAY INHALATION TREATMENT: CPT

## 2018-06-18 PROCEDURE — 40000193 ZZH STATISTIC PT WARD VISIT

## 2018-06-18 PROCEDURE — 25000132 ZZH RX MED GY IP 250 OP 250 PS 637: Performed by: INTERNAL MEDICINE

## 2018-06-18 RX ORDER — TAMSULOSIN HYDROCHLORIDE 0.4 MG/1
0.4 CAPSULE ORAL DAILY
Status: DISCONTINUED | OUTPATIENT
Start: 2018-06-18 | End: 2018-06-19 | Stop reason: HOSPADM

## 2018-06-18 RX ORDER — FLUTICASONE PROPIONATE 50 MCG
1 SPRAY, SUSPENSION (ML) NASAL DAILY
Qty: 1 BOTTLE | DISCHARGE
Start: 2018-06-19 | End: 2018-08-02

## 2018-06-18 RX ORDER — TRAMADOL HYDROCHLORIDE 50 MG/1
50 TABLET ORAL 3 TIMES DAILY PRN
Status: DISCONTINUED | OUTPATIENT
Start: 2018-06-18 | End: 2018-06-19 | Stop reason: HOSPADM

## 2018-06-18 RX ORDER — TRAMADOL HYDROCHLORIDE 50 MG/1
50 TABLET ORAL 3 TIMES DAILY
Qty: 12 TABLET | Refills: 0 | Status: SHIPPED | DISCHARGE
Start: 2018-06-18 | End: 2018-06-19

## 2018-06-18 RX ORDER — FEXOFENADINE HCL 60 MG/1
60 TABLET, FILM COATED ORAL DAILY
Qty: 60 TABLET | DISCHARGE
Start: 2018-06-19 | End: 2018-07-26

## 2018-06-18 RX ORDER — TAMSULOSIN HYDROCHLORIDE 0.4 MG/1
0.4 CAPSULE ORAL DAILY
Qty: 14 CAPSULE | Refills: 0 | DISCHARGE
Start: 2018-06-18 | End: 2018-06-20

## 2018-06-18 RX ORDER — TRAMADOL HYDROCHLORIDE 50 MG/1
50 TABLET ORAL 3 TIMES DAILY PRN
Qty: 15 TABLET | Refills: 0 | Status: SHIPPED | DISCHARGE
Start: 2018-06-18 | End: 2018-06-19

## 2018-06-18 RX ORDER — ACETAMINOPHEN 500 MG
1000 TABLET ORAL 3 TIMES DAILY
DISCHARGE
Start: 2018-06-18

## 2018-06-18 RX ADMIN — OMEPRAZOLE 40 MG: 20 CAPSULE, DELAYED RELEASE ORAL at 07:51

## 2018-06-18 RX ADMIN — FEXOFENADINE 60 MG: 60 TABLET, FILM COATED ORAL at 07:51

## 2018-06-18 RX ADMIN — Medication 500 MG: at 07:51

## 2018-06-18 RX ADMIN — CYANOCOBALAMIN TAB 1000 MCG 1000 MCG: 1000 TAB at 07:51

## 2018-06-18 RX ADMIN — GEMFIBROZIL 600 MG: 600 TABLET ORAL at 20:42

## 2018-06-18 RX ADMIN — LEVALBUTEROL HYDROCHLORIDE 1.25 MG: 1.25 SOLUTION RESPIRATORY (INHALATION) at 21:25

## 2018-06-18 RX ADMIN — VITAMIN D, TAB 1000IU (100/BT) 1000 UNITS: 25 TAB at 07:51

## 2018-06-18 RX ADMIN — TRAMADOL HYDROCHLORIDE 50 MG: 50 TABLET, COATED ORAL at 14:16

## 2018-06-18 RX ADMIN — PREDNISONE 5 MG: 5 TABLET ORAL at 07:51

## 2018-06-18 RX ADMIN — TRAMADOL HYDROCHLORIDE 50 MG: 50 TABLET, COATED ORAL at 07:51

## 2018-06-18 RX ADMIN — ROFLUMILAST 500 MCG: 500 TABLET ORAL at 07:51

## 2018-06-18 RX ADMIN — LEVALBUTEROL HYDROCHLORIDE 1.25 MG: 1.25 SOLUTION RESPIRATORY (INHALATION) at 11:16

## 2018-06-18 RX ADMIN — DULOXETINE HYDROCHLORIDE 20 MG: 20 CAPSULE, DELAYED RELEASE ORAL at 07:51

## 2018-06-18 RX ADMIN — LEVALBUTEROL HYDROCHLORIDE 1.25 MG: 1.25 SOLUTION RESPIRATORY (INHALATION) at 08:47

## 2018-06-18 RX ADMIN — MULTIPLE VITAMINS W/ MINERALS TAB 1 TABLET: TAB at 07:51

## 2018-06-18 RX ADMIN — FERROUS SULFATE TAB 325 MG (65 MG ELEMENTAL FE) 325 MG: 325 (65 FE) TAB at 07:51

## 2018-06-18 RX ADMIN — TAMSULOSIN HYDROCHLORIDE 0.4 MG: 0.4 CAPSULE ORAL at 16:04

## 2018-06-18 RX ADMIN — TRAMADOL HYDROCHLORIDE 50 MG: 50 TABLET, COATED ORAL at 20:44

## 2018-06-18 RX ADMIN — LEVOTHYROXINE SODIUM 25 MCG: 25 TABLET ORAL at 07:51

## 2018-06-18 RX ADMIN — MONTELUKAST SODIUM 10 MG: 10 TABLET, FILM COATED ORAL at 21:47

## 2018-06-18 RX ADMIN — ACETAMINOPHEN 650 MG: 325 TABLET, FILM COATED ORAL at 14:17

## 2018-06-18 NOTE — CONSULTS
Care Transition Initial Assessment - RN    Reason For Consult: care coordination/care conference, discharge planning   Met with: Patient and dtr via phone.  DATA   Active Problems:    Fall       Cognitive Status: awake, alert and oriented.  Primary Care Clinic Name: [facility]  Primary Care MD Name: Dr. Hamilton  Contact information and PCP information verified: Yes  Lives With: alone  Living Arrangements: independent living facility, apartment  Quality Of Family Relationships: supportive, involved  Description of Support System: Supportive, Involved   Who is your support system?: Children   Support Assessment: Adequate family and caregiver support   Insurance concerns: No Insurance issues identified  ASSESSMENT  Patient currently receives the following services:  Pt lives in [facility] w/ housekeeping services and [facility] RN for medication set-up. Pt is scheduled to move into [facility] assisted living on 6/25.        Identified issues/concerns regarding health management: PT evaluated and recommends that pt d/c to home w/ 24/7 assist or TCU. Per chart review w/ SW, pt should have TCU coverage. Met w/ pt at bedside, she is agreeable to TCU and would like to have referrals sent to 1)[facility] 2)[facility] 3)[facility] 4)[facility]. Pt would like HE WC transport and a private room, she is aware of cost and is agreeable.     Pt has been accepted at [facility], HE WC transport arranged for 1600 today. Pt will need to provide $400 deposit check for private room fees upon admission. Pt and dtr updated and agreeable. Orders entered and faxed to [facility].     PLAN  Patient given options and choices for discharge Yes .  Patient/family is agreeable to the plan?  Yes  Patient anticipates discharging to TCU .  Resources List: Transitional Care  Other Resources: Transportation Services  Patient anticipates needs for home equipment: No  Plan/Disposition: TCU   Appointments: Will be seen by rounding provider upon  admission.       Update 1450: Pt having difficulty voiding, staff straight cathing pt at this time. Plan to see if pt is able to urinate on her own prior to d/c. D/c time and transport changed to 1900, HE, [facility] and dtr Windy updated. Staff will continue to monitor and cancel d/c if needed.     Care  (CTS) will continue to follow as needed.    Palak Morales RN BSN CTS   [phone]  Care Transitions Team  [facility]

## 2018-06-18 NOTE — PLAN OF CARE
Problem: Patient Care Overview  Goal: Plan of Care/Patient Progress Review  Outcome: No Change  PRIMARY DIAGNOSIS: GENERALIZED WEAKNESS      OUTPATIENT/OBSERVATION GOALS TO BE MET BEFORE DISCHARGE  1. Orthostatic performed: No      2. Tolerating PO medications: Yes      3. Return to near baseline physical activity: No      4. Cleared for discharge by consultants (if involved): No      Discharge Planner Nurse   Safe discharge environment identified: No  Barriers to discharge: Yes       Entered by: Aurora Lee 06/18/2018 8:43 AM  Please review provider order for any additional goals.   Nurse to notify provider when observation goals have been met and patient is ready for discharge.      Patient is alert and oriented. Patient complains of some pain in shoulders, scheduled tramadol given. Patient is up with an assist of 2 and says she still feels weak, Patient says she does use a walker at home. Patient was seen by PT, and had recommended TCU. SW following- patient accepted to [facility] TCU. Patient lives at [facility] and is to be moving into an assisted living next week.  Patient is now having troubles with urinary retention and was straight cathed for 700mL, patient started on flomax and patient says she is on a new med for depression that she knows has a side effect of urinary retention- that med was d/cd now by PA. Plan is to re-evaluate and see if patient is able to void later, and if able will be picked up to go to TCU at 7pm- If patient is not able to , will have to stay overnight. Will continue to monitor.

## 2018-06-18 NOTE — PLAN OF CARE
Problem: Patient Care Overview  Goal: Plan of Care/Patient Progress Review  Outcome: Improving  PRIMARY DIAGNOSIS: GENERALIZED WEAKNESS     OUTPATIENT/OBSERVATION GOALS TO BE MET BEFORE DISCHARGE  1. Orthostatic performed: No     2. Tolerating PO medications: Yes     3. Return to near baseline physical activity: No     4. Cleared for discharge by consultants (if involved): No     Discharge Planner Nurse   Safe discharge environment identified: No  Barriers to discharge: Yes       Entered by: Aurora Lee 06/18/2018 8:43 AM  Please review provider order for any additional goals.   Nurse to notify provider when observation goals have been met and patient is ready for discharge.     Patient is alert and oriented. Patient complains of some pain in shoulders, scheduled tramadol given. Patient is up with an assist of 2 and says she still feels weak, Patient says she does use a walker at home. Patient was seen by PT, and had recommended TCU. SW sent referrals. Patient lives at [facility] and is to be moving into an assisted living next week. Will continue to monitor.

## 2018-06-18 NOTE — PROGRESS NOTES
PRIMARY DIAGNOSIS: GENERALIZED WEAKNESS    OUTPATIENT/OBSERVATION GOALS TO BE MET BEFORE DISCHARGE  1. Orthostatic performed: N/A    2. Tolerating PO medications: Yes    3. Return to near baseline physical activity: No    4. Cleared for discharge by consultants (if involved): No, PT, SW    Patient alert and oriented, forgetful.  Up with 2 assist to pivot to commode at bedside.  Lung sounds clear. Bowel sounds active and audible.  Skin tear to right elbow steri stripped.  Pain managed with scheduled tramadol.  Will continue to monitor.      Discharge Planner Nurse   Safe discharge environment identified: No  Barriers to discharge: Yes       Entered by: Abena Garcia 06/17/2018 11:25 PM     Please review provider order for any additional goals.   Nurse to notify provider when observation goals have been met and patient is ready for discharge.

## 2018-06-18 NOTE — PROGRESS NOTES
PRIMARY DIAGNOSIS: GENERALIZED WEAKNESS    OUTPATIENT/OBSERVATION GOALS TO BE MET BEFORE DISCHARGE  1. Orthostatic performed: N/A    2. Tolerating PO medications: Yes    3. Return to near baseline physical activity: No    4. Cleared for discharge by consultants (if involved): No, PT, SW    Discharge Planner Nurse   Safe discharge environment identified: No  Barriers to discharge: Yes       Entered by: Abena Garcia 06/17/2018 8:26 PM     Please review provider order for any additional goals.   Nurse to notify provider when observation goals have been met and patient is ready for discharge.

## 2018-06-18 NOTE — PLAN OF CARE
Problem: Patient Care Overview  Goal: Plan of Care/Patient Progress Review  Outcome: No Change  PRIMARY DIAGNOSIS: GENERALIZED WEAKNESS    OUTPATIENT/OBSERVATION GOALS TO BE MET BEFORE DISCHARGE  1. Orthostatic performed: No    2. Tolerating PO medications: Yes    3. Return to near baseline physical activity: No    4. Cleared for discharge by consultants (if involved): No    Discharge Planner Nurse   Safe discharge environment identified: No  Barriers to discharge: Yes       Entered by: Aurora Lee 06/18/2018 8:43 AM     Please review provider order for any additional goals.   Nurse to notify provider when observation goals have been met and patient is ready for discharge.    Patient is alert and oriented. Patient complains of some pain in shoulders, scheduled tramadol given. Patient is up with an assist of 2 and says she still feels weak, Patient says she does use a walker at home. Patient has PT consult and SW consult in place. Patient lives at [facility] and is to be moving into an assisted living next week. Will continue to monitor. BP elevated this am but was taken right after brought to bathroom, will recheck in a little bit.

## 2018-06-18 NOTE — PROGRESS NOTES
Your information has been submitted on June 18th, 2018 at 03:34:38 PM CDT. The confirmation number is [id number]

## 2018-06-18 NOTE — PROGRESS NOTES
Patient tried again to void and still was not able to. Patient was bladder scanned for 126 mL. Patient is agreeable to stay overnight and not go to [facility] today due to this concern.

## 2018-06-18 NOTE — PROGRESS NOTES
[facility]    Hospitalist Progress Note  Name: Ana Luisa Lee    MRN: [id number]  Provider:  Ariella Vela PA-C  Date of Service: 06/18/2018    Assessment & Plan   Summary of Stay: Ana Luisa Lee is a 78-year-old female with a history of hypertension, dyslipidemia, COPD, hypothyroidism, ambulatory dysfunction using a walker for the past year associated with increasing generalized weakness for which she was planning to move into an assisted living facility  next Monday from an independent living facility. The patient was admitted on 6/17/18 for evaluation after an accidental fall.     1.  Accidental fall/closed head injury, superficial skin laceration on her right elbow.     2. Progressive generalized weakness: CT of head and MRI of brain negative for stroke. 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 Evaluated by PT with recommendations for TCU. SW consulted and assisting with discharge planning. Patient was accepted to a TCU this afternoon.     3. Urinary retention: new onset this afternoon with difficulty voiding twice over a few hours with bladder scan showing >400 cc present. Patient was straight cathed for 700 ccs. Only new medication recently is Duloxetine which does have a side effect profile of urinary retention, which could be the cause.    - Start Flomax  - Hold Duloxetine   - Bladder scan and straight cath PRN  - Monitor I&Os    4. COPD/asthma: on tapering dose of prednisone, no current exacerbation.  We will start tapering dose of prednisone at 5 mg today with and continue her plan for taper as per PCP.     5.  History of hypertension, dyslipidemia, hypothyroidism, stable.  Continue prior to admission medications.     6.   Superficial skin tear, continue local treatment, keeping it covered.     7.  Past abnormal CT, MRI showing possible left ear effusion versus mastoiditis.  The patient has negative examination.  I will start her on Flonase and Allegra and have her followup with ENT for further recommendations.  Hold off on antibiotics at present due to her negative exam.     # Pain Assessment:  Current Pain Score 6/18/2018   Patient currently in pain? -   Pain score (0-10) 9   Pain location -   Pain descriptors -   - Ana Luisa is experiencing pain due to generalized pain from arthritis. Pain management was discussed and the plan was created in a collaborative fashion.  Ana Luisa's response to the current recommendations: engaged  - Continue PTA PRN Tylenol and Tramadol     DVT Prophylaxis: Ambulate every shift  Code Status: Prior  Disposition: Expected discharge later today if able to void or tomorrow to TCU    Interval History   Patient reports generalized weakness that has been present for months. She reports aches in multiple joints. She notes numbness in her bilateral feet for the last few months that she believes is contributing to her gait. Patient is in agreement to discharging to TCU.     -Data reviewed today: I reviewed all new labs and imaging reports over the last 24 hours. I personally reviewed all labs and imaging from this visit.    Physical Exam   Temp: 98.2  F (36.8  C) Temp src: Oral BP: 141/83   Heart Rate: 90 Resp: 18 SpO2: 96 % O2 Device: None (Room air)    There were no vitals filed for this visit.  Vital Signs with Ranges  Temp:  [97.8  F (36.6  C)-98.7  F (37.1  C)] 98.2  F (36.8  C)  Heart Rate:  [80-96] 90  Resp:  [16-18] 18  BP: (127-179)/() 141/83  SpO2:  [96 %-100 %] 96 %       GEN:  Alert, oriented x 3, appears comfortable, NAD.  HEENT:  Normocephalic/atraumatic, no scleral icterus, no nasal discharge, mouth moist.  CV:  Regular rate and rhythm, no murmur or JVD.  S1 + S2 noted, no S3 or S4.  LUNGS:   Clear to auscultation bilaterally without rales/rhonchi/wheezing/retractions.  Symmetric chest rise on inhalation noted.  ABD:  Active bowel sounds, soft, non-tender/non-distended.  No rebound/guarding/rigidity.  EXT:  No edema.  No cyanosis.  No acute joint synovitis noted.  SKIN:  Dry to touch, no exanthems noted in the visualized areas.  Neuro: CN II_ XII grossly intact, strength equal in bilateral UE. Right LE strength decreased at 4/5 compared to left LE at 5/5. Sensation grossly normal bilaterally.     Medications       calcium carbonate  1 tablet Oral Daily     cholecalciferol  1,000 Units Oral Daily     cyanocobalamin  1,000 mcg Oral Daily     ferrous sulfate  325 mg Oral Daily with breakfast     fexofenadine  60 mg Oral Daily     fluticasone  1 spray Both Nostrils Daily     fluticasone furoate  1 puff Inhalation Daily     gemfibrozil  600 mg Oral QPM     levothyroxine  25 mcg Oral Daily     montelukast  10 mg Oral At Bedtime     multivitamin, therapeutic with minerals  1 tablet Oral Daily     omeprazole  40 mg Oral Daily with breakfast     predniSONE  5 mg Oral Daily     roflumilast  500 mcg Oral Daily     sodium chloride (PF)  3 mL Intracatheter Q8H     traMADol  50 mg Oral TID     umeclidinium  1 puff Inhalation Daily     Data     Results for orders placed or performed during the hospital encounter of 06/17/18   CT Head w/o Contrast    Narrative    CT SCAN OF THE HEAD WITHOUT CONTRAST   6/17/2018 9:48 AM     HISTORY: Trauma, head injury.     TECHNIQUE:  Axial images of the head and coronal reformations without  IV contrast material. Radiation dose for this scan was reduced using  automated exposure control, adjustment of the mA and/or kV according  to patient size, or iterative reconstruction technique.    COMPARISON: 5/3/2017.    FINDINGS:  There is generalized atrophy of the brain.  There is low  attenuation in the white matter of the cerebral hemispheres consistent  with sequelae of small vessel  ischemic disease. There is no evidence  of intracranial hemorrhage, mass, acute infarct or anomaly.     Visualized sinuses are clear. Left mastoid air cells and middle ear  are completely opacified, worse than on the previous exam. However, no  fracture is seen in the left temporal bone. There is no evidence of  trauma.      Impression    IMPRESSION:   1. No evidence of acute trauma.  2.  Atrophy of the brain.  White matter changes consistent with  sequelae of small vessel ischemic disease. This is unchanged.  3. Worsening opacification of the left mastoid air cells and of the  left middle ear since the previous exam.      MAEVE OWEN MD   MR Brain w/o & w Contrast    Narrative    MRI BRAIN WITHOUT AND WITH CONTRAST  6/17/2018 11:51 AM    HISTORY:  Right foot numbness; fell this morning. Right foot drop.     TECHNIQUE:  Multiplanar, multisequence MRI of the brain without and  with 5 mL Gadavist.    COMPARISON: CT scan today.    FINDINGS:  There is generalized atrophy of the brain. White matter T2  hyperintensities are seen in the cerebral hemispheres consistent with  sequelae of small vessel ischemic disease.  There is no evidence of  hemorrhage, mass, acute infarct, or anomaly.  There are no gadolinium  enhancing lesions.    The facial structures appear normal. Left mastoid air cells and middle  ear are opacified. The arteries at the base of the brain and the dural  venous sinuses appear patent.       Impression    IMPRESSION:  1. No evidence of acute infarct, hemorrhage, or mass.  2. Brain atrophy and mild white matter changes consistent with  sequelae of small vessel ischemic disease.  3. Opacified left mastoid air cells and middle ear suggesting effusion  versus otomastoiditis.      MAEVE OWEN MD   CBC with platelets differential   Result Value Ref Range    WBC 8.5 4.0 - 11.0 10e9/L    RBC Count 3.96 3.8 - 5.2 10e12/L    Hemoglobin 11.9 11.7 - 15.7 g/dL    Hematocrit 36.4 35.0 - 47.0 %    MCV 92 78 - 100  fl    MCH 30.1 26.5 - 33.0 pg    MCHC 32.7 31.5 - 36.5 g/dL    RDW 13.5 10.0 - 15.0 %    Platelet Count 289 150 - 450 10e9/L    Diff Method Automated Method     % Neutrophils 77.8 %    % Lymphocytes 12.5 %    % Monocytes 8.5 %    % Eosinophils 0.4 %    % Basophils 0.2 %    % Immature Granulocytes 0.6 %    Nucleated RBCs 0 0 /100    Absolute Neutrophil 6.6 1.6 - 8.3 10e9/L    Absolute Lymphocytes 1.1 0.8 - 5.3 10e9/L    Absolute Monocytes 0.7 0.0 - 1.3 10e9/L    Absolute Eosinophils 0.0 0.0 - 0.7 10e9/L    Absolute Basophils 0.0 0.0 - 0.2 10e9/L    Abs Immature Granulocytes 0.1 0 - 0.4 10e9/L    Absolute Nucleated RBC 0.0    Basic metabolic panel   Result Value Ref Range    Sodium 133 133 - 144 mmol/L    Potassium 3.4 3.4 - 5.3 mmol/L    Chloride 98 94 - 109 mmol/L    Carbon Dioxide 22 20 - 32 mmol/L    Anion Gap 13 3 - 14 mmol/L    Glucose 98 70 - 99 mg/dL    Urea Nitrogen 14 7 - 30 mg/dL    Creatinine 0.71 0.52 - 1.04 mg/dL    GFR Estimate 79 >60 mL/min/1.7m2    GFR Estimate If Black >90 >60 mL/min/1.7m2    Calcium 9.3 8.5 - 10.1 mg/dL   Magnesium   Result Value Ref Range    Magnesium 2.2 1.6 - 2.3 mg/dL   Calcium ionized   Result Value Ref Range    Calcium Ionized 4.8 4.4 - 5.2 mg/dL   EKG 12-lead, tracing only   Result Value Ref Range    Interpretation ECG Click View Image link to view waveform and result    Social Work IP Consult    Narrative    Palak Morales RN     6/18/2018  2:09 PM  Care Transition Initial Assessment - RN    Reason For Consult: care coordination/care conference, discharge   planning   Met with: Patient and dtr via phone.  DATA   Active Problems:    Fall       Cognitive Status: awake, alert and oriented.  Primary Care Clinic [facility]: [facility]  Primary Care MD Name: Dr. Hamilton  Contact information and PCP information verified: Yes  Lives With: alone  Living Arrangements: independent living facility, apartment  Quality Of Family Relationships: supportive,  involved  Description of Support System: Supportive, Involved   Who is your support system?: Children   Support Assessment: Adequate family and caregiver support   Insurance concerns: No Insurance issues identified  ASSESSMENT  Patient currently receives the following services:  Pt lives in   [facility] w/ housekeeping services and HC RN for   medication set-up. Pt is scheduled to move into [facility]   assisted living on 6/25.        Identified issues/concerns regarding health management: PT   evaluated and recommends that pt d/c to home w/ 24/7 assist or   TCU. Per chart review w/ SW, pt should have TCU coverage. Met w/   pt at bedside, she is agreeable to TCU and would like to have   referrals sent to 1)[facility] 2)[facility] 3)[facility] 4)[facility]. Pt   would like HE WC transport and a private room, she is aware of   cost and is agreeable.     Pt has been accepted at [facility], HE WC transport arranged for 1600   today. Pt will need to provide $400 deposit check for private   room fees upon admission. Pt and dtr updated and agreeable.   Orders entered and faxed to [facility].     PLAN  Patient given options and choices for discharge Yes .  Patient/family is agreeable to the plan?  Yes  Patient anticipates discharging to TCU .  Resources List: Transitional Care  Other Resources: Transportation Services  Patient anticipates needs for home equipment: No  Plan/Disposition: TCU   Appointments: Will be seen by rounding provider upon admission.         Care  (CTS) will continue to follow as   needed.    Palak Morales RN BSN CTS   [phone]  Care Transitions Team  [facility]                 Ariella Vela PA-C

## 2018-06-18 NOTE — PLAN OF CARE
Problem: Patient Care Overview  Goal: Plan of Care/Patient Progress Review  Outcome: No Change  Problem: Patient Care Overview  Goal: Plan of Care/Patient Progress Review  Outcome: No Change  PRIMARY DIAGNOSIS: GENERALIZED WEAKNESS/FALL    OUTPATIENT/OBSERVATION GOALS TO BE MET BEFORE DISCHARGE  1. Orthostatic performed: No    2. Tolerating PO medications: Yes    3. Return to near baseline physical activity: No    4. Cleared for discharge by consultants (if involved): No    Discharge Planner Nurse   Safe discharge environment identified: Yes  Barriers to discharge: Yes       Entered by: Camila Ogden 06/18/2018 6:24 AM      Night Shift:    Neuro: Alert and Oriented, VSS   Lungs: clear, room air  Cardiac: HR regular, BPs controlled with hydralazine x1.   IV: Saline locked  Dressing: steri strips to right elbow for skin tear  GI: Bs active, no bm overnight, passing flatus  : voiding adequately  Diet: regular, tolerating well  Pain: denies  Activity: assist 2 to pivot, still very weak  Plan: Plan to monitor weakness, d/c back to independent living when cleared.     Please review provider order for any additional goals.   Nurse to notify provider when observation goals have been met and patient is ready for discharge.

## 2018-06-18 NOTE — PLAN OF CARE
Problem: Patient Care Overview  Goal: Plan of Care/Patient Progress Review  PT: Orders received, evaluation completed. Pt currently admitted under observation status. Patient admitted s/p fall with progressive generalized weakness. Patient lives alone in an independent living apartment. At baseline, she is mod I with a 4WW and is able to walk long hallways to get down to community dinners. Pt reports she gets cleaning and bathing assistance.     Discharge Planner PT   Patient plan for discharge: Feels she is not safe to return to her ILF alone.   Current status: Pt reports high levels of fear with mobility. Needs encouragement to trial. Supine to/from sit with SBA, increased time. Sit to/from stand with min A from commode overlay and CGA from bed. Ambulates 12' x 2 with min A x 1, CGA of another. Patient shaking, and demonstrating decreased step length.   Barriers to return to prior living situation: Impaired balance, impaired strength, level of A for mobility/ADLs, fall risk  Recommendations for discharge: TCU. Patient does report concerns re: finances.  Patient would be safe to return home if she had Ax1 for ALL mobility/ADLs with HHPT. Pt reports her daughter is in town to assist with moving and she may be willing to assist.   Rationale for recommendations: Patient currently demonstrating decreased balance, strength and activity tolerance. High falls risk. Will need Ax1 for all mobility for safety. Will complete IP PT order at this time. Defer treatment to next level of care.        Entered by: Hubert Gonzalez 06/18/2018 10:00 AM

## 2018-06-18 NOTE — PROGRESS NOTES
[facility] Care Coordination Contact  Rec'd EPIC notification that client was admitted to [facility]/observation 6/17/18, fall at home.  EPIC notes reviewed. Voice message left with Rosio RINALDI to introduce myself and share community POC.  Secure e-mail sent to Pauline BARTHOLOMEW [facility] to inform of admission. Call placed to Roberto Carlos at [facility] to inform of admission.    Rec'd e-mail from client's dtr Windy with questions regarding the application to [facility].  Medicare #, MA #, Medical Record #, questions regarding waiver obligation and inquired if her mother had dental benefits.  CM provided info on Medicare/MA number, request that she contact Sofia at [employer] regarding any financial questions and informed that her mother has dental benefits.    6/19/18 EPIC notes reviewed. Rec'd e-mail from client's daughter Windy to report that her mother is in the hospital due to a fall and questions if the fall was related to the antidepressant that she just stared. Windy stated that her mother will be transferring to a TCU.   E-mail sent to Windy that CM is aware that her mother is currently in the hospital and will follow. Explained that when CM met with her mother on 6/12/18, her mother was not taking the antidepressant. Per Windy, her mother started taking the antidepressant Thursday evening.   6/19/2018 VM left with Krystal at [facility] that client is currently in the hospital and will likely transfer to TCU.  6/19/2018 Per [facility], client to transition to [facility], call placed to Codie RINALDI at [facility] to introduce myself and share community POC. Request a call with care conference and d/c planning.  EPIC message sent to [facility] to notify of SNF admission.   Client d/c from [facility] 6/19/2018.  Yeni Savage RN, BC  Supervisor [facility]   [phone] (Fax)

## 2018-06-18 NOTE — PROGRESS NOTES
06/18/18 0900   Quick Adds   Type of Visit Initial PT Evaluation   Living Environment   Lives With alone   Living Arrangements independent living facility;apartment   Number of Stairs to Enter Home 0   Living Environment Comment Patient reports she has assist with bathing and cleaning.    Self-Care   Usual Activity Tolerance good  (Able to walk to community dinners with 4WW)   Current Activity Tolerance poor   Equipment Currently Used at Home grab bar;walker, rolling;tub bench   Functional Level Prior   Ambulation 1-->assistive equipment  (4WW)   Transferring 0-->independent   Toileting 0-->independent   Fall history within last six months yes   Number of times patient has fallen within last six months 1   General Information   Onset of Illness/Injury or Date of Surgery - Date 06/17/18   Referring Physician MD Moses   Patient/Family Goals Statement To get stronger; to go to TCU   Pertinent History of Current Problem (include personal factors and/or comorbidities that impact the POC) Patient admitted s/p fall with progressive generalized weakness.   Precautions/Limitations fall precautions   Cognitive Status Examination   Orientation orientation to person, place and time   Level of Consciousness alert   Follows Commands and Answers Questions able to follow single-step instructions   Personal Safety and Judgment impaired  (Needs assist and encouragement for safe mobility)   Pain Assessment   Patient Currently in Pain Yes, see Vital Sign flowsheet   Posture    Posture Forward head position;Protracted shoulders;Kyphosis   Range of Motion (ROM)   ROM Comment Decreased bilateral UE AROM; pt reports bilateral shoulder pain from fall.    Strength   Strength Comments Decreased generally; difficulty with ambulating functional distances   Bed Mobility   Bed Mobility Comments Supine to/from sit with SBA; increased time. Able to perform supine bridge to reposition without assist.    Transfer Skills   Transfer Comments Sit  "to/from stand with CGA/min A depending on surface,.    Gait   Gait Comments Ambulates 12' x 2 reps with FWW and min A x1, CGA of another. Pt demonstrating decreased step length, gait speed. Shaking; no ozzie LE buckling. Pt very emotional and scared to participate in ambulation.   Balance   Balance Comments Needs FWW and external A to perform dynamic ambulation. Needs total A for standing pericares and brief managment as she requires bilat UE support on FWW for safe static standing balance.    Clinical Impression   Criteria for Skilled Therapeutic Intervention evaluation only  (OBS status. )   Clinical Presentation Stable/Uncomplicated   Clinical Presentation Rationale Stable.    Clinical Decision Making (Complexity) Low complexity   Anticipated Discharge Disposition Transitional Care Facility   Risk & Benefits of therapy have been explained Yes   Patient, Family & other staff in agreement with plan of care Yes   [facility] TM \"6 Clicks\"   2016, Trustees of [facility], under license to [employer]  All rights reserved.   6 Clicks Short Forms Basic Mobility Inpatient Short Form   [facility] AM-PAC  \"6 Clicks\" V.2 Basic Mobility Inpatient Short Form   1. Turning from your back to your side while in a flat bed without using bedrails? 4 - None   2. Moving from lying on your back to sitting on the side of a flat bed without using bedrails? 4 - None   3. Moving to and from a bed to a chair (including a wheelchair)? 3 - A Little   4. Standing up from a chair using your arms (e.g., wheelchair, or bedside chair)? 3 - A Little   5. To walk in hospital room? 3 - A Little   6. Climbing 3-5 steps with a railing? 3 - A Little   Basic Mobility Raw Score (Score out of 24.Lower scores equate to lower levels of function) 20   Total Evaluation Time   Total Evaluation Time (Minutes) 19     "

## 2018-06-18 NOTE — PLAN OF CARE
Problem: Patient Care Overview  Goal: Plan of Care/Patient Progress Review  Outcome: No Change  PRIMARY DIAGNOSIS: GENERALIZED WEAKNESS/FALL    OUTPATIENT/OBSERVATION GOALS TO BE MET BEFORE DISCHARGE  1. Orthostatic performed: No    2. Tolerating PO medications: Yes    3. Return to near baseline physical activity: No    4. Cleared for discharge by consultants (if involved): No    Discharge Planner Nurse   Safe discharge environment identified: Yes  Barriers to discharge: Yes       Entered by: Camila Ogden 06/18/2018 4:00 AM      Night Shift:    Neuro: Alert and Oriented, VSS except high BPs  Lungs: clear, room air  Cardiac: BPs 170s/100s, rylee KUO. Gave 5mg IV hydralazine x1  IV: Saline locked  Dressing: steri strips to right elbow for skin tear  GI: Bs active, no bm overnight, passing flatus  : voiding adequately  Diet: regular, tolerating well  Pain: denies  Activity: assist 2 to pivot, still very weak  Plan: Plan to monitor weakness, d/c back to independent living when cleared.     Please review provider order for any additional goals.   Nurse to notify provider when observation goals have been met and patient is ready for discharge.

## 2018-06-18 NOTE — PROGRESS NOTES
Patient has tried urinating 2 times today and was unable to. Patient was bladder sanned for 420 mL in bladder and was straight cathed for 700 mL now. Will give patient time to try and void on own, TBD if patient goes to TCU today.

## 2018-06-18 NOTE — PROGRESS NOTES
[facility]  Patient is currently open to home care services with [facility].  The patient is currently receiving RN PT HHA services.  [facility]  and team have been notified of patient admission.  [facility] liaison will continue to follow patient during stay.

## 2018-06-19 ENCOUNTER — TELEPHONE (OUTPATIENT)
Dept: INTERNAL MEDICINE | Facility: CLINIC | Age: 78
End: 2018-06-19

## 2018-06-19 ENCOUNTER — MEDICAL CORRESPONDENCE (OUTPATIENT)
Dept: HEALTH INFORMATION MANAGEMENT | Facility: CLINIC | Age: 78
End: 2018-06-19

## 2018-06-19 VITALS
HEART RATE: 82 BPM | SYSTOLIC BLOOD PRESSURE: 165 MMHG | RESPIRATION RATE: 20 BRPM | OXYGEN SATURATION: 97 % | TEMPERATURE: 98 F | DIASTOLIC BLOOD PRESSURE: 87 MMHG

## 2018-06-19 LAB
ALBUMIN UR-MCNC: NEGATIVE MG/DL
APPEARANCE UR: CLEAR
BILIRUB UR QL STRIP: NEGATIVE
COLOR UR AUTO: YELLOW
GLUCOSE UR STRIP-MCNC: NEGATIVE MG/DL
HGB UR QL STRIP: NEGATIVE
KETONES UR STRIP-MCNC: NEGATIVE MG/DL
LEUKOCYTE ESTERASE UR QL STRIP: NEGATIVE
MUCOUS THREADS #/AREA URNS LPF: PRESENT /LPF
NITRATE UR QL: NEGATIVE
PH UR STRIP: 6 PH (ref 5–7)
RBC #/AREA URNS AUTO: 6 /HPF (ref 0–2)
SOURCE: ABNORMAL
SP GR UR STRIP: 1.01 (ref 1–1.03)
UROBILINOGEN UR STRIP-MCNC: 0 MG/DL (ref 0–2)
WBC #/AREA URNS AUTO: 1 /HPF (ref 0–5)

## 2018-06-19 PROCEDURE — 81001 URINALYSIS AUTO W/SCOPE: CPT | Performed by: PHYSICIAN ASSISTANT

## 2018-06-19 PROCEDURE — 40000275 ZZH STATISTIC RCP TIME EA 10 MIN

## 2018-06-19 PROCEDURE — 94640 AIRWAY INHALATION TREATMENT: CPT

## 2018-06-19 PROCEDURE — 99217 ZZC OBSERVATION CARE DISCHARGE: CPT | Performed by: PHYSICIAN ASSISTANT

## 2018-06-19 PROCEDURE — 25000125 ZZHC RX 250: Performed by: INTERNAL MEDICINE

## 2018-06-19 PROCEDURE — 25000132 ZZH RX MED GY IP 250 OP 250 PS 637: Performed by: INTERNAL MEDICINE

## 2018-06-19 PROCEDURE — 25000132 ZZH RX MED GY IP 250 OP 250 PS 637: Performed by: PHYSICIAN ASSISTANT

## 2018-06-19 PROCEDURE — G0378 HOSPITAL OBSERVATION PER HR: HCPCS

## 2018-06-19 RX ORDER — TRAMADOL HYDROCHLORIDE 50 MG/1
50 TABLET ORAL 3 TIMES DAILY
Qty: 12 TABLET | Refills: 0 | Status: SHIPPED | OUTPATIENT
Start: 2018-06-19 | End: 2018-06-27 | Stop reason: DRUGHIGH

## 2018-06-19 RX ORDER — TRAMADOL HYDROCHLORIDE 50 MG/1
50 TABLET ORAL 3 TIMES DAILY PRN
Qty: 15 TABLET | Refills: 0 | Status: SHIPPED | OUTPATIENT
Start: 2018-06-19 | End: 2018-06-20

## 2018-06-19 RX ADMIN — TRAMADOL HYDROCHLORIDE 50 MG: 50 TABLET, COATED ORAL at 11:49

## 2018-06-19 RX ADMIN — OMEPRAZOLE 40 MG: 20 CAPSULE, DELAYED RELEASE ORAL at 10:17

## 2018-06-19 RX ADMIN — MULTIPLE VITAMINS W/ MINERALS TAB 1 TABLET: TAB at 10:17

## 2018-06-19 RX ADMIN — Medication 500 MG: at 10:16

## 2018-06-19 RX ADMIN — FEXOFENADINE 60 MG: 60 TABLET, FILM COATED ORAL at 10:16

## 2018-06-19 RX ADMIN — TAMSULOSIN HYDROCHLORIDE 0.4 MG: 0.4 CAPSULE ORAL at 10:17

## 2018-06-19 RX ADMIN — VITAMIN D, TAB 1000IU (100/BT) 1000 UNITS: 25 TAB at 10:17

## 2018-06-19 RX ADMIN — LEVALBUTEROL HYDROCHLORIDE 1.25 MG: 1.25 SOLUTION RESPIRATORY (INHALATION) at 07:29

## 2018-06-19 RX ADMIN — FERROUS SULFATE TAB 325 MG (65 MG ELEMENTAL FE) 325 MG: 325 (65 FE) TAB at 10:17

## 2018-06-19 RX ADMIN — LEVOTHYROXINE SODIUM 25 MCG: 25 TABLET ORAL at 10:17

## 2018-06-19 RX ADMIN — PREDNISONE 5 MG: 5 TABLET ORAL at 10:17

## 2018-06-19 RX ADMIN — LEVALBUTEROL HYDROCHLORIDE 1.25 MG: 1.25 SOLUTION RESPIRATORY (INHALATION) at 11:09

## 2018-06-19 RX ADMIN — ACETAMINOPHEN 650 MG: 325 TABLET, FILM COATED ORAL at 11:49

## 2018-06-19 RX ADMIN — ROFLUMILAST 500 MCG: 500 TABLET ORAL at 10:16

## 2018-06-19 RX ADMIN — CYANOCOBALAMIN TAB 1000 MCG 1000 MCG: 1000 TAB at 10:17

## 2018-06-19 RX ADMIN — ACETAMINOPHEN 650 MG: 325 TABLET, FILM COATED ORAL at 00:48

## 2018-06-19 ASSESSMENT — PAIN DESCRIPTION - DESCRIPTORS: DESCRIPTORS: ACHING

## 2018-06-19 NOTE — PROGRESS NOTES
Per staff, patient will discharge today in the afternoon. Contacted HE ([phone]) to arrange transport. HE at 1500 via wheelchair to [facility]. Updated Crystal at [facility], PA and bedside RN.     CM will continue to follow patient until discharge for any additional needs.     Candis Alaniz RN, BSN, CTS  [facility]  [phone]

## 2018-06-19 NOTE — DISCHARGE SUMMARY
Discharge Summary  Hospitalist Service      Ana Luisa Lee MRN# [id number]   YOB: 1940 Age: 78 year old     Date of Admission:  6/17/2018  Date of Discharge:  6/19/2018  Admitting Physician:  Paulo Bonner MD  Discharge Physician: Bhavana Griffin PA-C   Discharging Service: Hospitalist Service     Primary Provider: Jenny Hamilton  Primary Care Physician Phone Number: [phone]         Discharge Diagnoses/Problem Oriented Hospital Course (Providers):    Ana Luisa Lee was admitted on 6/17/2018 by Paulo Bonner MD and I would refer you to their history and physical. Ms. Lee was admitted for progressing weakness and a fall earlier the day of admission. She had some head trauma but no LOC. She could not get up so EMS was called. On admission CT and MRI of the head was negative for intracranial process except for opacification of the left mastoid air cells and middle ear, suggesting effusion versus mastoiditis.  She was evaluated by PT who recommended rehab.  Her hospitalization was complicated by urinary retention.  A UA was negative for infection.  We suspected that her recent start on duloxetine was the culprit and discontinued this medication.  She preferred not to have a Crawley placed and instead wanted intermittent caths.  The following problems were addressed during her hospitalization:      1.  Accidental fall/closed head injury with superficial skin laceration on her right elbow- no complaints of head or elbow pain to me.  CT and MRI of head were negative.  2.  Generalized weakness- this appears to be a progressive issue and she was actually planning to move into assisted living.  She was evaluated for stroke which is negative.  On exam she does have some right foot weakness with a slight drop.  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  She is on a current taper with her pulmonologist.  PT did evaluate him recommended a TCU  at discharge.  3.  Urinary retention-UA was negative for infection.  She had recently started duloxetine for depression which has been known to cause urinary retention so this was discontinued.  We offered a Crawley catheter but she refused and preferred intermittent straight caths.  4.  COPD/asthma- she has been tapering this with her pulmonologist and is currently on 5 mg, plan for follow-up with either PCP or pulmonary for further tapering  5.  History of hypertension, dyslipidemia and hypothyroidism-continued all home meds  6.  Superficial skin tear of right elbow-local treatment  7.  Possible left ear effusion versus mastoiditis-she has no complaints of mastoid pain or even ear pain.  She had a negative examination.  She was started on Flonase and Allegra and recommended ENT evaluation in the future    Her daughter was updated prior to discharge    # Discharge Pain Plan:   - During her hospitalization, Ana Luisa experienced pain due to chronic arthritis pain.  The pain plan for discharge was discussed with Ana Luisa and the plan was created in a collaborative fashion.    - Chronic/continued opioids: Continue on tramadol as needed            Code Status:      Full Code              Pending Results:        Unresulted Labs Ordered in the Past 30 Days of this Admission     No orders found from 4/18/2018 to 6/18/2018.               Discharge Instructions and Follow-Up:      Follow-up Appointments     Follow Up and recommended labs and tests       Follow up with primary care provider within one week for hospital follow   up and discuss workup for numbness.  No follow up labs or test are needed.  Referral sent to set up follow up with ENT as outpatient.                         Discharge Disposition:      Discharged to rehabilitation facility          Discharge Medications:        Current Discharge Medication List      START taking these medications    Details   fexofenadine (ALLEGRA) 60 MG tablet Take 1 tablet (60 mg)  by mouth daily  Qty: 60 tablet    Associated Diagnoses: Middle ear effusion, left      fluticasone (FLONASE) 50 MCG/ACT spray Spray 1 spray into both nostrils daily  Qty: 1 Bottle    Associated Diagnoses: Middle ear effusion, left      tamsulosin (FLOMAX) 0.4 MG capsule Take 1 capsule (0.4 mg) by mouth daily  Qty: 14 capsule, Refills: 0    Associated Diagnoses: Urinary retention         CONTINUE these medications which have CHANGED    Details   acetaminophen (TYLENOL) 500 MG tablet Take 2 tablets (1,000 mg) by mouth 3 times daily    Associated Diagnoses: Pain of both shoulder joints      !! traMADol (ULTRAM) 50 MG tablet Take 1 tablet (50 mg) by mouth 3 times daily  Qty: 12 tablet, Refills: 0    Associated Diagnoses: Pain of both shoulder joints      !! traMADol (ULTRAM) 50 MG tablet Take 1 tablet (50 mg) by mouth 3 times daily as needed for moderate pain or moderate to severe pain  Qty: 15 tablet, Refills: 0    Associated Diagnoses: Pain of both shoulder joints       !! - Potential duplicate medications found. Please discuss with provider.      CONTINUE these medications which have NOT CHANGED    Details   albuterol (ALBUTEROL) 108 (90 BASE) MCG/ACT Inhaler Inhale 2 puffs into the lungs every 6 hours as needed    Associated Diagnoses: Asthma, persistent      budesonide (PULMICORT FLEXHALER) 180 MCG/ACT inhaler Inhale 1 puff into the lungs 2 times daily    Associated Diagnoses: Pulmonary emphysema, unspecified emphysema type (H)      calcium 600 MG tablet Take 1 tablet (600 mg) by mouth daily  Qty: 60 tablet    Associated Diagnoses: Pulmonary emphysema, unspecified emphysema type (H)      cholecalciferol (VITAMIN D) 1000 UNIT tablet Take 1 tablet (1,000 Units) by mouth daily  Qty: 30 tablet    Associated Diagnoses: COPD, moderate (H)      cyanocobalamin (B-12 TR) 1000 MCG TBCR Take 1 tablet by mouth daily  Qty: 100 tablet, Refills: 3    Associated Diagnoses: Pernicious anemia      ferrous sulfate (IRON) 325 (65 Fe)  MG tablet Take 325 mg by mouth daily (with breakfast)      gemfibrozil (LOPID) 600 MG tablet Take 1 tablet (600 mg) by mouth daily  Qty: 90 tablet, Refills: 0    Associated Diagnoses: Hyperlipidemia with target LDL less than 130      levalbuterol (XOPENEX) 1.25 MG/3ML neb solution Take 3 mLs (1.25 mg) by nebulization every 4 hours as needed for shortness of breath / dyspnea or wheezing  Qty: 1584 mL, Refills: 1    Associated Diagnoses: COPD, moderate (H); COPD exacerbation (H)      levothyroxine (SYNTHROID/LEVOTHROID) 25 MCG tablet Take 1 tablet (25 mcg) by mouth daily  Qty: 90 tablet, Refills: 3    Associated Diagnoses: Hypothyroidism due to acquired atrophy of thyroid      montelukast (SINGULAIR) 10 MG tablet Take 1 tablet (10 mg) by mouth At Bedtime  Qty: 30 tablet    Associated Diagnoses: Pulmonary emphysema, unspecified emphysema type (H)      Multiple Vitamins-Minerals (MULTIVITAMIN OR) Take 1 tablet by mouth daily      omeprazole (PRILOSEC) 40 MG capsule Take 1 capsule (40 mg) by mouth daily Take 30-60 minutes before a meal.  Qty: 90 capsule, Refills: 3    Comments: 20 mg are not controlling the GERD symptoms  Associated Diagnoses: Gastroesophageal reflux disease, esophagitis presence not specified      polyethylene glycol (MIRALAX/GLYCOLAX) Packet Take 17 g by mouth daily as needed for constipation      predniSONE (DELTASONE) 10 MG tablet Take 10 mg by mouth daily      prochlorperazine (COMPAZINE) 5 MG tablet Take 1 tablet (5 mg) by mouth every 6 hours as needed for nausea or vomiting  Qty: 120 tablet, Refills: 0    Associated Diagnoses: Nausea      roflumilast (DALIRESP) 500 MCG TABS tablet Take 500 mcg by mouth daily       umeclidinium (INCRUSE ELLIPTA) 62.5 MCG/INH oral inhaler Inhale 1 puff into the lungs daily      ASPIRIN NOT PRESCRIBED, INTENTIONAL, 1 each continuous prn. Antiplatelet medication not prescribed intentionally due to Use of NSAIDS  Qty: 0 each, Refills: 0         STOP taking these  medications       DULoxetine (CYMBALTA) 20 MG EC capsule Comments:   Reason for Stopping:                    Allergies:         Allergies   Allergen Reactions     Hydralazine Anxiety     Penicillin G Hives     Tolerated cephalosporines 2017     Meloxicam      dizziness       Metoprolol      ? Skin rash on the back     Norvasc [Amlodipine Besylate] Hives            Consultations This Hospital Stay:      No consultations were requested during this admission          Condition and Physical Exam on Discharge:      Discharge condition: Stable   Discharge vitals: Blood pressure 176/88, pulse 82, temperature 97.7  F (36.5  C), temperature source Oral, resp. rate 24, SpO2 98 %, not currently breastfeeding.     Constitutional:  Alert and oriented   Lungs:  Clear to auscultation bilaterally   Cardiovascular:  Regular rate and rhythm with no murmur   Abdomen:  Bowel sounds are present with no tenderness to palpation   Skin:  No rash or open sores   Musculoskeletal:  Patient was able to move herself to the edg oh the bed.  She had a slight right foot drop, with some noted weakness of the right foot with flexion and dorsiflexion on exam           Discharge Orders for Skilled Facility (from Discharge Orders):        After Care Instructions     Activity - Up with assistive device           Activity - Up with nursing assistance           Additional Discharge Instructions       Patient is retaining urine. UA was done and negative for infection. Likely related to recent start on antidepressant which was stopped.    Recommend every 3 hour bladder scans if not urinating and if scan shows greater than 300 ccs in bladder then straight cath as needed.            Advance Diet as Tolerated       Follow this diet upon discharge: Orders Placed This Encounter      Regular Diet Adult            Fall precautions           General info for SNF       Length of Stay Estimate: Short Term Care: Estimated # of Days <30  Condition at Discharge:  Improving  Level of care:skilled   Rehabilitation Potential: Fair  Admission H&P remains valid and up-to-date: Yes  Recent Chemotherapy: N/A  Use Nursing Home Standing Orders: Yes            Mantoux instructions       Give two-step Mantoux (PPD) Per Facility Policy Yes            Wound care (specify)       Site:   Right elbow  Instructions:  Apply bandage to open skin tear from fall                           Rehab orders for Skilled Facility (from Discharge Orders):      Referrals     Future Labs/Procedures    OTOLARYNGOLOGY REFERRAL     Comments:    Your provider has referred you to: N: [facility] [phone]     [email or web address]    Please be aware that coverage of these services is subject to the terms   and limitations of your health insurance plan.  Call member services at   your health plan with any benefit or coverage questions.      Please bring the following with you to your appointment:    (1) Any X-Rays, CTs or MRIs which have been performed.  Contact the   facility where they were done to arrange for  prior to your   scheduled appointment.   (2) List of current medications  (3) This referral request   (4) Any documents/labs given to you for this referral    Physical Therapy Adult Consult     Comments:    Evaluate and treat as clinically indicated.    Reason: mobility, unsteady gait, and recent fall             Discharge Time:      Less than 30 minutes.        Image Results From This Hospital Stay (For Non-EPIC Providers):        Results for orders placed or performed during the hospital encounter of 06/17/18   CT Head w/o Contrast    Narrative    CT SCAN OF THE HEAD WITHOUT CONTRAST   6/17/2018 9:48 AM     HISTORY: Trauma, head injury.     TECHNIQUE:  Axial images of the head and coronal reformations without  IV contrast material. Radiation dose for this scan was reduced using  automated exposure control,  adjustment of the mA and/or kV according  to patient size, or iterative reconstruction technique.    COMPARISON: 5/3/2017.    FINDINGS:  There is generalized atrophy of the brain.  There is low  attenuation in the white matter of the cerebral hemispheres consistent  with sequelae of small vessel ischemic disease. There is no evidence  of intracranial hemorrhage, mass, acute infarct or anomaly.     Visualized sinuses are clear. Left mastoid air cells and middle ear  are completely opacified, worse than on the previous exam. However, no  fracture is seen in the left temporal bone. There is no evidence of  trauma.      Impression    IMPRESSION:   1. No evidence of acute trauma.  2.  Atrophy of the brain.  White matter changes consistent with  sequelae of small vessel ischemic disease. This is unchanged.  3. Worsening opacification of the left mastoid air cells and of the  left middle ear since the previous exam.      MAEVE OWEN MD   MR Brain w/o & w Contrast    Narrative    MRI BRAIN WITHOUT AND WITH CONTRAST  6/17/2018 11:51 AM    HISTORY:  Right foot numbness; fell this morning. Right foot drop.     TECHNIQUE:  Multiplanar, multisequence MRI of the brain without and  with 5 mL Gadavist.    COMPARISON: CT scan today.    FINDINGS:  There is generalized atrophy of the brain. White matter T2  hyperintensities are seen in the cerebral hemispheres consistent with  sequelae of small vessel ischemic disease.  There is no evidence of  hemorrhage, mass, acute infarct, or anomaly.  There are no gadolinium  enhancing lesions.    The facial structures appear normal. Left mastoid air cells and middle  ear are opacified. The arteries at the base of the brain and the dural  venous sinuses appear patent.       Impression    IMPRESSION:  1. No evidence of acute infarct, hemorrhage, or mass.  2. Brain atrophy and mild white matter changes consistent with  sequelae of small vessel ischemic disease.  3. Opacified left mastoid air  cells and middle ear suggesting effusion  versus otomastoiditis.      MAEVE OWEN MD           Most Recent Lab Results In EPIC (For Non-EPIC Providers):    Most Recent 3 CBC's:  Recent Labs   Lab Test  06/17/18   0909  05/08/18   0835  04/24/18   0611   WBC  8.5  10.5  9.6   HGB  11.9  11.1*  10.6*   MCV  92  91  92   PLT  289  343  341      Most Recent 3 BMP's:  Recent Labs   Lab Test  06/17/18   0909  05/08/18   0835  04/24/18   0611   NA  133  131*  135   POTASSIUM  3.4  4.1  4.0   CHLORIDE  98  99  101   CO2  22  21  24   BUN  14  20  15   CR  0.71  0.79  0.71   ANIONGAP  13  11  10   DIANA  9.3  9.8  9.6   GLC  98  84  84     Most Recent 3 Troponin's:No lab results found.  Most Recent 3 INR's:  Recent Labs   Lab Test  10/31/17   1428  12/31/16   1510  09/20/14   1550   INR  1.13  1.09  1.12     Most Recent 2 LFT's:  Recent Labs   Lab Test  04/13/18   0429  12/28/17   1000   AST  14  7   ALT  14  13   ALKPHOS  66  55   BILITOTAL  0.5  0.3     Most Recent Cholesterol Panel:  Recent Labs   Lab Test  06/01/15   1026   CHOL  154   LDL  77   HDL  48*   TRIG  144     Most Recent 6 Bacteria Isolates From Any Culture (See EPIC Reports for Culture Details):  Recent Labs   Lab Test  04/13/18   0511  04/13/18   0445  04/13/18   0429  05/04/17   0035  12/31/16   1730  12/31/16   1605   CULT  Cultured on the 1st day of incubation:  Moraxella (Branhamella) catarrhalis  Susceptibility testing done on previous specimen  *  Critical Value/Significant Value, preliminary result only, called to and read back by  Brenna Eid RN at 2302 [id number]    >100,000 colonies/mL  Escherichia coli  *  Cultured on the 1st day of incubation:  Moraxella (Branhamella) catarrhalis  *  Critical Value/Significant Value, preliminary result only, called to and read back by  Brenna Eid RN at 2323 [id number]    No growth  No growth  Cultured on the 1st day of incubation: Staphylococcus epidermidis This isolate is   presumed to be  clindamycin resistant based on detection of inducible   clindamycin resistance. Erythromycin and clindamycin are resistant, therefore,   they are not recommended for use.  Critical Value/Significant Value, preliminary result only, called to and read   back by Ivis GUERRERO @ 3143 01/01/17 CS  (Note)  POSITIVE for STAPHYLOCOCCUS EPIDERMIDIS and NEGATIVE for the mecA  gene (not resistant to methicillin) by Verigene nucleic acid test.  The mecA gene was not detected. Final identification and  antimicrobial susceptibility testing will be verified by standard  methods.    Specimen tested with PollitoInglesigene multiplex, gram-positive blood culture  nucleic acid test for the following targets: Staph aureus, Staph  epidermidis, Staph lugdunensis, other Staph species, Enterococcus  faecalis, Enterococcus faecium, Streptococcus species, S. agalactiae,  S. anginosus grp., S. pneumoniae, S. pyogenes,  Listeria sp., mecA  (methicillin resistance) and Poonam/B (vancomycin resistance).    Critical Value/Significant Value called to and read back by Carlos GUERRERO @ 1758 1/1/17 CS  *     Most Recent TSH, T4 and HgbA1c:  Recent Labs   Lab Test  12/28/17   1000   01/04/17   0727  01/03/17   0726   TSH  1.24   < >  0.35*   --    T4   --    --   1.09   --    A1C   --    --    --   6.8*    < > = values in this interval not displayed.

## 2018-06-19 NOTE — PLAN OF CARE
Problem: Patient Care Overview  Goal: Plan of Care/Patient Progress Review  PRIMARY DIAGNOSIS: GENERALIZED WEAKNESS    OUTPATIENT/OBSERVATION GOALS TO BE MET BEFORE DISCHARGE  1. Orthostatic performed: No    2. Tolerating PO medications: Yes    3. Return to near baseline physical activity: No    4. Cleared for discharge by consultants (if involved): Yes    Pt A&Ox4, VSS on RA. Pt c/o 10/10 pain to bilateral shoulders, PRN tylenol given. Bladder scanned at 0040 for 291. Denies urge to void. Dressing applied to skin tear on back of right upper arm, just above elbow. Will continue to monitor.    Discharge Planner Nurse   Safe discharge environment identified: Yes  Barriers to discharge: Yes       Entered by: Palak Choi 06/19/2018 12:45 AM     Please review provider order for any additional goals.   Nurse to notify provider when observation goals have been met and patient is ready for discharge.

## 2018-06-19 NOTE — PLAN OF CARE
Problem: Patient Care Overview  Goal: Plan of Care/Patient Progress Review  Outcome: Adequate for Discharge Date Met: 06/19/18  Patient's After Visit Summary was reviewed with patient.  Patient verbalized understanding of After Visit Summary, recommended follow up and was given an opportunity to ask questions.   Discharge medications sent home with patient/family: Not applicable   Discharged with transport tech    OBSERVATION patient END time: 1458    Patient was stable at discharge and was transported Via  with HE transport to [facility] TCU.

## 2018-06-19 NOTE — PLAN OF CARE
Problem: Patient Care Overview  Goal: Plan of Care/Patient Progress Review  Problem: Patient Care Overview  Goal: Plan of Care/Patient Progress Review  PRIMARY DIAGNOSIS: GENERALIZED WEAKNESS     OUTPATIENT/OBSERVATION GOALS TO BE MET BEFORE DISCHARGE  1. Orthostatic performed: No     2. Tolerating PO medications: Yes     3. Return to near baseline physical activity: No     4. Cleared for discharge by consultants (if involved): Yes     Pt A&Ox4, VSS on RA. Pt reports decreased pain to bilateral shoulders after PRN tylenol given. Bladder scanned at 0349 for 358. Denies urge to void, but did attempt w/o success. Straight cathed for 500 cc at 0445. Dressing applied to skin tear on back of right upper arm, just above elbow. Will continue to monitor.     Discharge Planner Nurse   Safe discharge environment identified: Yes  Barriers to discharge: Yes       Entered by: Palak Choi 06/19/2018 12:45 AM  Please review provider order for any additional goals.   Nurse to notify provider when observation goals have been met and patient is ready for discharge.

## 2018-06-19 NOTE — PLAN OF CARE
Problem: Patient Care Overview  Goal: Plan of Care/Patient Progress Review  Outcome: No Change  PRIMARY DIAGNOSIS: GENERALIZED WEAKNESS     OUTPATIENT/OBSERVATION GOALS TO BE MET BEFORE DISCHARGE  1. Orthostatic performed: No     2. Tolerating PO medications: Yes     3. Return to near baseline physical activity: No     4. Cleared for discharge by consultants (if involved): Yes     Discharge Planner Nurse   Safe discharge environment identified: Yes  Barriers to discharge: Yes-patient unable to void       Entered by: Aurora Lee 06/19/2018 9:28 AM  Please review provider order for any additional goals.   Nurse to notify provider when observation goals have been met and patient is ready for discharge.     Patient is alert and oriented. Patient is still up with an assist of 2 d/t weakness. Patient has been straight cathed three times now dt urinary retention. Plan is to push fluids, continue to discharge patient to [facility] today and be picked up via HE at 1500, with orders for bladder scanning and intermittent straight cath as needed. Patient is in agreeance and TCU updated along with daughter.

## 2018-06-19 NOTE — PLAN OF CARE
Problem: Patient Care Overview  Goal: Plan of Care/Patient Progress Review  Outcome: No Change  PRIMARY DIAGNOSIS: GENERALIZED WEAKNESS      OUTPATIENT/OBSERVATION GOALS TO BE MET BEFORE DISCHARGE  1. Orthostatic performed: No      2. Tolerating PO medications: Yes      3. Return to near baseline physical activity: No      4. Cleared for discharge by consultants (if involved): No      Discharge Planner Nurse   Safe discharge environment identified: No  Barriers to discharge: Yes       Entered by: Aurora Lee 06/18/2018 8:43 AM  Please review provider order for any additional goals.   Nurse to notify provider when observation goals have been met and patient is ready for discharge.      Patient is alert and oriented. Patient complains of some pain in shoulders rated 9/10, had PRN tramadol 50 mg. Patient up with an assist of 2 and adriano steady at times, uses walker at home. SW following- patient  accepted to [facility] TCU but unable to go today due to urinary retention. Started on flomax. Plan to bladder scan q 2-4 hrs during the night and straight cath if  > 300 cc.  Bladder scanned at 2040 for 150 cc. Will continue to monitor.

## 2018-06-20 ENCOUNTER — PATIENT OUTREACH (OUTPATIENT)
Dept: GERIATRIC MEDICINE | Facility: CLINIC | Age: 78
End: 2018-06-20

## 2018-06-20 ENCOUNTER — NURSING HOME VISIT (OUTPATIENT)
Dept: GERIATRICS | Facility: CLINIC | Age: 78
End: 2018-06-20
Payer: COMMERCIAL

## 2018-06-20 VITALS
WEIGHT: 118 LBS | RESPIRATION RATE: 20 BRPM | SYSTOLIC BLOOD PRESSURE: 150 MMHG | HEART RATE: 88 BPM | OXYGEN SATURATION: 99 % | DIASTOLIC BLOOD PRESSURE: 98 MMHG | HEIGHT: 62 IN | BODY MASS INDEX: 21.71 KG/M2 | TEMPERATURE: 99 F

## 2018-06-20 DIAGNOSIS — R93.89 ABNORMAL FINDING ON IMAGING: ICD-10-CM

## 2018-06-20 DIAGNOSIS — M19.90 ARTHRITIS: ICD-10-CM

## 2018-06-20 DIAGNOSIS — J45.909 SEVERE ASTHMA, UNSPECIFIED WHETHER COMPLICATED, UNSPECIFIED WHETHER PERSISTENT: ICD-10-CM

## 2018-06-20 DIAGNOSIS — M62.81 GENERALIZED MUSCLE WEAKNESS: Primary | ICD-10-CM

## 2018-06-20 DIAGNOSIS — F32.A DEPRESSION, UNSPECIFIED DEPRESSION TYPE: ICD-10-CM

## 2018-06-20 DIAGNOSIS — R33.9 URINARY RETENTION: ICD-10-CM

## 2018-06-20 DIAGNOSIS — E78.5 HYPERLIPIDEMIA, UNSPECIFIED HYPERLIPIDEMIA TYPE: ICD-10-CM

## 2018-06-20 DIAGNOSIS — E03.9 HYPOTHYROIDISM, UNSPECIFIED TYPE: ICD-10-CM

## 2018-06-20 DIAGNOSIS — J44.9 CHRONIC OBSTRUCTIVE PULMONARY DISEASE, UNSPECIFIED COPD TYPE (H): ICD-10-CM

## 2018-06-20 DIAGNOSIS — R53.81 PHYSICAL DECONDITIONING: ICD-10-CM

## 2018-06-20 PROCEDURE — 99310 SBSQ NF CARE HIGH MDM 45: CPT | Performed by: NURSE PRACTITIONER

## 2018-06-20 NOTE — LETTER
6/20/2018        RE: Ana Luisa Lee  [address]  [facility] [address]        [facility]  PRIMARY CARE PROVIDER AND CLINIC:  Jenny Hamilton [address] [facility] [address]  Chief Complaint   Patient presents with     Hospital F/U     [place] Medical Record Number:  [id number]    HPI:    Ana Luisa Lee is a 78 year old  (1940),admitted to the [facility] from Hospital  [facility].  Hospital stay 6/17/18 through 6/19/18.  Admitted to this facility for  rehab, medical management and nursing care.  HPI information obtained from: facility chart records, facility staff, patient report and [facility] chart review     From hospital discharge summary- in italics  Hospital Assessment & Plan     Summary of Stay: Ana Luisa Lee is a 78-year-old female with a history of hypertension, dyslipidemia, COPD, hypothyroidism, ambulatory dysfunction using a walker for the past year associated with increasing generalized weakness for which she was planning to move into an assisted living facility  next Monday from an independent living facility. The patient was admitted on 6/17/18 for evaluation after an accidental fall.      1.  Accidental fall/closed head injury, superficial skin laceration on her right elbow.      2. Progressive generalized weakness: CT of head and MRI of brain negative for stroke. 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 Evaluated by PT with recommendations for TCU. SW consulted and assisting with discharge planning. Patient was accepted to a TCU this afternoon.      3. Urinary retention: new onset this afternoon with  difficulty voiding twice over a few hours with bladder scan showing >400 cc present. Patient was straight cathed for 700 ccs. Only new medication recently is Duloxetine which does have a side effect profile of urinary retention, which could be the cause.    - Start Flomax  - Hold Duloxetine   - Bladder scan and straight cath PRN  - Monitor I&Os     4. COPD/asthma: on tapering dose of prednisone, no current exacerbation.  We will start tapering dose of prednisone at 5 mg today with and continue her plan for taper as per PCP.      5.  History of hypertension, dyslipidemia, hypothyroidism, stable.  Continue prior to admission medications.      6.  Superficial skin tear, continue local treatment, keeping it covered.      7.  Past abnormal CT, MRI showing possible left ear effusion versus mastoiditis.  The patient has negative examination.  I will start her on Flonase and Allegra and have her followup with ENT for further recommendations.  Hold off on antibiotics at present due to her negative exam.      # Pain Assessment:  Current Pain Score 6/18/2018   Patient currently in pain? -   Pain score (0-10) 9   Pain location -   Pain descriptors -   - Ana Luisa is experiencing pain due to generalized pain from arthritis. Pain management was discussed and the plan was created in a collaborative fashion.  Ana Luisa's response to the current recommendations: engaged  - Continue PTA PRN Tylenol and Tramadol     Current issues are:      Generalized muscle weakness  Physical deconditioning  Ms. Lee fell at home. CT/ MRI negative. No injuries beyond superficial scrape of right elbow. 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 Here for therapies. Uses a walker for ambulation at home.     Urinary retention  Developed during hospitalization. Suspect due to to duloxetine (was recently started) so this was discontinued per notes- however this medication was still on  admission orders to facility. Required intermittent catheterization during hospitalization. No complaints of urinary symptoms on exam today.     Chronic obstructive pulmonary disease, unspecified COPD type (H)  Severe asthma, unspecified whether complicated, unspecified whether persistent  Inhalers as on medication list. Also on prolonged steroid taper. Is currently on 5 mg daily. Follow up with PCP or pulmonary for taper. She would like levalbuterol scheduled per her home routine. Reports chronic dry cough.    Arthritis  Reports increased pain. Was discharged from the hospital on PRN tramadol, however per patient she takes it scheduled. Will schedule today. Also has tylenol PRN.    Depression  Duloxetine stopped as above due to urinary retention. Mood ok on exam.     Hypothyroidism, unspecified type  On levothyroxine  TSH   Date Value Ref Range Status   12/28/2017 1.24 0.40 - 4.00 mU/L Final       Hyperlipidemia, unspecified hyperlipidemia type  On gemfibrozil    Abnormal finding on imaging  CT, MRI from 6/17 shows possible left ear effusion versus mastoiditis. Currently on flonase and allegra due to negative exam. Follow up with ENT        CODE STATUS/ADVANCE DIRECTIVES DISCUSSION:   DNR / DNI  Patient's living condition: lives alone. Is moving to [facility] next week. Currently receives home care services from [facility]    ALLERGIES:Hydralazine; Penicillin g; Meloxicam; Metoprolol; and Norvasc [amlodipine besylate]  PAST MEDICAL HISTORY:  has a past medical history of Anemia (Dec 2011); Arthritis of hand; Asthma, persistent; Asthma, persistent; Chronic intermittent steroid use; COPD exacerbation (H) (10/25/2013); Esophageal stricture (2007); Foot deformity; HTN, goal below 140/90; Hyperlipidemia; Hyperlipidemia LDL goal < 130; Hypothyroidism (Dec 2011); Hypothyroidism; Left foot pain (Nov 2011); Left shoulder pain (Nov 2011); Medication side effects; Osteoporosis; Paroxysmal supraventricular tachycardia (H); PVC  (premature ventricular contraction) (May 2012); Skin cyst (Dec 2011); SOB (shortness of breath) on exertion (May 2012); and SVT (supraventricular tachycardia) (H). She also has no past medical history of Blood transfusion; Congestive heart failure, unspecified; Coronary artery disease; Diabetes mellitus (H); Malignant neoplasm (H); or Unspecified cerebral artery occlusion with cerebral infarction.  PAST SURGICAL HISTORY:  has a past surgical history that includes GYN surgery (1980); Bunionectomy lapidus with tarsal metatarsal (TMT) fusion (1990's); Tonsillectomy; hysterectomy, pap no longer indicated; Hysterectomy total abdominal; and EP study, modified (7/2012).  FAMILY HISTORY: family history includes Cerebrovascular Disease in her mother; Family History Negative in her brother, daughter, father, sister, and son; Hypertension in her mother; Unknown/Adopted in her maternal grandfather, maternal grandmother, paternal grandfather, and paternal grandmother. There is no history of Asthma, C.A.D., Diabetes, or Cancer.  SOCIAL HISTORY:  reports that she quit smoking about 38 years ago. She has a 15.00 pack-year smoking history. She has never used smokeless tobacco. She reports that she does not drink alcohol or use illicit drugs.    Post Discharge Medication Reconciliation Status: discharge medications reconciled and changed, per note/orders (see AVS).  Current Outpatient Prescriptions   Medication Sig Dispense Refill     acetaminophen (TYLENOL) 500 MG tablet Take 2 tablets (1,000 mg) by mouth 3 times daily       albuterol (ALBUTEROL) 108 (90 BASE) MCG/ACT Inhaler Inhale 2 puffs into the lungs every 6 hours as needed       ASPIRIN NOT PRESCRIBED, INTENTIONAL, 1 each continuous prn. Antiplatelet medication not prescribed intentionally due to Use of NSAIDS 0 each 0     budesonide (PULMICORT FLEXHALER) 180 MCG/ACT inhaler Inhale 1 puff into the lungs 2 times daily       calcium 600 MG tablet Take 1 tablet (600 mg) by mouth  daily 60 tablet      cholecalciferol (VITAMIN D) 1000 UNIT tablet Take 1 tablet (1,000 Units) by mouth daily 30 tablet      cyanocobalamin (B-12 TR) 1000 MCG TBCR Take 1 tablet by mouth daily 100 tablet 3     DULOXETINE HCL PO Take 20 mg by mouth daily       ferrous sulfate (IRON) 325 (65 Fe) MG tablet Take 325 mg by mouth daily (with breakfast)       fexofenadine (ALLEGRA) 60 MG tablet Take 1 tablet (60 mg) by mouth daily 60 tablet      fluticasone (FLONASE) 50 MCG/ACT spray Spray 1 spray into both nostrils daily 1 Bottle      gemfibrozil (LOPID) 600 MG tablet Take 1 tablet (600 mg) by mouth daily 90 tablet 0     levalbuterol (XOPENEX) 1.25 MG/3ML neb solution Take 3 mLs (1.25 mg) by nebulization every 4 hours as needed for shortness of breath / dyspnea or wheezing (Patient taking differently: Take 1 ampule by nebulization 4 times daily ) 1584 mL 1     levothyroxine (SYNTHROID/LEVOTHROID) 25 MCG tablet Take 1 tablet (25 mcg) by mouth daily 90 tablet 3     montelukast (SINGULAIR) 10 MG tablet Take 1 tablet (10 mg) by mouth At Bedtime 30 tablet      Multiple Vitamins-Minerals (MULTIVITAMIN OR) Take 1 tablet by mouth daily       omeprazole (PRILOSEC) 40 MG capsule Take 1 capsule (40 mg) by mouth daily Take 30-60 minutes before a meal. 90 capsule 3     polyethylene glycol (MIRALAX/GLYCOLAX) Packet Take 17 g by mouth daily as needed for constipation       predniSONE (DELTASONE) 10 MG tablet Take 10 mg by mouth daily       roflumilast (DALIRESP) 500 MCG TABS tablet Take 500 mcg by mouth daily        traMADol (ULTRAM) 50 MG tablet Take 1 tablet (50 mg) by mouth 3 times daily 12 tablet 0     umeclidinium (INCRUSE ELLIPTA) 62.5 MCG/INH oral inhaler Inhale 1 puff into the lungs daily         ROS:  10 point ROS of systems including Constitutional, Eyes, Respiratory, Cardiovascular, Gastroenterology, Genitourinary, Integumentary, Muscularskeletal, Neurologic, Psychiatric were all negative except for pertinent positives noted  "in my HPI.    Exam:  BP (!) 150/98  Pulse 88  Temp 99  F (37.2  C)  Resp 20  Ht 5' 2\" (1.575 m)  Wt 118 lb (53.5 kg)  SpO2 99%  BMI 21.58 kg/m2   GENERAL APPEARANCE:  Alert, pleasant and cooperative, oriented x 4  EYES:  EOM, lids, pupils and irises normal, sclera clear and conjunctiva normal, no discharge or mattering on lids or lashes noted  ENT:  Mouth normal, moist mucous membranes, nose without drainage or crusting, external ears without lesions, hearing acuity : [place]  RESP:  respiratory effort normal, no respiratory distress, Lung sounds clear with crackles to the bilateral bases, patient is on room air  CV: auscultation of heart done, rate and rhythm regular. no edema  ABDOMEN:  normal bowel sounds, soft, nontender, no grimacing or guarding with palpation.  M/S:   Gait and station abnormal: uses walker for ambulation; Digits and nails normal, no tenderness or swelling of the joints; able to move all extremities   SKIN:  Inspection and palpation of skin and subcutaneous tissue: skin warm, dry without rashes, superficial scrape to right elbow  NEURO: cranial nerves 2-12 grossly intact, no facial asymmetry, no speech deficits and able to follow directions, moves all extremities symmetrically  PSYCH:  insight and judgement intact, memory intact, affect and mood normal    Lab/Diagnostic data:     CBC RESULTS:   Recent Labs   Lab Test  06/17/18   0909  05/08/18   0835   WBC  8.5  10.5   RBC  3.96  3.71*   HGB  11.9  11.1*   HCT  36.4  33.9*   MCV  92  91   MCH  30.1  29.9   MCHC  32.7  32.7   RDW  13.5  14.2   PLT  289  343       Last Basic Metabolic Panel:  Recent Labs   Lab Test  06/17/18   0909  05/08/18   0835   NA  133  131*   POTASSIUM  3.4  4.1   CHLORIDE  98  99   DIANA  9.3  9.8   CO2  22  21   BUN  14  20   CR  0.71  0.79   GLC  98  84       Liver Function Studies -   Recent Labs   Lab Test  04/13/18   0429  12/28/17   1000   PROTTOTAL  7.4  6.6*   ALBUMIN  3.9  3.3*   BILITOTAL  0.5  0.3   ALKPHOS  " 66  55   AST  14  7   ALT  14  13       ASSESSMENT/PLAN:  (M62.81) Generalized muscle weakness  (primary encounter diagnosis)  (R53.81) Physical deconditioning  Comment: Acute- suspect related to steroid myopathy  Plan: BMP, CBC 6/25. Encourage participation in physical therapy/occupational therapy for strengthening and deconditioning. Discharge planning per their recommendation. Social work to assist with d/c planning.    (R33.9) Urinary retention  Comment: Acute- started during hospitalization- suspect to be due to duloxetine  Plan: Discontinue duloxetine. House orders at TCU for PVR >300. Notify provider if straight catheterization is needed.     (J44.9) Chronic obstructive pulmonary disease, unspecified COPD type (H)  (J45.998) Severe asthma, unspecified whether complicated, unspecified whether persistent  Comment: Chronic- stable   Plan: Continue inhalers. Change levalbuterol to QID per patient request. Continue prednisone 5 mg daily. Follow up with PCP or pulmonary regarding further tapering of steroids.    (M19.90) Arthritis  Comment: Chronic- with pain on exam today  Plan: Schedule tramadol 50 mg TID and discontinue tramadol PRN. Continue tylenol PRN.     (E03.9) Hypothyroidism, unspecified type  Comment: Chronic, stable  Plan: Continue levothyroxine    (E78.5) Hyperlipidemia, unspecified hyperlipidemia type  Comment: Chronic, stable  Plan: Continue gemfibrozil    (R93.8) Abnormal finding on imaging  Comment: Acute- possible left ear effusion versus mastoiditis per CT/MRI  Plan: Continue flonase and allegra. Follow up with ENT for evaluation      Total time spent with patient visit at the [facility] was 47 minutes including patient visit and review of past records, review of admission orders and medication reconciliation. Greater than 50% of total time spent with counseling and coordinating care due to generalized weakness, COPD/ asthma, urinary retention and other problems as  above      Electronically signed by:  IVÁN Lind CNP                    Sincerely,        IVÁN Lind CNP

## 2018-06-20 NOTE — PROGRESS NOTES
[place] [facility]  PRIMARY CARE PROVIDER AND CLINIC:  Jenny Hamilton [address] [facility] [address]  Chief Complaint   Patient presents with     Hospital F/U     [facility] Medical Record Number:  [id number]    HPI:    Ana Luisa Lee is a 78 year old  (1940),admitted to the [facility] from Hospital  [facility].  Hospital stay 6/17/18 through 6/19/18.  Admitted to this facility for  rehab, medical management and nursing care.  HPI information obtained from: facility chart records, facility staff, patient report and [facility] chart review     From hospital discharge summary- in italics  Hospital Assessment & Plan     Summary of Stay: Ana Luisa Lee is a 78-year-old female with a history of hypertension, dyslipidemia, COPD, hypothyroidism, ambulatory dysfunction using a walker for the past year associated with increasing generalized weakness for which she was planning to move into an assisted living facility  next Monday from an independent living facility. The patient was admitted on 6/17/18 for evaluation after an accidental fall.      1.  Accidental fall/closed head injury, superficial skin laceration on her right elbow.      2. Progressive generalized weakness: CT of head and MRI of brain negative for stroke. 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 Evaluated by PT with recommendations for TCU. SW consulted and assisting with discharge planning. Patient was accepted to a TCU this afternoon.      3. Urinary retention: new onset this afternoon with difficulty voiding twice over a few hours with bladder scan showing >400 cc present. Patient was straight  cathed for 700 ccs. Only new medication recently is Duloxetine which does have a side effect profile of urinary retention, which could be the cause.    - Start Flomax  - Hold Duloxetine   - Bladder scan and straight cath PRN  - Monitor I&Os     4. COPD/asthma: on tapering dose of prednisone, no current exacerbation.  We will start tapering dose of prednisone at 5 mg today with and continue her plan for taper as per PCP.      5.  History of hypertension, dyslipidemia, hypothyroidism, stable.  Continue prior to admission medications.      6.  Superficial skin tear, continue local treatment, keeping it covered.      7.  Past abnormal CT, MRI showing possible left ear effusion versus mastoiditis.  The patient has negative examination.  I will start her on Flonase and Allegra and have her followup with ENT for further recommendations.  Hold off on antibiotics at present due to her negative exam.      # Pain Assessment:  Current Pain Score 6/18/2018   Patient currently in pain? -   Pain score (0-10) 9   Pain location -   Pain descriptors -   - Ana Luisa is experiencing pain due to generalized pain from arthritis. Pain management was discussed and the plan was created in a collaborative fashion.  Ana Luisa's response to the current recommendations: engaged  - Continue PTA PRN Tylenol and Tramadol     Current issues are:      Generalized muscle weakness  Physical deconditioning  Ms. Lee fell at home. CT/ MRI negative. No injuries beyond superficial scrape of right elbow. 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 Here for therapies. Uses a walker for ambulation at home.     Urinary retention  Developed during hospitalization. Suspect due to to duloxetine (was recently started) so this was discontinued per notes- however this medication was still on admission orders to facility. Required intermittent catheterization during hospitalization. No complaints of  urinary symptoms on exam today.     Chronic obstructive pulmonary disease, unspecified COPD type (H)  Severe asthma, unspecified whether complicated, unspecified whether persistent  Inhalers as on medication list. Also on prolonged steroid taper. Is currently on 5 mg daily. Follow up with PCP or pulmonary for taper. She would like levalbuterol scheduled per her home routine. Reports chronic dry cough.    Arthritis  Reports increased pain. Was discharged from the hospital on PRN tramadol, however per patient she takes it scheduled. Will schedule today. Also has tylenol PRN.    Depression  Duloxetine stopped as above due to urinary retention. Mood ok on exam.     Hypothyroidism, unspecified type  On levothyroxine  TSH   Date Value Ref Range Status   12/28/2017 1.24 0.40 - 4.00 mU/L Final       Hyperlipidemia, unspecified hyperlipidemia type  On gemfibrozil    Abnormal finding on imaging  CT, MRI from 6/17 shows possible left ear effusion versus mastoiditis. Currently on flonase and allegra due to negative exam. Follow up with ENT        CODE STATUS/ADVANCE DIRECTIVES DISCUSSION:   DNR / DNI  Patient's living condition: lives alone. Is moving to [facility] next week. Currently receives [facility] care services from [facility]    ALLERGIES:Hydralazine; Penicillin g; Meloxicam; Metoprolol; and Norvasc [amlodipine besylate]  PAST MEDICAL HISTORY:  has a past medical history of Anemia (Dec 2011); Arthritis of hand; Asthma, persistent; Asthma, persistent; Chronic intermittent steroid use; COPD exacerbation (H) (10/25/2013); Esophageal stricture (2007); Foot deformity; HTN, goal below 140/90; Hyperlipidemia; Hyperlipidemia LDL goal < 130; Hypothyroidism (Dec 2011); Hypothyroidism; Left foot pain (Nov 2011); Left shoulder pain (Nov 2011); Medication side effects; Osteoporosis; Paroxysmal supraventricular tachycardia (H); PVC (premature ventricular contraction) (May 2012); Skin cyst (Dec 2011); SOB (shortness of breath) on exertion  (May 2012); and SVT (supraventricular tachycardia) (H). She also has no past medical history of Blood transfusion; Congestive heart failure, unspecified; Coronary artery disease; Diabetes mellitus (H); Malignant neoplasm (H); or Unspecified cerebral artery occlusion with cerebral infarction.  PAST SURGICAL HISTORY:  has a past surgical history that includes GYN surgery (1980); Bunionectomy lapidus with tarsal metatarsal (TMT) fusion (1990's); Tonsillectomy; hysterectomy, pap no longer indicated; Hysterectomy total abdominal; and EP study, modified (7/2012).  FAMILY HISTORY: family history includes Cerebrovascular Disease in her mother; Family History Negative in her brother, daughter, father, sister, and son; Hypertension in her mother; Unknown/Adopted in her maternal grandfather, maternal grandmother, paternal grandfather, and paternal grandmother. There is no history of Asthma, C.A.D., Diabetes, or Cancer.  SOCIAL HISTORY:  reports that she quit smoking about 38 years ago. She has a 15.00 pack-year smoking history. She has never used smokeless tobacco. She reports that she does not drink alcohol or use illicit drugs.    Post Discharge Medication Reconciliation Status: discharge medications reconciled and changed, per note/orders (see AVS).  Current Outpatient Prescriptions   Medication Sig Dispense Refill     acetaminophen (TYLENOL) 500 MG tablet Take 2 tablets (1,000 mg) by mouth 3 times daily       albuterol (ALBUTEROL) 108 (90 BASE) MCG/ACT Inhaler Inhale 2 puffs into the lungs every 6 hours as needed       ASPIRIN NOT PRESCRIBED, INTENTIONAL, 1 each continuous prn. Antiplatelet medication not prescribed intentionally due to Use of NSAIDS 0 each 0     budesonide (PULMICORT FLEXHALER) 180 MCG/ACT inhaler Inhale 1 puff into the lungs 2 times daily       calcium 600 MG tablet Take 1 tablet (600 mg) by mouth daily 60 tablet      cholecalciferol (VITAMIN D) 1000 UNIT tablet Take 1 tablet (1,000 Units) by mouth daily  "30 tablet      cyanocobalamin (B-12 TR) 1000 MCG TBCR Take 1 tablet by mouth daily 100 tablet 3     DULOXETINE HCL PO Take 20 mg by mouth daily       ferrous sulfate (IRON) 325 (65 Fe) MG tablet Take 325 mg by mouth daily (with breakfast)       fexofenadine (ALLEGRA) 60 MG tablet Take 1 tablet (60 mg) by mouth daily 60 tablet      fluticasone (FLONASE) 50 MCG/ACT spray Spray 1 spray into both nostrils daily 1 Bottle      gemfibrozil (LOPID) 600 MG tablet Take 1 tablet (600 mg) by mouth daily 90 tablet 0     levalbuterol (XOPENEX) 1.25 MG/3ML neb solution Take 3 mLs (1.25 mg) by nebulization every 4 hours as needed for shortness of breath / dyspnea or wheezing (Patient taking differently: Take 1 ampule by nebulization 4 times daily ) 1584 mL 1     levothyroxine (SYNTHROID/LEVOTHROID) 25 MCG tablet Take 1 tablet (25 mcg) by mouth daily 90 tablet 3     montelukast (SINGULAIR) 10 MG tablet Take 1 tablet (10 mg) by mouth At Bedtime 30 tablet      Multiple Vitamins-Minerals (MULTIVITAMIN OR) Take 1 tablet by mouth daily       omeprazole (PRILOSEC) 40 MG capsule Take 1 capsule (40 mg) by mouth daily Take 30-60 minutes before a meal. 90 capsule 3     polyethylene glycol (MIRALAX/GLYCOLAX) Packet Take 17 g by mouth daily as needed for constipation       predniSONE (DELTASONE) 10 MG tablet Take 10 mg by mouth daily       roflumilast (DALIRESP) 500 MCG TABS tablet Take 500 mcg by mouth daily        traMADol (ULTRAM) 50 MG tablet Take 1 tablet (50 mg) by mouth 3 times daily 12 tablet 0     umeclidinium (INCRUSE ELLIPTA) 62.5 MCG/INH oral inhaler Inhale 1 puff into the lungs daily         ROS:  10 point ROS of systems including Constitutional, Eyes, Respiratory, Cardiovascular, Gastroenterology, Genitourinary, Integumentary, Muscularskeletal, Neurologic, Psychiatric were all negative except for pertinent positives noted in my HPI.    Exam:  BP (!) 150/98  Pulse 88  Temp 99  F (37.2  C)  Resp 20  Ht 5' 2\" (1.575 m)  Wt 118 " lb (53.5 kg)  SpO2 99%  BMI 21.58 kg/m2   GENERAL APPEARANCE:  Alert, pleasant and cooperative, oriented x 4  EYES:  EOM, lids, pupils and irises normal, sclera clear and conjunctiva normal, no discharge or mattering on lids or lashes noted  ENT:  Mouth normal, moist mucous membranes, nose without drainage or crusting, external ears without lesions, hearing acuity : [place]  RESP:  respiratory effort normal, no respiratory distress, Lung sounds clear with crackles to the bilateral bases, patient is on room air  CV: auscultation of heart done, rate and rhythm regular. no edema  ABDOMEN:  normal bowel sounds, soft, nontender, no grimacing or guarding with palpation.  M/S:   Gait and station abnormal: uses walker for ambulation; Digits and nails normal, no tenderness or swelling of the joints; able to move all extremities   SKIN:  Inspection and palpation of skin and subcutaneous tissue: skin warm, dry without rashes, superficial scrape to right elbow  NEURO: cranial nerves 2-12 grossly intact, no facial asymmetry, no speech deficits and able to follow directions, moves all extremities symmetrically  PSYCH:  insight and judgement intact, memory intact, affect and mood normal    Lab/Diagnostic data:     CBC RESULTS:   Recent Labs   Lab Test  06/17/18   0909  05/08/18   0835   WBC  8.5  10.5   RBC  3.96  3.71*   HGB  11.9  11.1*   HCT  36.4  33.9*   MCV  92  91   MCH  30.1  29.9   MCHC  32.7  32.7   RDW  13.5  14.2   PLT  289  343       Last Basic Metabolic Panel:  Recent Labs   Lab Test  06/17/18   0909  05/08/18   0835   NA  133  131*   POTASSIUM  3.4  4.1   CHLORIDE  98  99   DIANA  9.3  9.8   CO2  22  21   BUN  14  20   CR  0.71  0.79   GLC  98  84       Liver Function Studies -   Recent Labs   Lab Test  04/13/18   0429  12/28/17   1000   PROTTOTAL  7.4  6.6*   ALBUMIN  3.9  3.3*   BILITOTAL  0.5  0.3   ALKPHOS  66  55   AST  14  7   ALT  14  13       ASSESSMENT/PLAN:  (M62.81) Generalized muscle weakness  (primary  encounter diagnosis)  (R53.81) Physical deconditioning  Comment: Acute- suspect related to steroid myopathy  Plan: BMP, CBC 6/25. Encourage participation in physical therapy/occupational therapy for strengthening and deconditioning. Discharge planning per their recommendation. Social work to assist with d/c planning.    (R33.9) Urinary retention  Comment: Acute- started during hospitalization- suspect to be due to duloxetine  Plan: Discontinue duloxetine. House orders at TCU for PVR >300. Notify provider if straight catheterization is needed.     (J44.9) Chronic obstructive pulmonary disease, unspecified COPD type (H)  (J45.998) Severe asthma, unspecified whether complicated, unspecified whether persistent  Comment: Chronic- stable   Plan: Continue inhalers. Change levalbuterol to QID per patient request. Continue prednisone 5 mg daily. Follow up with PCP or pulmonary regarding further tapering of steroids.    (M19.90) Arthritis  Comment: Chronic- with pain on exam today  Plan: Schedule tramadol 50 mg TID and discontinue tramadol PRN. Continue tylenol PRN.     (E03.9) Hypothyroidism, unspecified type  Comment: Chronic, stable  Plan: Continue levothyroxine    (E78.5) Hyperlipidemia, unspecified hyperlipidemia type  Comment: Chronic, stable  Plan: Continue gemfibrozil    (R93.8) Abnormal finding on imaging  Comment: Acute- possible left ear effusion versus mastoiditis per CT/MRI  Plan: Continue flonase and allegra. Follow up with ENT for evaluation      Total time spent with patient visit at the [facility] nursing [facility] was 47 minutes including patient visit and review of past records, review of admission orders and medication reconciliation. Greater than 50% of total time spent with counseling and coordinating care due to generalized weakness, COPD/ asthma, urinary retention and other problems as above      Electronically signed by:  IVÁN Lind CNP

## 2018-06-20 NOTE — TELEPHONE ENCOUNTER
I am not going to do any of the admission forms.  They asked the forms while the patient was admitted in the hospital     They can ask medical records for H&P and discharge summary June 17, June 19

## 2018-06-20 NOTE — PROGRESS NOTES
Rec'd e-mail from client's dtr Windy to inquire on future planning, ? If she needs to put her mother on a nursing home wait list. Windy expressed concern of increasing weakness and 2 recent TCU admissions.  Call placed to Windy, had discussion that [facility] can provide assist with all ADL's, experience is if a patient would require 2 person or mechanical assist with transfers, behavior, memory changes, then a transition to a LTC facility may be beneficial.   Explained that when a care conference is scheduled, I will request Shobha BARTHOLOMEW from [facility] be present.  Shared that [facility] offers a rehab program, PT provides 3 visits per week for 30 minutes: walking/exercise program.   CM to follow.  Yeni Savage RN, BC  Supervisor [facility]   [phone] (Fax)

## 2018-06-21 ENCOUNTER — TELEPHONE (OUTPATIENT)
Dept: INTERNAL MEDICINE | Facility: CLINIC | Age: 78
End: 2018-06-21

## 2018-06-22 ENCOUNTER — TELEPHONE (OUTPATIENT)
Dept: INTERNAL MEDICINE | Facility: CLINIC | Age: 78
End: 2018-06-22

## 2018-06-22 DIAGNOSIS — K21.9 GASTROESOPHAGEAL REFLUX DISEASE, ESOPHAGITIS PRESENCE NOT SPECIFIED: ICD-10-CM

## 2018-06-22 NOTE — TELEPHONE ENCOUNTER
Patient's daughter Windy calls, consent to communicate on file. Forms were sent to Dr. Tierney for admission to Assisted Living, but she was told Dr. Tierney would not complete them. She asks if this is an indication of how Dr. Tierney feels about the move. Informed Windy that forms were addressed during hospital stay and the H&P and discharge summary needed can be obtained from medical records.     Windy also asks about FMLA paperwork for her to care for her mother. Advised this may require an office visit with patient to complete. Patient currently in TCU and patient is being by Geriatric team, recommended Windy ask if they are able to complete FMLA form for her since they are currently caring for patient. Windy will call back if they are not able to complete paperwork for patient.

## 2018-06-22 NOTE — TELEPHONE ENCOUNTER
"Requested Prescriptions   Pending Prescriptions Disp Refills     omeprazole (PRILOSEC) 40 MG capsule  Last Written Prescription Date:  6/27/17  Last Fill Quantity: 90,  # refills: 3   Last office visit: 6/1/2018 with prescribing provider:  Bruce   Future Office Visit:     90 capsule 3     Sig: Take 1 capsule (40 mg) by mouth daily Take 30-60 minutes before a meal.    PPI Protocol Failed    6/22/2018  8:48 AM       Failed - No diagnosis of osteoporosis on record       Passed - Not on Clopidogrel (unless Pantoprazole ordered)       Passed - Recent (12 mo) or future (30 days) visit within the authorizing provider's specialty    Patient had office visit in the last 12 months or has a visit in the next 30 days with authorizing provider or within the authorizing provider's specialty.  See \"Patient Info\" tab in inbasket, or \"Choose Columns\" in Meds & Orders section of the refill encounter.           Passed - Patient is age 18 or older       Passed - No active pregnacy on record       Passed - No positive pregnancy test in past 12 months          "

## 2018-06-24 ENCOUNTER — RECORDS - HEALTHEAST (OUTPATIENT)
Dept: LAB | Facility: CLINIC | Age: 78
End: 2018-06-24

## 2018-06-25 ENCOUNTER — TRANSFERRED RECORDS (OUTPATIENT)
Dept: HEALTH INFORMATION MANAGEMENT | Facility: CLINIC | Age: 78
End: 2018-06-25

## 2018-06-25 DIAGNOSIS — Z53.9 DIAGNOSIS NOT YET DEFINED: Primary | ICD-10-CM

## 2018-06-25 LAB
ANION GAP SERPL CALCULATED.3IONS-SCNC: 10 MMOL/L (ref 5–18)
ANION GAP SERPL CALCULATED.3IONS-SCNC: 10 MMOL/L (ref 5–18)
BUN SERPL-MCNC: 12 MG/DL (ref 8–28)
BUN SERPL-MCNC: 12 MG/DL (ref 8–28)
CALCIUM SERPL-MCNC: 9.5 MG/DL (ref 8.5–10.5)
CALCIUM SERPL-MCNC: 9.5 MG/DL (ref 8.5–10.5)
CHLORIDE BLD-SCNC: 100 MMOL/L (ref 98–107)
CHLORIDE SERPLBLD-SCNC: 100 MMOL/L (ref 98–107)
CO2 SERPL-SCNC: 24 MMOL/L (ref 22–31)
CO2 SERPL-SCNC: 24 MMOL/L (ref 22–31)
CREAT SERPL-MCNC: 0.69 MG/DL (ref 0.6–1.1)
CREAT SERPL-MCNC: 0.69 MG/DL (ref 0.6–1.1)
ERYTHROCYTE [DISTWIDTH] IN BLOOD BY AUTOMATED COUNT: 14 % (ref 11–14.5)
ERYTHROCYTE [DISTWIDTH] IN BLOOD BY AUTOMATED COUNT: 14 % (ref 11–14.5)
GFR SERPL CREATININE-BSD FRML MDRD: >60 ML/MIN/1.73M2
GFR SERPL CREATININE-BSD FRML MDRD: >60 ML/MIN/1.73M2
GLUCOSE BLD-MCNC: 89 MG/DL (ref 70–125)
GLUCOSE SERPL-MCNC: 89 MG/DL (ref 70–125)
HCT VFR BLD AUTO: 31 % (ref 35–47)
HCT VFR BLD AUTO: 31 % (ref 35–47)
HEMOGLOBIN: 10.4 G/DL (ref 12–16)
HGB BLD-MCNC: 10.4 G/DL (ref 12–16)
MCH RBC QN AUTO: 30.6 PG (ref 27–34)
MCH RBC QN AUTO: 30.6 PG (ref 27–34)
MCHC RBC AUTO-ENTMCNC: 33.5 G/DL (ref 32–36)
MCHC RBC AUTO-ENTMCNC: 33.5 G/DL (ref 32–36)
MCV RBC AUTO: 91 FL (ref 80–100)
MCV RBC AUTO: 91 FL (ref 80–100)
PLATELET # BLD AUTO: 303 THOU/UL (ref 140–440)
PLATELET # BLD AUTO: 303 THOU/UL (ref 140–440)
PMV BLD AUTO: 9 FL (ref 8.5–12.5)
POTASSIUM BLD-SCNC: 3.8 MMOL/L (ref 3.5–5)
POTASSIUM SERPL-SCNC: 3.8 MMOL/L (ref 3.5–5)
RBC # BLD AUTO: 3.4 MILL/UL (ref 3.8–5.4)
RBC # BLD AUTO: 3.4 MILL/UL (ref 3.8–5.4)
SODIUM SERPL-SCNC: 134 MMOL/L (ref 136–145)
SODIUM SERPL-SCNC: 134 MMOL/L (ref 136–145)
WBC # BLD AUTO: 8.3 THOU/UL (ref 4–11)
WBC: 8.3 THOU/UL (ref 4–11)

## 2018-06-25 PROCEDURE — G0180 MD CERTIFICATION HHA PATIENT: HCPCS | Performed by: INTERNAL MEDICINE

## 2018-06-26 NOTE — TELEPHONE ENCOUNTER
Pts daughter calling back, assisted living will not sign FMLA forms.  She was planning on coming back here from [place] anyway to help her move, but then she fell and got injured so she has had to care for her.  She is supposed to return to [place] Thurs, but they are having care conference tomorrow and that may change.  Bu she is getting nervous about getting paperwork signed before she departs.  She thinks you signed similar paperwork for her in the past.  [facility] if you are able to sign for her as Dr. Tierney is out until Thursday, in case she ends up leaving that day.

## 2018-06-27 ENCOUNTER — PATIENT OUTREACH (OUTPATIENT)
Dept: GERIATRIC MEDICINE | Facility: CLINIC | Age: 78
End: 2018-06-27

## 2018-06-27 VITALS
RESPIRATION RATE: 20 BRPM | SYSTOLIC BLOOD PRESSURE: 146 MMHG | DIASTOLIC BLOOD PRESSURE: 79 MMHG | BODY MASS INDEX: 22.19 KG/M2 | HEART RATE: 106 BPM | TEMPERATURE: 98 F | WEIGHT: 120.6 LBS | HEIGHT: 62 IN | OXYGEN SATURATION: 97 %

## 2018-06-27 RX ORDER — OMEPRAZOLE 40 MG/1
40 CAPSULE, DELAYED RELEASE ORAL DAILY
Qty: 90 CAPSULE | Refills: 2 | Status: SHIPPED | OUTPATIENT
Start: 2018-06-27 | End: 2018-08-16

## 2018-06-27 RX ORDER — LEVALBUTEROL INHALATION SOLUTION 1.25 MG/3ML
1 SOLUTION RESPIRATORY (INHALATION) 4 TIMES DAILY
COMMUNITY

## 2018-06-27 RX ORDER — TAMSULOSIN HYDROCHLORIDE 0.4 MG/1
0.4 CAPSULE ORAL DAILY
COMMUNITY
End: 2018-07-02

## 2018-06-27 NOTE — PROGRESS NOTES
"[facility] Care Coordination Contact  Rec'd e-mail from client's daughter Windy to report that there is a care conference scheduled for today.   Windy inquired if her mother could transfer to a different TCU, expressing concerns regarding communication, staff not answering telephone, staff not assisting her mother to use the telephone,  requests for her mother to receive bath, miscommunications regarding medications.  Secure e-mail sent to Windy that CM was not informed of the CC, explained that CM will f/u with Codie RINALDI to inquire on time that the CC is scheduled.   Enc'd Wnidy to f/u with Director of  or  regarding concerns. Enc'd Windy to call CM when available.  VM left with Marva Director of  (CM does not have a direct number to Codie RINALDI working with client). Shared that this client's daughter has concerns and recommend that a facility member f/u with Windy. Request a return call from Codie amin CC.  Call placed to Windy who informed CM that CC is scheduled for 10:30 today, explained that CM will not be able to attend due to another obligation at this time. Shared that CM did leave vm with Marva amin above info. Windy states that she will discuss at CC and if needed will talk with the Director of  or .  Windy stated that her mother is more alert, more \"peppy,\" but not ready for d/c. Windy states that her mother has been having difficulty with hearing and using the hearing aide. Windy states that her mother has an ENT appt  (Dr. Olmstead) today  to f/u on abnormal CT that was completed in the hospital.   Rec'd tele call from Codie RINALDI who states that she was unaware of families concerns and will f/u with Windy. Codie shared that client is not ready for d/c, continuing with rehab. Codie will f/u with CM after CC.  Rec'd the following e-mail from Codie   walking 20ft 2ww cga, bed mobility sba-cga, pain in limiting. dressing set up assist-min, toileting 2ww with encouragement sba, " standing 2-6mintues sba, slums 13/30 requiring daily check ins. nebulizer x4a day established ,regular diet. Current DC Plan: dc to [facility]. Barriers to DC: strength , endurance, tires easily. Anticipated length of stay: no end date at this time, therapy to continue.    CM to follow, will need to reassess prior to transition to [facility], due to change in condition.  Yeni Savage RN, BC  Supervisor [facility]   [phone] (Fax)

## 2018-06-27 NOTE — TELEPHONE ENCOUNTER
I have not seen her for FMLA since 5/2017- daughter came to appointment   This is not my patient, and I have not seen her for her health, except an ear wash, for almost a year    Daughter should make appointment with Kelsy to have completed, with her mother

## 2018-06-27 NOTE — TELEPHONE ENCOUNTER
Spoke to pt's daughter Windy-she actually spoke to Nery earlier today and was advised that we would need to speak with Dr. Tierney when she comes in tomorrow to see how she wants to address this. Windy reports that there is a bit less urgency to the matter because she is not still leaving for [place] tomorrow-at this point, she thinks she will be here at least another 2 weeks.   Says they had a care conference today at the nursing [place] and this was the reason she decided to extend her stay (after they discussed pt's condition a little more in depth).   Windy wants to stay long enough to help her mother transition from [facility] to her new assisted living facility (I believe she said she will be moving to the [facility]?).   Daughter is still very frustrated with the lack of communication with the staff at [facility] and the way that facility is operating makes her a bit nervous about her mother being there. She feels like she has to monitor and double-check everything they are doing there.

## 2018-06-29 ENCOUNTER — RECORDS - HEALTHEAST (OUTPATIENT)
Dept: LAB | Facility: CLINIC | Age: 78
End: 2018-06-29

## 2018-06-29 ENCOUNTER — NURSING HOME VISIT (OUTPATIENT)
Dept: GERIATRICS | Facility: CLINIC | Age: 78
End: 2018-06-29
Payer: COMMERCIAL

## 2018-06-29 DIAGNOSIS — R53.81 PHYSICAL DECONDITIONING: ICD-10-CM

## 2018-06-29 DIAGNOSIS — E03.9 HYPOTHYROIDISM, UNSPECIFIED TYPE: ICD-10-CM

## 2018-06-29 DIAGNOSIS — J44.9 CHRONIC OBSTRUCTIVE PULMONARY DISEASE, UNSPECIFIED COPD TYPE (H): ICD-10-CM

## 2018-06-29 DIAGNOSIS — I35.0 AORTIC STENOSIS, MODERATE: ICD-10-CM

## 2018-06-29 DIAGNOSIS — E78.5 HYPERLIPIDEMIA, UNSPECIFIED HYPERLIPIDEMIA TYPE: ICD-10-CM
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
## 2018-06-29 LAB
ALBUMIN UR-MCNC: NEGATIVE MG/DL
APPEARANCE UR: CLEAR
BACTERIA #/AREA URNS HPF: ABNORMAL HPF
BILIRUB UR QL STRIP: NEGATIVE
COLOR UR AUTO: YELLOW
GLUCOSE UR STRIP-MCNC: NEGATIVE MG/DL
HGB UR QL STRIP: NEGATIVE
HYALINE CASTS #/AREA URNS LPF: ABNORMAL LPF
KETONES UR STRIP-MCNC: NEGATIVE MG/DL
LEUKOCYTE ESTERASE UR QL STRIP: ABNORMAL
NITRATE UR QL: NEGATIVE
PH UR STRIP: 6.5 [PH] (ref 4.5–8)
RBC #/AREA URNS AUTO: ABNORMAL HPF
SP GR UR STRIP: 1.01 (ref 1–1.03)
SQUAMOUS #/AREA URNS AUTO: ABNORMAL LPF
TRANS CELLS #/AREA URNS HPF: ABNORMAL LPF
UROBILINOGEN UR STRIP-ACNC: ABNORMAL
WBC #/AREA URNS AUTO: ABNORMAL HPF
WBC CLUMPS #/AREA URNS HPF: PRESENT /[HPF]

## 2018-06-29 PROCEDURE — 99306 1ST NF CARE HIGH MDM 50: CPT | Performed by: INTERNAL MEDICINE

## 2018-06-29 NOTE — LETTER
6/29/2018        RE: Ana Luisa Haynes Rosa  [address]  [facility] [address]          PRIMARY CARE PROVIDER AND CLINIC RESPONSIBLE:  Jenny Hamilton, [address] [facility] / [facility] [id number]        ADMISSION HISTORY AND PHYSICAL EXAMINATION     Chief Complaint   Patient presents with     Hospital F/U         HISTORY OF PRESENT ILLNESS:  78 year old female, (1940), admitted to the [facility] for continuation of medical care and rehab.    Pt admitted [facility] 6/17 to 6/19 for fall and laceration to the R elbow.    Pt denies any fevers/chills/chest pain. + urinary urgency and frequency.     Please see Yany Torres 's CNP admit noted dated 6/20 for details of admission, past medical history, family history, allergies, medication list, social history and other details pertinent with this admission. Hospital admission and dc summary reviewed.      Past Medical History:   Diagnosis Date     Anemia Dec 2011     Arthritis of hand      Asthma, persistent     f/U dr Brock at [facility]     Asthma, persistent      Chronic intermittent steroid use     for asthma     COPD exacerbation (H) 10/25/2013     Esophageal stricture 2007    s/p dilation     Foot deformity     Left     HTN, goal below 140/90      Hyperlipidemia      Hyperlipidemia LDL goal < 130      Hypothyroidism Dec 2011     Hypothyroidism      Left foot pain Nov 2011     Left shoulder pain Nov 2011     Medication side effects      Osteoporosis      Paroxysmal supraventricular tachycardia (H)      PVC (premature ventricular contraction) May 2012     Skin cyst Dec 2011    L thumb     SOB (shortness of breath) on exertion May 2012     SVT (supraventricular tachycardia) (H)        Past Surgical History:   Procedure Laterality Date     BUNIONECTOMY LAPIDUS WITH TARSAL METATARSAL (TMT) FUSION  1990's     EP STUDY, MODIFIED  7/2012    SVT not induced, PVC's     GYN SURGERY  1980     HYSTERECTOMY TOTAL ABDOMINAL        HYSTERECTOMY, PAP NO LONGER INDICATED       TONSILLECTOMY         Current Outpatient Prescriptions   Medication Sig     acetaminophen (TYLENOL) 500 MG tablet Take 2 tablets (1,000 mg) by mouth 3 times daily     albuterol (ALBUTEROL) 108 (90 BASE) MCG/ACT Inhaler Inhale 2 puffs into the lungs every 6 hours as needed     budesonide (PULMICORT FLEXHALER) 180 MCG/ACT inhaler Inhale 1 puff into the lungs 2 times daily     calcium 600 MG tablet Take 1 tablet (600 mg) by mouth daily     cholecalciferol (VITAMIN D) 1000 UNIT tablet Take 1 tablet (1,000 Units) by mouth daily     cyanocobalamin (B-12 TR) 1000 MCG TBCR Take 1 tablet by mouth daily     ferrous sulfate (IRON) 325 (65 Fe) MG tablet Take 325 mg by mouth daily (with breakfast)     fexofenadine (ALLEGRA) 60 MG tablet Take 1 tablet (60 mg) by mouth daily     fluticasone (FLONASE) 50 MCG/ACT spray Spray 1 spray into both nostrils daily     gemfibrozil (LOPID) 600 MG tablet Take 1 tablet (600 mg) by mouth daily     levalbuterol (XOPENEX) 1.25 MG/3ML neb solution Take 1 ampule by nebulization 4 times daily     levothyroxine (SYNTHROID/LEVOTHROID) 25 MCG tablet Take 1 tablet (25 mcg) by mouth daily     montelukast (SINGULAIR) 10 MG tablet Take 1 tablet (10 mg) by mouth At Bedtime     Multiple Vitamins-Minerals (MULTIVITAMIN OR) Take 1 tablet by mouth daily     omeprazole (PRILOSEC) 40 MG capsule Take 1 capsule (40 mg) by mouth daily Take 30-60 minutes before a meal.     polyethylene glycol (MIRALAX/GLYCOLAX) Packet Take 17 g by mouth daily as needed for constipation     predniSONE (DELTASONE) 10 MG tablet Take 10 mg by mouth daily     roflumilast (DALIRESP) 500 MCG TABS tablet Take 500 mcg by mouth daily      tamsulosin (FLOMAX) 0.4 MG capsule Take 0.4 mg by mouth daily     TRAMADOL HCL PO Take 50 mg by mouth every 4 hours     umeclidinium (INCRUSE ELLIPTA) 62.5 MCG/INH oral inhaler Inhale 1 puff into the lungs daily     ASPIRIN NOT PRESCRIBED, INTENTIONAL, 1 each  "continuous prn. Antiplatelet medication not prescribed intentionally due to Use of NSAIDS     No current facility-administered medications for this visit.        Allergies   Allergen Reactions     Hydralazine Anxiety     Penicillin G Hives     Tolerated cephalosporines 2017     Meloxicam      dizziness       Metoprolol      ? Skin rash on the back     Norvasc [Amlodipine Besylate] Hives       Social History     Social History     Marital status:      Spouse name: N/A     Number of children: N/A     Years of education: N/A     Occupational History     Not on file.     Social History Main Topics     Smoking status: Former Smoker     Packs/day: 1.00     Years: 15.00     Quit date: 1/1/1980     Smokeless tobacco: Never Used     Alcohol use No      Comment: Occasional drink     Drug use: No     Sexual activity: No     Other Topics Concern     Caffeine Concern No     1 cup of tea in the morning     Sleep Concern No     Stress Concern No     Weight Concern No     Special Diet No     Exercise No     some walking     Seat Belt Yes     Social History Narrative          Information reviewed:  Medications, vital signs, orders, nursing notes, problem list, hospital information.     ROS: All 10 point review of system completed, those pertinent positive, please see H&P, the remaining ROS is negative.    /79  Pulse 106  Temp 98  F (36.7  C)  Resp 20  Ht 5' 2\" (1.575 m)  Wt 120 lb 9.6 oz (54.7 kg)  SpO2 97%  BMI 22.06 kg/m2    PHYSICAL EXAMINATION:   GENERAL:  No acute distress. Sitting on WC.  SKIN:  Dry and warm.  There is no rash, lesions, ulcers or juandice at area of skin examined.  HEENT:  Head without trauma.  Pupils round, reactive. Exam of conjunctiva and lids are normal. Sclera without icterus. There is no oral thrush.  NECK:  Supple.  There is no cervical adenopathy, no thyromegaly. No jugular venous distension.  CHEST: No reproducible chest tenderness.   LUNGS:  Normal respiratory effort. Lungs are " Clear on ascultation.  HEART:  Regular rate and rhythm.  4/6 systolic murmur, no gallops or rubs auscultated.  ABDOMEN:  Soft, bowel sounds positive.  There is no tenderness or guarding.   EXTREMITIES: No edema. Abrasion to R elbow. No cellulitis.  NEUROLOGIC:  Alert and oriented x3.      Lab/Diagnostic data:  Reviewed    Lab Results   Component Value Date    WBC 8.3 06/25/2018     Lab Results   Component Value Date    RBC 3.40 06/25/2018     Lab Results   Component Value Date    HGB 10.4 06/25/2018     Lab Results   Component Value Date    HCT 31.0 06/25/2018     Lab Results   Component Value Date    MCV 91 06/25/2018     Lab Results   Component Value Date    MCH 30.6 06/25/2018     Lab Results   Component Value Date    MCHC 33.5 06/25/2018     Lab Results   Component Value Date    RDW 14.0 06/25/2018     Lab Results   Component Value Date     06/25/2018       Last Basic Metabolic Panel:  Lab Results   Component Value Date     06/25/2018      Lab Results   Component Value Date    POTASSIUM 3.8 06/25/2018     Lab Results   Component Value Date    CHLORIDE 100 06/25/2018     Lab Results   Component Value Date    DIANA 9.5 06/25/2018     Lab Results   Component Value Date    CO2 24 06/25/2018     Lab Results   Component Value Date    BUN 12 06/25/2018     Lab Results   Component Value Date    CR 0.69 06/25/2018     Lab Results   Component Value Date    GLC 89 06/25/2018         ASSESSMENT / PLAN:     Fall, subsequent encounter  - MRI showed no CVA but, opacified L mastoid air cells suggesting effusion versus otomastoiditis.  - f/u with ENT.  - On flonase and allegra.    Aortic stenosis, moderate  - Also noted on TTE 1/2018.  - f/u with cardiology.    Chronic obstructive pulmonary disease, unspecified COPD type (H)  - Severe COPD.  - On daliresp, pulmicort, prednisone, xopenex nebs and incruse ellipta.    Hypothyroidism, unspecified type  - On synthroid.    Hyperlipidemia, unspecified hyperlipidemia type  -  On lopid.    Urinary frequency.  - Recheck UA.    Physical deconditioning  -Plan: PT/OT, fall precautions. Care conference with patient and family for the progress of rehab and disposition issues will be discussed as planned. Rehab evaluation and other evaluations including CPT are at rehab logs, to be reviewed separately.  Fall risk assessment as well as cognitive evaluation will be formed during rehab stay if indicated.      Other problems with same care. Primary care doctor and other specialists to address those chronic problems in next clinic appointment to be scheduled upon discharge from the TCU.    Total time spent with patient visit was 44 min including patient visit, review of past records, 1/2 time on patients counseling and coordinating care.            Sincerely,        Latesha Cao MD

## 2018-06-30 NOTE — PROGRESS NOTES
PRIMARY CARE PROVIDER AND CLINIC RESPONSIBLE:  Jenny Hamilton, [address] [facility] / [facility] [address]        ADMISSION HISTORY AND PHYSICAL EXAMINATION     Chief Complaint   Patient presents with     Hospital F/U         HISTORY OF PRESENT ILLNESS:  78 year old female, (1940), [facility].    Pt admitted [facility] 6/17 to 6/19 for fall and laceration to the R elbow.    Pt denies any fevers/chills/chest pain. + urinary urgency and frequency.     Please see Yany Torres 's CNP admit noted dated 6/20 for details of admission, past medical history, family history, allergies, medication list, social history and other details pertinent with this admission. Hospital admission and dc summary reviewed.      Past Medical History:   Diagnosis Date     Anemia Dec 2011     Arthritis of hand      Asthma, persistent     f/U dr Brock at [facility]     Asthma, persistent      Chronic intermittent steroid use     for asthma     COPD exacerbation (H) 10/25/2013     Esophageal stricture 2007    s/p dilation     Foot deformity     Left     HTN, goal below 140/90      Hyperlipidemia      Hyperlipidemia LDL goal < 130      Hypothyroidism Dec 2011     Hypothyroidism      Left foot pain Nov 2011     Left shoulder pain Nov 2011     Medication side effects      Osteoporosis      Paroxysmal supraventricular tachycardia (H)      PVC (premature ventricular contraction) May 2012     Skin cyst Dec 2011    L thumb     SOB (shortness of breath) on exertion May 2012     SVT (supraventricular tachycardia) (H)        Past Surgical History:   Procedure Laterality Date     BUNIONECTOMY LAPIDUS WITH TARSAL METATARSAL (TMT) FUSION  1990's     EP STUDY, MODIFIED  7/2012    SVT not induced, PVC's     GYN SURGERY  1980     HYSTERECTOMY TOTAL ABDOMINAL       HYSTERECTOMY, PAP NO LONGER INDICATED       TONSILLECTOMY         Current Outpatient Prescriptions   Medication Sig      acetaminophen (TYLENOL) 500 MG tablet Take 2 tablets (1,000 mg) by mouth 3 times daily     albuterol (ALBUTEROL) 108 (90 BASE) MCG/ACT Inhaler Inhale 2 puffs into the lungs every 6 hours as needed     budesonide (PULMICORT FLEXHALER) 180 MCG/ACT inhaler Inhale 1 puff into the lungs 2 times daily     calcium 600 MG tablet Take 1 tablet (600 mg) by mouth daily     cholecalciferol (VITAMIN D) 1000 UNIT tablet Take 1 tablet (1,000 Units) by mouth daily     cyanocobalamin (B-12 TR) 1000 MCG TBCR Take 1 tablet by mouth daily     ferrous sulfate (IRON) 325 (65 Fe) MG tablet Take 325 mg by mouth daily (with breakfast)     fexofenadine (ALLEGRA) 60 MG tablet Take 1 tablet (60 mg) by mouth daily     fluticasone (FLONASE) 50 MCG/ACT spray Spray 1 spray into both nostrils daily     gemfibrozil (LOPID) 600 MG tablet Take 1 tablet (600 mg) by mouth daily     levalbuterol (XOPENEX) 1.25 MG/3ML neb solution Take 1 ampule by nebulization 4 times daily     levothyroxine (SYNTHROID/LEVOTHROID) 25 MCG tablet Take 1 tablet (25 mcg) by mouth daily     montelukast (SINGULAIR) 10 MG tablet Take 1 tablet (10 mg) by mouth At Bedtime     Multiple Vitamins-Minerals (MULTIVITAMIN OR) Take 1 tablet by mouth daily     omeprazole (PRILOSEC) 40 MG capsule Take 1 capsule (40 mg) by mouth daily Take 30-60 minutes before a meal.     polyethylene glycol (MIRALAX/GLYCOLAX) Packet Take 17 g by mouth daily as needed for constipation     predniSONE (DELTASONE) 10 MG tablet Take 10 mg by mouth daily     roflumilast (DALIRESP) 500 MCG TABS tablet Take 500 mcg by mouth daily      tamsulosin (FLOMAX) 0.4 MG capsule Take 0.4 mg by mouth daily     TRAMADOL HCL PO Take 50 mg by mouth every 4 hours     umeclidinium (INCRUSE ELLIPTA) 62.5 MCG/INH oral inhaler Inhale 1 puff into the lungs daily     ASPIRIN NOT PRESCRIBED, INTENTIONAL, 1 each continuous prn. Antiplatelet medication not prescribed intentionally due to Use of NSAIDS     No current  "facility-administered medications for this visit.        Allergies   Allergen Reactions     Hydralazine Anxiety     Penicillin G Hives     Tolerated cephalosporines 2017     Meloxicam      dizziness       Metoprolol      ? Skin rash on the back     Norvasc [Amlodipine Besylate] Hives       Social History     Social History     Marital status:      Spouse name: N/A     Number of children: N/A     Years of education: N/A     Occupational History     Not on file.     Social History Main Topics     Smoking status: Former Smoker     Packs/day: 1.00     Years: 15.00     Quit date: 1/1/1980     Smokeless tobacco: Never Used     Alcohol use No      Comment: Occasional drink     Drug use: No     Sexual activity: No     Other Topics Concern     Caffeine Concern No     1 cup of tea in the morning     Sleep Concern No     Stress Concern No     Weight Concern No     Special Diet No     Exercise No     some walking     Seat Belt Yes     Social History Narrative          Information reviewed:  Medications, vital signs, orders, nursing notes, problem list, hospital information.     ROS: All 10 point review of system completed, those pertinent positive, please see H&P, the remaining ROS is negative.    /79  Pulse 106  Temp 98  F (36.7  C)  Resp 20  Ht 5' 2\" (1.575 m)  Wt 120 lb 9.6 oz (54.7 kg)  SpO2 97%  BMI 22.06 kg/m2    PHYSICAL EXAMINATION:   GENERAL:  No acute distress. Sitting on WC.  SKIN:  Dry and warm.  There is no rash, lesions, ulcers or juandice at area of skin examined.  HEENT:  Head without trauma.  Pupils round, reactive. Exam of conjunctiva and lids are normal. Sclera without icterus. There is no oral thrush.  NECK:  Supple.  There is no cervical adenopathy, no thyromegaly. No jugular venous distension.  CHEST: No reproducible chest tenderness.   LUNGS:  Normal respiratory effort. Lungs are Clear on ascultation.  HEART:  Regular rate and rhythm.  4/6 systolic murmur, no gallops or rubs " auscultated.  ABDOMEN:  Soft, bowel sounds positive.  There is no tenderness or guarding.   EXTREMITIES: No edema. Abrasion to R elbow. No cellulitis.  NEUROLOGIC:  Alert and oriented x3.      Lab/Diagnostic data:  Reviewed    Lab Results   Component Value Date    WBC 8.3 06/25/2018     Lab Results   Component Value Date    RBC 3.40 06/25/2018     Lab Results   Component Value Date    HGB 10.4 06/25/2018     Lab Results   Component Value Date    HCT 31.0 06/25/2018     Lab Results   Component Value Date    MCV 91 06/25/2018     Lab Results   Component Value Date    MCH 30.6 06/25/2018     Lab Results   Component Value Date    MCHC 33.5 06/25/2018     Lab Results   Component Value Date    RDW 14.0 06/25/2018     Lab Results   Component Value Date     06/25/2018       Last Basic Metabolic Panel:  Lab Results   Component Value Date     06/25/2018      Lab Results   Component Value Date    POTASSIUM 3.8 06/25/2018     Lab Results   Component Value Date    CHLORIDE 100 06/25/2018     Lab Results   Component Value Date    DIANA 9.5 06/25/2018     Lab Results   Component Value Date    CO2 24 06/25/2018     Lab Results   Component Value Date    BUN 12 06/25/2018     Lab Results   Component Value Date    CR 0.69 06/25/2018     Lab Results   Component Value Date    GLC 89 06/25/2018         ASSESSMENT / PLAN:     Fall, subsequent encounter  - MRI showed no CVA but, opacified L mastoid air cells suggesting effusion versus otomastoiditis.  - f/u with ENT.  - On flonase and allegra.    Aortic stenosis, moderate  - Also noted on TTE 1/2018.  - f/u with cardiology.    Chronic obstructive pulmonary disease, unspecified COPD type (H)  - Severe COPD.  - On daliresp, pulmicort, prednisone, xopenex nebs and incruse ellipta.    Hypothyroidism, unspecified type  - On synthroid.    Hyperlipidemia, unspecified hyperlipidemia type  - On lopid.    Urinary frequency.  - Recheck UA.    Physical deconditioning  -Plan: PT/OT, fall  precautions. Care conference with patient and family for the progress of rehab and disposition issues will be discussed as planned. Rehab evaluation and other evaluations including CPT are at rehab logs, to be reviewed separately.  Fall risk assessment as well as cognitive evaluation will be formed during rehab stay if indicated.      Other problems with same care. Primary care doctor and other specialists to address those chronic problems in next clinic appointment to be scheduled upon discharge from the TCU.    Total time spent with patient visit was 44 min including patient visit, review of past records, 1/2 time on patients counseling and coordinating care.

## 2018-07-02 ENCOUNTER — NURSING HOME VISIT (OUTPATIENT)
Dept: GERIATRICS | Facility: CLINIC | Age: 78
End: 2018-07-02
Payer: COMMERCIAL

## 2018-07-02 ENCOUNTER — RECORDS - HEALTHEAST (OUTPATIENT)
Dept: LAB | Facility: CLINIC | Age: 78
End: 2018-07-02

## 2018-07-02 ENCOUNTER — PATIENT OUTREACH (OUTPATIENT)
Dept: GERIATRIC MEDICINE | Facility: CLINIC | Age: 78
End: 2018-07-02

## 2018-07-02 VITALS
DIASTOLIC BLOOD PRESSURE: 86 MMHG | WEIGHT: 120.6 LBS | OXYGEN SATURATION: 94 % | RESPIRATION RATE: 20 BRPM | BODY MASS INDEX: 22.19 KG/M2 | SYSTOLIC BLOOD PRESSURE: 149 MMHG | HEART RATE: 92 BPM | HEIGHT: 62 IN | TEMPERATURE: 98.1 F

## 2018-07-02 DIAGNOSIS — J44.9 CHRONIC OBSTRUCTIVE PULMONARY DISEASE, UNSPECIFIED COPD TYPE (H): ICD-10-CM

## 2018-07-02 DIAGNOSIS — E87.1 HYPONATREMIA: ICD-10-CM

## 2018-07-02 DIAGNOSIS — R35.0 URINARY FREQUENCY: Primary | ICD-10-CM

## 2018-07-02 DIAGNOSIS — R33.9 URINARY RETENTION: ICD-10-CM

## 2018-07-02 DIAGNOSIS — K59.00 CONSTIPATION, UNSPECIFIED CONSTIPATION TYPE: ICD-10-CM

## 2018-07-02 DIAGNOSIS — M19.90 ARTHRITIS: ICD-10-CM

## 2018-07-02 DIAGNOSIS — Z76.89 HEALTH CARE HOME: ICD-10-CM

## 2018-07-02 DIAGNOSIS — R53.81 PHYSICAL DECONDITIONING: ICD-10-CM

## 2018-07-02 DIAGNOSIS — J45.909 ASTHMA, UNSPECIFIED ASTHMA SEVERITY, UNSPECIFIED WHETHER COMPLICATED, UNSPECIFIED WHETHER PERSISTENT: ICD-10-CM

## 2018-07-02 DIAGNOSIS — M62.81 GENERALIZED MUSCLE WEAKNESS: ICD-10-CM

## 2018-07-02 DIAGNOSIS — R93.89 ABNORMAL FINDING ON IMAGING: ICD-10-CM

## 2018-07-02 DIAGNOSIS — F32.A DEPRESSION, UNSPECIFIED DEPRESSION TYPE: ICD-10-CM

## 2018-07-02 PROCEDURE — 99310 SBSQ NF CARE HIGH MDM 45: CPT | Performed by: NURSE PRACTITIONER

## 2018-07-02 NOTE — PROGRESS NOTES
[place] [facility]    Chief Complaint   Patient presents with     intermediate Acute       [place] Medical Record Number:  [id number]    HPI:    Ana Luisa Lee is a 78 year old  (1940), who is being seen today for an episodic care visit at [facility].  HPI information obtained from: facility chart records, facility staff, patient report and [facility] chart review.    Today's concern is:  Urinary frequency  Noted last week. Patient concerned she has UTI. UA with UC obtained 6/29- no change in symptoms over the weekend. Ucx still pending.    Constipation  2 BMs since arrival at TCU. Is concerned about taking bowel medications because nurses do not answer call lights in timely manner. Explained importance of having regular BMs- she is agreeable to trying miralax.     Hyponatremia  Baseline over past year around 130-134.  Sodium   Date Value Ref Range Status   06/25/2018 134 (L) 136 - 145 mmol/L Final     Generalized muscle weakness  Physical deconditioning  Ms. Lee fell at home. CT/ MRI negative. No injuries beyond superficial scrape of right elbow. 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 Here for therapies. Uses a walker for ambulation at home.      Chronic obstructive pulmonary disease, unspecified COPD type (H)  asthma, unspecified whether complicated, unspecified whether persistent  Inhalers as on medication list. Also on prolonged steroid taper. Is currently on 5 mg daily. Follow up with PCP or pulmonary for taper. Reports chronic dry cough. Reports some SOB that is not worse than normal.     Arthritis  Reports pain from shoulders down. Was discharged from the hospital on PRN tramadol, however per patient she takes it scheduled q4h. Per review of chart has been taking q6h PRN and q8h scheduled recently. Also on scheduled tylenol.    Urinary retention  Depression  Urinary retention  developed during hospitalization. Suspect due to to duloxetine. On tamsulosin. No complaints of retention since arriving at TCU.    Duloxetine stopped as above due to urinary retention. Review alternate options for depression (mirtazapine?) with patient at next visit.     Abnormal finding on imaging  CT, MRI from 6/17 shows possible left ear effusion versus mastoiditis. Currently on flonase and allegra due to negative exam. ENT recommended follow up with audiologist- daughter plans to make this appointment and let facility know.        BP: 100-172/63-97 mmHg  P:  bpm  Admission weight: 106.6 lbs  Current weight: 120.6 lbs    ALLERGIES: Hydralazine; Penicillin g; Meloxicam; Metoprolol; and Norvasc [amlodipine besylate]  Past Medical, Surgical, Family and Social History reviewed and updated in [facility].    Current Outpatient Prescriptions   Medication Sig Dispense Refill     acetaminophen (TYLENOL) 500 MG tablet Take 2 tablets (1,000 mg) by mouth 3 times daily       albuterol (ALBUTEROL) 108 (90 BASE) MCG/ACT Inhaler Inhale 2 puffs into the lungs every 6 hours as needed       ASPIRIN NOT PRESCRIBED, INTENTIONAL, 1 each continuous prn. Antiplatelet medication not prescribed intentionally due to Use of NSAIDS 0 each 0     budesonide (PULMICORT FLEXHALER) 180 MCG/ACT inhaler Inhale 1 puff into the lungs 2 times daily       calcium 600 MG tablet Take 1 tablet (600 mg) by mouth daily 60 tablet      cholecalciferol (VITAMIN D) 1000 UNIT tablet Take 1 tablet (1,000 Units) by mouth daily 30 tablet      cyanocobalamin (B-12 TR) 1000 MCG TBCR Take 1 tablet by mouth daily 100 tablet 3     ferrous sulfate (IRON) 325 (65 Fe) MG tablet Take 325 mg by mouth daily (with breakfast)       fexofenadine (ALLEGRA) 60 MG tablet Take 1 tablet (60 mg) by mouth daily 60 tablet      fluticasone (FLONASE) 50 MCG/ACT spray Spray 1 spray into both nostrils daily 1 Bottle      gemfibrozil (LOPID) 600 MG tablet Take 1 tablet (600 mg) by mouth  "daily 90 tablet 0     levalbuterol (XOPENEX) 1.25 MG/3ML neb solution Take 1 ampule by nebulization 4 times daily       levothyroxine (SYNTHROID/LEVOTHROID) 25 MCG tablet Take 1 tablet (25 mcg) by mouth daily 90 tablet 3     montelukast (SINGULAIR) 10 MG tablet Take 1 tablet (10 mg) by mouth At Bedtime 30 tablet      Multiple Vitamins-Minerals (MULTIVITAMIN OR) Take 1 tablet by mouth daily       omeprazole (PRILOSEC) 40 MG capsule Take 1 capsule (40 mg) by mouth daily Take 30-60 minutes before a meal. 90 capsule 2     polyethylene glycol (MIRALAX/GLYCOLAX) Packet Take 17 g by mouth daily as needed for constipation       predniSONE (DELTASONE) 10 MG tablet Take 10 mg by mouth daily       roflumilast (DALIRESP) 500 MCG TABS tablet Take 500 mcg by mouth daily        TRAMADOL HCL PO Take 50 mg by mouth every 8 hours        umeclidinium (INCRUSE ELLIPTA) 62.5 MCG/INH oral inhaler Inhale 1 puff into the lungs daily       Medications reviewed:  Medications reconciled to facility chart and changes were made to reflect current medications as identified as above med list.     REVIEW OF SYSTEMS:  10 point ROS of systems including Constitutional, Eyes, Respiratory, Cardiovascular, Gastroenterology, Genitourinary, Integumentary, Muscularskeletal, Psychiatric were all negative except for pertinent positives noted in my HPI.    Physical Exam:  /86  Pulse 92  Temp 98.1  F (36.7  C)  Resp 20  Ht 5' 2\" (1.575 m)  Wt 120 lb 9.6 oz (54.7 kg)  SpO2 94%  BMI 22.06 kg/m2   GENERAL APPEARANCE:  Alert, pleasant and cooperative, oriented x 4  EYES:  EOM, lids, pupils and irises normal, sclera clear and conjunctiva normal, no discharge or mattering on lids or lashes noted  ENT:  Mouth normal, moist mucous membranes, nose without drainage or crusting, external ears without lesions, hearing acuity : [place]  RESP:  respiratory effort normal, no respiratory distress, Lung sounds clear, patient is on room air  CV: auscultation of heart " done, rate and rhythm regular. generalized edema  ABDOMEN:  normal bowel sounds, soft, nontender, no grimacing or guarding with palpation.  M/S:   Gait and station abnormal: uses walker for ambulation; able to move all extremities   SKIN:  Inspection and palpation of skin and subcutaneous tissue: skin warm, dry without rashes, superficial scrape to right elbow- healing  NEURO: cranial nerves 2-12 grossly intact, no facial asymmetry, no speech deficits and able to follow directions, moves all extremities symmetrically  PSYCH:  insight and judgement intact, memory intact, affect and mood normal    Recent Labs:     CBC RESULTS:   Recent Labs   Lab Test 06/25/18 06/17/18   0909   WBC  8.3  8.5   RBC  3.40*  3.96   HGB  10.4*  11.9   HCT  31.0*  36.4   MCV  91  92   MCH  30.6  30.1   MCHC  33.5  32.7   RDW  14.0  13.5   PLT  303  289       Last Basic Metabolic Panel:  Recent Labs   Lab Test 06/25/18 06/17/18   0909   NA  134*  133   POTASSIUM  3.8  3.4   CHLORIDE  100  98   DIANA  9.5  9.3   CO2  24  22   BUN  12  14   CR  0.69  0.71   GLC  89  98       Liver Function Studies -   Recent Labs   Lab Test  04/13/18   0429  12/28/17   1000   PROTTOTAL  7.4  6.6*   ALBUMIN  3.9  3.3*   BILITOTAL  0.5  0.3   ALKPHOS  66  55   AST  14  7   ALT  14  13       Assessment/Plan:  (R35.0) Urinary frequency  (primary encounter diagnosis)  Comment: Acute, UC pending  Plan: UC pending. Consider treatment options when returned. Notify provider with any development of fevers or VS changes.    (K59.00) Constipation, unspecified constipation type  Comment: acute, suspect related to narcotics, immobility   Plan: Miralax daily. Nursing to notify provider if stools not remaining regular after starting this medication.    (E87.1) Hyponatremia  Comment: Na 134 at last check. Chronically low  Plan: BMP 7/3. Monitor for mental status changes.    (M62.81) Generalized muscle weakness    (R53.81) Physical deconditioning  Comment: Acute- suspect  related to steroid myopathy  Plan: BMP 7/3. Encourage participation in physical therapy/occupational therapy for strengthening and deconditioning. Discharge planning per their recommendation. Social work to assist with d/c planning.     (J44.9) Chronic obstructive pulmonary disease, unspecified COPD type (H)  (J45.998) Severe asthma, unspecified whether complicated, unspecified whether persistent  Comment: Chronic- stable   Plan: Continue inhalers. Change levalbuterol to QID per patient request. Continue prednisone 5 mg daily. Follow up with PCP or pulmonary regarding further tapering of steroids.     (M19.90) Arthritis  Comment: Chronic- with pain on exam today  Plan: Per chart review patient recently taking q6h PRN and q8h. Will schedule tramadol 50 mg q8h today. Continue tylenol scheduled.    (R33.9) Urinary retention  (F32.9) Depression, unspecified depression type  Comment: Acute- no symptoms of urinary retention  Plan: Discontinue flomax. At next visit discuss alternate antidepressants- possibly mirtazapine.      (R93.8) Abnormal finding on imaging  Comment: Acute- possible left ear effusion versus mastoiditis per CT/MRI  Plan: Continue flonase and allegra. Daughter scheduled follow up appointment with audiology- nursing to please send copy with to appointment    Total time spent with patient visit at the skilled nursing facility was 36 minutes including patient visit and review of past records, and phone call to daughter. Greater than 50% of total time spent with counseling and coordinating care due to pain management (arthritis), urinary frequency, constipation, and other problems as above.    Electronically signed by  IVÁN Lind CNP

## 2018-07-02 NOTE — LETTER
7/2/2018        RE: Ana Luisa Lee  [address]  [place] [address]        [place] [facility]    Chief Complaint   Patient presents with     USP Acute       [place] Medical Record Number:  [id number]    HPI:    Ana Luisa Lee is a 78 year old  (1940), who is being seen today for an episodic care visit at [facility].  HPI information obtained from: facility chart records, facility staff, patient report and [facility] chart review.    Today's concern is:  Urinary frequency  Noted last week. Patient concerned she has UTI. UA with UC obtained 6/29- no change in symptoms over the weekend. Ucx still pending.    Constipation  2 BMs since arrival at TCU. Is concerned about taking bowel medications because nurses do not answer call lights in timely manner. Explained importance of having regular BMs- she is agreeable to trying miralax.     Hyponatremia  Baseline over past year around 130-134.  Sodium   Date Value Ref Range Status   06/25/2018 134 (L) 136 - 145 mmol/L Final     Generalized muscle weakness  Physical deconditioning  Ms. Lee fell at home. CT/ MRI negative. No injuries beyond superficial scrape of right elbow. 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 Here for therapies. Uses a walker for ambulation at home.      Chronic obstructive pulmonary disease, unspecified COPD type (H)  asthma, unspecified whether complicated, unspecified whether persistent  Inhalers as on medication list. Also on prolonged steroid taper. Is currently on 5 mg daily. Follow up with PCP or pulmonary for taper. Reports chronic dry cough. Reports some SOB that is not worse than normal.     Arthritis  Reports pain from shoulders down. Was discharged from the hospital on PRN tramadol, however per patient she takes it scheduled q4h. Per review of chart has been taking q6h PRN and q8h  scheduled recently. Also on scheduled tylenol.    Urinary retention  Depression  Urinary retention developed during hospitalization. Suspect due to to duloxetine. On tamsulosin. No complaints of retention since arriving at TCU.    Duloxetine stopped as above due to urinary retention. Review alternate options for depression (mirtazapine?) with patient at next visit.     Abnormal finding on imaging  CT, MRI from 6/17 shows possible left ear effusion versus mastoiditis. Currently on flonase and allegra due to negative exam. ENT recommended follow up with audiologist- daughter plans to make this appointment and let facility know.        BP: 100-172/63-97 mmHg  P:  bpm  Admission weight: 106.6 lbs  Current weight: 120.6 lbs    ALLERGIES: Hydralazine; Penicillin g; Meloxicam; Metoprolol; and Norvasc [amlodipine besylate]  Past Medical, Surgical, Family and Social History reviewed and updated in [facility].    Current Outpatient Prescriptions   Medication Sig Dispense Refill     acetaminophen (TYLENOL) 500 MG tablet Take 2 tablets (1,000 mg) by mouth 3 times daily       albuterol (ALBUTEROL) 108 (90 BASE) MCG/ACT Inhaler Inhale 2 puffs into the lungs every 6 hours as needed       ASPIRIN NOT PRESCRIBED, INTENTIONAL, 1 each continuous prn. Antiplatelet medication not prescribed intentionally due to Use of NSAIDS 0 each 0     budesonide (PULMICORT FLEXHALER) 180 MCG/ACT inhaler Inhale 1 puff into the lungs 2 times daily       calcium 600 MG tablet Take 1 tablet (600 mg) by mouth daily 60 tablet      cholecalciferol (VITAMIN D) 1000 UNIT tablet Take 1 tablet (1,000 Units) by mouth daily 30 tablet      cyanocobalamin (B-12 TR) 1000 MCG TBCR Take 1 tablet by mouth daily 100 tablet 3     ferrous sulfate (IRON) 325 (65 Fe) MG tablet Take 325 mg by mouth daily (with breakfast)       fexofenadine (ALLEGRA) 60 MG tablet Take 1 tablet (60 mg) by mouth daily 60 tablet      fluticasone (FLONASE) 50 MCG/ACT spray Spray 1 spray into both  "nostrils daily 1 Bottle      gemfibrozil (LOPID) 600 MG tablet Take 1 tablet (600 mg) by mouth daily 90 tablet 0     levalbuterol (XOPENEX) 1.25 MG/3ML neb solution Take 1 ampule by nebulization 4 times daily       levothyroxine (SYNTHROID/LEVOTHROID) 25 MCG tablet Take 1 tablet (25 mcg) by mouth daily 90 tablet 3     montelukast (SINGULAIR) 10 MG tablet Take 1 tablet (10 mg) by mouth At Bedtime 30 tablet      Multiple Vitamins-Minerals (MULTIVITAMIN OR) Take 1 tablet by mouth daily       omeprazole (PRILOSEC) 40 MG capsule Take 1 capsule (40 mg) by mouth daily Take 30-60 minutes before a meal. 90 capsule 2     polyethylene glycol (MIRALAX/GLYCOLAX) Packet Take 17 g by mouth daily as needed for constipation       predniSONE (DELTASONE) 10 MG tablet Take 10 mg by mouth daily       roflumilast (DALIRESP) 500 MCG TABS tablet Take 500 mcg by mouth daily        TRAMADOL HCL PO Take 50 mg by mouth every 8 hours        umeclidinium (INCRUSE ELLIPTA) 62.5 MCG/INH oral inhaler Inhale 1 puff into the lungs daily       Medications reviewed:  Medications reconciled to facility chart and changes were made to reflect current medications as identified as above med list.     REVIEW OF SYSTEMS:  10 point ROS of systems including Constitutional, Eyes, Respiratory, Cardiovascular, Gastroenterology, Genitourinary, Integumentary, Muscularskeletal, Psychiatric were all negative except for pertinent positives noted in my HPI.    Physical Exam:  /86  Pulse 92  Temp 98.1  F (36.7  C)  Resp 20  Ht 5' 2\" (1.575 m)  Wt 120 lb 9.6 oz (54.7 kg)  SpO2 94%  BMI 22.06 kg/m2   GENERAL APPEARANCE:  Alert, pleasant and cooperative, oriented x 4  EYES:  EOM, lids, pupils and irises normal, sclera clear and conjunctiva normal, no discharge or mattering on lids or lashes noted  ENT:  Mouth normal, moist mucous membranes, nose without drainage or crusting, external ears without lesions, hearing acuity : [place]  RESP:  respiratory effort normal, " no respiratory distress, Lung sounds clear, patient is on room air  CV: auscultation of heart done, rate and rhythm regular. generalized edema  ABDOMEN:  normal bowel sounds, soft, nontender, no grimacing or guarding with palpation.  M/S:   Gait and station abnormal: uses walker for ambulation; able to move all extremities   SKIN:  Inspection and palpation of skin and subcutaneous tissue: skin warm, dry without rashes, superficial scrape to right elbow- healing  NEURO: cranial nerves 2-12 grossly intact, no facial asymmetry, no speech deficits and able to follow directions, moves all extremities symmetrically  PSYCH:  insight and judgement intact, memory intact, affect and mood normal    Recent Labs:     CBC RESULTS:   Recent Labs   Lab Test 06/25/18 06/17/18   0909   WBC  8.3  8.5   RBC  3.40*  3.96   HGB  10.4*  11.9   HCT  31.0*  36.4   MCV  91  92   MCH  30.6  30.1   MCHC  33.5  32.7   RDW  14.0  13.5   PLT  303  289       Last Basic Metabolic Panel:  Recent Labs   Lab Test 06/25/18 06/17/18   0909   NA  134*  133   POTASSIUM  3.8  3.4   CHLORIDE  100  98   DIANA  9.5  9.3   CO2  24  22   BUN  12  14   CR  0.69  0.71   GLC  89  98       Liver Function Studies -   Recent Labs   Lab Test  04/13/18   0429  12/28/17   1000   PROTTOTAL  7.4  6.6*   ALBUMIN  3.9  3.3*   BILITOTAL  0.5  0.3   ALKPHOS  66  55   AST  14  7   ALT  14  13       Assessment/Plan:  (R35.0) Urinary frequency  (primary encounter diagnosis)  Comment: Acute, UC pending  Plan: UC pending. Consider treatment options when returned. Notify provider with any development of fevers or VS changes.    (K59.00) Constipation, unspecified constipation type  Comment: acute, suspect related to narcotics, immobility   Plan: Miralax daily. Nursing to notify provider if stools not remaining regular after starting this medication.    (E87.1) Hyponatremia  Comment: Na 134 at last check. Chronically low  Plan: BMP 7/3. Monitor for mental status changes.    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 Encourage participation in physical therapy/occupational therapy for strengthening and deconditioning. Discharge planning per their recommendation. Social work to assist with d/c planning.     (J44.9) Chronic obstructive pulmonary disease, unspecified COPD type (H)  (J45.998) Severe asthma, unspecified whether complicated, unspecified whether persistent  Comment: Chronic- stable   Plan: Continue inhalers. Change levalbuterol to QID per patient request. Continue prednisone 5 mg daily. Follow up with PCP or pulmonary regarding further tapering of steroids.     (M19.90) Arthritis  Comment: Chronic- with pain on exam today  Plan: Per chart review patient recently taking q6h PRN and q8h. Will schedule tramadol 50 mg q8h today. Continue tylenol scheduled.    (R33.9) Urinary retention  (F32.9) Depression, unspecified depression type  Comment: Acute- no symptoms of urinary retention  Plan: Discontinue flomax. At next visit discuss alternate antidepressants- possibly mirtazapine.      (R93.8) Abnormal finding on imaging  Comment: Acute- possible left ear effusion versus mastoiditis per CT/MRI  Plan: Continue flonase and allegra. Daughter scheduled follow up appointment with audiology- nursing to please send copy with to appointment    Total time spent with patient visit at the [facility] nursing facility was 36 minutes including patient visit and review of past records, and phone call to daughter. Greater than 50% of total time spent with counseling and coordinating care due to pain management (arthritis), urinary frequency, constipation, and other problems as above.    Electronically signed by  IVÁN Lind CNP                      Sincerely,        IVÁN Lind CNP

## 2018-07-03 ENCOUNTER — RECORDS - HEALTHEAST (OUTPATIENT)
Dept: LAB | Facility: CLINIC | Age: 78
End: 2018-07-03

## 2018-07-03 ENCOUNTER — TRANSFERRED RECORDS (OUTPATIENT)
Dept: HEALTH INFORMATION MANAGEMENT | Facility: CLINIC | Age: 78
End: 2018-07-03

## 2018-07-03 LAB
ALBUMIN UR-MCNC: NEGATIVE MG/DL
ANION GAP SERPL CALCULATED.3IONS-SCNC: 10 MMOL/L (ref 5–18)
ANION GAP SERPL CALCULATED.3IONS-SCNC: 10 MMOL/L (ref 5–18)
APPEARANCE UR: CLEAR
BILIRUB UR QL STRIP: NEGATIVE
BUN SERPL-MCNC: 15 MG/DL (ref 8–28)
BUN SERPL-MCNC: 15 MG/DL (ref 8–28)
CALCIUM SERPL-MCNC: 10 MG/DL (ref 8.5–10.5)
CALCIUM SERPL-MCNC: 10 MG/DL (ref 8.5–10.5)
CHLORIDE BLD-SCNC: 100 MMOL/L (ref 98–107)
CHLORIDE SERPLBLD-SCNC: 100 MMOL/L (ref 98–107)
CO2 SERPL-SCNC: 24 MMOL/L (ref 22–31)
CO2 SERPL-SCNC: 24 MMOL/L (ref 22–31)
COLOR UR AUTO: YELLOW
CREAT SERPL-MCNC: 0.63 MG/DL (ref 0.6–1.1)
CREAT SERPL-MCNC: 0.63 MG/DL (ref 0.6–1.1)
GFR SERPL CREATININE-BSD FRML MDRD: >60 ML/MIN/1.73M2
GFR SERPL CREATININE-BSD FRML MDRD: >60 ML/MIN/1.73M2
GLUCOSE BLD-MCNC: 68 MG/DL (ref 70–125)
GLUCOSE SERPL-MCNC: 68 MG/DL (ref 70–125)
GLUCOSE UR STRIP-MCNC: NEGATIVE MG/DL
HGB UR QL STRIP: NEGATIVE
KETONES UR STRIP-MCNC: NEGATIVE MG/DL
LEUKOCYTE ESTERASE UR QL STRIP: NEGATIVE
NITRATE UR QL: NEGATIVE
PH UR STRIP: 6 [PH] (ref 4.5–8)
POTASSIUM BLD-SCNC: 3.8 MMOL/L (ref 3.5–5)
POTASSIUM SERPL-SCNC: 3.8 MMOL/L (ref 3.5–5)
SODIUM SERPL-SCNC: 134 MMOL/L (ref 136–145)
SODIUM SERPL-SCNC: 134 MMOL/L (ref 136–145)
SP GR UR STRIP: 1.02 (ref 1–1.03)
UROBILINOGEN UR STRIP-ACNC: NORMAL

## 2018-07-05 ENCOUNTER — RECORDS - HEALTHEAST (OUTPATIENT)
Dept: LAB | Facility: CLINIC | Age: 78
End: 2018-07-05

## 2018-07-05 ENCOUNTER — TRANSFERRED RECORDS (OUTPATIENT)
Dept: HEALTH INFORMATION MANAGEMENT | Facility: CLINIC | Age: 78
End: 2018-07-05

## 2018-07-05 LAB
ANION GAP SERPL CALCULATED.3IONS-SCNC: 11 MMOL/L (ref 5–18)
ANION GAP SERPL CALCULATED.3IONS-SCNC: 11 MMOL/L (ref 5–18)
BUN SERPL-MCNC: 14 MG/DL (ref 8–28)
BUN SERPL-MCNC: 14 MG/DL (ref 8–28)
CALCIUM SERPL-MCNC: 10.6 MG/DL (ref 8.5–10.5)
CALCIUM SERPL-MCNC: 10.6 MG/DL (ref 8.5–10.5)
CHLORIDE BLD-SCNC: 100 MMOL/L (ref 98–107)
CHLORIDE SERPLBLD-SCNC: 100 MMOL/L (ref 98–107)
CO2 SERPL-SCNC: 22 MMOL/L (ref 22–31)
CO2 SERPL-SCNC: 22 MMOL/L (ref 22–31)
CREAT SERPL-MCNC: 0.79 MG/DL (ref 0.6–1.1)
CREAT SERPL-MCNC: 0.79 MG/DL (ref 0.6–1.1)
GFR SERPL CREATININE-BSD FRML MDRD: >60 ML/MIN/1.73M2
GFR SERPL CREATININE-BSD FRML MDRD: >60 ML/MIN/1.73M2
GLUCOSE BLD-MCNC: 90 MG/DL (ref 70–125)
GLUCOSE SERPL-MCNC: 90 MG/DL (ref 70–125)
POTASSIUM BLD-SCNC: 4 MMOL/L (ref 3.5–5)
POTASSIUM SERPL-SCNC: 4 MMOL/L (ref 3.5–5)
SODIUM SERPL-SCNC: 133 MMOL/L (ref 136–145)
SODIUM SERPL-SCNC: 133 MMOL/L (ref 136–145)

## 2018-07-09 ENCOUNTER — TRANSFERRED RECORDS (OUTPATIENT)
Dept: HEALTH INFORMATION MANAGEMENT | Facility: CLINIC | Age: 78
End: 2018-07-09

## 2018-07-09 ENCOUNTER — RECORDS - HEALTHEAST (OUTPATIENT)
Dept: LAB | Facility: CLINIC | Age: 78
End: 2018-07-09

## 2018-07-09 LAB
ANION GAP SERPL CALCULATED.3IONS-SCNC: 13 MMOL/L (ref 5–18)
ANION GAP SERPL CALCULATED.3IONS-SCNC: 13 MMOL/L (ref 5–18)
BUN SERPL-MCNC: 15 MG/DL (ref 8–28)
BUN SERPL-MCNC: 15 MG/DL (ref 8–28)
CALCIUM SERPL-MCNC: 10.1 MG/DL (ref 8.5–10.5)
CALCIUM SERPL-MCNC: 10.1 MG/DL (ref 8.5–10.5)
CHLORIDE BLD-SCNC: 101 MMOL/L (ref 98–107)
CHLORIDE SERPLBLD-SCNC: 101 MMOL/L (ref 98–107)
CO2 SERPL-SCNC: 20 MMOL/L (ref 22–31)
CO2 SERPL-SCNC: 20 MMOL/L (ref 22–31)
CREAT SERPL-MCNC: 0.68 MG/DL (ref 0.6–1.1)
CREAT SERPL-MCNC: 0.68 MG/DL (ref 0.6–1.1)
GFR SERPL CREATININE-BSD FRML MDRD: >60 ML/MIN/1.73M2
GFR SERPL CREATININE-BSD FRML MDRD: >60 ML/MIN/1.73M2
GLUCOSE BLD-MCNC: 77 MG/DL (ref 70–125)
GLUCOSE SERPL-MCNC: 77 MG/DL (ref 70–125)
POTASSIUM BLD-SCNC: 4 MMOL/L (ref 3.5–5)
POTASSIUM SERPL-SCNC: 4 MMOL/L (ref 3.5–5)
SODIUM SERPL-SCNC: 134 MMOL/L (ref 136–145)
SODIUM SERPL-SCNC: 134 MMOL/L (ref 136–145)

## 2018-07-09 ASSESSMENT — ACTIVITIES OF DAILY LIVING (ADL): DEPENDENT_IADLS:: CLEANING;COOKING;LAUNDRY;SHOPPING;MEAL PREPARATION;MEDICATION MANAGEMENT;TRANSPORTATION

## 2018-07-10 ENCOUNTER — PATIENT OUTREACH (OUTPATIENT)
Dept: GERIATRIC MEDICINE | Facility: CLINIC | Age: 78
End: 2018-07-10

## 2018-07-10 ENCOUNTER — NURSING HOME VISIT (OUTPATIENT)
Dept: GERIATRICS | Facility: CLINIC | Age: 78
End: 2018-07-10
Payer: COMMERCIAL

## 2018-07-10 VITALS
RESPIRATION RATE: 17 BRPM | HEART RATE: 85 BPM | DIASTOLIC BLOOD PRESSURE: 87 MMHG | OXYGEN SATURATION: 95 % | HEIGHT: 62 IN | TEMPERATURE: 98 F | BODY MASS INDEX: 21.12 KG/M2 | WEIGHT: 114.8 LBS | SYSTOLIC BLOOD PRESSURE: 149 MMHG

## 2018-07-10 DIAGNOSIS — R53.81 PHYSICAL DECONDITIONING: ICD-10-CM

## 2018-07-10 DIAGNOSIS — M62.81 GENERALIZED MUSCLE WEAKNESS: Primary | ICD-10-CM

## 2018-07-10 DIAGNOSIS — E87.1 HYPONATREMIA: ICD-10-CM

## 2018-07-10 DIAGNOSIS — J45.909 ASTHMA, UNSPECIFIED ASTHMA SEVERITY, UNSPECIFIED WHETHER COMPLICATED, UNSPECIFIED WHETHER PERSISTENT: ICD-10-CM

## 2018-07-10 DIAGNOSIS — M19.90 ARTHRITIS: ICD-10-CM

## 2018-07-10 DIAGNOSIS — E03.9 HYPOTHYROIDISM, UNSPECIFIED TYPE: ICD-10-CM

## 2018-07-10 DIAGNOSIS — J44.9 CHRONIC OBSTRUCTIVE PULMONARY DISEASE, UNSPECIFIED COPD TYPE (H): ICD-10-CM

## 2018-07-10 DIAGNOSIS — R93.89 ABNORMAL FINDING ON IMAGING: ICD-10-CM

## 2018-07-10 DIAGNOSIS — F32.A DEPRESSION, UNSPECIFIED DEPRESSION TYPE: ICD-10-CM

## 2018-07-10 PROCEDURE — 99316 NF DSCHRG MGMT 30 MIN+: CPT | Performed by: NURSE PRACTITIONER

## 2018-07-10 NOTE — PROGRESS NOTES
[facility] Care Coordination Contact  Received a request to submit a DTR for the termination of Homemaking, meals, SNV and HHA. Documentation completed and faxed to the health plan.  [employer].    Candis Wiseman RN  Utilization   [facility]  [phone]

## 2018-07-10 NOTE — PROGRESS NOTES
[facility] Care Coordination Contact    [facility] Change in Condition Assessment    Home visit for Change in Condition Health Risk Assessment with Ana Luisa Lee completed on July 2, 2018    Reason for Early reassessment: Health Status Change  Yes, if yes explain recent fall that required hospitalization and TCU, increased ADL needs        Current living arrangement::  ([facility], to d/c to [facility] 7/11/18)     Assessment completed with:: Patient, Care Team Member (OT, [facility] )    Current Care Plan  Member currently receiving the following home care services:   None at this time.   Member currently receiving the following community resources: [employer], Transportation Services, Volunteer ([employer] ), will transition to new living environment 7/11/2018 24 hr Customized [facility]*    Medication Review  Medication reconciliation completed in Epic: No, currently in TCU followed by [place] GNP   Medication set-up & administration: RN sets up  daily.  Nursing Home staff administers medications.  Assisted Living will continue to manage all medication administration at d/c   Medication understanding concerns (by member, family or CC): No  Medication adherence concerns (by member, family or CC): No    Mental/Behavioral Health   Depression Screening: See PHQ assessment flowsheet.   Mental health DX:: No      Client reports anxiety, states difficulty adjusting to changes in health needs or changes in her routine.     Falls Assessment:   Fallen 2 or more times in the past year?: Yes   Any fall with injury in the past year?: No    ADL/IADL Dependencies:   Dependent ADLs:: Ambulation-walker, Dressing, Bathing, Transfers, Wheelchair-with assist, Toileting  Dependent IADLs:: Cleaning, Cooking, Laundry, Shopping, Meal Preparation, Medication Management, Transportation    [employer] Plan sponsored benefits: Shared information  re: Silver Sneakers/gym memberships, ASA, Calcium +D.    PCA Assessment completed at visit: No     Elderly Waiver Eligibility: Yes-will continue on EW    Care Plan & Recommendations:   D/c from TCU on 7/11/18, transition to [facility] 24 hr Cust [facility]  TCU to place referral for PT/OT and w/c   Continue  through [facility]     See [facility]CC for detailed assessment information.    Follow-Up Plan: Member informed of future contact, plan to f/u with member with a 6 month telephone assessment.  Contact information shared with member and family, encouraged member to call with any questions or concerns at any time.    [facility] care continuum providers: Please refer to Health Care Home on the [facility] Problem List to view this patient's [facility] Care Plan Summary.  7/2/2018 VM left with Shobha BARTHOLOMEW at [facility] that CM completed another assessment due to changes in ADL. Shared info above  7/2/2018 Call placed to client's daughter Windy to inform of visit and info above.   7/5/18 Rec'd vm from client's daughter Windy inquiring on the level of services that her mother will be able to receive at [facility]. Windy expressed concerns of her mother's limited capabilities.   7/9/18 Left vm with Shobha BARTHOLOMEW [facility] inquiring if she would be available for a conference call with client's daughter Windy.  7/9/18 Rec'd tele call from Shobha stating that she spoke with Windy on 7/6/18, explained that they are able to meet her mother's needs. Reviewed above information regarding assessment. Shobha states that they are able to provide 2 assist/mechanical lift transfers, if needed in the future.  Shobha requests that TCU place referral for w/c through [employer]. Explained that CM will f/u client through month of July and transfer to [facility] 8/1/18.   7/9/18 Call placed to Windy, shared that CM did f/u with Shobha. Windy acknowledged speaking with Shobha last week.  Windy states that her mother will  be seen by Ros[facility] Wells 7/17/18. Windy states that she did speak with Ortiz Black and was informed that her mother will not have a waiver obligation when she moves to the AL. Explained to Windy that  will send communication to Sofia of new address when she d/c from [facility].   Secure e-mail sent to Codie RINALDI at [facility] to request that if referral for w/c is recommended, place order through [employer]. Rec'd f/u e-mail from Codie RINALDI that she had already spoken with Gavi at the [employer] re w/c.   Yeni Savage RN, BC  Supervisor [facility]   [phone] (Fax)

## 2018-07-10 NOTE — PROGRESS NOTES
[facility] DISCHARGE SUMMARY    PATIENT'S NAME: Ana Luisa Lee  YOB: 1940  MEDICAL RECORD NUMBER:  [id number]    PRIMARY CARE PROVIDER AND CLINIC RESPONSIBLE AFTER TRANSFER: Jenny Hamilton [address] [facility] [address]     CODE STATUS/ADVANCE DIRECTIVES DISCUSSION:   DNR / DNI       Allergies   Allergen Reactions     Hydralazine Anxiety     Penicillin G Hives     Tolerated cephalosporines 2017     Meloxicam      dizziness       Metoprolol      ? Skin rash on the back     Norvasc [Amlodipine Besylate] Hives       TRANSFERRING PROVIDERS: IVÁN Lind CNP, Dr. Kiley MD  DATE OF SNF ADMISSION:    DATE OF SNF (anticipated) DISCHARGE:   DISCHARGE DISPOSITION: [facility]   Nursing Facility: [facility] stay 18 to 18.     Condition on Discharge:  Stable.  Function:   Dressing: u/e set p l/e cga  Bed mobility: cga- min   Transfers:sba- cga   Walkinft 2WW cga- requiring multiple breaks  Toileting: sba- cga    Cognitive Scores: SLUMS 18/30 and CPT 4.5/5.6    Equipment: walker and wheelchair    DISCHARGE DIAGNOSIS:   1. Generalized muscle weakness    2. Physical deconditioning    3. Arthritis    4. Hyponatremia    5. Chronic obstructive pulmonary disease, unspecified COPD type (H)    6. Asthma, unspecified asthma severity, unspecified whether complicated, unspecified whether persistent    7. Depression, unspecified depression type    8. Hypothyroidism, unspecified type    9. Abnormal finding on imaging        HPI Nursing Facility Course:  HPI information obtained from: facility chart records, facility staff, patient report and [facility] chart review.    Generalized muscle weakness  Physical deconditioning  Ms. Lee fell at home. CT/ MRI negative. No injuries beyond superficial scrape of right elbow. 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 Here for therapies- worked with therapy during her stay and will discharge with home care services including home physical therapy, occupational therapy, RN, ANDREZ. Prior to admission she was using a 4WW, however she will discharge home with 2WW and wheelchair will be ordered today. Patient is also discharging to new facility- [facility] where she will receive services including med management, daily cares, and meals.     Arthritis  Urinary retention  Ms. Lee tells me her pain is related to arthiritis. She is unable to point to specific joints that cause her the most pain, but tells me she has pain everywhere. Per nursing notes, pain is often in her shoulder joints. Was started on cymbalta recently, however during hospitalization she developed urinary retention and they thought maybe it was associated to the start of the medication. She has had no further complaints of urinary symptoms since admission to TCU. Her cymbalta was stopped for several weeks. Spoke to geriatric pharmacist today to discuss restarting cymbalta due to significant chronic pain and it was felt safe, as long as closely monitor for any urinary retention. Patient also has hyponatremia- so BMP 7/17 by home care RN.  Orders were also written that if any symptoms of urinary retention that provider be notified. She will be discharged on scheduled tylenol and tramadol PRN.     Hyponatremia  Baseline over past year around 130-134  Sodium   Date Value Ref Range Status   07/09/2018 134 (A) 136 - 145 mmol/L Final   BMP scheduled to be checked by home care on 7/17 due to start of cymbalta.    Chronic obstructive pulmonary disease, unspecified COPD type (H)  Severe asthma, unspecified whether complicated, unspecified whether persistent  Inhalers as on medication list. Also on prolonged steroid taper. Is currently on 5 mg daily. Follow up with PCP or pulmonary for  taper. Reports chronic dry cough, no increased sputum production.      UTI  Treated with 3 day course of bactrim- completed on 7/6. No urinary symptoms on exam.    Depression  Mood ok. Will be restarted on duloxetine today     Hypothyroidism, unspecified type  On levothyroxine        TSH   Date Value Ref Range Status   12/28/2017 1.24 0.40 - 4.00 mU/L Final         Hyperlipidemia, unspecified hyperlipidemia type  On gemfibrozil     Abnormal finding on imaging  CT, MRI from 6/17 shows possible left ear effusion versus mastoiditis. Currently on flonase and allegra due to negative exam. Patient followed up with ENT as requested, no notes available from visit. However patient's daughter said that ENT recommended follow up with audiologist and that the daughter planned to make this appointment. Follow up with audiology.     BP: over past week: 117-158/69-97, outlier 173/84, 186/96 mmHg  P:  bpm  Admission weight: 106.6 lbs  Current weight: 114.8 lbs    PAST MEDICAL HISTORY:  has a past medical history of Anemia (Dec 2011); Arthritis of hand; Asthma, persistent; Asthma, persistent; Chronic intermittent steroid use; COPD exacerbation (H) (10/25/2013); Esophageal stricture (2007); Foot deformity; HTN, goal below 140/90; Hyperlipidemia; Hyperlipidemia LDL goal < 130; Hypothyroidism (Dec 2011); Hypothyroidism; Left foot pain (Nov 2011); Left shoulder pain (Nov 2011); Medication side effects; Osteoporosis; Paroxysmal supraventricular tachycardia (H); PVC (premature ventricular contraction) (May 2012); Skin cyst (Dec 2011); SOB (shortness of breath) on exertion (May 2012); and SVT (supraventricular tachycardia) (H). She also has no past medical history of Blood transfusion; Congestive heart failure, unspecified; Coronary artery disease; Diabetes mellitus (H); Malignant neoplasm (H); or Unspecified cerebral artery occlusion with cerebral infarction.    DISCHARGE MEDICATIONS:  Current Outpatient Prescriptions   Medication  Sig Dispense Refill     acetaminophen (TYLENOL) 500 MG tablet Take 2 tablets (1,000 mg) by mouth 3 times daily       albuterol (ALBUTEROL) 108 (90 BASE) MCG/ACT Inhaler Inhale 2 puffs into the lungs every 6 hours as needed       budesonide (PULMICORT FLEXHALER) 180 MCG/ACT inhaler Inhale 1 puff into the lungs 2 times daily       calcium 600 MG tablet Take 1 tablet (600 mg) by mouth daily 60 tablet      cholecalciferol (VITAMIN D) 1000 UNIT tablet Take 1 tablet (1,000 Units) by mouth daily 30 tablet      cyanocobalamin (B-12 TR) 1000 MCG TBCR Take 1 tablet by mouth daily 100 tablet 3     ferrous sulfate (IRON) 325 (65 Fe) MG tablet Take 325 mg by mouth daily (with breakfast)       fexofenadine (ALLEGRA) 60 MG tablet Take 1 tablet (60 mg) by mouth daily 60 tablet      fluticasone (FLONASE) 50 MCG/ACT spray Spray 1 spray into both nostrils daily 1 Bottle      gemfibrozil (LOPID) 600 MG tablet Take 1 tablet (600 mg) by mouth daily 90 tablet 0     levalbuterol (XOPENEX) 1.25 MG/3ML neb solution Take 1 ampule by nebulization 4 times daily       levothyroxine (SYNTHROID/LEVOTHROID) 25 MCG tablet Take 1 tablet (25 mcg) by mouth daily 90 tablet 3     montelukast (SINGULAIR) 10 MG tablet Take 1 tablet (10 mg) by mouth At Bedtime 30 tablet      Multiple Vitamins-Minerals (MULTIVITAMIN OR) Take 1 tablet by mouth daily       omeprazole (PRILOSEC) 40 MG capsule Take 1 capsule (40 mg) by mouth daily Take 30-60 minutes before a meal. 90 capsule 2     polyethylene glycol (MIRALAX/GLYCOLAX) Packet Take 17 g by mouth daily as needed for constipation        predniSONE (DELTASONE) 10 MG tablet Take 10 mg by mouth daily       roflumilast (DALIRESP) 500 MCG TABS tablet Take 500 mcg by mouth daily        TRAMADOL HCL PO Take 50 mg by mouth every 8 hours as needed        umeclidinium (INCRUSE ELLIPTA) 62.5 MCG/INH oral inhaler Inhale 1 puff into the lungs daily       ASPIRIN NOT PRESCRIBED, INTENTIONAL, 1 each continuous prn. Antiplatelet  "medication not prescribed intentionally due to Use of NSAIDS 0 each 0       MEDICATION CHANGES/RATIONALE:   Levalbuterol was scheduled per patient request- per her home routine.  Tramadol was changed to q8h scheduled during stay at TCU and back to PRN at discharge.  Cymbalta was restarted today 7/10 for chronic pain    Controlled medications sent with patient:   Script for Tramadol medication for 30 tabs and 0 refills given to patient at dischage to have them fill at their out patient pharmacy     ROS:    10 point ROS of systems including Constitutional, Eyes, Respiratory, Cardiovascular, Gastroenterology, Genitourinary, Integumentary, Muscularskeletal, Neurologic, Psychiatric were all negative except for pertinent positives noted in my HPI.    Physical Exam:   Vitals: /87  Pulse 85  Temp 98  F (36.7  C)  Resp 17  Ht 5' 2\" (1.575 m)  Wt 114 lb 12.8 oz (52.1 kg)  SpO2 95%  BMI 21 kg/m2  BMI= Body mass index is 21 kg/(m^2).   GENERAL APPEARANCE:  Alert, pleasant and cooperative, oriented x 4  EYES:  EOM, lids, pupils and irises normal, sclera clear and conjunctiva normal, no discharge or mattering on lids or lashes noted  ENT:  Mouth normal, moist mucous membranes, nose without drainage or crusting, external ears without lesions, hearing acuity : [place]  RESP:  respiratory effort normal, no respiratory distress, Lung sounds clear, patient is on room air  CV: auscultation of heart done, rate and rhythm regular. no edema  ABDOMEN:  normal bowel sounds, soft, nontender, no grimacing or guarding with palpation.  M/S:   Gait and station abnormal: uses walker for ambulation; Digits and nails normal, no tenderness or swelling of the joints; able to move all extremities   SKIN:  Inspection and palpation of skin and subcutaneous tissue: skin warm, dry without rashes  NEURO: cranial nerves 2-12 grossly intact, no facial asymmetry, no speech deficits and able to follow directions, moves all extremities " symmetrically  PSYCH:  insight and judgement intact, memory intact, affect and mood normal      DISCHARGE PLAN: Patient to discharge to [facility] with the following services:  Occupational Therapy, Physical Therapy, Registered Nurse, Home Health Aide and From:  [facility]  Patient instructed to follow-up with:  PCP in 7 days, pulmonary per regular follow up schedule, Follow up with audiology    Current [place] scheduled appointments:  Future Appointments  Date Time Provider Department Center   2018 10:40 AM Jenny Hamilton MD [facility] referral needed and placed by this provider: No    Pending labs: None  SNF labs     Labs 18:      CBC RESULTS:   Recent Labs   Lab Test 18   0909   WBC  8.3  8.5   RBC  3.40*  3.96   HGB  10.4*  11.9   HCT  31.0*  36.4   MCV  91  92   MCH  30.6  30.1   MCHC  33.5  32.7   RDW  14.0  13.5   PLT  303  289       Last Basic Metabolic Panel:  Recent Labs   Lab Test 18   NA  133*  134*   POTASSIUM  4.0  3.8   CHLORIDE  100  100   DIANA  10.6*  10.0   CO2  22  24   BUN  14  15   CR  0.79  0.63   GLC  90  68*     Discharge Treatments:  Home care services as above- Home care RN to draw BMP on .  Follow up as above with PCP, audiologist, and pulmonary    TOTAL DISCHARGE TIME:   Greater than 30 minutes  Electronically signed by:  IVÁN Lind CNP         MEDICAL NECESSITY STATEMENT FOR DME    Demographic Information on Ana Luisa Lee:    Ana Luisa Lee  Gender: female  : 1940  [address]    [facility] [address]  [phone] (home) none (work)    Medical Record: [id number]  Social Security Number: [id number]  Primary Care Provider: Jenny Hamilton  Insurance: Payor: MEDICA / Plan: MEDICA [employer] / Product Type: HMO /       Generalized muscle weakness [M62.81]  Physical deconditioning R53.81  Arthritis M19.90    DME:  WHEELCHAIR:  "Mechanical Wheelchair    Size: 16x16   Corresponding cushion: Yes, for comfort   Standard foot rest: yes standard   Elevating leg rest: No   Arm rests: Yes, standard   Lap tray: No    Dose patient use oxygen No   Able to propel w/c No, patient will require assistance to propel wheelchair due to pain and arthritis of her shoulder joints. Staff at AL will help to propel her wheelchair   Mobility related ADL that are affected in the home: decreased mobility and ROM due to pain, generalized weakness   The wheelchair is suitable and necessary for use in the patient's home and the  Home/rooms can accommodate the wheelchair: Yes   Reason why a cane or walker will not meet the patient's needs. (ie: balance,   Tolerance, level of assistance): Patient with limited standing tolerance and poor balance. She is only able to ambulate short distances with supervision.    The patient has expressed willingness to use the wheelchair in the home and    Does have the physical and cognitive ability to maneuver the equipment or has a    Caregiver who is available, willing, and able to provide assistance in the home    With the wheelchair: Yes  Lifetime need: 99    VITAL SIGNS:  Vitals: /87  Pulse 85  Temp 98  F (36.7  C)  Resp 17  Ht 5' 2\" (1.575 m)  Wt 114 lb 12.8 oz (52.1 kg)  SpO2 95%  BMI 21 kg/m2  BMI= Body mass index is 21 kg/(m^2).       MEDICAL NECESSITY STATEMENT   Generalized muscle weakness [M62.81]  Physical deconditioning R53.81  Arthritis M19.90    Patient requires 16x16 inch wheelchair with standard foot rests, standard arm rests, and comfort cushion. Patient with significant pain from arthritis, generalized weakness, and physical deconditioning. Patient only able to ambulate short distances, with poor endurance and poor balance. A wheelchair will improve her quality of life, reduce her risk for falls, improver her mobility, and improve her ability to participate in daily activities. A caregiver will be able to " propel her wheelchair for her.    ELECTRONICALLY SIGNED BY KALLIE CERTIFIED PROVIDER:  IVÁN Lind CNP   NPI: [id number]  [facility]  [facility] [address], [address]  [place] [address]

## 2018-07-10 NOTE — Clinical Note
Patient will be discharging to [facility] tomorrow. Recommend follow up in about 1 week.

## 2018-07-10 NOTE — LETTER
7/10/2018        RE: Ana Luisa Lee  [id number]  [facility] [address]          [facility] DISCHARGE SUMMARY    PATIENT'S NAME: Ana Luisa Lee  YOB: 1940  MEDICAL RECORD NUMBER:  [id number]    PRIMARY CARE PROVIDER AND CLINIC RESPONSIBLE AFTER TRANSFER: Jenny Hamilton [address] [facility] [address]     CODE STATUS/ADVANCE DIRECTIVES DISCUSSION:   DNR / DNI       Allergies   Allergen Reactions     Hydralazine Anxiety     Penicillin G Hives     Tolerated cephalosporines 2017     Meloxicam      dizziness       Metoprolol      ? Skin rash on the back     Norvasc [Amlodipine Besylate] Hives       TRANSFERRING PROVIDERS: IVÁN Lind CNP, Dr. Kiley MD  DATE OF SNF ADMISSION:    DATE OF SNF (anticipated) DISCHARGE:   DISCHARGE DISPOSITION: FMG Provider   Nursing Facility: [facility]  [facility] stay 18 to 18.     Condition on Discharge:  Stable.  Function:   Dressing: u/e set p l/e cga  Bed mobility: cga- min   Transfers:sba- cga   Walkinft 2WW cga- requiring multiple breaks  Toileting: sba- cga    Cognitive Scores: SLUMS 18/30 and CPT 4.5/5.6    Equipment: walker and wheelchair    DISCHARGE DIAGNOSIS:   1. Generalized muscle weakness    2. Physical deconditioning    3. Arthritis    4. Hyponatremia    5. Chronic obstructive pulmonary disease, unspecified COPD type (H)    6. Asthma, unspecified asthma severity, unspecified whether complicated, unspecified whether persistent    7. Depression, unspecified depression type    8. Hypothyroidism, unspecified type    9. Abnormal finding on imaging        HPI Nursing Facility Course:  HPI information obtained from: facility chart records, facility staff, patient report and [facility] chart review.    Generalized muscle weakness  Physical deconditioning  Ms. Lee fell at  home. CT/ MRI negative. No injuries beyond superficial scrape of right elbow. 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 Here for therapies- worked with therapy during her stay and will discharge with home care services including home physical therapy, occupational therapy, RN, ANDREZ. Prior to admission she was using a 4WW, however she will discharge home with 2WW and wheelchair will be ordered today. Patient is also discharging to new facility- [facility] where she will receive services including med management, daily cares, and meals.     Arthritis  Urinary retention  Ms. Lee tells me her pain is related to arthiritis. She is unable to point to specific joints that cause her the most pain, but tells me she has pain everywhere. Per nursing notes, pain is often in her shoulder joints. Was started on cymbalta recently, however during hospitalization she developed urinary retention and they thought maybe it was associated to the start of the medication. She has had no further complaints of urinary symptoms since admission to TCU. Her cymbalta was stopped for several weeks. Spoke to geriatric pharmacist today to discuss restarting cymbalta due to significant chronic pain and it was felt safe, as long as closely monitor for any urinary retention. Patient also has hyponatremia- so BMP 7/17 by home care RN.  Orders were also written that if any symptoms of urinary retention that provider be notified. She will be discharged on scheduled tylenol and tramadol PRN.     Hyponatremia  Baseline over past year around 130-134  Sodium   Date Value Ref Range Status   07/09/2018 134 (A) 136 - 145 mmol/L Final   BMP scheduled to be checked by home care on 7/17 due to start of cymbalta.    Chronic obstructive pulmonary disease, unspecified COPD type (H)  Severe asthma, unspecified whether complicated, unspecified whether persistent  Inhalers as on medication  list. Also on prolonged steroid taper. Is currently on 5 mg daily. Follow up with PCP or pulmonary for taper. Reports chronic dry cough, no increased sputum production.      UTI  Treated with 3 day course of bactrim- completed on 7/6. No urinary symptoms on exam.    Depression  Mood ok. Will be restarted on duloxetine today     Hypothyroidism, unspecified type  On levothyroxine        TSH   Date Value Ref Range Status   12/28/2017 1.24 0.40 - 4.00 mU/L Final         Hyperlipidemia, unspecified hyperlipidemia type  On gemfibrozil     Abnormal finding on imaging  CT, MRI from 6/17 shows possible left ear effusion versus mastoiditis. Currently on flonase and allegra due to negative exam. Patient followed up with ENT as requested, no notes available from visit. However patient's daughter said that ENT recommended follow up with audiologist and that the daughter planned to make this appointment. Follow up with audiology.     BP: over past week: 117-158/69-97, outlier 173/84, 186/96 mmHg  P:  bpm  Admission weight: 106.6 lbs  Current weight: 114.8 lbs    PAST MEDICAL HISTORY:  has a past medical history of Anemia (Dec 2011); Arthritis of hand; Asthma, persistent; Asthma, persistent; Chronic intermittent steroid use; COPD exacerbation (H) (10/25/2013); Esophageal stricture (2007); Foot deformity; HTN, goal below 140/90; Hyperlipidemia; Hyperlipidemia LDL goal < 130; Hypothyroidism (Dec 2011); Hypothyroidism; Left foot pain (Nov 2011); Left shoulder pain (Nov 2011); Medication side effects; Osteoporosis; Paroxysmal supraventricular tachycardia (H); PVC (premature ventricular contraction) (May 2012); Skin cyst (Dec 2011); SOB (shortness of breath) on exertion (May 2012); and SVT (supraventricular tachycardia) (H). She also has no past medical history of Blood transfusion; Congestive heart failure, unspecified; Coronary artery disease; Diabetes mellitus (H); Malignant neoplasm (H); or Unspecified cerebral artery  occlusion with cerebral infarction.    DISCHARGE MEDICATIONS:  Current Outpatient Prescriptions   Medication Sig Dispense Refill     acetaminophen (TYLENOL) 500 MG tablet Take 2 tablets (1,000 mg) by mouth 3 times daily       albuterol (ALBUTEROL) 108 (90 BASE) MCG/ACT Inhaler Inhale 2 puffs into the lungs every 6 hours as needed       budesonide (PULMICORT FLEXHALER) 180 MCG/ACT inhaler Inhale 1 puff into the lungs 2 times daily       calcium 600 MG tablet Take 1 tablet (600 mg) by mouth daily 60 tablet      cholecalciferol (VITAMIN D) 1000 UNIT tablet Take 1 tablet (1,000 Units) by mouth daily 30 tablet      cyanocobalamin (B-12 TR) 1000 MCG TBCR Take 1 tablet by mouth daily 100 tablet 3     ferrous sulfate (IRON) 325 (65 Fe) MG tablet Take 325 mg by mouth daily (with breakfast)       fexofenadine (ALLEGRA) 60 MG tablet Take 1 tablet (60 mg) by mouth daily 60 tablet      fluticasone (FLONASE) 50 MCG/ACT spray Spray 1 spray into both nostrils daily 1 Bottle      gemfibrozil (LOPID) 600 MG tablet Take 1 tablet (600 mg) by mouth daily 90 tablet 0     levalbuterol (XOPENEX) 1.25 MG/3ML neb solution Take 1 ampule by nebulization 4 times daily       levothyroxine (SYNTHROID/LEVOTHROID) 25 MCG tablet Take 1 tablet (25 mcg) by mouth daily 90 tablet 3     montelukast (SINGULAIR) 10 MG tablet Take 1 tablet (10 mg) by mouth At Bedtime 30 tablet      Multiple Vitamins-Minerals (MULTIVITAMIN OR) Take 1 tablet by mouth daily       omeprazole (PRILOSEC) 40 MG capsule Take 1 capsule (40 mg) by mouth daily Take 30-60 minutes before a meal. 90 capsule 2     polyethylene glycol (MIRALAX/GLYCOLAX) Packet Take 17 g by mouth daily as needed for constipation        predniSONE (DELTASONE) 10 MG tablet Take 10 mg by mouth daily       roflumilast (DALIRESP) 500 MCG TABS tablet Take 500 mcg by mouth daily        TRAMADOL HCL PO Take 50 mg by mouth every 8 hours as needed        umeclidinium (INCRUSE ELLIPTA) 62.5 MCG/INH oral inhaler Inhale  "1 puff into the lungs daily       ASPIRIN NOT PRESCRIBED, INTENTIONAL, 1 each continuous prn. Antiplatelet medication not prescribed intentionally due to Use of NSAIDS 0 each 0       MEDICATION CHANGES/RATIONALE:   Levalbuterol was scheduled per patient request- per her home routine.  Tramadol was changed to q8h scheduled during stay at TCU and back to PRN at discharge.  Cymbalta was restarted today 7/10 for chronic pain    Controlled medications sent with patient:   Script for Tramadol medication for 30 tabs and 0 refills given to patient at dischage to have them fill at their out patient pharmacy     ROS:    10 point ROS of systems including Constitutional, Eyes, Respiratory, Cardiovascular, Gastroenterology, Genitourinary, Integumentary, Muscularskeletal, Neurologic, Psychiatric were all negative except for pertinent positives noted in my HPI.    Physical Exam:   Vitals: /87  Pulse 85  Temp 98  F (36.7  C)  Resp 17  Ht 5' 2\" (1.575 m)  Wt 114 lb 12.8 oz (52.1 kg)  SpO2 95%  BMI 21 kg/m2  BMI= Body mass index is 21 kg/(m^2).   GENERAL APPEARANCE:  Alert, pleasant and cooperative, oriented x 4  EYES:  EOM, lids, pupils and irises normal, sclera clear and conjunctiva normal, no discharge or mattering on lids or lashes noted  ENT:  Mouth normal, moist mucous membranes, nose without drainage or crusting, external ears without lesions, hearing acuity : [place]  RESP:  respiratory effort normal, no respiratory distress, Lung sounds clear, patient is on room air  CV: auscultation of heart done, rate and rhythm regular. no edema  ABDOMEN:  normal bowel sounds, soft, nontender, no grimacing or guarding with palpation.  M/S:   Gait and station abnormal: uses walker for ambulation; Digits and nails normal, no tenderness or swelling of the joints; able to move all extremities   SKIN:  Inspection and palpation of skin and subcutaneous tissue: skin warm, dry without rashes  NEURO: cranial nerves 2-12 grossly intact, no " facial asymmetry, no speech deficits and able to follow directions, moves all extremities symmetrically  PSYCH:  insight and judgement intact, memory intact, affect and mood normal      DISCHARGE PLAN: Patient to discharge to [facility] with the following services:  Occupational Therapy, Physical Therapy, Registered Nurse, Home Health Aide and From:  [facility]  Patient instructed to follow-up with:  PCP in 7 days, pulmonary per regular follow up schedule, Follow up with audiology    Current [place] scheduled appointments:  Future Appointments  Date Time Provider Department Center   2018 10:40 AM Jenny Hamilton MD [facility] referral needed and placed by this provider: No    Pending labs: None  SNF labs     Labs 18:      CBC RESULTS:   Recent Labs   Lab Test 18   0909   WBC  8.3  8.5   RBC  3.40*  3.96   HGB  10.4*  11.9   HCT  31.0*  36.4   MCV  91  92   MCH  30.6  30.1   MCHC  33.5  32.7   RDW  14.0  13.5   PLT  303  289       Last Basic Metabolic Panel:  Recent Labs   Lab Test 18   NA  133*  134*   POTASSIUM  4.0  3.8   CHLORIDE  100  100   DIANA  10.6*  10.0   CO2  22  24   BUN  14  15   CR  0.79  0.63   GLC  90  68*     Discharge Treatments:  Home care services as above- Home care RN to draw BMP on .  Follow up as above with PCP, audiologist, and pulmonary    TOTAL DISCHARGE TIME:   Greater than 30 minutes  Electronically signed by:  IVÁN Lind CNP         MEDICAL NECESSITY STATEMENT FOR DME    Demographic Information on Ana Luisa Lee:    Ana Luisa Lee  Gender: female  : 1940  [address]  [place] [address]  [phone] (home) none (work)    Medical Record: [id number]  Social Security Number: [id number]  Primary Care Provider: Jenny Hamilton  Insurance: Payor: MEDICA / Plan: MEDICA [employer] / Product Type: HMO /       Generalized muscle  "weakness [M62.81]  Physical deconditioning R53.81  Arthritis M19.90    DME:  WHEELCHAIR: Mechanical Wheelchair    Size: 16x16   Corresponding cushion: Yes, for comfort   Standard foot rest: yes standard   Elevating leg rest: No   Arm rests: Yes, standard   Lap tray: No    Dose patient use oxygen No   Able to propel w/c No, patient will require assistance to propel wheelchair due to pain and arthritis of her shoulder joints. Staff at [facility] will help to propel her wheelchair   Mobility related ADL that are affected in the home: decreased mobility and ROM due to pain, generalized weakness   The wheelchair is suitable and necessary for use in the patient's home and the  Home/rooms can accommodate the wheelchair: Yes   Reason why a cane or walker will not meet the patient's needs. (ie: balance,   Tolerance, level of assistance): Patient with limited standing tolerance and poor balance. She is only able to ambulate short distances with supervision.    The patient has expressed willingness to use the wheelchair in the home and    Does have the physical and cognitive ability to maneuver the equipment or has a    Caregiver who is available, willing, and able to provide assistance in the home    With the wheelchair: Yes  Lifetime need: 99    VITAL SIGNS:  Vitals: /87  Pulse 85  Temp 98  F (36.7  C)  Resp 17  Ht 5' 2\" (1.575 m)  Wt 114 lb 12.8 oz (52.1 kg)  SpO2 95%  BMI 21 kg/m2  BMI= Body mass index is 21 kg/(m^2).       MEDICAL NECESSITY STATEMENT   Generalized muscle weakness [M62.81]  Physical deconditioning R53.81  Arthritis M19.90    Patient requires 16x16 inch wheelchair with standard foot rests, standard arm rests, and comfort cushion. Patient with significant pain from arthritis, generalized weakness, and physical deconditioning. Patient only able to ambulate short distances, with poor endurance and poor balance. A wheelchair will improve her quality of life, reduce her risk for falls, improver her " mobility, and improve her ability to participate in daily activities. A caregiver will be able to propel her wheelchair for her.    ELECTRONICALLY SIGNED BY [facility] CERTIFIED PROVIDER:  IVÁN Lind CNP   NPI: [id number]  [facility]  [address] [place] [address], [address]  [place] [address]                Sincerely,        IVÁN Lind CNP

## 2018-07-10 NOTE — PROGRESS NOTES
[facility] Care Coordination Contact  Dr. Tierney    I am the [facility] care coordinator for Ana Luisa Lee, and I am writing to notify you of a change in services.   Homemaking services and Meals on Wheels have been terminated.   This change has occurred because Ana Luisa will be moving to [facility] and will receive homemaking and meals.     I am required by the health plan to notify you of this change. No action is required on your part. Please do not hesitate to contact me with any questions or concerns. Thank you.    Yeni Savage RN, BC  Supervisor [facility]   [phone] (Fax)

## 2018-07-12 ENCOUNTER — TELEPHONE (OUTPATIENT)
Dept: INTERNAL MEDICINE | Facility: CLINIC | Age: 78
End: 2018-07-12

## 2018-07-12 ENCOUNTER — PATIENT OUTREACH (OUTPATIENT)
Dept: GERIATRIC MEDICINE | Facility: CLINIC | Age: 78
End: 2018-07-12

## 2018-07-12 ENCOUNTER — OFFICE VISIT (OUTPATIENT)
Dept: INTERNAL MEDICINE | Facility: CLINIC | Age: 78
End: 2018-07-12
Payer: COMMERCIAL

## 2018-07-12 VITALS
RESPIRATION RATE: 16 BRPM | SYSTOLIC BLOOD PRESSURE: 130 MMHG | WEIGHT: 117 LBS | HEART RATE: 97 BPM | DIASTOLIC BLOOD PRESSURE: 74 MMHG | OXYGEN SATURATION: 100 % | BODY MASS INDEX: 21.53 KG/M2 | HEIGHT: 62 IN | TEMPERATURE: 98.4 F

## 2018-07-12 DIAGNOSIS — Z76.89 HEALTH CARE HOME: ICD-10-CM

## 2018-07-12 DIAGNOSIS — J44.9 COPD, SEVERE (H): ICD-10-CM

## 2018-07-12 DIAGNOSIS — E03.9 HYPOTHYROIDISM, UNSPECIFIED TYPE: Chronic | ICD-10-CM

## 2018-07-12 DIAGNOSIS — I10 ESSENTIAL HYPERTENSION WITH GOAL BLOOD PRESSURE LESS THAN 140/90: Chronic | ICD-10-CM

## 2018-07-12 DIAGNOSIS — Z09 HOSPITAL DISCHARGE FOLLOW-UP: Primary | ICD-10-CM

## 2018-07-12 DIAGNOSIS — R53.81 PHYSICAL DECONDITIONING: ICD-10-CM

## 2018-07-12 PROCEDURE — 99215 OFFICE O/P EST HI 40 MIN: CPT | Performed by: INTERNAL MEDICINE

## 2018-07-12 NOTE — TELEPHONE ENCOUNTER
IP F/U    Date: 7/11/18  Diagnosis: Generalized Muscle Weakness  Is patient active in care coordination? No  Was patient in TCU? Yes - Route to Care Coordination (P [id number])    Next 5 appointments (look out 90 days)     Jul 12, 2018 10:40 AM CDT   [facility]   [facility]    [address]  [facility] [id number] [phone]

## 2018-07-12 NOTE — PROGRESS NOTES
[facility] Care Coordination Contact  The DTR review team has reviewed your request for the member  Ana Luisa Lee.                                                      Decision: The reviewer agrees with CC recommendation to terminate services.  Item/Service: Homemaking and Home Delivered Meals.  Amount: NA  Start date of termination: 07/21/18.  Yeni Savage RN, BC  Supervisor [facility]   [phone] (Fax)

## 2018-07-12 NOTE — PATIENT INSTRUCTIONS
Plan:  1. Continue same meds, same doses for now   2. Follow up in about 2 months    Please ask the staff at [facility] to fax us a copy of the current medication list to [phone] Attn: Mariluz. Please ask them what pharmacy they would like refills sent to (what city their [facility] is in?)

## 2018-07-12 NOTE — LETTER
My Depression Action Plan  Name: Ana Luisa Lee   Date of Birth 1940  Date: 7/12/2018    My doctor: Jenny Hamilton   [facility] [place] [address] [facility] [address]  [phone]          GREEN    ZONE   Good Control    What it looks like:     Things are going generally well. You have normal up s and down s. You may even feel depressed from time to time, but bad moods usually last less than a day.   What you need to do:  1. Continue to care for yourself (see self care plan)  2. Check your depression survival kit and update it as needed  3. Follow your physician s recommendations including any medication.  4. Do not stop taking medication unless you consult with your physician first.           YELLOW         ZONE Getting Worse    What it looks like:     Depression is starting to interfere with your life.     It may be hard to get out of bed; you may be starting to isolate yourself from others.    Symptoms of depression are starting to last most all day and this has happened for several days.     You may have suicidal thoughts but they are not constant.   What you need to do:     1. Call your care team, your response to treatment will improve if you keep your care team informed of your progress. Yellow periods are signs an adjustment may need to be made.     2. Continue your self-care, even if you have to fake it!    3. Talk to someone in your support network    4. Open up your depression survival kit           RED    ZONE Medical Alert - Get Help    What it looks like:     Depression is seriously interfering with your life.     You may experience these or other symptoms: You can t get out of bed most days, can t work or engage in other necessary activities, you have trouble taking care of basic hygiene, or basic responsibilities, thoughts of suicide or death that will not go away, self-injurious behavior.     What you need to  do:  1. Call your care team and request a same-day appointment. If they are not available (weekends or after hours) call your local crisis line, emergency room or 911.            Depression Self Care Plan / Survival Kit    Self-Care for Depression  Here s the deal. Your body and mind are really not as separate as most people think.  What you do and think affects how you feel and how you feel influences what you do and think. This means if you do things that people who feel good do, it will help you feel better.  Sometimes this is all it takes.  There is also a place for medication and therapy depending on how severe your depression is, so be sure to consult with your medical provider and/ or Behavioral Health Consultant if your symptoms are worsening or not improving.     In order to better manage my stress, I will:    Exercise  Get some form of exercise, every day. This will help reduce pain and release endorphins, the  feel good  chemicals in your brain. This is almost as good as taking antidepressants!  This is not the same as joining a gym and then never going! (they count on that by the way ) It can be as simple as just going for a walk or doing some gardening, anything that will get you moving.      Hygiene   Maintain good hygiene (Get out of bed in the morning, Make your bed, Brush your teeth, Take a shower, and Get dressed like you were going to work, even if you are unemployed).  If your clothes don't fit try to get ones that do.    Diet  I will strive to eat foods that are good for me, drink plenty of water, and avoid excessive sugar, caffeine, alcohol, and other mood-altering substances.  Some foods that are helpful in depression are: complex carbohydrates, B vitamins, flaxseed, fish or fish oil, fresh fruits and vegetables.    Psychotherapy  I agree to participate in Individual Therapy (if recommended).    Medication  If prescribed medications, I agree to take them.  Missing doses can result in serious  side effects.  I understand that drinking alcohol, or other illicit drug use, may cause potential side effects.  I will not stop my medication abruptly without first discussing it with my provider.    Staying Connected With Others  I will stay in touch with my friends, family members, and my primary care provider/team.    Use your imagination  Be creative.  We all have a creative side; it doesn t matter if it s oil painting, sand castles, or mud pies! This will also kick up the endorphins.    Witness Beauty  (AKA stop and smell the roses) Take a look outside, even in mid-winter. Notice colors, textures. Watch the squirrels and birds.     Service to others  Be of service to others.  There is always someone else in need.  By helping others we can  get out of ourselves  and remember the really important things.  This also provides opportunities for practicing all the other parts of the program.    Humor  Laugh and be silly!  Adjust your TV habits for less news and crime-drama and more comedy.    Control your stress  Try breathing deep, massage therapy, biofeedback, and meditation. Find time to relax each day.     My support system    Clinic Contact:  Phone number:    Contact 1:  Phone number:    Contact 2:  Phone number:    [place]/:  Phone number:    Therapist:  Phone number:    Local crisis center:    Phone number:    Other community support:  Phone number:

## 2018-07-12 NOTE — PROGRESS NOTES
Dr Tierney's note      Patient's instructions / PLAN:                                                        Plan:  1. Continue same meds, same doses for now   2. Follow up in about 2 months        ASSESSMENT & PLAN:                                                      78-year-old woman with PMHx significant for mod-severe Ao Stenosis, severe Asthma steroid dependent, tachycardia/PVCs ( intol to BB and Ca gillian block), hypothyroidism, hypothyroidism, HTN, osteoporosis, HLipidemia, depression      She spent a lot of time in TCU for generalized weakness.  We will continue physical therapy at her apartment complex.  She is still very frail.  She has had multiple episodes of asthma/COPD exacerbation, she is on chronic prednisone .  Next appointment with pulmonary, Dr. Montana, is in November.  She feels stable with the present inhalers.  She has chronic joint pain for which she takes tramadol along with Tylenol tid.     (Z09) Hospital discharge follow-up  (primary encounter diagnosis)  (R53.81) Physical deconditioning  Comment: High risk for readmission because of COPD and deconditioning  Plan: Continue physical therapy    (J44.9) COPD, severe (H)  Comment: Stable  Plan: Continue same meds, same doses for now     (E03.9) Hypothyroidism, unspecified type  Comment: Controlled  Plan: Continue same meds, same doses for now     (I10) HTN, goal <  140/90  Comment: Controlled    Plan: Continue same meds, same doses for now      Chief complaint:                                                      Hospital follow-up  Daughter from [place] present.  She needs FMLA forms    SUBJECTIVE:   Ana Luisa Lee is a 78 year old female who presents to clinic today for the following health issues:    *Hospital F/U-LOV 5/24/18. Main concern today is that she still can't walk. --We will continue physical therapy at the new assisted living facility.   *Forms-FMLA forms for her daughter in [place].  Forms done  Asthma: Also has  "form that needs to be filled out regarding her Allegra since this is not a covered medication.  We will try PA.  She will continue the present inhalers and nebulizer.   appointment with dr Montana in NOv   *Polypharmacy-Pt's daughter reports that they received med list from [facility] (and it is different than what we have), but they gave that copy to the new assisted living facility she just went to. So meds are unable to be reconciled today.   *Foot Problems-Has had specialized expensive shoes in the past to try to help with her feet issues, but doesn't wear them anymore because they hurt too much. Daughter is concerned that the slippers she wears all the time now may present a fall/tripping hazard and is wondering if there is anything else that can be done as far as footwear?  I told her daughter may be she can look in the same type of shoes but with back support or if she can saw an elastic, so the shoes do not fly out her feet    Chronic joint pain.   Tramadol 50 mg tid works well   I advised her to continue physical therapy        Review of Systems:                                                      ROS: negative for fever, chills, cough, wheezes, chest pain,vomiting, abdominal pain, leg swelling positive for chronic shortness of breath      OBJECTIVE:             Physical exam:  Blood pressure 130/74, pulse 97, temperature 98.4  F (36.9  C), temperature source Oral, resp. rate 16, height 5' 2\" (1.575 m), weight 117 lb (53.1 kg), SpO2 100 %, not currently breastfeeding.   NAD, appears comfortable, but very frail  Skin: no rashes   HEENT: PERRLA, EOMI, pink conjunctiva, anicteric sclerae, bilateral tympanic membranes are clinically normal, oropharynx is normal color  Neck: supple, no JVD,  No thyroidmegaly. Lymph nodes nonpalpable cervical and supraclavicular.  Chest: clear to auscultation bilaterally, good respiratory effort  Heart: S1 S2, RRR,3/6 murmur  Abdomen: soft, not tender,   Extremities: no edema, "   Neurologic: A, Ox3, no focal signs appreciated    PMHx: reviewed  Past Medical History:   Diagnosis Date     Anemia Dec 2011     Arthritis of hand      Asthma, persistent     f/U dr Brock at [facility]     Asthma, persistent      Chronic intermittent steroid use     for asthma     COPD exacerbation (H) 10/25/2013     Esophageal stricture 2007    s/p dilation     Foot deformity     Left     HTN, goal below 140/90      Hyperlipidemia      Hyperlipidemia LDL goal < 130      Hypothyroidism Dec 2011     Hypothyroidism      Left foot pain Nov 2011     Left shoulder pain Nov 2011     Medication side effects      Osteoporosis      Paroxysmal supraventricular tachycardia (H)      PVC (premature ventricular contraction) May 2012     Skin cyst Dec 2011    L thumb     SOB (shortness of breath) on exertion May 2012     SVT (supraventricular tachycardia) (H)       PSHx: reviewed  Past Surgical History:   Procedure Laterality Date     BUNIONECTOMY LAPIDUS WITH TARSAL METATARSAL (TMT) FUSION  1990's     EP STUDY, MODIFIED  7/2012    SVT not induced, PVC's     GYN SURGERY  1980     HYSTERECTOMY TOTAL ABDOMINAL       HYSTERECTOMY, PAP NO LONGER INDICATED       TONSILLECTOMY          Meds: reviewed  Current Outpatient Prescriptions   Medication Sig Dispense Refill     acetaminophen (TYLENOL) 500 MG tablet Take 2 tablets (1,000 mg) by mouth 3 times daily       albuterol (ALBUTEROL) 108 (90 BASE) MCG/ACT Inhaler Inhale 2 puffs into the lungs every 6 hours as needed       ASPIRIN NOT PRESCRIBED, INTENTIONAL, 1 each continuous prn. Antiplatelet medication not prescribed intentionally due to Use of NSAIDS 0 each 0     budesonide (PULMICORT FLEXHALER) 180 MCG/ACT inhaler Inhale 1 puff into the lungs 2 times daily       calcium 600 MG tablet Take 1 tablet (600 mg) by mouth daily 60 tablet      cholecalciferol (VITAMIN D) 1000 UNIT tablet Take 1 tablet (1,000 Units) by mouth daily 30 tablet      cyanocobalamin (B-12 TR) 1000 MCG  TBCR Take 1 tablet by mouth daily 100 tablet 3     ferrous sulfate (IRON) 325 (65 Fe) MG tablet Take 325 mg by mouth daily (with breakfast)       fexofenadine (ALLEGRA) 60 MG tablet Take 1 tablet (60 mg) by mouth daily 60 tablet      fluticasone (FLONASE) 50 MCG/ACT spray Spray 1 spray into both nostrils daily 1 Bottle      gemfibrozil (LOPID) 600 MG tablet Take 1 tablet (600 mg) by mouth daily 90 tablet 0     levalbuterol (XOPENEX) 1.25 MG/3ML neb solution Take 1 ampule by nebulization 4 times daily       levothyroxine (SYNTHROID/LEVOTHROID) 25 MCG tablet Take 1 tablet (25 mcg) by mouth daily 90 tablet 3     montelukast (SINGULAIR) 10 MG tablet Take 1 tablet (10 mg) by mouth At Bedtime 30 tablet      Multiple Vitamins-Minerals (MULTIVITAMIN OR) Take 1 tablet by mouth daily       omeprazole (PRILOSEC) 40 MG capsule Take 1 capsule (40 mg) by mouth daily Take 30-60 minutes before a meal. 90 capsule 2     polyethylene glycol (MIRALAX/GLYCOLAX) Packet Take 17 g by mouth daily as needed for constipation        predniSONE (DELTASONE) 10 MG tablet Take 10 mg by mouth daily       roflumilast (DALIRESP) 500 MCG TABS tablet Take 500 mcg by mouth daily        TRAMADOL HCL PO Take 50 mg by mouth every 8 hours as needed        umeclidinium (INCRUSE ELLIPTA) 62.5 MCG/INH oral inhaler Inhale 1 puff into the lungs daily         Soc Hx: reviewed  Fam Hx: reviewed          Jenny Tierney MD  Internal Medicine         Hospital Follow-up Visit:    Hospital/Nursing Home/ Rehab Facility: [facility]]  Date of Admission: [facility] from 6/17/18 through 6/19/18, then at [facility] from 6/19/18 through 7/11/18. Now at [facility].     Reason(s) for Admission:   1. Generalized muscle weakness    2. Physical deconditioning    3. Arthritis    4. Hyponatremia    5. Chronic obstructive pulmonary disease, unspecified COPD type (H)    6. Asthma, unspecified asthma  severity, unspecified whether complicated, unspecified whether persistent    7. Depression, unspecified depression type    8. Hypothyroidism, unspecified type    9. Abnormal finding on imaging    Problems taking medications regularly:  None       Medication changes since discharge: Pt knows there have been changes, but unsure of specifics.       Problems adhering to non-medication therapy:  None    Summary of hospitalization:  [facility] discharge summary reviewed  Diagnostic Tests/Treatments reviewed.  Follow up needed: as above   Other Healthcare Providers Involved in Patient s Care:         Pulmonary  Update since discharge: stable.     Post Discharge Medication Reconciliation: discharge medications reconciled and changed, per note/orders (see AVS).  Plan of care communicated with patient and family     Coding guidelines for this visit:  Type of Medical   Decision Making Face-to-Face Visit       within 7 Days of discharge Face-to-Face Visit        within 14 days of discharge   Moderate Complexity 11354 [id number]   High Complexity 20841 40098            Time spent with the patient  40 min, more than 50% in counseling and coordinating care, Re above medical problems COPD, deconditioning, hypertension, reviewing recent hospital stay, FMLA forms, discussing with family

## 2018-07-12 NOTE — MR AVS SNAPSHOT
After Visit Summary   7/12/2018    Ana Luisa Lee    MRN: [id number]           Patient Information     Date Of Birth          1940        Visit Information        Provider Department      7/12/2018 10:40 AM Jenny Hamilton MD [facility]        Today's Diagnoses     [id number]: mod-severe Ao Stenosis, severe Asthma/COPD steroid dependent, tachycardia/PVCs ( intol to BB and Ca gillian block), hypothyroidism,  HTN, osteoporosis, HLipidemia, depression     -  1      Care Instructions    Plan:  1. Continue same meds, same doses for now   2. Follow up in about 2 months    Please ask the staff at [facility] to fax us a copy of the current medication list to [phone] Attn: Mariluz. Please ask them what pharmacy they would like refills sent to (what city their [facility] is in?)           Follow-ups after your visit        Who to contact     If you have questions or need follow up information about today's clinic visit or your schedule please contact [facility] directly at [phone].  Normal or non-critical lab and imaging results will be communicated to you by "[employer]hart, letter or phone within 4 business days after the clinic has received the results. If you do not hear from us within 7 days, please contact the clinic through [employer] or phone. If you have a critical or abnormal lab result, we will notify you by phone as soon as possible.  Submit refill requests through "[employer] or call your pharmacy and they will forward the refill request to us. Please allow 3 business days for your refill to be completed.          Additional Information About Your Visit        "[employer]hart Information     "[employer] gives you secure access to your electronic health record. If you see a primary care provider, you can also send messages to your care team and make appointments. If you have questions, please call your primary care clinic.  If you do not  "have a primary care provider, please call [phone] and they will assist you.        Care EveryWhere ID     This is your Care EveryWhere ID. This could be used by other organizations to access your [facility] medical records  [id number]        Your Vitals Were     Pulse Temperature Respirations Height Pulse Oximetry Breastfeeding?    97 98.4  F (36.9  C) (Oral) 16 5' 2\" (1.575 m) 100% No    BMI (Body Mass Index)                   21.4 kg/m2            Blood Pressure from Last 3 Encounters:   07/12/18 130/74   07/10/18 149/87   07/02/18 149/86    Weight from Last 3 Encounters:   07/12/18 117 lb (53.1 kg)   07/10/18 114 lb 12.8 oz (52.1 kg)   07/02/18 120 lb 9.6 oz (54.7 kg)              We Performed the Following     DEPRESSION ACTION PLAN (DAP)        Primary Care Provider Office Phone # Fax #    Jenny Padmini Hamilton [phone]       [address] [facility] [address]        Equal Access to Services     [facility]: Hadii aad ku hadasho Somerry, waaxda luqadaha, qaybta kaalmada germain, holly garcia . So [facility] [phone].    ATENCIÓN: Si habla español, tiene a martinez disposición servicios gratuitos de asistencia lingüística. [facility] [phone].    We comply with applicable federal civil rights laws and [place] laws. We do not discriminate on the basis of race, color, national origin, age, disability, sex, sexual orientation, or gender identity.            Thank you!     Thank you for choosing [facility]  for your care. Our goal is always to provide you with excellent care. Hearing back from our patients is one way we can continue to improve our services. Please take a few minutes to complete the written survey that you may receive in the mail after your visit with us. Thank you!             Your Updated Medication List - Protect others around you: Learn how to safely use, store and throw away your medicines at " www.disposemymeds.org.          This list is accurate as of 7/12/18 11:29 AM.  Always use your most recent med list.                   Brand Name Dispense Instructions for use Diagnosis    acetaminophen 500 MG tablet    TYLENOL     Take 2 tablets (1,000 mg) by mouth 3 times daily    Pain of both shoulder joints       albuterol 108 (90 Base) MCG/ACT Inhaler    PROAIR HFA     Inhale 2 puffs into the lungs every 6 hours as needed    Asthma, persistent       ASPIRIN NOT PRESCRIBED    INTENTIONAL    0 each    1 each continuous prn. Antiplatelet medication not prescribed intentionally due to Use of NSAIDS        budesonide 180 MCG/ACT inhaler    PULMICORT FLEXHALER     Inhale 1 puff into the lungs 2 times daily    Pulmonary emphysema, unspecified emphysema type (H)       calcium 600 MG tablet     60 tablet    Take 1 tablet (600 mg) by mouth daily    Pulmonary emphysema, unspecified emphysema type (H)       cholecalciferol 1000 UNIT tablet    vitamin D3    30 tablet    Take 1 tablet (1,000 Units) by mouth daily    COPD, moderate (H)       cyanocobalamin 1000 MCG Tbcr    B-12 TR    100 tablet    Take 1 tablet by mouth daily    Pernicious anemia       ferrous sulfate 325 (65 Fe) MG tablet    IRON     Take 325 mg by mouth daily (with breakfast)        fexofenadine 60 MG tablet    ALLEGRA    60 tablet    Take 1 tablet (60 mg) by mouth daily    Middle ear effusion, left       fluticasone 50 MCG/ACT spray    FLONASE    1 Bottle    Spray 1 spray into both nostrils daily    Middle ear effusion, left       gemfibrozil 600 MG tablet    LOPID    90 tablet    Take 1 tablet (600 mg) by mouth daily    Hyperlipidemia with target LDL less than 130       INCRUSE ELLIPTA 62.5 MCG/INH oral inhaler   Generic drug:  umeclidinium      Inhale 1 puff into the lungs daily        levalbuterol 1.25 MG/3ML neb solution    XOPENEX     Take 1 ampule by nebulization 4 times daily        levothyroxine 25 MCG tablet    SYNTHROID/LEVOTHROID    90 tablet     Take 1 tablet (25 mcg) by mouth daily    Hypothyroidism due to acquired atrophy of thyroid       montelukast 10 MG tablet    SINGULAIR    30 tablet    Take 1 tablet (10 mg) by mouth At Bedtime    Pulmonary emphysema, unspecified emphysema type (H)       MULTIVITAMIN PO      Take 1 tablet by mouth daily        omeprazole 40 MG capsule    priLOSEC    90 capsule    Take 1 capsule (40 mg) by mouth daily Take 30-60 minutes before a meal.    Gastroesophageal reflux disease, esophagitis presence not specified       polyethylene glycol Packet    MIRALAX/GLYCOLAX     Take 17 g by mouth daily as needed for constipation        predniSONE 10 MG tablet    DELTASONE     Take 10 mg by mouth daily        roflumilast 500 MCG Tabs tablet    DALIRESP     Take 500 mcg by mouth daily        TRAMADOL HCL PO      Take 50 mg by mouth every 8 hours as needed

## 2018-07-12 NOTE — PROGRESS NOTES
"[facility] Care Coordination Contact  Rec'd e-mail from Codie RINALDI at [facility] that client d/c to [facility] 7/11/18.    Codie shared that client will be borrowing a w/c until her w/c is delivered.  7/12/18 Call placed to client's daughter Windy to inquire on transition. Windy states that her mother is doing well, needs to get adjusted to changes. Windy stated that her mother is shaky and has a difficult time walking. Windy states that the new w/c will be delivered on Saturday. Windy inquired on her mother's walker, stating that her current walker(4WW)  was not recommended but Medicare will not cover a new walker. Windy states that she does have a regular walker that she will bring to have her mother trial. Explained that a referral for [facility] has been placed, PT can assess and if needed will obtain a new walker through [facility].   Client was seen by Dr. Tierney today.   Windy will be going back to [place] on Saturday. Windy inquired if her mother could receive back up tubing to use with her nebulizer. Windy will call CM back with the name of the company that provided the nebulizer.   Call placed to client, states that she is tired from the move, is happy with the way that her daughter Windy decorated her apt. Client talked about transitioning to the onsite medical team, \"I can always go back to [place] if I want.\"   Explained that a referral to [facility] was placed, CM will f/u with [facility].  E-mail sent to Windy RN [facility] to update on client's transition.   Secure e-mail sent to Krystal at [facility] to provided client's updated address in order to resume  services.  Yeni Savage RN, BC  Supervisor [facility]   [phone] (Fax)    "

## 2018-07-13 ENCOUNTER — TELEPHONE (OUTPATIENT)
Dept: INTERNAL MEDICINE | Facility: CLINIC | Age: 78
End: 2018-07-13

## 2018-07-13 ASSESSMENT — ASTHMA QUESTIONNAIRES: ACT_TOTALSCORE: 12

## 2018-07-13 NOTE — TELEPHONE ENCOUNTER
I called the pharmacy listed and they do not have anything on file for fexofenadine. P/A not needed.

## 2018-07-13 NOTE — TELEPHONE ENCOUNTER
Prior Authorization Retail Medication Request    Medication/Dose: fexofenadine  ICD code (if different than what is on RX):    Previously Tried and Failed:    Rationale:      Insurance Name:  Not provided  Insurance ID:  [id number]      Pharmacy Information (if different than what is on RX)  Name:  [facility]  Phone:  [phone]

## 2018-07-17 ENCOUNTER — DOCUMENTATION ONLY (OUTPATIENT)
Dept: CARE COORDINATION | Facility: CLINIC | Age: 78
End: 2018-07-17

## 2018-07-17 ENCOUNTER — RECORDS - HEALTHEAST (OUTPATIENT)
Dept: LAB | Facility: CLINIC | Age: 78
End: 2018-07-17

## 2018-07-17 LAB
ALBUMIN UR-MCNC: ABNORMAL MG/DL
ANION GAP SERPL CALCULATED.3IONS-SCNC: 9 MMOL/L (ref 3–14)
APPEARANCE UR: CLEAR
BACTERIA #/AREA URNS HPF: ABNORMAL HPF
BILIRUB UR QL STRIP: NEGATIVE
BUN SERPL-MCNC: 16 MG/DL (ref 7–30)
CALCIUM SERPL-MCNC: 9.5 MG/DL (ref 8.5–10.1)
CAOX CRY #/AREA URNS HPF: PRESENT /[HPF]
CHLORIDE SERPL-SCNC: 93 MMOL/L (ref 94–109)
CO2 SERPL-SCNC: 25 MMOL/L (ref 20–32)
COLOR UR AUTO: YELLOW
CREAT SERPL-MCNC: 0.69 MG/DL (ref 0.52–1.04)
GFR SERPL CREATININE-BSD FRML MDRD: 82 ML/MIN/1.7M2
GLUCOSE SERPL-MCNC: 160 MG/DL (ref 70–99)
GLUCOSE UR STRIP-MCNC: NEGATIVE MG/DL
HGB UR QL STRIP: NEGATIVE
KETONES UR STRIP-MCNC: NEGATIVE MG/DL
LEUKOCYTE ESTERASE UR QL STRIP: ABNORMAL
MUCOUS THREADS #/AREA URNS LPF: ABNORMAL LPF
NITRATE UR QL: NEGATIVE
PH UR STRIP: 5.5 [PH] (ref 4.5–8)
POTASSIUM SERPL-SCNC: 3.4 MMOL/L (ref 3.4–5.3)
RBC #/AREA URNS AUTO: ABNORMAL HPF
SODIUM SERPL-SCNC: 127 MMOL/L (ref 133–144)
SP GR UR STRIP: 1.02 (ref 1–1.03)
SQUAMOUS #/AREA URNS AUTO: ABNORMAL LPF
TRANS CELLS #/AREA URNS HPF: ABNORMAL LPF
UROBILINOGEN UR STRIP-ACNC: ABNORMAL
WBC #/AREA URNS AUTO: ABNORMAL HPF

## 2018-07-17 PROCEDURE — 80048 BASIC METABOLIC PNL TOTAL CA: CPT | Performed by: INTERNAL MEDICINE

## 2018-07-17 NOTE — PROGRESS NOTES
[facility]  [facility] process is that all ordered disciplines will be involved in the development of the plan of care.  The following disciplines were unable to see Ana Luisa Lee; MRN [id number] within the 5 day evaluation window.  There will be a delay in the evalation visit by    OT    We will notify you when the evaluation is completed.  The patient has been notified.      Sincerely [facility]  Caren Griffin  [phone]

## 2018-07-18 ENCOUNTER — PATIENT OUTREACH (OUTPATIENT)
Dept: GERIATRIC MEDICINE | Facility: CLINIC | Age: 78
End: 2018-07-18

## 2018-07-18 LAB — BACTERIA SPEC CULT: NO GROWTH

## 2018-07-18 NOTE — PROGRESS NOTES
[facility] Care Coordination Contact  7/16/18 Rec'd vm from Windy BARTHOLOMEW [facility] to report   Referral with client. Windy states that homecare will likely work with client for 2-3 weeks, stating that [employer] auth until 2/23/18 7/18/18 Left vm with Windy to report CM was notified by Rubio AMBROSEU, referral to [facility] for PT. Explained that CM unsure of skilled nursing visits as client will receive on site nursing cares: med management. Request call back as needed.  7/18/19 Rec'd tele call from Windy BARTHOLOMEW. Windy reports RN visit initially ordered for lab work, Windy states likely 1 visit per month x 2-3.    Yeni Savage RN, BC  Supervisor [facility]   [phone] (Fax)

## 2018-07-19 ENCOUNTER — TELEPHONE (OUTPATIENT)
Dept: INTERNAL MEDICINE | Facility: CLINIC | Age: 78
End: 2018-07-19

## 2018-07-19 ENCOUNTER — PATIENT OUTREACH (OUTPATIENT)
Dept: GERIATRIC MEDICINE | Facility: CLINIC | Age: 78
End: 2018-07-19

## 2018-07-19 DIAGNOSIS — E87.1 HYPONATREMIA: Primary | ICD-10-CM

## 2018-07-19 NOTE — TELEPHONE ENCOUNTER
Please call the patient, the recent so to level is lower.    She needs to decrease the fluid intake and repeat the sodium next week    If she feels very weak, no nausea, lightheaded, and she needs to go to emergency room

## 2018-07-19 NOTE — TELEPHONE ENCOUNTER
Madhavi OT with [facility] ([phone])   Patient hospitalized in June, then went to TCU and was discharged from there on 7/11/18.  Home care admission 7/13/18 as ordered by TCU MD.      Madhavi is requesting order for OT once a week x1 week then twice a week x2 weeks to work on getting pt a wheelchair, hospital bed and toilet safety equipment.  CAROL Flores R.N.

## 2018-07-19 NOTE — PROGRESS NOTES
[facility] Care Coordination Contact  Rec'd tele call from Madhavi OT [facility] to inquire if client would be eligible for a toilet safety frame. Explained that CM can assist with ordering. Madhavi will e-mail CM information from [employer]. Madhavi states that she is working with the [employer] on the appropriate w/c, client will need a semi jesus w/c.    Had conversation re lift chair, at this time, Madhavi is not recommending.   Rec'd e-mail from Madhavi that after discussion with [facility], a referral was placed for a commode vs a toilet safety frame    Rec'd tele call from client's daughter Windy.  Windy states that her mother did establish care with [facility].   Windy states that her mother informed her that the [facility] PT said that she could no longer with client because of [facility].  Explained that CM was in communication with Windy BARTHOLOMEW and Madhavi OT, no plans for discharge at this time. CM shared above information from Madhavi.   Windy also shared that her mother informed her that Dr. Tierney ordered lab work and a nurse from Dr. Tierney's clinic came to draw the labs yesterday. Explained that per EMR, no info regarding lab work, possibly ordered from Dr. Alvarez. Explained that CM will send message to Windy BARTHOLOMEW [facility] to inform the team of the change in PCP, that Dr. Tierney is no longer the PCP.   Client will transfer 8/1/18 to [facility] for ongoing case management.   VM left with Shobha BARTHOLOMEW [facility] to share above info, noting [facility] in place and CM will communicate change in PCP.   Yeni Savage RN, BC  Supervisor [facility]   [phone] (Fax)

## 2018-07-20 NOTE — PROGRESS NOTES
[facility] Care Coordination Contact  Rec'd e-mail from client's daughter Windy stating that she rec'd a call from Dr. Tierney's office regarding low Na level and that her mother has noted increased urinating at night. Windy states that Dr. Tierney's office is aware that PCP has been changed and will make a note in the chart. Windy inquired on communication with [facility] and [facility].  Secure e-mail to Windy stating that CM will forward information to Windy HOUGH to inform of Na level.   Rec'd tele call from Windy HOUGH, reviewed above info.   Secure e-mail sent to client's daughter Windy requesting that she forward the above message to the [employer] [facility] and also be in communication with Shobha Cui with nursing concerns.  Rec'd secure e-mail from Windy that she informed Dr. Jimenez via [employer] portal and also emailed Shobha BARTHOLOMEW.  Yeni Savage RN, BC  Supervisor [facility]   [phone] (Fax)

## 2018-07-20 NOTE — TELEPHONE ENCOUNTER
Attempted to contact patient. No answer. Contacted daughter Windy. States patient has moved to an assisted living facility and is transitioning to a new provider. See 7/19 patient outreach encounter. Dr. Alvarez will be following patient at assisted living. Windy will contact Yeni with [facility] care coordination to update her on MD recommendations. States she has been in very close contact with her. Advised Windy that Dr. Tierney is still receiving orders from [facility]. Windy states these orders should be going to Dr. Alvarez. Windy will speak to Yeni about this also. Removed Dr. Tierney as patient's primary care provider. Also deactivated patient's Audience account per Windy's request as patient has not read any of the messages sent in the past year.

## 2018-07-22 ENCOUNTER — APPOINTMENT (OUTPATIENT)
Dept: GENERAL RADIOLOGY | Facility: CLINIC | Age: 78
DRG: 643 | End: 2018-07-22
Attending: EMERGENCY MEDICINE
Payer: COMMERCIAL

## 2018-07-22 ENCOUNTER — TRANSFERRED RECORDS (OUTPATIENT)
Dept: HEALTH INFORMATION MANAGEMENT | Facility: CLINIC | Age: 78
End: 2018-07-22

## 2018-07-22 ENCOUNTER — HOSPITAL ENCOUNTER (INPATIENT)
Facility: CLINIC | Age: 78
LOS: 3 days | Discharge: NURSING, REHAB OR LONG-TERM CARE | DRG: 643 | End: 2018-07-25
Attending: EMERGENCY MEDICINE | Admitting: INTERNAL MEDICINE
Payer: COMMERCIAL

## 2018-07-22 ENCOUNTER — APPOINTMENT (OUTPATIENT)
Dept: CT IMAGING | Facility: CLINIC | Age: 78
DRG: 643 | End: 2018-07-22
Attending: EMERGENCY MEDICINE
Payer: COMMERCIAL

## 2018-07-22 DIAGNOSIS — I10 ESSENTIAL HYPERTENSION WITH GOAL BLOOD PRESSURE LESS THAN 140/90: Chronic | ICD-10-CM

## 2018-07-22 DIAGNOSIS — E87.1 HYPONATREMIA: ICD-10-CM

## 2018-07-22 DIAGNOSIS — M19.049 ARTHRITIS OF HAND: Primary | ICD-10-CM

## 2018-07-22 DIAGNOSIS — S81.811A SKIN TEAR OF RIGHT LOWER LEG WITHOUT COMPLICATION, INITIAL ENCOUNTER: ICD-10-CM

## 2018-07-22 DIAGNOSIS — R41.0 DELIRIUM: ICD-10-CM

## 2018-07-22 PROBLEM — G93.40 ENCEPHALOPATHY ACUTE: Status: ACTIVE | Noted: 2018-07-22

## 2018-07-22 PROBLEM — G93.40 ENCEPHALOPATHY: Status: ACTIVE | Noted: 2018-07-22

## 2018-07-22 LAB
ALBUMIN SERPL-MCNC: 3.9 G/DL (ref 3.4–5)
ALBUMIN UR-MCNC: NEGATIVE MG/DL
ALP SERPL-CCNC: 72 U/L (ref 40–150)
ALT SERPL W P-5'-P-CCNC: 15 U/L (ref 0–50)
ANION GAP SERPL CALCULATED.3IONS-SCNC: 8 MMOL/L (ref 3–14)
ANION GAP SERPL CALCULATED.3IONS-SCNC: 9 MMOL/L (ref 3–14)
APPEARANCE UR: CLEAR
APTT PPP: 27 SEC (ref 22–37)
AST SERPL W P-5'-P-CCNC: 12 U/L (ref 0–45)
BACTERIA SPEC CULT: NORMAL
BASE EXCESS BLDV CALC-SCNC: 1 MMOL/L
BASOPHILS # BLD AUTO: 0 10E9/L (ref 0–0.2)
BASOPHILS NFR BLD AUTO: 0.2 %
BILIRUB SERPL-MCNC: 0.5 MG/DL (ref 0.2–1.3)
BILIRUB UR QL STRIP: NEGATIVE
BUN SERPL-MCNC: 12 MG/DL (ref 7–30)
BUN SERPL-MCNC: 9 MG/DL (ref 7–30)
CALCIUM SERPL-MCNC: 8.8 MG/DL (ref 8.5–10.1)
CALCIUM SERPL-MCNC: 9.2 MG/DL (ref 8.5–10.1)
CHLORIDE SERPL-SCNC: 93 MMOL/L (ref 94–109)
CHLORIDE SERPL-SCNC: 98 MMOL/L (ref 94–109)
CO2 SERPL-SCNC: 23 MMOL/L (ref 20–32)
CO2 SERPL-SCNC: 25 MMOL/L (ref 20–32)
COLOR UR AUTO: ABNORMAL
CORTIS SERPL-MCNC: 30.6 UG/DL (ref 4–22)
CREAT SERPL-MCNC: 0.47 MG/DL (ref 0.52–1.04)
CREAT SERPL-MCNC: 0.58 MG/DL (ref 0.52–1.04)
DIFFERENTIAL METHOD BLD: ABNORMAL
EOSINOPHIL # BLD AUTO: 0.1 10E9/L (ref 0–0.7)
EOSINOPHIL NFR BLD AUTO: 0.6 %
ERYTHROCYTE [DISTWIDTH] IN BLOOD BY AUTOMATED COUNT: 13.6 % (ref 10–15)
ETHANOL SERPL-MCNC: <0.01 G/DL
GFR SERPL CREATININE-BSD FRML MDRD: >90 ML/MIN/1.7M2
GFR SERPL CREATININE-BSD FRML MDRD: >90 ML/MIN/1.7M2
GLUCOSE SERPL-MCNC: 138 MG/DL (ref 70–99)
GLUCOSE SERPL-MCNC: 88 MG/DL (ref 70–99)
GLUCOSE UR STRIP-MCNC: NEGATIVE MG/DL
HCO3 BLDV-SCNC: 26 MMOL/L (ref 21–28)
HCT VFR BLD AUTO: 33.6 % (ref 35–47)
HGB BLD-MCNC: 11.4 G/DL (ref 11.7–15.7)
HGB UR QL STRIP: ABNORMAL
HYALINE CASTS #/AREA URNS LPF: 1 /LPF (ref 0–2)
IMM GRANULOCYTES # BLD: 0.1 10E9/L (ref 0–0.4)
IMM GRANULOCYTES NFR BLD: 0.9 %
INR PPP: 1.09 (ref 0.86–1.14)
INTERPRETATION ECG - MUSE: NORMAL
KETONES UR STRIP-MCNC: NEGATIVE MG/DL
LACTATE BLD-SCNC: 1 MMOL/L (ref 0.7–2)
LEUKOCYTE ESTERASE UR QL STRIP: NEGATIVE
LYMPHOCYTES # BLD AUTO: 1.9 10E9/L (ref 0.8–5.3)
LYMPHOCYTES NFR BLD AUTO: 16.5 %
MCH RBC QN AUTO: 30.6 PG (ref 26.5–33)
MCHC RBC AUTO-ENTMCNC: 33.9 G/DL (ref 31.5–36.5)
MCV RBC AUTO: 90 FL (ref 78–100)
MONOCYTES # BLD AUTO: 1.1 10E9/L (ref 0–1.3)
MONOCYTES NFR BLD AUTO: 9.3 %
NEUTROPHILS # BLD AUTO: 8.2 10E9/L (ref 1.6–8.3)
NEUTROPHILS NFR BLD AUTO: 72.5 %
NITRATE UR QL: NEGATIVE
NRBC # BLD AUTO: 0 10*3/UL
NRBC BLD AUTO-RTO: 0 /100
O2/TOTAL GAS SETTING VFR VENT: 0 %
OXYHGB MFR BLDV: 78 %
PCO2 BLDV: 42 MM HG (ref 40–50)
PH BLDV: 7.4 PH (ref 7.32–7.43)
PH UR STRIP: 7 PH (ref 5–7)
PLATELET # BLD AUTO: 357 10E9/L (ref 150–450)
PO2 BLDV: 46 MM HG (ref 25–47)
POTASSIUM SERPL-SCNC: 3.3 MMOL/L (ref 3.4–5.3)
POTASSIUM SERPL-SCNC: 3.9 MMOL/L (ref 3.4–5.3)
POTASSIUM SERPL-SCNC: 4.4 MMOL/L (ref 3.4–5.3)
PROT SERPL-MCNC: 6.9 G/DL (ref 6.8–8.8)
RBC # BLD AUTO: 3.73 10E12/L (ref 3.8–5.2)
RBC #/AREA URNS AUTO: 1 /HPF (ref 0–2)
SALICYLATES SERPL-MCNC: <2 MG/DL
SODIUM SERPL-SCNC: 126 MMOL/L (ref 133–144)
SODIUM SERPL-SCNC: 130 MMOL/L (ref 133–144)
SODIUM SERPL-SCNC: 131 MMOL/L (ref 133–144)
SOURCE: ABNORMAL
SP GR UR STRIP: 1.02 (ref 1–1.03)
SPECIMEN SOURCE: NORMAL
SQUAMOUS #/AREA URNS AUTO: <1 /HPF (ref 0–1)
TROPONIN I SERPL-MCNC: <0.015 UG/L (ref 0–0.04)
TSH SERPL DL<=0.005 MIU/L-ACNC: 2.07 MU/L (ref 0.4–4)
UROBILINOGEN UR STRIP-MCNC: 0 MG/DL (ref 0–2)
WBC # BLD AUTO: 11.3 10E9/L (ref 4–11)
WBC #/AREA URNS AUTO: 1 /HPF (ref 0–5)

## 2018-07-22 PROCEDURE — 85610 PROTHROMBIN TIME: CPT | Performed by: EMERGENCY MEDICINE

## 2018-07-22 PROCEDURE — 25000128 H RX IP 250 OP 636: Performed by: EMERGENCY MEDICINE

## 2018-07-22 PROCEDURE — G0378 HOSPITAL OBSERVATION PER HR: HCPCS

## 2018-07-22 PROCEDURE — 85025 COMPLETE CBC W/AUTO DIFF WBC: CPT | Performed by: EMERGENCY MEDICINE

## 2018-07-22 PROCEDURE — 80320 DRUG SCREEN QUANTALCOHOLS: CPT | Performed by: EMERGENCY MEDICINE

## 2018-07-22 PROCEDURE — 80053 COMPREHEN METABOLIC PANEL: CPT | Performed by: EMERGENCY MEDICINE

## 2018-07-22 PROCEDURE — 80329 ANALGESICS NON-OPIOID 1 OR 2: CPT | Performed by: EMERGENCY MEDICINE

## 2018-07-22 PROCEDURE — 93005 ELECTROCARDIOGRAM TRACING: CPT

## 2018-07-22 PROCEDURE — 94640 AIRWAY INHALATION TREATMENT: CPT

## 2018-07-22 PROCEDURE — 81001 URINALYSIS AUTO W/SCOPE: CPT | Performed by: EMERGENCY MEDICINE

## 2018-07-22 PROCEDURE — 25000128 H RX IP 250 OP 636

## 2018-07-22 PROCEDURE — 99220 ZZC INITIAL OBSERVATION CARE,LEVL III: CPT | Performed by: INTERNAL MEDICINE

## 2018-07-22 PROCEDURE — 96374 THER/PROPH/DIAG INJ IV PUSH: CPT | Mod: 59

## 2018-07-22 PROCEDURE — 36415 COLL VENOUS BLD VENIPUNCTURE: CPT | Performed by: INTERNAL MEDICINE

## 2018-07-22 PROCEDURE — 82533 TOTAL CORTISOL: CPT | Performed by: EMERGENCY MEDICINE

## 2018-07-22 PROCEDURE — 40000915 ZZH STATISTIC SITTER, EVENING HOURS

## 2018-07-22 PROCEDURE — 87040 BLOOD CULTURE FOR BACTERIA: CPT | Performed by: EMERGENCY MEDICINE

## 2018-07-22 PROCEDURE — 12000000 ZZH R&B MED SURG/OB

## 2018-07-22 PROCEDURE — 40000914 ZZH STATISTIC SITTER, DAY HOURS

## 2018-07-22 PROCEDURE — 83605 ASSAY OF LACTIC ACID: CPT | Performed by: EMERGENCY MEDICINE

## 2018-07-22 PROCEDURE — 71045 X-RAY EXAM CHEST 1 VIEW: CPT

## 2018-07-22 PROCEDURE — 40000275 ZZH STATISTIC RCP TIME EA 10 MIN

## 2018-07-22 PROCEDURE — 25000125 ZZHC RX 250: Performed by: INTERNAL MEDICINE

## 2018-07-22 PROCEDURE — 73502 X-RAY EXAM HIP UNI 2-3 VIEWS: CPT

## 2018-07-22 PROCEDURE — 70496 CT ANGIOGRAPHY HEAD: CPT

## 2018-07-22 PROCEDURE — 99285 EMERGENCY DEPT VISIT HI MDM: CPT | Mod: 25

## 2018-07-22 PROCEDURE — 96375 TX/PRO/DX INJ NEW DRUG ADDON: CPT

## 2018-07-22 PROCEDURE — 72125 CT NECK SPINE W/O DYE: CPT

## 2018-07-22 PROCEDURE — 87086 URINE CULTURE/COLONY COUNT: CPT | Performed by: EMERGENCY MEDICINE

## 2018-07-22 PROCEDURE — 82805 BLOOD GASES W/O2 SATURATION: CPT | Performed by: EMERGENCY MEDICINE

## 2018-07-22 PROCEDURE — 84484 ASSAY OF TROPONIN QUANT: CPT | Performed by: EMERGENCY MEDICINE

## 2018-07-22 PROCEDURE — 94640 AIRWAY INHALATION TREATMENT: CPT | Mod: 76

## 2018-07-22 PROCEDURE — 25000128 H RX IP 250 OP 636: Performed by: INTERNAL MEDICINE

## 2018-07-22 PROCEDURE — 25000132 ZZH RX MED GY IP 250 OP 250 PS 637: Performed by: INTERNAL MEDICINE

## 2018-07-22 PROCEDURE — 84132 ASSAY OF SERUM POTASSIUM: CPT | Performed by: INTERNAL MEDICINE

## 2018-07-22 PROCEDURE — 80048 BASIC METABOLIC PNL TOTAL CA: CPT | Performed by: INTERNAL MEDICINE

## 2018-07-22 PROCEDURE — 36415 COLL VENOUS BLD VENIPUNCTURE: CPT | Performed by: EMERGENCY MEDICINE

## 2018-07-22 PROCEDURE — 40000916 ZZH STATISTIC SITTER, NIGHT HOURS

## 2018-07-22 PROCEDURE — 70450 CT HEAD/BRAIN W/O DYE: CPT

## 2018-07-22 PROCEDURE — 84295 ASSAY OF SERUM SODIUM: CPT | Performed by: INTERNAL MEDICINE

## 2018-07-22 PROCEDURE — 40000274 ZZH STATISTIC RCP CONSULT EA 30 MIN

## 2018-07-22 PROCEDURE — 85730 THROMBOPLASTIN TIME PARTIAL: CPT | Performed by: EMERGENCY MEDICINE

## 2018-07-22 PROCEDURE — 84443 ASSAY THYROID STIM HORMONE: CPT | Performed by: EMERGENCY MEDICINE

## 2018-07-22 RX ORDER — POLYETHYLENE GLYCOL 3350 17 G/17G
17 POWDER, FOR SOLUTION ORAL DAILY PRN
Status: DISCONTINUED | OUTPATIENT
Start: 2018-07-22 | End: 2018-07-25 | Stop reason: HOSPADM

## 2018-07-22 RX ORDER — MAGNESIUM SULFATE HEPTAHYDRATE 40 MG/ML
4 INJECTION, SOLUTION INTRAVENOUS EVERY 4 HOURS PRN
Status: DISCONTINUED | OUTPATIENT
Start: 2018-07-22 | End: 2018-07-25 | Stop reason: HOSPADM

## 2018-07-22 RX ORDER — ACETAMINOPHEN 325 MG/1
650 TABLET ORAL EVERY 4 HOURS PRN
Status: DISCONTINUED | OUTPATIENT
Start: 2018-07-22 | End: 2018-07-25 | Stop reason: HOSPADM

## 2018-07-22 RX ORDER — POTASSIUM CHLORIDE 7.45 MG/ML
10 INJECTION INTRAVENOUS
Status: DISCONTINUED | OUTPATIENT
Start: 2018-07-22 | End: 2018-07-23

## 2018-07-22 RX ORDER — ALBUTEROL SULFATE 0.83 MG/ML
2.5 SOLUTION RESPIRATORY (INHALATION)
Status: DISCONTINUED | OUTPATIENT
Start: 2018-07-22 | End: 2018-07-25 | Stop reason: HOSPADM

## 2018-07-22 RX ORDER — POTASSIUM CL/LIDO/0.9 % NACL 10MEQ/0.1L
10 INTRAVENOUS SOLUTION, PIGGYBACK (ML) INTRAVENOUS
Status: DISCONTINUED | OUTPATIENT
Start: 2018-07-22 | End: 2018-07-23

## 2018-07-22 RX ORDER — POTASSIUM CHLORIDE 29.8 MG/ML
20 INJECTION INTRAVENOUS
Status: DISCONTINUED | OUTPATIENT
Start: 2018-07-22 | End: 2018-07-22 | Stop reason: CLARIF

## 2018-07-22 RX ORDER — MAGNESIUM SULFATE HEPTAHYDRATE 40 MG/ML
2 INJECTION, SOLUTION INTRAVENOUS DAILY PRN
Status: DISCONTINUED | OUTPATIENT
Start: 2018-07-22 | End: 2018-07-25 | Stop reason: HOSPADM

## 2018-07-22 RX ORDER — PROCHLORPERAZINE MALEATE 5 MG
5 TABLET ORAL EVERY 6 HOURS PRN
Status: DISCONTINUED | OUTPATIENT
Start: 2018-07-22 | End: 2018-07-25 | Stop reason: HOSPADM

## 2018-07-22 RX ORDER — LEVOTHYROXINE SODIUM 25 UG/1
25 TABLET ORAL DAILY
Status: DISCONTINUED | OUTPATIENT
Start: 2018-07-22 | End: 2018-07-25 | Stop reason: HOSPADM

## 2018-07-22 RX ORDER — NALOXONE HYDROCHLORIDE 0.4 MG/ML
.1-.4 INJECTION, SOLUTION INTRAMUSCULAR; INTRAVENOUS; SUBCUTANEOUS
Status: DISCONTINUED | OUTPATIENT
Start: 2018-07-22 | End: 2018-07-25 | Stop reason: HOSPADM

## 2018-07-22 RX ORDER — IPRATROPIUM BROMIDE AND ALBUTEROL SULFATE 2.5; .5 MG/3ML; MG/3ML
3 SOLUTION RESPIRATORY (INHALATION) 4 TIMES DAILY
Status: DISCONTINUED | OUTPATIENT
Start: 2018-07-22 | End: 2018-07-23

## 2018-07-22 RX ORDER — ACETAMINOPHEN 650 MG/1
650 SUPPOSITORY RECTAL EVERY 4 HOURS PRN
Status: DISCONTINUED | OUTPATIENT
Start: 2018-07-22 | End: 2018-07-25 | Stop reason: HOSPADM

## 2018-07-22 RX ORDER — TRAMADOL HYDROCHLORIDE 50 MG/1
50 TABLET ORAL DAILY PRN
Status: ON HOLD | COMMUNITY
End: 2018-07-25

## 2018-07-22 RX ORDER — AMOXICILLIN 250 MG
2 CAPSULE ORAL 2 TIMES DAILY PRN
Status: DISCONTINUED | OUTPATIENT
Start: 2018-07-22 | End: 2018-07-25 | Stop reason: HOSPADM

## 2018-07-22 RX ORDER — LIDOCAINE 40 MG/G
CREAM TOPICAL
Status: DISCONTINUED | OUTPATIENT
Start: 2018-07-22 | End: 2018-07-22

## 2018-07-22 RX ORDER — ONDANSETRON 4 MG/1
4 TABLET, ORALLY DISINTEGRATING ORAL EVERY 6 HOURS PRN
Status: DISCONTINUED | OUTPATIENT
Start: 2018-07-22 | End: 2018-07-25 | Stop reason: HOSPADM

## 2018-07-22 RX ORDER — SODIUM CHLORIDE AND POTASSIUM CHLORIDE 150; 900 MG/100ML; MG/100ML
INJECTION, SOLUTION INTRAVENOUS CONTINUOUS
Status: DISCONTINUED | OUTPATIENT
Start: 2018-07-22 | End: 2018-07-23

## 2018-07-22 RX ORDER — IOPAMIDOL 755 MG/ML
500 INJECTION, SOLUTION INTRAVASCULAR ONCE
Status: COMPLETED | OUTPATIENT
Start: 2018-07-22 | End: 2018-07-22

## 2018-07-22 RX ORDER — LORAZEPAM 2 MG/ML
INJECTION INTRAMUSCULAR
Status: COMPLETED
Start: 2018-07-22 | End: 2018-07-22

## 2018-07-22 RX ORDER — HYDRALAZINE HYDROCHLORIDE 20 MG/ML
10 INJECTION INTRAMUSCULAR; INTRAVENOUS EVERY 4 HOURS PRN
Status: DISCONTINUED | OUTPATIENT
Start: 2018-07-22 | End: 2018-07-23

## 2018-07-22 RX ORDER — ONDANSETRON 2 MG/ML
4 INJECTION INTRAMUSCULAR; INTRAVENOUS ONCE
Status: COMPLETED | OUTPATIENT
Start: 2018-07-22 | End: 2018-07-22

## 2018-07-22 RX ORDER — LORAZEPAM 2 MG/ML
1 INJECTION INTRAMUSCULAR ONCE
Status: COMPLETED | OUTPATIENT
Start: 2018-07-22 | End: 2018-07-22

## 2018-07-22 RX ORDER — PREDNISONE 10 MG/1
10 TABLET ORAL DAILY
Status: DISCONTINUED | OUTPATIENT
Start: 2018-07-22 | End: 2018-07-25 | Stop reason: HOSPADM

## 2018-07-22 RX ORDER — POTASSIUM CHLORIDE 1500 MG/1
20-40 TABLET, EXTENDED RELEASE ORAL
Status: DISCONTINUED | OUTPATIENT
Start: 2018-07-22 | End: 2018-07-23

## 2018-07-22 RX ORDER — PROCHLORPERAZINE 25 MG
12.5 SUPPOSITORY, RECTAL RECTAL EVERY 12 HOURS PRN
Status: DISCONTINUED | OUTPATIENT
Start: 2018-07-22 | End: 2018-07-25 | Stop reason: HOSPADM

## 2018-07-22 RX ORDER — POTASSIUM CHLORIDE 1.5 G/1.58G
20-40 POWDER, FOR SOLUTION ORAL
Status: DISCONTINUED | OUTPATIENT
Start: 2018-07-22 | End: 2018-07-23

## 2018-07-22 RX ORDER — IPRATROPIUM BROMIDE AND ALBUTEROL SULFATE 2.5; .5 MG/3ML; MG/3ML
3 SOLUTION RESPIRATORY (INHALATION)
Status: DISCONTINUED | OUTPATIENT
Start: 2018-07-22 | End: 2018-07-22

## 2018-07-22 RX ORDER — HALOPERIDOL 5 MG/ML
2 INJECTION INTRAMUSCULAR EVERY 6 HOURS PRN
Status: DISCONTINUED | OUTPATIENT
Start: 2018-07-22 | End: 2018-07-25 | Stop reason: HOSPADM

## 2018-07-22 RX ORDER — AMOXICILLIN 250 MG
1 CAPSULE ORAL 2 TIMES DAILY PRN
Status: DISCONTINUED | OUTPATIENT
Start: 2018-07-22 | End: 2018-07-25 | Stop reason: HOSPADM

## 2018-07-22 RX ORDER — TRAMADOL HYDROCHLORIDE 50 MG/1
50 TABLET ORAL 2 TIMES DAILY
Status: ON HOLD | COMMUNITY
End: 2018-07-25

## 2018-07-22 RX ORDER — GINSENG 100 MG
CAPSULE ORAL
Status: DISCONTINUED
Start: 2018-07-22 | End: 2018-07-22 | Stop reason: HOSPADM

## 2018-07-22 RX ORDER — DULOXETIN HYDROCHLORIDE 30 MG/1
20 CAPSULE, DELAYED RELEASE ORAL DAILY
COMMUNITY
Start: 2018-07-27 | End: 2018-07-30

## 2018-07-22 RX ORDER — ONDANSETRON 2 MG/ML
4 INJECTION INTRAMUSCULAR; INTRAVENOUS EVERY 6 HOURS PRN
Status: DISCONTINUED | OUTPATIENT
Start: 2018-07-22 | End: 2018-07-25 | Stop reason: HOSPADM

## 2018-07-22 RX ORDER — LOPERAMIDE HCL 2 MG
2 CAPSULE ORAL EVERY 8 HOURS PRN
COMMUNITY

## 2018-07-22 RX ORDER — IOPAMIDOL 755 MG/ML
500 INJECTION, SOLUTION INTRAVASCULAR ONCE
Status: DISCONTINUED | OUTPATIENT
Start: 2018-07-22 | End: 2018-07-22

## 2018-07-22 RX ORDER — ALBUTEROL SULFATE 90 UG/1
2 AEROSOL, METERED RESPIRATORY (INHALATION) EVERY 6 HOURS PRN
Status: DISCONTINUED | OUTPATIENT
Start: 2018-07-22 | End: 2018-07-25 | Stop reason: HOSPADM

## 2018-07-22 RX ORDER — FLUTICASONE PROPIONATE 50 MCG
1 SPRAY, SUSPENSION (ML) NASAL DAILY
Status: DISCONTINUED | OUTPATIENT
Start: 2018-07-22 | End: 2018-07-25 | Stop reason: HOSPADM

## 2018-07-22 RX ORDER — POLYETHYLENE GLYCOL 3350 17 G/17G
17 POWDER, FOR SOLUTION ORAL DAILY PRN
COMMUNITY

## 2018-07-22 RX ORDER — POLYETHYLENE GLYCOL 3350 17 G/17G
17 POWDER, FOR SOLUTION ORAL DAILY
COMMUNITY
End: 2018-07-26

## 2018-07-22 RX ORDER — ACETAMINOPHEN 500 MG
1000 TABLET ORAL 3 TIMES DAILY
Status: DISCONTINUED | OUTPATIENT
Start: 2018-07-22 | End: 2018-07-25 | Stop reason: HOSPADM

## 2018-07-22 RX ORDER — MORPHINE SULFATE 4 MG/ML
4 INJECTION, SOLUTION INTRAMUSCULAR; INTRAVENOUS ONCE
Status: COMPLETED | OUTPATIENT
Start: 2018-07-22 | End: 2018-07-22

## 2018-07-22 RX ADMIN — HYDRALAZINE HYDROCHLORIDE 10 MG: 20 INJECTION INTRAMUSCULAR; INTRAVENOUS at 07:53

## 2018-07-22 RX ADMIN — MORPHINE SULFATE 4 MG: 4 INJECTION INTRAVENOUS at 03:03

## 2018-07-22 RX ADMIN — POTASSIUM CHLORIDE AND SODIUM CHLORIDE: 900; 150 INJECTION, SOLUTION INTRAVENOUS at 06:55

## 2018-07-22 RX ADMIN — IPRATROPIUM BROMIDE AND ALBUTEROL SULFATE 3 ML: .5; 3 SOLUTION RESPIRATORY (INHALATION) at 12:28

## 2018-07-22 RX ADMIN — LORAZEPAM 1 MG: 2 INJECTION INTRAMUSCULAR at 03:48

## 2018-07-22 RX ADMIN — LIDOCAINE HYDROCHLORIDE 10 MEQ: 10 INJECTION, SOLUTION EPIDURAL; INFILTRATION; INTRACAUDAL; PERINEURAL at 10:55

## 2018-07-22 RX ADMIN — ACETAMINOPHEN 1000 MG: 500 TABLET, FILM COATED ORAL at 15:43

## 2018-07-22 RX ADMIN — IOPAMIDOL 70 ML: 755 INJECTION, SOLUTION INTRAVENOUS at 03:43

## 2018-07-22 RX ADMIN — LIDOCAINE HYDROCHLORIDE 10 MEQ: 10 INJECTION, SOLUTION EPIDURAL; INFILTRATION; INTRACAUDAL; PERINEURAL at 12:30

## 2018-07-22 RX ADMIN — SODIUM CHLORIDE 80 ML: 900 INJECTION, SOLUTION INTRAVENOUS at 03:44

## 2018-07-22 RX ADMIN — IPRATROPIUM BROMIDE AND ALBUTEROL SULFATE 3 ML: .5; 3 SOLUTION RESPIRATORY (INHALATION) at 16:11

## 2018-07-22 RX ADMIN — ONDANSETRON 4 MG: 2 INJECTION INTRAMUSCULAR; INTRAVENOUS at 03:03

## 2018-07-22 RX ADMIN — LIDOCAINE HYDROCHLORIDE 10 MEQ: 10 INJECTION, SOLUTION EPIDURAL; INFILTRATION; INTRACAUDAL; PERINEURAL at 09:34

## 2018-07-22 RX ADMIN — Medication 12.5 MG: at 17:44

## 2018-07-22 RX ADMIN — IPRATROPIUM BROMIDE AND ALBUTEROL SULFATE 3 ML: .5; 3 SOLUTION RESPIRATORY (INHALATION) at 19:31

## 2018-07-22 RX ADMIN — LIDOCAINE HYDROCHLORIDE 10 MEQ: 10 INJECTION, SOLUTION EPIDURAL; INFILTRATION; INTRACAUDAL; PERINEURAL at 13:37

## 2018-07-22 RX ADMIN — LORAZEPAM 1 MG: 2 INJECTION INTRAMUSCULAR; INTRAVENOUS at 03:48

## 2018-07-22 RX ADMIN — FLUTICASONE FUROATE 1 PUFF: 100 POWDER RESPIRATORY (INHALATION) at 19:38

## 2018-07-22 ASSESSMENT — PAIN DESCRIPTION - DESCRIPTORS: DESCRIPTORS: HEADACHE

## 2018-07-22 ASSESSMENT — ACTIVITIES OF DAILY LIVING (ADL)
ADLS_ACUITY_SCORE: 27

## 2018-07-22 NOTE — IP AVS SNAPSHOT
` `     [place] [address] MED[facility] SURGICAL: [phone]            Medication Administration Report for Ana Luisa Lee as of 07/25/18 1501   Legend:    Given Hold Not Given Due Canceled Entry Other Actions    Time Time (Time) Time  Time-Action       Inactive    Active    Linked        Medications 07/19/18 07/20/18 07/21/18 07/22/18 07/23/18 07/24/18 07/25/18    acetaminophen (TYLENOL) Suppository 650 mg  Dose: 650 mg  Freq: EVERY 4 HOURS PRN Route: RE  PRN Reason: mild pain  Start: 07/22/18 0608   Admin Instructions: Alternate ibuprofen (if ordered) with acetaminophen.  Maximum acetaminophen dose from all sources = 75 mg/kg/day not to exceed 4 grams/day.    Admin. Amount: 1 suppository (1 × 650 mg suppository)  Dispense Loc:  [facility]               acetaminophen (TYLENOL) tablet 1,000 mg  Dose: 1,000 mg  Freq: 3 TIMES DAILY Route: PO  Start: 07/22/18 0900   Admin Instructions: Maximum acetaminophen dose from all sources = 75 mg/kg/day not to exceed 4 gram    Admin. Amount: 2 tablet (2 × 500 mg tablet)  Last Admin: 07/25/18 0828  Dispense Loc:  [facility]        (0923)-Not Given       1543 (1,000 mg)-Given       (2227)-Not Given        1010 (1,000 mg)-Given       1620 (1,000 mg)-Given       2159 (1,000 mg)-Given        0837 (1,000 mg)-Given       1542 (1,000 mg)-Given       2219 (1,000 mg)-Given        0828 (1,000 mg)-Given       [ ] 1600       [ ] 2200           acetaminophen (TYLENOL) tablet 650 mg  Dose: 650 mg  Freq: EVERY 4 HOURS PRN Route: PO  PRN Reason: mild pain  Start: 07/22/18 0608   Admin Instructions: Alternate ibuprofen (if ordered) with acetaminophen.  Maximum acetaminophen dose from all sources = 75 mg/kg/day not to exceed 4 grams/day.    Admin. Amount: 2 tablet (2 × 325 mg tablet)  Dispense Loc: [facility]               albuterol (PROAIR HFA/PROVENTIL HFA/VENTOLIN HFA) Inhaler 2 puff  Dose: 2 puff  Freq: EVERY 6 HOURS PRN Route: IN  PRN Reasons: wheezing,shortness of breath /  dyspnea  Start: 07/22/18 0608   Admin. Amount: 2 puff  Last Admin: 07/25/18 0107  Dispense Loc:  Main Pharmacy           0107 (2 puff)-Given by Patient/Family           albuterol neb solution 2.5 mg  Dose: 2.5 mg  Freq: EVERY 2 HOURS PRN Route: NEBULIZATION  PRN Reason: wheezing  Start: 07/22/18 1717   Admin. Amount: 2.5 mg = 3 mL Conc: 2.5 mg/3 mL  Dispense Loc:  [facility]  Volume: 3 mL               DULoxetine (CYMBALTA) EC capsule 30 mg  Dose: 30 mg  Freq: DAILY Route: PO  Start: 07/23/18 0900   Admin. Amount: 1 capsule (1 × 30 mg capsule)  Last Admin: 07/25/18 0828  Dispense Loc:  [facility]         1012 (30 mg)-Given        0838 (30 mg)-Given        0828 (30 mg)-Given           enalaprilat (VASOTEC) injection 1.25 mg  Dose: 1.25 mg  Freq: EVERY 6 HOURS PRN Route: IV  PRN Reason: high blood pressure  PRN Comment: Systolic blood pressure greater than 180  Start: 07/23/18 0805   Admin Instructions: For ordered doses up to 1.25 mg, give IV Push undiluted over 5 minutes.    Admin. Amount: 1.25 mg = 1 mL Conc: 1.25 mg/mL  Dispense Loc:  [facility]  Infused Over: 5 Minutes  Volume: 1 mL               fluticasone (FLONASE) 50 MCG/ACT spray 1 spray  Dose: 1 spray  Freq: DAILY Route: BOTH NOSTRIL  Start: 07/22/18 2000   Admin Instructions: Start when patient is able    Admin. Amount: 1 spray  Last Admin: 07/24/18 2219  Dispense Loc:  Main Pharmacy        (2007)-Not Given                2219 (1 spray)-Given        [ ] 2000           fluticasone furoate (ARNUITY ELLIPTA) 100 MCG/ACT inhalation powder 1 puff  Dose: 1 puff  Freq: DAILY Route: IN  Start: 07/22/18 2000   Admin Instructions: Start inhaler when patient is able.<br>Autosub  Arnuity for Pulmicort    Admin. Amount: 1 puff  Last Admin: 07/25/18 0723  Dispense Loc:  Main Pharmacy        1938 (1 puff)-Given        0738 (1 puff)-Given       (1938)-Not Given [C]        0828 (1 puff)-Given        0723 (1 puff)-Given           haloperidol lactate (HALDOL) injection  2 mg  Dose: 2 mg  Freq: EVERY 6 HOURS PRN Route: IV  PRN Reason: agitation  Start: 07/22/18 1143   Admin Instructions: For ordered doses up to 5 mg, drug can be give IV Push undiluted over 1 minute.    Admin. Amount: 2 mg = 0.4 mL Conc: 5 mg/mL  Dispense Loc: [facility]  Infused Over: 1 Minutes  Volume: 1 mL               ipratropium - albuterol 0.5 mg/2.5 mg/3 mL (DUONEB) neb solution 3 mL  Dose: 3 mL  Freq: EVERY 4 HOURS PRN Route: NEBULIZATION  PRN Reason: wheezing  Start: 07/23/18 0815   Admin. Amount: 3 mL  Dispense Loc: [facility]  Volume: 3 mL               levalbuterol (XOPENEX) neb solution 1.25 mg  Dose: 1 ampule  Freq: 4 TIMES DAILY Route: NEBULIZATION  Start: 07/23/18 0900   Order specific questions:  Levalbuterol orders will be substituted to albuterol unless intolerance is documented here Tachycardia     Admin. Amount: 1.25 mg = 3 mL Conc: 1.25 mg/3 mL  Last Admin: 07/25/18 1115  Dispense Loc: [facility]  Volume: 3 mL                1124 (1.25 mg)-Given       1601 (1.25 mg)-Given       2138 (1.25 mg)-Given        0828 (1.25 mg)-Given       1217 (1.25 mg)-Given       1550 (1.25 mg)-Given       2153 (1.25 mg)-Given        0723 (1.25 mg)-Given       1115 (1.25 mg)-Given       [ ] 1600       [ ] 2000           levothyroxine (SYNTHROID/LEVOTHROID) tablet 25 mcg  Dose: 25 mcg  Freq: DAILY Route: PO  Start: 07/22/18 0900   Admin Instructions: Separate oral administration of iron- or calcium-containing products and levothyroxine by at least 4 hours.    Admin. Amount: 1 tablet (1 × 25 mcg tablet)  Last Admin: 07/25/18 0828  Dispense Loc: [facility]        (0923)-Not Given        1013 (25 mcg)-Given        0837 (25 mcg)-Given        0828 (25 mcg)-Given           lisinopril (PRINIVIL/ZESTRIL) tablet 10 mg  Dose: 10 mg  Freq: DAILY Route: PO  Start: 07/23/18 0900   Admin Instructions: Hold for SBP < 100    Admin. Amount: 1 tablet (1 × 10 mg tablet)  Last Admin: 07/25/18 0828  Dispense Loc:  [facility]          1011 (10 mg)-Given        0836 (10 mg)-Given        0828 (10 mg)-Given           magnesium sulfate 2 g in water intermittent infusion  Dose: 2 g  Freq: DAILY PRN Route: IV  PRN Reason: magnesium supplementation  Last Dose: 2 g (07/24/18 0842)  Start: 07/22/18 0608   Admin Instructions: For Serum Mg++ 1.6 - 2 mg/dL  Give 2 g and recheck magnesium level next AM.    Admin. Amount: 2 g = 50 mL Conc: 0.04 g/mL  Last Admin: 07/24/18 0842  Dispense Loc:  Main Pharmacy  Infused Over: 60 Minutes  Volume: 50 mL   Current Line: Peripheral IV 07/22/18 Left Hand         0842 (2 g)-New Bag            magnesium sulfate 4 g in 100 mL sterile water (premade)  Dose: 4 g  Freq: EVERY 4 HOURS PRN Route: IV  PRN Reason: magnesium supplementation  Start: 07/22/18 0608   Admin Instructions: For serum Mg++ less than 1.6 mg/dL  Give 4 g and recheck magnesium level 2 hours after dose, and next AM.    Admin. Amount: 4 g = 100 mL Conc: 4 g/100 mL  Dispense Loc:  Main Pharmacy  Infused Over: 120 Minutes  Volume: 100 mL               melatonin tablet 1 mg  Dose: 1 mg  Freq: AT BEDTIME PRN Route: PO  PRN Reason: sleep  Start: 07/22/18 0608   Admin Instructions: Do not give unless at least 6 hours of uninterrupted sleep is expected.    Admin. Amount: 1 tablet (1 × 1 mg tablet)  Dispense Loc:  [facility]               montelukast (SINGULAIR) tablet 10 mg  Dose: 10 mg  Freq: AT BEDTIME Route: PO  Start: 07/23/18 2200   Admin. Amount: 1 tablet (1 × 10 mg tablet)  Last Admin: 07/24/18 2219  Dispense Loc:  [facility]         2159 (10 mg)-Given        2219 (10 mg)-Given        [ ] 2200           multivitamin, therapeutic with minerals (THERA-VIT-M) tablet 1 tablet  Dose: 1 tablet  Freq: DAILY Route: PO  Start: 07/24/18 0900   Admin. Amount: 1 tablet  Last Admin: 07/25/18 0828  Dispense Loc:  [facility]          0836 (1 tablet)-Given        0828 (1 tablet)-Given           naloxone (NARCAN) injection 0.1-0.4 mg  Dose: 0.1-0.4 mg  Freq: EVERY 2 MIN PRN  Route: IV  PRN Reason: opioid reversal  Start: 07/22/18 0608   Admin Instructions: For respiratory rate LESS than or EQUAL to 8.  Partial reversal dose:  0.1 mg titrated q 2 minutes for Analgesia Side Effects Monitoring Sedation Level of 3 (frequently drowsy, arousable, drifts to sleep during conversation).Full reversal dose:  0.4 mg bolus for Analgesia Side Effects Monitoring Sedation Level of 4 (somnolent, minimal or no response to stimulation).  For ordered doses up to 2mg give IVP. Give each 0.4mg over 15 seconds in emergency situations. For non-emergent situations further dilute in 9mL of NS to facilitate titration of response.    Admin. Amount: 0.1-0.4 mg = 0.25-1 mL Conc: 0.4 mg/mL  Dispense Loc: [facility]  Volume: 1 mL               nitroGLYcerin (NITROSTAT) sublingual tablet 0.4 mg  Dose: 0.4 mg  Freq: EVERY 2 HOURS PRN Route: SL  PRN Reasons: chest pain,other  PRN Comment: Blood pressure greater than 180 systolic  Start: 07/23/18 0804   Admin. Amount: 1 tablet (1 × 0.4 mg tablet)  Dispense Loc: [facility]               omeprazole (priLOSEC) CR capsule 40 mg  Dose: 40 mg  Freq: DAILY Route: PO  Start: 07/22/18 0900   Admin. Amount: 2 capsule (2 × 20 mg capsule)  Last Admin: 07/25/18 0828  Dispense Loc: [facility]        (0924)-Not Given        1007 (40 mg)-Given        0837 (40 mg)-Given        0828 (40 mg)-Given           ondansetron (ZOFRAN-ODT) ODT tab 4 mg  Dose: 4 mg  Freq: EVERY 6 HOURS PRN Route: PO  PRN Reasons: nausea,vomiting  Start: 07/22/18 0608   Admin Instructions: This is Step 1 of nausea and vomiting management.  If nausea not resolved in 15 minutes, go to Step 2 prochlorperazine (COMPAZINE). Do not push through foil backing. Peel back foil and gently remove. Place on tongue immediately. Administration with liquid unnecessary  With dry hands, peel back foil backing and gently remove tablet; do not push oral disintegrating tablet through foil backing; administer immediately on tongue and  oral disintegrating tablet dissolves in seconds; then swallow with saliva; liquid not required.    Admin. Amount: 1 tablet (1 × 4 mg tablet)  Last Admin: 07/23/18 0522  Dispense Loc: [facility]         0522 (4 mg)-Given            Or  ondansetron (ZOFRAN) injection 4 mg  Dose: 4 mg  Freq: EVERY 6 HOURS PRN Route: IV  PRN Reasons: nausea,vomiting  Start: 07/22/18 0608   Admin Instructions: This is Step 1 of nausea and vomiting management.  If nausea not resolved in 15 minutes, go to Step 2 prochlorperazine (COMPAZINE).  Irritant. For ordered doses up to 4 mg, give IV Push undiluted over 2-5 minutes.    Admin. Amount: 4 mg = 2 mL Conc: 4 mg/2 mL  Dispense Loc: [facility]  Infused Over: 2-5 Minutes  Volume: 2 mL                      polyethylene glycol (MIRALAX/GLYCOLAX) Packet 17 g  Dose: 17 g  Freq: DAILY Route: PO  Start: 07/23/18 0900   Admin Instructions: 1 Packet = 17 grams. Mixed prescribed dose in 8 ounces of water. Follow with 8 oz. of water.    Admin. Amount: 17 g  Last Admin: 07/25/18 0828  Dispense Loc: [facility]         1005 (17 g)-Given        0840 (17 g)-Given        0828 (17 g)-Given           polyethylene glycol (MIRALAX/GLYCOLAX) Packet 17 g  Dose: 17 g  Freq: DAILY PRN Route: PO  PRN Reason: constipation  Start: 07/22/18 0608   Admin Instructions: Give in 8oz of  water, juice, or soda. Hold for loose stools.  This is the second step of a three step constipation treatment.  1 Packet = 17 grams. Mixed prescribed dose in 8 ounces of water. Follow with 8 oz. of water.    Admin. Amount: 17 g  Dispense Loc: [facility]               potassium chloride (KLOR-CON) Packet 20-40 mEq  Dose: 20-40 mEq  Freq: EVERY 2 HOURS PRN Route: ORAL OR FEED  PRN Reason: potassium supplementation  Start: 07/23/18 0909   Admin Instructions: Use if unable to tolerate tablets.  If Serum K+ 3.0-3.3, dose = 60 mEq po total dose (40 mEq x1 followed in 2 hours by 20 mEq x1). Recheck K+ level 4 hours after dose and the next  AM.  If Serum K+ 2.5-2.9, dose = 80 mEq po total dose (40 mEq Q2H x2). Recheck K+ level 4 hours after dose and the next AM.  If Serum K+ less than 2.5, See IV order.  Dissolve packet contents in 4-8 ounces of cold water or juice.    Admin. Amount: 20-40 mEq  Dispense Loc: [facility]               potassium chloride 10 mEq in 100 mL intermittent infusion with 10 mg lidocaine  Dose: 10 mEq  Freq: EVERY 1 HOUR PRN Route: IV  PRN Reason: potassium supplementation  Start: 07/23/18 0909   Admin Instructions: Infuse via PERIPHERAL LINE. Use potassium with lidocaine for pain with peripheral administration.  If Serum K+ 3.0-3.3, dose = 10 mEq/hr x4 doses (40 mEq IV total dose). Recheck K+ level 2 hours after dose and the next AM.  If Serum K+ less than 3.0, dose = 10 mEq/hr x6 doses (60 mEq IV total dose). Recheck K+ level 2 hours after dose and the next AM.    Admin. Amount: 10 mEq = 100 mL Conc: 10 mEq/100 mL  Dispense Loc:  Main Pharmacy  Infused Over: 1 Hours  Volume: 100 mL               potassium chloride 10 mEq in 100 mL sterile water intermittent infusion (premix)  Dose: 10 mEq  Freq: EVERY 1 HOUR PRN Route: IV  PRN Reason: potassium supplementation  Start: 07/23/18 0909   Admin Instructions: Infuse via PERIPHERAL LINE or CENTRAL LINE. Use for central line replacement if patient weight less than 65 kg, if patient is on TPN with high potassium content or if unit does not stock 20 mEq bags.   If Serum K+ 3.0-3.3, dose = 10 mEq/hr x4 doses (40 mEq IV total dose). Recheck K+ level 2 hours after dose and the next AM.   If Serum K+ less than 3.0, dose = 10 mEq/hr x6 doses (60 mEq IV total dose). Recheck K+ level 2 hours after dose and the next AM.    Admin. Amount: 10 mEq = 100 mL Conc: 10 mEq/100 mL  Dispense Loc:  Main Pharmacy  Infused Over: 60 Minutes  Volume: 100 mL               potassium chloride 20 mEq in 50 mL intermittent infusion  Dose: 20 mEq  Freq: EVERY 1 HOUR PRN Route: IV  PRN Reason: potassium  supplementation  Start: 07/23/18 0909   Admin Instructions: Infuse via CENTRAL LINE Only. May need EKG if less than 65 kg or on TPN - Max rate is 0.3 mEq/kg/hr for patients not on EKG monitoring.   If Serum K+ 3.0-3.3, dose = 20 mEq/hr x2 doses (40 mEq IV total dose). Recheck K+ level 2 hours after dose and the next AM.  If Serum K+ less than 3.0, dose = 20 mEq/hr x3 doses (60 mEq IV total dose). Recheck K+ level 2 hours after dose and the next AM.    Admin. Amount: 20 mEq = 50 mL Conc: 20 mEq/50 mL  Dispense Loc:  Main Pharmacy  Infused Over: 2 Hours  Volume: 50 mL               potassium chloride SA (K-DUR/KLOR-CON M) CR tablet 20-40 mEq  Dose: 20-40 mEq  Freq: EVERY 2 HOURS PRN Route: PO  PRN Reason: potassium supplementation  Start: 07/23/18 0909   Admin Instructions: Use if able to take PO.   If Serum K+ 3.0-3.3, dose = 60 mEq po total dose (40 mEq x1 followed in 2 hours by 20 mEq x1). Recheck K+ level 4 hours after dose and the next AM.  If Serum K+ 2.5-2.9, dose = 80 mEq po total dose (40 mEq Q2H x2). Recheck K+ level 4 hours after dose and the next AM.  If Serum K+ less than 2.5, See IV order.  DO NOT CRUSH    Admin. Amount: 1-2 tablet (1-2 × 20 mEq tablet)  Last Admin: 07/24/18 1044  Dispense Loc:  ADS MS5E          0838 (40 mEq)-Given       1044 (20 mEq)-Given [C]            predniSONE (DELTASONE) tablet 10 mg  Dose: 10 mg  Freq: DAILY Route: PO  Start: 07/22/18 0900   Admin. Amount: 1 tablet (1 × 10 mg tablet)  Last Admin: 07/25/18 0828  Dispense Loc:  [facility]        (0923)-Not Given        1013 (10 mg)-Given        0837 (10 mg)-Given        0828 (10 mg)-Given           prochlorperazine (COMPAZINE) injection 5 mg  Dose: 5 mg  Freq: EVERY 6 HOURS PRN Route: IV  PRN Reasons: nausea,vomiting  Start: 07/22/18 0608   Admin Instructions: This is Step 2 of nausea and vomiting management. Give if nausea not resolved 15 minutes after giving ondansetron (ZOFRAN). If nausea not resolved in 15 minutes, go to  Step 3 metoclopramide (REGLAN), if ordered.  For ordered doses up to 10 mg, give IV Push undiluted. Each 5mg over 1 minute.    Admin. Amount: 5 mg = 1 mL Conc: 5 mg/mL  Dispense Loc: [facility]  Infused Over: 1-2 Minutes  Volume: 1 mL              Or  prochlorperazine (COMPAZINE) tablet 5 mg  Dose: 5 mg  Freq: EVERY 6 HOURS PRN Route: PO  PRN Reason: vomiting  Start: 07/22/18 0608   Admin Instructions: This is Step 2 of nausea and vomiting management. Give if nausea not resolved 15 minutes after giving ondansetron (ZOFRAN). If nausea not resolved in 15 minutes, go to Step 3 metoclopramide (REGLAN), if ordered.    Admin. Amount: 1 tablet (1 × 5 mg tablet)  Dispense Loc: [facility]              Or  prochlorperazine (COMPAZINE) Suppository 12.5 mg  Dose: 12.5 mg  Freq: EVERY 12 HOURS PRN Route: RE  PRN Reasons: nausea,vomiting  Start: 07/22/18 0608   Admin Instructions: This is Step 2 of nausea and vomiting management. Give if nausea not resolved 15 minutes after giving ondansetron (ZOFRAN). If nausea not resolved in 15 minutes, go to Step 3 metoclopramide (REGLAN), if ordered.    Admin. Amount: 0.5 suppository (0.5 × 25 mg suppository)  Dispense Loc: [facility] MS[facility]               QUEtiapine (SEROquel) half-tab 12.5 mg  Dose: 12.5 mg  Freq: 3 TIMES DAILY PRN Route: PO  PRN Comment: anxiety, delerium  Start: 07/22/18 0608   Admin. Amount: 1 half-tab (1 × 12.5 mg half-tab)  Last Admin: 07/22/18 1744  Dispense Loc: [facility] MS[facility]        1744 (12.5 mg)-Given              roflumilast (DALIRESP) tablet 500 mcg  Dose: 500 mcg  Freq: DAILY Route: PO  Start: 07/23/18 0900   Admin. Amount: 1 tablet (1 × 500 mcg tablet)  Last Admin: 07/25/18 0828  Dispense Loc: [facility] MS[facility]         (1201)-Not Given [C]        0838 (500 mcg)-Given        0828 (500 mcg)-Given           senna-docusate (SENOKOT-S;PERICOLACE) 8.6-50 MG per tablet 1 tablet  Dose: 1 tablet  Freq: 2 TIMES DAILY PRN Route: PO  PRN Reason: constipation  Start: 07/22/18 0608    Admin Instructions: If no bowel movement in 24 hours, increase to 2 tablets PO.  Hold for loose stools.  This is the first step of a three step constipation treatment.    Admin. Amount: 1 tablet  Dispense Loc:  ADS MS5E              Or  senna-docusate (SENOKOT-S;PERICOLACE) 8.6-50 MG per tablet 2 tablet  Dose: 2 tablet  Freq: 2 TIMES DAILY PRN Route: PO  PRN Reason: constipation  Start: 18 0608   Admin Instructions: Hold for loose stools.  This is the first step of a three step constipation treatment.    Admin. Amount: 2 tablet  Dispense Loc:  ADS MS5E               sodium chloride (PF) 0.9% PF flush 3 mL  Dose: 3 mL  Freq: EVERY 1 HOUR PRN Route: IK  PRN Reason: line flush  PRN Comment: for peripheral IV flush post IV meds  Start: 18 0250   Admin. Amount: 3 mL  Last Admin: 18 0756  Dispense Loc: [facility] Floor Stock  Volume: 4 mL        (748)-Not Given       0756 (3 mL)-Given              traMADol (ULTRAM) tablet 50 mg  Dose: 50 mg  Freq: DAILY PRN Route: PO  PRN Reason: severe pain  Start: 18 0738   Admin. Amount: 1 tablet (1 × 50 mg tablet)  Dispense Loc:  ADS MS5E               umeclidinium (INCRUSE ELLIPTA) 62.5 MCG/INH oral inhaler 1 puff  Dose: 1 puff  Freq: DAILY Route: IN  Start: 18 0739   Admin Instructions: Start when able to do - will do duonebs until able    Admin. Amount: 1 puff  Last Admin: 18 0723  Dispense Loc:  Main Pharmacy         0739 (1 puff)-Given        0828 (1 puff)-Given        0723 (1 puff)-Given          Completed Medications  Medications 18         Dose: 500 mL  Freq: ONCE Route: IV  Last Dose: 500 mL (18 1640)  Start: 18 1645   End: 18 174   Admin. Amount: 500 mL  Last Admin: 18 1640  Dispense Loc: [facility] Floor Stock  Infused Over: 1 Hours  Administrations Remainin  Volume: 500 mL   Current Line: Peripheral IV 18 Left Hand        1640 (500 mL)-New Bag                Dose: 500 mL  Freq: ONCE Route: IV  Last Dose: 500 mL (18 1222)  Start: 18 1215   End: 18 1322   Admin. Amount: 500 mL  Last Admin: 18 1222  Dispense Loc: [facility] Floor Stock  Infused Over: 1 Hours  Administrations Remainin  Volume: 500 mL         1222 (500 mL)-New Bag            Discontinued Medications  Medications 18         Rate: 75 mL/hr   Freq: CONTINUOUS Route: IV  Start: 18 0615   End: 18 0735   Last Admin: 18 07  Dispense Loc: [facility]  Volume: 1,000 mL        0655 ( )-New Bag       0747 ( )-Rate/Dose Verify       1453 ( )-Rate/Dose Change       1551 ( )-Rate/Dose Verify        0119 ( )-New Bag       0721 ( )-Rate/Dose Verify       0735-Med Discontinued           Rate: 75 mL/hr   Freq: CONTINUOUS Route: IV  Start: 18 0745   End: 18 1413   Last Admin: 18 1357  Dispense Loc: [facility] Floor Stock  Volume: 1,000 mL         0748 ( )-New Bag       1357 ( )-Rate/Dose Change       1413-Med Discontinued           Dose: 10 mg  Freq: EVERY 4 HOURS PRN Route: IV  PRN Reason: high blood pressure  PRN Comment: give for SBP > 170  Start: 18   End: 18 08   Admin Instructions: For ordered doses up to 40 mg, give IV Push undiluted over 1 minute.    Admin. Amount: 10 mg = 0.5 mL Conc: 20 mg/mL  Last Admin: 18 031  Dispense Loc:  ADS MS5E  Volume: 1 mL        0753 (10 mg)-Given        0317 (10 mg)-Given       0805-Med Discontinued           Dose: 3 mL  Freq: 4 TIMES DAILY Route: NEBULIZATION  Start: 18   End: 18   Admin. Amount: 3 mL  Last Admin: 1834  Dispense Loc:  ADS MS5E  Volume: 3 mL        1931 (3 mL)-Given        0734 (3 mL)-Given       0806-Med Discontinued           Dose: 3 mL  Freq: EVERY 4 HOURS WHILE AWAKE Route: NEBULIZATION  Start: 18 1000   End: 18 1718   Admin. Amount: 3 mL  Last Admin: 18  1611  Dispense Loc:  ADS MS5E  Volume: 3 mL               1228 (3 mL)-Given       1611 (3 mL)-Given       1718-Med Discontinued            Dose: 20-40 mEq  Freq: EVERY 2 HOURS PRN Route: ORAL OR FEED  PRN Reason: potassium supplementation  Start: 07/22/18 0608   End: 07/23/18 0909   Admin Instructions: Use if unable to tolerate tablets.    If Serum K+ 3.4-4.0, dose = 20 mEq x1. Recheck K+ level the next AM.  If Serum K+ 3.0-3.3, dose = 60 mEq po total dose (40 mEq x 1 followed in 2 hours by 20 mEq X1). Recheck K+ level 4 hours after dose and the next AM.  If Serum K+ 2.5-2.9, dose = 80 mEq po total dose (40 mEq Q2H x2). Recheck K+ level 4 hours after dose and the next AM.  If Serum K+ less than 2.5, See IV order.  Dissolve packet contents in 4-8 ounces of cold water or juice.    Admin. Amount: 20-40 mEq  Dispense Loc:  ADS MS5E         0909-Med Discontinued           Dose: 10 mEq  Freq: EVERY 1 HOUR PRN Route: IV  PRN Reason: potassium supplementation  Last Dose: 10 mEq (07/22/18 1055)  Start: 07/22/18 0608   End: 07/23/18 0909   Admin Instructions: Infuse via PERIPHERAL LINE. Use potassium with lidocaine for pain with peripheral administration.  If Serum K+ 3.4-4.0, dose = 10 mEq/hr x2 doses. Recheck K+ level the next AM.  If Serum K+ 3.0-3.3, dose = 10 mEq/hr x4 doses (40 mEq IV total dose). Recheck K+ level 2 hours after dose and the next AM.  If Serum K+ less than 3.0, dose = 10 mEq/hr x6 doses (60 mEq IV total dose). Recheck K+ level 2 hours after dose and the next AM.    Admin. Amount: 10 mEq = 100 mL Conc: 10 mEq/100 mL  Last Admin: 07/22/18 1337  Dispense Loc:  Main Pharmacy  Infused Over: 1 Hours  Volume: 100 mL        0934 (10 mEq)-New Bag [C]       1055 (10 mEq)-New Bag       1230 (10 mEq)-New Bag       1337 (10 mEq)-New Bag        0909-Med Discontinued           Dose: 10 mEq  Freq: EVERY 1 HOUR PRN Route: IV  PRN Reason: potassium supplementation  Start: 07/22/18 0608   End: 07/23/18 0909   Admin  Instructions: Infuse via PERIPHERAL LINE or CENTRAL LINE. Use for central line replacement if patient weight less than 65 kg, if patient is on TPN with high potassium content or if unit does not stock 20 mEq bags.  If Serum K+ 3.4-4.0, dose = 10 mEq/hr x2 doses. Recheck K+ level the next AM.  If Serum K+ 3.0-3.3, dose = 10 mEq/hr x4 doses (40 mEq IV total dose). Recheck K+ level 2 hours after dose and the next AM.  If Serum K+ less than 3.0, dose = 10 mEq/hr x6 doses (60 mEq IV total dose). Recheck K+ level 2 hours after dose and the next AM.    Admin. Amount: 10 mEq = 100 mL Conc: 10 mEq/100 mL  Dispense Loc:  Main Pharmacy  Infused Over: 60 Minutes  Volume: 100 mL         0909-Med Discontinued           Dose: 20-40 mEq  Freq: EVERY 2 HOURS PRN Route: PO  PRN Reason: potassium supplementation  Start: 07/22/18 0608   End: 07/23/18 0909   Admin Instructions: Use if able to take PO.   If Serum K+ 3.4-4.0, dose = 20 mEq x1. Recheck K+ level the next AM.  If Serum K+ 3.0-3.3, dose = 60 mEq po total dose (40 mEq x1 followed in 2 hours by 20 mEq x1). Recheck K+ level 4 hours after dose and the next AM.  If Serum K+ 2.5-2.9, dose = 80 mEq po total dose (40 mEq Q2H x2). Recheck K+ level 4 hours after dose and the next AM.  If Serum K+ less than 2.5, See IV order.  DO NOT CRUSH    Admin. Amount: 1-2 tablet (1-2 × 20 mEq tablet)  Dispense Loc:  [facility] MS5E         0909-Med Discontinued           Rate: 75 mL/hr   Freq: CONTINUOUS Route: IV  Start: 07/23/18 1415   End: 07/25/18 0947   Last Admin: 07/25/18 0837  Dispense Loc: [facility] Floor Stock  Volume: 1,000 mL   Current Line: Peripheral IV 07/22/18 Left Hand        1427 ( )-Rate/Dose Change       1621 ( )-Rate/Dose Verify        0502 ( )-New Bag       1934 ( )-New Bag        0837 ( )-New Bag       0947-Med Discontinued         Rate: 100 mL/hr   Freq: CONTINUOUS Route: IV  Start: 07/23/18 0745   End: 07/23/18 0736         0736-Med Discontinued      Medications 07/19/18 07/20/18  07/21/18 07/22/18 07/23/18 07/24/18 07/25/18

## 2018-07-22 NOTE — H&P
Admitted:     07/22/2018      CHIEF COMPLAINT:  Fall, confusion.      HISTORY OF PRESENT ILLNESS:  History is limited due to the patient's change in mental status, not giving any information and sleeping, and obtained from ED physician, Dr. Mercer.  Ms. Ana Luisa Lee is a 78-year-old female with a past medical history significant for hypertension, chronic obstructive pulmonary disease, dyslipidemia, hypothyroidism who recently had accidental fall in the hospital, discharged on 06/19/2018.  Today came back brought in by ambulance to the emergency room for unwitnessed fall.  The patient lives in an assisted living facility.  Staff found the patient on the floor with change in mental status and complaining of left hip pain.  This happened in less than an hour after the patient was left alone.  The staff was with the patient 1 hour prior to that.  The patient was on the floor confused, complaining of hip pain.  As of yesterday patient was alert and oriented.  En route to the emergency room, the patient was placed in C-collar.  She was hypertensive and tachycardic.  Systolic blood pressure was 190.  Blood glucose was 140 on arrival.  In the emergency room, she remained confused and does not answer questions.  Later on patient was not following command or instruction and Ativan 1 mg was given prior to doing imaging studies.  At this time, the patient is sleeping.      PAST MEDICAL HISTORY:   1.  Chronic obstructive pulmonary disease, currently on a steroid 10 mg.   2.  Hypertension.   3.  Paroxysmal SVT and PVCs.   4.  Hypothyroidism.   5.  Pamela finger deformity secondary to osteoarthritis.   6.  Esophageal stricture.   7.  Chronic anemia.   8.  Recent fall with closed head injury.   9.  Urinary retention.      PAST SURGICAL HISTORY:   1.  Bunionectomy.   2.  Hysterectomy.   3.  Tonsillectomy.   4.  Joint surgery.      FAMILY HISTORY:  Significant for history of CVA and hypertension upon chart review, noncontributory  to her current condition.      SOCIAL HISTORY:  The patient was brought by ambulance.  She is a former smoker.  She does not drink alcohol.  She lives in an assisted living facility and uses a walker.      REVIEW OF SYSTEMS:  Unable to review as patient was not giving any history.      HOME MEDICATIONS:  Needs to be reviewed and reconciled by pharmacy.   Prior to Admission Medications   Prescriptions Last Dose Informant Patient Reported? Taking?   Multiple Vitamins-Minerals (MULTIVITAMIN OR) Unknown at Unknown time custodial Yes No   Sig: Take 1 tablet by mouth daily   acetaminophen (TYLENOL) 500 MG tablet Unknown at Unknown time Nursing Home No No   Sig: Take 2 tablets (1,000 mg) by mouth 3 times daily   budesonide (PULMICORT FLEXHALER) 180 MCG/ACT inhaler Unknown at Unknown time Nursing Home No No   Sig: Inhale 1 puff into the lungs 2 times daily   calcium 600 MG tablet Unknown at Unknown time Nursing Home No No   Sig: Take 1 tablet (600 mg) by mouth daily   cholecalciferol (VITAMIN D) 1000 UNIT tablet Unknown at Unknown time Nursing Home No No   Sig: Take 1 tablet (1,000 Units) by mouth daily   cyanocobalamin (B-12 TR) 1000 MCG TBCR Unknown at Unknown time Nursing Home No No   Sig: Take 1 tablet by mouth daily   ferrous sulfate (IRON) 325 (65 Fe) MG tablet Unknown at Unknown time custodial Yes No   Sig: Take 325 mg by mouth three times a week Give on M-W-F AM only   gemfibrozil (LOPID) 600 MG tablet Unknown at Unknown time Nursing Home No No   Sig: Take 1 tablet (600 mg) by mouth daily   levalbuterol (XOPENEX) 1.25 MG/3ML neb solution Unknown at Unknown time custodial Yes No   Sig: Take 1 ampule by nebulization 4 times daily   levothyroxine (SYNTHROID/LEVOTHROID) 25 MCG tablet Unknown at Unknown time Nursing Home No No   Sig: Take 1 tablet (25 mcg) by mouth daily   loperamide (IMODIUM) 2 MG capsule Unknown at Unknown time custodial Yes No   Sig: Take 2 mg by mouth every 8 hours as needed for diarrhea    montelukast (SINGULAIR) 10 MG tablet Unknown at Unknown time Nursing Home No No   Sig: Take 1 tablet (10 mg) by mouth At Bedtime   omeprazole (PRILOSEC) 40 MG capsule Unknown at Unknown time Nursing Home No No   Sig: Take 1 capsule (40 mg) by mouth daily Take 30-60 minutes before a meal.   Patient taking differently: Take 20 mg by mouth daily Take 30-60 minutes before a meal.   polyethylene glycol (MIRALAX/GLYCOLAX) Packet Unknown at Unknown time [facility] Yes No   Sig: Take 17 g by mouth daily as needed for constipation   predniSONE (DELTASONE) 10 MG tablet Unknown at Unknown time [facility] Yes No   Sig: Take 10 mg by mouth daily   roflumilast (DALIRESP) 500 MCG TABS tablet Unknown at Unknown time [facility] Yes No   Sig: Take 500 mcg by mouth daily    traMADol (ULTRAM) 50 MG tablet Unknown at Unknown time [facility] Yes No   Sig: Take 50 mg by mouth daily as needed for severe pain   umeclidinium (INCRUSE ELLIPTA) 62.5 MCG/INH oral inhaler Unknown at Unknown time [facility] Yes No   Sig: Inhale 1 puff into the lungs daily      Facility-Administered Medications: None        PHYSICAL EXAMINATION:   GENERAL:  The patient is sleeping.  Responds to painful stimuli, but does not give any history.  She is hard of hearing and her hearing aide at bedside.   VITAL SIGNS:  Blood pressure 115/71, pulse rate 113, temperature 97.5, oxygen saturation 98%.   HEENT:  Pink, nonicteric.  Extraocular muscle movement intact.   NECK:  Supple, no JVD.   CHEST:  Good air entry bilaterally, wheezing with prolonged expiratory phase is noted.   CARDIOVASCULAR:  S1, S2 were heard.  No gallop or murmur.   ABDOMEN:  Soft, nontender, nondistended, positive bowel sounds.   EXTREMITIES:  Deformed fingers noted due to Neal and Ev's nodes, abrasion on the right anterolateral aspect of the leg with a skin  tear.  No edema.   PSYCHIATRIC:  No agitation.  Resting, does not give any history, has not opened her eyes.    NEUROLOGIC:  Sleeping, moves her extremities.  No focal deficits.  Cranial nerves not tested.  This patient is not following any command and sleeping.      DIAGNOSTIC TESTS OF INTEREST:  EKG showed normal sinus rhythm at 100 beats per minute, QTc 454 with specific ST-T wave changes.  Chest x-ray, no infiltrates.  A few noted densities noted previously and again on this x-ray.  CT of cervical spine, no acute findings.  Radiology, CT head and neck angio with and without contrast, indeterminate nodule right lung apex for mass.  CT chest is recommended.  Atherosclerotic narrowing of the internal carotid artery bilaterally.  There is 50% stenosis.  Thyroid nodule noted.  Left pelvic and hip x-ray, no acute findings.  Small nonspecific sclerotic focus in the right innominate bone, also present on prior pelvic CT.  Head CT, no acute abnormality.  There is mild to moderate atrophy and chronic changes.      LABORATORY:  Sodium 126, potassium 3.3, BUN 12, creatinine 0.5.  Liver function tests normal.  Lactic acid is 1.  Troponin less than 0.015.  Glucose 138.  VBG showed pH 7.43, pCO2 of 42, bicarbonate 26.  Urinalysis negative for infection.  Blood culture also done.  Alcohol negative.      ASSESSMENT:  Ana Luisa Lee is a 78-year-old female who was found on the floor for less than an hour, found to have change in mental status and skin tear on the right leg, and also found to have hypokalemia and hyponatremia, and being admitted to the hospital for observation.      1.  Acute encephalopathy, etiology not clear, likely metabolic, no focal infection identified.   2.  Unwitnessed fall.   3.  Right shin skin tear, abrasion.   4.  Hyponatremia.   5.  Hypokalemia.   6.  Chronic obstructive pulmonary disease.   7.  Hypertension.     8.  Incidental finding of lung nodule and thyroid nodule.      PLAN:  The patient is admitted under observation.  We will hydrate her with IV fluid and at 100 mL per hour.  Check BMP around  midday, replace electrolytes per protocol, continue most of her home medication when it is reconciled including her bronchodilators.  Blood pressure is stable.  Skin tear on the right shin was dressed for precaution.  PT, OT will evaluate the patient, monitor her mental status.  The patient got a dose of 1 mg IV Ativan in the emergency room prior to imaging studies and at this time she is sleeping, need to reevaluate her mental status and obtain further history.  At this point, the patient is not giving any.  Incidental lung nodule and thyroid nodule were found that need to be discussed with the patient and family members, and pursue further workup as an outpatient.  Patient does not appear to have any questions as she is sleeping.  No family member available at bedside.  , PT, OT consulted for discharge planning.         ZACHARY COOL MD             D: 2018   T: 2018   MT: CC      Name:     NAM DAS   MRN:      [id number]        Account:      [id number]   :      1940        Admitted:     2018                   Document: [id number]       cc: Jenny Alvarez MD

## 2018-07-22 NOTE — IP AVS SNAPSHOT
"          [place] [address] MEDICAL SURGICAL: [phone]                                              INTERAGENCY TRANSFER FORM - LAB / IMAGING / EKG / EMG RESULTS   2018                    Hospital Admission Date: 2018  NAM DAS   : 1940  Sex: Female        Attending Provider: Adrian Carvalho MD     Allergies:  Hydralazine, Penicillin G, Meloxicam, Metoprolol, Norvasc [Amlodipine Besylate]    Infection:  None   Service:  Observation    Ht:  1.575 m (5' 2.01\")   Wt:  52.2 kg (115 lb 1.6 oz)   Admission Wt:  52.2 kg (115 lb 1.6 oz)    BMI:  21.05 kg/m 2   BSA:  1.51 m 2            Patient PCP Information     Provider PCP Type    Db Alvarez MD General         Lab Results - 3 Days      Potassium [[id number]]  Resulted: 18 0842, Result status: Final result    Ordering provider: Adrian Carvalho MD  18 0000 Resulting lab: [facility]    Specimen Information    Type Source Collected On   Blood  18 0806          Components       Value Reference Range Flag Lab   Potassium 3.9 3.4 - 5.3 mmol/L  [facility]            Magnesium [[id number]]  Resulted: 18 0842, Result status: Final result    Ordering provider: Adrian Carvalho MD  18 0000 Resulting lab: [facility]    Specimen Information    Type Source Collected On   Blood  18 0806          Components       Value Reference Range Flag Lab   Magnesium 2.1 1.6 - 2.3 mg/dL  [facility]            Blood culture [[id number]]  Resulted: 18 0700, Result status: Preliminary result    Ordering provider: Damien Blanca MD  18 0252 Resulting lab: [facility]    Specimen Information    Type Source Collected On   Blood  18 0258   Comment:  Right Arm          Components       Value Reference Range Flag Lab   Specimen Description Blood Right Arm      Special Requests Aerobic and anaerobic bottles received   75   Culture " Micro No growth after 3 days   225            Blood culture ONE site [[id number]]  Resulted: 07/25/18 0700, Result status: Preliminary result    Ordering provider: Damien Blanca MD  07/22/18 0316 Resulting lab: [facility]    Specimen Information    Type Source Collected On   Blood  07/22/18 0400   Comment:  Left Arm          Components       Value Reference Range Flag Lab   Specimen Description Blood Left Arm      Special Requests Aerobic and anaerobic bottles received   75   Culture Micro No growth after 3 days   225            Potassium [[id number]]  Resulted: 07/24/18 1456, Result status: Final result    Ordering provider: Adrian Carvalho MD  07/24/18 1046 Resulting lab: [facility]    Specimen Information    Type Source Collected On   Blood  07/24/18 1437          Components       Value Reference Range Flag Lab   Potassium 3.8 3.4 - 5.3 mmol/L  [facility]d            Basic metabolic panel [[id number]] (Abnormal)  Resulted: 07/24/18 0655, Result status: Final result    Ordering provider: Adrian Carvalho MD  07/24/18 0000 Resulting lab: [facility]    Specimen Information    Type Source Collected On   Blood  07/24/18 0604          Components       Value Reference Range Flag Lab   Sodium 134 133 - 144 mmol/L  [facility]   Potassium 3.1 3.4 - 5.3 mmol/L L [facility]   Chloride 106 94 - 109 mmol/L  [facility]   Carbon Dioxide 20 20 - 32 mmol/L  [facility]   Anion Gap 8 3 - 14 mmol/L  [facility]   Glucose 99 70 - 99 mg/dL  [facility]   Urea Nitrogen 11 7 - 30 mg/dL  [facility]   Creatinine 0.54 0.52 - 1.04 mg/dL  [facility]   GFR Estimate >90 >60 mL/min/1.7m2  [facility]   Comment:  Non  GFR Calc   GFR Estimate If Black >90 >60 mL/min/1.7m2  FrRd   Comment:  African American GFR Calc   Calcium 8.2 8.5 - 10.1 mg/dL L [facility]            Magnesium [[id number]]  Resulted: 07/24/18 0655, Result status: Final result    Ordering provider: Adrian Carvalho  MD  07/24/18 0000 Resulting lab: [facility]    Specimen Information    Type Source Collected On   Blood  07/24/18 0604          Components       Value Reference Range Flag Lab   Magnesium 1.9 1.6 - 2.3 mg/dL  [facility]            Glucose by meter [[id number]] (Abnormal)  Resulted: 07/23/18 1259, Result status: Final result    Ordering provider: Adrian Carvalho MD  07/23/18 1218 Resulting lab: POINT OF CARE TEST, GLUCOSE    Specimen Information    Type Source Collected On     07/23/18 1218          Components       Value Reference Range Flag Lab   Glucose 190 70 - 99 mg/dL H 170            Urine Culture [[id number]]  Resulted: 07/23/18 0718, Result status: Final result    Ordering provider: Damien Blanca MD  07/22/18 0252 Resulting lab: [facility]    Specimen Information    Type Source Collected On   Catheterized Urine Urine catheter 07/22/18 0413          Components       Value Reference Range Flag Lab   Specimen Description Catheterized Urine      Special Requests Specimen received in preservative   75   Culture Micro No growth   225            Lactic acid level STAT for sepsis protocol [[id number]]  Resulted: 07/23/18 0522, Result status: Final result    Ordering provider: Adrian Carvalho MD  07/23/18 0447 Resulting lab: [facility]    Specimen Information    Type Source Collected On   Blood  07/23/18 0500          Components       Value Reference Range Flag Lab   Lactate for Sepsis Protocol 0.5 0.4 - 1.9 mmol/L  [facility]            Basic metabolic panel [[id number]] (Abnormal)  Resulted: 07/23/18 0521, Result status: Final result    Ordering provider: Ankit Maradiaga MD  07/23/18 0000 Resulting lab: [facility]    Specimen Information    Type Source Collected On   Blood  07/23/18 0500          Components       Value Reference Range Flag Lab   Sodium 131 133 - 144 mmol/L L [facility]   Potassium 3.6 3.4 - 5.3 mmol/L   [facility]   Chloride 101 94 - 109 mmol/L  [facility]   Carbon Dioxide 18 20 - 32 mmol/L L [facility]   Anion Gap 12 3 - 14 mmol/L  [facility]   Glucose 78 70 - 99 mg/dL  [facility]   Urea Nitrogen 9 7 - 30 mg/dL  [facility]   Creatinine 0.48 0.52 - 1.04 mg/dL L [facility]   GFR Estimate >90 >60 mL/min/1.7m2  [facility]   Comment:  Non  GFR Calc   GFR Estimate If Black >90 >60 mL/min/1.7m2  [facility]   Comment:  African American GFR Calc   Calcium 8.6 8.5 - 10.1 mg/dL  [facility]            Potassium [[id number]]  Resulted: 07/22/18 1631, Result status: Final result    Ordering provider: Adrian Carvalho MD  07/22/18 1340 Resulting lab: [facility]    Specimen Information    Type Source Collected On   Blood  07/22/18 1611          Components       Value Reference Range Flag Lab   Potassium 4.4 3.4 - 5.3 mmol/L  [facility]            Sodium [[id number]] (Abnormal)  Resulted: 07/22/18 1624, Result status: Final result    Ordering provider: Adrian Carvalho MD  07/22/18 1407 Resulting lab: [facility]    Specimen Information    Type Source Collected On   Blood  07/22/18 1611          Components       Value Reference Range Flag Lab   Sodium 131 133 - 144 mmol/L L [facility]            Basic metabolic panel [[id number]] (Abnormal)  Resulted: 07/22/18 1259, Result status: Final result    Ordering provider: Ankit Maradiaga MD  07/22/18 0608 Resulting lab: [facility]    Specimen Information    Type Source Collected On   Blood  07/22/18 1223          Components       Value Reference Range Flag Lab   Sodium 130 133 - 144 mmol/L L [facility]   Potassium 3.9 3.4 - 5.3 mmol/L  [facility]   Chloride 98 94 - 109 mmol/L  [facility]   Carbon Dioxide 23 20 - 32 mmol/L  [facility]   Anion Gap 9 3 - 14 mmol/L  [facility]   Glucose 88 70 - 99 mg/dL  [facility]   Urea Nitrogen 9 7 - 30 mg/dL  [facility]   Creatinine 0.47 0.52 - 1.04 mg/dL L [facility]   GFR Estimate >90 >60 mL/min/1.7m2  [facility]   Comment:  Non   GFR Calc   GFR Estimate If Black >90 >60 mL/min/1.7m2  Rd   Comment:  African American GFR Calc   Calcium 8.8 8.5 - 10.1 mg/dL  Rd            Cortisol [[id number]] (Abnormal)  Resulted: 07/22/18 1242, Result status: Final result    Ordering provider: Damien Blanca MD  07/22/18 0258 Resulting lab: [facility]    Specimen Information    Type Source Collected On     07/22/18 0258          Components       Value Reference Range Flag Lab   Cortisol Serum 30.6 4 - 22 ug/dL H 51   Comment:         8 AM Cortisol Reference Range = 4-22 ug/dL  4 PM Cortisol Reference Range = 3-17 ug/dL              TSH with free T4 reflex [[id number]]  Resulted: 07/22/18 1145, Result status: Final result    Ordering provider: Damien Blanca MD  07/22/18 0259 Resulting lab: [facility]    Specimen Information    Type Source Collected On     07/22/18 0259          Components       Value Reference Range Flag Lab   TSH 2.07 0.40 - 4.00 mU/L  FrRd            Blood culture [[id number]]  Resulted: 07/22/18 0720, Result status: Final result    Ordering provider: Damien Blanca MD  07/22/18 0252 Resulting lab: [facility]    Specimen Information    Type Source Collected On   Blood  07/22/18 0252          Components       Value Reference Range Flag Lab   Specimen Description Blood      Culture Micro --   [facility]   Result:         Canceled, Test credited  Duplicate request              Salicylate level [[id number]]  Resulted: 07/22/18 0453, Result status: Final result    Ordering provider: Damien Blanca MD  07/22/18 0258 Resulting lab: [facility]    Specimen Information    Type Source Collected On     07/22/18 0258          Components       Value Reference Range Flag Lab   Salicylate Level <2 mg/dL  [facility]   Comment:  Therapeutic:        <20  Anti inflammatory:  15-30              Blood gas venous and oxyhgb [[id number]]  Resulted: 07/22/18 0436, Result status: Final result     Ordering provider: Damien Blanca MD  07/22/18 0427 Resulting lab: [facility]    Specimen Information    Type Source Collected On   Blood  07/22/18 0431          Components       Value Reference Range Flag Lab   Ph Venous 7.40 7.32 - 7.43 pH  FrRdHs   PCO2 Venous 42 40 - 50 mm Hg  FrRdHs   PO2 Venous 46 25 - 47 mm Hg  Fr[facility]dHs   Bicarbonate Venous 26 21 - 28 mmol/L  Fr[facility]   FIO2 0   FrRdHs   Oxyhemoglobin Venous 78 %  FrRdHs   Base Excess Venous 1.0 mmol/L  Fr[facility]   Comment:  Reference range:  -7.7 to 1.9            UA with Microscopic [[id number]] (Abnormal)  Resulted: 07/22/18 0424, Result status: Final result    Ordering provider: Damien Blanca MD  07/22/18 0252 Resulting lab: [facility]    Specimen Information    Type Source Collected On   Catheterized Urine Urine catheter 07/22/18 0414          Components       Value Reference Range Flag Lab   Color Urine Straw   FrRdHs   Appearance Urine Clear   FrRdHs   Glucose Urine Negative NEG^Negative mg/dL  FrRdHs   Bilirubin Urine Negative NEG^Negative  FrRdHs   Ketones Urine Negative NEG^Negative mg/dL  Fr[facility]   Specific Gravity Urine 1.020 1.003 - 1.035  FrRdHs   Blood Urine Small NEG^Negative A FrRdHs   pH Urine 7.0 5.0 - 7.0 pH  FrRdHs   Protein Albumin Urine Negative NEG^Negative mg/dL  Fr[facility]   Urobilinogen mg/dL 0.0 0.0 - 2.0 mg/dL  Fr[facility]   Nitrite Urine Negative NEG^Negative  Fr[facility]   Leukocyte Esterase Urine Negative NEG^Negative  FrRdHs   Source Catheterized Urine   FrRdHs   WBC Urine 1 0 - 5 /HPF  FrRdHs   RBC Urine 1 0 - 2 /HPF  Fr[facility]   Squamous Epithelial /HPF Urine <1 0 - 1 /HPF  Fr[facility]   Hyaline Casts 1 0 - 2 /LPF  FrRdHs            Comprehensive metabolic panel [[id number]] (Abnormal)  Resulted: 07/22/18 0325, Result status: Final result    Ordering provider: Damien Blanca MD  07/22/18 0252 Resulting lab: [facility]    Specimen Information    Type Source Collected On   Blood  07/22/18 0259          Components        Value Reference Range Flag Lab   Sodium 126 133 - 144 mmol/L L [facility]   Potassium 3.3 3.4 - 5.3 mmol/L L [facility]   Chloride 93 94 - 109 mmol/L L FrRd[facility]   Carbon Dioxide 25 20 - 32 mmol/L  [facility]   Anion Gap 8 3 - 14 mmol/L  [facility]   Glucose 138 70 - 99 mg/dL H FrRd[facility]   Urea Nitrogen 12 7 - 30 mg/dL  [facility]   Creatinine 0.58 0.52 - 1.04 mg/dL  [facility]   GFR Estimate >90 >60 mL/min/1.7m2  [facility]   Comment:  Non  GFR Calc   GFR Estimate If Black >90 >60 mL/min/1.7m2  [facility]   Comment:  African American GFR Calc   Calcium 9.2 8.5 - 10.1 mg/dL  [facility]   Bilirubin Total 0.5 0.2 - 1.3 mg/dL  [facility]   Albumin 3.9 3.4 - 5.0 g/dL  [facility]   Protein Total 6.9 6.8 - 8.8 g/dL  [facility]   Alkaline Phosphatase 72 40 - 150 U/L  FrRdHs   ALT 15 0 - 50 U/L  Fr[facility]   AST 12 0 - 45 U/L  FrRdHs            Troponin I [[id number]]  Resulted: 07/22/18 0325, Result status: Final result    Ordering provider: Damien Blanca MD  07/22/18 0252 Resulting lab: [facility]    Specimen Information    Type Source Collected On   Blood  07/22/18 0259          Components       Value Reference Range Flag Lab   Troponin I ES <0.015 0.000 - 0.045 ug/L  [facility]   Comment:         The 99th percentile for upper reference range is 0.045 ug/L.  Troponin values   in the range of 0.045 - 0.120 ug/L may be associated with risks of adverse   clinical events.              Alcohol ethyl [[id number]]  Resulted: 07/22/18 0325, Result status: Final result    Ordering provider: Damien Blanca MD  07/22/18 0252 Resulting lab: [facility]    Specimen Information    Type Source Collected On   Blood  07/22/18 0259          Components       Value Reference Range Flag Lab   Ethanol g/dL <0.01 <0.01 g/dL  FrRd            INR [[id number]]  Resulted: 07/22/18 0319, Result status: Final result    Ordering provider: Damien Blanca MD  07/22/18 0252 Resulting lab: [facility]    Specimen Information    Type Source Collected On   Blood   07/22/18 0259          Components       Value Reference Range Flag Lab   INR 1.09 0.86 - 1.14  [facility]            Partial thromboplastin time [[id number]]  Resulted: 07/22/18 0319, Result status: Final result    Ordering provider: Damien Blanca MD  07/22/18 0252 Resulting lab: [facility]    Specimen Information    Type Source Collected On   Blood  07/22/18 0259          Components       Value Reference Range Flag Lab   PTT 27 22 - 37 sec  FrRdHs            Lactic acid whole blood [[id number]]  Resulted: 07/22/18 0311, Result status: Final result    Ordering provider: Damien Blanca MD  07/22/18 0252 Resulting lab: [facility]    Specimen Information    Type Source Collected On   Blood  07/22/18 0259          Components       Value Reference Range Flag Lab   Lactic Acid 1.0 0.7 - 2.0 mmol/L  Fr[facility]            CBC with platelets differential [[id number]] (Abnormal)  Resulted: 07/22/18 0306, Result status: Final result    Ordering provider: Damien Blanca MD  07/22/18 0252 Resulting lab: [facility]    Specimen Information    Type Source Collected On   Blood  07/22/18 0259          Components       Value Reference Range Flag Lab   WBC 11.3 4.0 - 11.0 10e9/L H FrRdHs   RBC Count 3.73 3.8 - 5.2 10e12/L L FrRdHs   Hemoglobin 11.4 11.7 - 15.7 g/dL L FrRd[facility]   Hematocrit 33.6 35.0 - 47.0 % L FrRdHs   MCV 90 78 - 100 fl  FrRdHs   MCH 30.6 26.5 - 33.0 pg  FrRdHs   MCHC 33.9 31.5 - 36.5 g/dL  FrRd[facility]   RDW 13.6 10.0 - 15.0 %  FrRd[facility]   Platelet Count 357 150 - 450 10e9/L  FrRdHs   Diff Method Automated Method   FrRdHs   % Neutrophils 72.5 %  FrRdHs   % Lymphocytes 16.5 %  FrRdHs   % Monocytes 9.3 %  FrRdHs   % Eosinophils 0.6 %  FrRdHs   % Basophils 0.2 %  FrRdHs   % Immature Granulocytes 0.9 %  FrRdHs   Nucleated RBCs 0 0 /100  FrRdHs   Absolute Neutrophil 8.2 1.6 - 8.3 10e9/L  FrRdHs   Absolute Lymphocytes 1.9 0.8 - 5.3 10e9/L  Fr[facility]   Absolute Monocytes 1.1 0.0 - 1.3 10e9/L  Fr[facility]   Absolute  "Eosinophils 0.1 0.0 - 0.7 10e9/L  FrRdHs   Absolute Basophils 0.0 0.0 - 0.2 10e9/L  FrRdHs   Abs Immature Granulocytes 0.1 0 - 0.4 10e9/L  Fr[facility]dHs   Absolute Nucleated RBC 0.0   FrRdHs            Testing Performed By     Lab - Abbreviation Name Director Address Valid Date Range    12 - [facility] Unknown [address]  [facility] [address] 05/08/15 1057 - Present    51 - Unknown [facility] Unknown [address] [facility] [address] 12/31/14 1010 - Present    75 - Unknown [facility] Unknown [address] [facility] [address] 01/15/15 1019 - Present    170 - Unknown POINT OF CARE TEST, GLUCOSE Unknown Unknown 10/31/11 1114 - Present    225 - Unknown [facility] Unknown [address] [facility] [address] 12/19/14 0954 - Present            Unresulted Labs (24h ago through future)    Start       Ordered    Unscheduled  Magnesium  (Magnesium Replacement - Adult - \"High\" - Replacement for all levels less than or equal to 2 mg/dL)  CONDITIONAL (SPECIFY),   Routine     Comments:  Obtain Magnesium Level for these conditions:  *IF no magnesium result within 24 hrs before initiation of order set, draw magnesium level with next lab collect.    *2 HOURS AFTER last magnesium replacement dose when magnesium replacement given for level less than 1.6  *Next morning after magnesium dose.     Repeat Magnesium Replacement if necessary.    07/22/18 0608    Unscheduled  Potassium  (Potassium Replacement - \"Standard\" - For K levels less than 3.4 mmol/L - UU,UR,UA,RH,SH,PH,WY )  CONDITIONAL (SPECIFY),   Routine     Comments:  Obtain Potassium Level for these conditions:  *IF no potassium result within 24 hours before initiation of order set, draw potassium level with next lab collect.    *2 HOURS AFTER last IV potassium replacement dose and 4 hours after an oral replacement dose.  *Next morning after potassium " dose.     Repeat Potassium Replacement if necessary.    07/23/18 0909         Imaging Results - 3 Days      CT Head Neck Angio w/o & w Contrast [[id number]]  Resulted: 07/22/18 0956, Result status: Final result    Ordering provider: Damien Blanca MD  07/22/18 0317 Resulted by: Joel Lala MD    Performed: 07/22/18 0340 - 07/22/18 0357 Resulting lab: RADIOLOGY RESULTS    Narrative:       CTA HEAD NECK WITH CONTRAST  7/22/2018 3:57 AM     HISTORY: Evaluate for dissection/thromboembolism;     TECHNIQUE:  Precontrast localizing scans were followed by CT  angiography with an injection of 70mL Isovue-370 IV contrast with  scans through the head and neck.  Images were transferred to a  separate 3-D workstation where multiplanar reformations and 3-D images  were created.  Estimates of carotid stenoses are made relative to the  distal internal carotid artery diameters except as noted. Radiation  dose for this scan was reduced using automated exposure control,  adjustment of the mA and/or kV according to patient size, or iterative  reconstruction technique.    COMPARISON: None.    FINDINGS: Estimates of stenosis at the carotid bifurcations are  relative to the distal internal carotids.    Arch: [ Normal Anatomy]    Neck:  Right Carotid:  The right common carotid artery is normal.   Atherosclerotic plaque is seen at the right carotid bifurcation. The  stenosis was calculated at 37%.  The right internal carotid artery is  negative.     Left Carotid:  The left common carotid artery is unremarkable.   Atherosclerotic plaque is seen at the left carotid bifurcation. The  stenosis was calculated at 30%..  The left internal carotid is  negative.      Vertebrals:  The right vertebral artery is dominant supplying the  basilar artery. The left vertebral artery is a very small vessel..    Head:  Right Carotid:No occluded vessels are seen. .    Left Carotid:  No occluded vessels are identified.  A1 segment of the  left anterior  cerebral artery is hypoplastic.    Basilar:  The basilar artery and its branches appear normal.     Miscellaneous: There is a 1.3 cm nodule in the right lobe of the  thyroid.. There is also a 1 cm somewhat spiculated nodule in the right  lung apex. This is indeterminant. A CT of the thorax would be helpful  for more complete characterization. There is approximately 5 mm of  anterior subluxation of cc 4 on C5. This appears to be due to  degenerative change in the facet joints. There is loss of disc space  height at C5-6 and C6-7. No fractures are identified but if there is  any clinical suspicion for fracture a formal CT of the cervical spine  is recommended for further evaluation.      Impression:       IMPRESSION:   1. No stenosis is seen at either carotid bifurcation.  2. No arterial dissection is identified.  3. No high-grade intracranial vascular stenosis is identified.  4. Venous sinuses appear patent  5. Indeterminate 1 cm nodule in the right upper lobe. CT of the chest  is recommended for further evaluation and characterization.  6. 1.3 cm nodule in the right lobe of the thyroid. Thyroid ultrasound  could be performed for further characterization.  7. Multilevel degenerative change in the cervical spine. 5 mm of  anterior subluxation of C4 on C5.    I agree with the preliminary report that was communicated to the  referring physician.    WILMA CARBAJAL MD      CT Head w/o Contrast [[id number]]  Resulted: 07/22/18 0954, Result status: Final result    Ordering provider: Damien Blanca MD  07/22/18 0252 Resulted by: Joel Carbajal MD    Performed: 07/22/18 0311 - 07/22/18 0347 Resulting lab: RADIOLOGY RESULTS    Narrative:       CT SCAN OF THE HEAD WITHOUT CONTRAST   7/22/2018 3:47 AM     HISTORY: fall;     TECHNIQUE:  Axial images of the head and coronal reformations without  IV contrast material. Radiation dose for this scan was reduced using  automated exposure control, adjustment of the mA and/or kV  according  to patient size, or iterative reconstruction technique.    COMPARISON: None.    FINDINGS:  There is generalized atrophy of the brain.  White matter  changes are present in the cerebral hemispheres that are consistent  with small vessel ischemic disease in this age patient. There is no  evidence of intracranial hemorrhage, mass, acute infarct or anomaly.  The visualized portions of the sinuses and mastoids appear normal.  There is no evidence of trauma.      Impression:       IMPRESSION: No intracranial hemorrhage or skull fractures are  identified.      WILMA CARBAJAL MD      XR Pelvis and Hip Left 2 Views [[id number]]  Resulted: 07/22/18 0528, Result status: Final result    Ordering provider: Damien Blanca MD  07/22/18 0252 Resulted by: Bishop Hoyt MD    Performed: 07/22/18 0335 - 07/22/18 0350 Resulting lab: RADIOLOGY RESULTS    Narrative:       XR PELVIS AND HIP LEFT 2 VIEWS   7/22/2018 3:50 AM     INDICATION: Fall, hip pain.    COMPARISON: None.      Impression:       IMPRESSION: No acute findings. Small nonspecific sclerotic focus in  the right innominate bone, also present on prior pelvic CT  of  11/19/2017, unchanged and likely a bone island. Contrast in the  collecting systems.    BISHOP HOYT MD      XR Chest 1 View [[id number]]  Resulted: 07/22/18 0528, Result status: Final result    Ordering provider: Damien Blanac MD  07/22/18 0252 Resulted by: Bishop Hoyt MD    Performed: 07/22/18 0336 - 07/22/18 0351 Resulting lab: RADIOLOGY RESULTS    Narrative:       XR CHEST 1 VIEW   7/22/2018 3:51 AM     INDICATION: Found down, delirium.    COMPARISON: 4/16/2018.      Impression:       IMPRESSION: No infiltrates or other acute findings. Heart size is  within normal limits. A few nodular densities noted previously are  again noted, probably related to some costochondral calcification.  Moderate-sized hiatal hernia.    BISHOP HOYT MD      CT Cervical Spine w/o Contrast  [[id number]]  Resulted: 07/22/18 0527, Result status: Final result    Ordering provider: Damien Blanca MD  07/22/18 0252 Resulted by: Bishop Hoyt MD    Performed: 07/22/18 0310 - 07/22/18 0354 Resulting lab: RADIOLOGY RESULTS    Narrative:       CT CERVICAL SPINE WITHOUT CONTRAST 7/22/2018 3:54 AM     HISTORY: Neck pain after trauma.     TECHNIQUE: Axial images through cervical spine without contrast.  Sagittal and coronal reformatted images. Radiation dose for this scan  was reduced using automated exposure control, adjustment of the mA  and/or kV according to patient size, or iterative reconstruction  technique.    COMPARISON: C-spine views 10/3/2016.    FINDINGS: No acute fractures. Advanced degenerative changes with  multilevel degenerative disc disease. Moderate anterior subluxation of  C4 on C5. Degenerative and hypertrophic changes in the mid and lower  cervical facet joints. Curve convex to the right. No prevertebral soft  tissue swelling or significant central canal narrowing. There is mild  central canal narrowing relating to the degenerative changes and  anterior subluxation of C4 on C5.      Impression:       IMPRESSION: No acute findings.    BISHOP HOYT MD      Testing Performed By     Lab - Abbreviation Name Director Address Valid Date Range    104 - [facility] RADIOLOGY RESULTS Unknown Unknown 02/16/05 1553 - Present            Encounter-Level Documents:     There are no encounter-level documents.      Order-Level Documents:     There are no order-level documents.

## 2018-07-22 NOTE — IP AVS SNAPSHOT
` `     [place] [address] MEDICAL SURGICAL: [phone]                 INTERAGENCY TRANSFER FORM - NOTES (H&P, Discharge Summary, Consults, Procedures, Therapies)   2018                    Hospital Admission Date: 2018  ANA LUISA LEE   : 1940  Sex: Female        Patient PCP Information     Provider PCP Type    Db Alvarez MD General      History & Physicals     No notes of this type exist for this encounter.         Discharge Summaries      Discharge Summaries by Latesha Cao MD at 2018 11:36 AM     Author:  Latesha Cao MD Service:  Hospitalist Author Type:  Physician    Filed:  2018 11:39 AM Date of Service:  2018 11:36 AM Creation Time:  2018 11:36 AM    Status:  Signed :  Latesha Cao MD (Physician)           Discharge Summary  [facility]    Ana Luisa Lee MRN# [id number]   YOB: 1940 Age: 78 year old     Date of Admission:  2018  Date of Discharge:  2018  Admitting Physician:  Adrian Carvalho MD  Discharge Physician: Latesha Cao MD  Discharging Service: Hospitalist     Primary Provider: Db Alvarez  Primary Care Physician Phone Number: [phone]         Discharge Diagnoses/Problem Oriented Hospital Course (Providers):    Ana Luisa Lee was admitted on 2018 by Adrian Carvlaho MD and I would refer you to their history and physical.  The following problems were addressed during her hospitalization:    1. Toxic/metabolic encephalopathy: Resolving.  So far hyponatremia is 126 and what I suspect is reduced reserve is the only apparent explanation.  She does not participate readily with neurologic exam but has been moving all extremities and no focal deficits have been seen.  She had imaging of her brain back in  in the form of an MRI and here in the emergency room in the form of a noncontrast CT of her head and also a CT angiogram of her head and neck with  no acute pathology identified.  --Improved.  --Afebrile, no infectious focus apparent  --Cortisol is appropriately somewhat elevated and TSH is within normal limits  --Blood gas upon presentation did not show significant CO2 retention  --Toxicology workup including ethanol, salicylate and Tylenol levels unrevealing     2.   Unwitnessed fall: It sounds as though she has had numerous falls in the past and again this one was unwitnessed.  No apparent bony injury is been identified and imaging was probably negative for traumatic injury as noted above.  I suspect she may require TCU placement upon discharge pending improvement in her mental status.  When fully alert and awake on 7/23 she denied any pain complaints whatsoever.     3.   Hypovolemic hyponatremia: So far correcting slowly with IV hydration.  It is possible she could have a component of SIADH given underlying lung disease.  She does have lung nodules which appear to not have been completely worked up and I discussed these with her daughter.  I suspect that biopsy would not be consistent with her goals of care at this time this could be readdressed at follow-up.     4.   Hypertension: Not apparently on any medications prior to admission.  She has been intermittently hypertensive here so we will plan to start low-dose lisinopril and have as needed nitroglycerin available.  She does have numerous drug allergies noted.     5.   Frequent falls     6.  COPD: Not currently in an acute exacerbation, continue home Singulair, Roflumilast and other inhalers as well as scheduled Xopenex.  She is chronically on prednisone 10 mg daily.  Hemodynamically does not appear adrenally insufficient and cortisol is appropriately somewhat elevated.     7.  Thyroid nodule:  My colleague  Discussed with her daughter.  TSH is normal.     8.  Pulmonary nodules: my colleague Discussed these with her daughter.  Consider outpatient CT scan.          Code Status:      DNR / DNI          Important Results:                Pending Results:        Unresulted Labs Ordered in the Past 30 Days of this Admission     Date and Time Order Name Status Description    7/22/2018 0316 Blood culture ONE site Preliminary     7/22/2018 0252 Blood culture Preliminary                Discharge Instructions and Follow-Up:               Discharge Disposition:      Discharged to TCU.          Discharge Medications:        Current Discharge Medication List      START taking these medications    Details   lisinopril (PRINIVIL/ZESTRIL) 10 MG tablet Take 1 tablet (10 mg) by mouth daily  Qty: 30 tablet, Refills: 0    Associated Diagnoses: Essential hypertension with goal blood pressure less than 140/90         CONTINUE these medications which have CHANGED    Details   traMADol (ULTRAM) 50 MG tablet Take 1 tablet (50 mg) by mouth 2 times daily  Qty: 20 tablet, Refills: 0    Associated Diagnoses: Arthritis of hand         CONTINUE these medications which have NOT CHANGED    Details   acetaminophen (TYLENOL) 500 MG tablet Take 2 tablets (1,000 mg) by mouth 3 times daily    Associated Diagnoses: Pain of both shoulder joints      albuterol (ALBUTEROL) 108 (90 BASE) MCG/ACT Inhaler Inhale 2 puffs into the lungs every 6 hours as needed    Associated Diagnoses: Asthma, persistent      budesonide (PULMICORT FLEXHALER) 180 MCG/ACT inhaler Inhale 1 puff into the lungs 2 times daily    Associated Diagnoses: Pulmonary emphysema, unspecified emphysema type (H)      calcium 600 MG tablet Take 1 tablet (600 mg) by mouth daily  Qty: 60 tablet    Associated Diagnoses: Pulmonary emphysema, unspecified emphysema type (H)      cholecalciferol (VITAMIN D) 1000 UNIT tablet Take 1 tablet (1,000 Units) by mouth daily  Qty: 30 tablet    Associated Diagnoses: COPD, moderate (H)      cyanocobalamin (B-12 TR) 1000 MCG TBCR Take 1 tablet by mouth daily  Qty: 100 tablet, Refills: 3    Associated Diagnoses: Pernicious anemia      DULoxetine (CYMBALTA) 30 MG  EC capsule Take 30 mg by mouth daily      ferrous sulfate (IRON) 325 (65 Fe) MG tablet Take 325 mg by mouth daily (with breakfast)      fexofenadine (ALLEGRA) 60 MG tablet Take 1 tablet (60 mg) by mouth daily  Qty: 60 tablet    Associated Diagnoses: Middle ear effusion, left      fluticasone (FLONASE) 50 MCG/ACT spray Spray 1 spray into both nostrils daily  Qty: 1 Bottle    Associated Diagnoses: Middle ear effusion, left      gemfibrozil (LOPID) 600 MG tablet Take 1 tablet (600 mg) by mouth daily  Qty: 90 tablet, Refills: 0    Associated Diagnoses: Hyperlipidemia with target LDL less than 130      levalbuterol (XOPENEX) 1.25 MG/3ML neb solution Take 1 ampule by nebulization 4 times daily      levothyroxine (SYNTHROID/LEVOTHROID) 25 MCG tablet Take 1 tablet (25 mcg) by mouth daily  Qty: 90 tablet, Refills: 3    Associated Diagnoses: Hypothyroidism due to acquired atrophy of thyroid      loperamide (IMODIUM) 2 MG capsule Take 2 mg by mouth every 8 hours as needed for diarrhea      montelukast (SINGULAIR) 10 MG tablet Take 1 tablet (10 mg) by mouth At Bedtime  Qty: 30 tablet    Associated Diagnoses: Pulmonary emphysema, unspecified emphysema type (H)      Multiple Vitamins-Minerals (MULTIVITAMIN OR) Take 1 tablet by mouth daily      omeprazole (PRILOSEC) 40 MG capsule Take 1 capsule (40 mg) by mouth daily Take 30-60 minutes before a meal.  Qty: 90 capsule, Refills: 2    Associated Diagnoses: Gastroesophageal reflux disease, esophagitis presence not specified      !! polyethylene glycol (MIRALAX/GLYCOLAX) Packet Take 17 g by mouth daily      !! polyethylene glycol (MIRALAX/GLYCOLAX) Packet Take 17 g by mouth daily as needed for constipation      predniSONE (DELTASONE) 10 MG tablet Take 10 mg by mouth daily      roflumilast (DALIRESP) 500 MCG TABS tablet Take 500 mcg by mouth daily       umeclidinium (INCRUSE ELLIPTA) 62.5 MCG/INH oral inhaler Inhale 1 puff into the lungs daily       !! - Potential duplicate medications  "found. Please discuss with provider.               Allergies:         Allergies   Allergen Reactions     Hydralazine Anxiety     Penicillin G Hives     Tolerated cephalosporines 2017     Meloxicam      dizziness       Metoprolol      ? Skin rash on the back     Norvasc [Amlodipine Besylate] Hives            Consultations This Hospital Stay:      No consultations were requested during this admission          Condition and Physical Exam on Discharge:      Discharge condition: Stable   Discharge vitals: Blood pressure 154/76, pulse 83, temperature 98.7  F (37.1  C), temperature source Oral, resp. rate 18, height 1.575 m (5' 2.01\"), weight 52.2 kg (115 lb 1.6 oz), SpO2 96 %, not currently breastfeeding.     Constitutional: Alert, awake in NAD   Lungs: Diminished BS   Cardiovascular: RR,S1+S2 nml, no m/g/r.   Abdomen: Soft, NT, ND, + BS   Skin: No edema.   Other:            Discharge Orders for Skilled Facility (from Discharge Orders):               Rehab orders for Skilled Facility (from Discharge Orders):               Discharge Time:        > 30 mins on chart review, exam and .        Image Results From This Hospital Stay (For Non-EPIC Providers):        Results for orders placed or performed during the hospital encounter of 07/22/18   CT Head w/o Contrast    Narrative    CT SCAN OF THE HEAD WITHOUT CONTRAST   7/22/2018 3:47 AM     HISTORY: fall;     TECHNIQUE:  Axial images of the head and coronal reformations without  IV contrast material. Radiation dose for this scan was reduced using  automated exposure control, adjustment of the mA and/or kV according  to patient size, or iterative reconstruction technique.    COMPARISON: None.    FINDINGS:  There is generalized atrophy of the brain.  White matter  changes are present in the cerebral hemispheres that are consistent  with small vessel ischemic disease in this age patient. There is no  evidence of intracranial hemorrhage, mass, acute infarct or anomaly.  The " visualized portions of the sinuses and mastoids appear normal.  There is no evidence of trauma.      Impression    IMPRESSION: No intracranial hemorrhage or skull fractures are  identified.      WILMA CARBAJAL MD   CT Cervical Spine w/o Contrast    Narrative    CT CERVICAL SPINE WITHOUT CONTRAST 7/22/2018 3:54 AM     HISTORY: Neck pain after trauma.     TECHNIQUE: Axial images through cervical spine without contrast.  Sagittal and coronal reformatted images. Radiation dose for this scan  was reduced using automated exposure control, adjustment of the mA  and/or kV according to patient size, or iterative reconstruction  technique.    COMPARISON: C-spine views 10/3/2016.    FINDINGS: No acute fractures. Advanced degenerative changes with  multilevel degenerative disc disease. Moderate anterior subluxation of  C4 on C5. Degenerative and hypertrophic changes in the mid and lower  cervical facet joints. Curve convex to the right. No prevertebral soft  tissue swelling or significant central canal narrowing. There is mild  central canal narrowing relating to the degenerative changes and  anterior subluxation of C4 on C5.      Impression    IMPRESSION: No acute findings.    MANISH AGUDELO MD   XR Chest 1 View    Narrative    XR CHEST 1 VIEW   7/22/2018 3:51 AM     INDICATION: Found down, delirium.    COMPARISON: 4/16/2018.      Impression    IMPRESSION: No infiltrates or other acute findings. Heart size is  within normal limits. A few nodular densities noted previously are  again noted, probably related to some costochondral calcification.  Moderate-sized hiatal hernia.    MANISH AGUDELO MD   XR Pelvis and Hip Left 2 Views    Narrative    XR PELVIS AND HIP LEFT 2 VIEWS   7/22/2018 3:50 AM     INDICATION: Fall, hip pain.    COMPARISON: None.      Impression    IMPRESSION: No acute findings. Small nonspecific sclerotic focus in  the right innominate bone, also present on prior pelvic CT  of  11/19/2017, unchanged and likely  a bone island. Contrast in the  collecting systems.    MANISH AGUDELO MD   CT Head Neck Angio w/o & w Contrast    Narrative    CTA HEAD NECK WITH CONTRAST  7/22/2018 3:57 AM     HISTORY: Evaluate for dissection/thromboembolism;     TECHNIQUE:  Precontrast localizing scans were followed by CT  angiography with an injection of 70mL [id number]-370 IV contrast with  scans through the head and neck.  Images were transferred to a  separate 3-D workstation where multiplanar reformations and 3-D images  were created.  Estimates of carotid stenoses are made relative to the  distal internal carotid artery diameters except as noted. Radiation  dose for this scan was reduced using automated exposure control,  adjustment of the mA and/or kV according to patient size, or iterative  reconstruction technique.    COMPARISON: None.    FINDINGS: Estimates of stenosis at the carotid bifurcations are  relative to the distal internal carotids.    Arch: [ Normal Anatomy]    Neck:  Right Carotid:  The right common carotid artery is normal.   Atherosclerotic plaque is seen at the right carotid bifurcation. The  stenosis was calculated at 37%.  The right internal carotid artery is  negative.     Left Carotid:  The left common carotid artery is unremarkable.   Atherosclerotic plaque is seen at the left carotid bifurcation. The  stenosis was calculated at 30%..  The left internal carotid is  negative.      Vertebrals:  The right vertebral artery is dominant supplying the  basilar artery. The left vertebral artery is a very small vessel..    Head:  Right Carotid:No occluded vessels are seen. .    Left Carotid:  No occluded vessels are identified.  A1 segment of the  left anterior cerebral artery is hypoplastic.    Basilar:  The basilar artery and its branches appear normal.     Miscellaneous: There is a 1.3 cm nodule in the right lobe of the  thyroid.. There is also a 1 cm somewhat spiculated nodule in the right  lung apex. This is  indeterminant. A CT of the thorax would be helpful  for more complete characterization. There is approximately 5 mm of  anterior subluxation of cc 4 on C5. This appears to be due to  degenerative change in the facet joints. There is loss of disc space  height at C5-6 and C6-7. No fractures are identified but if there is  any clinical suspicion for fracture a formal CT of the cervical spine  is recommended for further evaluation.      Impression    IMPRESSION:   1. No stenosis is seen at either carotid bifurcation.  2. No arterial dissection is identified.  3. No high-grade intracranial vascular stenosis is identified.  4. Venous sinuses appear patent  5. Indeterminate 1 cm nodule in the right upper lobe. CT of the chest  is recommended for further evaluation and characterization.  6. 1.3 cm nodule in the right lobe of the thyroid. Thyroid ultrasound  could be performed for further characterization.  7. Multilevel degenerative change in the cervical spine. 5 mm of  anterior subluxation of C4 on C5.    I agree with the preliminary report that was communicated to the  referring physician.    WILMA CARBAJAL MD     *Note: Due to a large number of results and/or encounters for the requested time period, some results have not been displayed. A complete set of results can be found in Results Review.           Most Recent Lab Results In EPIC (For Non-EPIC Providers):[AP1.1]      Results for orders placed or performed during the hospital encounter of 07/22/18 (from the past 24 hour(s))   Potassium   Result Value Ref Range    Potassium 3.8 3.4 - 5.3 mmol/L   Potassium   Result Value Ref Range    Potassium 3.9 3.4 - 5.3 mmol/L   Magnesium   Result Value Ref Range    Magnesium 2.1 1.6 - 2.3 mg/dL     *Note: Due to a large number of results and/or encounters for the requested time period, some results have not been displayed. A complete set of results can be found in Results Review.[AP1.2]         Revision History        User Key  Date/Time User Provider Type Action    > AP1.2 7/25/2018 11:39 AM Latesha Cao MD Physician Sign     AP1.1 7/25/2018 11:36 AM Latesha Cao MD Physician                      Consult Notes      Consults by White, Corinne C, LSW at 7/23/2018  3:03 PM     Author:  White, Corinne C, LSW Service:  (none) Author Type:      Filed:  7/23/2018  3:03 PM Date of Service:  7/23/2018  3:03 PM Creation Time:  7/23/2018  2:42 PM    Status:  Signed :  White, Corinne C, LSW ()     Consult Orders:    1. Social Work IP Consult [[id number]] ordered by Ankit Maradiaga MD at 07/22/18 0509                Care Transition Initial Assessment -   Reason For Consult: discharge planning  Met with: Family  Spoke with daughter Windy on the phone. HCD on file. Pt sleeping at time of visit   Active Problems:    Encephalopathy acute    Encephalopathy       DATA  Lives With: alone  Living Arrangements: assisted living- [facility] [place] just moved into [facility] about 2 weeks ago.   Description of Support System: Supportive, Involved  Who is your support system?: Children, Facility resident(s)/Staff  Support Assessment: Adequate family and caregiver support, Adequate social supports. PT new to [facility]. Has a pendent to call for help. Has support for AM/PM care, bathing support transfer assist, med set up.   Pt was open to home care support for PT/OT.  They were working on getting a WC better fit for pt and to help her get stronger to self ambulate in the WC> Baseline for transfer is assist of 1. Pt here is assist of 2   Identified issues/concerns regarding health management: Admitted from home after a fall with encephalopathy.       Resources List: Assisted Living,- [facility] -[phone]   Home Care, Skilled Nursing Facility- was in TCU at [facility] from 06/19-07/11  Other Resources: County Worker-  [facility] ROSARIO lugo [phone]  Quality Of Family Relationships: supportive        ASSESSMENT  Cognitive Status:  PT was sleeping at time of vist. Called Daughter Windy and left VM message for RN at custodial  Concerns to be addressed: spoke with daughter about OT recommendations for TCU. Waiting for PT eval.. Pt has only been in her new [facility] since 07/11.  Due to increased needs pt most likely will need TCU at time of dc unless pt has a big improvement in her transfers and mobility.. PT for the most part has been in a WC since she moved into custodial. However she was able to pivot transfer.  The next goal is for pt to be able to self propel the WC on her own.    Will follow PT recommendations for TCU vs home with home care at [place]  Waiting for Eirn to call as well.      PLAN  Daughter Windy will be flying in Thursday. She is aware that pt may be DC ready in the next day or 2  Should pt still need TCU family do prefer a private room and aware of cost  [facility] are options for TCU at this time. Would consider [facility] if needed.[CW1.1]      Revision History        User Key Date/Time User Provider Type Action    > CW1.1 7/23/2018  3:03 PM White, Corinne C, LSW  Sign            Consults by Katie Ellis RN at 7/23/2018  2:06 PM     Author:  Katie Ellis RN Service:  (none) Author Type:      Filed:  7/23/2018  2:07 PM Date of Service:  7/23/2018  2:06 PM Creation Time:  7/23/2018  2:06 PM    Status:  Signed :  Katie Ellis RN ()     Consult Orders:    1. Care Coordinator IP Consult [[id number]] ordered by Adrian Carvalho MD at 07/23/18 1356                COURTNEY ELEVATED.  Will follow along with SW for needs.  Will give hand off to Dr Alvarez's RN CTS at time of dc and assist in making f/u appts.  Sabi BARTHOLOMEW CTS 7043[RB1.1]     Revision History        User Key Date/Time User Provider Type Action    > RB1.1 7/23/2018  2:07 PM Katie Ellis RN Case Manager Sign            Consults by Brenna Mcrae RD, LD at 7/23/2018  8:04  "AM     Author:  Brenna Mcrae RD, LD Service:  Nutrition Author Type:  Registered Dietitian    Filed:  7/23/2018  9:35 AM Date of Service:  7/23/2018  8:04 AM Creation Time:  7/23/2018  7:46 AM    Status:  Signed :  Brenna Mcrae RD, LD (Registered Dietitian)     Consult Orders:    1. Nutrition Services Adult IP Consult [[id number]] ordered by Adrian Carvalho MD at 07/22/18 1015                CLINICAL NUTRITION SERVICES  -  ASSESSMENT NOTE      MALNUTRITION:  % Weight Loss:[MS1.1]  Weight loss does not meet criteria for malnutrition (7% w/in 7 mo, 14% w/in 9 months --> overall unclear if criteria met w/in 6 mo timeframe based on wt trendings above)[MS1.2]  % Intake:[MS1.1]  Decreased intake does not meet criteria for malnutrition --> Likely <50% since admission but unclear/unable to clarify PO intakes PTA[MS1.2]  Subcutaneous Fat Loss:[MS1.1]  Upper arm region mild depletion[MS1.2]  Muscle Loss:[MS1.1]  Temporal region mild depletion, Clavicle bone region mild depletion, Acromion bone region moderate depletion and Dorsal hand region mild depletion --> did not observe LEs[MS1.2]  Fluid Retention:[MS1.1]  None noted[MS1.2]    Malnutrition Diagnosis:[MS1.1] Non-Severe malnutrition (or greater as lacking indicators)[MS1.2]  In Context of:[MS1.1]  Acute illness or injury  Chronic illness or disease[MS1.2]        REASON FOR ASSESSMENT  Ana Luisa Lee is a 78 year old female seen by Registered Dietitian for Admission Nutrition Risk Screen - Unintentional weight loss of 10# or more in past 2 months and RN Consult - \"decrease weight and strength\".       NUTRITION HISTORY[MS1.1]  - Information obtained from chart, patient admitted with change in mental status and oriented to self (?etiology).[MS1.2]    - Patient with a h/o COPD.  Admitted with change in mental status (?etiology).    -[MS1.1] Per review of chart, resides in [facility].   - NKFA.[MS1.2]       CURRENT NUTRITION ORDERS  Diet " "Order:     Fulls (ADAT)    Current Intake/Tolerance:[MS1.1]  Flowsheet review indicating 0% consumption since admission.  Sitter in room though was breaking, unsure if patient had consumed breakfast.  Per discussion with team during rounds, possible need for SLP consult d/t ?dysphagia sx yesterday/overnight (possible decreased DYANA impacting)?[MS1.2]      PHYSICAL FINDINGS  Observed[MS1.1]  See below, e/o muscle loss though without complete history, unclear if acute vs chronic (aging?)[MS1.2]  Obtained from Chart/Interdisciplinary Team  [place]  Alert to self only   With sitter    ANTHROPOMETRICS  Height: 5' 2\"  Weight:[MS1.1] 52.3 kg ([MS1.2]115[MS1.1]#)[MS1.2]  Body mass index is 21.05 kg/(m^2).  Weight Status:[MS1.1]  Normal BMI[MS1.2]  IBW:[MS1.1] 50 kg (110#)[MS1.2]  % IBW:[MS1.1] 105%[MS1.2]  Weight History[MS1.1]:[MS1.3]  Wt Readings from Last 30 Encounters:   07/22/18 52.2 kg (115 lb 1.6 oz)   07/12/18 53.1 kg (117 lb)   07/10/18 52.1 kg (114 lb 12.8 oz)   07/02/18 54.7 kg (120 lb 9.6 oz)   06/27/18 54.7 kg (120 lb 9.6 oz)   06/20/18 53.5 kg (118 lb)   06/01/18 53.9 kg (118 lb 14.4 oz)   05/24/18 54.3 kg (119 lb 9.6 oz)   05/07/18 53.2 kg (117 lb 3.2 oz)   05/04/18 53.8 kg (118 lb 9.6 oz)   05/01/18 53.8 kg (118 lb 9.6 oz)   04/27/18 55 kg (121 lb 3.2 oz)   04/23/18 54.3 kg (119 lb 12.8 oz)   04/18/18 54.8 kg (120 lb 12.8 oz)   04/17/18 55.2 kg (121 lb 11.2 oz)   04/05/18 56.2 kg (124 lb)   03/29/18 56.6 kg (124 lb 11.2 oz)   03/21/18 55.3 kg (122 lb)   01/02/18 55.9 kg (123 lb 4.8 oz)   11/27/17 56.1 kg (123 lb 11.2 oz)   11/22/17 56.8 kg (125 lb 3.2 oz)   11/06/17 60.7 kg (133 lb 12.8 oz)   10/31/17 60.8 kg (134 lb)   10/17/17 59.6 kg (131 lb 6.4 oz)   08/24/17 60.8 kg (134 lb)   08/17/17 60.8 kg (134 lb)   08/14/17 59.9 kg (132 lb)   08/11/17 59.6 kg (131 lb 6.4 oz)   08/07/17 60.8 kg (134 lb)   08/03/17 60.6 kg (133 lb 9.6 oz)[MS1.4]   - Possibility for slight wt loss of ?4% x ~2 weeks.  However current wt " consistent with that in 5/2018.  Further review of wt trending indicates a 14% wt loss beginning in 11/2017 (x9 months).[MS1.3]      LABS  Labs reviewed    MEDICATIONS  Medications reviewed      ASSESSED NUTRITION NEEDS PER APPROVED PRACTICE GUIDELINES:    Dosing Weight[MS1.1] 52.3 kg[MS1.2]  Estimated Energy Needs:[MS1.1] 5049-1686[MS1.2] kcals ([MS1.1]25-30 Kcal/Kg[MS1.2])  Justification:[MS1.1] maintenance[MS1.2]  Estimated Protein Needs:[MS1.1] 63-78[MS1.2] grams protein ([MS1.1]1.2-1.5 g pro/Kg[MS1.2])  Justification:[MS1.1] ?Acute vs chronic repletion and preservation of lean body mass[MS1.2]  Estimated Fluid Needs:[MS1.1] >1 ml/Kcal[MS1.2]  Justification:[MS1.1] maintenance[MS1.2]    MALNUTRITION:  % Weight Loss:[MS1.1]  Weight loss does not meet criteria for malnutrition (7% w/in 7 mo, 14% w/in 9 months --> overall unclear if criteria met w/in 6 mo timeframe based on wt trendings above)[MS1.2]  % Intake:[MS1.1]  Decreased intake does not meet criteria for malnutrition --> Likely <50% since admission but unclear/unable to clarify PO intakes PTA[MS1.2]  Subcutaneous Fat Loss:[MS1.1]  Upper arm region mild depletion[MS1.2]  Muscle Loss:[MS1.1]  Temporal region mild depletion, Clavicle bone region mild depletion, Acromion bone region moderate depletion and Dorsal hand region mild depletion --> did not observe LEs[MS1.2]  Fluid Retention:[MS1.1]  None noted[MS1.2]    Malnutrition Diagnosis:[MS1.1] Non-Severe malnutrition (or greater as lacking indicators)[MS1.2]  In Context of:[MS1.1]  Acute illness or injury  Chronic illness or disease[MS1.2]    NUTRITION DIAGNOSIS:[MS1.1]  Malnutrition[MS1.2] related to[MS1.1] suspect acute on chronic inadequate oral intakes leading to LBM losses[MS1.2] as evidenced by[MS1.1] documented wt loss of 14% x 9 mo (which does not meet criteria), meeting <50% needs orally since admission, e/o at least mild fat/muscle loss with coding of non-severe malnutrition (or greater).[MS1.2]         NUTRITION INTERVENTIONS  Recommendations / Nutrition Prescription[MS1.1]  Diet per MD.  Need for SLP consult per MD/RN discretion.      Add daily MVI/M.    Add supplement offering with meals TID.  Ok to utilize with medication passes as able.[MS1.2]      Implementation  Nutrition education:[MS1.1] Not appropriate at this time due to patient condition (oriented to self only).    Medical Food Supplement, Multivitamin/Mineral: As above.    Collaboration and Referral of Nutrition care: Discussed POC with team during rounds.[MS1.2]       Nutrition Goals[MS1.1]  Diet advancement past fulls w/in 24-48 hrs.[MS1.2]       MONITORING AND EVALUATION:[MS1.1]  Progress towards goals will be monitored and evaluated per protocol and Practice Guidelines[MS1.2]      Brenna Mcrae RD, CHARLEE  Clinical Dietitian  3rd floor/ICU: [phone]  All other floors: [phone]  Weekend/holiday: [phone][MS1.1]     Revision History        User Key Date/Time User Provider Type Action    > MS1.2 7/23/2018  9:35 AM Brenna Mcrae RD, LD Registered Dietitian Sign     MS1.4 7/23/2018  8:17 AM Brenna Mcrae RD, LD Registered Dietitian      MS1.3 7/23/2018  8:16 AM Brenna Mcrae RD, CHARLEE Registered Dietitian      MS1.1 7/23/2018  7:46 AM Brenna Mcrae RD, CHARLEE Registered Dietitian                      Progress Notes - Physician (Notes from 07/22/18 through 07/25/18)      Progress Notes by Katie Ellis RN at 7/25/2018  2:36 PM     Author:  Katie Ellis RN Service:  (none) Author Type:      Filed:  7/25/2018  2:40 PM Date of Service:  7/25/2018  2:36 PM Creation Time:  7/25/2018  2:36 PM    Status:  Addendum :  Katie Ellis RN ()         PAS-RR    D: Per DHS regulation, [employer] completed and submitted PAS-RR to [place] [employer] Direct Connect via the Senior LinkAge Line.  PAS-RR confirmation # is : [id number]          P: Further questions may be directed to Senior LinkAge  Line at #[phone], option #4 for [facility]- staff.    Sabi BARTHOLOMEW CTS 3504[RB1.1]    DC orders faxed to [facility] at 1440.[RB1.2]      Revision History        User Key Date/Time User Provider Type Action    > RB1.2 7/25/2018  2:40 PM Katie Ellis RN Case Manager Addend     RB1.1 7/25/2018  2:37 PM Katie Ellis RN Case Manager Sign            Progress Notes by White, Corinne C, LSW at 7/25/2018  2:07 PM     Author:  White, Corinne C, LSW Service:  (none) Author Type:      Filed:  7/25/2018  2:09 PM Date of Service:  7/25/2018  2:07 PM Creation Time:  7/25/2018  2:07 PM    Status:  Signed :  White, Corinne C, LSW ()         Discharge Planner   Discharge Plans in progress: Pt has TCU for today. H/E transport set up for 1545  Barriers to discharge plan: none. Daughter aware   Follow up plan: [facility]     Entered by: Corinne C. White 07/25/2018 2:07 PM[CW1.1]          Revision History        User Key Date/Time User Provider Type Action    > CW1.1 7/25/2018  2:09 PM White, Corinne C, LSW  Sign            Progress Notes by White, Corinne C, LSW at 7/25/2018 11:08 AM     Author:  White, Corinne C, LSW Service:  (none) Author Type:      Filed:  7/25/2018 12:43 PM Date of Service:  7/25/2018 11:08 AM Creation Time:  7/25/2018 11:08 AM    Status:  Addendum :  White, Corinne C, LSW ()         Discharge Planner[CW1.1]   Discharge Plans in progress: Spoke with Erin at [facility]. She indicated that she is not able to see pt today and if pt is really feels TCU would be good for pt.   Barriers to discharge plan:Will follow up on referrals placed. [facility] no bed, Left message for [facility].   Follow up plan: per MD GONZALEZ ready today        Entered by:[CW1.2] Corinne C. White 07/25/2018 11:09 AM[CW1.1]     CM: Pt has been declined at [facility]   Sent additional referrals to [facility], [facility] [place] and [facility][CW1.3]       Revision History        User Key Date/Time User Provider Type Action    > CW1.3 7/25/2018 12:43 PM White, Corinne C, LSW  Addend     CW1.1 7/25/2018 11:12 AM White, Corinne C, LSW  Sign     CW1.2 7/25/2018 11:08 AM White, Corinne C, LSW              Progress Notes by White, Corinne C, LSW at 7/25/2018 10:51 AM     Author:  White, Corinne C, LSW Service:  (none) Author Type:      Filed:  7/25/2018 10:53 AM Date of Service:  7/25/2018 10:51 AM Creation Time:  7/25/2018 10:51 AM    Status:  Signed :  White, Corinne C, LSW ()         Discharge Planner   Discharge Plans in progress: Called Erin Salcedo at [facility] [phone] to find out when she would be out to assess pt./   Barriers to discharge plan: Per MD pt is ready today.   Follow up plan: Waiting for return call. Pt was open to Skilled home care with [facility] for PT.OT support        Entered by: Corinne C. White 07/25/2018 10:51 AM[CW1.1]          Revision History        User Key Date/Time User Provider Type Action    > CW1.1 7/25/2018 10:53 AM White, Corinne C, LSW  Sign            Progress Notes by Mae Loza PT at 7/24/2018 10:58 AM     Author:  Mae Loza PT Service:  (none) Author Type:  Physical Therapist    Filed:  7/24/2018 10:58 AM Date of Service:  7/24/2018 10:58 AM Creation Time:  7/24/2018 10:58 AM    Status:  Signed :  Mae Loza PT (Physical Therapist)          07/24/18 1043   Quick Adds   Type of Visit Initial PT Evaluation   Living Environment   Lives With alone   Living Arrangements assisted living   Home Accessibility grab bars present (toilet);grab bars present (bathtub)   Number of Stairs to Enter Home 0   Number of Stairs Within Home 0   Living Environment Comment Pt just moved to [facility] 2 weeks ago. Pt was working with PT/OT and mostly in W/C since leaving [facility] 2 weeks ago   Self-Care   Dominant Hand right   Usual Activity Tolerance fair    Regular Exercise no   Equipment Currently Used at Home walker, rolling   Functional Level Prior   Ambulation 1-->assistive equipment  (pt states this but also chart reporting W/C)   Transferring 1-->assistive equipment  (Pt states ability to perform but also chart states A x 1)   Toileting 1-->assistive equipment  (per pt)   Bathing 3-->assistive equipment and person   Dressing 3-->assistive equipment and person   Eating 3-->assistive equipment and person   Communication 0-->understands/communicates without difficulty   Swallowing 0-->swallows foods/liquids without difficulty   Cognition 2 - difficulty with organizing thoughts   Fall history within last six months yes   Number of times patient has fallen within last six months 1   Which of the above functional risks had a recent onset or change? transferring;toileting;dressing;cognition   Prior Functional Level Comment Reports from pt are inaccurate.   General Information   Onset of Illness/Injury or Date of Surgery - Date 07/22/18   Referring Physician Dr. Louis   Patient/Family Goals Statement None stated   Pertinent History of Current Problem (include personal factors and/or comorbidities that impact the POC) Ana Luisa Lee is a 78 year old female with past medical history including steroid-dependent COPD, hypertension, hypothyroidism, recent fall with closed head injury, urinary retention and generally recently declining state of health with numerous recent hospital stays and TCU stays admitted on 7/22/2018 with altered mental status after reportedly having an unwitnessed fall at her assisted living facility.  She was found on the floor and was complaining of left hip pain.  She was also hypertensive.  Here in the emergency room extensive workup was performed and notable for sodium of 126 with potassium of 3.3.  Otherwise this was generally negative for acute pathology including CT scan of the head and neck angiogram, pelvic x-ray, chest x-ray and  basic labs as well as blood gas, ethanol level, Tylenol level and salicylate level.   Cognitive Status Examination   Orientation person;place   Level of Consciousness alert;confused  (difficulty describing needs at times)   Follows Commands and Answers Questions 75% of the time;able to follow single-step instructions   Personal Safety and Judgment impaired   Memory impaired   Pain Assessment   Patient Currently in Pain No   Integumentary/Edema   Integumentary/Edema Comments Pt does have abrasion/skin tear on shin   Posture    Posture Forward head position;Protracted shoulders   Range of Motion (ROM)   ROM Comment WFL   Strength   Strength Comments foot drop on the R side, lacking shoulder shrug on R, able to raise eyebrows, puff up checks, 3+/5 in quads/hamstrings/hip flexors   Transfer Skills   Transfer Comments min/mod A x 2   Gait   Gait Comments min/mod A x 2 due to poor coordination, balance   Balance   Balance Comments poor, narrow GAVIN throughout, needing up to mod A/max A with LOB   Sensory Examination   Sensory Perception Comments Numbness in R foot   Coordination   Coordination Comments poor with attempting steps   General Therapy Interventions   Planned Therapy Interventions balance training;bed mobility training;gait training;strengthening;transfer training;progressive activity/exercise;risk factor education   Clinical Impression   Criteria for Skilled Therapeutic Intervention yes, treatment indicated   PT Diagnosis decreased functional mobility status post hyponatremia and fall   Influenced by the following impairments decreased cognition, strength, coordination, balance   Functional limitations due to impairments decreased transfers, ambulation   Clinical Presentation Evolving/Changing   Clinical Presentation Rationale concerning with foot drop on R, poor cognitive status   Clinical Decision Making (Complexity) Moderate complexity   Therapy Frequency` 5 times/week   Predicted Duration of Therapy  "Intervention (days/wks) 1 week   Anticipated Discharge Disposition Transitional Care Facility;Home with Assist;Home with Home Therapy  (pending retirement to provide level of care)   Risk & Benefits of therapy have been explained Yes   Patient, Family & other staff in agreement with plan of care Yes   [facility] TM \"6 Clicks\"   2016, Trustees of [facility], under license to [employer].  All rights reserved.   6 Clicks Short Forms Basic Mobility Inpatient Short Form   [facility] AM-PAC  \"6 Clicks\" V.2 Basic Mobility Inpatient Short Form   1. Turning from your back to your side while in a flat bed without using bedrails? 2 - A Lot   2. Moving from lying on your back to sitting on the side of a flat bed without using bedrails? 2 - A Lot   3. Moving to and from a bed to a chair (including a wheelchair)? 2 - A Lot   4. Standing up from a chair using your arms (e.g., wheelchair, or bedside chair)? 2 - A Lot   5. To walk in hospital room? 2 - A Lot   6. Climbing 3-5 steps with a railing? 1 - Total   Basic Mobility Raw Score (Score out of 24.Lower scores equate to lower levels of function) 11   Total Evaluation Time   Total Evaluation Time (Minutes) 10[MP1.1]        Revision History        User Key Date/Time User Provider Type Action    > MP1.1 7/24/2018 10:58 AM Mae Loza, PT Physical Therapist Sign            Progress Notes by Adrian Carvalho MD at 7/24/2018  7:41 AM     Author:  Adrian Carvalho MD Service:  Hospitalist Author Type:  Physician    Filed:  7/24/2018  9:43 AM Date of Service:  7/24/2018  7:41 AM Creation Time:  7/24/2018  7:41 AM    Status:  Signed :  Adrian Carvalho MD (Physician)         [facility]  Hospitalist Progress Note[MF1.1]  Adrian Carvalho MD[MF1.2] 07/24/2018[MF1.3]      Reason for Stay (Diagnosis): Unwitnessed fall, hyponatremia         Assessment and Plan:      Summary of Stay: Ana Luisa Haynes" Rosa is a 78 year old female with past medical history including steroid-dependent COPD, hypertension, hypothyroidism, recent fall with closed head injury, urinary retention and generally recently declining state of health with numerous recent hospital stays and TCU stays admitted on 7/22/2018 with altered mental status after reportedly having an unwitnessed fall at her assisted living facility.  She was found on the floor and was complaining of left hip pain.  She was also hypertensive.  Here in the emergency room extensive workup was performed and notable for sodium of 126 with potassium of 3.3.  Otherwise this was generally negative for acute pathology including CT scan of the head and neck angiogram, pelvic x-ray, chest x-ray and basic labs as well as blood gas, ethanol level, Tylenol level and salicylate level.    She was admitted for IV hydration and close monitoring in the setting of ongoing encephalopathy.  She has been moving all extremities and after discussion with her daughter it sounds as though following a fall in June 2018 she had to be significantly sedated in order to obtain an MRI of her brain and she/her daughter would like to avoid repeating that at this time.    Thankfully on 7/23 she became much more alert and her encephalopathy is resolving.  Intermittently she still has periods where she is poorly responsive but seems to eventually simply wake up so I suspect she is sleeping soundly during his episodes.  Overall the trend has been gradual improvement of her mental status and lab values with IV hydration alone.  Disposition is still somewhat unclear as I not convinced she is safe to go back to her assisted living at this time and we may need to pursue TCU placement.    Problem List/Assessment and Plan:   1. Toxic/metabolic encephalopathy: Resolving.  So far hyponatremia is 126 and what I suspect is reduced reserve is the only apparent explanation.  She does not participate readily with  neurologic exam but has been moving all extremities and no focal deficits have been seen.  She had imaging of her brain back in June in the form of an MRI and here in the emergency room in the form of a noncontrast CT of her head and also a CT angiogram of her head and neck with no acute pathology identified.  --After discussion with her daughter the current plan is for ongoing IV hydration, correction of electrolytes/dehydration and close monitoring of her mental status  --Afebrile, no infectious focus apparent  --Cortisol is appropriately somewhat elevated and TSH is within normal limits  --Blood gas upon presentation did not show significant CO2 retention  --Toxicology workup including ethanol, salicylate and Tylenol levels unrevealing    2.   Unwitnessed fall: It sounds as though she has had numerous falls in the past and again this one was unwitnessed.  No apparent bony injury is been identified and imaging was probably negative for traumatic injury as noted above.  I suspect she may require TCU placement upon discharge pending improvement in her mental status.  When fully alert and awake on 7/23 she denied any pain complaints whatsoever.    3.   Hypovolemic hyponatremia: So far correcting slowly with IV hydration.  It is possible she could have a component of SIADH given underlying lung disease.  She does have lung nodules which appear to not have been completely worked up and I discussed these with her daughter.  I suspect that biopsy would not be consistent with her goals of care at this time this could be readdressed at follow-up.    4.   Hypertension: Not apparently on any medications prior to admission.  She has been intermittently hypertensive here so we will plan to start low-dose lisinopril and have as needed nitroglycerin available.  She does have numerous drug allergies noted.    5.   Frequent falls    6.  COPD: Not currently in an acute exacerbation, continue home Singulair, Roflumilast and other  "inhalers as well as scheduled Xopenex.  She is chronically on prednisone 10 mg daily.  Hemodynamically does not appear adrenally insufficient and cortisol is appropriately somewhat elevated.    7.  Thyroid nodule: We will obtain an ultrasound as an outpatient.  Discussed with her daughter.  TSH is normal.    8.  Pulmonary nodules: Discussed these with her daughter.  Consider outpatient CT scan.    DVT Prophylaxis: Pneumatic Compression Devices  Code Status: DNR / DNI, discussed with the patient's daughter personally  Discharge Dispo/Date: At least 1-2 more days.  Consider TCU.  Consult PT and OT.        Interval History (Subjective):      Sodium slowly correcting, now 134  Had borderline low blood pressures intermittently yesterday which consistently responded to 500 cc normal saline boluses[MF1.1]  Mental status much improved, now awake and alert.  Removing joseph[MF1.4]                  Physical Exam:      Last Vital Signs:[MF1.1]  /74 (BP Location: Right arm)  Pulse 83  Temp 99.1  F (37.3  C) (Oral)  Resp 18  Ht 1.575 m (5' 2.01\")  Wt 52.2 kg (115 lb 1.6 oz)  SpO2 97%  BMI 21.05 kg/m2[MF1.2]      Intake/Output Summary (Last 24 hours) at 07/23/18 0808  Last data filed at 07/23/18 0721   Gross per 24 hour   Intake             1976 ml   Output             3050 ml   Net            -1074 ml       General: Elderly woman, lying in bed.  Awake, calm and overall appropriate.  HEENT: NC/AT, eyes anicteric, external occular movements intact, face symmetric.    Cardiac: RRR, S1, S2.  No murmurs appreciated.  Pulmonary: Normal chest rise, normal work of breathing.  Lungs CTA BL  Abdomen: soft, non-tender, non-distended.  Bowel Sounds Present.  No guarding.  Extremities: no deformities.  Warm, well perfused.  Skin: no rashes or lesions noted.  Warm and Dry.  Neuro: No focal deficits noted.  Speech clear.  Somewhat weak diffusely but alert and oriented to place and person.  Psych: Appropriate affect.         " Medications:      All current medications were reviewed with changes reflected in problem list.         Data:      All new lab and imaging data was reviewed.   Labs:[MF1.1]    Recent Labs  Lab 07/24/18  0604      POTASSIUM 3.1*   CHLORIDE 106   CO2 20   ANIONGAP 8   GLC 99   BUN 11   CR 0.54   GFRESTIMATED >90   GFRESTBLACK >90   DIANA 8.2*[MF1.5]       Recent Labs  Lab 07/22/18  0259   WBC 11.3*   HGB 11.4*   HCT 33.6*   MCV 90   [MF1.3]      Imaging:   Recent Results (from the past 48 hour(s))   CT Head w/o Contrast    Narrative    CT SCAN OF THE HEAD WITHOUT CONTRAST   7/22/2018 3:47 AM     HISTORY: fall;     TECHNIQUE:  Axial images of the head and coronal reformations without  IV contrast material. Radiation dose for this scan was reduced using  automated exposure control, adjustment of the mA and/or kV according  to patient size, or iterative reconstruction technique.    COMPARISON: None.    FINDINGS:  There is generalized atrophy of the brain.  White matter  changes are present in the cerebral hemispheres that are consistent  with small vessel ischemic disease in this age patient. There is no  evidence of intracranial hemorrhage, mass, acute infarct or anomaly.  The visualized portions of the sinuses and mastoids appear normal.  There is no evidence of trauma.      Impression    IMPRESSION: No intracranial hemorrhage or skull fractures are  identified.      WILMA CARBAJAL MD   XR Pelvis and Hip Left 2 Views    Narrative    XR PELVIS AND HIP LEFT 2 VIEWS   7/22/2018 3:50 AM     INDICATION: Fall, hip pain.    COMPARISON: None.      Impression    IMPRESSION: No acute findings. Small nonspecific sclerotic focus in  the right innominate bone, also present on prior pelvic CT  of  11/19/2017, unchanged and likely a bone island. Contrast in the  collecting systems.    MANISH AGUDELO MD   XR Chest 1 View    Narrative    XR CHEST 1 VIEW   7/22/2018 3:51 AM     INDICATION: Found down, delirium.    COMPARISON:  4/16/2018.      Impression    IMPRESSION: No infiltrates or other acute findings. Heart size is  within normal limits. A few nodular densities noted previously are  again noted, probably related to some costochondral calcification.  Moderate-sized hiatal hernia.    MANISH AGUDELO MD   CT Cervical Spine w/o Contrast    Narrative    CT CERVICAL SPINE WITHOUT CONTRAST 7/22/2018 3:54 AM     HISTORY: Neck pain after trauma.     TECHNIQUE: Axial images through cervical spine without contrast.  Sagittal and coronal reformatted images. Radiation dose for this scan  was reduced using automated exposure control, adjustment of the mA  and/or kV according to patient size, or iterative reconstruction  technique.    COMPARISON: C-spine views 10/3/2016.    FINDINGS: No acute fractures. Advanced degenerative changes with  multilevel degenerative disc disease. Moderate anterior subluxation of  C4 on C5. Degenerative and hypertrophic changes in the mid and lower  cervical facet joints. Curve convex to the right. No prevertebral soft  tissue swelling or significant central canal narrowing. There is mild  central canal narrowing relating to the degenerative changes and  anterior subluxation of C4 on C5.      Impression    IMPRESSION: No acute findings.    MANISH AGUDELO MD   CT Head Neck Angio w/o & w Contrast    Narrative    CTA HEAD NECK WITH CONTRAST  7/22/2018 3:57 AM     HISTORY: Evaluate for dissection/thromboembolism;     TECHNIQUE:  Precontrast localizing scans were followed by CT  angiography with an injection of 70mL [id number]-370 IV contrast with  scans through the head and neck.  Images were transferred to a  separate 3-D workstation where multiplanar reformations and 3-D images  were created.  Estimates of carotid stenoses are made relative to the  distal internal carotid artery diameters except as noted. Radiation  dose for this scan was reduced using automated exposure control,  adjustment of the mA and/or kV  according to patient size, or iterative  reconstruction technique.    COMPARISON: None.    FINDINGS: Estimates of stenosis at the carotid bifurcations are  relative to the distal internal carotids.    Arch: [ Normal Anatomy]    Neck:  Right Carotid:  The right common carotid artery is normal.   Atherosclerotic plaque is seen at the right carotid bifurcation. The  stenosis was calculated at 37%.  The right internal carotid artery is  negative.     Left Carotid:  The left common carotid artery is unremarkable.   Atherosclerotic plaque is seen at the left carotid bifurcation. The  stenosis was calculated at 30%..  The left internal carotid is  negative.      Vertebrals:  The right vertebral artery is dominant supplying the  basilar artery. The left vertebral artery is a very small vessel..    Head:  Right Carotid:No occluded vessels are seen. .    Left Carotid:  No occluded vessels are identified.  A1 segment of the  left anterior cerebral artery is hypoplastic.    Basilar:  The basilar artery and its branches appear normal.     Miscellaneous: There is a 1.3 cm nodule in the right lobe of the  thyroid.. There is also a 1 cm somewhat spiculated nodule in the right  lung apex. This is indeterminant. A CT of the thorax would be helpful  for more complete characterization. There is approximately 5 mm of  anterior subluxation of cc 4 on C5. This appears to be due to  degenerative change in the facet joints. There is loss of disc space  height at C5-6 and C6-7. No fractures are identified but if there is  any clinical suspicion for fracture a formal CT of the cervical spine  is recommended for further evaluation.      Impression    IMPRESSION:   1. No stenosis is seen at either carotid bifurcation.  2. No arterial dissection is identified.  3. No high-grade intracranial vascular stenosis is identified.  4. Venous sinuses appear patent  5. Indeterminate 1 cm nodule in the right upper lobe. CT of the chest  is recommended for  further evaluation and characterization.  6. 1.3 cm nodule in the right lobe of the thyroid. Thyroid ultrasound  could be performed for further characterization.  7. Multilevel degenerative change in the cervical spine. 5 mm of  anterior subluxation of C4 on C5.    I agree with the preliminary report that was communicated to the  referring physician.    MD Adrian LÓPEZ MD.[MF1.1]         Revision History        User Key Date/Time User Provider Type Action    > MF1.4 7/24/2018  9:43 AM Adrian Carvalho MD Physician Sign     MF1.3 7/24/2018  7:45 AM Adrian Carvalho MD Physician      MF1.5 7/24/2018  7:44 AM Adrian Carvalho MD Physician      MF1.2 7/24/2018  7:42 AM Adrian Carvalho MD Physician      MF1.1 7/24/2018  7:41 AM Adrian Carvalho MD Physician             Progress Notes by Laura Dunham at 7/24/2018  9:11 AM     Author:  Laura Dunham Service:  Social Work Author Type:      Filed:  7/24/2018  9:18 AM Date of Service:  7/24/2018  9:11 AM Creation Time:  7/24/2018  9:11 AM    Status:  Signed :  Laura Dunham ()         SWS  SW spoke to Erin at [facility].  Her strong preference is for pt to return to [facility].  She was in TCU up to 2 weeks ago and TCU felt strongly that they could no longer help her.  [facility] does have an EZ stand and believe that they could care for her as long as she is not 1:1, which she is not.  Erin plans to come to hospital to see pt.  P:  SW will continue to follow and coordinate discharge.[AJ1.1]     Revision History        User Key Date/Time User Provider Type Action    > AJ1.1 7/24/2018  9:18 AM Laura Dunham  Sign            Progress Notes by Ivonne Gagnon RN at 7/23/2018  3:25 PM     Author:  Ivonne Gagnon RN Service:  (none) Author Type:  Registered Nurse    Filed:  7/23/2018  3:26 PM Date of Service:  7/23/2018  3:25 PM  Creation Time:  7/23/2018  3:25 PM    Status:  Signed :  Ivonne Gagnon RN (Registered Nurse)         [facility]  Patient is currently open to home care services with [facility].  The patient is currently receiving RN PT OT services.  [facility]  and team have been notified of patient admission.  [facility] liaison will continue to follow patient during stay.  If appropriate provide orders to resume home care at time of discharge.[CL1.1]         Revision History        User Key Date/Time User Provider Type Action    > CL1.1 7/23/2018  3:26 PM Ivonne Gagnon RN Registered Nurse Sign            Progress Notes by Mili Nguyen RN at 7/23/2018  1:45 PM     Author:  Mili Nguyen RN Service:  Nursing Author Type:  Registered Nurse    Filed:  7/23/2018  1:46 PM Date of Service:  7/23/2018  1:45 PM Creation Time:  7/23/2018  1:45 PM    Status:  Signed :  Mili Nguyen RN (Registered Nurse)         MD notified at 1210 of pt not responding and BP's lowering to 90's/40's. MD ordered 500cc bolus of NS and monitor BPs. BPs now normalizing and pt awake. Will continue to monitor.[KA1.1]     Revision History        User Key Date/Time User Provider Type Action    > KA1.1 7/23/2018  1:46 PM Mili Nguyen RN Registered Nurse Sign            Progress Notes by Kaitlyn Jurado OT at 7/23/2018  1:42 PM     Author:  Kaitlyn Jurado OT Service:  (none) Author Type:  Occupational Therapist    Filed:  7/23/2018  1:42 PM Date of Service:  7/23/2018  1:42 PM Creation Time:  7/23/2018  1:42 PM    Status:  Signed :  Kaitlyn Jurado OT (Occupational Therapist)          07/23/18 1331   Quick Adds   Type of Visit Initial Occupational Therapy Evaluation   Living Environment   Lives With alone   Living Arrangements assisted living   Home Accessibility grab bars present (toilet);grab bars present (bathtub)   Number of Stairs to Enter Home 0   Number of Stairs Within Home 0   Living Environment Comment  Limited PLOF info obtained secondary to cognitive deficits; info obtained through chart review.   Self-Care   Dominant Hand right   Usual Activity Tolerance good   Current Activity Tolerance fair   Regular Exercise no   Equipment Currently Used at Home walker, rolling   Activity/Exercise/Self-Care Comment Pt states she uses a 4ww at baseline and walks to the dining ocasio   Functional Level Prior   Ambulation 1-->assistive equipment   Transferring 1-->assistive equipment   Toileting 3-->assistive equipment and person   Bathing 3-->assistive equipment and person   Dressing 3-->assistive equipment and person   Eating 3-->assistive equipment and person   Communication 0-->understands/communicates without difficulty   Swallowing 0-->swallows foods/liquids without difficulty   Cognition 2 - difficulty with organizing thoughts   Fall history within last six months yes   Number of times patient has fallen within last six months 1   Which of the above functional risks had a recent onset or change? transferring;toileting;dressing;cognition   Prior Functional Level Comment PLOF info obtained from chart. Pt reports she gets A with bathing 2x/week   General Information   Onset of Illness/Injury or Date of Surgery - Date 07/22/18   Referring Physician Ankit Maradiaga MD   Patient/Family Goals Statement not stated   Additional Occupational Profile Info/Pertinent History of Current Problem Pt is a 78 year old female with past medical history including steroid-dependent COPD, hypertension, hypothyroidism, recent fall with closed head injury, urinary retention and generally recently declining state of health with numerous recent hospital stays and TCU stays admitted on 7/22/2018 with altered mental status after reportedly having an unwitnessed fall at her assisted living facility.  She was found on the floor and was complaining of left hip pain.  She was also hypertensive.   Precautions/Limitations fall precautions   General Info  Comments Fluctuating alertness, pleasant   Cognitive Status Examination   Orientation place   Level of Consciousness alert;lethargic/somnolent  (fluctuating)   Able to Follow Commands moderate impairment;success, 1-step commands  (approx. 50% of the time)   Personal Safety (Cognitive) impulsive;one on one supervision required for safety   Memory impaired   Attention Distractible during evaluation;Sustained attention impaired   Organization/Problem Solving Problem solving impaired   Cognitive Comment Pt knows she is in the hospital, states she is here because of high BP. Pt able to follow single step commands approx. 50% of the time with verbal and tactile cuing.   Pain Assessment   Patient Currently in Pain No   Mobility   Bed Mobility Comments Mod Ax1   Transfer Skills   Transfer Comments Mod Ax1; extra assist needed mid transfer as pt fatigues quickly   Transfer Skill: Sit to Stand   Level of Ross: Sit/Stand minimum assist (75% patients effort)   Physical Assist/Nonphysical Assist: Sit/Stand 1 person assist;verbal cues   Transfer Skill: Sit to Stand full weight-bearing   Assistive Device for Transfer: Sit/Stand rolling walker   Eating/Self Feeding   Level of Ross: Eating stand-by assist   Physical Assist/Nonphysical Assist: Eating supervision;verbal cues   Instrumental Activities of Daily Living (IADL)   IADL Comments Pt unable to state what services she receives other than meals 3x/day.   Activities of Daily Living Analysis   Impairments Contributing to Impaired Activities of Daily Living cognition impaired;strength decreased   General Therapy Interventions   Planned Therapy Interventions ADL retraining;progressive activity/exercise   Clinical Impression   Criteria for Skilled Therapeutic Interventions Met yes, treatment indicated   OT Diagnosis Decreased ADLs   Influenced by the following impairments cognition impaired;strength decreased   Assessment of Occupational Performance 3-5 Performance  "Deficits   Identified Performance Deficits ADLs: dressing, toileting, g/h, transfers   Clinical Decision Making (Complexity) Low complexity   Therapy Frequency 5 times/wk   Predicted Duration of Therapy Intervention (days/wks) 1 week   Anticipated Discharge Disposition Transitional Care Facility   Risks and Benefits of Treatment have been explained. Yes   Patient, Family & other staff in agreement with plan of care Yes   Clinical Impression Comments Agreeable to session with encouragement   [facility] TM \"6 Clicks\"   2016, Trustees of [facility], under license to [employer].  All rights reserved.   6 Clicks Short Forms Daily Activity Inpatient Short Form   [facility]  \"6 Clicks\" Daily Activity Inpatient Short Form   1. Putting on and taking off regular lower body clothing? 2 - A Lot   2. Bathing (including washing, rinsing, drying)? 2 - A Lot   3. Toileting, which includes using toilet, bedpan or urinal? 2 - A Lot   4. Putting on and taking off regular upper body clothing? 2 - A Lot   5. Taking care of personal grooming such as brushing teeth? 3 - A Little   6. Eating meals? 3 - A Little   Daily Activity Raw Score (Score out of 24.Lower scores equate to lower levels of function) 14   Total Evaluation Time   Total Evaluation Time (Minutes) 12[TR1.1]        Revision History        User Key Date/Time User Provider Type Action    > TR1.1 7/23/2018  1:42 PM Kaitlyn Jurado OT Occupational Therapist Sign            Progress Notes by Mili Nguyen RN at 7/23/2018 10:04 AM     Author:  Mili Nguyen RN Service:  Nursing Author Type:  Registered Nurse    Filed:  7/23/2018 10:04 AM Date of Service:  7/23/2018 10:04 AM Creation Time:  7/23/2018 10:04 AM    Status:  Signed :  Mili Nguyen RN (Registered Nurse)         Spoke with director at [facility] JAMARCUS Khan- phone number [phone] if questions arise.[KA1.1]     Revision History        User Key Date/Time User Provider Type " Action    > KA1.1 7/23/2018 10:04 AM Mili Nguyen, RN Registered Nurse Sign            Progress Notes by Adrian Carvalho MD at 7/23/2018  7:55 AM     Author:  Adrian Carvalho MD Service:  Hospitalist Author Type:  Physician    Filed:  7/23/2018  9:10 AM Date of Service:  7/23/2018  7:55 AM Creation Time:  7/23/2018  7:55 AM    Status:  Signed :  Adrian Carvalho MD (Physician)         [facility]  Hospitalist Progress Note  Adrian Carvalho MD 07/23/18    Reason for Stay (Diagnosis): Unwitnessed fall, hyponatremia         Assessment and Plan:      Summary of Stay: Ana Luisa Lee is a 78 year old female with past medical history including steroid-dependent COPD, hypertension, hypothyroidism, recent fall with closed head injury, urinary retention and generally recently declining state of health with numerous recent hospital stays and TCU stays admitted on 7/22/2018 with altered mental status after reportedly having an unwitnessed fall at her assisted living facility.  She was found on the floor and was complaining of left hip pain.  She was also hypertensive.  Here in the emergency room extensive workup was performed and notable for sodium of 126 with potassium of 3.3.  Otherwise this was generally negative for acute pathology including CT scan of the head and neck angiogram, pelvic x-ray, chest x-ray and basic labs as well as blood gas, ethanol level, Tylenol level and salicylate level.    She was admitted for IV hydration and close monitoring in the setting of ongoing encephalopathy.  She has been moving all extremities and after discussion with her daughter it sounds as though following a fall in June 2018 she had to be significantly sedated in order to obtain an MRI of her brain and she/her daughter would like to avoid repeating that at this time.[MF1.1]    Thankfully on 7/23 she became much more alert and her encephalopathy is  resolving.[MF1.2]    Problem List/Assessment and Plan:   1. Toxic/metabolic encephalopathy: So far hyponatremia is 126 and what I suspect is reduced reserve is the only apparent explanation.  She does not participate readily with neurologic exam but has been moving all extremities and no focal deficits have been seen.  She had imaging of her brain back in June in the form of an MRI and here in the emergency room in the form of a noncontrast CT of her head and also a CT angiogram of her head and neck with no acute pathology identified.  --After discussion with her daughter the current plan is for ongoing IV hydration, correction of electrolytes/dehydration and close monitoring of her mental status  --Afebrile, no infectious focus apparent  --Cortisol is appropriately somewhat elevated and TSH is within normal limits  --Blood gas upon presentation did not show significant CO2 retention  --Toxicology workup including ethanol, salicylate and Tylenol levels unrevealing    2.   Unwitnessed fall: It sounds as though she has had numerous falls in the past and again this 1 was unwitnessed.  No apparent bony injury is been identified and imaging was probably negative for traumatic injury as noted above.  I suspect she may require TCU placement upon discharge pending improvement in her mental status.    3.   Hypovolemic hyponatremia: So far correcting slowly with IV hydration.  It is possible she could have a component of SIADH given underlying lung disease.  She does have lung nodules which appear to not have been completely worked up and I discussed these with her daughter.  I suspect that biopsy would not be consistent with her goals of care at this time this could be readdressed at follow-up.    4.   Hypertension: Not apparently on any medications prior to admission.  She has been intermittently hypertensive here so we will plan to start low-dose lisinopril and have as needed nitroglycerin available.  She does have  "numerous drug allergies noted.    5.   Frequent falls    6.  COPD: Not currently in an acute exacerbation, continue home Singulair, Roflumilast and other inhalers as well as scheduled Xopenex.  She is chronically on prednisone 10 mg daily.  Hemodynamically does not appear adrenally insufficient and cortisol is appropriately somewhat elevated.    7.  Thyroid nodule: We will obtain an ultrasound as an outpatient.  Discussed with her daughter.  TSH is normal.    8.  Pulmonary nodules: Discussed these with her daughter.  Consider outpatient CT scan.    DVT Prophylaxis: Pneumatic Compression Devices  Code Status: DNR / DNI, discussed with the patient's daughter personally  Discharge Dispo/Date: At least 1-2 more days.  Consider TCU.  Consult PT and OT.        Interval History (Subjective):      Sodium slowly correcting, now 131  I reviewed her recent history at length with her daughter over the phone yesterday.  She confirmed she is DNR/DNI  Patient remains confused, poor appetite  Some intermittent issues with IV access which I suspect are largely related to her encephalopathy/delirium and picking and pulling at lines[MF1.1]  Much improved today, more alert, knows where she is, makes her needs known and is interactive.[MF1.2]                  Physical Exam:      Last Vital Signs:  /72  Pulse 105  Temp 97.4  F (36.3  C) (Axillary)  Resp 18  Ht 1.575 m (5' 2.01\")  Wt 52.2 kg (115 lb 1.6 oz)  SpO2 97%  BMI 21.05 kg/m2[MF1.1]      Intake/Output Summary (Last 24 hours) at 07/23/18 0808  Last data filed at 07/23/18 0721   Gross per 24 hour   Intake             1976 ml   Output             3050 ml   Net            -1074 ml[MF1.3]       General: Elderly woman, lying in bed.[MF1.1]  Awake, calm and overall appropriate.[MF1.2]  HEENT: NC/AT, eyes anicteric, external occular movements intact, face symmetric.    Cardiac: RRR, S1, S2.  No murmurs appreciated.  Pulmonary: Normal chest rise, normal work of breathing.  " Lungs CTA BL  Abdomen: soft, non-tender, non-distended.  Bowel Sounds Present.  No guarding.  Extremities: no deformities.  Warm, well perfused.  Skin: no rashes or lesions noted.  Warm and Dry.  Neuro: No focal deficits noted.  Speech clear.[MF1.1]  Somewhat weak diffusely but alert and oriented to place and person.[MF1.2]  Psych: Appropriate affect.         Medications:      All current medications were reviewed with changes reflected in problem list.         Data:      All new lab and imaging data was reviewed.   Labs:[MF1.1]    Recent Labs  Lab 07/23/18  0500   *   POTASSIUM 3.6   CHLORIDE 101   CO2 18*   ANIONGAP 12   GLC 78   BUN 9   CR 0.48*   GFRESTIMATED >90   GFRESTBLACK >90   DIANA 8.6       Recent Labs  Lab 07/22/18  0259   WBC 11.3*   HGB 11.4*   HCT 33.6*   MCV 90   [MF1.3]      Imaging:[MF1.1]   Recent Results (from the past 48 hour(s))   CT Head w/o Contrast    Narrative    CT SCAN OF THE HEAD WITHOUT CONTRAST   7/22/2018 3:47 AM     HISTORY: fall;     TECHNIQUE:  Axial images of the head and coronal reformations without  IV contrast material. Radiation dose for this scan was reduced using  automated exposure control, adjustment of the mA and/or kV according  to patient size, or iterative reconstruction technique.    COMPARISON: None.    FINDINGS:  There is generalized atrophy of the brain.  White matter  changes are present in the cerebral hemispheres that are consistent  with small vessel ischemic disease in this age patient. There is no  evidence of intracranial hemorrhage, mass, acute infarct or anomaly.  The visualized portions of the sinuses and mastoids appear normal.  There is no evidence of trauma.      Impression    IMPRESSION: No intracranial hemorrhage or skull fractures are  identified.      WILMA CARBAJAL MD   XR Pelvis and Hip Left 2 Views    Narrative    XR PELVIS AND HIP LEFT 2 VIEWS   7/22/2018 3:50 AM     INDICATION: Fall, hip pain.    COMPARISON: None.      Impression     IMPRESSION: No acute findings. Small nonspecific sclerotic focus in  the right innominate bone, also present on prior pelvic CT  of  11/19/2017, unchanged and likely a bone island. Contrast in the  collecting systems.    MANISH AGUDELO MD   XR Chest 1 View    Narrative    XR CHEST 1 VIEW   7/22/2018 3:51 AM     INDICATION: Found down, delirium.    COMPARISON: 4/16/2018.      Impression    IMPRESSION: No infiltrates or other acute findings. Heart size is  within normal limits. A few nodular densities noted previously are  again noted, probably related to some costochondral calcification.  Moderate-sized hiatal hernia.    MANISH AGUDELO MD   CT Cervical Spine w/o Contrast    Narrative    CT CERVICAL SPINE WITHOUT CONTRAST 7/22/2018 3:54 AM     HISTORY: Neck pain after trauma.     TECHNIQUE: Axial images through cervical spine without contrast.  Sagittal and coronal reformatted images. Radiation dose for this scan  was reduced using automated exposure control, adjustment of the mA  and/or kV according to patient size, or iterative reconstruction  technique.    COMPARISON: C-spine views 10/3/2016.    FINDINGS: No acute fractures. Advanced degenerative changes with  multilevel degenerative disc disease. Moderate anterior subluxation of  C4 on C5. Degenerative and hypertrophic changes in the mid and lower  cervical facet joints. Curve convex to the right. No prevertebral soft  tissue swelling or significant central canal narrowing. There is mild  central canal narrowing relating to the degenerative changes and  anterior subluxation of C4 on C5.      Impression    IMPRESSION: No acute findings.    MANISH AGUDELO MD   CT Head Neck Angio w/o & w Contrast    Narrative    CTA HEAD NECK WITH CONTRAST  7/22/2018 3:57 AM     HISTORY: Evaluate for dissection/thromboembolism;     TECHNIQUE:  Precontrast localizing scans were followed by CT  angiography with an injection of 70mL [id number]-370 IV contrast with  scans through  the head and neck.  Images were transferred to a  separate 3-D workstation where multiplanar reformations and 3-D images  were created.  Estimates of carotid stenoses are made relative to the  distal internal carotid artery diameters except as noted. Radiation  dose for this scan was reduced using automated exposure control,  adjustment of the mA and/or kV according to patient size, or iterative  reconstruction technique.    COMPARISON: None.    FINDINGS: Estimates of stenosis at the carotid bifurcations are  relative to the distal internal carotids.    Arch: [ Normal Anatomy]    Neck:  Right Carotid:  The right common carotid artery is normal.   Atherosclerotic plaque is seen at the right carotid bifurcation. The  stenosis was calculated at 37%.  The right internal carotid artery is  negative.     Left Carotid:  The left common carotid artery is unremarkable.   Atherosclerotic plaque is seen at the left carotid bifurcation. The  stenosis was calculated at 30%..  The left internal carotid is  negative.      Vertebrals:  The right vertebral artery is dominant supplying the  basilar artery. The left vertebral artery is a very small vessel..    Head:  Right Carotid:No occluded vessels are seen. .    Left Carotid:  No occluded vessels are identified.  A1 segment of the  left anterior cerebral artery is hypoplastic.    Basilar:  The basilar artery and its branches appear normal.     Miscellaneous: There is a 1.3 cm nodule in the right lobe of the  thyroid.. There is also a 1 cm somewhat spiculated nodule in the right  lung apex. This is indeterminant. A CT of the thorax would be helpful  for more complete characterization. There is approximately 5 mm of  anterior subluxation of cc 4 on C5. This appears to be due to  degenerative change in the facet joints. There is loss of disc space  height at C5-6 and C6-7. No fractures are identified but if there is  any clinical suspicion for fracture a formal CT of the cervical  spine  is recommended for further evaluation.      Impression    IMPRESSION:   1. No stenosis is seen at either carotid bifurcation.  2. No arterial dissection is identified.  3. No high-grade intracranial vascular stenosis is identified.  4. Venous sinuses appear patent  5. Indeterminate 1 cm nodule in the right upper lobe. CT of the chest  is recommended for further evaluation and characterization.  6. 1.3 cm nodule in the right lobe of the thyroid. Thyroid ultrasound  could be performed for further characterization.  7. Multilevel degenerative change in the cervical spine. 5 mm of  anterior subluxation of C4 on C5.    I agree with the preliminary report that was communicated to the  referring physician.    WILMA CARBAJAL MD[MF1.3]         Adrian Carvalho MD.[MF1.1]         Revision History        User Key Date/Time User Provider Type Action    > MF1.2 7/23/2018  9:10 AM Adrian Carvalho MD Physician Sign     MF1.3 7/23/2018  8:08 AM Adrian Carvalho MD Physician      MF1.1 7/23/2018  7:55 AM Adrian Carvalho MD Physician             Progress Notes by Ankit Maradiaga MD at 7/23/2018  3:50 AM     Author:  Ankit Maradiaga MD Service:  Hospitalist Author Type:  Physician    Filed:  7/23/2018  3:51 AM Date of Service:  7/23/2018  3:50 AM Creation Time:  7/23/2018  3:50 AM    Status:  Signed :  Ankit Maradiaga MD (Physician)         XCOVER.  Urinary retention, required straight cath twice already, will place joseph cath for now. Reevaluate when her mental status improves.[AO1.1]     Revision History        User Key Date/Time User Provider Type Action    > AO1.1 7/23/2018  3:51 AM Ankit Maradiaga MD Physician Sign            Progress Notes by Abdulkadir Ortiz RT at 7/22/2018  5:18 PM     Author:  Abdulkadir Ortiz RT Service:  Respiratory Therapy Author Type:  Respiratory Therapist    Filed:  7/22/2018  5:20 PM Date of Service:   "7/22/2018  5:18 PM Creation Time:  7/22/2018  5:18 PM    Status:  Signed :  Abdulkadir Ortiz RT (Respiratory Therapist)         [facility]     Date:7/22/18  Admission Dx: Fall  Pulmonary History, COPD  Home Nebulizer/MDI Use: Yes   Home Oxygen: No  Acuity Level (RCAT flow sheet): 3  Aerosol Therapy initiated: Duoneb QID, Alb Q2prn       Pulmonary Hygiene initiated: pt confused       Volume Expansion initiated: pt confused       Current Oxygen Requirements: room air   Current SpO2: 97%    Re-evaluation date: 7/25/18    Patient Education: pt was explained her breathing treatment.       See \"RT Assessments\" flow sheet for patient assessment scoring and Acuity Level Details.[SD1.1]                Revision History        User Key Date/Time User Provider Type Action    > SD1.1 7/22/2018  5:20 PM Abdulkadir Ortiz, RT Respiratory Therapist Sign            Progress Notes by Adrian Carvalho MD at 7/22/2018  9:13 AM     Author:  Adrian Carvalho MD Service:  Hospitalist Author Type:  Physician    Filed:  7/22/2018 10:59 AM Date of Service:  7/22/2018  9:13 AM Creation Time:  7/22/2018  9:13 AM    Status:  Addendum :  Adrian Carvalho MD (Physician)         Update: patient remains quite encephalopathic in the setting of metabolic derangements though unifying diagnosis not yet clear.  Will require >2 midnight stay, possibly longer.  Changing to inpatient status.[MF1.1]    I did update her daughter via phone.  She confirms dnr/dni status.[MF1.2]    Adrian Carvalho MD[MF1.1]       Revision History        User Key Date/Time User Provider Type Action    > MF1.2 7/22/2018 10:59 AM Adrian Carvalho MD Physician Addend     MF1.1 7/22/2018  9:13 AM Adrian Carvalho MD Physician Sign            Progress Notes by Katie Whipple, RN at 7/22/2018  6:36 AM     Author:  Katie Whipple, RN Service:  Skilled Nursing Author Type:  Registered Nurse    Filed:  " 7/22/2018  6:40 AM Date of Service:  7/22/2018  6:36 AM Creation Time:  7/22/2018  6:36 AM    Status:  Signed :  Katie Whipple RN (Registered Nurse)         PRIMARY DIAGNOSIS: AMS   OUTPATIENT/OBSERVATION GOALS TO BE MET BEFORE DISCHARGE:  1. ADLs back to baseline: No    2. Activity and level of assistance: Assist x 2     3. Pain status: does not answer questions, grimaces and groans with movement     4. Return to near baseline physical activity: No     Discharge Planner Nurse   Safe discharge environment identified: Yes  Barriers to discharge: No       Entered by: Katie Whipple 07/22/2018 6:36 AM     Please review provider order for any additional goals.   Nurse to notify provider when observation goals have been met and patient is ready for discharge.    Pt arrived to unit around 0545. Elevated BP's, tachy other vital signs stable. /hr, skin tear to right LE, dressing intact. Consults: PT/SW[RN1.1]     Revision History        User Key Date/Time User Provider Type Action    > RN1.1 7/22/2018  6:40 AM Katie Whipple, RN Registered Nurse Sign            ED Provider Notes by Damien Blanca MD at 7/22/2018  2:46 AM     Author:  Damien Blanca MD Service:  Emergency Medicine Author Type:  Physician    Filed:  7/22/2018  6:36 AM Date of Service:  7/22/2018  2:46 AM Creation Time:  7/22/2018  2:48 AM    Status:  Signed :  Damien Blanca MD (Physician)           History     Chief Complaint:  Fall    HPI History limited secondary to patients mental status change:    Ana Luisa Lee is a 78 year old female with a history of[SL1.1] hypertension, hyperlipidemia, COPD and falls[SL1.2] who presents to the Emergency Department for evaluation after an unwitnessed fall. Approximately 45 minutes prior to arrival, staff found the patient on the floor with altered mental status and complaining of left hip pain.[SL1.1] Of note,[SL1.2] sta[SL1.1]ff had been with the patient one hour prior to when she was found  down, at which[SL1.2] time[SL1.3] she was[SL1.2] alert and oriented[SL1.3] x 4[SL1.4] and ambulating with her wheelchair per her usual.[SL1.3] En route the patient was placed in C-collar, she was tachycardic and hypertensive with BP in the 190's-200's systolic and with a blood sugar of 140.[SL1.1] Here in the[SL1.4] ED[SL1.5] the patient has a skin tear to her right lower extremity,[SL1.4] she[SL1.5] is[SL1.2] confused and[SL1.5] does not answer questions[SL1.2].[SL1.5] No use of anticoagulants.[SL1.6]     Sodium (7/17/18): 127 (L)[SL1.7]    Allergies:[SL1.1]  Hydralazine  Penicillin G  Meloxicam  Metoprolol  Norvasc [Amlodipine Besylate][SL1.8]     Medications:    acetaminophen (TYLENOL) 500 MG tablet  albuterol (ALBUTEROL) 108 (90 BASE) MCG/ACT Inhaler  budesonide (PULMICORT FLEXHALER) 180 MCG/ACT inhaler  calcium 600 MG tablet  cholecalciferol (VITAMIN D) 1000 UNIT tablet  cyanocobalamin (B-12 TR) 1000 MCG TBCR  ferrous sulfate (IRON) 325 (65 Fe) MG tablet  fexofenadine (ALLEGRA) 60 MG tablet  fluticasone (FLONASE) 50 MCG/ACT spray  gemfibrozil (LOPID) 600 MG tablet  levalbuterol (XOPENEX) 1.25 MG/3ML neb solution  levothyroxine (SYNTHROID/LEVOTHROID) 25 MCG tablet  montelukast (SINGULAIR) 10 MG tablet  Multiple Vitamins-Minerals (MULTIVITAMIN OR)  omeprazole (PRILOSEC) 40 MG capsule  polyethylene glycol (MIRALAX/GLYCOLAX) Packet  predniSONE (DELTASONE) 10 MG tablet  roflumilast (DALIRESP) 500 MCG TABS tablet  TRAMADOL HCL PO  umeclidinium (INCRUSE ELLIPTA) 62.5 MCG/INH oral inhaler     Past Medical History:    Anemia   Arthritis of hand   Asthma  Aortic stenosis  Chronic intermittent steroid use   COPD exacerbation (H)   Esophageal stricture   HTN  Hypercalcemia  Hyperlipidemia   Hypothyroidism   Osteoporosis   Paroxysmal supraventricular tachycardia   PVC (premature ventricular contraction)   SVT (supraventricular tachycardia)    Past Surgical History:    Bunionectomy  Hysterectomy  Tonsillectomy    Family  History:    Cerebrovascular disease: mother  Hypertension: mother    Social History:  The patient was accompanied to the ED by EMS.  Smoking Status: former smoker  Smokeless Tobacco: never used  Alcohol Use: no  Marital Status:      Review of Systems   Unable to perform ROS: Mental status change     Physical Exam   First Vitals:[SL1.1]  Patient Vitals for the past 24 hrs:   BP Temp Temp src Heart Rate Resp SpO2 Weight   07/22/18 0629 178/88 - - - - - -   07/22/18 0548 192/82 97.9  F (36.6  C) Axillary 99 20 99 % 52.2 kg (115 lb 1.6 oz)   07/22/18 0530 134/84 - - - - - -   07/22/18 0415 - - - - - 98 % -   07/22/18 0414 - - - - - 100 % -   07/22/18 0413 115/71 - - - - 99 % -   07/22/18 0252 - 97.5  F (36.4  C) Temporal - - - -   07/22/18 0248 (!) 140/124 - - 113 20 99 % -[JA1.1]       Physical Exam  Nursing note and vitals reviewed.  Constitutional: Holding left hip. Uncomfortable appearing. Doesn't[SL1.1] respond[SL1.4] to verbal questions.[SL1.1] Writhing in bed.[JA1.2]   HENT:   Mouth/Throat: Mucous membranes are[SL1.1] dry[JA1.2].   Eyes: Pupils are equal, round, and reactive to light.   Cardiovascular:[SL1.1] tachycardic[JA1.2] rate, regular rhythm and normal heart sounds.  No murmur.  Pulmonary/Chest: Effort normal and breath sounds normal. No respiratory distress. No wheezes. No rales.   Abdominal: Soft. Normal appearance and bowel sounds are normal. No distension. There is no tenderness. There is no rigidity and no guarding.   Musculoskeletal: Normal range of motion. Moving all extremities[SL1.1] without deformity[JA1.2]  Neurological:[SL1.1] Strength normal.  Withdrawals to pain in all extremities.  Neg babinski.[JA1.2]     Skin: Skin is warm and dry. No rash noted. skin tear to right shin  Psychiatric:[SL1.1] Unable to assess[JA1.2]    Emergency Department Course   ECG:  Indication: fall/altered mental status  Time: 0255  Vent. Rate 100 bpm. PA interval 176. QRS duration 88. QT/QTc 352/454. P-R-T  axis 77 23 70.   normal sinus rhythm. Septal infarct, age undetermined. No change from 6/2018. Read time: 0256.    Imaging:  Radiographic findings were communicated with the patient who voiced understanding of the findings.    Chest XR:[SL1.1]  No infiltrates or other acute findings. Heart size is within normal limits. A few nodular densities noted previously are again noted, probably related to some costochondral calcification. Moderate-sized hiatal hernia.[SL1.9] As per radiology.     CT Cervical spine without contrast:[SL1.1]  No acute findings.[SL1.9] As per radiology.     CT Head[SL1.1] Neck Angio with and[SL1.10] without contrast:[SL1.1]  Head: mild atherosclerotic changes, otherwise normal intracranial circulation.  Neck:  1. Indeterminate nodule right lung apex, formal CT chest recommended.2. Tiny left vertebral artery is only segmentally visualized; normal dominant right vertebral artery and normal basilar artery.  3. Mild to moderate atherosclerotic narrowing proximal[SL1.5] ICA's[SL1.4] bilaterally, measured at less than 50% on the left and at approximately 50 % on the right.  4. Nodule right thyroid gland; ultrasound recommended, if not done previously.   5. Incidentally noted is a 5 mm anterior subluxation C4 on C5 without definite evidence of a fracture if indicated, CT cervical spine may be performed.[SL1.5]     Left Pelvis and hip XR:[SL1.1]  No acute findings. Small nonspecific sclerotic focus in the right innominate bone, also present on prior pelvic CT  of 11/19/2017, unchanged and likely a bone island. Contrast in the collecting systems.[SL1.9] As per radiology.[SL1.1]     Head CT:  No hemorrhage, mass or mass effect. Mild to moderate atrophy. Moderate presumed chronic small vessel ischemia. Normal orbits. Clear paranasal sinuses. Opacification of left mastoid air cells and middle ear cavity. As per radiology.[SL1.10]     Laboratory:  CBC: WBC:[SL1.1] 11.3(H)[SL1.2], HGB:[SL1.1] 11.4(L)[SL1.2],  PLT:[SL1.1] 357[SL1.2]  CMP: Glucose[SL1.1] 138(H)[SL1.11],[SL1.1] (L), potassium 3.3(L), chloride 93(L)[SL1.11], o/w WNL (Creat[SL1.1] 0.58[SL1.11])    Alcohol ethyl:[SL1.1] <0.01[SL1.11]    Lactic acid:[SL1.1] 1.0[SL1.2]    Troponin:[SL1.1] <0.015[SL1.11]    PTT:[SL1.1] 27[SL1.12]  INR:[SL1.1] 1.09[SL1.12]    Salicylate level:[SL1.5] <2[SL1.4]    Blood venous gas: all within normal limits.[SL1.5]    Blood Culture 1: pending  Blood Culture 2: pending    UA with Microscopic:[SL1.1] small urine blood  Urine Culture: Pending[SL1.5]    Interventions:[SL1.1]  0303 Ativan[SL1.9] 0.5[JA1.2] mg IV[SL1.9] (for MRI)[JA1.2]   Morphine 4 mg IV   Zofran 4 mg IV[SL1.9]  0348    Ativan 0.5mg IV[JA1.2]    Emergency Department Course:  Nursing notes and vitals reviewed. I performed an exam of the patient as documented above.     The patient provided a urine sample here in the emergency department. This was sent for laboratory testing, findings above.    EKG obtained in the ED, see results above.     Blood drawn. This was sent to the lab for further testing, results above.    The patient was sent for a chest x-ray while here in the emergency department, findings above.    The patient was sent for a head CT while here in the emergency department, findings above.    The patient was sent for a pelvis xray while here in the emergency department, findings above.    The patient was sent for a CT cervical spine while here in the emergency department, findings above.[SL1.1]    0406 I reassessed the patient.[SL1.9]     0451 I reassessed the patient.[SL1.5]     0458 I spoke with Dr. Maradiaga regarding this patient.    Findings and plan explained to the Patient who consents to admission. Discussed the patient with Dr. Maradiaga, who will admit the patient to a[SL1.13]n observation[SL1.4] bed for further monitoring, evaluation, and treatment.[SL1.13]    Impression & Plan      Medical Decision Making:[SL1.1]  Ana Luisa Lee is a 78 year  old female who presents with delirium and apparent hip pain. Interestingly radiographs of the pelvis, hip and chest are all negative. C spine and CT/CTA of the head and neck are also unremarkable. She has no[SL1.4] lateralizing[JA1.2] neurologic deficits. She appropriately withdrawals to pain in all extremities but is not directable. She did require ativan to allow for head CT imaging to rule out intercranial hemorrhage. Since that time she has been resting comfortably in bed. Venous gas is unremarkable and her toxicologic metabolic work up is essentially negative other than a low sodium. She had a similarly low sodium last week making hyponatremic seizures unlikely although a seizure is not ruled out. It would be along time for her to be post-ictal without clearing. It does not appear to be an infectious etiology with reassuring chest xray, urine and lab work. She is afebrile. meningitis was considered but seems unlikely. Somewhat unclear of what her baseline is although per report she is normally alert and oriented. Given her age and undifferentiated delirium she will be admitted to the observation unit. IV fluids have been initiated and she is resting comfortable condition. Skin tear of the right lower extremity has been cleaned and appropriately dressed.[SL1.4]     Diagnosis:[SL1.1]    ICD-10-CM   1. Delirium R41.0   2. Skin tear of right lower leg without complication, initial encounter S81.811A   3. Hyponatremia E87.1[SL1.4]       Disposition:[SL1.1]  Admitted to[SL1.13] observation[SL1.4]    Ivis VERGARA[SL1.2] Parvin Mueller[SL1.4], am serving as a scribe on 7/22/2018 at 3:14 AM to personally document services performed by Damien Blanca MD based on my observations and the provider's statements to me.[SL1.2]     Ivis[SL1.1] Parvin Mueller[SL1.4]  7/22/2018   [facility] EMERGENCY DEPARTMENT[SL1.1]       Damien Blanca MD  07/22/18 0636  [JA1.3]     Revision History        User Key Date/Time User Provider Type  Action    > JA1.3 7/22/2018  6:36 AM Damien Blanca MD Physician Sign     JA1.1 7/22/2018  6:32 AM Damien Blanca MD Physician      JA1.2 7/22/2018  6:31 AM Damien Blanca MD Physician      SL1.4 7/22/2018  5:50 AM LabvincenzoeIvis Scribe Share     SL1.3 7/22/2018  5:02 AM LabrosseIvis Scribe Share     SL1.13 7/22/2018  5:00 AM Labrosse, Ivis Scribe Share     SL1.5 7/22/2018  4:55 AM Labrosse, Ivis Scribe Share     SL1.10 7/22/2018  4:17 AM Labrosse, Ivis Scribe Share     [N/A] 7/22/2018  4:13 AM Labrosse, Ivis Scribe Share     SL1.7 7/22/2018  4:11 AM Labrosse, Ivis Scribe Share     SL1.9 7/22/2018  4:08 AM Labrosse, Ivis Scribe Share     SL1.11 7/22/2018  3:55 AM Labrosse, Ivis Scribe Share     [N/A] 7/22/2018  3:46 AM Labrosse, Ivis Scribe Share     SL1.6 7/22/2018  3:39 AM Labrosse, Ivis Scribe Share     [N/A] 7/22/2018  3:30 AM Labrosse, Ivis Scribe Share     SL1.12 7/22/2018  3:22 AM Labrosse, Ivis Scribe Share     SL1.2 7/22/2018  3:16 AM Labrosse, Ivis Scribe Share     SL1.8 7/22/2018  3:00 AM LabrosseIvis Scribe Share     SL1.1 7/22/2018  2:48 AM Labrosse, Ivis Scribe             ED Notes by Katie Whipple, RN at 7/22/2018  5:20 AM     Author:  Katie Whipple, RN Service:  Skilled Nursing Author Type:  Registered Nurse    Filed:  7/22/2018  5:28 AM Date of Service:  7/22/2018  5:20 AM Creation Time:  7/22/2018  5:23 AM    Status:  Addendum :  Katie Whipple, RN (Registered Nurse)         [facility]  ED Nurse Handoff Report    Ana Luisa Lee is a 78 year old female   ED Chief complaint: Altered Mental Status  . ED Diagnosis:   Final diagnoses:   Delirium   Skin tear of right lower leg without complication, initial encounter   Hyponatremia     Allergies:   Allergies   Allergen Reactions     Hydralazine Anxiety     Penicillin G Hives     Tolerated cephalosporines 2017     Meloxicam      dizziness       Metoprolol      ? Skin rash on the back     Norvasc [Amlodipine  Besylate] Hives       Code Status: DNR / DNI  Activity level - Baseline/Home:  Stand with Assist of 2. Activity Level - Current:   Stand with Assist of 2. Lift room needed: No. Bariatric: No   Needed: No   Isolation: No. Infection: Not Applicable.     Vital Signs:   Vitals:    07/22/18 0252 07/22/18 0413 07/22/18 0414 07/22/18 0415   BP:  115/71     Resp:       Temp: 97.5  F (36.4  C)      TempSrc: Temporal      SpO2:  99% 100% 98%       Cardiac Rhythm:  ,      Pain level:    Patient confused: Yes. Patient Falls Risk: Yes.   Elimination Status: quick cathed.    Patient Report - Initial Complaint: found down, and not making sense to staff. . Focused Assessment:  Cognitive/Perceptual/Neuro - Cognitive/Neuro/Behavioral WDL:  WDL except; arousability Arousal Level: arouses to pain Best Language: 0 - No aphasia Headache: None LUE Sensation: no numbness; no tingling RUE Sensation: no numbness; no tingling LLE Sensation: no tingling; no numbness RLE Sensation: no numbness; no tingling Swartz Agitation-Sedation Scale (RASS): 0-->alert and calm  Pupils (CN II) - Pupil PERRLA: yes  Motor Strength - Left Upper: 5 - active movement against gravity and full resistance Right Upper: 5 - active movement against gravity and full resistance Left Lower: 5 - active movement against gravity and full resistance Right Lower: 5 - active movement against gravity and full resistance  [employer] Coma Scale - Best Eye Response: 3-->(E3) to speech Best Motor Response: 5-->(M5) localizes pain Best Verbal Response: 4-->(V4) confused Sally Coma Scale Score: 12  Tests Performed:[RN1.1]  Labs Ordered and Resulted from Time of ED Arrival Up to the Time of Departure from the ED   CBC WITH PLATELETS DIFFERENTIAL - Abnormal; Notable for the following:        Result Value    WBC 11.3 (*)     RBC Count 3.73 (*)     Hemoglobin 11.4 (*)     Hematocrit 33.6 (*)     All other components within normal limits   COMPREHENSIVE METABOLIC PANEL -  Abnormal; Notable for the following:     Sodium 126 (*)     Potassium 3.3 (*)     Chloride 93 (*)     Glucose 138 (*)     All other components within normal limits   ROUTINE UA WITH MICROSCOPIC - Abnormal; Notable for the following:     Blood Urine Small (*)     All other components within normal limits   INR   PARTIAL THROMBOPLASTIN TIME   LACTIC ACID WHOLE BLOOD   TROPONIN I   ALCOHOL ETHYL   BLOOD GAS VENOUS AND OXYHGB   SALICYLATE LEVEL   PULSE OXIMETRY NURSING   CARDIAC CONTINUOUS MONITORING   PERIPHERAL IV CATHETER   INDWELLING URINARY CATHETER (HYATT)   BLOOD CULTURE   URINE CULTURE AEROBIC BACTERIAL   BLOOD CULTURE   BLOOD CULTURE     CT Cervical Spine w/o Contrast   Preliminary Result   IMPRESSION: No acute findings.      XR Chest 1 View   Preliminary Result   IMPRESSION: No infiltrates or other acute findings. Heart size is   within normal limits. A few nodular densities noted previously are   again noted, probably related to some costochondral calcification.   Moderate-sized hiatal hernia.      XR Pelvis and Hip Left 2 Views   Preliminary Result   IMPRESSION: No acute findings. Small nonspecific sclerotic focus in   the right innominate bone, also present on prior pelvic CT  of   11/19/2017, unchanged and likely a bone island. Contrast in the   collecting systems.      CT Head w/o Contrast    (Results Pending)   CT Head Neck Angio w/o & w Contrast    (Results Pending)[RN1.2]       Treatments provided:[RN1.1]   Medications   lidocaine 1 % 1 mL (not administered)   lidocaine (LMX4) kit (not administered)   sodium chloride (PF) 0.9% PF flush 3 mL (not administered)   sodium chloride (PF) 0.9% PF flush 3 mL (not administered)   lidocaine 2 % (URO-JET) jelly 20 mL (not administered)   ondansetron (ZOFRAN) injection 4 mg (4 mg Intravenous Given 7/22/18 0303)   morphine (PF) injection 4 mg (4 mg Intravenous Given 7/22/18 0303)   LORazepam (ATIVAN) injection 1 mg (1 mg Intravenous Given 7/22/18 0348)   0.9%  sodium chloride BOLUS (0 mLs Intravenous Stopped 7/22/18 0346)   iopamidol (ISOVUE-370) solution 500 mL (70 mLs Intravenous Given 7/22/18 0343)[RN1.2]       Family Comments: n/a   OBS brochure/video discussed/provided to patient:  No pt unable to comprehend   ED Medications:   Medications   lidocaine 1 % 1 mL (not administered)   lidocaine (LMX4) kit (not administered)   sodium chloride (PF) 0.9% PF flush 3 mL (not administered)   sodium chloride (PF) 0.9% PF flush 3 mL (not administered)   lidocaine 2 % (URO-JET) jelly 20 mL (not administered)   ondansetron (ZOFRAN) injection 4 mg (4 mg Intravenous Given 7/22/18 0303)   morphine (PF) injection 4 mg (4 mg Intravenous Given 7/22/18 0303)   LORazepam (ATIVAN) injection 1 mg (1 mg Intravenous Given 7/22/18 0348)   0.9% sodium chloride BOLUS (0 mLs Intravenous Stopped 7/22/18 0346)   iopamidol (ISOVUE-370) solution 500 mL (70 mLs Intravenous Given 7/22/18 0343)     Drips infusing:  No  For the majority of the shift, the patient's behavior Green. Interventions performed were monitor  .     Severe Sepsis OR Septic Shock Diagnosis Present: No      ED Nurse Name/Phone Number: Hunter Garza,   5:20 AM[RN1.1]    RECEIVING UNIT ED HANDOFF REVIEW    Above ED Nurse Handoff Report was reviewed: Yes  Reviewed by: Katie Whipple on July 22, 2018 at 5:28 AM[RN2.1]        Revision History        User Key Date/Time User Provider Type Action    > RN2.1 7/22/2018  5:28 AM Katie Whipple RN Registered Nurse Addend     RN1.2 7/22/2018  5:23 AM Hunter Garza RN Registered Nurse Sign     RN1.1 7/22/2018  5:20 AM Hunter aGrza RN Registered Nurse             ED Notes by Kesha Grover RN at 7/22/2018  2:46 AM     Author:  Kesha Grover RN Service:  (none) Author Type:  Registered Nurse    Filed:  7/22/2018  2:46 AM Date of Service:  7/22/2018  2:46 AM Creation Time:  7/22/2018  2:46 AM    Status:  Signed :  Kesha Grover RN (Registered Nurse)         Bed:  ED14  Expected date: 7/22/18  Expected time: 2:37 AM  Means of arrival: Ambulance  Comments:  596     Revision History        User Key Date/Time User Provider Type Action    > KB1.1 7/22/2018  2:46 AM Kesha Grover, RN Registered Nurse Sign                  Procedure Notes     No notes of this type exist for this encounter.         Progress Notes - Therapies (Notes from 07/22/18 through 07/25/18)      Progress Notes by Mae Loza PT at 7/24/2018 10:58 AM     Author:  Mae Loza PT Service:  (none) Author Type:  Physical Therapist    Filed:  7/24/2018 10:58 AM Date of Service:  7/24/2018 10:58 AM Creation Time:  7/24/2018 10:58 AM    Status:  Signed :  Mae Loza PT (Physical Therapist)          07/24/18 1043   Quick Adds   Type of Visit Initial PT Evaluation   Living Environment   Lives With alone   Living Arrangements assisted living   Home Accessibility grab bars present (toilet);grab bars present (bathtub)   Number of Stairs to Enter Home 0   Number of Stairs Within Home 0   Living Environment Comment Pt just moved to [facility] 2 weeks ago. Pt was working with PT/OT and mostly in W/C since leaving TCU 2 weeks ago   Self-Care   Dominant Hand right   Usual Activity Tolerance fair   Regular Exercise no   Equipment Currently Used at Home walker, rolling   Functional Level Prior   Ambulation 1-->assistive equipment  (pt states this but also chart reporting W/C)   Transferring 1-->assistive equipment  (Pt states ability to perform but also chart states A x 1)   Toileting 1-->assistive equipment  (per pt)   Bathing 3-->assistive equipment and person   Dressing 3-->assistive equipment and person   Eating 3-->assistive equipment and person   Communication 0-->understands/communicates without difficulty   Swallowing 0-->swallows foods/liquids without difficulty   Cognition 2 - difficulty with organizing thoughts   Fall history within last six months yes   Number of times patient has fallen within last six  months 1   Which of the above functional risks had a recent onset or change? transferring;toileting;dressing;cognition   Prior Functional Level Comment Reports from pt are inaccurate.   General Information   Onset of Illness/Injury or Date of Surgery - Date 07/22/18   Referring Physician Dr. Louis   Patient/Family Goals Statement None stated   Pertinent History of Current Problem (include personal factors and/or comorbidities that impact the POC) Ana Luisa Lee is a 78 year old female with past medical history including steroid-dependent COPD, hypertension, hypothyroidism, recent fall with closed head injury, urinary retention and generally recently declining state of health with numerous recent hospital stays and TCU stays admitted on 7/22/2018 with altered mental status after reportedly having an unwitnessed fall at her assisted living facility.  She was found on the floor and was complaining of left hip pain.  She was also hypertensive.  Here in the emergency room extensive workup was performed and notable for sodium of 126 with potassium of 3.3.  Otherwise this was generally negative for acute pathology including CT scan of the head and neck angiogram, pelvic x-ray, chest x-ray and basic labs as well as blood gas, ethanol level, Tylenol level and salicylate level.   Cognitive Status Examination   Orientation person;place   Level of Consciousness alert;confused  (difficulty describing needs at times)   Follows Commands and Answers Questions 75% of the time;able to follow single-step instructions   Personal Safety and Judgment impaired   Memory impaired   Pain Assessment   Patient Currently in Pain No   Integumentary/Edema   Integumentary/Edema Comments Pt does have abrasion/skin tear on shin   Posture    Posture Forward head position;Protracted shoulders   Range of Motion (ROM)   ROM Comment WFL   Strength   Strength Comments foot drop on the R side, lacking shoulder shrug on R, able to raise eyebrows,  "puff up checks, 3+/5 in quads/hamstrings/hip flexors   Transfer Skills   Transfer Comments min/mod A x 2   Gait   Gait Comments min/mod A x 2 due to poor coordination, balance   Balance   Balance Comments poor, narrow GAVIN throughout, needing up to mod A/max A with LOB   Sensory Examination   Sensory Perception Comments Numbness in R foot   Coordination   Coordination Comments poor with attempting steps   General Therapy Interventions   Planned Therapy Interventions balance training;bed mobility training;gait training;strengthening;transfer training;progressive activity/exercise;risk factor education   Clinical Impression   Criteria for Skilled Therapeutic Intervention yes, treatment indicated   PT Diagnosis decreased functional mobility status post hyponatremia and fall   Influenced by the following impairments decreased cognition, strength, coordination, balance   Functional limitations due to impairments decreased transfers, ambulation   Clinical Presentation Evolving/Changing   Clinical Presentation Rationale concerning with foot drop on R, poor cognitive status   Clinical Decision Making (Complexity) Moderate complexity   Therapy Frequency` 5 times/week   Predicted Duration of Therapy Intervention (days/wks) 1 week   Anticipated Discharge Disposition Transitional Care Facility;Home with Assist;Home with Home Therapy  (pending JAMARCUS to provide level of care)   Risk & Benefits of therapy have been explained Yes   Patient, Family & other staff in agreement with plan of care Yes   [facility] mohchi TM \"6 Clicks\"   2016, Trustees of [facility], under license to [employer].  All rights reserved.   6 Clicks Short Forms Basic Mobility Inpatient Short Form   [facility] Think [employer]time[employer]AC  \"6 Clicks\" V.2 Basic Mobility Inpatient Short Form   1. Turning from your back to your side while in a flat bed without using bedrails? 2 - A Lot   2. Moving from lying on your back to sitting on the side of a flat bed without " using bedrails? 2 - A Lot   3. Moving to and from a bed to a chair (including a wheelchair)? 2 - A Lot   4. Standing up from a chair using your arms (e.g., wheelchair, or bedside chair)? 2 - A Lot   5. To walk in hospital room? 2 - A Lot   6. Climbing 3-5 steps with a railing? 1 - Total   Basic Mobility Raw Score (Score out of 24.Lower scores equate to lower levels of function) 11   Total Evaluation Time   Total Evaluation Time (Minutes) 10[MP1.1]        Revision History        User Key Date/Time User Provider Type Action    > MP1.1 7/24/2018 10:58 AM Mae Loza, PT Physical Therapist Sign            Progress Notes by Kaitlyn Jurado OT at 7/23/2018  1:42 PM     Author:  Kaitlyn Jurado OT Service:  (none) Author Type:  Occupational Therapist    Filed:  7/23/2018  1:42 PM Date of Service:  7/23/2018  1:42 PM Creation Time:  7/23/2018  1:42 PM    Status:  Signed :  Kaitlyn Jurado OT (Occupational Therapist)          07/23/18 1331   Quick Adds   Type of Visit Initial Occupational Therapy Evaluation   Living Environment   Lives With alone   Living Arrangements assisted living   Home Accessibility grab bars present (toilet);grab bars present (bathtub)   Number of Stairs to Enter Home 0   Number of Stairs Within Home 0   Living Environment Comment Limited PLOF info obtained secondary to cognitive deficits; info obtained through chart review.   Self-Care   Dominant Hand right   Usual Activity Tolerance good   Current Activity Tolerance fair   Regular Exercise no   Equipment Currently Used at Home walker, rolling   Activity/Exercise/Self-Care Comment Pt states she uses a 4ww at baseline and walks to the dining ocasio   Functional Level Prior   Ambulation 1-->assistive equipment   Transferring 1-->assistive equipment   Toileting 3-->assistive equipment and person   Bathing 3-->assistive equipment and person   Dressing 3-->assistive equipment and person   Eating 3-->assistive equipment and person   Communication  0-->understands/communicates without difficulty   Swallowing 0-->swallows foods/liquids without difficulty   Cognition 2 - difficulty with organizing thoughts   Fall history within last six months yes   Number of times patient has fallen within last six months 1   Which of the above functional risks had a recent onset or change? transferring;toileting;dressing;cognition   Prior Functional Level Comment PLOF info obtained from chart. Pt reports she gets A with bathing 2x/week   General Information   Onset of Illness/Injury or Date of Surgery - Date 07/22/18   Referring Physician Ankit Maradiaga MD   Patient/Family Goals Statement not stated   Additional Occupational Profile Info/Pertinent History of Current Problem Pt is a 78 year old female with past medical history including steroid-dependent COPD, hypertension, hypothyroidism, recent fall with closed head injury, urinary retention and generally recently declining state of health with numerous recent hospital stays and TCU stays admitted on 7/22/2018 with altered mental status after reportedly having an unwitnessed fall at her assisted living facility.  She was found on the floor and was complaining of left hip pain.  She was also hypertensive.   Precautions/Limitations fall precautions   General Info Comments Fluctuating alertness, pleasant   Cognitive Status Examination   Orientation place   Level of Consciousness alert;lethargic/somnolent  (fluctuating)   Able to Follow Commands moderate impairment;success, 1-step commands  (approx. 50% of the time)   Personal Safety (Cognitive) impulsive;one on one supervision required for safety   Memory impaired   Attention Distractible during evaluation;Sustained attention impaired   Organization/Problem Solving Problem solving impaired   Cognitive Comment Pt knows she is in the hospital, states she is here because of high BP. Pt able to follow single step commands approx. 50% of the time with verbal and tactile cuing.  "  Pain Assessment   Patient Currently in Pain No   Mobility   Bed Mobility Comments Mod Ax1   Transfer Skills   Transfer Comments Mod Ax1; extra assist needed mid transfer as pt fatigues quickly   Transfer Skill: Sit to Stand   Level of [place]: Sit/Stand minimum assist (75% patients effort)   Physical Assist/Nonphysical Assist: Sit/Stand 1 person assist;verbal cues   Transfer Skill: Sit to Stand full weight-bearing   Assistive Device for Transfer: Sit/Stand rolling walker   Eating/Self Feeding   Level of [place]: Eating stand-by assist   Physical Assist/Nonphysical Assist: Eating supervision;verbal cues   Instrumental Activities of Daily Living (IADL)   IADL Comments Pt unable to state what services she receives other than meals 3x/day.   Activities of Daily Living Analysis   Impairments Contributing to Impaired Activities of Daily Living cognition impaired;strength decreased   General Therapy Interventions   Planned Therapy Interventions ADL retraining;progressive activity/exercise   Clinical Impression   Criteria for Skilled Therapeutic Interventions Met yes, treatment indicated   OT Diagnosis Decreased ADLs   Influenced by the following impairments cognition impaired;strength decreased   Assessment of Occupational Performance 3-5 Performance Deficits   Identified Performance Deficits ADLs: dressing, toileting, g/h, transfers   Clinical Decision Making (Complexity) Low complexity   Therapy Frequency 5 times/wk   Predicted Duration of Therapy Intervention (days/wks) 1 week   Anticipated Discharge Disposition Transitional Care Facility   Risks and Benefits of Treatment have been explained. Yes   Patient, Family & other staff in agreement with plan of care Yes   Clinical Impression Comments Agreeable to session with encouragement   [facility] AM-PAC TM \"6 Clicks\"   2016, Trustees of [facility], under license to [employer].  All rights reserved.   6 Clicks Short Forms Daily Activity " "Inpatient Short Form   [facility] AM-PAC  \"6 Clicks\" Daily Activity Inpatient Short Form   1. Putting on and taking off regular lower body clothing? 2 - A Lot   2. Bathing (including washing, rinsing, drying)? 2 - A Lot   3. Toileting, which includes using toilet, bedpan or urinal? 2 - A Lot   4. Putting on and taking off regular upper body clothing? 2 - A Lot   5. Taking care of personal grooming such as brushing teeth? 3 - A Little   6. Eating meals? 3 - A Little   Daily Activity Raw Score (Score out of 24.Lower scores equate to lower levels of function) 14   Total Evaluation Time   Total Evaluation Time (Minutes) 12[TR1.1]        Revision History        User Key Date/Time User Provider Type Action    > TR1.1 7/23/2018  1:42 PM Kaitlyn Jurado OT Occupational Therapist Sign            Progress Notes by Abdulkadir Ortiz RT at 7/22/2018  5:18 PM     Author:  Abdulkadir Ortiz RT Service:  Respiratory Therapy Author Type:  Respiratory Therapist    Filed:  7/22/2018  5:20 PM Date of Service:  7/22/2018  5:18 PM Creation Time:  7/22/2018  5:18 PM    Status:  Signed :  Abdulkadir Ortiz RT (Respiratory Therapist)         [facility] RCAT     Date:7/22/18  Admission Dx: Fall  Pulmonary History, COPD  Home Nebulizer/MDI Use: Yes   Home Oxygen: No  Acuity Level (RCAT flow sheet): 3  Aerosol Therapy initiated: Duoneb QID, Alb Q2prn       Pulmonary Hygiene initiated: pt confused       Volume Expansion initiated: pt confused       Current Oxygen Requirements: room air   Current SpO2: 97%    Re-evaluation date: 7/25/18    Patient Education: pt was explained her breathing treatment.       See \"RT Assessments\" flow sheet for patient assessment scoring and Acuity Level Details.[SD1.1]                Revision History        User Key Date/Time User Provider Type Action    > SD1.1 7/22/2018  5:20 PM Abdulkadir Ortiz RT Respiratory Therapist Sign            "

## 2018-07-22 NOTE — PROGRESS NOTES
PRIMARY DIAGNOSIS: AMS   OUTPATIENT/OBSERVATION GOALS TO BE MET BEFORE DISCHARGE:  1. ADLs back to baseline: No    2. Activity and level of assistance: Assist x 2     3. Pain status: does not answer questions, grimaces and groans with movement     4. Return to near baseline physical activity: No     Discharge Planner Nurse   Safe discharge environment identified: Yes  Barriers to discharge: No       Entered by: Katie Whipple 07/22/2018 6:36 AM     Please review provider order for any additional goals.   Nurse to notify provider when observation goals have been met and patient is ready for discharge.    Pt arrived to unit around 0545. Elevated BP's, tachy other vital signs stable. /hr, skin tear to right LE, dressing intact. Consults: PT/NIMCO

## 2018-07-22 NOTE — PROVIDER NOTIFICATION
Web based paged Dr. Carvalho regarding: Bladder scanned patient for 329, do you want us to straight cath patient? Straight cathed this am for 800mL.

## 2018-07-22 NOTE — PROVIDER NOTIFICATION
Web based paged admitting regarding: On IV 0.9% sodium chloride + KCl 20 mEq/L. K 4.4. Sodium 131. Do you still want patient on same IV fluids?

## 2018-07-22 NOTE — LETTER
Key Recommendations:  Pt is admitted with Encephalopathy and fall.  COURTNEY ELEVATED.      Katie Ellis    AVS/Discharge Summary is the source of truth; this is a helpful guide for improved communication of patient story

## 2018-07-22 NOTE — PLAN OF CARE
Problem: Patient Care Overview  Goal: Plan of Care/Patient Progress Review  Outcome: No Change  Ambulatory Status:  Pt up 2-3 people for transfer. Pt does not follow directions at times. Getting out of bed, yelling out for help, and undirectable. Tried VPM, unsuccessful. Sitter at bedside which is helping.   VS:  High /92 and 171/74, gave PRN hydralazine per mar. Recheck 155/71. On RA.   Pain:  Unable to assess  Resp: LS clear  GI:  no nausea.  no appetite and on clear diet.  BS active.  Passing flatus.  Last BM today.  :  Straight cath x1 this shift (800 mL out). purewick in place and bladder scanned for comfort. Incontinent of urine multiple times.  Skin:  Bruises noted and skin tear to left lower leg  Tx:  IV potassium, fluids, and lab work  Labs:  Na 126, recheck at 1800. K 3.3, IV potassium was replaced.   Consults:  PT/social work/ nutrition  Disposition:  Assistant living facility

## 2018-07-22 NOTE — PLAN OF CARE
Problem: Patient Care Overview  Goal: Plan of Care/Patient Progress Review  PT/OT: Per discussion with RN, patient not appropriate for therapies today. Pt remains confused and is not redirectable. Will continue to follow.

## 2018-07-22 NOTE — PROGRESS NOTES
"[facility] RCAT     Date:7/22/18  Admission Dx: Fall  Pulmonary History, COPD  Home Nebulizer/MDI Use: Yes   Home Oxygen: No  Acuity Level (RCAT flow sheet): 3  Aerosol Therapy initiated: Duoneb QID, Alb Q2prn       Pulmonary Hygiene initiated: pt confused       Volume Expansion initiated: pt confused       Current Oxygen Requirements: room air   Current SpO2: 97%    Re-evaluation date: 7/25/18    Patient Education: pt was explained her breathing treatment.       See \"RT Assessments\" flow sheet for patient assessment scoring and Acuity Level Details.             "

## 2018-07-22 NOTE — LETTER
Transition Communication Hand-off for Care Transitions to Next Level of Care Provider    Name: Ana Luisa Lee  : 1940  MRN #: [id number]  Primary Care Provider: Db Alvarez  Primary Care MD Name: Db Jimenez  Primary Clinic: [facility] [address] [facility] [address]  Primary Care Clinic Name: [facility]   Reason for Hospitalization:  Delirium [R41.0]  Hyponatremia [E87.1]  Skin tear of right lower leg without complication, initial encounter [S81.811A]  Admit Date/Time: 2018  2:46 AM  Discharge Date:   Payor Source: Payor: MEDICA / Plan: MEDICA [employer] / Product Type: HMO /     Readmission Assessment Measure (COURTNEY) Risk Score/category: Elevated    Discharge Plan: TCU     Discharge Needs Assessment:  Needs       Most Recent Value    Equipment Currently Used at Home walker, rolling    # of Referrals Placed by CTS External Care Coordination    Assisted Living -- [[facility]]    Other Resources [facility] Worker    [facility] Worker Name  ROSARIO missy [facility] Contact Info [phone]    [facility] Worker Status Active    Home Care [facility] [phone], Fax: [phone]    Skilled Nursing Facility [facility] [phone], Fax: [phone]    [facility] Number [id number]            Referrals     Future Labs/Procedures    Occupational Therapy Adult Consult     Comments:    Evaluate and treat as clinically indicated.    Reason:  weakness    Physical Therapy Adult Consult     Comments:    Evaluate and treat as clinically indicated.    Reason:  weakness          Recommendations:  Pt is admitted with Encephalopathy and fall.  COURTNEY ELEVATED.      Encephalopathy was felt to be related to low Na on admission which was 126. Her Na on dc was 134. She also had an unwitnessed fall at her TCU prior to admission. PT/OT were recommending TCU. She was dc'd to TCU at [facility] on dc. Please continue to follow pt  when she returns to [facility].    Katie Ellis/Gay Kong RN CTS    AVS/Discharge Summary is the source of truth; this is a helpful guide for improved communication of patient story

## 2018-07-22 NOTE — PHARMACY-ADMISSION MEDICATION HISTORY
Admission medication history interview status for this patient is complete. See [facility] admission navigator for allergy information, prior to admission medications and immunization status.     Medication history interview source(s):[facility]   Medication history resources (including written lists, pill bottles, clinic record):MAR  Primary pharmacy:[facility]    Changes made to [facility] medication list:  Added: miralax(daily), cymbalta, loperamide, tramadol(BID)  Deleted: none  Changed: tramadol (every 8 hour prn to daily prn)    Actions taken by pharmacist (provider contacted, etc):None     Additional medication history information:None    Medication reconciliation/reorder completed by provider prior to medication history? Yes    Do you take OTC medications (eg tylenol, ibuprofen, fish oil, eye/ear drops, etc)? Yes    For patients on insulin therapy: No      Prior to Admission medications    Medication Sig Last Dose Taking? Auth Provider   acetaminophen (TYLENOL) 500 MG tablet Take 2 tablets (1,000 mg) by mouth 3 times daily Unknown at Unknown time  Kesha Vela PA-C   albuterol (ALBUTEROL) 108 (90 BASE) MCG/ACT Inhaler Inhale 2 puffs into the lungs every 6 hours as needed Unknown at Unknown time  Hallie Barroso MD   budesonide (PULMICORT FLEXHALER) 180 MCG/ACT inhaler Inhale 1 puff into the lungs 2 times daily Unknown at Unknown time  Hallie Barroso MD   calcium 600 MG tablet Take 1 tablet (600 mg) by mouth daily Unknown at Unknown time  Hallie Barroso MD   cholecalciferol (VITAMIN D) 1000 UNIT tablet Take 1 tablet (1,000 Units) by mouth daily Unknown at Unknown time  Hallie Barroso MD   cyanocobalamin (B-12 TR) 1000 MCG TBCR Take 1 tablet by mouth daily Unknown at Unknown time  Jenny Hamilton MD   DULoxetine (CYMBALTA) 30 MG EC capsule Take 30 mg by mouth daily Unknown at Unknown time  Unknown, Entered By History   ferrous sulfate (IRON) 325 (65 Fe) MG  tablet Take 325 mg by mouth daily (with breakfast) Unknown at Unknown time  Unknown, Entered By History   fexofenadine (ALLEGRA) 60 MG tablet Take 1 tablet (60 mg) by mouth daily Unknown at Unknown time  Kesha Vela PA-C   fluticasone (FLONASE) 50 MCG/ACT spray Spray 1 spray into both nostrils daily Unknown at Unknown time  Kesha Vela PA-C   gemfibrozil (LOPID) 600 MG tablet Take 1 tablet (600 mg) by mouth daily Unknown at Unknown time  Jenny Hamilton MD   levalbuterol (XOPENEX) 1.25 MG/3ML neb solution Take 1 ampule by nebulization 4 times daily Unknown at Unknown time  Reported, Patient   levothyroxine (SYNTHROID/LEVOTHROID) 25 MCG tablet Take 1 tablet (25 mcg) by mouth daily Unknown at Unknown time  Jenny Hamilton MD   loperamide (IMODIUM) 2 MG capsule Take 2 mg by mouth every 8 hours as needed for diarrhea Unknown at Unknown time  Unknown, Entered By History   montelukast (SINGULAIR) 10 MG tablet Take 1 tablet (10 mg) by mouth At Bedtime Unknown at Unknown time  Hallie Barroso MD   Multiple Vitamins-Minerals (MULTIVITAMIN OR) Take 1 tablet by mouth daily Unknown at Unknown time  Unknown, Entered By History   omeprazole (PRILOSEC) 40 MG capsule Take 1 capsule (40 mg) by mouth daily Take 30-60 minutes before a meal. Unknown at Unknown time  Jenny Hamilton MD   polyethylene glycol (MIRALAX/GLYCOLAX) Packet Take 17 g by mouth daily Unknown at Unknown time  Unknown, Entered By History   polyethylene glycol (MIRALAX/GLYCOLAX) Packet Take 17 g by mouth daily as needed for constipation Unknown at Unknown time  Unknown, Entered By History   predniSONE (DELTASONE) 10 MG tablet Take 10 mg by mouth daily Unknown at Unknown time  Unknown, Entered By History   roflumilast (DALIRESP) 500 MCG TABS tablet Take 500 mcg by mouth daily  Unknown at Unknown time  Cole Carrasco MD   traMADol (ULTRAM) 50 MG tablet Take 50 mg by mouth 2 times daily  Unknown at Unknown time  Unknown, Entered By History   traMADol (ULTRAM) 50 MG tablet Take 50 mg by mouth daily as needed for severe pain Unknown at Unknown time  Unknown, Entered By History   umeclidinium (INCRUSE ELLIPTA) 62.5 MCG/INH oral inhaler Inhale 1 puff into the lungs daily Unknown at Unknown time  Cole Carrasco MD

## 2018-07-22 NOTE — IP AVS SNAPSHOT
"` `           [place] [address] [facility]: [phone]                                              INTERAGENCY TRANSFER FORM - NURSING   2018                    Hospital Admission Date: 2018  NAM DAS   : 1940  Sex: Female        Attending Provider: Adrian Carvalho MD     Allergies:  Hydralazine, Penicillin G, Meloxicam, Metoprolol, Norvasc [Amlodipine Besylate]    Infection:  None   Service:  Observation    Ht:  1.575 m (5' 2.01\")   Wt:  52.2 kg (115 lb 1.6 oz)   Admission Wt:  52.2 kg (115 lb 1.6 oz)    BMI:  21.05 kg/m 2   BSA:  1.51 m 2            Patient PCP Information     Provider PCP Teresita Alvarez MD General      Current Code Status     Date Active Code Status Order ID Comments User Context       Prior      Code Status History     Date Active Date Inactive Code Status Order ID Comments User Context    2018  2:34 PM  DNR/DNI [id number]  Latesha Cao MD Outpatient    2018 10:58 AM 2018  2:34 PM DNR/DNI [id number]  Adrian Carvalho MD Inpatient    2018  1:33 PM 2018 10:58 AM DNR [id number]  Yany Torres APRN CNP Outpatient    2018  1:26 PM 2018  1:33 PM DNR/DNI [id number]  Kesha Vela PA-C Outpatient    2018  4:55 PM 2018  1:26 PM DNR/DNI [id number]  Jodi Lopes MD Inpatient    2018 10:51 AM 2018  4:55 PM DNR/DNI [id number]  Paulo Curry MD Outpatient    2018  7:53 AM 2018 10:51 AM DNR/DNI [id number]  Paulo Curry MD Inpatient    2017 12:47 PM 2018  7:53 AM DNR/DNI [id number]  Fuentes Hopson DO Outpatient    2017  1:02 AM 2017 12:47 PM DNR/DNI [id number]  Damien Johnson,  Inpatient    2017  8:00 PM 2017  3:40 PM DNR/DNI [id number]  Speedy Yu MD Inpatient    2017  9:37 AM 2017  8:00 PM Full Code [id number]  Dea Riley DO Outpatient    2017  2:07 AM 2017  " 9:37 AM Full Code [id number]  Sonido Spears MD Inpatient    1/6/2017 10:35 AM 5/4/2017  2:07 AM Full Code [id number]  Hallie Barroso MD Outpatient    1/2/2017  9:59 AM 1/6/2017 10:35 AM Full Code [id number]  Moses Calle MD ED    12/24/2016 11:48 AM 1/2/2017  9:59 AM Full Code [id number]  Kesha Vela PA-C Outpatient    12/22/2016  5:04 PM 12/24/2016 11:48 AM Full Code [id number]  Mimi Mullins PA-C ED    11/29/2016  5:41 PM 12/1/2016  3:24 PM DNR/DNI [id number]  Patsy Rae PA-C ED    3/19/2016 10:42 AM 11/29/2016  5:41 PM Full Code [id number]  Sonido Spears MD Outpatient    3/4/2016  5:36 PM 3/19/2016 10:42 AM Full Code [id number]  Meg Leger PA-C ED    12/13/2014  4:34 PM 12/19/2014  4:26 PM Full Code [id number]  Betty Roca MD Inpatient    11/4/2013  9:09 PM 12/13/2014  4:34 PM Full Code [id number]  Valentine Lundberg Outpatient    10/25/2013  1:07 PM 10/30/2013  3:25 PM Full Code [id number]  Libia Arevalo PA Inpatient      Advance Directives        Scanned docmt in ACP Activity?           Yes, scanned ACP docmt        Hospital Problems as of 7/25/2018              Priority Class Noted POA    Encephalopathy acute Medium  7/22/2018 Yes    Encephalopathy Medium  7/22/2018 Yes      Non-Hospital Problems as of 7/25/2018              Priority Class Noted    COPD, severe (H) Medium  1/7/2010    Osteoporosis Medium  Unknown    Hyperlipidemia with target LDL less than 130 Medium  Unknown    Asthma, persistent Medium  Unknown    Pes planus Medium  11/29/2011    Skin cyst Medium  12/1/2011    Advance Care Planning Medium  12/16/2011    Pain in joint, shoulder region Medium  1/10/2012    PVC (premature ventricular contraction) Medium  Unknown    Arthritis of hand Medium  Unknown    Pulmonary nodule seen on imaging study Medium  12/22/2014    Health Care Home Medium  12/3/2015    Hypercalcemia Medium  12/4/2015    Other fatigue Medium  12/4/2015     Steroid-dependent chronic obstructive pulmonary disease (H) Medium  3/19/2016    SVT (supraventricular tachycardia) (H) Medium  5/17/2016    Hypothyroidism, unspecified type Medium  10/1/2016    HTN, goal <  140/90 Medium  10/1/2016    Pernicious anemia Medium  11/3/2016    Paroxysmal supraventricular tachycardia (H) Medium  1/12/2017    PAC (premature atrial contraction) Medium  1/12/2017    Closed fracture of sacrum, unspecified portion of sacrum, initial encounter (H) Medium  12/2/2017    Anemia, unspecified type Medium  5/28/2018    [id number]: mod-severe Ao Stenosis, severe Asthma/COPD steroid dependent, tachycardia/PVCs ( intol to BB and Ca gillian block), hypothyroidism,  HTN, osteoporosis, HLipidemia, depression  Medium  5/28/2018    Aortic stenosis, moderate Medium  5/28/2018    Fall Medium  6/17/2018      Immunizations     Name Date      Influenza (H1N1) 12/10/09     Influenza (High Dose) 3 valent vaccine 09/26/17     Influenza (High Dose) 3 valent vaccine 09/20/16     Influenza (High Dose) 3 valent vaccine 10/06/15     Influenza (High Dose) 3 valent vaccine 09/21/14     Influenza (High Dose) 3 valent vaccine 09/10/13     Influenza (IIV3) PF 10/01/12     Influenza (IIV3) PF 01/01/11     Pneumo Conj 13-V (2010&after) 08/26/16     Tdap (Adacel,Boostrix) 03/28/11     Tdap (Adacel,Boostrix) 04/13/07          END      ASSESSMENT     Discharge Profile Flowsheet     EXPECTED DISCHARGE     Resources List  Skilled Nursing Facility 07/25/18 1403    Expected Discharge Date  07/25/18 ([facility] RN/PT/OT ) 07/24/18 1030   Assisted Living  -- [facility] 07/23/18 0945    DISCHARGE NEEDS ASSESSMENT     Other Resources  County Worker 07/23/18 0945    Concerns To Be Addressed  all concerns addressed in this encounter 06/18/18 1356   [facility] Worker Name  EVANGELINA sharmaie Hussein  07/23/18 0945    Equipment Currently Used at Home  walker, rolling 07/24/18 1044   County Worker Contact Info  [phone] 07/23/18  0945    # of Referrals Placed by CTS  External Care Coordination 07/23/18 1356   [facility] Worker Status  Active 07/23/18 0945    Does Patient Need a Referral for Clinic CC  No 05/05/17 1045   Home Care  [facility] [phone], Fax: [phone] 07/23/18 0945    Primary Care Clinic Name  [facility]  07/23/18 0945   Skilled [facility] [phone], Fax: [phone] 07/25/18 1409    Primary Care MD Name  Db Jimenez 07/23/18 0945   PAS Number  [id number] 07/25/18 1433    Equipment Used at Home  cane, straight 03/15/16 1547   SKIN      ASSESSMENT OF FUNCTIONAL STATUS     Inspection of bony prominences  Full 07/25/18 1116    Prior to admission patient needed assistance with:  Medications;Meal preparation;Laundry/Housekeeping;Shopping;Transportation 07/23/18 0945   Inspection under devices  Full 07/25/18 1116    Assesssment of Functional Status  Not at baseline with ADL Functioning;Not at baseline with mobility;Not at  functional baseline 07/23/18 0945   Skin WDL  ex 07/25/18 1116    GASTROINTESTINAL (ADULT,PEDIATRIC,OB)     Skin Color/Characteristics  bruised (ecchymotic) 07/25/18 1116    GI WDL  WDL 07/25/18 1116   Skin Temperature  warm 07/25/18 1116    All Quadrants Bowel Sounds  hypoactive 07/25/18 1116   Skin Moisture  dry;flaky 07/25/18 1116    Last Bowel Movement  07/25/18 07/25/18 1116   Skin Elasticity  slow return to original state 07/25/18 1116    GI Signs/Symptoms  passing flatus;no gastrointestinal signs/symptoms 07/25/18 1116   Skin Integrity  scab(s);scar(s);bruise(s) 07/25/18 1116    Passing flatus  yes 07/25/18 1116   Additional Documentation  Wound (LDA) 07/23/18 1502    COMMUNICATION ASSESSMENT     SAFETY      Patient's communication style  spoken language (English or Bilingual) 07/22/18 0246   Safety WDL  WDL 07/25/18 1116    FINAL RESOURCES     All Alarms  alarm(s) activated and audible 07/25/18 1116                 Assessment WDL (Within Defined  "Limits) Definitions           Safety WDL     Effective: 09/28/15    Row Information: <b>WDL Definition:</b> Bed in low position, wheels locked; call light in reach; upper side rails up x 2; ID band on<br> <font color=\"gray\"><i>Item=AS safety wdl>>List=AS safety wdl>>Version=F14</i></font>      Skin WDL     Effective: 09/28/15    Row Information: <b>WDL Definition:</b> Warm; dry; intact; elastic; without discoloration; pressure points without redness<br> <font color=\"gray\"><i>Item=AS skin wdl>>List=AS skin wdl>>Version=F14</i></font>      Vitals     Vital Signs Flowsheet     VITAL SIGNS     Response to Interventions  Decrease in pain 07/23/18 2030    Temp  98.6  F (37  C) 07/25/18 1451   ANALGESIA SIDE EFFECTS MONITORING      Temp src  Oral 07/25/18 1451   Side Effects Monitoring: Respiratory Quality  R 07/25/18 0037    Resp  18 07/25/18 1451   Side Effects Monitoring: Respiratory Depth  N 07/25/18 0037    Pulse  83 07/24/18 0326   Side Effects Monitoring: Sedation Level  1 07/25/18 0037    Heart Rate  105 07/25/18 1451   HEIGHT AND WEIGHT      Pulse/Heart Rate Source  Monitor 07/25/18 1234   Height  1.575 m (5' 2.01\") 07/22/18 1616    BP  187/89 07/25/18 1451   Weight  52.2 kg (115 lb 1.6 oz) 07/22/18 0549    BP Location  Left arm 07/25/18 1451   Weight Method  Bed scale 07/22/18 0549    OXYGEN THERAPY     Bed Scale  Standard (fitted sheet, draw sheet/ pad, cover/flat sheet, blanket, two pillows) 07/22/18 0549    SpO2  98 % 07/25/18 1451   POSITIONING      O2 Device  None (Room air) 07/25/18 1451   Body Position  independently positioning 07/25/18 1454    PAIN/COMFORT     Head of Bed (HOB)  HOB at 30 degrees 07/25/18 1454    Patient Currently in Pain  denies 07/25/18 0037   Positioning/Transfer Devices  pillows;in use 07/25/18 1454    Preferred Pain Scale  number (Numeric Rating Pain Scale) 07/22/18 1556   Chair  Upright in chair 07/24/18 1005    Patient's Stated Pain Goal  No pain 07/22/18 1556   DAILY CARE   "    0-10 Pain Scale  5 07/22/18 1556   Activity Management  up in chair 07/25/18 1234    rFLACC Pain Rating: Face  0-->no particular expression or smile 07/24/18 0511   Activity Assistance Provided  assistance, 2 people 07/25/18 1234    rFLACC Pain Rating - Legs  0-->normal position or relaxed 07/24/18 0511   Assistive Device Utilized  gait belt;walker 07/25/18 1234    rFLACC Pain Rating: Activity  0-->lying quietly, normal position, moves easily 07/24/18 0511   EUSEBIA COMA SCALE      rFLACC Pain Rating - Cry  0-->no cry (awake or asleep) 07/24/18 0511   Best Eye Response  4-->(E4) spontaneous 07/25/18 0919    rFLACC Pain Rating - Consolability  0-->content, relaxed 07/24/18 0511   Best Motor Response  6-->(M6) obeys commands 07/25/18 0919    rFLACC Score: Activity  0 07/24/18 0511   Best Verbal Response  4-->(V4) confused 07/25/18 0919    Pain Location  Head 07/22/18 1556   Elizabeth Coma Scale Score  14 07/25/18 0919    Pain Descriptors  Headache 07/22/18 1556   POINT OF CARE TESTING      Nonverbal Indicators Of Pain  grimace 07/22/18 0614   Puncture Site  fingertip 07/23/18 1218    Pain Intervention(s)  Medication (See eMAR) 07/22/18 1556   Bedside Glucose (mg/dl )   190 mg/dl 07/23/18 1218            Patient Lines/Drains/Airways Status    Active LINES/DRAINS/AIRWAYS     Name: Placement date: Placement time: Site: Days: Last dressing change:    Peripheral IV 07/22/18 Left Hand 07/22/18   1833   Hand   2     Wound 07/23/18 Right;Lower;Anterior Leg Skin tear skin tear 07/23/18   1500   Leg   2             Patient Lines/Drains/Airways Status    Active PICC/CVC     None            Intake/Output Detail Report     Date Intake     Output Net    Shift P.O. I.V. IV Piggyback Total Urine Total       Day 07/24/18 0700 - 07/24/18 1459 1090 -- -- 1090 175 175 915    Dottie 07/24/18 1500 - 07/24/18 2259 240 1292 -- 1532 0 -- 1532    Noc 07/24/18 2300 - 07/25/18 0659 -- 600 -- 600 1700 1700 -1100    Day 07/25/18 0700 -  07/25/18 1459 240 -- -- 240 550 550 -310    Dottie 07/25/18 1500 - 07/25/18 2259 -- -- -- -- -- -- 0      Last Void/BM       Most Recent Value    Urine Occurrence 1 at 07/25/2018 1405    Stool Occurrence 1 at 07/25/2018 1405      Case Management/Discharge Planning     Case Management/Discharge Planning Flowsheet     REFERRAL INFORMATION     COPING/STRESS      Did the Initial Social Work Assessment result in a Social Work Case?  Yes 07/23/18 0945   Major Change/Loss/Stressor  denies 07/22/18 1000    Admission Type  inpatient 07/23/18 0945   Patient Personal Strengths  expressive of needs;strong support system 04/16/18 1310    Arrived From  home or self-care 07/23/18 0945   EXPECTED DISCHARGE      Referral Source  physician 07/23/18 0945   Expected Discharge Date  07/25/18 ([facility] RN/PT/OT ) 07/24/18 1030    # of Referrals Placed by CTS  External Care Coordination 07/23/18 1356   ASSESSMENT/CONCERNS TO BE ADDRESSED      Reason For Consult  discharge planning 07/23/18 0945   Concerns To Be Addressed  all concerns addressed in this encounter 06/18/18 1356    Record Reviewed  history and physical;medical record 07/23/18 0945   DISCHARGE PLANNING      CTS Assigned to Damion Celestin RN CTS 07/25/18 1437   Does Patient Need a Referral for Clinic CC  No 05/05/17 1045    Primary Care Clinic Name  [facility]  07/23/18 0945   Equipment Used at Home  cane, straight 03/15/16 1542    Primary Care MD Name  Db Jimenez 07/23/18 0945   FINAL RESOURCES      LIVING ENVIRONMENT     Equipment Currently Used at Home  walker, rolling 07/24/18 1044    Lives With  alone 07/24/18 1044   Resources List  Skilled Nursing Facility 07/25/18 1403    Living Arrangements  assisted living 07/24/18 1044   Assisted Living  -- ([facility]) 07/23/18 0945    Provides Primary Care For  no one 07/23/18 0945   Other Resources  County Worker 07/23/18 0945    Quality Of Family Relationships  supportive 07/23/18 0945   [facility] Worker Name  [facility]  missy Savage  07/23/18 0945    Able to Return to Prior Living Arrangements  yes 07/23/18 0945   [facility] Worker Contact Info  [phone] 07/23/18 0945    HOME SAFETY     [facility] Worker Status  Active 07/23/18 0945    Patient Feels Safe Living in Home?  yes 07/23/18 0945   Home Care  [facility] [phone], Fax: [phone] 07/23/18 0945    ASSESSMENT OF FAMILY/SOCIAL SUPPORT     Skilled Nursing [facility] [phone], Fax: [phone] 07/25/18 1402    Marital Status   07/23/18 0945   PAS Number  [id number] 07/25/18 1437    Who is your support system?  Children;Facility resident(s)/Staff 07/23/18 0945   ABUSE RISK SCREEN      Description of Support System  Supportive;Involved 07/23/18 0945   QUESTION TO PATIENT:  Has a member of your family or a partner(now or in the past) intimidated, hurt, manipulated, or controlled you in any way?  patient declined to answer or is unable to answer 07/22/18 0247    Support Assessment  Adequate family and caregiver support;Adequate social supports 07/23/18 0945   QUESTION TO PATIENT: Do you feel safe going back to the place where you are living?  patient declined to answer or is unable to answer 07/22/18 0247    ASSESSMENT OF FUNCTIONAL STATUS     OTHER      Prior to admission patient needed assistance with:  Medications;Meal preparation;Laundry/Housekeeping;Shopping;Transportation 07/23/18 0945   Are you depressed or being treated for depression?  No 07/22/18 1000    Assesssment of Functional Status  Not at baseline with ADL Functioning;Not at baseline with mobility;Not at  functional baseline 07/23/18 0945

## 2018-07-22 NOTE — IP AVS SNAPSHOT
[place] [address] [facility]    [address]    [facility] [address]    Phone:  [phone]    Fax:  [phone]                                       After Visit Summary   7/22/2018    Ana Luisa Lee    MRN: [id number]           After Visit Summary Signature Page     I have received my discharge instructions, and my questions have been answered. I have discussed any challenges I see with this plan with the nurse or doctor.    ..........................................................................................................................................  Patient/Patient Representative Signature      ..........................................................................................................................................  Patient Representative Print Name and Relationship to Patient    ..................................................               ................................................  Date                                            Time    ..........................................................................................................................................  Reviewed by Signature/Title    ...................................................              ..............................................  Date                                                            Time

## 2018-07-22 NOTE — ED PROVIDER NOTES
History     Chief Complaint:  Fall    HPI History limited secondary to patients mental status change:    Ana Luisa Lee is a 78 year old female with a history of hypertension, hyperlipidemia, COPD and falls who presents to the Emergency Department for evaluation after an unwitnessed fall. Approximately 45 minutes prior to arrival, staff found the patient on the floor with altered mental status and complaining of left hip pain. Of note, staff had been with the patient one hour prior to when she was found down, at which time she was alert and oriented x 4 and ambulating with her wheelchair per her usual. En route the patient was placed in C-collar, she was tachycardic and hypertensive with BP in the 190's-200's systolic and with a blood sugar of 140. Here in the ED the patient has a skin tear to her right lower extremity, she is confused and does not answer questions. No use of anticoagulants.     Sodium (7/17/18): 127 (L)    Allergies:  Hydralazine  Penicillin G  Meloxicam  Metoprolol  Norvasc [Amlodipine Besylate]     Medications:    acetaminophen (TYLENOL) 500 MG tablet  albuterol (ALBUTEROL) 108 (90 BASE) MCG/ACT Inhaler  budesonide (PULMICORT FLEXHALER) 180 MCG/ACT inhaler  calcium 600 MG tablet  cholecalciferol (VITAMIN D) 1000 UNIT tablet  cyanocobalamin (B-12 TR) 1000 MCG TBCR  ferrous sulfate (IRON) 325 (65 Fe) MG tablet  fexofenadine (ALLEGRA) 60 MG tablet  fluticasone (FLONASE) 50 MCG/ACT spray  gemfibrozil (LOPID) 600 MG tablet  levalbuterol (XOPENEX) 1.25 MG/3ML neb solution  levothyroxine (SYNTHROID/LEVOTHROID) 25 MCG tablet  montelukast (SINGULAIR) 10 MG tablet  Multiple Vitamins-Minerals (MULTIVITAMIN OR)  omeprazole (PRILOSEC) 40 MG capsule  polyethylene glycol (MIRALAX/GLYCOLAX) Packet  predniSONE (DELTASONE) 10 MG tablet  roflumilast (DALIRESP) 500 MCG TABS tablet  TRAMADOL HCL PO  umeclidinium (INCRUSE ELLIPTA) 62.5 MCG/INH oral inhaler     Past Medical History:    Anemia   Arthritis of  hand   Asthma  Aortic stenosis  Chronic intermittent steroid use   COPD exacerbation (H)   Esophageal stricture   HTN  Hypercalcemia  Hyperlipidemia   Hypothyroidism   Osteoporosis   Paroxysmal supraventricular tachycardia   PVC (premature ventricular contraction)   SVT (supraventricular tachycardia)    Past Surgical History:    Bunionectomy  Hysterectomy  Tonsillectomy    Family History:    Cerebrovascular disease: mother  Hypertension: mother    Social History:  The patient was accompanied to the ED by EMS.  Smoking Status: former smoker  Smokeless Tobacco: never used  Alcohol Use: no  Marital Status:      Review of Systems   Unable to perform ROS: Mental status change     Physical Exam   First Vitals:  Patient Vitals for the past 24 hrs:   BP Temp Temp src Heart Rate Resp SpO2 Weight   07/22/18 0629 178/88 - - - - - -   07/22/18 0548 192/82 97.9  F (36.6  C) Axillary 99 20 99 % 52.2 kg (115 lb 1.6 oz)   07/22/18 0530 134/84 - - - - - -   07/22/18 0415 - - - - - 98 % -   07/22/18 0414 - - - - - 100 % -   07/22/18 0413 115/71 - - - - 99 % -   07/22/18 0252 - 97.5  F (36.4  C) Temporal - - - -   07/22/18 0248 (!) 140/124 - - 113 20 99 % -       Physical Exam  Nursing note and vitals reviewed.  Constitutional: Holding left hip. Uncomfortable appearing. Doesn't respond to verbal questions. Writhing in bed.   HENT:   Mouth/Throat: Mucous membranes are dry.   Eyes: Pupils are equal, round, and reactive to light.   Cardiovascular: tachycardic rate, regular rhythm and normal heart sounds.  No murmur.  Pulmonary/Chest: Effort normal and breath sounds normal. No respiratory distress. No wheezes. No rales.   Abdominal: Soft. Normal appearance and bowel sounds are normal. No distension. There is no tenderness. There is no rigidity and no guarding.   Musculoskeletal: Normal range of motion. Moving all extremities without deformity  Neurological: Strength normal.  Withdrawals to pain in all extremities.  Neg babinski.      Skin: Skin is warm and dry. No rash noted. skin tear to right shin  Psychiatric: Unable to assess    Emergency Department Course   ECG:  Indication: fall/altered mental status  Time: 0255  Vent. Rate 100 bpm. NC interval 176. QRS duration 88. QT/QTc 352/454. P-R-T axis 77 23 70.   normal sinus rhythm. Septal infarct, age undetermined. No change from 6/2018. Read time: 0256.    Imaging:  Radiographic findings were communicated with the patient who voiced understanding of the findings.    Chest XR:  No infiltrates or other acute findings. Heart size is within normal limits. A few nodular densities noted previously are again noted, probably related to some costochondral calcification. Moderate-sized hiatal hernia. As per radiology.     CT Cervical spine without contrast:  No acute findings. As per radiology.     CT Head Neck Angio with and without contrast:  Head: mild atherosclerotic changes, otherwise normal intracranial circulation.  Neck:  1. Indeterminate nodule right lung apex, formal CT chest recommended.2. Tiny left vertebral artery is only segmentally visualized; normal dominant right vertebral artery and normal basilar artery.  3. Mild to moderate atherosclerotic narrowing proximal ICA's bilaterally, measured at less than 50% on the left and at approximately 50 % on the right.  4. Nodule right thyroid gland; ultrasound recommended, if not done previously.   5. Incidentally noted is a 5 mm anterior subluxation C4 on C5 without definite evidence of a fracture if indicated, CT cervical spine may be performed.     Left Pelvis and hip XR:  No acute findings. Small nonspecific sclerotic focus in the right innominate bone, also present on prior pelvic CT  of 11/19/2017, unchanged and likely a bone island. Contrast in the collecting systems. As per radiology.     Head CT:  No hemorrhage, mass or mass effect. Mild to moderate atrophy. Moderate presumed chronic small vessel ischemia. Normal orbits. Clear paranasal  sinuses. Opacification of left mastoid air cells and middle ear cavity. As per radiology.     Laboratory:  CBC: WBC: 11.3(H), HGB: 11.4(L), PLT: 357  CMP: Glucose 138(H), (L), potassium 3.3(L), chloride 93(L), o/w WNL (Creat 0.58)    Alcohol ethyl: <0.01    Lactic acid: 1.0    Troponin: <0.015    PTT: 27  INR: 1.09    Salicylate level: <2    Blood venous gas: all within normal limits.    Blood Culture 1: pending  Blood Culture 2: pending    UA with Microscopic: small urine blood  Urine Culture: Pending    Interventions:  0303 Ativan 0.5 mg IV (for MRI)   Morphine 4 mg IV   Zofran 4 mg IV  0348    Ativan 0.5mg IV    Emergency Department Course:  Nursing notes and vitals reviewed. I performed an exam of the patient as documented above.     The patient provided a urine sample here in the emergency department. This was sent for laboratory testing, findings above.    EKG obtained in the ED, see results above.     Blood drawn. This was sent to the lab for further testing, results above.    The patient was sent for a chest x-ray while here in the emergency department, findings above.    The patient was sent for a head CT while here in the emergency department, findings above.    The patient was sent for a pelvis xray while here in the emergency department, findings above.    The patient was sent for a CT cervical spine while here in the emergency department, findings above.    0406 I reassessed the patient.     0451 I reassessed the patient.     0458 I spoke with Dr. Maradiaga regarding this patient.    Findings and plan explained to the Patient who consents to admission. Discussed the patient with Dr. Maradiaga, who will admit the patient to an observation bed for further monitoring, evaluation, and treatment.    Impression & Plan      Medical Decision Making:  Ana Luisa Lee is a 78 year old female who presents with delirium and apparent hip pain. Interestingly radiographs of the pelvis, hip and chest are all  negative. C spine and CT/CTA of the head and neck are also unremarkable. She has no lateralizing neurologic deficits. She appropriately withdrawals to pain in all extremities but is not directable. She did require ativan to allow for head CT imaging to rule out intercranial hemorrhage. Since that time she has been resting comfortably in bed. Venous gas is unremarkable and her toxicologic metabolic work up is essentially negative other than a low sodium. She had a similarly low sodium last week making hyponatremic seizures unlikely although a seizure is not ruled out. It would be along time for her to be post-ictal without clearing. It does not appear to be an infectious etiology with reassuring chest xray, urine and lab work. She is afebrile. meningitis was considered but seems unlikely. Somewhat unclear of what her baseline is although per report she is normally alert and oriented. Given her age and undifferentiated delirium she will be admitted to the observation unit. IV fluids have been initiated and she is resting comfortable condition. Skin tear of the right lower extremity has been cleaned and appropriately dressed.     Diagnosis:    ICD-10-CM   1. Delirium R41.0   2. Skin tear of right lower leg without complication, initial encounter S81.811A   3. Hyponatremia E87.1       Disposition:  Admitted to observation    I, Ivis Olivares, am serving as a scribe on 7/22/2018 at 3:14 AM to personally document services performed by Damien Blanca MD based on my observations and the provider's statements to me.     Ivis Olivares  7/22/2018   [facility] EMERGENCY DEPARTMENT       Damien Blanca MD  07/22/18 0636

## 2018-07-22 NOTE — PLAN OF CARE
Problem: Patient Care Overview  Goal: Plan of Care/Patient Progress Review  OT- Orders received, per PT- RN reports, patient not appropriate for therapies today. Pt remains confused and is not redirectable. Will reschedule OT evaluation.

## 2018-07-22 NOTE — ED NOTES
Bed: ED14  Expected date: 7/22/18  Expected time: 2:37 AM  Means of arrival: Ambulance  Comments:  596

## 2018-07-22 NOTE — ED NOTES
[facility]  ED Nurse Handoff Report    Ana Luisa Lee is a 78 year old female   ED Chief complaint: Altered Mental Status  . ED Diagnosis:   Final diagnoses:   Delirium   Skin tear of right lower leg without complication, initial encounter   Hyponatremia     Allergies:   Allergies   Allergen Reactions     Hydralazine Anxiety     Penicillin G Hives     Tolerated cephalosporines 2017     Meloxicam      dizziness       Metoprolol      ? Skin rash on the back     Norvasc [Amlodipine Besylate] Hives       Code Status: DNR / DNI  Activity level - Baseline/Home:  Stand with Assist of 2. Activity Level - Current:   Stand with Assist of 2. Lift room needed: No. Bariatric: No   Needed: No   Isolation: No. Infection: Not Applicable.     Vital Signs:   Vitals:    07/22/18 0252 07/22/18 0413 07/22/18 0414 07/22/18 0415   BP:  115/71     Resp:       Temp: 97.5  F (36.4  C)      TempSrc: Temporal      SpO2:  99% 100% 98%       Cardiac Rhythm:  ,      Pain level:    Patient confused: Yes. Patient Falls Risk: Yes.   Elimination Status: quick cathed.    Patient Report - Initial Complaint: found down, and not making sense to staff. . Focused Assessment:  Cognitive/Perceptual/Neuro - Cognitive/Neuro/Behavioral WDL:  WDL except; arousability Arousal Level: arouses to pain Best Language: 0 - No aphasia Headache: None LUE Sensation: no numbness; no tingling RUE Sensation: no numbness; no tingling LLE Sensation: no tingling; no numbness RLE Sensation: no numbness; no tingling Swartz Agitation-Sedation Scale (RASS): 0-->alert and calm  Pupils (CN II) - Pupil PERRLA: yes  Motor Strength - Left Upper: 5 - active movement against gravity and full resistance Right Upper: 5 - active movement against gravity and full resistance Left Lower: 5 - active movement against gravity and full resistance Right Lower: 5 - active movement against gravity and full resistance  Sally Coma Scale - Best Eye Response: 3-->(E3)  to speech Best Motor Response: 5-->(M5) localizes pain Best Verbal Response: 4-->(V4) confused [place] Coma Scale Score: 12  Tests Performed:  Labs Ordered and Resulted from Time of ED Arrival Up to the Time of Departure from the ED   CBC WITH PLATELETS DIFFERENTIAL - Abnormal; Notable for the following:        Result Value    WBC 11.3 (*)     RBC Count 3.73 (*)     Hemoglobin 11.4 (*)     Hematocrit 33.6 (*)     All other components within normal limits   COMPREHENSIVE METABOLIC PANEL - Abnormal; Notable for the following:     Sodium 126 (*)     Potassium 3.3 (*)     Chloride 93 (*)     Glucose 138 (*)     All other components within normal limits   ROUTINE UA WITH MICROSCOPIC - Abnormal; Notable for the following:     Blood Urine Small (*)     All other components within normal limits   INR   PARTIAL THROMBOPLASTIN TIME   LACTIC ACID WHOLE BLOOD   TROPONIN I   ALCOHOL ETHYL   BLOOD GAS VENOUS AND OXYHGB   SALICYLATE LEVEL   PULSE OXIMETRY NURSING   CARDIAC CONTINUOUS MONITORING   PERIPHERAL IV CATHETER   INDWELLING URINARY CATHETER (HYATT)   BLOOD CULTURE   URINE CULTURE AEROBIC BACTERIAL   BLOOD CULTURE   BLOOD CULTURE     CT Cervical Spine w/o Contrast   Preliminary Result   IMPRESSION: No acute findings.      XR Chest 1 View   Preliminary Result   IMPRESSION: No infiltrates or other acute findings. Heart size is   within normal limits. A few nodular densities noted previously are   again noted, probably related to some costochondral calcification.   Moderate-sized hiatal hernia.      XR Pelvis and Hip Left 2 Views   Preliminary Result   IMPRESSION: No acute findings. Small nonspecific sclerotic focus in   the right innominate bone, also present on prior pelvic CT  of   11/19/2017, unchanged and likely a bone island. Contrast in the   collecting systems.      CT Head w/o Contrast    (Results Pending)   CT Head Neck Angio w/o & w Contrast    (Results Pending)       Treatments provided:   Medications   lidocaine 1  % 1 mL (not administered)   lidocaine (LMX4) kit (not administered)   sodium chloride (PF) 0.9% PF flush 3 mL (not administered)   sodium chloride (PF) 0.9% PF flush 3 mL (not administered)   lidocaine 2 % (URO-JET) jelly 20 mL (not administered)   ondansetron (ZOFRAN) injection 4 mg (4 mg Intravenous Given 7/22/18 0303)   morphine (PF) injection 4 mg (4 mg Intravenous Given 7/22/18 0303)   LORazepam (ATIVAN) injection 1 mg (1 mg Intravenous Given 7/22/18 0348)   0.9% sodium chloride BOLUS (0 mLs Intravenous Stopped 7/22/18 0346)   iopamidol (ISOVUE-370) solution 500 mL (70 mLs Intravenous Given 7/22/18 0343)       Family Comments: n/a   OBS brochure/video discussed/provided to patient:  No pt unable to comprehend   ED Medications:   Medications   lidocaine 1 % 1 mL (not administered)   lidocaine (LMX4) kit (not administered)   sodium chloride (PF) 0.9% PF flush 3 mL (not administered)   sodium chloride (PF) 0.9% PF flush 3 mL (not administered)   lidocaine 2 % (URO-JET) jelly 20 mL (not administered)   ondansetron (ZOFRAN) injection 4 mg (4 mg Intravenous Given 7/22/18 0303)   morphine (PF) injection 4 mg (4 mg Intravenous Given 7/22/18 0303)   LORazepam (ATIVAN) injection 1 mg (1 mg Intravenous Given 7/22/18 0348)   0.9% sodium chloride BOLUS (0 mLs Intravenous Stopped 7/22/18 0346)   iopamidol (ISOVUE-370) solution 500 mL (70 mLs Intravenous Given 7/22/18 0343)     Drips infusing:  No  For the majority of the shift, the patient's behavior Green. Interventions performed were monitor  .     Severe Sepsis OR Septic Shock Diagnosis Present: No      ED Nurse Name/Phone Number: Hunter Garza,   5:20 AM    RECEIVING UNIT ED HANDOFF REVIEW    Above ED Nurse Handoff Report was reviewed: Yes  Reviewed by: Katie Whipple on July 22, 2018 at 5:28 AM

## 2018-07-22 NOTE — ED TRIAGE NOTES
Pt arrives from skilled nursing facility via EMS after being found down. Pt was reportedly normal 45 min prior being found down on ground with altered mental status. Pt not answering questions currently.  per EMS.

## 2018-07-22 NOTE — LETTER
Transition Communication Hand-off for Care Transitions to Next Level of Care Provider    Name: Ana Luisa Lee  : 1940  MRN #: [id number]  Primary Care Provider: Db Alvarez  Primary Care MD Name: Db Jimenez  Primary Clinic: [facility] [address] [facility] [address]  Primary Care Clinic Name: [facility]   Reason for Hospitalization:  Delirium [R41.0]  Hyponatremia [E87.1]  Skin tear of right lower leg without complication, initial encounter [S81.811A]  Admit Date/Time: 2018  2:46 AM  Discharge Date:   Payor Source: Payor: MEDICA / Plan: [employer] / Product Type: HMO /     Readmission Assessment Measure (COURTNEY) Risk Score/category: Elevated    [facility]   Discharge Needs Assessment:  Needs       Most Recent Value    Equipment Currently Used at Home walker, rolling    # of Referrals Placed by CTS External Care Coordination    Assisted Living -- [[facility]]    Other Resources [facility] Worker    [facility] Worker Name  [facility] Contact Info [phone]    [facility] Worker Status Active    Home Care [facility] [phone], Fax: [phone]    Skilled Nursing Facility [facility] [phone], Fax: [phone]    [facility] Number [id number]        Follow-up plan:  No future appointments.    Any outstanding tests or procedures:        Referrals     Future Labs/Procedures    Occupational Therapy Adult Consult     Comments:    Evaluate and treat as clinically indicated.    Reason:  weakness    Physical Therapy Adult Consult     Comments:    Evaluate and treat as clinically indicated.    Reason:  weakness           Recommendations:  Pt is admitted with Encephalopathy and fall.  COURTNEY ELEVATED.  Encephalopathy was felt to be related to low Na    She was dc'd on  to TCU at [facility] in [place]. Please continue to follow when she returns to [place]  See.    Katie Ellsi    AVS/Discharge Summary is the source of truth; this is a helpful guide for improved communication of patient story

## 2018-07-22 NOTE — PHARMACY
Spoke with Nicolas from pharmacy regarding IV loss and potassium running. He states to treat conservatively. He states since the patient did bleed, this helps get rid of some potassium that may have gone into tissues. Will continue to monitor. See orders.  Ice applied after IV removal.

## 2018-07-22 NOTE — IP AVS SNAPSHOT
` ` Patient Information     Patient Name Sex Ana Luisa Murdock ([id number]) Female 1940       Room Bed    0502 0502-01      Patient Demographics     Address Phone E-mail Address    [address]  [facility] [address] [phone] (Home) *Preferred*  none (Work) [email or web address]      Patient Ethnicity & Race     Ethnic Group Patient Race    American White      Emergency Contact(s)     Name Relation Home Work Mobile    Windy Kowalski Daughter [phone] none [phone]    Jake Patel [phone] none [phone]      Documents on File        Status Date Received Description       Documents for the Patient    Insurance Card Received () 12/10/09     Face Sheet Received () 12/10/09     Privacy Notice - [place] Received 12/10/09     External Medication Information Consent Accepted () 12/10/09     Patient ID Received 14     Consent for Services - Hospital/Clinic Received () 10/25/11     Consent for Services - Hospital/Clinic  ()      External Medication Information Consent Accepted () 11     Insurance Card Received () 01/10/12     HIM PHYLICIA Authorization - File Only   PHYLICIA to Obtain From: [facility]-2012    HIM PHYLICIA Authorization - File Only   Records released to patient 12    HIM PHYLICIA Authorization   Release of Information: [facility] 2012    HIM PHYLICIA Authorization   [facility]    Consent for Services - Hospital/Clinic Received () 12     Insurance Card Received () 12     External Medication Information Consent Accepted 12     Insurance Card Received () 12     Consent for EHR Access  13 Copied from existing Consent for services - C/HOD collected on 2012    [facility] Specified Other       Insurance Card Received 13 Medicare    Insurance Card Received 13 Med[employer]    Consent for EHR Access Received 09/10/13      Consent for Services - Hospital/Clinic Received () 09/10/13     Consent for Services - Geriatrics Received 13     HIM PHYLICIA Authorization - File Only   Radiology CD released to patient 13    Advance Directives and Living Will Received  POLST 13     HIM PHYLICIA Authorization - File Only  14 NAM DAS 2013    HIM PHYLICIA Authorization  14     Insurance Card Received 14     Insurance Card Received 09/15/14     Consent for Services - Hospital/Clinic Received () 09/15/14     Physical Therapy Certification Received 10/09/14 2014    Insurance Card Received 05/21/15 Medica    Consent for Services - Informed Received 05/21/15 [facility]    Consent to Communicate Received 05/21/15 Consent to Communicate    HIM PHYLICIA Authorization - File Only Patient Refused 05/21/15 Email Consent    Insurance Card Received 08/11/15 MA    Consent for Services - Hospital/Clinic Received () 09/09/15     Insurance Card Received 11/13/15     Insurance Card Received 18 Medica-no 2018 card    Consent for Services/Privacy Notice - Hospital/Clinic Received 18     Consent for Services/Privacy Notice - Hospital/Clinic Received () 16     Business/Insurance/Care Coordination/Health Form - Patient   Acceptance of Responsibility form - 2016    Patient ID Received 18 EXP 2019    Business/Insurance/Care Coordination/Health Form - Patient  16 BLOOD PRESSURE UNIT, [facility] - 3/17/2016    Consent to Communicate  16 AUTHORIZATION TO DISCUSS PROTECTED  HEALTH INFORMATION 16    Business/Insurance/Care Coordination/Health Form - Patient  [facility]    Consent for Services/Privacy Notice - Hospital/Clinic Received () 16     Advance Directives and Living Will Received 17 POLST 2017     Business/Insurance/Care Coordination/Health Form - Patient  17 MEDICA  LETTER REGARDING LANSOPRAZOLE SUPPLY    [facility] PHYLICIA Authorization - File Only  05/24/17 AUTHORIZATION FOR ELECTRONIC COMMUNICATION    Consent to Communicate Received 05/24/17     HIM PHYLICIA Authorization - File Only  06/12/17 MYCHART ACCESS    Advance Directives and Living Will Received 06/28/17 Health Care Directive 6-24-17    Advance Directives and Living Will Not Received  Validation of AD 6-24-17    Business/Insurance/Care Coordination/Health Form - Patient  07/17/17 MEDICA APPROVAL OF DICLOFENAC SODIUM GEL 04/01/17 - 06/30/18    Care Everywhere Prospective Auth Received 10/31/17     Consent to Communicate  11/16/17 AUTHORIZATION TO DISCUSS PROTECTED HEALTH INFORMATION 11/16/17    Business/Insurance/Care Coordination/Health Form - Patient  02/15/18 ELIGIBILITY FOR NO COST BONE DENSITY TEST 1/11/18 MEDICA    Consent for Services - Hospital and Clinic Received 05/24/18     HIE Auth Received 05/24/18     HIM PHYLICIA Authorization  07/12/18     HIM PHYLICIA Authorization  07/17/18 [facility] - [facility]    Advance Directives and Living Will Received 07/23/18 POLST 06/29/2018       Documents for the Encounter    CMS IM for Patient Signature Received 07/23/18       Admission Information     Attending Provider Admitting Provider Admission Type Admission Date/Time    Adrian Carvalho MD Foehrenbacher, Matthew Henry, MD Emergency 07/22/18  0246    Discharge Date Hospital Service Auth/Cert Status Service Area     Observation Incomplete [facility]    Unit Room/Bed Admission Status        5 MEDICAL SURGICAL 0502/0502-01 Admission (Confirmed)       Admission     Complaint    Encephalopathy acute, Encephalopathy      Hospital Account     Name Acct ID Class Status Primary Coverage    Ana Luisa Lee [id number] Inpatient Open MEDICA - MEDICA DUAL SOLUTIONS [facility] PARTNERS            Guarantor Account (for Hospital Account #[id number])     Name Relation to Pt Service Area Active? Acct Type     Ana Luisa Lee  FCS Yes Personal/Family    Address Phone          [address]  [place] [address] [phone](H)  none(O)              Coverage Information (for Hospital Account #[id number])     F/O Payor/Plan Precert #    MEDICA/MEDICA DUAL SOLUTIONS [facility]     Subscriber Subscriber #    Ana Luisa Lee [id number]    Address Phone    [address] [address]  [place] [address] [phone]

## 2018-07-22 NOTE — IP AVS SNAPSHOT
MRN:[id number]                      After Visit Summary   7/22/2018    Ana Luisa Lee    MRN: [id number]           Thank you!     Thank you for choosing [facility] for your care. Our goal is always to provide you with excellent care. Hearing back from our patients is one way we can continue to improve our services. Please take a few minutes to complete the written survey that you may receive in the mail after you visit. If you would like to speak to someone directly about your visit please contact Patient Relations at [phone]. Thank you!          Patient Information     Date Of Birth          1940        Designated Caregiver       Most Recent Value    Caregiver    Will someone help with your care after discharge? yes    Name of designated caregiver Assistant living    Phone number of caregiver [phone] [home]    Caregiver address [address] [place], [place]      About your hospital stay     You were admitted on:  July 22, 2018 You last received care in the:  [place] [address] [facility]    You were discharged on:  July 25, 2018       Who to Call     For medical emergencies, please call 911.  For non-urgent questions about your medical care, please call your primary care provider or clinic, [phone]          Attending Provider     Provider Specialty    Damien Blanca MD Emergency Medicine    Ankit Maradiaga MD Internal Medicine    [facility] youngblood MD Internal Medicine       Primary Care Provider Office Phone # Fax #    Db Alvarez [phone]      After Care Instructions     Activity - Up with nursing assistance           Advance Diet as Tolerated       Follow this diet upon discharge: Orders Placed This Encounter      Snacks/Supplements Adult: Boost Plus; With Meals      Advance Diet as Tolerated: Regular Diet Adult            Fall precautions           General info for SNF       Length of Stay  "Estimate: Short Term Care: Estimated # of Days <30  Condition at Discharge: Stable  Level of care:skilled   Rehabilitation Potential: Fair  Admission H&P remains valid and up-to-date: Yes  Recent Chemotherapy: N/A  Use Nursing Home Standing Orders: Yes            Mantoux instructions       Give two-step Mantoux (PPD) Per Facility Policy Yes                  Follow-up Appointments     Follow Up and recommended labs and tests       Follow up with group home physician.  The following labs/tests are recommended: bmp.                  Additional Services     Occupational Therapy Adult Consult       Evaluate and treat as clinically indicated.    Reason:  weakness            Physical Therapy Adult Consult       Evaluate and treat as clinically indicated.    Reason:  weakness                  Further instructions from your care team       Follow up with DR Alvarez within 7 days after you discharge from [facility]  Db Alvarez MD  [phone]    Pending Results     Date and Time Order Name Status Description    7/22/2018 0316 Blood culture ONE site Preliminary     7/22/2018 0252 Blood culture Preliminary             Statement of Approval     Ordered          07/25/18 8589  I have reviewed and agree with all the recommendations and orders detailed in this document.  EFFECTIVE NOW     Approved and electronically signed by:  Latesha Cao MD             Admission Information     Date & Time Provider Department Dept. Phone    7/22/2018 Adrian Carvalho MD [place] [address] [facility] [phone]      Your Vitals Were     Blood Pressure Pulse Temperature Respirations Height Weight    187/89 (BP Location: Left arm) 83 98.6  F (37  C) (Oral) 18 1.575 m (5' 2.01\") 52.2 kg (115 lb 1.6 oz)    Pulse Oximetry BMI (Body Mass Index)                98% 21.05 kg/m2          MyChart Information     [employer] lets you send messages to your doctor, view your test results, renew your prescriptions, schedule appointments and " "more. To sign up, go to [email or web address] . Click on \"Log in\" on the left side of the screen, which will take you to the Welcome page. Then click on \"Sign up Now\" on the right side of the page.     You will be asked to enter the access code listed below, as well as some personal information. Please follow the directions to create your username and password.     Your access code is: [email or web address]  Expires: 10/20/2018  4:35 AM     Your access code will  in 90 days. If you need help or a new code, please call your [place] clinic or [phone].        Care EveryWhere ID     This is your Care EveryWhere ID. This could be used by other organizations to access your [place] medical records  [id number]        Equal Access to Services     SOBEIDA GIFFORD : Allyson Fernandes, shannan hodge, leslee groves, holly garcia . So [facility] [phone].    ATENCIÓN: Si habla español, tiene a martinez disposición servicios gratuitos de asistencia lingüística. Robert al [phone].    We comply with applicable federal civil rights laws and [place] laws. We do not discriminate on the basis of race, color, national origin, age, disability, sex, sexual orientation, or gender identity.               Review of your medicines      START taking        Dose / Directions    lisinopril 10 MG tablet   Commonly known as:  PRINIVIL/ZESTRIL   Used for:  Essential hypertension with goal blood pressure less than 140/90        Dose:  10 mg   Start taking on:  2018   Take 1 tablet (10 mg) by mouth daily   Quantity:  30 tablet   Refills:  0         CONTINUE these medicines which may have CHANGED, or have new prescriptions. If we are uncertain of the size of tablets/capsules you have at home, strength may be listed as something that might have changed.        Dose / Directions    traMADol 50 MG tablet   Commonly known as:  ULTRAM   This may have changed:  Another medication with the same " name was removed. Continue taking this medication, and follow the directions you see here.   Used for:  Arthritis of hand        Dose:  50 mg   Take 1 tablet (50 mg) by mouth 2 times daily   Quantity:  20 tablet   Refills:  0         CONTINUE these medicines which have NOT CHANGED        Dose / Directions    acetaminophen 500 MG tablet   Commonly known as:  TYLENOL   Used for:  Pain of both shoulder joints        Dose:  1000 mg   Take 2 tablets (1,000 mg) by mouth 3 times daily   Refills:  0       albuterol 108 (90 Base) MCG/ACT Inhaler   Commonly known as:  PROAIR HFA   Used for:  Asthma, persistent        Dose:  2 puff   Inhale 2 puffs into the lungs every 6 hours as needed   Refills:  0       budesonide 180 MCG/ACT inhaler   Commonly known as:  PULMICORT FLEXHALER   Used for:  Pulmonary emphysema, unspecified emphysema type (H)        Dose:  1 puff   Inhale 1 puff into the lungs 2 times daily   Refills:  0       calcium 600 MG tablet   Used for:  Pulmonary emphysema, unspecified emphysema type (H)        Dose:  1 tablet   Take 1 tablet (600 mg) by mouth daily   Quantity:  60 tablet   Refills:  0       cholecalciferol 1000 UNIT tablet   Commonly known as:  vitamin D3   Used for:  COPD, moderate (H)        Dose:  1000 Units   Take 1 tablet (1,000 Units) by mouth daily   Quantity:  30 tablet   Refills:  0       cyanocobalamin 1000 MCG Tbcr   Commonly known as:  B-12 TR   Used for:  Pernicious anemia        Dose:  1 tablet   Take 1 tablet by mouth daily   Quantity:  100 tablet   Refills:  3       DULoxetine 30 MG EC capsule   Commonly known as:  CYMBALTA        Dose:  30 mg   Take 30 mg by mouth daily   Refills:  0       ferrous sulfate 325 (65 Fe) MG tablet   Commonly known as:  IRON        Dose:  325 mg   Take 325 mg by mouth daily (with breakfast)   Refills:  0       fexofenadine 60 MG tablet   Commonly known as:  ALLEGRA   Used for:  Middle ear effusion, left        Dose:  60 mg   Take 1 tablet (60 mg) by mouth  daily   Quantity:  60 tablet   Refills:  0       fluticasone 50 MCG/ACT spray   Commonly known as:  FLONASE   Used for:  Middle ear effusion, left        Dose:  1 spray   Spray 1 spray into both nostrils daily   Quantity:  1 Bottle   Refills:  0       gemfibrozil 600 MG tablet   Commonly known as:  LOPID   Used for:  Hyperlipidemia with target LDL less than 130        Dose:  600 mg   Take 1 tablet (600 mg) by mouth daily   Quantity:  90 tablet   Refills:  0       INCRUSE ELLIPTA 62.5 MCG/INH oral inhaler   Generic drug:  umeclidinium        Dose:  1 puff   Inhale 1 puff into the lungs daily   Refills:  0       levalbuterol 1.25 MG/3ML neb solution   Commonly known as:  XOPENEX        Dose:  1 ampule   Take 1 ampule by nebulization 4 times daily   Refills:  0       levothyroxine 25 MCG tablet   Commonly known as:  SYNTHROID/LEVOTHROID   Used for:  Hypothyroidism due to acquired atrophy of thyroid        Dose:  25 mcg   Take 1 tablet (25 mcg) by mouth daily   Quantity:  90 tablet   Refills:  3       loperamide 2 MG capsule   Commonly known as:  IMODIUM        Dose:  2 mg   Take 2 mg by mouth every 8 hours as needed for diarrhea   Refills:  0       montelukast 10 MG tablet   Commonly known as:  SINGULAIR   Used for:  Pulmonary emphysema, unspecified emphysema type (H)        Dose:  10 mg   Take 1 tablet (10 mg) by mouth At Bedtime   Quantity:  30 tablet   Refills:  0       MULTIVITAMIN PO        Dose:  1 tablet   Take 1 tablet by mouth daily   Refills:  0       omeprazole 40 MG capsule   Commonly known as:  priLOSEC   Used for:  Gastroesophageal reflux disease, esophagitis presence not specified        Dose:  40 mg   Take 1 capsule (40 mg) by mouth daily Take 30-60 minutes before a meal.   Quantity:  90 capsule   Refills:  2       * polyethylene glycol Packet   Commonly known as:  MIRALAX/GLYCOLAX        Dose:  17 g   Take 17 g by mouth daily   Refills:  0       * polyethylene glycol Packet   Commonly known as:   MIRALAX/GLYCOLAX        Dose:  17 g   Take 17 g by mouth daily as needed for constipation   Refills:  0       predniSONE 10 MG tablet   Commonly known as:  DELTASONE        Dose:  10 mg   Take 10 mg by mouth daily   Refills:  0       roflumilast 500 MCG Tabs tablet   Commonly known as:  DALIRESP        Dose:  500 mcg   Take 500 mcg by mouth daily   Refills:  0       * Notice:  This list has 2 medication(s) that are the same as other medications prescribed for you. Read the directions carefully, and ask your doctor or other care provider to review them with you.         Where to get your medicines      Some of these will need a paper prescription and others can be bought over the counter. Ask your nurse if you have questions.     Bring a paper prescription for each of these medications     lisinopril 10 MG tablet    traMADol 50 MG tablet                Protect others around you: Learn how to safely use, store and throw away your medicines at www.disposemymeds.org.        Information about OPIOIDS     PRESCRIPTION OPIOIDS: WHAT YOU NEED TO KNOW   We gave you an opioid (narcotic) pain medicine. It is important to manage your pain, but opioids are not always the best choice. You should first try all the other options your care team gave you. Take this medicine for as short a time (and as few doses) as possible.     These medicines have risks:    DO NOT drive when on new or higher doses of pain medicine. These medicines can affect your alertness and reaction times, and you could be arrested for driving under the influence (DUI). If you need to use opioids long-term, talk to your care team about driving.    DO NOT operate heave machinery    DO NOT do any other dangerous activities while taking these medicines.     DO NOT drink any alcohol while taking these medicines.      If the opioid prescribed includes acetaminophen, DO NOT take with any other medicines that contain acetaminophen. Read all labels carefully. Look for  the word  acetaminophen  or  Tylenol.  Ask your pharmacist if you have questions or are unsure.    You can get addicted to pain medicines, especially if you have a history of addiction (chemical, alcohol or substance dependence). Talk to your care team about ways to reduce this risk.    Store your pills in a secure place, locked if possible. We will not replace any lost or stolen medicine. If you don t finish your medicine, please throw away (dispose) as directed by your pharmacist. The [place] Pollution Control Agency has more information about safe disposal: https://www.pca.[facility].[place].us/living-green/managing-unwanted-medications.     All opioids tend to cause constipation. Drink plenty of water and eat foods that have a lot of fiber, such as fruits, vegetables, prune juice, apple juice and high-fiber cereal. Take a laxative (Miralax, milk of magnesia, Colace, Senna) if you don t move your bowels at least every other day.              Medication List: This is a list of all your medications and when to take them. Check marks below indicate your daily home schedule. Keep this list as a reference.      Medications           Morning Afternoon Evening Bedtime As Needed    acetaminophen 500 MG tablet   Commonly known as:  TYLENOL   Take 2 tablets (1,000 mg) by mouth 3 times daily   Last time this was given:  1,000 mg on 7/25/2018  8:28 AM   Next Dose Due:  07/25 at 04:00 PM                                         albuterol 108 (90 Base) MCG/ACT Inhaler   Commonly known as:  PROAIR HFA   Inhale 2 puffs into the lungs every 6 hours as needed   Last time this was given:  2 puffs on 7/25/2018  1:07 AM                                budesonide 180 MCG/ACT inhaler   Commonly known as:  PULMICORT FLEXHALER   Inhale 1 puff into the lungs 2 times daily                                calcium 600 MG tablet   Take 1 tablet (600 mg) by mouth daily                                cholecalciferol 1000 UNIT tablet   Commonly known as:   vitamin D3   Take 1 tablet (1,000 Units) by mouth daily                                cyanocobalamin 1000 MCG Tbcr   Commonly known as:  B-12 TR   Take 1 tablet by mouth daily                                DULoxetine 30 MG EC capsule   Commonly known as:  CYMBALTA   Take 30 mg by mouth daily   Last time this was given:  30 mg on 7/25/2018  8:28 AM                                   ferrous sulfate 325 (65 Fe) MG tablet   Commonly known as:  IRON   Take 325 mg by mouth daily (with breakfast)                                fexofenadine 60 MG tablet   Commonly known as:  ALLEGRA   Take 1 tablet (60 mg) by mouth daily                                fluticasone 50 MCG/ACT spray   Commonly known as:  FLONASE   Spray 1 spray into both nostrils daily   Last time this was given:  1 spray on 7/24/2018 10:19 PM   Next Dose Due:  07/25 08:00 PM                                   gemfibrozil 600 MG tablet   Commonly known as:  LOPID   Take 1 tablet (600 mg) by mouth daily                                INCRUSE ELLIPTA 62.5 MCG/INH oral inhaler   Inhale 1 puff into the lungs daily   Last time this was given:  1 puff on 7/25/2018  7:23 AM   Generic drug:  umeclidinium                                   levalbuterol 1.25 MG/3ML neb solution   Commonly known as:  XOPENEX   Take 1 ampule by nebulization 4 times daily   Last time this was given:  1.25 mg on 7/25/2018 11:15 AM   Next Dose Due:  07/25 at 04:00 PM                       08:00 PM               levothyroxine 25 MCG tablet   Commonly known as:  SYNTHROID/LEVOTHROID   Take 1 tablet (25 mcg) by mouth daily   Last time this was given:  25 mcg on 7/25/2018  8:28 AM                                   lisinopril 10 MG tablet   Commonly known as:  PRINIVIL/ZESTRIL   Take 1 tablet (10 mg) by mouth daily   Start taking on:  7/26/2018   Last time this was given:  10 mg on 7/25/2018  8:28 AM                                   loperamide 2 MG capsule   Commonly known as:  IMODIUM   Take 2  mg by mouth every 8 hours as needed for diarrhea                                montelukast 10 MG tablet   Commonly known as:  SINGULAIR   Take 1 tablet (10 mg) by mouth At Bedtime   Last time this was given:  10 mg on 7/24/2018 10:19 PM   Next Dose Due:  07/25 at 10:00 PM                                   MULTIVITAMIN PO   Take 1 tablet by mouth daily                                   omeprazole 40 MG capsule   Commonly known as:  priLOSEC   Take 1 capsule (40 mg) by mouth daily Take 30-60 minutes before a meal.   Last time this was given:  40 mg on 7/25/2018  8:28 AM                                   * polyethylene glycol Packet   Commonly known as:  MIRALAX/GLYCOLAX   Take 17 g by mouth daily   Last time this was given:  17 g on 7/25/2018  8:28 AM                                   * polyethylene glycol Packet   Commonly known as:  MIRALAX/GLYCOLAX   Take 17 g by mouth daily as needed for constipation   Last time this was given:  17 g on 7/25/2018  8:28 AM                                predniSONE 10 MG tablet   Commonly known as:  DELTASONE   Take 10 mg by mouth daily   Last time this was given:  10 mg on 7/25/2018  8:28 AM                                   roflumilast 500 MCG Tabs tablet   Commonly known as:  DALIRESP   Take 500 mcg by mouth daily   Last time this was given:  500 mcg on 7/25/2018  8:28 AM                                traMADol 50 MG tablet   Commonly known as:  ULTRAM   Take 1 tablet (50 mg) by mouth 2 times daily                                * Notice:  This list has 2 medication(s) that are the same as other medications prescribed for you. Read the directions carefully, and ask your doctor or other care provider to review them with you.

## 2018-07-22 NOTE — PROGRESS NOTES
Update: patient remains quite encephalopathic in the setting of metabolic derangements though unifying diagnosis not yet clear.  Will require >2 midnight stay, possibly longer.  Changing to inpatient status.    I did update her daughter via phone.  She confirms dnr/dni status.    Adrian Carvalho MD

## 2018-07-22 NOTE — IP AVS SNAPSHOT
"[place] [address] MEDICAL SURGICAL: [phone]                                              INTERAGENCY TRANSFER FORM - PHYSICIAN ORDERS   2018                    Hospital Admission Date: 2018  NAM DAS   : 1940  Sex: Female        Attending Provider: Adrian Carvalho MD     Allergies:  Hydralazine, Penicillin G, Meloxicam, Metoprolol, Norvasc [Amlodipine Besylate]    Infection:  None   Service:  Observation    Ht:  1.575 m (5' 2.01\")   Wt:  52.2 kg (115 lb 1.6 oz)   Admission Wt:  52.2 kg (115 lb 1.6 oz)    BMI:  21.05 kg/m 2   BSA:  1.51 m 2            Patient PCP Information     Provider PCP Teresita Alvarez MD General      ED Clinical Impression     Diagnosis Description Comment Added By Time Added    Delirium [R41.0] Delirium [R41.0]  Damien Blanca MD 2018  4:17 AM    Skin tear of right lower leg without complication, initial encounter [S81.811A] Skin tear of right lower leg without complication, initial encounter [S81.811A]  Damien Blanca MD 2018  4:17 AM    Hyponatremia [E87.1] Hyponatremia [E87.1]  Damien Blanca MD 2018  4:17 AM      Hospital Problems as of 2018              Priority Class Noted POA    Encephalopathy acute Medium  2018 Yes    Encephalopathy Medium  2018 Yes      Non-Hospital Problems as of 2018              Priority Class Noted    COPD, severe (H) Medium  2010    Osteoporosis Medium  Unknown    Hyperlipidemia with target LDL less than 130 Medium  Unknown    Asthma, persistent Medium  Unknown    Pes planus Medium  2011    Skin cyst Medium  2011    Advance Care Planning Medium  2011    Pain in joint, shoulder region Medium  1/10/2012    PVC (premature ventricular contraction) Medium  Unknown    Arthritis of hand Medium  Unknown    Pulmonary nodule seen on imaging study Medium  2014    Health Care Home Medium  12/3/2015    Hypercalcemia Medium  2015    Other fatigue Medium  " 12/4/2015    Steroid-dependent chronic obstructive pulmonary disease (H) Medium  3/19/2016    SVT (supraventricular tachycardia) (H) Medium  5/17/2016    Hypothyroidism, unspecified type Medium  10/1/2016    HTN, goal <  140/90 Medium  10/1/2016    Pernicious anemia Medium  11/3/2016    Paroxysmal supraventricular tachycardia (H) Medium  1/12/2017    PAC (premature atrial contraction) Medium  1/12/2017    Closed fracture of sacrum, unspecified portion of sacrum, initial encounter (H) Medium  12/2/2017    Anemia, unspecified type Medium  5/28/2018    [id number]: mod-severe Ao Stenosis, severe Asthma/COPD steroid dependent, tachycardia/PVCs ( intol to BB and Ca gillian block), hypothyroidism,  HTN, osteoporosis, HLipidemia, depression  Medium  5/28/2018    Aortic stenosis, moderate Medium  5/28/2018    Fall Medium  6/17/2018      Code Status History     Date Active Date Inactive Code Status Order ID Comments User Context    7/25/2018  2:34 PM  DNR/DNI [id number]  Latesha Cao MD Outpatient    7/22/2018 10:58 AM 7/25/2018  2:34 PM DNR/DNI [id number]  Adrian Carvalho MD Inpatient    6/29/2018  1:33 PM 7/22/2018 10:58 AM DNR [id number]  Melissa YanyIVÁN Buenrostro CNP Outpatient    6/18/2018  1:26 PM 6/29/2018  1:33 PM DNR/DNI [id number]  Kesha Vela PA-C Outpatient    6/17/2018  4:55 PM 6/18/2018  1:26 PM DNR/DNI [id number]  Jodi Lopes MD Inpatient    4/17/2018 10:51 AM 6/17/2018  4:55 PM DNR/DNI [id number]  Paulo Curry MD Outpatient    4/13/2018  7:53 AM 4/17/2018 10:51 AM DNR/DNI [id number]  Paulo Curry MD Inpatient    11/22/2017 12:47 PM 4/13/2018  7:53 AM DNR/DNI [id number]  Fuentes Hopson DO Outpatient    11/20/2017  1:02 AM 11/22/2017 12:47 PM DNR/DNI [id number]  Damien Johnson,  Inpatient    5/27/2017  8:00 PM 5/28/2017  3:40 PM DNR/DNI [id number]  Speedy Yu MD Inpatient    5/5/2017  9:37 AM 5/27/2017  8:00 PM Full Code [id number]  Dea Riley  DO [facility] Outpatient    5/4/2017  2:07 AM 5/5/2017  9:37 AM Full Code [id number]  Sonido Spears MD Inpatient    1/6/2017 10:35 AM 5/4/2017  2:07 AM Full Code [id number]  Hallie Barroso MD Outpatient    1/2/2017  9:59 AM 1/6/2017 10:35 AM Full Code [id number]  Moses Calle MD ED    12/24/2016 11:48 AM 1/2/2017  9:59 AM Full Code [id number]  Kesha Vela PA-C Outpatient    12/22/2016  5:04 PM 12/24/2016 11:48 AM Full Code [id number]  Mimi Mullins PA-C ED    11/29/2016  5:41 PM 12/1/2016  3:24 PM DNR/DNI [id number]  Patsy Rae PA-C ED    3/19/2016 10:42 AM 11/29/2016  5:41 PM Full Code [id number]  Sonido Spears MD Outpatient    3/4/2016  5:36 PM 3/19/2016 10:42 AM Full Code [id number]  Meg Leger PA-C ED    12/13/2014  4:34 PM 12/19/2014  4:26 PM Full Code [id number]  eBtty Roca MD Inpatient    11/4/2013  9:09 PM 12/13/2014  4:34 PM Full Code [id number]  Valentine Lundberg Outpatient    10/25/2013  1:07 PM 10/30/2013  3:25 PM Full Code [id number]  Libia Arevalo PA Inpatient         Medication Review      START taking        Dose / Directions Comments    lisinopril 10 MG tablet   Commonly known as:  PRINIVIL/ZESTRIL   Used for:  Essential hypertension with goal blood pressure less than 140/90        Dose:  10 mg   Start taking on:  7/26/2018   Take 1 tablet (10 mg) by mouth daily   Quantity:  30 tablet   Refills:  0          CONTINUE these medications which may have CHANGED, or have new prescriptions. If we are uncertain of the size of tablets/capsules you have at home, strength may be listed as something that might have changed.        Dose / Directions Comments    traMADol 50 MG tablet   Commonly known as:  ULTRAM   This may have changed:  Another medication with the same name was removed. Continue taking this medication, and follow the directions you see here.   Used for:  Arthritis of hand        Dose:  50 mg   Take 1 tablet (50 mg) by mouth 2 times  daily   Quantity:  20 tablet   Refills:  0          CONTINUE these medications which have NOT CHANGED        Dose / Directions Comments    acetaminophen 500 MG tablet   Commonly known as:  TYLENOL   Used for:  Pain of both shoulder joints        Dose:  1000 mg   Take 2 tablets (1,000 mg) by mouth 3 times daily   Refills:  0        albuterol 108 (90 Base) MCG/ACT Inhaler   Commonly known as:  PROAIR HFA   Used for:  Asthma, persistent        Dose:  2 puff   Inhale 2 puffs into the lungs every 6 hours as needed   Refills:  0        budesonide 180 MCG/ACT inhaler   Commonly known as:  PULMICORT FLEXHALER   Used for:  Pulmonary emphysema, unspecified emphysema type (H)        Dose:  1 puff   Inhale 1 puff into the lungs 2 times daily   Refills:  0        calcium 600 MG tablet   Used for:  Pulmonary emphysema, unspecified emphysema type (H)        Dose:  1 tablet   Take 1 tablet (600 mg) by mouth daily   Quantity:  60 tablet   Refills:  0        cholecalciferol 1000 UNIT tablet   Commonly known as:  vitamin D3   Used for:  COPD, moderate (H)        Dose:  1000 Units   Take 1 tablet (1,000 Units) by mouth daily   Quantity:  30 tablet   Refills:  0        cyanocobalamin 1000 MCG Tbcr   Commonly known as:  B-12 TR   Used for:  Pernicious anemia        Dose:  1 tablet   Take 1 tablet by mouth daily   Quantity:  100 tablet   Refills:  3        DULoxetine 30 MG EC capsule   Commonly known as:  CYMBALTA        Dose:  30 mg   Take 30 mg by mouth daily   Refills:  0        ferrous sulfate 325 (65 Fe) MG tablet   Commonly known as:  IRON        Dose:  325 mg   Take 325 mg by mouth daily (with breakfast)   Refills:  0        fexofenadine 60 MG tablet   Commonly known as:  ALLEGRA   Used for:  Middle ear effusion, left        Dose:  60 mg   Take 1 tablet (60 mg) by mouth daily   Quantity:  60 tablet   Refills:  0        fluticasone 50 MCG/ACT spray   Commonly known as:  FLONASE   Used for:  Middle ear effusion, left        Dose:  1  spray   Spray 1 spray into both nostrils daily   Quantity:  1 Bottle   Refills:  0        gemfibrozil 600 MG tablet   Commonly known as:  LOPID   Used for:  Hyperlipidemia with target LDL less than 130        Dose:  600 mg   Take 1 tablet (600 mg) by mouth daily   Quantity:  90 tablet   Refills:  0        INCRUSE ELLIPTA 62.5 MCG/INH oral inhaler   Generic drug:  umeclidinium        Dose:  1 puff   Inhale 1 puff into the lungs daily   Refills:  0        levalbuterol 1.25 MG/3ML neb solution   Commonly known as:  XOPENEX        Dose:  1 ampule   Take 1 ampule by nebulization 4 times daily   Refills:  0        levothyroxine 25 MCG tablet   Commonly known as:  SYNTHROID/LEVOTHROID   Used for:  Hypothyroidism due to acquired atrophy of thyroid        Dose:  25 mcg   Take 1 tablet (25 mcg) by mouth daily   Quantity:  90 tablet   Refills:  3        loperamide 2 MG capsule   Commonly known as:  IMODIUM        Dose:  2 mg   Take 2 mg by mouth every 8 hours as needed for diarrhea   Refills:  0        montelukast 10 MG tablet   Commonly known as:  SINGULAIR   Used for:  Pulmonary emphysema, unspecified emphysema type (H)        Dose:  10 mg   Take 1 tablet (10 mg) by mouth At Bedtime   Quantity:  30 tablet   Refills:  0        MULTIVITAMIN PO        Dose:  1 tablet   Take 1 tablet by mouth daily   Refills:  0        omeprazole 40 MG capsule   Commonly known as:  priLOSEC   Used for:  Gastroesophageal reflux disease, esophagitis presence not specified        Dose:  40 mg   Take 1 capsule (40 mg) by mouth daily Take 30-60 minutes before a meal.   Quantity:  90 capsule   Refills:  2        * polyethylene glycol Packet   Commonly known as:  MIRALAX/GLYCOLAX        Dose:  17 g   Take 17 g by mouth daily   Refills:  0        * polyethylene glycol Packet   Commonly known as:  MIRALAX/GLYCOLAX        Dose:  17 g   Take 17 g by mouth daily as needed for constipation   Refills:  0        predniSONE 10 MG tablet   Commonly known as:   DELTASONE        Dose:  10 mg   Take 10 mg by mouth daily   Refills:  0        roflumilast 500 MCG Tabs tablet   Commonly known as:  DALIRESP        Dose:  500 mcg   Take 500 mcg by mouth daily   Refills:  0        * Notice:  This list has 2 medication(s) that are the same as other medications prescribed for you. Read the directions carefully, and ask your doctor or other care provider to review them with you.            After Care     Activity - Up with nursing assistance           Advance Diet as Tolerated       Follow this diet upon discharge: Orders Placed This Encounter      Snacks/Supplements Adult: Boost Plus; With Meals      Advance Diet as Tolerated: Regular Diet Adult       Fall precautions           General info for SNF       Length of Stay Estimate: Short Term Care: Estimated # of Days <30  Condition at Discharge: Stable  Level of care:skilled   Rehabilitation Potential: Fair  Admission H&P remains valid and up-to-date: Yes  Recent Chemotherapy: N/A  Use Nursing Home Standing Orders: Yes       Mantoux instructions       Give two-step Mantoux (PPD) Per Facility Policy Yes             Referrals     Occupational Therapy Adult Consult       Evaluate and treat as clinically indicated.    Reason:  weakness       Physical Therapy Adult Consult       Evaluate and treat as clinically indicated.    Reason:  weakness             Follow-Up Appointment Instructions     Future Labs/Procedures    Follow Up and recommended labs and tests     Comments:    Follow up with [facility] physician.  The following labs/tests are recommended: bmp.      Follow-Up Appointment Instructions     Follow Up and recommended labs and tests       Follow up with [facility] physician.  The following labs/tests are recommended: bmp.             Statement of Approval     Ordered          07/25/18 1434  I have reviewed and agree with all the recommendations and orders detailed in this document.  EFFECTIVE NOW     Approved and electronically  signed by:  Latesha Cao MD

## 2018-07-23 ENCOUNTER — TELEPHONE (OUTPATIENT)
Dept: INTERNAL MEDICINE | Facility: CLINIC | Age: 78
End: 2018-07-23

## 2018-07-23 ENCOUNTER — APPOINTMENT (OUTPATIENT)
Dept: OCCUPATIONAL THERAPY | Facility: CLINIC | Age: 78
DRG: 643 | End: 2018-07-23
Attending: INTERNAL MEDICINE
Payer: COMMERCIAL

## 2018-07-23 ENCOUNTER — PATIENT OUTREACH (OUTPATIENT)
Dept: GERIATRIC MEDICINE | Facility: CLINIC | Age: 78
End: 2018-07-23

## 2018-07-23 LAB
ANION GAP SERPL CALCULATED.3IONS-SCNC: 12 MMOL/L (ref 3–14)
BACTERIA SPEC CULT: NO GROWTH
BUN SERPL-MCNC: 9 MG/DL (ref 7–30)
CALCIUM SERPL-MCNC: 8.6 MG/DL (ref 8.5–10.1)
CHLORIDE SERPL-SCNC: 101 MMOL/L (ref 94–109)
CO2 SERPL-SCNC: 18 MMOL/L (ref 20–32)
CREAT SERPL-MCNC: 0.48 MG/DL (ref 0.52–1.04)
GFR SERPL CREATININE-BSD FRML MDRD: >90 ML/MIN/1.7M2
GLUCOSE BLDC GLUCOMTR-MCNC: 190 MG/DL (ref 70–99)
GLUCOSE SERPL-MCNC: 78 MG/DL (ref 70–99)
LACTATE BLD-SCNC: 0.5 MMOL/L (ref 0.4–1.9)
Lab: NORMAL
POTASSIUM SERPL-SCNC: 3.6 MMOL/L (ref 3.4–5.3)
SODIUM SERPL-SCNC: 131 MMOL/L (ref 133–144)
SPECIMEN SOURCE: NORMAL

## 2018-07-23 PROCEDURE — 40000914 ZZH STATISTIC SITTER, DAY HOURS

## 2018-07-23 PROCEDURE — 97530 THERAPEUTIC ACTIVITIES: CPT | Mod: GO

## 2018-07-23 PROCEDURE — 25000125 ZZHC RX 250: Performed by: INTERNAL MEDICINE

## 2018-07-23 PROCEDURE — 36415 COLL VENOUS BLD VENIPUNCTURE: CPT | Performed by: INTERNAL MEDICINE

## 2018-07-23 PROCEDURE — 25000128 H RX IP 250 OP 636: Performed by: INTERNAL MEDICINE

## 2018-07-23 PROCEDURE — 40000275 ZZH STATISTIC RCP TIME EA 10 MIN

## 2018-07-23 PROCEDURE — 80048 BASIC METABOLIC PNL TOTAL CA: CPT | Performed by: INTERNAL MEDICINE

## 2018-07-23 PROCEDURE — 00000146 ZZHCL STATISTIC GLUCOSE BY METER IP

## 2018-07-23 PROCEDURE — 94640 AIRWAY INHALATION TREATMENT: CPT

## 2018-07-23 PROCEDURE — 12000000 ZZH R&B MED SURG/OB

## 2018-07-23 PROCEDURE — 25000132 ZZH RX MED GY IP 250 OP 250 PS 637: Performed by: INTERNAL MEDICINE

## 2018-07-23 PROCEDURE — 83605 ASSAY OF LACTIC ACID: CPT | Performed by: INTERNAL MEDICINE

## 2018-07-23 PROCEDURE — 40000133 ZZH STATISTIC OT WARD VISIT

## 2018-07-23 PROCEDURE — 94640 AIRWAY INHALATION TREATMENT: CPT | Mod: 76

## 2018-07-23 PROCEDURE — 97165 OT EVAL LOW COMPLEX 30 MIN: CPT | Mod: GO

## 2018-07-23 PROCEDURE — 99233 SBSQ HOSP IP/OBS HIGH 50: CPT | Performed by: INTERNAL MEDICINE

## 2018-07-23 RX ORDER — ENALAPRILAT 1.25 MG/ML
1.25 INJECTION INTRAVENOUS EVERY 6 HOURS PRN
Status: DISCONTINUED | OUTPATIENT
Start: 2018-07-23 | End: 2018-07-25 | Stop reason: HOSPADM

## 2018-07-23 RX ORDER — MONTELUKAST SODIUM 10 MG/1
10 TABLET ORAL AT BEDTIME
Status: DISCONTINUED | OUTPATIENT
Start: 2018-07-23 | End: 2018-07-25 | Stop reason: HOSPADM

## 2018-07-23 RX ORDER — DULOXETIN HYDROCHLORIDE 30 MG/1
30 CAPSULE, DELAYED RELEASE ORAL DAILY
Status: DISCONTINUED | OUTPATIENT
Start: 2018-07-23 | End: 2018-07-25 | Stop reason: HOSPADM

## 2018-07-23 RX ORDER — POTASSIUM CHLORIDE 29.8 MG/ML
20 INJECTION INTRAVENOUS
Status: DISCONTINUED | OUTPATIENT
Start: 2018-07-23 | End: 2018-07-25 | Stop reason: HOSPADM

## 2018-07-23 RX ORDER — LEVALBUTEROL INHALATION SOLUTION 1.25 MG/3ML
1 SOLUTION RESPIRATORY (INHALATION) 4 TIMES DAILY
Status: DISCONTINUED | OUTPATIENT
Start: 2018-07-23 | End: 2018-07-25 | Stop reason: HOSPADM

## 2018-07-23 RX ORDER — IPRATROPIUM BROMIDE AND ALBUTEROL SULFATE 2.5; .5 MG/3ML; MG/3ML
3 SOLUTION RESPIRATORY (INHALATION) EVERY 4 HOURS PRN
Status: DISCONTINUED | OUTPATIENT
Start: 2018-07-23 | End: 2018-07-25 | Stop reason: HOSPADM

## 2018-07-23 RX ORDER — SODIUM CHLORIDE 9 MG/ML
INJECTION, SOLUTION INTRAVENOUS CONTINUOUS
Status: DISCONTINUED | OUTPATIENT
Start: 2018-07-23 | End: 2018-07-23

## 2018-07-23 RX ORDER — POTASSIUM CHLORIDE 7.45 MG/ML
10 INJECTION INTRAVENOUS
Status: DISCONTINUED | OUTPATIENT
Start: 2018-07-23 | End: 2018-07-25 | Stop reason: HOSPADM

## 2018-07-23 RX ORDER — POTASSIUM CL/LIDO/0.9 % NACL 10MEQ/0.1L
10 INTRAVENOUS SOLUTION, PIGGYBACK (ML) INTRAVENOUS
Status: DISCONTINUED | OUTPATIENT
Start: 2018-07-23 | End: 2018-07-25 | Stop reason: HOSPADM

## 2018-07-23 RX ORDER — POLYETHYLENE GLYCOL 3350 17 G/17G
17 POWDER, FOR SOLUTION ORAL DAILY
Status: DISCONTINUED | OUTPATIENT
Start: 2018-07-23 | End: 2018-07-25 | Stop reason: HOSPADM

## 2018-07-23 RX ORDER — SODIUM CHLORIDE 9 MG/ML
INJECTION, SOLUTION INTRAVENOUS CONTINUOUS
Status: DISCONTINUED | OUTPATIENT
Start: 2018-07-23 | End: 2018-07-25

## 2018-07-23 RX ORDER — MULTIPLE VITAMINS W/ MINERALS TAB 9MG-400MCG
1 TAB ORAL DAILY
Status: DISCONTINUED | OUTPATIENT
Start: 2018-07-24 | End: 2018-07-25 | Stop reason: HOSPADM

## 2018-07-23 RX ORDER — NITROGLYCERIN 0.4 MG/1
0.4 TABLET SUBLINGUAL
Status: DISCONTINUED | OUTPATIENT
Start: 2018-07-23 | End: 2018-07-25 | Stop reason: HOSPADM

## 2018-07-23 RX ORDER — TRAMADOL HYDROCHLORIDE 50 MG/1
50 TABLET ORAL DAILY PRN
Status: DISCONTINUED | OUTPATIENT
Start: 2018-07-23 | End: 2018-07-25 | Stop reason: HOSPADM

## 2018-07-23 RX ORDER — ROFLUMILAST 500 UG/1
500 TABLET ORAL DAILY
Status: DISCONTINUED | OUTPATIENT
Start: 2018-07-23 | End: 2018-07-25 | Stop reason: HOSPADM

## 2018-07-23 RX ORDER — LISINOPRIL 10 MG/1
10 TABLET ORAL DAILY
Status: DISCONTINUED | OUTPATIENT
Start: 2018-07-23 | End: 2018-07-25 | Stop reason: HOSPADM

## 2018-07-23 RX ORDER — POTASSIUM CHLORIDE 1.5 G/1.58G
20-40 POWDER, FOR SOLUTION ORAL
Status: DISCONTINUED | OUTPATIENT
Start: 2018-07-23 | End: 2018-07-25 | Stop reason: HOSPADM

## 2018-07-23 RX ORDER — POTASSIUM CHLORIDE 1500 MG/1
20-40 TABLET, EXTENDED RELEASE ORAL
Status: DISCONTINUED | OUTPATIENT
Start: 2018-07-23 | End: 2018-07-25 | Stop reason: HOSPADM

## 2018-07-23 RX ADMIN — ACETAMINOPHEN 1000 MG: 500 TABLET, FILM COATED ORAL at 16:20

## 2018-07-23 RX ADMIN — IPRATROPIUM BROMIDE AND ALBUTEROL SULFATE 3 ML: .5; 3 SOLUTION RESPIRATORY (INHALATION) at 07:34

## 2018-07-23 RX ADMIN — OMEPRAZOLE 40 MG: 20 CAPSULE, DELAYED RELEASE ORAL at 10:07

## 2018-07-23 RX ADMIN — ACETAMINOPHEN 1000 MG: 500 TABLET, FILM COATED ORAL at 10:10

## 2018-07-23 RX ADMIN — SODIUM CHLORIDE 500 ML: 9 INJECTION, SOLUTION INTRAVENOUS at 12:22

## 2018-07-23 RX ADMIN — PREDNISONE 10 MG: 10 TABLET ORAL at 10:13

## 2018-07-23 RX ADMIN — SODIUM CHLORIDE 500 ML: 9 INJECTION, SOLUTION INTRAVENOUS at 16:40

## 2018-07-23 RX ADMIN — POLYETHYLENE GLYCOL 3350 17 G: 17 POWDER, FOR SOLUTION ORAL at 10:05

## 2018-07-23 RX ADMIN — POTASSIUM CHLORIDE AND SODIUM CHLORIDE: 900; 150 INJECTION, SOLUTION INTRAVENOUS at 01:19

## 2018-07-23 RX ADMIN — LEVALBUTEROL HYDROCHLORIDE 1.25 MG: 1.25 SOLUTION RESPIRATORY (INHALATION) at 11:24

## 2018-07-23 RX ADMIN — DEXTROSE AND SODIUM CHLORIDE: 5; 900 INJECTION, SOLUTION INTRAVENOUS at 07:48

## 2018-07-23 RX ADMIN — DULOXETINE HYDROCHLORIDE 30 MG: 30 CAPSULE, DELAYED RELEASE ORAL at 10:12

## 2018-07-23 RX ADMIN — HYDRALAZINE HYDROCHLORIDE 10 MG: 20 INJECTION INTRAMUSCULAR; INTRAVENOUS at 03:17

## 2018-07-23 RX ADMIN — LEVALBUTEROL HYDROCHLORIDE 1.25 MG: 1.25 SOLUTION RESPIRATORY (INHALATION) at 16:01

## 2018-07-23 RX ADMIN — LEVALBUTEROL HYDROCHLORIDE 1.25 MG: 1.25 SOLUTION RESPIRATORY (INHALATION) at 21:38

## 2018-07-23 RX ADMIN — LEVOTHYROXINE SODIUM 25 MCG: 25 TABLET ORAL at 10:13

## 2018-07-23 RX ADMIN — UMECLIDINIUM 1 PUFF: 62.5 AEROSOL, POWDER ORAL at 07:39

## 2018-07-23 RX ADMIN — FLUTICASONE FUROATE 1 PUFF: 100 POWDER RESPIRATORY (INHALATION) at 07:38

## 2018-07-23 RX ADMIN — ACETAMINOPHEN 1000 MG: 500 TABLET, FILM COATED ORAL at 21:59

## 2018-07-23 RX ADMIN — LISINOPRIL 10 MG: 10 TABLET ORAL at 10:11

## 2018-07-23 RX ADMIN — ONDANSETRON 4 MG: 4 TABLET, ORALLY DISINTEGRATING ORAL at 05:22

## 2018-07-23 RX ADMIN — MONTELUKAST SODIUM 10 MG: 10 TABLET, FILM COATED ORAL at 21:59

## 2018-07-23 ASSESSMENT — ACTIVITIES OF DAILY LIVING (ADL)
ADLS_ACUITY_SCORE: 15.5
ADLS_ACUITY_SCORE: 15.5
ADLS_ACUITY_SCORE: 25
ADLS_ACUITY_SCORE: 23
ADLS_ACUITY_SCORE: 14.5
ADLS_ACUITY_SCORE: 27

## 2018-07-23 NOTE — PLAN OF CARE
Problem: Patient Care Overview  Goal: Plan of Care/Patient Progress Review  PT: Noted new PT orders. Pt approached, sleeping in bed. Pt was able to arouse briefly but unable to sustain despite verbal, manual stimulation. PT to reschedule for tomorrow.

## 2018-07-23 NOTE — PROGRESS NOTES
[facility]  Patient is currently open to home care services with [facility].  The patient is currently receiving RN PT OT services.  [facility]  and team have been notified of patient admission.  [facility] liaison will continue to follow patient during stay.  If appropriate provide orders to resume home care at time of discharge.

## 2018-07-23 NOTE — PLAN OF CARE
"Problem: Patient Care Overview  Goal: Plan of Care/Patient Progress Review  OT- Eval completed, tx initiated. Pt is a 78 year old female  admitted on 7/22/2018 with altered mental status after reportedly having an unwitnessed fall at her assisted living facility. She lives in an [facility] and states she receives A for bathing 2x/week and 3 meals/day. She uses a 4ww at baseline and ambulates to the dining ocasio for meals. Pt is not a reliable historian; PLOF mostly obtained from chart.     Discharge Planner OT   Patient plan for discharge: not stated  Current status: Pt with fluctuating alertness. Alert to self and place. Pt requiring heavy verbal and tactile cuing to follow simple 1 step commands; pt able to follow approx. 50% of the time. Supine>sit EOB Mod Ax1. Sit>stand Min Ax1 with 2ww and CGAx1; pt with very slow shuffling gait and verbal cuing to alternate feet to walk. Pt only able to tolerate about 3 steps forward, exclaims \"I want to sit down\", provided encouragement and reassurance that pt was on the way to the bedside chair. Increased to Mod Ax2 with 2ww during transfer as pt with increasing fatigue, controlled descent to sit in bedside chair with Mod Ax2. Set-up for breakfast; pt with slowed ability to manipulate containers but able to complete SBA. Noted that pt attempts to open containers with L hand w/o success and seemingly w/o purpose; as evidenced by eating with R hand and simultaneously trying to manipulate tray and utensils with L hand; not quite a tremor. Will continue to monitor, RN notified  Barriers to return to prior living situation: increased need for A  Recommendations for discharge: TCU as pt is currently Ax2 for transfers/functional mobility  Rationale for recommendations: Pt presents with below baseline ability to complete ADL and functional mobility tasks; to benefit from continued OT services to maximize safety and independence in daily activities       Entered by: Kaitlyn Jurado 07/23/2018 " 1:49 PM

## 2018-07-23 NOTE — PROGRESS NOTES
CC received notification of Hospital admission, currently admitted to observation  Hospital admission occurred on 8/22/2018 at [facility] with Dx of Fall/altered cognitive status EPIC notes reviewed.   CC contacted Hospital /discharge planner Rosio RINALDI,and left a message with this CC contact information, reviewed community POC as well requested to be notified of concerns, care conferences and discharge planning.  Received e-mail from client's daughter Windy inquiring if CM aware of admission. Windy also inquired if CM had record or med list regarding antidepressant and diuretic, secure e-mail sent to Windy that CM is aware that her mother is currently under an observation status at [facility], CM will f/u with [facility].  Explained that she should be in communication with Shobha BARTHOLOMEW at [facility] regarding medication profile.    CM to follow.  Rec'd vm from Shobha BARTHOLOMEW [facility] to update CM that client fell over the w/e, currently at [facility]. Shobha states that she did see client at the hospital this morning, currently confused requiring a 1:1. Shobha states that client was doing very well prior to the admission.  Spoke with Windy BARTHOLOMEW [facility] to update on hospitalization. Windy states that she did talk with Dr. Alvarez late Friday to update on lab tests.   Yeni Savage RN, BC  Supervisor [facility]   [phone] (Fax)

## 2018-07-23 NOTE — PLAN OF CARE
Problem: Patient Care Overview  Goal: Plan of Care/Patient Progress Review  Outcome: Improving  Pt was hypertensive in AM and hypotensive later in the afternoon, 500cc bolus administered. Intermittently alert, but also lethargic/somnolent at times. Disoriented to situation. Skin tear to lower right leg, dressing intact. Crawley in place for retention with adequate output. Sodium trending up. Will continue to monitor.

## 2018-07-23 NOTE — PROGRESS NOTES
XCOVER.  Urinary retention, required straight cath twice already, will place joseph cath for now. Reevaluate when her mental status improves.

## 2018-07-23 NOTE — PLAN OF CARE
Problem: Patient Care Overview  Goal: Plan of Care/Patient Progress Review  Outcome: No Change  Pt alert to self only. Calm and somewhat redirectable.   Bladder scanned for 489 and 709 ml. Attempted to void, ast x 2 with gait belt on bedside commode. Unable to void. Put in joseph cath.   Gave Hydralazine for /87, came down to 137/67.  Sitter at bedside.

## 2018-07-23 NOTE — PROGRESS NOTES
07/23/18 1331   Quick Adds   Type of Visit Initial Occupational Therapy Evaluation   Living Environment   Lives With alone   Living Arrangements assisted living   Home Accessibility grab bars present (toilet);grab bars present (bathtub)   Number of Stairs to Enter Home 0   Number of Stairs Within Home 0   Living Environment Comment Limited PLOF info obtained secondary to cognitive deficits; info obtained through chart review.   Self-Care   Dominant Hand right   Usual Activity Tolerance good   Current Activity Tolerance fair   Regular Exercise no   Equipment Currently Used at Home walker, rolling   Activity/Exercise/Self-Care Comment Pt states she uses a 4ww at baseline and walks to the dining ocasio   Functional Level Prior   Ambulation 1-->assistive equipment   Transferring 1-->assistive equipment   Toileting 3-->assistive equipment and person   Bathing 3-->assistive equipment and person   Dressing 3-->assistive equipment and person   Eating 3-->assistive equipment and person   Communication 0-->understands/communicates without difficulty   Swallowing 0-->swallows foods/liquids without difficulty   Cognition 2 - difficulty with organizing thoughts   Fall history within last six months yes   Number of times patient has fallen within last six months 1   Which of the above functional risks had a recent onset or change? transferring;toileting;dressing;cognition   Prior Functional Level Comment PLOF info obtained from chart. Pt reports she gets A with bathing 2x/week   General Information   Onset of Illness/Injury or Date of Surgery - Date 07/22/18   Referring Physician Ankit Maradiaga MD   Patient/Family Goals Statement not stated   Additional Occupational Profile Info/Pertinent History of Current Problem Pt is a 78 year old female with past medical history including steroid-dependent COPD, hypertension, hypothyroidism, recent fall with closed head injury, urinary retention and generally recently declining state of  health with numerous recent hospital stays and TCU stays admitted on 7/22/2018 with altered mental status after reportedly having an unwitnessed fall at her assisted living facility.  She was found on the floor and was complaining of left hip pain.  She was also hypertensive.   Precautions/Limitations fall precautions   General Info Comments Fluctuating alertness, pleasant   Cognitive Status Examination   Orientation place   Level of Consciousness alert;lethargic/somnolent  (fluctuating)   Able to Follow Commands moderate impairment;success, 1-step commands  (approx. 50% of the time)   Personal Safety (Cognitive) impulsive;one on one supervision required for safety   Memory impaired   Attention Distractible during evaluation;Sustained attention impaired   Organization/Problem Solving Problem solving impaired   Cognitive Comment Pt knows she is in the hospital, states she is here because of high BP. Pt able to follow single step commands approx. 50% of the time with verbal and tactile cuing.   Pain Assessment   Patient Currently in Pain No   Mobility   Bed Mobility Comments Mod Ax1   Transfer Skills   Transfer Comments Mod Ax1; extra assist needed mid transfer as pt fatigues quickly   Transfer Skill: Sit to Stand   Level of [place]: Sit/Stand minimum assist (75% patients effort)   Physical Assist/Nonphysical Assist: Sit/Stand 1 person assist;verbal cues   Transfer Skill: Sit to Stand full weight-bearing   Assistive Device for Transfer: Sit/Stand rolling walker   Eating/Self Feeding   Level of [place]: Eating stand-by assist   Physical Assist/Nonphysical Assist: Eating supervision;verbal cues   Instrumental Activities of Daily Living (IADL)   IADL Comments Pt unable to state what services she receives other than meals 3x/day.   Activities of Daily Living Analysis   Impairments Contributing to Impaired Activities of Daily Living cognition impaired;strength decreased   General Therapy Interventions   Planned  "Therapy Interventions ADL retraining;progressive activity/exercise   Clinical Impression   Criteria for Skilled Therapeutic Interventions Met yes, treatment indicated   OT Diagnosis Decreased ADLs   Influenced by the following impairments cognition impaired;strength decreased   Assessment of Occupational Performance 3-5 Performance Deficits   Identified Performance Deficits ADLs: dressing, toileting, g/h, transfers   Clinical Decision Making (Complexity) Low complexity   Therapy Frequency 5 times/wk   Predicted Duration of Therapy Intervention (days/wks) 1 week   Anticipated Discharge Disposition Transitional Care Facility   Risks and Benefits of Treatment have been explained. Yes   Patient, Family & other staff in agreement with plan of care Yes   Clinical Impression Comments Agreeable to session with encouragement   [facility] TM \"6 Clicks\"   2016, Trustees of [facility], under license to [employer].  All rights reserved.   6 Clicks Short Forms Daily Activity Inpatient Short Form   [facility] Center[facility] Communications[facility]ly [facility]  \"6 Clicks\" Daily Activity Inpatient Short Form   1. Putting on and taking off regular lower body clothing? 2 - A Lot   2. Bathing (including washing, rinsing, drying)? 2 - A Lot   3. Toileting, which includes using toilet, bedpan or urinal? 2 - A Lot   4. Putting on and taking off regular upper body clothing? 2 - A Lot   5. Taking care of personal grooming such as brushing teeth? 3 - A Little   6. Eating meals? 3 - A Little   Daily Activity Raw Score (Score out of 24.Lower scores equate to lower levels of function) 14   Total Evaluation Time   Total Evaluation Time (Minutes) 12     "

## 2018-07-23 NOTE — PLAN OF CARE
Problem: Patient Care Overview  Goal: Plan of Care/Patient Progress Review  Outcome: No Change  Patient alert to self at date at times. Bladder scanned patient x2: 329 and 513. Straight cathed patient for 600mL with assist of 4 people. Patient complained of having to urinate multiple times during shift with no results. Clear liquid diet with no appetite. Dressing to RLE intact. K came back at 4.4. Na 131. IV infusing. IV lost during shift, removed and applied ice. Patient refused ice later in shift. New IV placed. Patient assist of 2 or more for transfers, pivots, gait belt. Patient has sitter present. Patient does not follow commands. VSS. Nutrition, PT, OT, SW consults.

## 2018-07-23 NOTE — CONSULTS
Care Transition Initial Assessment -   Reason For Consult: discharge planning  Met with: Family  Spoke with daughter Windy on the phone. HCD on file. Pt sleeping at time of visit   Active Problems:    Encephalopathy acute    Encephalopathy       DATA  Lives With: alone  Living Arrangements: assisted living- [facility] just moved into [facility] about 2 weeks ago.   Description of Support System: Supportive, Involved  Who is your support system?: Children, Facility resident(s)/Staff  Support Assessment: Adequate family and caregiver support, Adequate social supports. PT new to [facility]. Has a pendent to call for help. Has support for AM/PM care, bathing support transfer assist, med set up.   Pt was open to home care support for PT/OT.  They were working on getting a WC better fit for pt and to help her get stronger to self ambulate in the WC> Baseline for transfer is assist of 1. Pt here is assist of 2   Identified issues/concerns regarding health management: Admitted from home after a fall with encephalopathy.       Resources List: Assisted Living,- [facility] -[phone]   Home Care, Skilled Nursing Facility- was in TCU at [facility] from 06/19-07/11  Other Resources: [facility] Worker-  [facility]  [place] [facility] [phone]  Quality Of Family Relationships: supportive       ASSESSMENT  Cognitive Status:  PT was sleeping at time of vist. Called Daughter Windy and left VM message for RN at [facility]  Concerns to be addressed: spoke with daughter about OT recommendations for TCU. Waiting for PT eval.. Pt has only been in her new [facility] since 07/11.  Due to increased needs pt most likely will need TCU at time of dc unless pt has a big improvement in her transfers and mobility.. PT for the most part has been in a WC since she moved into [facility]. However she was able to pivot transfer.  The next goal is for pt to be able to self propel the WC on her own.    Will follow PT recommendations for TCU vs home with home care at  dc  Waiting for [facility] to call as well.      PLAN  Daughter Windy will be flying in Thursday. She is aware that pt may be DC ready in the next day or 2  Should pt still need TCU family do prefer a private room and aware of cost  [facility] and [facility] are options for TCU at this time. Would consider [facility] if needed.

## 2018-07-23 NOTE — PROGRESS NOTES
[facility]  Hospitalist Progress Note  Adrian Carvalho MD 07/23/18    Reason for Stay (Diagnosis): Unwitnessed fall, hyponatremia         Assessment and Plan:      Summary of Stay: Ana Luisa Lee is a 78 year old female with past medical history including steroid-dependent COPD, hypertension, hypothyroidism, recent fall with closed head injury, urinary retention and generally recently declining state of health with numerous recent hospital stays and TCU stays admitted on 7/22/2018 with altered mental status after reportedly having an unwitnessed fall at her assisted living facility.  She was found on the floor and was complaining of left hip pain.  She was also hypertensive.  Here in the emergency room extensive workup was performed and notable for sodium of 126 with potassium of 3.3.  Otherwise this was generally negative for acute pathology including CT scan of the head and neck angiogram, pelvic x-ray, chest x-ray and basic labs as well as blood gas, ethanol level, Tylenol level and salicylate level.    She was admitted for IV hydration and close monitoring in the setting of ongoing encephalopathy.  She has been moving all extremities and after discussion with her daughter it sounds as though following a fall in June 2018 she had to be significantly sedated in order to obtain an MRI of her brain and she/her daughter would like to avoid repeating that at this time.    Thankfully on 7/23 she became much more alert and her encephalopathy is resolving.    Problem List/Assessment and Plan:   1. Toxic/metabolic encephalopathy: So far hyponatremia is 126 and what I suspect is reduced reserve is the only apparent explanation.  She does not participate readily with neurologic exam but has been moving all extremities and no focal deficits have been seen.  She had imaging of her brain back in June in the form of an MRI and here in the emergency room in the form of a noncontrast CT of her head  and also a CT angiogram of her head and neck with no acute pathology identified.  --After discussion with her daughter the current plan is for ongoing IV hydration, correction of electrolytes/dehydration and close monitoring of her mental status  --Afebrile, no infectious focus apparent  --Cortisol is appropriately somewhat elevated and TSH is within normal limits  --Blood gas upon presentation did not show significant CO2 retention  --Toxicology workup including ethanol, salicylate and Tylenol levels unrevealing    2.   Unwitnessed fall: It sounds as though she has had numerous falls in the past and again this 1 was unwitnessed.  No apparent bony injury is been identified and imaging was probably negative for traumatic injury as noted above.  I suspect she may require TCU placement upon discharge pending improvement in her mental status.    3.   Hypovolemic hyponatremia: So far correcting slowly with IV hydration.  It is possible she could have a component of SIADH given underlying lung disease.  She does have lung nodules which appear to not have been completely worked up and I discussed these with her daughter.  I suspect that biopsy would not be consistent with her goals of care at this time this could be readdressed at follow-up.    4.   Hypertension: Not apparently on any medications prior to admission.  She has been intermittently hypertensive here so we will plan to start low-dose lisinopril and have as needed nitroglycerin available.  She does have numerous drug allergies noted.    5.   Frequent falls    6.  COPD: Not currently in an acute exacerbation, continue home Singulair, Roflumilast and other inhalers as well as scheduled Xopenex.  She is chronically on prednisone 10 mg daily.  Hemodynamically does not appear adrenally insufficient and cortisol is appropriately somewhat elevated.    7.  Thyroid nodule: We will obtain an ultrasound as an outpatient.  Discussed with her daughter.  TSH is normal.    8.   "Pulmonary nodules: Discussed these with her daughter.  Consider outpatient CT scan.    DVT Prophylaxis: Pneumatic Compression Devices  Code Status: DNR / DNI, discussed with the patient's daughter personally  Discharge Dispo/Date: At least 1-2 more days.  Consider TCU.  Consult PT and OT.        Interval History (Subjective):      Sodium slowly correcting, now 131  I reviewed her recent history at length with her daughter over the phone yesterday.  She confirmed she is DNR/DNI  Patient remains confused, poor appetite  Some intermittent issues with IV access which I suspect are largely related to her encephalopathy/delirium and picking and pulling at lines  Much improved today, more alert, knows where she is, makes her needs known and is interactive.                  Physical Exam:      Last Vital Signs:  /72  Pulse 105  Temp 97.4  F (36.3  C) (Axillary)  Resp 18  Ht 1.575 m (5' 2.01\")  Wt 52.2 kg (115 lb 1.6 oz)  SpO2 97%  BMI 21.05 kg/m2      Intake/Output Summary (Last 24 hours) at 07/23/18 0808  Last data filed at 07/23/18 0721   Gross per 24 hour   Intake             1976 ml   Output             3050 ml   Net            -1074 ml       General: Elderly woman, lying in bed.  Awake, calm and overall appropriate.  HEENT: NC/AT, eyes anicteric, external occular movements intact, face symmetric.    Cardiac: RRR, S1, S2.  No murmurs appreciated.  Pulmonary: Normal chest rise, normal work of breathing.  Lungs CTA BL  Abdomen: soft, non-tender, non-distended.  Bowel Sounds Present.  No guarding.  Extremities: no deformities.  Warm, well perfused.  Skin: no rashes or lesions noted.  Warm and Dry.  Neuro: No focal deficits noted.  Speech clear.  Somewhat weak diffusely but alert and oriented to place and person.  Psych: Appropriate affect.         Medications:      All current medications were reviewed with changes reflected in problem list.         Data:      All new lab and imaging data was reviewed. "   Labs:    Recent Labs  Lab 07/23/18  0500   *   POTASSIUM 3.6   CHLORIDE 101   CO2 18*   ANIONGAP 12   GLC 78   BUN 9   CR 0.48*   GFRESTIMATED >90   GFRESTBLACK >90   DIANA 8.6       Recent Labs  Lab 07/22/18  0259   WBC 11.3*   HGB 11.4*   HCT 33.6*   MCV 90         Imaging:   Recent Results (from the past 48 hour(s))   CT Head w/o Contrast    Narrative    CT SCAN OF THE HEAD WITHOUT CONTRAST   7/22/2018 3:47 AM     HISTORY: fall;     TECHNIQUE:  Axial images of the head and coronal reformations without  IV contrast material. Radiation dose for this scan was reduced using  automated exposure control, adjustment of the mA and/or kV according  to patient size, or iterative reconstruction technique.    COMPARISON: None.    FINDINGS:  There is generalized atrophy of the brain.  White matter  changes are present in the cerebral hemispheres that are consistent  with small vessel ischemic disease in this age patient. There is no  evidence of intracranial hemorrhage, mass, acute infarct or anomaly.  The visualized portions of the sinuses and mastoids appear normal.  There is no evidence of trauma.      Impression    IMPRESSION: No intracranial hemorrhage or skull fractures are  identified.      WILMA CARBAJAL MD   XR Pelvis and Hip Left 2 Views    Narrative    XR PELVIS AND HIP LEFT 2 VIEWS   7/22/2018 3:50 AM     INDICATION: Fall, hip pain.    COMPARISON: None.      Impression    IMPRESSION: No acute findings. Small nonspecific sclerotic focus in  the right innominate bone, also present on prior pelvic CT  of  11/19/2017, unchanged and likely a bone island. Contrast in the  collecting systems.    MANISH AGUDELO MD   XR Chest 1 View    Narrative    XR CHEST 1 VIEW   7/22/2018 3:51 AM     INDICATION: Found down, delirium.    COMPARISON: 4/16/2018.      Impression    IMPRESSION: No infiltrates or other acute findings. Heart size is  within normal limits. A few nodular densities noted previously are  again noted,  probably related to some costochondral calcification.  Moderate-sized hiatal hernia.    MANISH AGUDELO MD   CT Cervical Spine w/o Contrast    Narrative    CT CERVICAL SPINE WITHOUT CONTRAST 7/22/2018 3:54 AM     HISTORY: Neck pain after trauma.     TECHNIQUE: Axial images through cervical spine without contrast.  Sagittal and coronal reformatted images. Radiation dose for this scan  was reduced using automated exposure control, adjustment of the mA  and/or kV according to patient size, or iterative reconstruction  technique.    COMPARISON: C-spine views 10/3/2016.    FINDINGS: No acute fractures. Advanced degenerative changes with  multilevel degenerative disc disease. Moderate anterior subluxation of  C4 on C5. Degenerative and hypertrophic changes in the mid and lower  cervical facet joints. Curve convex to the right. No prevertebral soft  tissue swelling or significant central canal narrowing. There is mild  central canal narrowing relating to the degenerative changes and  anterior subluxation of C4 on C5.      Impression    IMPRESSION: No acute findings.    MANISH AGUDELO MD   CT Head Neck Angio w/o & w Contrast    Narrative    CTA HEAD NECK WITH CONTRAST  7/22/2018 3:57 AM     HISTORY: Evaluate for dissection/thromboembolism;     TECHNIQUE:  Precontrast localizing scans were followed by CT  angiography with an injection of 70mL [id number]-370 IV contrast with  scans through the head and neck.  Images were transferred to a  separate 3-D workstation where multiplanar reformations and 3-D images  were created.  Estimates of carotid stenoses are made relative to the  distal internal carotid artery diameters except as noted. Radiation  dose for this scan was reduced using automated exposure control,  adjustment of the mA and/or kV according to patient size, or iterative  reconstruction technique.    COMPARISON: None.    FINDINGS: Estimates of stenosis at the carotid bifurcations are  relative to the distal  internal carotids.    Arch: [ Normal Anatomy]    Neck:  Right Carotid:  The right common carotid artery is normal.   Atherosclerotic plaque is seen at the right carotid bifurcation. The  stenosis was calculated at 37%.  The right internal carotid artery is  negative.     Left Carotid:  The left common carotid artery is unremarkable.   Atherosclerotic plaque is seen at the left carotid bifurcation. The  stenosis was calculated at 30%..  The left internal carotid is  negative.      Vertebrals:  The right vertebral artery is dominant supplying the  basilar artery. The left vertebral artery is a very small vessel..    Head:  Right Carotid:No occluded vessels are seen. .    Left Carotid:  No occluded vessels are identified.  A1 segment of the  left anterior cerebral artery is hypoplastic.    Basilar:  The basilar artery and its branches appear normal.     Miscellaneous: There is a 1.3 cm nodule in the right lobe of the  thyroid.. There is also a 1 cm somewhat spiculated nodule in the right  lung apex. This is indeterminant. A CT of the thorax would be helpful  for more complete characterization. There is approximately 5 mm of  anterior subluxation of cc 4 on C5. This appears to be due to  degenerative change in the facet joints. There is loss of disc space  height at C5-6 and C6-7. No fractures are identified but if there is  any clinical suspicion for fracture a formal CT of the cervical spine  is recommended for further evaluation.      Impression    IMPRESSION:   1. No stenosis is seen at either carotid bifurcation.  2. No arterial dissection is identified.  3. No high-grade intracranial vascular stenosis is identified.  4. Venous sinuses appear patent  5. Indeterminate 1 cm nodule in the right upper lobe. CT of the chest  is recommended for further evaluation and characterization.  6. 1.3 cm nodule in the right lobe of the thyroid. Thyroid ultrasound  could be performed for further characterization.  7. Multilevel  degenerative change in the cervical spine. 5 mm of  anterior subluxation of C4 on C5.    I agree with the preliminary report that was communicated to the  referring physician.    MD Adrian LÓPEZ MD.

## 2018-07-23 NOTE — PROVIDER NOTIFICATION
9805 Paged MD: Per pt, feeling dizzy. BP 96/45.   -MD ordered 500 mL bolus, next /51.    2290 Paged MD: Pt had 150 mL of urine out this shift, should we give patient a bolus?

## 2018-07-23 NOTE — PROGRESS NOTES
MD notified at 1210 of pt not responding and BP's lowering to 90's/40's. MD ordered 500cc bolus of NS and monitor BPs. BPs now normalizing and pt awake. Will continue to monitor.

## 2018-07-23 NOTE — CONSULTS
"CLINICAL NUTRITION SERVICES  -  ASSESSMENT NOTE      MALNUTRITION:  % Weight Loss:  Weight loss does not meet criteria for malnutrition (7% w/in 7 mo, 14% w/in 9 months --> overall unclear if criteria met w/in 6 mo timeframe based on wt trendings above)  % Intake:  Decreased intake does not meet criteria for malnutrition --> Likely <50% since admission but unclear/unable to clarify PO intakes PTA  Subcutaneous Fat Loss:  Upper arm region mild depletion  Muscle Loss:  Temporal region mild depletion, Clavicle bone region mild depletion, Acromion bone region moderate depletion and Dorsal hand region mild depletion --> did not observe LEs  Fluid Retention:  None noted    Malnutrition Diagnosis: Non-Severe malnutrition (or greater as lacking indicators)  In Context of:  Acute illness or injury  Chronic illness or disease        REASON FOR ASSESSMENT  Ana Luisa Lee is a 78 year old female seen by Registered Dietitian for Admission Nutrition Risk Screen - Unintentional weight loss of 10# or more in past 2 months and RN Consult - \"decrease weight and strength\".       NUTRITION HISTORY  - Information obtained from chart, patient admitted with change in mental status and oriented to self (?etiology).    - Patient with a h/o COPD.  Admitted with change in mental status (?etiology).    - Per review of chart, resides in [facility].   - NKFA.       CURRENT NUTRITION ORDERS  Diet Order:     Fulls (ADAT)    Current Intake/Tolerance:  Flowsheet review indicating 0% consumption since admission.  Sitter in room though was breaking, unsure if patient had consumed breakfast.  Per discussion with team during rounds, possible need for SLP consult d/t ?dysphagia sx yesterday/overnight (possible decreased DYANA impacting)?      PHYSICAL FINDINGS  Observed  See below, e/o muscle loss though without complete history, unclear if acute vs chronic (aging?)  Obtained from Chart/Interdisciplinary Team  [facility]  Alert to self only   With " "[facility]    ANTHROPOMETRICS  Height: 5' 2\"  Weight: 52.3 kg (115#)  Body mass index is 21.05 kg/(m^2).  Weight Status:  Normal BMI  IBW: 50 kg (110#)  % IBW: 105%  Weight History:  Wt Readings from Last 30 Encounters:   07/22/18 52.2 kg (115 lb 1.6 oz)   07/12/18 53.1 kg (117 lb)   07/10/18 52.1 kg (114 lb 12.8 oz)   07/02/18 54.7 kg (120 lb 9.6 oz)   06/27/18 54.7 kg (120 lb 9.6 oz)   06/20/18 53.5 kg (118 lb)   06/01/18 53.9 kg (118 lb 14.4 oz)   05/24/18 54.3 kg (119 lb 9.6 oz)   05/07/18 53.2 kg (117 lb 3.2 oz)   05/04/18 53.8 kg (118 lb 9.6 oz)   05/01/18 53.8 kg (118 lb 9.6 oz)   04/27/18 55 kg (121 lb 3.2 oz)   04/23/18 54.3 kg (119 lb 12.8 oz)   04/18/18 54.8 kg (120 lb 12.8 oz)   04/17/18 55.2 kg (121 lb 11.2 oz)   04/05/18 56.2 kg (124 lb)   03/29/18 56.6 kg (124 lb 11.2 oz)   03/21/18 55.3 kg (122 lb)   01/02/18 55.9 kg (123 lb 4.8 oz)   11/27/17 56.1 kg (123 lb 11.2 oz)   11/22/17 56.8 kg (125 lb 3.2 oz)   11/06/17 60.7 kg (133 lb 12.8 oz)   10/31/17 60.8 kg (134 lb)   10/17/17 59.6 kg (131 lb 6.4 oz)   08/24/17 60.8 kg (134 lb)   08/17/17 60.8 kg (134 lb)   08/14/17 59.9 kg (132 lb)   08/11/17 59.6 kg (131 lb 6.4 oz)   08/07/17 60.8 kg (134 lb)   08/03/17 60.6 kg (133 lb 9.6 oz)   - Possibility for slight wt loss of ?4% x ~2 weeks.  However current wt consistent with that in 5/2018.  Further review of wt trending indicates a 14% wt loss beginning in 11/2017 (x9 months).      LABS  Labs reviewed    MEDICATIONS  Medications reviewed      ASSESSED NUTRITION NEEDS PER APPROVED PRACTICE GUIDELINES:    Dosing Weight 52.3 kg  Estimated Energy Needs: 1854-7543 kcals (25-30 Kcal/Kg)  Justification: maintenance  Estimated Protein Needs: 63-78 grams protein (1.2-1.5 g pro/Kg)  Justification: ?Acute vs chronic repletion and preservation of lean body mass  Estimated Fluid Needs: >1 ml/Kcal  Justification: maintenance    MALNUTRITION:  % Weight Loss:  Weight loss does not meet criteria for malnutrition (7% w/in 7 mo, " 14% w/in 9 months --> overall unclear if criteria met w/in 6 mo timeframe based on wt trendings above)  % Intake:  Decreased intake does not meet criteria for malnutrition --> Likely <50% since admission but unclear/unable to clarify PO intakes PTA  Subcutaneous Fat Loss:  Upper arm region mild depletion  Muscle Loss:  Temporal region mild depletion, Clavicle bone region mild depletion, Acromion bone region moderate depletion and Dorsal hand region mild depletion --> did not observe LEs  Fluid Retention:  None noted    Malnutrition Diagnosis: Non-Severe malnutrition (or greater as lacking indicators)  In Context of:  Acute illness or injury  Chronic illness or disease    NUTRITION DIAGNOSIS:  Malnutrition related to suspect acute on chronic inadequate oral intakes leading to LBM losses as evidenced by documented wt loss of 14% x 9 mo (which does not meet criteria), meeting <50% needs orally since admission, e/o at least mild fat/muscle loss with coding of non-severe malnutrition (or greater).        NUTRITION INTERVENTIONS  Recommendations / Nutrition Prescription  Diet per MD.  Need for SLP consult per MD/RN discretion.      Add daily MVI/M.    Add supplement offering with meals TID.  Ok to utilize with medication passes as able.      Implementation  Nutrition education: Not appropriate at this time due to patient condition (oriented to self only).    Medical Food Supplement, Multivitamin/Mineral: As above.    Collaboration and Referral of Nutrition care: Discussed POC with team during rounds.       Nutrition Goals  Diet advancement past fulls w/in 24-48 hrs.       MONITORING AND EVALUATION:  Progress towards goals will be monitored and evaluated per protocol and Practice Guidelines      Brenna Mcrae RD, LD  Clinical Dietitian  3rd floor/ICU: [phone]  All other floors: [phone]  Weekend/holiday: [phone]

## 2018-07-23 NOTE — CONSULTS
COURTNEY ELEVATED.  Will follow along with SW for needs.  Will give hand off to Dr Alvarez's RN CTS at time of dc and assist in making f/u appts.  Sabi RN CTS [phone]

## 2018-07-24 ENCOUNTER — APPOINTMENT (OUTPATIENT)
Dept: PHYSICAL THERAPY | Facility: CLINIC | Age: 78
DRG: 643 | End: 2018-07-24
Attending: INTERNAL MEDICINE
Payer: COMMERCIAL

## 2018-07-24 ENCOUNTER — APPOINTMENT (OUTPATIENT)
Dept: OCCUPATIONAL THERAPY | Facility: CLINIC | Age: 78
DRG: 643 | End: 2018-07-24
Payer: COMMERCIAL

## 2018-07-24 LAB
ANION GAP SERPL CALCULATED.3IONS-SCNC: 8 MMOL/L (ref 3–14)
BUN SERPL-MCNC: 11 MG/DL (ref 7–30)
CALCIUM SERPL-MCNC: 8.2 MG/DL (ref 8.5–10.1)
CHLORIDE SERPL-SCNC: 106 MMOL/L (ref 94–109)
CO2 SERPL-SCNC: 20 MMOL/L (ref 20–32)
CREAT SERPL-MCNC: 0.54 MG/DL (ref 0.52–1.04)
GFR SERPL CREATININE-BSD FRML MDRD: >90 ML/MIN/1.7M2
GLUCOSE SERPL-MCNC: 99 MG/DL (ref 70–99)
MAGNESIUM SERPL-MCNC: 1.9 MG/DL (ref 1.6–2.3)
POTASSIUM SERPL-SCNC: 3.1 MMOL/L (ref 3.4–5.3)
POTASSIUM SERPL-SCNC: 3.8 MMOL/L (ref 3.4–5.3)
SODIUM SERPL-SCNC: 134 MMOL/L (ref 133–144)

## 2018-07-24 PROCEDURE — 97535 SELF CARE MNGMENT TRAINING: CPT | Mod: GO

## 2018-07-24 PROCEDURE — 94640 AIRWAY INHALATION TREATMENT: CPT | Mod: 76

## 2018-07-24 PROCEDURE — 80048 BASIC METABOLIC PNL TOTAL CA: CPT | Performed by: INTERNAL MEDICINE

## 2018-07-24 PROCEDURE — 84132 ASSAY OF SERUM POTASSIUM: CPT | Performed by: INTERNAL MEDICINE

## 2018-07-24 PROCEDURE — 25000132 ZZH RX MED GY IP 250 OP 250 PS 637: Performed by: INTERNAL MEDICINE

## 2018-07-24 PROCEDURE — 25000128 H RX IP 250 OP 636: Performed by: INTERNAL MEDICINE

## 2018-07-24 PROCEDURE — 12000000 ZZH R&B MED SURG/OB

## 2018-07-24 PROCEDURE — 99233 SBSQ HOSP IP/OBS HIGH 50: CPT | Performed by: INTERNAL MEDICINE

## 2018-07-24 PROCEDURE — 94640 AIRWAY INHALATION TREATMENT: CPT

## 2018-07-24 PROCEDURE — 97530 THERAPEUTIC ACTIVITIES: CPT | Mod: GO

## 2018-07-24 PROCEDURE — 25000125 ZZHC RX 250: Performed by: INTERNAL MEDICINE

## 2018-07-24 PROCEDURE — 97162 PT EVAL MOD COMPLEX 30 MIN: CPT | Mod: GP | Performed by: PHYSICAL THERAPIST

## 2018-07-24 PROCEDURE — 97530 THERAPEUTIC ACTIVITIES: CPT | Mod: GP | Performed by: PHYSICAL THERAPIST

## 2018-07-24 PROCEDURE — 40000275 ZZH STATISTIC RCP TIME EA 10 MIN

## 2018-07-24 PROCEDURE — 36415 COLL VENOUS BLD VENIPUNCTURE: CPT | Performed by: INTERNAL MEDICINE

## 2018-07-24 PROCEDURE — 40000133 ZZH STATISTIC OT WARD VISIT

## 2018-07-24 PROCEDURE — 40000193 ZZH STATISTIC PT WARD VISIT: Performed by: PHYSICAL THERAPIST

## 2018-07-24 PROCEDURE — 83735 ASSAY OF MAGNESIUM: CPT | Performed by: INTERNAL MEDICINE

## 2018-07-24 PROCEDURE — 97116 GAIT TRAINING THERAPY: CPT | Mod: GP | Performed by: PHYSICAL THERAPIST

## 2018-07-24 RX ADMIN — OMEPRAZOLE 40 MG: 20 CAPSULE, DELAYED RELEASE ORAL at 08:37

## 2018-07-24 RX ADMIN — ACETAMINOPHEN 1000 MG: 500 TABLET, FILM COATED ORAL at 15:42

## 2018-07-24 RX ADMIN — UMECLIDINIUM 1 PUFF: 62.5 AEROSOL, POWDER ORAL at 08:28

## 2018-07-24 RX ADMIN — FLUTICASONE PROPIONATE 1 SPRAY: 50 SPRAY, METERED NASAL at 22:19

## 2018-07-24 RX ADMIN — LEVALBUTEROL HYDROCHLORIDE 1.25 MG: 1.25 SOLUTION RESPIRATORY (INHALATION) at 12:17

## 2018-07-24 RX ADMIN — POLYETHYLENE GLYCOL 3350 17 G: 17 POWDER, FOR SOLUTION ORAL at 08:40

## 2018-07-24 RX ADMIN — POTASSIUM CHLORIDE 20 MEQ: 1500 TABLET, EXTENDED RELEASE ORAL at 10:44

## 2018-07-24 RX ADMIN — LISINOPRIL 10 MG: 10 TABLET ORAL at 08:36

## 2018-07-24 RX ADMIN — PREDNISONE 10 MG: 10 TABLET ORAL at 08:37

## 2018-07-24 RX ADMIN — MULTIPLE VITAMINS W/ MINERALS TAB 1 TABLET: TAB at 08:36

## 2018-07-24 RX ADMIN — FLUTICASONE FUROATE 1 PUFF: 100 POWDER RESPIRATORY (INHALATION) at 08:28

## 2018-07-24 RX ADMIN — LEVALBUTEROL HYDROCHLORIDE 1.25 MG: 1.25 SOLUTION RESPIRATORY (INHALATION) at 15:50

## 2018-07-24 RX ADMIN — DULOXETINE HYDROCHLORIDE 30 MG: 30 CAPSULE, DELAYED RELEASE ORAL at 08:38

## 2018-07-24 RX ADMIN — POTASSIUM CHLORIDE 40 MEQ: 1500 TABLET, EXTENDED RELEASE ORAL at 08:38

## 2018-07-24 RX ADMIN — MONTELUKAST SODIUM 10 MG: 10 TABLET, FILM COATED ORAL at 22:19

## 2018-07-24 RX ADMIN — SODIUM CHLORIDE: 9 INJECTION, SOLUTION INTRAVENOUS at 19:34

## 2018-07-24 RX ADMIN — SODIUM CHLORIDE: 9 INJECTION, SOLUTION INTRAVENOUS at 05:02

## 2018-07-24 RX ADMIN — ACETAMINOPHEN 1000 MG: 500 TABLET, FILM COATED ORAL at 08:37

## 2018-07-24 RX ADMIN — ROFLUMILAST 500 MCG: 500 TABLET ORAL at 08:38

## 2018-07-24 RX ADMIN — ACETAMINOPHEN 1000 MG: 500 TABLET, FILM COATED ORAL at 22:19

## 2018-07-24 RX ADMIN — LEVOTHYROXINE SODIUM 25 MCG: 25 TABLET ORAL at 08:37

## 2018-07-24 RX ADMIN — LEVALBUTEROL HYDROCHLORIDE 1.25 MG: 1.25 SOLUTION RESPIRATORY (INHALATION) at 08:28

## 2018-07-24 RX ADMIN — MAGNESIUM SULFATE HEPTAHYDRATE 2 G: 40 INJECTION, SOLUTION INTRAVENOUS at 08:42

## 2018-07-24 RX ADMIN — LEVALBUTEROL HYDROCHLORIDE 1.25 MG: 1.25 SOLUTION RESPIRATORY (INHALATION) at 21:53

## 2018-07-24 ASSESSMENT — ACTIVITIES OF DAILY LIVING (ADL)
ADLS_ACUITY_SCORE: 22
ADLS_ACUITY_SCORE: 23
ADLS_ACUITY_SCORE: 20
ADLS_ACUITY_SCORE: 23
ADLS_ACUITY_SCORE: 20
ADLS_ACUITY_SCORE: 23

## 2018-07-24 NOTE — PROGRESS NOTES
07/24/18 1043   Quick Adds   Type of Visit Initial PT Evaluation   Living Environment   Lives With alone   Living Arrangements assisted living   Home Accessibility grab bars present (toilet);grab bars present (bathtub)   Number of Stairs to Enter Home 0   Number of Stairs Within Home 0   Living Environment Comment Pt just moved to [facility] 2 weeks ago. Pt was working with PT/OT and mostly in W/C since leaving TCU 2 weeks ago   Self-Care   Dominant Hand right   Usual Activity Tolerance fair   Regular Exercise no   Equipment Currently Used at Home walker, rolling   Functional Level Prior   Ambulation 1-->assistive equipment  (pt states this but also chart reporting W/C)   Transferring 1-->assistive equipment  (Pt states ability to perform but also chart states A x 1)   Toileting 1-->assistive equipment  (per pt)   Bathing 3-->assistive equipment and person   Dressing 3-->assistive equipment and person   Eating 3-->assistive equipment and person   Communication 0-->understands/communicates without difficulty   Swallowing 0-->swallows foods/liquids without difficulty   Cognition 2 - difficulty with organizing thoughts   Fall history within last six months yes   Number of times patient has fallen within last six months 1   Which of the above functional risks had a recent onset or change? transferring;toileting;dressing;cognition   Prior Functional Level Comment Reports from pt are inaccurate.   General Information   Onset of Illness/Injury or Date of Surgery - Date 07/22/18   Referring Physician Dr. Louis   Patient/Family Goals Statement None stated   Pertinent History of Current Problem (include personal factors and/or comorbidities that impact the POC) Ana Luisa Lee is a 78 year old female with past medical history including steroid-dependent COPD, hypertension, hypothyroidism, recent fall with closed head injury, urinary retention and generally recently declining state of health with numerous recent hospital  stays and TCU stays admitted on 7/22/2018 with altered mental status after reportedly having an unwitnessed fall at her assisted living facility.  She was found on the floor and was complaining of left hip pain.  She was also hypertensive.  Here in the emergency room extensive workup was performed and notable for sodium of 126 with potassium of 3.3.  Otherwise this was generally negative for acute pathology including CT scan of the head and neck angiogram, pelvic x-ray, chest x-ray and basic labs as well as blood gas, ethanol level, Tylenol level and salicylate level.   Cognitive Status Examination   Orientation person;place   Level of Consciousness alert;confused  (difficulty describing needs at times)   Follows Commands and Answers Questions 75% of the time;able to follow single-step instructions   Personal Safety and Judgment impaired   Memory impaired   Pain Assessment   Patient Currently in Pain No   Integumentary/Edema   Integumentary/Edema Comments Pt does have abrasion/skin tear on shin   Posture    Posture Forward head position;Protracted shoulders   Range of Motion (ROM)   ROM Comment WFL   Strength   Strength Comments foot drop on the R side, lacking shoulder shrug on R, able to raise eyebrows, puff up checks, 3+/5 in quads/hamstrings/hip flexors   Transfer Skills   Transfer Comments min/mod A x 2   Gait   Gait Comments min/mod A x 2 due to poor coordination, balance   Balance   Balance Comments poor, narrow GAVIN throughout, needing up to mod A/max A with LOB   Sensory Examination   Sensory Perception Comments Numbness in R foot   Coordination   Coordination Comments poor with attempting steps   General Therapy Interventions   Planned Therapy Interventions balance training;bed mobility training;gait training;strengthening;transfer training;progressive activity/exercise;risk factor education   Clinical Impression   Criteria for Skilled Therapeutic Intervention yes, treatment indicated   PT Diagnosis  "decreased functional mobility status post hyponatremia and fall   Influenced by the following impairments decreased cognition, strength, coordination, balance   Functional limitations due to impairments decreased transfers, ambulation   Clinical Presentation Evolving/Changing   Clinical Presentation Rationale concerning with foot drop on R, poor cognitive status   Clinical Decision Making (Complexity) Moderate complexity   Therapy Frequency` 5 times/week   Predicted Duration of Therapy Intervention (days/wks) 1 week   Anticipated Discharge Disposition Transitional Care Facility;Home with Assist;Home with Home Therapy  (pending senior care to provide level of care)   Risk & Benefits of therapy have been explained Yes   Patient, Family & other staff in agreement with plan of care Yes   [facility] TM \"6 Clicks\"   2016, Trustees of [facility], under license to [employer].  All rights reserved.   6 Clicks Short Forms Basic Mobility Inpatient Short Form   [facility]  \"6 Clicks\" V.2 Basic Mobility Inpatient Short Form   1. Turning from your back to your side while in a flat bed without using bedrails? 2 - A Lot   2. Moving from lying on your back to sitting on the side of a flat bed without using bedrails? 2 - A Lot   3. Moving to and from a bed to a chair (including a wheelchair)? 2 - A Lot   4. Standing up from a chair using your arms (e.g., wheelchair, or bedside chair)? 2 - A Lot   5. To walk in hospital room? 2 - A Lot   6. Climbing 3-5 steps with a railing? 1 - Total   Basic Mobility Raw Score (Score out of 24.Lower scores equate to lower levels of function) 11   Total Evaluation Time   Total Evaluation Time (Minutes) 10     "

## 2018-07-24 NOTE — PLAN OF CARE
Problem: Patient Care Overview  Goal: Plan of Care/Patient Progress Review  PT: PT rocio completed. Pt here with fall, AMS and found to have hyponatremia. Pt lives in [facility], pt not good historian, reports ambulation with FWW, can ambulate to dining ocasio. Per chart was needing W/C and A x 1, assist with ADLs. Pt recently in TCU. Of note R foot drop not documented length of time with this, MD was paged concerning this finding. Pt not able to report when this started.   Discharge Planner PT   Patient plan for discharge: none stated  Current status: Pt up in chair upon entry. Pt agreeable to PT, educated on PT role, POC. Pt was cued for sit to stand from commode and chair, needing min/mod A x 2. Pt was able to ambulate in room with min/mod A x 2, poor balance, weak with foot drop on R. Cues for improved GAVIN as very narrow GAVIN. Needing max A due to LOB needing chair pulled up to pt. Pt needing A x 2 for toileting due to poor balance. Up in chair at end of session with chair alarm on. Continues to present with confusion and poor memory. Following 75% of cues.   Barriers to return to prior living situation: needing A x 2 with transfers/ambulation  Recommendations for discharge: TCU unless [facility] able to provide A x 2 with transfers and ambulation, home PT/OT.   Rationale for recommendations: Pt currently needing A x 2 with transfers and ambulation and would benefit from further PT to decrease burden of care. Pt may benefit from AFO trial with PT pending length of foot drop.        Entered by: Mae Loza 07/24/2018 11:02 AM

## 2018-07-24 NOTE — PLAN OF CARE
Problem: Patient Care Overview  Goal: Plan of Care/Patient Progress Review  Discharge Planner OT   Patient plan for discharge: [facility], open to TCU  Current status:   Mod A transferring EOB; therapist providing support through transfer. Pt tolerated sitting at EOB to complete 4 self-cares total. Pt requested to go into the bathroom to use the toilet; therapist asked x2 about using the commode and pt refused. Sit<>stand was min A. Mod A x2 and FWW walking into the bathroom and back into the chair. Pt required 3+ cues to orient to the room; cued to turn in the correct direction. Pt demonstrated drop foot and crossing feet while walking and was unable to walk to bathroom; commode was brought to pt. Pt able to complete jesenia-cares I. Sit<>stand was mod A and pt required orientation cues to walk to the chair. Pt required a rest break while walking; commode was brought to pt. Therapist adjusted FWW to pt's height. Pt reported ability to walk to the chair but became fixated on adjusting socks; therapist required to pivot pt into chair to prevent a fall.   Barriers to return to prior living situation: Reduced activity tolerance for I/ADLs, impaired cognition for safety, and below baseline for ADL activities.   Recommendations for discharge: TCU; to increase endurance to return to baseline for I/ADLs  Rationale for recommendations: Pt demonstrating needing mod-max assist through transfers and >3 cues through out session regarding hand orientation to room and safety awareness through transfers.       Entered by: Ashwini Fischer 07/24/2018 8:56 AM

## 2018-07-24 NOTE — PLAN OF CARE
Problem: Patient Care Overview  Goal: Plan of Care/Patient Progress Review  Outcome: Improving  Patient remains hospitalized for AMS and hyponatremia. Patient alert throughout the shift, oriented to place and self. Calm and cooperative with cares. Answering questions appropriately. Na improved to 134 today. K 3.1, replaced per PO protocol, recheck ordered for this afternoon. Mag 1.9, replaced per protocol, recheck ordered for tomorrow. Tolerating regular diet. Crawley catheter removed this AM at 1045. Attempted to void x1 without results, bladder scanned for 139ml. Continuing to monitor. Denies pain. Ambulating with assist of 2 with walker and gait belt. Was able to ambulate to the bathroom and back today. VSS. SW following for discharge planning.

## 2018-07-24 NOTE — PROGRESS NOTES
SWS  SW spoke to Erin at [facility].  Her strong preference is for pt to return to [facility].  She was in TCU up to 2 weeks ago and TCU felt strongly that they could no longer help her.  [facility] does have an EZ stand and believe that they could care for her as long as she is not 1:1, which she is not.  Erin plans to come to hospital to see pt.  P:  SW will continue to follow and coordinate discharge.

## 2018-07-24 NOTE — PLAN OF CARE
Problem: Patient Care Overview  Goal: Plan of Care/Patient Progress Review  Outcome: Improving  Ambulatory Status:  Pt up 2 assist with gait belt and walker.  VS:  Soft BP of 96/45 P 94, gave 500 mL bolus. Recheck of 112/51. HR 94. Temp 99.2 and on RA.  Pain:  none  Resp: LS clear  GI:  no nausea.  poor appetite and on regulare diet.  BS active.  Passing flatus.  Last BM yesterday.  :  Crawley in place, cares were completed this shift  Skin:  brusing noted and skin tear to left leg, dressing changed at HS.  Tx:  IV fluids, NS running at 75 mL/hr.   Labs:  Na 131, K 3.6, and BS 78 and 190  Consults:  PT/OT  Disposition:  TBD

## 2018-07-24 NOTE — PROGRESS NOTES
[facility]  Hospitalist Progress Note  Adrian Carvalho MD 07/24/2018      Reason for Stay (Diagnosis): Unwitnessed fall, hyponatremia         Assessment and Plan:      Summary of Stay: Ana Luisa Lee is a 78 year old female with past medical history including steroid-dependent COPD, hypertension, hypothyroidism, recent fall with closed head injury, urinary retention and generally recently declining state of health with numerous recent hospital stays and TCU stays admitted on 7/22/2018 with altered mental status after reportedly having an unwitnessed fall at her assisted living facility.  She was found on the floor and was complaining of left hip pain.  She was also hypertensive.  Here in the emergency room extensive workup was performed and notable for sodium of 126 with potassium of 3.3.  Otherwise this was generally negative for acute pathology including CT scan of the head and neck angiogram, pelvic x-ray, chest x-ray and basic labs as well as blood gas, ethanol level, Tylenol level and salicylate level.    She was admitted for IV hydration and close monitoring in the setting of ongoing encephalopathy.  She has been moving all extremities and after discussion with her daughter it sounds as though following a fall in June 2018 she had to be significantly sedated in order to obtain an MRI of her brain and she/her daughter would like to avoid repeating that at this time.    Thankfully on 7/23 she became much more alert and her encephalopathy is resolving.  Intermittently she still has periods where she is poorly responsive but seems to eventually simply wake up so I suspect she is sleeping soundly during his episodes.  Overall the trend has been gradual improvement of her mental status and lab values with IV hydration alone.  Disposition is still somewhat unclear as I not convinced she is safe to go back to her assisted living at this time and we may need to pursue TCU  placement.    Problem List/Assessment and Plan:   1. Toxic/metabolic encephalopathy: Resolving.  So far hyponatremia is 126 and what I suspect is reduced reserve is the only apparent explanation.  She does not participate readily with neurologic exam but has been moving all extremities and no focal deficits have been seen.  She had imaging of her brain back in June in the form of an MRI and here in the emergency room in the form of a noncontrast CT of her head and also a CT angiogram of her head and neck with no acute pathology identified.  --After discussion with her daughter the current plan is for ongoing IV hydration, correction of electrolytes/dehydration and close monitoring of her mental status  --Afebrile, no infectious focus apparent  --Cortisol is appropriately somewhat elevated and TSH is within normal limits  --Blood gas upon presentation did not show significant CO2 retention  --Toxicology workup including ethanol, salicylate and Tylenol levels unrevealing    2.   Unwitnessed fall: It sounds as though she has had numerous falls in the past and again this one was unwitnessed.  No apparent bony injury is been identified and imaging was probably negative for traumatic injury as noted above.  I suspect she may require TCU placement upon discharge pending improvement in her mental status.  When fully alert and awake on 7/23 she denied any pain complaints whatsoever.    3.   Hypovolemic hyponatremia: So far correcting slowly with IV hydration.  It is possible she could have a component of SIADH given underlying lung disease.  She does have lung nodules which appear to not have been completely worked up and I discussed these with her daughter.  I suspect that biopsy would not be consistent with her goals of care at this time this could be readdressed at follow-up.    4.   Hypertension: Not apparently on any medications prior to admission.  She has been intermittently hypertensive here so we will plan to start  "low-dose lisinopril and have as needed nitroglycerin available.  She does have numerous drug allergies noted.    5.   Frequent falls    6.  COPD: Not currently in an acute exacerbation, continue home Singulair, Roflumilast and other inhalers as well as scheduled Xopenex.  She is chronically on prednisone 10 mg daily.  Hemodynamically does not appear adrenally insufficient and cortisol is appropriately somewhat elevated.    7.  Thyroid nodule: We will obtain an ultrasound as an outpatient.  Discussed with her daughter.  TSH is normal.    8.  Pulmonary nodules: Discussed these with her daughter.  Consider outpatient CT scan.    DVT Prophylaxis: Pneumatic Compression Devices  Code Status: DNR / DNI, discussed with the patient's daughter personally  Discharge Dispo/Date: At least 1-2 more days.  Consider TCU.  Consult PT and OT.        Interval History (Subjective):      Sodium slowly correcting, now 134  Had borderline low blood pressures intermittently yesterday which consistently responded to 500 cc normal saline boluses  Mental status much improved, now awake and alert.  Removing joseph                  Physical Exam:      Last Vital Signs:  /74 (BP Location: Right arm)  Pulse 83  Temp 99.1  F (37.3  C) (Oral)  Resp 18  Ht 1.575 m (5' 2.01\")  Wt 52.2 kg (115 lb 1.6 oz)  SpO2 97%  BMI 21.05 kg/m2      Intake/Output Summary (Last 24 hours) at 07/23/18 0808  Last data filed at 07/23/18 0721   Gross per 24 hour   Intake             1976 ml   Output             3050 ml   Net            -1074 ml       General: Elderly woman, lying in bed.  Awake, calm and overall appropriate.  HEENT: NC/AT, eyes anicteric, external occular movements intact, face symmetric.    Cardiac: RRR, S1, S2.  No murmurs appreciated.  Pulmonary: Normal chest rise, normal work of breathing.  Lungs CTA BL  Abdomen: soft, non-tender, non-distended.  Bowel Sounds Present.  No guarding.  Extremities: no deformities.  Warm, well " perfused.  Skin: no rashes or lesions noted.  Warm and Dry.  Neuro: No focal deficits noted.  Speech clear.  Somewhat weak diffusely but alert and oriented to place and person.  Psych: Appropriate affect.         Medications:      All current medications were reviewed with changes reflected in problem list.         Data:      All new lab and imaging data was reviewed.   Labs:    Recent Labs  Lab 07/24/18  0604      POTASSIUM 3.1*   CHLORIDE 106   CO2 20   ANIONGAP 8   GLC 99   BUN 11   CR 0.54   GFRESTIMATED >90   GFRESTBLACK >90   DIANA 8.2*       Recent Labs  Lab 07/22/18  0259   WBC 11.3*   HGB 11.4*   HCT 33.6*   MCV 90         Imaging:   Recent Results (from the past 48 hour(s))   CT Head w/o Contrast    Narrative    CT SCAN OF THE HEAD WITHOUT CONTRAST   7/22/2018 3:47 AM     HISTORY: fall;     TECHNIQUE:  Axial images of the head and coronal reformations without  IV contrast material. Radiation dose for this scan was reduced using  automated exposure control, adjustment of the mA and/or kV according  to patient size, or iterative reconstruction technique.    COMPARISON: None.    FINDINGS:  There is generalized atrophy of the brain.  White matter  changes are present in the cerebral hemispheres that are consistent  with small vessel ischemic disease in this age patient. There is no  evidence of intracranial hemorrhage, mass, acute infarct or anomaly.  The visualized portions of the sinuses and mastoids appear normal.  There is no evidence of trauma.      Impression    IMPRESSION: No intracranial hemorrhage or skull fractures are  identified.      WILMA CARBAJAL MD   XR Pelvis and Hip Left 2 Views    Narrative    XR PELVIS AND HIP LEFT 2 VIEWS   7/22/2018 3:50 AM     INDICATION: Fall, hip pain.    COMPARISON: None.      Impression    IMPRESSION: No acute findings. Small nonspecific sclerotic focus in  the right innominate bone, also present on prior pelvic CT  of  11/19/2017, unchanged and likely a bone  island. Contrast in the  collecting systems.    MANISH AGUDELO MD   XR Chest 1 View    Narrative    XR CHEST 1 VIEW   7/22/2018 3:51 AM     INDICATION: Found down, delirium.    COMPARISON: 4/16/2018.      Impression    IMPRESSION: No infiltrates or other acute findings. Heart size is  within normal limits. A few nodular densities noted previously are  again noted, probably related to some costochondral calcification.  Moderate-sized hiatal hernia.    MANISH AGUDELO MD   CT Cervical Spine w/o Contrast    Narrative    CT CERVICAL SPINE WITHOUT CONTRAST 7/22/2018 3:54 AM     HISTORY: Neck pain after trauma.     TECHNIQUE: Axial images through cervical spine without contrast.  Sagittal and coronal reformatted images. Radiation dose for this scan  was reduced using automated exposure control, adjustment of the mA  and/or kV according to patient size, or iterative reconstruction  technique.    COMPARISON: C-spine views 10/3/2016.    FINDINGS: No acute fractures. Advanced degenerative changes with  multilevel degenerative disc disease. Moderate anterior subluxation of  C4 on C5. Degenerative and hypertrophic changes in the mid and lower  cervical facet joints. Curve convex to the right. No prevertebral soft  tissue swelling or significant central canal narrowing. There is mild  central canal narrowing relating to the degenerative changes and  anterior subluxation of C4 on C5.      Impression    IMPRESSION: No acute findings.    MANISH AGUDELO MD   CT Head Neck Angio w/o & w Contrast    Narrative    CTA HEAD NECK WITH CONTRAST  7/22/2018 3:57 AM     HISTORY: Evaluate for dissection/thromboembolism;     TECHNIQUE:  Precontrast localizing scans were followed by CT  angiography with an injection of 70mL [id number]-370 IV contrast with  scans through the head and neck.  Images were transferred to a  separate 3-D workstation where multiplanar reformations and 3-D images  were created.  Estimates of carotid stenoses are made  relative to the  distal internal carotid artery diameters except as noted. Radiation  dose for this scan was reduced using automated exposure control,  adjustment of the mA and/or kV according to patient size, or iterative  reconstruction technique.    COMPARISON: None.    FINDINGS: Estimates of stenosis at the carotid bifurcations are  relative to the distal internal carotids.    Arch: [ Normal Anatomy]    Neck:  Right Carotid:  The right common carotid artery is normal.   Atherosclerotic plaque is seen at the right carotid bifurcation. The  stenosis was calculated at 37%.  The right internal carotid artery is  negative.     Left Carotid:  The left common carotid artery is unremarkable.   Atherosclerotic plaque is seen at the left carotid bifurcation. The  stenosis was calculated at 30%..  The left internal carotid is  negative.      Vertebrals:  The right vertebral artery is dominant supplying the  basilar artery. The left vertebral artery is a very small vessel..    Head:  Right Carotid:No occluded vessels are seen. .    Left Carotid:  No occluded vessels are identified.  A1 segment of the  left anterior cerebral artery is hypoplastic.    Basilar:  The basilar artery and its branches appear normal.     Miscellaneous: There is a 1.3 cm nodule in the right lobe of the  thyroid.. There is also a 1 cm somewhat spiculated nodule in the right  lung apex. This is indeterminant. A CT of the thorax would be helpful  for more complete characterization. There is approximately 5 mm of  anterior subluxation of cc 4 on C5. This appears to be due to  degenerative change in the facet joints. There is loss of disc space  height at C5-6 and C6-7. No fractures are identified but if there is  any clinical suspicion for fracture a formal CT of the cervical spine  is recommended for further evaluation.      Impression    IMPRESSION:   1. No stenosis is seen at either carotid bifurcation.  2. No arterial dissection is identified.  3.  No high-grade intracranial vascular stenosis is identified.  4. Venous sinuses appear patent  5. Indeterminate 1 cm nodule in the right upper lobe. CT of the chest  is recommended for further evaluation and characterization.  6. 1.3 cm nodule in the right lobe of the thyroid. Thyroid ultrasound  could be performed for further characterization.  7. Multilevel degenerative change in the cervical spine. 5 mm of  anterior subluxation of C4 on C5.    I agree with the preliminary report that was communicated to the  referring physician.    MD Adrian LÓPEZ MD.

## 2018-07-24 NOTE — PLAN OF CARE
Problem: Patient Care Overview  Goal: Plan of Care/Patient Progress Review  Outcome: No Change  Pt remains hospitalized for AMS.  Vital signs stable, no signs of pain, on RA.   IVF 75/hr.  Up with assist x 2 with walker and gait belt, not out of bed this shift.   Crawley with 150mL output.   Dressing to right leg CDI.   Alarms on for safety.   Will continue to monitor.

## 2018-07-25 ENCOUNTER — PATIENT OUTREACH (OUTPATIENT)
Dept: GERIATRIC MEDICINE | Facility: CLINIC | Age: 78
End: 2018-07-25

## 2018-07-25 ENCOUNTER — APPOINTMENT (OUTPATIENT)
Dept: OCCUPATIONAL THERAPY | Facility: CLINIC | Age: 78
DRG: 643 | End: 2018-07-25
Payer: COMMERCIAL

## 2018-07-25 ENCOUNTER — TELEPHONE (OUTPATIENT)
Dept: INTERNAL MEDICINE | Facility: CLINIC | Age: 78
End: 2018-07-25

## 2018-07-25 VITALS
HEART RATE: 83 BPM | BODY MASS INDEX: 21.18 KG/M2 | DIASTOLIC BLOOD PRESSURE: 89 MMHG | RESPIRATION RATE: 18 BRPM | WEIGHT: 115.1 LBS | OXYGEN SATURATION: 99 % | SYSTOLIC BLOOD PRESSURE: 187 MMHG | HEIGHT: 62 IN | TEMPERATURE: 98.6 F

## 2018-07-25 LAB
MAGNESIUM SERPL-MCNC: 2.1 MG/DL (ref 1.6–2.3)
POTASSIUM SERPL-SCNC: 3.9 MMOL/L (ref 3.4–5.3)

## 2018-07-25 PROCEDURE — 25000125 ZZHC RX 250: Performed by: INTERNAL MEDICINE

## 2018-07-25 PROCEDURE — 99239 HOSP IP/OBS DSCHRG MGMT >30: CPT | Performed by: INTERNAL MEDICINE

## 2018-07-25 PROCEDURE — 25000132 ZZH RX MED GY IP 250 OP 250 PS 637: Performed by: INTERNAL MEDICINE

## 2018-07-25 PROCEDURE — 84132 ASSAY OF SERUM POTASSIUM: CPT | Performed by: INTERNAL MEDICINE

## 2018-07-25 PROCEDURE — 97530 THERAPEUTIC ACTIVITIES: CPT | Mod: GO | Performed by: REHABILITATION PRACTITIONER

## 2018-07-25 PROCEDURE — 40000275 ZZH STATISTIC RCP TIME EA 10 MIN

## 2018-07-25 PROCEDURE — 40000133 ZZH STATISTIC OT WARD VISIT: Performed by: REHABILITATION PRACTITIONER

## 2018-07-25 PROCEDURE — 36415 COLL VENOUS BLD VENIPUNCTURE: CPT | Performed by: INTERNAL MEDICINE

## 2018-07-25 PROCEDURE — 25000128 H RX IP 250 OP 636: Performed by: INTERNAL MEDICINE

## 2018-07-25 PROCEDURE — 83735 ASSAY OF MAGNESIUM: CPT | Performed by: INTERNAL MEDICINE

## 2018-07-25 PROCEDURE — 94640 AIRWAY INHALATION TREATMENT: CPT | Mod: 76

## 2018-07-25 PROCEDURE — 94640 AIRWAY INHALATION TREATMENT: CPT

## 2018-07-25 RX ORDER — LISINOPRIL 10 MG/1
10 TABLET ORAL DAILY
Qty: 30 TABLET | Refills: 0 | Status: SHIPPED | OUTPATIENT
Start: 2018-07-26 | End: 2018-07-26

## 2018-07-25 RX ORDER — TRAMADOL HYDROCHLORIDE 50 MG/1
50 TABLET ORAL 2 TIMES DAILY
Qty: 20 TABLET | Refills: 0 | Status: SHIPPED | OUTPATIENT
Start: 2018-07-25 | End: 2018-08-10

## 2018-07-25 RX ADMIN — ACETAMINOPHEN 1000 MG: 500 TABLET, FILM COATED ORAL at 08:28

## 2018-07-25 RX ADMIN — LISINOPRIL 10 MG: 10 TABLET ORAL at 08:28

## 2018-07-25 RX ADMIN — LEVOTHYROXINE SODIUM 25 MCG: 25 TABLET ORAL at 08:28

## 2018-07-25 RX ADMIN — SODIUM CHLORIDE: 9 INJECTION, SOLUTION INTRAVENOUS at 08:37

## 2018-07-25 RX ADMIN — LEVALBUTEROL HYDROCHLORIDE 1.25 MG: 1.25 SOLUTION RESPIRATORY (INHALATION) at 07:23

## 2018-07-25 RX ADMIN — LEVALBUTEROL HYDROCHLORIDE 1.25 MG: 1.25 SOLUTION RESPIRATORY (INHALATION) at 15:23

## 2018-07-25 RX ADMIN — MULTIPLE VITAMINS W/ MINERALS TAB 1 TABLET: TAB at 08:28

## 2018-07-25 RX ADMIN — OMEPRAZOLE 40 MG: 20 CAPSULE, DELAYED RELEASE ORAL at 08:28

## 2018-07-25 RX ADMIN — FLUTICASONE FUROATE 1 PUFF: 100 POWDER RESPIRATORY (INHALATION) at 07:23

## 2018-07-25 RX ADMIN — LEVALBUTEROL HYDROCHLORIDE 1.25 MG: 1.25 SOLUTION RESPIRATORY (INHALATION) at 11:15

## 2018-07-25 RX ADMIN — PREDNISONE 10 MG: 10 TABLET ORAL at 08:28

## 2018-07-25 RX ADMIN — ALBUTEROL SULFATE 2 PUFF: 90 AEROSOL, METERED RESPIRATORY (INHALATION) at 01:07

## 2018-07-25 RX ADMIN — UMECLIDINIUM 1 PUFF: 62.5 AEROSOL, POWDER ORAL at 07:23

## 2018-07-25 RX ADMIN — DULOXETINE HYDROCHLORIDE 30 MG: 30 CAPSULE, DELAYED RELEASE ORAL at 08:28

## 2018-07-25 RX ADMIN — ROFLUMILAST 500 MCG: 500 TABLET ORAL at 08:28

## 2018-07-25 RX ADMIN — POLYETHYLENE GLYCOL 3350 17 G: 17 POWDER, FOR SOLUTION ORAL at 08:28

## 2018-07-25 ASSESSMENT — ACTIVITIES OF DAILY LIVING (ADL)
ADLS_ACUITY_SCORE: 19
ADLS_ACUITY_SCORE: 22

## 2018-07-25 NOTE — PLAN OF CARE
Problem: Patient Care Overview  Goal: Plan of Care/Patient Progress Review  Discharge Planner OT   Patient plan for discharge: TCU  Current status:   Therapist provided >3 cues through transferring EOB and pt min assist through transfer; requiring support sitting up right. Pt initiated putting socks on but unable to complete task and therapist assisted. CGA with FWW transferring sit<>stand and needed orientation cues for transferring onto commode. Pt require >3 cues for managing brief and demonstrated I managing jesenia-cares.  CGA transferring sit<>stand and became disoriented upon standing; therapist provided cues to complete transfer back into the chair. Upon transferring into the chair; pt required mod assist through transfer with FWW. Pt reported feeling tired and needed to be cued to sit in the chair; not realizing the chair was behind her. Pt demonstrated I completing self-cares in the chair after set up. Throughout session, therapist cued pt back to task, reorienting pt to task, and cuing through activity.   Barriers to return to prior living situation: Reduced activity tolerance for I/ADLs, impaired cognition for safety, and below baseline for ADL activities.  Recommendations for discharge: TCU to improve endurance through I/ADL activity.   Rationale for recommendations: Pt demonstrating requiring significant cueing for orientation, hand/foot placement for safety, and demonstrating CGA/mod assist through transfers. Pt would benefit from additional services through TCU to improve functional transfers and safety for I/ADL activities.        Entered by: Ashwini Fischer 07/25/2018 4:05 PM    Occupational Therapy Discharge Summary    Reason for therapy discharge:    Discharged to transitional care facility.    Progress towards therapy goal(s). See goals on Care Plan in [facility] electronic health record for goal details.  Goals not met.  Barriers to achieving goals:   limited tolerance for therapy and discharge from  facility.    Therapy recommendation(s):    Continued therapy is recommended.  Rationale/Recommendations:  TCU to increase functional transfers through I/ADL activities.

## 2018-07-25 NOTE — PLAN OF CARE
Problem: Patient Care Overview  Goal: Plan of Care/Patient Progress Review  Outcome: No Change  Pt transferring via sera steady. Regular diet tolerating well. /88, BP med given recheck 154/76. Fluids discontinued. Disoriented to situation and place. Plan is to discharge to [facility] at 1545 via [facility].

## 2018-07-25 NOTE — PROGRESS NOTES
Discharge Planner   Discharge Plans in progress: Called Erin Salcedo at [facility] [phone] to find out when she would be out to assess pt./   Barriers to discharge plan: Per MD pt is ready today.   Follow up plan: Waiting for return call. Pt was open to Skilled home care with [facility] for PT.OT support        Entered by: Corinne C. White 07/25/2018 10:51 AM

## 2018-07-25 NOTE — PLAN OF CARE
Problem: Patient Care Overview  Goal: Plan of Care/Patient Progress Review      Physical Therapy Discharge Summary    Reason for therapy discharge:    Discharged to transitional care facility.    Progress towards therapy goal(s). See goals on Care Plan in [facility] electronic health record for goal details.  Goals not met.  Barriers to achieving goals:   discharge from facility.    Therapy recommendation(s):    Continued therapy is recommended.  Rationale/Recommendations:  Progress indep with mobility at TCU.     Note: Pt not seen by documenting PT on this date. Information obtained from chart review.

## 2018-07-25 NOTE — PROGRESS NOTES
COURTNEY ELEVATED. DC to TCU.  Will give hand off to DR Alvarez's RN CTS.  Pt will need to f/u with Dr Alvarez within 7 days after dc from TCU.  Sabi RN CTS [id number]

## 2018-07-25 NOTE — PROGRESS NOTES
[facility] Care Coordination Contact  EPIC notes reviewed. Left vm with Shobha BARTHOLOMEW, [facility] stating that CM did receive her vm earlier this week regarding member's hospital admission. Explained that CM reviewed hospital  notes, aware that client may transition to TCU or back to AL with increased cares (EZ stand) and ongoing homecare.  CM to follow  Rec'd vm from Gloria RINALDI @ [facility] to report that the plan is for client discharge to back to [facility]. Gloria states that she was informed that the AL can provide EZ stand assistance, hoping that client can return back to AL with resumption of homecare services. Gloria states that she is waiting for Shobha to come and assess client. [phone]  Rec'd notification from Candis LISA that client will be transferring to [facility]   VM left with Mae, admissions at TCU to introduce myself and request call back with  contact information.  EPIC message routed to ANTONIO Rodney NP to inform of TCU admission.   JACINTA log updated.   Yeni Savage RN, BC  Supervisor [facility]   [phone] (Fax)

## 2018-07-25 NOTE — PROGRESS NOTES
Pt to D/C to TCU. Spoke with Winyd (daughter) via phone regarding pt new medication lisinopril and to follow up with Dr Alvarez within 7 days of discharging from TCU. Copy of paperwork sent with pt.  [facility] to provide transport.  All personal belongings sent with pt (clothing).

## 2018-07-25 NOTE — DISCHARGE SUMMARY
Discharge Summary  [facility]    Ana Luisa Lee MRN# [id number]   YOB: 1940 Age: 78 year old     Date of Admission:  7/22/2018  Date of Discharge:  7/25/2018  Admitting Physician:  Adrian Carvalho MD  Discharge Physician: Latesha Cao MD  Discharging Service: Hospitalist     Primary Provider: Db Alvarez  Primary Care Physician Phone Number: [phone]         Discharge Diagnoses/Problem Oriented Hospital Course (Providers):    Ana Luisa Lee was admitted on 7/22/2018 by Adrian Carvalho MD and I would refer you to their history and physical.  The following problems were addressed during her hospitalization:    1. Toxic/metabolic encephalopathy: Resolving.  So far hyponatremia is 126 and what I suspect is reduced reserve is the only apparent explanation.  She does not participate readily with neurologic exam but has been moving all extremities and no focal deficits have been seen.  She had imaging of her brain back in June in the form of an MRI and here in the emergency room in the form of a noncontrast CT of her head and also a CT angiogram of her head and neck with no acute pathology identified.  --Improved.  --Afebrile, no infectious focus apparent  --Cortisol is appropriately somewhat elevated and TSH is within normal limits  --Blood gas upon presentation did not show significant CO2 retention  --Toxicology workup including ethanol, salicylate and Tylenol levels unrevealing     2.   Unwitnessed fall: It sounds as though she has had numerous falls in the past and again this one was unwitnessed.  No apparent bony injury is been identified and imaging was probably negative for traumatic injury as noted above.  I suspect she may require TCU placement upon discharge pending improvement in her mental status.  When fully alert and awake on 7/23 she denied any pain complaints whatsoever.     3.   Hypovolemic hyponatremia: So far correcting  slowly with IV hydration.  It is possible she could have a component of SIADH given underlying lung disease.  She does have lung nodules which appear to not have been completely worked up and I discussed these with her daughter.  I suspect that biopsy would not be consistent with her goals of care at this time this could be readdressed at follow-up.     4.   Hypertension: Not apparently on any medications prior to admission.  She has been intermittently hypertensive here so we will plan to start low-dose lisinopril and have as needed nitroglycerin available.  She does have numerous drug allergies noted.     5.   Frequent falls     6.  COPD: Not currently in an acute exacerbation, continue home Singulair, Roflumilast and other inhalers as well as scheduled Xopenex.  She is chronically on prednisone 10 mg daily.  Hemodynamically does not appear adrenally insufficient and cortisol is appropriately somewhat elevated.     7.  Thyroid nodule:  My colleague  Discussed with her daughter.  TSH is normal.     8.  Pulmonary nodules: my colleague Discussed these with her daughter.  Consider outpatient CT scan.          Code Status:      DNR / DNI         Important Results:                Pending Results:        Unresulted Labs Ordered in the Past 30 Days of this Admission     Date and Time Order Name Status Description    7/22/2018 0316 Blood culture ONE site Preliminary     7/22/2018 0252 Blood culture Preliminary                Discharge Instructions and Follow-Up:               Discharge Disposition:      Discharged to TCU.          Discharge Medications:        Current Discharge Medication List      START taking these medications    Details   lisinopril (PRINIVIL/ZESTRIL) 10 MG tablet Take 1 tablet (10 mg) by mouth daily  Qty: 30 tablet, Refills: 0    Associated Diagnoses: Essential hypertension with goal blood pressure less than 140/90         CONTINUE these medications which have CHANGED    Details   traMADol (ULTRAM) 50  MG tablet Take 1 tablet (50 mg) by mouth 2 times daily  Qty: 20 tablet, Refills: 0    Associated Diagnoses: Arthritis of hand         CONTINUE these medications which have NOT CHANGED    Details   acetaminophen (TYLENOL) 500 MG tablet Take 2 tablets (1,000 mg) by mouth 3 times daily    Associated Diagnoses: Pain of both shoulder joints      albuterol (ALBUTEROL) 108 (90 BASE) MCG/ACT Inhaler Inhale 2 puffs into the lungs every 6 hours as needed    Associated Diagnoses: Asthma, persistent      budesonide (PULMICORT FLEXHALER) 180 MCG/ACT inhaler Inhale 1 puff into the lungs 2 times daily    Associated Diagnoses: Pulmonary emphysema, unspecified emphysema type (H)      calcium 600 MG tablet Take 1 tablet (600 mg) by mouth daily  Qty: 60 tablet    Associated Diagnoses: Pulmonary emphysema, unspecified emphysema type (H)      cholecalciferol (VITAMIN D) 1000 UNIT tablet Take 1 tablet (1,000 Units) by mouth daily  Qty: 30 tablet    Associated Diagnoses: COPD, moderate (H)      cyanocobalamin (B-12 TR) 1000 MCG TBCR Take 1 tablet by mouth daily  Qty: 100 tablet, Refills: 3    Associated Diagnoses: Pernicious anemia      DULoxetine (CYMBALTA) 30 MG EC capsule Take 30 mg by mouth daily      ferrous sulfate (IRON) 325 (65 Fe) MG tablet Take 325 mg by mouth daily (with breakfast)      fexofenadine (ALLEGRA) 60 MG tablet Take 1 tablet (60 mg) by mouth daily  Qty: 60 tablet    Associated Diagnoses: Middle ear effusion, left      fluticasone (FLONASE) 50 MCG/ACT spray Spray 1 spray into both nostrils daily  Qty: 1 Bottle    Associated Diagnoses: Middle ear effusion, left      gemfibrozil (LOPID) 600 MG tablet Take 1 tablet (600 mg) by mouth daily  Qty: 90 tablet, Refills: 0    Associated Diagnoses: Hyperlipidemia with target LDL less than 130      levalbuterol (XOPENEX) 1.25 MG/3ML neb solution Take 1 ampule by nebulization 4 times daily      levothyroxine (SYNTHROID/LEVOTHROID) 25 MCG tablet Take 1 tablet (25 mcg) by mouth  "daily  Qty: 90 tablet, Refills: 3    Associated Diagnoses: Hypothyroidism due to acquired atrophy of thyroid      loperamide (IMODIUM) 2 MG capsule Take 2 mg by mouth every 8 hours as needed for diarrhea      montelukast (SINGULAIR) 10 MG tablet Take 1 tablet (10 mg) by mouth At Bedtime  Qty: 30 tablet    Associated Diagnoses: Pulmonary emphysema, unspecified emphysema type (H)      Multiple Vitamins-Minerals (MULTIVITAMIN OR) Take 1 tablet by mouth daily      omeprazole (PRILOSEC) 40 MG capsule Take 1 capsule (40 mg) by mouth daily Take 30-60 minutes before a meal.  Qty: 90 capsule, Refills: 2    Associated Diagnoses: Gastroesophageal reflux disease, esophagitis presence not specified      !! polyethylene glycol (MIRALAX/GLYCOLAX) Packet Take 17 g by mouth daily      !! polyethylene glycol (MIRALAX/GLYCOLAX) Packet Take 17 g by mouth daily as needed for constipation      predniSONE (DELTASONE) 10 MG tablet Take 10 mg by mouth daily      roflumilast (DALIRESP) 500 MCG TABS tablet Take 500 mcg by mouth daily       umeclidinium (INCRUSE ELLIPTA) 62.5 MCG/INH oral inhaler Inhale 1 puff into the lungs daily       !! - Potential duplicate medications found. Please discuss with provider.               Allergies:         Allergies   Allergen Reactions     Hydralazine Anxiety     Penicillin G Hives     Tolerated cephalosporines 2017     Meloxicam      dizziness       Metoprolol      ? Skin rash on the back     Norvasc [Amlodipine Besylate] Hives            Consultations This Hospital Stay:      No consultations were requested during this admission          Condition and Physical Exam on Discharge:      Discharge condition: Stable   Discharge vitals: Blood pressure 154/76, pulse 83, temperature 98.7  F (37.1  C), temperature source Oral, resp. rate 18, height 1.575 m (5' 2.01\"), weight 52.2 kg (115 lb 1.6 oz), SpO2 96 %, not currently breastfeeding.     Constitutional: Alert, awake in NAD   Lungs: Diminished BS "   Cardiovascular: RR,S1+S2 nml, no m/g/r.   Abdomen: Soft, NT, ND, + BS   Skin: No edema.   Other:            Discharge Orders for Skilled Facility (from Discharge Orders):               Rehab orders for Skilled Facility (from Discharge Orders):               Discharge Time:        > 30 mins on chart review, exam and .        Image Results From This Hospital Stay (For Non-EPIC Providers):        Results for orders placed or performed during the hospital encounter of 07/22/18   CT Head w/o Contrast    Narrative    CT SCAN OF THE HEAD WITHOUT CONTRAST   7/22/2018 3:47 AM     HISTORY: fall;     TECHNIQUE:  Axial images of the head and coronal reformations without  IV contrast material. Radiation dose for this scan was reduced using  automated exposure control, adjustment of the mA and/or kV according  to patient size, or iterative reconstruction technique.    COMPARISON: None.    FINDINGS:  There is generalized atrophy of the brain.  White matter  changes are present in the cerebral hemispheres that are consistent  with small vessel ischemic disease in this age patient. There is no  evidence of intracranial hemorrhage, mass, acute infarct or anomaly.  The visualized portions of the sinuses and mastoids appear normal.  There is no evidence of trauma.      Impression    IMPRESSION: No intracranial hemorrhage or skull fractures are  identified.      WILMA CARBAJAL MD   CT Cervical Spine w/o Contrast    Narrative    CT CERVICAL SPINE WITHOUT CONTRAST 7/22/2018 3:54 AM     HISTORY: Neck pain after trauma.     TECHNIQUE: Axial images through cervical spine without contrast.  Sagittal and coronal reformatted images. Radiation dose for this scan  was reduced using automated exposure control, adjustment of the mA  and/or kV according to patient size, or iterative reconstruction  technique.    COMPARISON: C-spine views 10/3/2016.    FINDINGS: No acute fractures. Advanced degenerative changes with  multilevel degenerative disc  disease. Moderate anterior subluxation of  C4 on C5. Degenerative and hypertrophic changes in the mid and lower  cervical facet joints. Curve convex to the right. No prevertebral soft  tissue swelling or significant central canal narrowing. There is mild  central canal narrowing relating to the degenerative changes and  anterior subluxation of C4 on C5.      Impression    IMPRESSION: No acute findings.    MANISH AGUDELO MD   XR Chest 1 View    Narrative    XR CHEST 1 VIEW   7/22/2018 3:51 AM     INDICATION: Found down, delirium.    COMPARISON: 4/16/2018.      Impression    IMPRESSION: No infiltrates or other acute findings. Heart size is  within normal limits. A few nodular densities noted previously are  again noted, probably related to some costochondral calcification.  Moderate-sized hiatal hernia.    MANISH AGUDELO MD   XR Pelvis and Hip Left 2 Views    Narrative    XR PELVIS AND HIP LEFT 2 VIEWS   7/22/2018 3:50 AM     INDICATION: Fall, hip pain.    COMPARISON: None.      Impression    IMPRESSION: No acute findings. Small nonspecific sclerotic focus in  the right innominate bone, also present on prior pelvic CT  of  11/19/2017, unchanged and likely a bone island. Contrast in the  collecting systems.    MANISH AGUDELO MD   CT Head Neck Angio w/o & w Contrast    Narrative    CTA HEAD NECK WITH CONTRAST  7/22/2018 3:57 AM     HISTORY: Evaluate for dissection/thromboembolism;     TECHNIQUE:  Precontrast localizing scans were followed by CT  angiography with an injection of 70mL [id number]-370 IV contrast with  scans through the head and neck.  Images were transferred to a  separate 3-D workstation where multiplanar reformations and 3-D images  were created.  Estimates of carotid stenoses are made relative to the  distal internal carotid artery diameters except as noted. Radiation  dose for this scan was reduced using automated exposure control,  adjustment of the mA and/or kV according to patient size, or  iterative  reconstruction technique.    COMPARISON: None.    FINDINGS: Estimates of stenosis at the carotid bifurcations are  relative to the distal internal carotids.    Arch: [ Normal Anatomy]    Neck:  Right Carotid:  The right common carotid artery is normal.   Atherosclerotic plaque is seen at the right carotid bifurcation. The  stenosis was calculated at 37%.  The right internal carotid artery is  negative.     Left Carotid:  The left common carotid artery is unremarkable.   Atherosclerotic plaque is seen at the left carotid bifurcation. The  stenosis was calculated at 30%..  The left internal carotid is  negative.      Vertebrals:  The right vertebral artery is dominant supplying the  basilar artery. The left vertebral artery is a very small vessel..    Head:  Right Carotid:No occluded vessels are seen. .    Left Carotid:  No occluded vessels are identified.  A1 segment of the  left anterior cerebral artery is hypoplastic.    Basilar:  The basilar artery and its branches appear normal.     Miscellaneous: There is a 1.3 cm nodule in the right lobe of the  thyroid.. There is also a 1 cm somewhat spiculated nodule in the right  lung apex. This is indeterminant. A CT of the thorax would be helpful  for more complete characterization. There is approximately 5 mm of  anterior subluxation of cc 4 on C5. This appears to be due to  degenerative change in the facet joints. There is loss of disc space  height at C5-6 and C6-7. No fractures are identified but if there is  any clinical suspicion for fracture a formal CT of the cervical spine  is recommended for further evaluation.      Impression    IMPRESSION:   1. No stenosis is seen at either carotid bifurcation.  2. No arterial dissection is identified.  3. No high-grade intracranial vascular stenosis is identified.  4. Venous sinuses appear patent  5. Indeterminate 1 cm nodule in the right upper lobe. CT of the chest  is recommended for further evaluation and  characterization.  6. 1.3 cm nodule in the right lobe of the thyroid. Thyroid ultrasound  could be performed for further characterization.  7. Multilevel degenerative change in the cervical spine. 5 mm of  anterior subluxation of C4 on C5.    I agree with the preliminary report that was communicated to the  referring physician.    WILMA CARBAJAL MD     *Note: Due to a large number of results and/or encounters for the requested time period, some results have not been displayed. A complete set of results can be found in Results Review.           Most Recent Lab Results In EPIC (For Non-EPIC Providers):      Results for orders placed or performed during the hospital encounter of 07/22/18 (from the past 24 hour(s))   Potassium   Result Value Ref Range    Potassium 3.8 3.4 - 5.3 mmol/L   Potassium   Result Value Ref Range    Potassium 3.9 3.4 - 5.3 mmol/L   Magnesium   Result Value Ref Range    Magnesium 2.1 1.6 - 2.3 mg/dL     *Note: Due to a large number of results and/or encounters for the requested time period, some results have not been displayed. A complete set of results can be found in Results Review.

## 2018-07-25 NOTE — PLAN OF CARE
Problem: Patient Care Overview  Goal: Plan of Care/Patient Progress Review  Outcome: No Change  Pt oriented to self only. Up with 1/SS. Voiding well, but up Q1* with residuals 400-589 this am. Straight cath [id number]. no incontinence. Denies pain. Vitals stable. Na 134. Potential d/c back to AL vs TCU today. Uneventful night. Slept well between cares/bathroom visits.     Problem: Memory Impairment Comorbidity  Goal: Memory Impairment Comorbidity  Patient comorbidity will be monitored for signs and symptoms of Memory Impairment condition.  Problems will be absent, minimized or managed by discharge/transition of care.   Outcome: No Change  Confused overnight. Oriented to self only.

## 2018-07-25 NOTE — PLAN OF CARE
Problem: Patient Care Overview  Goal: Plan of Care/Patient Progress Review  Outcome: No Change  Disoriented to time and situation, up assist x2 with adriano steady, tolerating diet, heart sounds- WNL, lungs- clear, BS- hypoactive, large BM this shift, bladder scanned for 523mL- straight cath for 650mL, skin tear on R leg covered with dressing- dressing has dried drainage, IVF.

## 2018-07-25 NOTE — PROGRESS NOTES
PAS-RR    D: Per DHS regulation, SW completed and submitted PAS-RR to [place] [employer] Direct Connect via the Senior LinkAge Line.  PAS-RR confirmation # is : [id number]          P: Further questions may be directed to [facility] LinkAge Line at #[phone], option #4 for PAS-RR staff.    Sabi BARTHOLOMEW CTS [id number]    DC orders faxed to [facility] at [phone].

## 2018-07-26 ENCOUNTER — NURSING HOME VISIT (OUTPATIENT)
Dept: GERIATRICS | Facility: CLINIC | Age: 78
End: 2018-07-26
Payer: COMMERCIAL

## 2018-07-26 ENCOUNTER — PATIENT OUTREACH (OUTPATIENT)
Dept: GERIATRIC MEDICINE | Facility: CLINIC | Age: 78
End: 2018-07-26

## 2018-07-26 VITALS
DIASTOLIC BLOOD PRESSURE: 88 MMHG | RESPIRATION RATE: 21 BRPM | SYSTOLIC BLOOD PRESSURE: 160 MMHG | HEART RATE: 100 BPM | TEMPERATURE: 97.5 F | OXYGEN SATURATION: 98 %

## 2018-07-26 DIAGNOSIS — G93.41 METABOLIC ENCEPHALOPATHY: Primary | ICD-10-CM

## 2018-07-26 DIAGNOSIS — F43.21 ADJUSTMENT DISORDER WITH DEPRESSED MOOD: ICD-10-CM

## 2018-07-26 DIAGNOSIS — Z87.820 HISTORY OF CLOSED HEAD INJURY: ICD-10-CM

## 2018-07-26 DIAGNOSIS — E87.1 HYPONATREMIA: ICD-10-CM

## 2018-07-26 DIAGNOSIS — T14.8XXA SKIN ABRASION: ICD-10-CM

## 2018-07-26 DIAGNOSIS — M19.90 ARTHRITIS PAIN: ICD-10-CM

## 2018-07-26 DIAGNOSIS — Z92.241 STEROID-DEPENDENT CHRONIC OBSTRUCTIVE PULMONARY DISEASE (H): ICD-10-CM

## 2018-07-26 DIAGNOSIS — J44.9 STEROID-DEPENDENT CHRONIC OBSTRUCTIVE PULMONARY DISEASE (H): ICD-10-CM

## 2018-07-26 DIAGNOSIS — I10 ESSENTIAL HYPERTENSION: ICD-10-CM

## 2018-07-26 DIAGNOSIS — R53.81 PHYSICAL DECONDITIONING: ICD-10-CM

## 2018-07-26 DIAGNOSIS — R29.6 UNWITNESSED FALL: ICD-10-CM

## 2018-07-26 PROBLEM — S32.10XA CLOSED FRACTURE OF SACRUM, UNSPECIFIED PORTION OF SACRUM, INITIAL ENCOUNTER (H): Status: RESOLVED | Noted: 2017-12-02 | Resolved: 2018-07-26

## 2018-07-26 PROBLEM — G93.40 ENCEPHALOPATHY ACUTE: Status: RESOLVED | Noted: 2018-07-22 | Resolved: 2018-07-26

## 2018-07-26 PROCEDURE — 99310 SBSQ NF CARE HIGH MDM 45: CPT | Performed by: NURSE PRACTITIONER

## 2018-07-26 NOTE — PROGRESS NOTES
"[place] [facility]  PRIMARY CARE PROVIDER AND CLINIC:  Db Alvarez PHYSICIAN [facility] [address] [facility] / [facility] 550*  Chief Complaint   Patient presents with     hospitals Care     [place] Medical Record Number:  [id number]    HPI:    Ana Luisa Lee is a 78 year old  (1940),admitted to the [facility] from [facility].  Hospital stay 7/22/18 through 7/25/18.  Admitted to this facility for  rehab, medical management and nursing care.  HPI information obtained from: facility chart records, facility staff, [facility] chart review and family/first contact daughter report.  Current issues are:         Metabolic encephalopathy  Hyponatremia  Unwitnessed fall  Skin abrasion  Physical deconditioning  Steroid-dependent chronic obstructive pulmonary disease (H)  Essential hypertension  Adjustment disorder with depressed mood  Arthritis pain  History of closed head injury     Hospital Course:    \"1. Toxic/metabolic encephalopathy: Resolving.  So far hyponatremia is 126 and what I suspect is reduced reserve is the only apparent explanation.  She does not participate readily with neurologic exam but has been moving all extremities and no focal deficits have been seen.  She had imaging of her brain back in June in the form of an MRI and here in the emergency room in the form of a non contrast CT of her head and also a CT angiogram of her head and neck with no acute pathology identified.  --Improved.  --Afebrile, no infectious focus apparent  --Cortisol is appropriately somewhat elevated and TSH is within normal limits  --Blood gas upon presentation did not show significant CO2 retention  --Toxicology workup including ethanol, salicylate and Tylenol levels unrevealing      2.   Unwitnessed fall: It sounds as though she has had numerous falls in the past and again this one was unwitnessed.  No apparent bony injury is been identified and imaging was " "probably negative for traumatic injury as noted above.  I suspect she may require TCU placement upon discharge pending improvement in her mental status.  When fully alert and awake on 7/23 she denied any pain complaints whatsoever.      3.   Hypovolemic hyponatremia: So far correcting slowly with IV hydration.  It is possible she could have a component of SIADH given underlying lung disease.  She does have lung nodules which appear to not have been completely worked up and I discussed these with her daughter.  I suspect that biopsy would not be consistent with her goals of care at this time this could be readdressed at follow-up.      4.   Hypertension: Not apparently on any medications prior to admission.  She has been intermittently hypertensive here so we will plan to start low-dose lisinopril and have as needed nitroglycerin available.  She does have numerous drug allergies noted.      5.   Frequent falls      6.  COPD: Not currently in an acute exacerbation, continue home Singulair, Roflumilast and other inhalers as well as scheduled Xopenex.  She is chronically on prednisone 10 mg daily.  Hemodynamically does not appear adrenally insufficient and cortisol is appropriately somewhat elevated.  7.  Thyroid nodule:  My colleague  Discussed with her daughter.  TSH is normal.  8.  Pulmonary nodules: my colleague Discussed these with her daughter.  Consider outpatient CT scan.\"    HPI/ROS:  difficult to assess to alertness varied.  No CP, SOB,   Dizziness,  HA, N/V, dysuria or Bowel Abnormalities. Appetite is down.  No pain at this time.  Has arthritis pains.    CODE STATUS/ADVANCE DIRECTIVES DISCUSSION:   DNR / DNI  Patient's living condition: lives in an assisted living facility    ALLERGIES:Hydralazine; Penicillin g; Meloxicam; Metoprolol; and Norvasc [amlodipine besylate]  PAST MEDICAL HISTORY:  has a past medical history of Anemia (Dec 2011); Arthritis of hand; Asthma, persistent; Chronic intermittent steroid " use; COPD exacerbation (H) (10/25/2013); Esophageal stricture (2007); HTN, goal below 140/90; Hyperlipidemia; Hypothyroidism; Osteoporosis; Paroxysmal supraventricular tachycardia (H); PVC (premature ventricular contraction) (May 2012); and SVT (supraventricular tachycardia) (H). She also has no past medical history of Blood transfusion; Congestive heart failure, unspecified; Coronary artery disease; Diabetes mellitus (H); Malignant neoplasm (H); or Unspecified cerebral artery occlusion with cerebral infarction.  PAST SURGICAL HISTORY:  has a past surgical history that includes Bunionectomy lapidus with tarsal metatarsal (TMT) fusion (1990's); Tonsillectomy; and Hysterectomy total abdominal.  FAMILY HISTORY: family history includes Cerebrovascular Disease in her mother; Family History Negative in her brother, daughter, father, sister, and son; Hypertension in her mother; Unknown/Adopted in her maternal grandfather, maternal grandmother, paternal grandfather, and paternal grandmother. There is no history of Asthma, C.A.D., Diabetes, or Cancer.  SOCIAL HISTORY:  reports that she quit smoking about 38 years ago. She has a 15.00 pack-year smoking history. She has never used smokeless tobacco. She reports that she does not drink alcohol or use illicit drugs.    Post Discharge Medication Reconciliation Status: discharge medications reconciled and changed, per note/orders (see AVS).  Current Outpatient Prescriptions   Medication Sig Dispense Refill     acetaminophen (TYLENOL) 500 MG tablet Take 2 tablets (1,000 mg) by mouth 3 times daily       albuterol (ALBUTEROL) 108 (90 BASE) MCG/ACT Inhaler Inhale 2 puffs into the lungs every 6 hours as needed       budesonide (PULMICORT FLEXHALER) 180 MCG/ACT inhaler Inhale 1 puff into the lungs 2 times daily       calcium 600 MG tablet Take 1 tablet (600 mg) by mouth daily 60 tablet      cholecalciferol (VITAMIN D) 1000 UNIT tablet Take 1 tablet (1,000 Units) by mouth daily 30 tablet       cyanocobalamin (B-12 TR) 1000 MCG TBCR Take 1 tablet by mouth daily 100 tablet 3     [START ON 7/27/2018] DULoxetine (CYMBALTA) 30 MG EC capsule Take 20 mg by mouth daily        ferrous sulfate (IRON) 325 (65 Fe) MG tablet Take 325 mg by mouth daily (with breakfast)       Fexofenadine HCl (ALLEGRA PO) Take 30 mg by mouth daily       fluticasone (FLONASE) 50 MCG/ACT spray Spray 1 spray into both nostrils daily 1 Bottle      gemfibrozil (LOPID) 600 MG tablet Take 1 tablet (600 mg) by mouth daily 90 tablet 0     levalbuterol (XOPENEX) 1.25 MG/3ML neb solution Take 1 ampule by nebulization 4 times daily       levothyroxine (SYNTHROID/LEVOTHROID) 25 MCG tablet Take 1 tablet (25 mcg) by mouth daily 90 tablet 3     LISINOPRIL PO Take 10 mg by mouth daily HOLD if SBP <110.Call if held x 2 in a row symptomatic       loperamide (IMODIUM) 2 MG capsule Take 2 mg by mouth every 8 hours as needed for diarrhea       montelukast (SINGULAIR) 10 MG tablet Take 1 tablet (10 mg) by mouth At Bedtime 30 tablet      Multiple Vitamins-Minerals (MULTIVITAMIN OR) Take 1 tablet by mouth daily       omeprazole (PRILOSEC) 40 MG capsule Take 1 capsule (40 mg) by mouth daily Take 30-60 minutes before a meal. 90 capsule 2     polyethylene glycol (MIRALAX/GLYCOLAX) Packet Take 17 g by mouth daily as needed for constipation       predniSONE (DELTASONE) 10 MG tablet Take 10 mg by mouth daily       roflumilast (DALIRESP) 500 MCG TABS tablet Take 500 mcg by mouth daily        traMADol (ULTRAM) 50 MG tablet Take 1 tablet (50 mg) by mouth 2 times daily 20 tablet 0     umeclidinium (INCRUSE ELLIPTA) 62.5 MCG/INH oral inhaler Inhale 1 puff into the lungs daily         ROS:  Limited  secondary to cognitive impairment.     Exam:  /88  Pulse 100  Temp 97.5  F (36.4  C)  Resp 21  SpO2 98%     GENERAL APPEARANCE:  variable alertness,  pleasant and cooperative but easily distracted, oriented to person, thinks it is Saturday   EYES:  EOM, lids,  pupils and irises normal, sclera clear and conjunctiva normal  ENT:  Mouth normal, moist mucous membranes.  Hearing acuity : [place]  RESP:  respiratory effort normal, no respiratory distress, Lung sounds clear except scattered coarse crackles.  Patient is on room air  CV: auscultation of heart done, RRR, soft systolic murmur, no gallop or rub.   Trace edema bilat lower legs, +dps, warm feet  ABDOMEN:  normal bowel sounds, soft, nontender..  M/S:   seen in WC, able to VILLEGAS equally, weak.  SKIN:  Inspection and palpation of skin and subcutaneous tissue: skin warm, dry and intact except right shin with abrasion which is dry,beginning to scab, no sign infection  NEURO: cranial nerves 2-12 grossly intact, no facial asymmetry, no speech deficits and able to follow directions.  PSYCH:  Distracted easily, confused with some responses WNL.      Lab/Diagnostic data:  CBC RESULTS:   Recent Labs   Lab Test  07/22/18   0259 06/25/18   WBC  11.3*  8.3   RBC  3.73*  3.40*   HGB  11.4*  10.4*   HCT  33.6*  31.0*   MCV  90  91   MCH  30.6  30.6   MCHC  33.9  33.5   RDW  13.6  14.0   PLT  357  303       Last Basic Metabolic Panel:  Recent Labs   Lab Test  07/25/18   0806  07/24/18   1437  07/24/18   0604  07/23/18   0500   NA   --    --   134  131*   POTASSIUM  3.9  3.8  3.1*  3.6   CHLORIDE   --    --   106  101   DIANA   --    --   8.2*  8.6   CO2   --    --   20  18*   BUN   --    --   11  9   CR   --    --   0.54  0.48*   GLC   --    --   99  78       Liver Function Studies -   Recent Labs   Lab Test  07/22/18   0259  04/13/18   0429   PROTTOTAL  6.9  7.4   ALBUMIN  3.9  3.9   BILITOTAL  0.5  0.5   ALKPHOS  72  66   AST  12  14   ALT  15  14       TSH   Date Value Ref Range Status   07/22/2018 2.07 0.40 - 4.00 mU/L Final   12/28/2017 1.24 0.40 - 4.00 mU/L Final       ASSESSMENT / PLAN:  (G93.41) Metabolic encephalopathy  (primary encounter diagnosis)   (E87.1) Hyponatremia  Comment/Plan:  Still decreased LOC, check labs Monday,  monitor    (R29.6) Unwitnessed fall   (T14.8XXA) Skin abrasion: Right Quiñonez 7/22/18  Comment/Plan: healing skin, add Tegaderm and Tubigrip to protect.  Decrease Allegra and dc next visit if no symptoms due to Fall risk.    (R53.81) Physical deconditioning  Comment/Plan: PT OT, cog evals    (J44.9) Steroid-dependent chronic obstructive pulmonary disease (H)  Comment/Plan: long time prednisone, has several COPD meds, no changes and monitor.    (I10) Essential hypertension  Comment/Plan: started on lisinopril, will add hold parameters. Monitor. checking BMP Monday    (F43.21) Adjustment disorder with depressed mood  Comment/Plan:  Started on Cymbalta on 5/24/18 according to chart.   daughter thinks pt has worsened since then but also noted she had head injury end of June and this hospital stay with low NA.  Will decrease to 20 mg daily and if able dc soon if tolerates.    (M19.90) Arthritis pain  Comment/Plan: had been on tramadol 50 mg q8h this past spring/summer--see EPIC.  Will keep for now but consider decrease to 25 mg bid as trial if able. Also on Tylenol. monitor    (Z87.820) History of closed head injury: June 2018  Comment/Plan: has had a lot of unfortunate issues since this past springtime.    Total time with patient visit: 75 minutes including discussions about the POC and care coordination with the family, daughter, Windy who was here from [place]. Greater than 50% of total time spent with counseling and coordinating care due to complexities of pts situation.  We will try to taper Cymbalta, will have Gay Cameron, Geriatric PharmD review meds also.  See above.        Electronically signed by:  IVÁN Redding CNP

## 2018-07-26 NOTE — Clinical Note
HI I made changes to Cymbalta--how long on that dose if only on since 5/24/18.  Not sure if an issue in light of her other head injury or Low Na

## 2018-07-26 NOTE — PROGRESS NOTES
CC received notification of admission to [facility] on 7/25/2018, from [facility].    OBRA 1 faxed to Nursing Home admissions office.   CC contacted Nursing Home LSW, Ira who is covering for [facility]  [phone], reviewed community POC. CC requested to be notified of concerns, care conference dates and discharge planning.   CC reached out to adult daughter Windy via secure e-mail regarding transition, name of following NP while in TCU and SW contact info. Offered support as needed.    Transition log updated.   PCP notified of transition to TCU via EMR.  Yeni Savage RN, BC  Supervisor [facility]   [phone] (Fax)

## 2018-07-27 ENCOUNTER — RECORDS - HEALTHEAST (OUTPATIENT)
Dept: LAB | Facility: CLINIC | Age: 78
End: 2018-07-27

## 2018-07-27 NOTE — PROGRESS NOTES
[facility] Care Coordination Contact  Rec'd e-mail from client's daughter Windy requesting CM to call her   Call placed to Windy, reviewed recent events. Windy expressed concern with her mother's cognitive status, shared that she did speak with Samaria NP yesterday.  Windy inquired if her mother will be able to return back to [facility] if her mother's confusion continues. Explained that Shobha at [facility] would make that determination.   Windy states that her mother has a 15 minute care conference scheduled for this afternoon.   Enc'd Windy to call CM as needed.  Yeni Savage RN, BC  Supervisor [facility]   [phone] (Fax)

## 2018-07-28 LAB
BACTERIA SPEC CULT: NO GROWTH
BACTERIA SPEC CULT: NO GROWTH
Lab: NORMAL
Lab: NORMAL
SPECIMEN SOURCE: NORMAL
SPECIMEN SOURCE: NORMAL

## 2018-07-30 ENCOUNTER — TRANSFERRED RECORDS (OUTPATIENT)
Dept: HEALTH INFORMATION MANAGEMENT | Facility: CLINIC | Age: 78
End: 2018-07-30

## 2018-07-30 ENCOUNTER — NURSING HOME VISIT (OUTPATIENT)
Dept: GERIATRICS | Facility: CLINIC | Age: 78
End: 2018-07-30
Payer: COMMERCIAL

## 2018-07-30 VITALS
HEART RATE: 100 BPM | DIASTOLIC BLOOD PRESSURE: 82 MMHG | TEMPERATURE: 98.9 F | HEIGHT: 62 IN | OXYGEN SATURATION: 98 % | BODY MASS INDEX: 21.71 KG/M2 | RESPIRATION RATE: 22 BRPM | WEIGHT: 118 LBS | SYSTOLIC BLOOD PRESSURE: 143 MMHG

## 2018-07-30 DIAGNOSIS — Z87.820 HISTORY OF CLOSED HEAD INJURY: ICD-10-CM

## 2018-07-30 DIAGNOSIS — I10 ESSENTIAL HYPERTENSION: ICD-10-CM

## 2018-07-30 DIAGNOSIS — R29.6 UNWITNESSED FALL: ICD-10-CM

## 2018-07-30 DIAGNOSIS — Z92.241 STEROID-DEPENDENT CHRONIC OBSTRUCTIVE PULMONARY DISEASE (H): ICD-10-CM

## 2018-07-30 DIAGNOSIS — M19.90 ARTHRITIS PAIN: ICD-10-CM

## 2018-07-30 DIAGNOSIS — F43.21 ADJUSTMENT DISORDER WITH DEPRESSED MOOD: ICD-10-CM

## 2018-07-30 DIAGNOSIS — J44.9 STEROID-DEPENDENT CHRONIC OBSTRUCTIVE PULMONARY DISEASE (H): ICD-10-CM

## 2018-07-30 DIAGNOSIS — E87.1 HYPONATREMIA: ICD-10-CM

## 2018-07-30 DIAGNOSIS — R53.81 PHYSICAL DECONDITIONING: ICD-10-CM

## 2018-07-30 DIAGNOSIS — T14.8XXA SKIN ABRASION: ICD-10-CM

## 2018-07-30 DIAGNOSIS — G93.41 METABOLIC ENCEPHALOPATHY: Primary | ICD-10-CM

## 2018-07-30 LAB
ANION GAP SERPL CALCULATED.3IONS-SCNC: 11 MMOL/L (ref 5–18)
ANION GAP SERPL CALCULATED.3IONS-SCNC: 11 MMOL/L (ref 5–18)
BUN SERPL-MCNC: 7 MG/DL (ref 8–28)
BUN SERPL-MCNC: 7 MG/DL (ref 8–28)
CALCIUM SERPL-MCNC: 10.4 MG/DL (ref 8.5–10.5)
CALCIUM SERPL-MCNC: 10.4 MG/DL (ref 8.5–10.5)
CHLORIDE BLD-SCNC: 95 MMOL/L (ref 98–107)
CHLORIDE SERPLBLD-SCNC: 95 MMOL/L (ref 98–107)
CO2 SERPL-SCNC: 26 MMOL/L (ref 22–31)
CO2 SERPL-SCNC: 26 MMOL/L (ref 22–31)
CREAT SERPL-MCNC: 0.62 MG/DL (ref 0.6–1.1)
CREAT SERPL-MCNC: 0.62 MG/DL (ref 0.6–1.1)
ERYTHROCYTE [DISTWIDTH] IN BLOOD BY AUTOMATED COUNT: 13.8 % (ref 11–14.5)
ERYTHROCYTE [DISTWIDTH] IN BLOOD BY AUTOMATED COUNT: 13.8 % (ref 11–14.5)
GFR SERPL CREATININE-BSD FRML MDRD: >60 ML/MIN/1.73M2
GFR SERPL CREATININE-BSD FRML MDRD: >60 ML/MIN/1.73M2
GLUCOSE BLD-MCNC: 106 MG/DL (ref 70–125)
GLUCOSE SERPL-MCNC: 106 MG/DL (ref 70–125)
HCT VFR BLD AUTO: 32.8 % (ref 35–47)
HCT VFR BLD AUTO: 32.8 % (ref 35–47)
HEMOGLOBIN: 10.6 G/DL (ref 12–16)
HGB BLD-MCNC: 10.6 G/DL (ref 12–16)
MCH RBC QN AUTO: 29.7 PG (ref 27–34)
MCH RBC QN AUTO: 29.7 PG (ref 27–34)
MCHC RBC AUTO-ENTMCNC: 32.3 G/DL (ref 32–36)
MCHC RBC AUTO-ENTMCNC: 32.3 G/DL (ref 32–36)
MCV RBC AUTO: 92 FL (ref 80–100)
MCV RBC AUTO: 92 FL (ref 80–100)
PLATELET # BLD AUTO: 360 THOU/UL (ref 140–440)
PLATELET # BLD AUTO: 360 THOU/UL (ref 140–440)
PMV BLD AUTO: 8.8 FL (ref 8.5–12.5)
POTASSIUM BLD-SCNC: 3.7 MMOL/L (ref 3.5–5)
POTASSIUM SERPL-SCNC: 3.7 MMOL/L (ref 3.5–5)
RBC # BLD AUTO: 3.57 MILL/UL (ref 3.8–5.4)
RBC # BLD AUTO: 3.57 MILL/UL (ref 3.8–5.4)
SODIUM SERPL-SCNC: 132 MMOL/L (ref 136–145)
SODIUM SERPL-SCNC: 132 MMOL/L (ref 136–145)
WBC # BLD AUTO: 9.6 THOU/UL (ref 4–11)
WBC: 9.6 THOU/UL (ref 4–11)

## 2018-07-30 PROCEDURE — 99309 SBSQ NF CARE MODERATE MDM 30: CPT | Performed by: NURSE PRACTITIONER

## 2018-07-30 RX ORDER — CALCIUM CARBONATE 500 MG/1
1 TABLET, CHEWABLE ORAL 4 TIMES DAILY PRN
COMMUNITY

## 2018-07-30 NOTE — PROGRESS NOTES
[place] [facility]    Chief Complaint   Patient presents with     correction Acute       [place] Medical Record Number:  [id number]    HPI:    Ana Luisa Lee is a 78 year old  (1940), who is being seen today for an episodic care visit at [facility].  HPI information obtained from: facility chart records, facility staff, patient report and [facility] chart review.Today's concern is: pt is alert and feels stronger today.     Metabolic encephalopathy  Hyponatremia  Unwitnessed fall  Skin abrasion  Physical deconditioning  Steroid-dependent chronic obstructive pulmonary disease (H)  Essential hypertension  Adjustment disorder with depressed mood  Arthritis pain  History of closed head injury     HPI/ROS:  No CP, SOB, Cough, dizziness, fevers, chills, HA,  dysuria or Bowel Abnormalities. Appetite is a little better but feels nauseated at times.  No pain    ALLERGIES: Hydralazine; Penicillin g; Meloxicam; Metoprolol; and Norvasc [amlodipine besylate]  Past Medical, Surgical, Family and Social History reviewed and updated in [facility].    Current Outpatient Prescriptions   Medication Sig Dispense Refill     acetaminophen (TYLENOL) 500 MG tablet Take 2 tablets (1,000 mg) by mouth 3 times daily       albuterol (ALBUTEROL) 108 (90 BASE) MCG/ACT Inhaler Inhale 2 puffs into the lungs every 6 hours as needed       budesonide (PULMICORT FLEXHALER) 180 MCG/ACT inhaler Inhale 1 puff into the lungs 2 times daily       calcium 600 MG tablet Take 1 tablet (600 mg) by mouth daily 60 tablet      calcium carbonate (TUMS) 500 MG chewable tablet Take 1 chew tab by mouth 4 times daily as needed for heartburn (nausea or indigestion)       cholecalciferol (VITAMIN D) 1000 UNIT tablet Take 1 tablet (1,000 Units) by mouth daily 30 tablet      cyanocobalamin (B-12 TR) 1000 MCG TBCR Take 1 tablet by mouth daily 100 tablet 3     ferrous sulfate (IRON) 325 (65 Fe) MG tablet Take 325 mg by mouth daily  (with breakfast)       fluticasone (FLONASE) 50 MCG/ACT spray Spray 1 spray into both nostrils daily 1 Bottle      gemfibrozil (LOPID) 600 MG tablet Take 1 tablet (600 mg) by mouth daily 90 tablet 0     levalbuterol (XOPENEX) 1.25 MG/3ML neb solution Take 1 ampule by nebulization 4 times daily       levothyroxine (SYNTHROID/LEVOTHROID) 25 MCG tablet Take 1 tablet (25 mcg) by mouth daily 90 tablet 3     LISINOPRIL PO Take 10 mg by mouth daily HOLD if SBP <110.Call if held x 2 in a row symptomatic       loperamide (IMODIUM) 2 MG capsule Take 2 mg by mouth every 8 hours as needed for diarrhea       montelukast (SINGULAIR) 10 MG tablet Take 1 tablet (10 mg) by mouth At Bedtime 30 tablet      Multiple Vitamins-Minerals (MULTIVITAMIN OR) Take 1 tablet by mouth daily       omeprazole (PRILOSEC) 40 MG capsule Take 1 capsule (40 mg) by mouth daily Take 30-60 minutes before a meal. 90 capsule 2     Ondansetron (ZOFRAN ODT PO) Take by mouth every 12 hours as needed for nausea or vomiting       polyethylene glycol (MIRALAX/GLYCOLAX) Packet Take 17 g by mouth daily as needed for constipation       predniSONE (DELTASONE) 10 MG tablet Take 10 mg by mouth daily       roflumilast (DALIRESP) 500 MCG TABS tablet Take 500 mcg by mouth daily        traMADol (ULTRAM) 50 MG tablet Take 1 tablet (50 mg) by mouth 2 times daily 20 tablet 0     umeclidinium (INCRUSE ELLIPTA) 62.5 MCG/INH oral inhaler Inhale 1 puff into the lungs daily       Medications reviewed:  Medications reconciled to facility chart and changes were made to reflect current medications as identified as above med list. Below are the changes that were made:   Medications stopped since last EPIC medication reconciliation:   There are no discontinued medications.    Medications started since last [facility] medication reconciliation:  No orders of the defined types were placed in this encounter.        REVIEW OF SYSTEMS:  See under HPI    Physical Exam:  /82  Pulse 100  Temp  "98.9  F (37.2  C)  Resp 22  Ht 5' 2\" (1.575 m)  Wt 118 lb (53.5 kg)  SpO2 98%  BMI 21.58 kg/m2  GENERAL APPEARANCE: alert, engaged and  pleasant and cooperative during exam  ENT:  Mouth normal, moist mucous membranes.  Hearing acuity : [place]  RESP:  respiratory effort normal, no respiratory distress, Lung sounds clear, Patient on room air  CV: auscultation of heart done, RRR, soft systolic murmur, no gallop or rub.   Trace edema bilat lower legs, +dps, warm feet  ABDOMEN:  normal bowel sounds, soft, nontender..  M/S:   seen in bed able to VILLEGAS equally.  SKIN:  Inspection  of skin and subcutaneous tissue: skin warm, dry and intact except right shin with abrasion which is dry,beginning to scab and is a bit smaller. There are no sign infection  NEURO: cranial nerves 2-12 grossly intact, no facial asymmetry, no speech deficits and able to follow directions.  PSYCH:  Alert, forgetful but appropriate.    BP Readings from Last 3 Encounters:   07/30/18 143/82   07/26/18 160/88   07/25/18 187/89       Recent Labs:   CBC RESULTS:   Recent Labs   Lab Test  07/22/18   0259 06/25/18   WBC  11.3*  8.3   RBC  3.73*  3.40*   HGB  11.4*  10.4*   HCT  33.6*  31.0*   MCV  90  91   MCH  30.6  30.6   MCHC  33.9  33.5   RDW  13.6  14.0   PLT  357  303       Last Basic Metabolic Panel:  Recent Labs   Lab Test  07/25/18   0806  07/24/18   1437  07/24/18   0604  07/23/18   0500   NA   --    --   134  131*   POTASSIUM  3.9  3.8  3.1*  3.6   CHLORIDE   --    --   106  101   DIANA   --    --   8.2*  8.6   CO2   --    --   20  18*   BUN   --    --   11  9   CR   --    --   0.54  0.48*   GLC   --    --   99  78       Liver Function Studies -   Recent Labs   Lab Test  07/22/18   0259  04/13/18   0429   PROTTOTAL  6.9  7.4   ALBUMIN  3.9  3.9   BILITOTAL  0.5  0.5   ALKPHOS  72  66   AST  12  14   ALT  15  14       TSH   Date Value Ref Range Status   07/22/2018 2.07 0.40 - 4.00 mU/L Final   12/28/2017 1.24 0.40 - 4.00 mU/L Final       Lab Results "   Component Value Date    A1C 6.8 01/03/2017     ASSESSMENT / PLAN:  (G93.41) Metabolic encephalopathy  (primary encounter diagnosis)   (E87.1) Hyponatremia  Comment/Plan:  Improved,  LOC, labs today pending,  monitor    (R29.6) Unwitnessed fall   (T14.8XXA) Skin abrasion: Right Quiñonez 7/22/18  Comment/Plan: healing skin, cont cares/drsg change as needed. Will dc  Allegra after today--no increased allergy symptoms.    (R53.81) Physical deconditioning  Comment/Plan: PT OT, cog evals, cont    (J44.9) Steroid-dependent chronic obstructive pulmonary disease (H)  Comment/Plan: compensated, is on long time prednisone, has several COPD meds, no changes and monitor.    (I10) Essential hypertension  Comment/Plan: labile from 130-170's SBP, was started on lisinopril in the hospital-may need to increase it,   no changes today, cont to monitor. BMP pending    (F43.21) Adjustment disorder with depressed mood  Comment/Plan:  Started on Cymbalta on 5/24/18 according to chart.  We decreased to 20 mg daily on 7/25 and will dc after tomorrow's dose(D/W PharmD), no adverse effects noted at this time.      (M19.90) Arthritis pain  Comment/Plan:  For now will cont on tramadol 50 mg q8h this past spring/summer--see EPIC.  May consider decrease to 25 mg bid as trial in future Also on Tylenol. monitor    (Z87.820) History of closed head injury: June 2018  Comment/Plan:      Electronically signed by  IVÁN Redding CNP    **Updated daughter, Windy--she agreed pt was more alert/coherent when she saw on Saturday before leaving town for [place]**.

## 2018-07-31 ENCOUNTER — NURSING HOME VISIT (OUTPATIENT)
Dept: GERIATRICS | Facility: CLINIC | Age: 78
End: 2018-07-31
Payer: COMMERCIAL

## 2018-07-31 VITALS
DIASTOLIC BLOOD PRESSURE: 81 MMHG | OXYGEN SATURATION: 95 % | BODY MASS INDEX: 21.58 KG/M2 | TEMPERATURE: 97.5 F | SYSTOLIC BLOOD PRESSURE: 136 MMHG | RESPIRATION RATE: 20 BRPM | WEIGHT: 118 LBS | HEART RATE: 104 BPM

## 2018-07-31 DIAGNOSIS — D51.0 PERNICIOUS ANEMIA: Chronic | ICD-10-CM

## 2018-07-31 DIAGNOSIS — E87.6 HYPOKALEMIA: Primary | ICD-10-CM

## 2018-07-31 PROCEDURE — 99308 SBSQ NF CARE LOW MDM 20: CPT | Performed by: NURSE PRACTITIONER

## 2018-07-31 NOTE — PROGRESS NOTES
CC: Lab Recheck    HPI:    Ana Luisa Lee is a 78 year old  (1940), who is being seen today for an episodic care visit at [facility]. Today's concern: lab recheck of CBC and BMP        OBJECTIVE: A & O x 3-forgetful, NAD. Seen in  with PT     /81  Pulse 104  Temp 97.5  F (36.4  C)  Resp 20  Wt 118 lb (53.5 kg)  SpO2 95%  BMI 21.58 kg/m2    LABS: today   Last Comprehensive Metabolic Panel:  Sodium   Date Value Ref Range Status   07/30/2018 132 (A) 136 - 145 mmol/L Final     Potassium   Date Value Ref Range Status   07/30/2018 3.7 3.5 - 5.0 mmol/L Final     Chloride   Date Value Ref Range Status   07/30/2018 95 (A) 98 - 107 mmol/L Final     Carbon Dioxide   Date Value Ref Range Status   07/30/2018 26 22 - 31 mmol/L Final     Anion Gap   Date Value Ref Range Status   07/30/2018 11 5 - 18 mmol/L Final     Glucose   Date Value Ref Range Status   07/30/2018 106 70 - 125 mg/dL Final     Urea Nitrogen   Date Value Ref Range Status   07/30/2018 7 (A) 8 - 28 mg/dL Final     Creatinine   Date Value Ref Range Status   07/30/2018 0.62 0.60 - 1.10 mg/dL Final     GFR Estimate   Date Value Ref Range Status   07/30/2018 >60 >60 mL/min/1.73m2 Final     Calcium   Date Value Ref Range Status   07/30/2018 10.4 8.5 - 10.5 mg/dL Final     Lab Results   Component Value Date    WBC 9.6 07/30/2018     Lab Results   Component Value Date    RBC 3.57 07/30/2018     Lab Results   Component Value Date    HGB 10.6 07/30/2018     Lab Results   Component Value Date    HCT 32.8 07/30/2018     Lab Results   Component Value Date    MCV 92 07/30/2018     Lab Results   Component Value Date    MCH 29.7 07/30/2018     Lab Results   Component Value Date    MCHC 32.3 07/30/2018     Lab Results   Component Value Date    RDW 13.8 07/30/2018     Lab Results   Component Value Date     07/30/2018     Hemoglobin   Date Value Ref Range Status   07/30/2018 10.6 (A) 12.0 - 16.0 g/dL Final   07/22/2018 11.4 (L) 11.7 - 15.7 g/dL  Final         ASSESSMENT / PLAN:  (E87.6) Hypokalemia  (primary encounter diagnosis)  Comment/Plan: low norm--will replace and check prn as appetite still varies    (D51.0) Pernicious anemia  Comment/Plan: was 11.4 but review shows she varies in the 10-11 range usually. Cont Vit B12, no other changes. monitot.        Lucy Rodney RN, CNP

## 2018-08-01 ENCOUNTER — PATIENT OUTREACH (OUTPATIENT)
Dept: GERIATRIC MEDICINE | Facility: CLINIC | Age: 78
End: 2018-08-01

## 2018-08-01 VITALS
BODY MASS INDEX: 21.71 KG/M2 | HEIGHT: 62 IN | HEART RATE: 104 BPM | RESPIRATION RATE: 20 BRPM | OXYGEN SATURATION: 95 % | WEIGHT: 118 LBS | DIASTOLIC BLOOD PRESSURE: 81 MMHG | TEMPERATURE: 97.5 F | SYSTOLIC BLOOD PRESSURE: 136 MMHG

## 2018-08-01 NOTE — LETTER
August 1, 2018    Important Plan Information    ANA LUISA DAS  [address]  [facility] [address]  Your Care Plan  Dear Ana Luisa,  When we spoke recently, I promised to send you a Care Plan. The plan enclosed is a summary of our discussion. It includes the steps we agreed would help you meet your health goals. In addition, I can help you with:  Wikwfqj-R-LbrxAK  This program is available to members who need a ride to medical and dental visits. To schedule a ride, call [phone] or [phone] (toll free). TTY/TTD: 711. You can call Monday - Thursday 8 a.m. to 5 p.m. and Fridays 9 a.m. to 5 p.m.   Liveyearbook   The Liveyearbook program empowers you to improve your health through education and exercise. To learn more, visit [employer], or call [employer] Service at [phone] (toll free) (TTY:711) from 7 a.m. - 7 p.m. Central Time, Monday-Friday.  Health Care Directive   This form helps you outline your health care wishes. You can request a form from me and I will answer any questions you have before you discuss it with your doctor.   Annual Physical  Take a key step on your path to good health and set up an annual physical at your clinic.  Questions?  Call me at [phone] Monday-Friday between 8am and 5pm.  TTY/TTD: 711. As we discussed, I plan to be in touch with you again in 6 months to follow up via phone.  Sincerely,      Yeni Savage RN  [facility]  [phone]    cc: member records            American Indians can continue to use [place] and  Health Services (IHS) clinics. We will not require prior approval or impose any conditions for you to get services at these clinics. For elders age 65 years and older this includes Elderly Waiver (EW) services accessed through the [place]. If a doctor or other provider in a [place] or IHS clinic refers you to a provider in our network, we will not require you to see your primary care provider prior to  the referral.    For accessible formats of this publication or assistance with equal or access to our services, visit "[employer]/contactmedicaid, or call [phone] (toll free) or use your preferred relay service.    Auxiliary Aids and Services.   [facility] provides auxiliary aids and services, like qualified interpreters or information in accessible formats, free of charge and in a timely manner, to ensure an equal opportunity to participate in our health care programs. Contact [facility] Customer Service at "[employer]/contactmedicaid or call [phone] (toll free) or use your preferred relay service.    Language Assistance Services.   [facility] provides translated documents and spoken language interpreting, free of charge and in a timely manner, when language assistance services are necessary to ensure limited English speakers have meaningful access to our information and services. Contact [employer] at "[employer]/contactmedicaid or call [phone] (toll free) or use your preferred relay service.     Civil Rights Notice  Discrimination is against the law. [facility] does not discriminate on the basis of any of the following:    Race    Color    National Origin    Creed    Shinto    Age    Public Assistance Status    Receipt of Health Care Services    Disability (including physical or mental impairment)    Sex (including sex stereotypes and gender identity)    Marital Status    Political Beliefs    Medical Condition    Genetic Information    Sexual Orientation    Claims Experience    Medical History    Health Status    Civil Rights Complaints.   You have the right to file a discrimination complaint if you believe you were treated in a discriminatory way by [facility]. You may contact any of the following four agencies directly to file a discrimination complaint.    U.S. Department of Health and Human Services  Office for Civil Rights (OCR)  You have the right to file a complaint with the OCR, a federal  agency, if you believe you have been discriminated against because of any of the following:    Race    Disability    Color    Sex (including sex stereotypes and gender identity)    National Origin    Age    Contact the OCR directly to file a complaint:         Director         U.S. Department of Health and Human Services  Office for Civil Rights         [address] [place] [address] [address] [address]         [phone] (Voice)         [phone] (TDD)         Complaint Portal - [email or web address]     [place] Department of Human Rights (MDHR)  In [place], you have the right to file a complaint with the MDHR if you believe you have been discriminated against because of any of the following:      Race    Color    National Origin    Alevism    Creed    Sex    Sexual Orientation    Marital Status    Public Assistance Status    Contact the MDHR directly to file a complaint:         [place] Department of Human Rights         [address] [address]         [phone] (voice)          [phone] (toll free)         711 or [phone] ([place] Relay)         [phone] (Fax)         [email or web address] (Email)     [place] Department of Human Services (DHS)  You have the right to file a complaint with [facility] if you believe you have been discriminated against in our health care programs because of any of the following:    Race    Color    National Origin    Creed    Alevism    Age    Public Assistance Status    Receipt of Health Care Services    Disability (including physical or mental impairment)    Sex (including sex stereotypes and gender identity)    Marital Status    Political Beliefs    Medical Condition    Genetic Information    Sexual Orientation    Claims Experience    Medical History    Health Status    Complaints must be in writing and filed within 180 days of the date you discovered  the alleged discrimination. The complaint must contain your name and address and describe the discrimination you are complaining about. After we get your complaint, we will review it and notify you in writing about whether we have authority to investigate. If we do, we will investigate the complaint.      [facility] will notify you in writing of the investigation s outcome. You have a right to appeal the outcome if you disagree with the decision. To appeal, you must send a written request to have [facility] review the investigation outcome period. Be brief and state why you disagree with the decision. Include additional information you think is important.      If you file a complaint in this way, the people who work for the agency named in the complaint cannot retaliate against you. This means they cannot punish you in any way for filing a complaint. Filing a complaint in this way does not stop you from seeking out other legal or administration actions.     Contact [facility] directly to file a discrimination complaint:        ATTN: Civil Rights Coordinator        [place] Department of Human [facility]        Equal Opportunity and Access Division        [address] [address]        [place] [address] [phone] (voice) or use your preferred relay service     [facility] Complaint Notice   Contact [facility] directly to file a discrimination complaint:  [facility] Civil Rights Coordinator  Mail Route   [address]  [place] [address] [phone] (voice) or use your preferred relay service  [email or web address]

## 2018-08-01 NOTE — PROGRESS NOTES
[facility] Care Coordination Contact  Received after visit chart from care coordinator.  Completed following tasks: Mailed copy of care plan to client and Updated services in access.    Chart was returned to GARETT.     Carmenza Griffin  Case Management Specialist  [facility]  [phone]

## 2018-08-02 ENCOUNTER — NURSING HOME VISIT (OUTPATIENT)
Dept: GERIATRICS | Facility: CLINIC | Age: 78
End: 2018-08-02
Payer: COMMERCIAL
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
## 2018-08-02 DIAGNOSIS — E11.9 TYPE 2 DIABETES MELLITUS WITHOUT COMPLICATION, WITHOUT LONG-TERM CURRENT USE OF INSULIN (H): ICD-10-CM

## 2018-08-02 DIAGNOSIS — E87.1 HYPONATREMIA: ICD-10-CM

## 2018-08-02 DIAGNOSIS — T14.8XXA SKIN ABRASION: ICD-10-CM

## 2018-08-02 DIAGNOSIS — J44.9 STEROID-DEPENDENT CHRONIC OBSTRUCTIVE PULMONARY DISEASE (H): ICD-10-CM

## 2018-08-02 DIAGNOSIS — G93.40 ENCEPHALOPATHY: Primary | ICD-10-CM

## 2018-08-02 DIAGNOSIS — E03.9 HYPOTHYROIDISM, UNSPECIFIED TYPE: Chronic | ICD-10-CM

## 2018-08-02 DIAGNOSIS — I10 BENIGN ESSENTIAL HYPERTENSION: ICD-10-CM

## 2018-08-02 DIAGNOSIS — Z92.241 STEROID-DEPENDENT CHRONIC OBSTRUCTIVE PULMONARY DISEASE (H): ICD-10-CM

## 2018-08-02 PROCEDURE — 99306 1ST NF CARE HIGH MDM 50: CPT | Performed by: INTERNAL MEDICINE

## 2018-08-02 NOTE — LETTER
8/2/2018        RE: Ana Luisa Lee  [address]  [facility] [place] [facility] [address]        Ana Luisa Lee is a 78 year old female seen August 2, 2018 at [facility] TCU where she was admitted last week after [facility] hospitalization for toxic/metabolic encephalopathy with low Na and a fall.   Brain MRI and CT showed atrophy and white matter changes c/w small vessel ischemic disease.     CT angiogram did not reveal any significant arterial stenosis, did show RUL nodule and 1.3 cm nodule in the right lobe of the thyroid.   Multilevel degenerative changes in the C-spine.   Patient improved and is now here for Rehab.      Patient is seen in her room, recognizes me from when I took care of her  several years ago.   She reports continuing to feel sleepy and tired, but is considerably clearer by chart review.   She notes discomfort in neck, upper back and shoulders which she attributes to ambulating with FWW.  Has been working with therapies.     Patient has had several hospitalizations and TCU stays this year, in April for UTI with sepsis, hyponatremia, and COPD exacerbation    She was also admitted for fall with MS changes in June 2018, then [place] stay.        Past Medical History:   Diagnosis Date     Anemia Dec 2011     Arthritis of hand      Asthma, persistent     f/U dr Brock at [facility]     Chronic intermittent steroid use     for asthma     COPD exacerbation (H) 10/25/2013     Esophageal stricture 2007    s/p dilation     HTN, goal below 140/90      Hyperlipidemia      Hypothyroidism      Osteoporosis      Paroxysmal supraventricular tachycardia (H)      PVC (premature ventricular contraction) May 2012     SVT (supraventricular tachycardia) (H)        Past Surgical History:   Procedure Laterality Date     BUNIONECTOMY LAPIDUS WITH TARSAL METATARSAL (TMT) FUSION  1990's     HYSTERECTOMY TOTAL ABDOMINAL       TONSILLECTOMY         Family  "History   Problem Relation Age of Onset     Cerebrovascular Disease Mother      Hypertension Mother      Family History Negative Father      Unknown/Adopted Maternal Grandmother      Unknown/Adopted Maternal Grandfather      Unknown/Adopted Paternal Grandmother      Unknown/Adopted Paternal Grandfather      Family History Negative Brother      Family History Negative Sister      Family History Negative Son      Family History Negative Daughter      Asthma No family hx of      C.A.D. No family hx of      Diabetes No family hx of      Cancer No family hx of        Social History   Substance Use Topics     Smoking status: Former Smoker     Packs/day: 1.00     Years: 15.00     Quit date: 1/1/1980     Smokeless tobacco: Never Used     Alcohol use No      Comment: Occasional drink      SH:   2015.   She just moved to [facility], was there only a week before this hospitalization   Services include med management, daily cares and meals.     She has a daughter Windy in [place], and a son Jake.     Review Of Systems  Skin: negative   Eyes: impaired vision, glasses  Ears/Nose/Throat: hearing loss  Respiratory: No shortness of breath, dyspnea on exertion, cough, or hemoptysis  Cardiovascular: negative and exercise intolerance  Gastrointestinal: negative  Genitourinary: negative  Musculoskeletal: transfers and ADLs with CGA, able to ambulate short distances with FWW and CGA  Neurologic: SLUMS 18/30   CPT 4.5     Psychiatric: anxiety and depression  Hematologic/Lymphatic/Immunologic: anemia  Endocrine: thyroid disorder and diabetes      GENERAL APPEARANCE: alert and no distress  /81  Pulse 104  Temp 97.5  F (36.4  C)  Resp 20  Ht 5' 2\" (1.575 m)  Wt 118 lb (53.5 kg)  SpO2 95%  BMI 21.58 kg/m2   HEENT: normocephalic, no lesion or abnormalities  +kyphosis  NECK: no adenopathy, no asymmetry, masses, or scars and thyroid normal to palpation  RESP: decreased BS, few rales, no wheeze  CV: regular rate and " rhythm, normal S1 S2  ABDOMEN:  soft, nontender, no HSM or masses and bowel sounds normal  MS: extremities normal- no gross deformities noted, no evidence of inflammation in joints, ext without edema  SKIN: no suspicious lesions or rashes; scabbed over healing laceration down from of right shin.     NEURO: Normal strength and tone, sensory exam grossly normal, and speech normal  PSYCH: affect okay  LYMPHATICS: No cervical,  or supraclavicular nodes     Last Comprehensive Metabolic Panel:  Sodium   Date Value Ref Range Status   07/30/2018 132 (A) 136 - 145 mmol/L Final     Potassium   Date Value Ref Range Status   07/30/2018 3.7 3.5 - 5.0 mmol/L Final     Chloride   Date Value Ref Range Status   07/30/2018 95 (A) 98 - 107 mmol/L Final     Carbon Dioxide   Date Value Ref Range Status   07/30/2018 26 22 - 31 mmol/L Final     Anion Gap   Date Value Ref Range Status   07/30/2018 11 5 - 18 mmol/L Final     Glucose   Date Value Ref Range Status   07/30/2018 106 70 - 125 mg/dL Final     Urea Nitrogen   Date Value Ref Range Status   07/30/2018 7 (A) 8 - 28 mg/dL Final     Creatinine   Date Value Ref Range Status   07/30/2018 0.62 0.60 - 1.10 mg/dL Final     GFR Estimate   Date Value Ref Range Status   07/30/2018 >60 >60 mL/min/1.73m2 Final     Calcium   Date Value Ref Range Status   07/30/2018 10.4 8.5 - 10.5 mg/dL Final     Lab Results   Component Value Date    WBC 9.6 07/30/2018      HGB 10.6 07/30/2018      MCV 92 07/30/2018       07/30/2018     IMP/PLAN:    (G93.40) Encephalopathy  (primary encounter diagnosis)  Comment: metabolic/toxic, now cleared to apparent baseline     Plan: follow Na, maintain hydration, minimize meds as possible.     Patient will need follow up with PCP to address incidental radiologic findings of nodules in RUL lung and thyroid.       (W19.XXXD) Fall, subsequent encounter  (T14.8XXA) Skin abrasion: Right Quiñonez 7/22/18  Comment: no further falls     Plan: PHYSICAL THERAPY / OCCUPATIONAL  THERAPY for balance, gait, strengthening, ADLs.   Discharge goal is return to her [place] apartment with services.  Scheduled acetaminophen, tramadol and local measures for pain management.         (E03.9) Hypothyroidism, unspecified type  Comment:   TSH   Date Value Ref Range Status   07/22/2018 2.07 0.40 - 4.00 mU/L Final      Plan: continue levothyroxine     Follow up incidental finding of thyroid nodule with PCP.       (E87.1) Hyponatremia  (I10) Benign essential hypertension  Comment: improved  Plan: continue to follow, may need to decrease lisinopril if Na goes back down, was just started in hospital.       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      Plan: Pulmicort MDI, rescue albuterol MDI, Xopenex nebs, Daliresp, Incruse Ellipta and daily prednisone       (E11.9) Type 2 diabetes mellitus without complication, without long-term current use of insulin (H)  Comment:   Likely steroid-induced     Lab Results   Component Value Date    A1C 6.8 01/03/2017    A1C 6.5 03/16/2016   Plan: recheck A1C next week.        Wendy Prabhakar MD       Sincerely,        Wendy Prabhakar MD

## 2018-08-02 NOTE — PROGRESS NOTES
Ana Luisa Lee is a 78 year old female seen August 2, 2018 at [facility] TCU where she was admitted last week after [facility] hospitalization for toxic/metabolic encephalopathy with low Na and a fall.   Brain MRI and CT showed atrophy and white matter changes c/w small vessel ischemic disease.     CT angiogram did not reveal any significant arterial stenosis, did show RUL nodule and 1.3 cm nodule in the right lobe of the thyroid.   Multilevel degenerative changes in the C-spine.   Patient improved and is now here for Rehab.      Patient is seen in her room, recognizes me from when I took care of her  several years ago.   She reports continuing to feel sleepy and tired, but is considerably clearer by chart review.   She notes discomfort in neck, upper back and shoulders which she attributes to ambulating with FWW.  Has been working with therapies.     Patient has had several hospitalizations and TCU stays this year, in April for UTI with sepsis, hyponatremia, and COPD exacerbation    She was also admitted for fall with MS changes in June 2018, then moved [place] after [place]CU stay.        Past Medical History:   Diagnosis Date     Anemia Dec 2011     Arthritis of hand      Asthma, persistent     f/U dr Brock at [facility]     Chronic intermittent steroid use     for asthma     COPD exacerbation (H) 10/25/2013     Esophageal stricture 2007    s/p dilation     HTN, goal below 140/90      Hyperlipidemia      Hypothyroidism      Osteoporosis      Paroxysmal supraventricular tachycardia (H)      PVC (premature ventricular contraction) May 2012     SVT (supraventricular tachycardia) (H)        Past Surgical History:   Procedure Laterality Date     BUNIONECTOMY LAPIDUS WITH TARSAL METATARSAL (TMT) FUSION  1990's     HYSTERECTOMY TOTAL ABDOMINAL       TONSILLECTOMY         Family History   Problem Relation Age of Onset     Cerebrovascular Disease Mother      Hypertension Mother       "Family History Negative Father      Unknown/Adopted Maternal Grandmother      Unknown/Adopted Maternal Grandfather      Unknown/Adopted Paternal Grandmother      Unknown/Adopted Paternal Grandfather      Family History Negative Brother      Family History Negative Sister      Family History Negative Son      Family History Negative Daughter      Asthma No family hx of      C.A.D. No family hx of      Diabetes No family hx of      Cancer No family hx of        Social History   Substance Use Topics     Smoking status: Former Smoker     Packs/day: 1.00     Years: 15.00     Quit date: 1/1/1980     Smokeless tobacco: Never Used     Alcohol use No      Comment: Occasional drink      SH:   2015.   She just moved to [facility], was there only a week before this hospitalization   Services include med management, daily cares and meals.     She has a daughter Windy in [place], and a son Jake.     Review Of Systems  Skin: negative   Eyes: impaired vision, glasses  Ears/Nose/Throat: hearing loss  Respiratory: No shortness of breath, dyspnea on exertion, cough, or hemoptysis  Cardiovascular: negative and exercise intolerance  Gastrointestinal: negative  Genitourinary: negative  Musculoskeletal: transfers and ADLs with CGA, able to ambulate short distances with FWW and CGA  Neurologic: SLUMS 18/30   CPT 4.5     Psychiatric: anxiety and depression  Hematologic/Lymphatic/Immunologic: anemia  Endocrine: thyroid disorder and diabetes      GENERAL APPEARANCE: alert and no distress  /81  Pulse 104  Temp 97.5  F (36.4  C)  Resp 20  Ht 5' 2\" (1.575 m)  Wt 118 lb (53.5 kg)  SpO2 95%  BMI 21.58 kg/m2   HEENT: normocephalic, no lesion or abnormalities  +kyphosis  NECK: no adenopathy, no asymmetry, masses, or scars and thyroid normal to palpation  RESP: decreased BS, few rales, no wheeze  CV: regular rate and rhythm, normal S1 S2  ABDOMEN:  soft, nontender, no HSM or masses and bowel sounds normal  MS: " extremities normal- no gross deformities noted, no evidence of inflammation in joints, ext without edema  SKIN: no suspicious lesions or rashes; scabbed over healing laceration down from of right shin.     NEURO: Normal strength and tone, sensory exam grossly normal, and speech normal  PSYCH: affect okay  LYMPHATICS: No cervical,  or supraclavicular nodes     Last Comprehensive Metabolic Panel:  Sodium   Date Value Ref Range Status   07/30/2018 132 (A) 136 - 145 mmol/L Final     Potassium   Date Value Ref Range Status   07/30/2018 3.7 3.5 - 5.0 mmol/L Final     Chloride   Date Value Ref Range Status   07/30/2018 95 (A) 98 - 107 mmol/L Final     Carbon Dioxide   Date Value Ref Range Status   07/30/2018 26 22 - 31 mmol/L Final     Anion Gap   Date Value Ref Range Status   07/30/2018 11 5 - 18 mmol/L Final     Glucose   Date Value Ref Range Status   07/30/2018 106 70 - 125 mg/dL Final     Urea Nitrogen   Date Value Ref Range Status   07/30/2018 7 (A) 8 - 28 mg/dL Final     Creatinine   Date Value Ref Range Status   07/30/2018 0.62 0.60 - 1.10 mg/dL Final     GFR Estimate   Date Value Ref Range Status   07/30/2018 >60 >60 mL/min/1.73m2 Final     Calcium   Date Value Ref Range Status   07/30/2018 10.4 8.5 - 10.5 mg/dL Final     Lab Results   Component Value Date    WBC 9.6 07/30/2018      HGB 10.6 07/30/2018      MCV 92 07/30/2018       07/30/2018     IMP/PLAN:    (G93.40) Encephalopathy  (primary encounter diagnosis)  Comment: metabolic/toxic, now cleared to apparent baseline     Plan: follow Na, maintain hydration, minimize meds as possible.     Patient will need follow up with PCP to address incidental radiologic findings of nodules in RUL lung and thyroid.       (W19.XXXD) Fall, subsequent encounter  (T14.8XXA) Skin abrasion: Right Quiñonez 7/22/18  Comment: no further falls     Plan: PHYSICAL THERAPY / OCCUPATIONAL THERAPY for balance, gait, strengthening, ADLs.   Discharge goal is return to her [place] apartment with  services.  Scheduled acetaminophen, tramadol and local measures for pain management.         (E03.9) Hypothyroidism, unspecified type  Comment:   TSH   Date Value Ref Range Status   07/22/2018 2.07 0.40 - 4.00 mU/L Final      Plan: continue levothyroxine     Follow up incidental finding of thyroid nodule with PCP.       (E87.1) Hyponatremia  (I10) Benign essential hypertension  Comment: improved  Plan: continue to follow, may need to decrease lisinopril if Na goes back down, was just started in hospital.       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      Plan: Pulmicort MDI, rescue albuterol MDI, Xopenex nebs, Daliresp, Incruse Ellipta and daily prednisone       (E11.9) Type 2 diabetes mellitus without complication, without long-term current use of insulin (H)  Comment:   Likely steroid-induced     Lab Results   Component Value Date    A1C 6.8 01/03/2017    A1C 6.5 03/16/2016   Plan: recheck A1C next week.        Wendy Prabhakar MD

## 2018-08-03 ENCOUNTER — RECORDS - HEALTHEAST (OUTPATIENT)
Dept: LAB | Facility: CLINIC | Age: 78
End: 2018-08-03

## 2018-08-03 ENCOUNTER — TELEPHONE (OUTPATIENT)
Dept: GERIATRICS | Facility: CLINIC | Age: 78
End: 2018-08-03

## 2018-08-03 NOTE — TELEPHONE ENCOUNTER
Call that pt had urgency, no other symptoms.  Gave order to push fluids, and monitor. Call if dysuria or fever or other change in condition.  She had (-) UC in hospital from a joseph on 7/22.

## 2018-08-06 ENCOUNTER — NURSING HOME VISIT (OUTPATIENT)
Dept: GERIATRICS | Facility: CLINIC | Age: 78
End: 2018-08-06
Payer: COMMERCIAL

## 2018-08-06 ENCOUNTER — RECORDS - HEALTHEAST (OUTPATIENT)
Dept: LAB | Facility: CLINIC | Age: 78
End: 2018-08-06

## 2018-08-06 ENCOUNTER — TRANSFERRED RECORDS (OUTPATIENT)
Dept: HEALTH INFORMATION MANAGEMENT | Facility: CLINIC | Age: 78
End: 2018-08-06

## 2018-08-06 VITALS
RESPIRATION RATE: 20 BRPM | OXYGEN SATURATION: 98 % | HEART RATE: 94 BPM | SYSTOLIC BLOOD PRESSURE: 101 MMHG | DIASTOLIC BLOOD PRESSURE: 59 MMHG | TEMPERATURE: 97 F

## 2018-08-06 DIAGNOSIS — R30.0 DYSURIA: ICD-10-CM

## 2018-08-06 DIAGNOSIS — J44.9 STEROID-DEPENDENT CHRONIC OBSTRUCTIVE PULMONARY DISEASE (H): ICD-10-CM

## 2018-08-06 DIAGNOSIS — M19.90 ARTHRITIS PAIN: ICD-10-CM

## 2018-08-06 DIAGNOSIS — M79.604 PAIN IN BOTH LOWER EXTREMITIES: ICD-10-CM

## 2018-08-06 DIAGNOSIS — Z92.241 STEROID-DEPENDENT CHRONIC OBSTRUCTIVE PULMONARY DISEASE (H): ICD-10-CM

## 2018-08-06 DIAGNOSIS — M79.605 PAIN IN BOTH LOWER EXTREMITIES: ICD-10-CM

## 2018-08-06 DIAGNOSIS — R73.9 STEROID-INDUCED HYPERGLYCEMIA: ICD-10-CM

## 2018-08-06 DIAGNOSIS — T38.0X5A STEROID-INDUCED HYPERGLYCEMIA: ICD-10-CM

## 2018-08-06 DIAGNOSIS — E87.1 HYPONATREMIA: Primary | ICD-10-CM

## 2018-08-06 DIAGNOSIS — F03.90 DEMENTIA WITHOUT BEHAVIORAL DISTURBANCE, UNSPECIFIED DEMENTIA TYPE: ICD-10-CM

## 2018-08-06 DIAGNOSIS — R53.81 PHYSICAL DECONDITIONING: ICD-10-CM

## 2018-08-06 DIAGNOSIS — I10 BENIGN ESSENTIAL HYPERTENSION: ICD-10-CM

## 2018-08-06 LAB
ALBUMIN UR-MCNC: NEGATIVE MG/DL
ANION GAP SERPL CALCULATED.3IONS-SCNC: 9 MMOL/L (ref 5–18)
ANION GAP SERPL CALCULATED.3IONS-SCNC: 9 MMOL/L (ref 5–18)
APPEARANCE UR: CLEAR
BILIRUB UR QL STRIP: NEGATIVE
BUN SERPL-MCNC: 12 MG/DL (ref 8–28)
BUN SERPL-MCNC: 12 MG/DL (ref 8–28)
CALCIUM SERPL-MCNC: 10.2 MG/DL (ref 8.5–10.5)
CALCIUM SERPL-MCNC: 10.2 MG/DL (ref 8.5–10.5)
CHLORIDE BLD-SCNC: 99 MMOL/L (ref 98–107)
CHLORIDE SERPLBLD-SCNC: 99 MMOL/L (ref 98–107)
CO2 SERPL-SCNC: 23 MMOL/L (ref 22–31)
CO2 SERPL-SCNC: 23 MMOL/L (ref 22–31)
COLOR UR AUTO: NORMAL
CREAT SERPL-MCNC: 0.67 MG/DL (ref 0.6–1.1)
CREAT SERPL-MCNC: 0.67 MG/DL (ref 0.6–1.1)
GFR SERPL CREATININE-BSD FRML MDRD: >60 ML/MIN/1.73M2
GFR SERPL CREATININE-BSD FRML MDRD: >60 ML/MIN/1.73M2
GLUCOSE BLD-MCNC: 91 MG/DL (ref 70–125)
GLUCOSE SERPL-MCNC: 91 MG/DL (ref 70–125)
GLUCOSE UR STRIP-MCNC: NEGATIVE MG/DL
HBA1C MFR BLD: 5.6 % (ref 4.2–6.1)
HBA1C MFR BLD: 5.6 % (ref 4.2–6.1)
HEMOGLOBIN: 10.7 G/DL (ref 12–16)
HGB BLD-MCNC: 10.7 G/DL (ref 12–16)
HGB UR QL STRIP: NEGATIVE
KETONES UR STRIP-MCNC: NEGATIVE MG/DL
LEUKOCYTE ESTERASE UR QL STRIP: NEGATIVE
NITRATE UR QL: NEGATIVE
PH UR STRIP: 6 [PH] (ref 4.5–8)
POTASSIUM BLD-SCNC: 4.6 MMOL/L (ref 3.5–5)
POTASSIUM SERPL-SCNC: 4.6 MMOL/L (ref 3.5–5)
SODIUM SERPL-SCNC: 131 MMOL/L (ref 136–145)
SODIUM SERPL-SCNC: 131 MMOL/L (ref 136–145)
SP GR UR STRIP: 1.01 (ref 1–1.03)
UROBILINOGEN UR STRIP-ACNC: NORMAL

## 2018-08-06 PROCEDURE — 99309 SBSQ NF CARE MODERATE MDM 30: CPT | Performed by: NURSE PRACTITIONER

## 2018-08-06 NOTE — PROGRESS NOTES
[facility]    No chief complaint on file.      [facility] Record Number:  [id number]    HPI:    Ana Luisa Lee is a 78 year old  (1940), who is being seen today for an episodic care visit at [facility].  HPI information obtained from: facility chart records, facility staff, patient report and [facility] chart review.Today's concern is: pt is alert and feels stronger--up with therapies. Says has some dysuria, cramping with urination and more frequency.      Hyponatremia  Dementia without behavioral disturbance, unspecified dementia type  Physical deconditioning  Steroid-dependent chronic obstructive pulmonary disease (H)  Steroid-induced hyperglycemia  Benign essential hypertension  Arthritis pain  Pain in both lower extremities: says legs bother her more on right foot area, and not new.       HPI/ROS:  No CP, SOB, Cough, dizziness, fevers, chills, HA,  Bowel Abnormalities. Appetite is a little better but feels nauseated at times after eating.  No pain except as above.    ALLERGIES: Hydralazine; Penicillin g; Meloxicam; Metoprolol; and Norvasc [amlodipine besylate]  Past Medical, Surgical, Family and Social History reviewed and updated in [facility].    Current Outpatient Prescriptions   Medication Sig Dispense Refill     acetaminophen (TYLENOL) 500 MG tablet Take 2 tablets (1,000 mg) by mouth 3 times daily       albuterol (ALBUTEROL) 108 (90 BASE) MCG/ACT Inhaler Inhale 2 puffs into the lungs every 6 hours as needed       budesonide (PULMICORT FLEXHALER) 180 MCG/ACT inhaler Inhale 1 puff into the lungs 2 times daily       calcium 600 MG tablet Take 1 tablet (600 mg) by mouth daily 60 tablet      calcium carbonate (TUMS) 500 MG chewable tablet Take 1 chew tab by mouth 4 times daily as needed for heartburn (nausea or indigestion)       cholecalciferol (VITAMIN D) 1000 UNIT tablet Take 1 tablet (1,000 Units) by mouth daily 30 tablet      cyanocobalamin (B-12 TR)  1000 MCG TBCR Take 1 tablet by mouth daily 100 tablet 3     ferrous sulfate (IRON) 325 (65 Fe) MG tablet Take 325 mg by mouth three times a week Give on M-W-F AM only       gemfibrozil (LOPID) 600 MG tablet Take 1 tablet (600 mg) by mouth daily 90 tablet 0     levalbuterol (XOPENEX) 1.25 MG/3ML neb solution Take 1 ampule by nebulization 4 times daily       levothyroxine (SYNTHROID/LEVOTHROID) 25 MCG tablet Take 1 tablet (25 mcg) by mouth daily 90 tablet 3     LISINOPRIL PO Take 10 mg by mouth daily HOLD if SBP <110.Call if held x 2 in a row symptomatic       loperamide (IMODIUM) 2 MG capsule Take 2 mg by mouth every 8 hours as needed for diarrhea       montelukast (SINGULAIR) 10 MG tablet Take 1 tablet (10 mg) by mouth At Bedtime 30 tablet      Multiple Vitamins-Minerals (MULTIVITAMIN OR) Take 1 tablet by mouth daily       omeprazole (PRILOSEC) 40 MG capsule Take 1 capsule (40 mg) by mouth daily Take 30-60 minutes before a meal. 90 capsule 2     Ondansetron (ZOFRAN ODT PO) Take by mouth every 12 hours as needed for nausea or vomiting       polyethylene glycol (MIRALAX/GLYCOLAX) Packet Take 17 g by mouth daily as needed for constipation       predniSONE (DELTASONE) 10 MG tablet Take 10 mg by mouth daily       roflumilast (DALIRESP) 500 MCG TABS tablet Take 500 mcg by mouth daily        traMADol (ULTRAM) 50 MG tablet Take 1 tablet (50 mg) by mouth 2 times daily 20 tablet 0     umeclidinium (INCRUSE ELLIPTA) 62.5 MCG/INH oral inhaler Inhale 1 puff into the lungs daily       Medications reviewed:  Medications reconciled to facility chart and changes were made to reflect current medications as identified as above med list. Below are the changes that were made:   Medications stopped since last EPIC medication reconciliation:   There are no discontinued medications.    Medications started since last [facility] medication reconciliation:  No orders of the defined types were placed in this encounter.        REVIEW OF SYSTEMS:  See  under HPI    Physical Exam:  /59  Pulse 94  Temp 97  F (36.1  C)  Resp 20  SpO2 98%  GENERAL APPEARANCE: alert, engaged and  pleasant and cooperative during exam  ENT:  Mouth normal, moist mucous membranes.  Hearing acuity : [place]  RESP:  respiratory effort normal, no respiratory distress, Lung sounds clear,    CV: auscultation of heart done, RRR, soft systolic murmur, no gallop or rub.   Trace edema bilat lower legs, +dps, warm feet  ABDOMEN:  normal bowel sounds, soft, nontender.  M/S:   seen in bed able to V[place]LEGAS equally.  SKIN:  Inspection  of skin:  skin warm, dry and intact except right shin which has drsg on it--dry and intact.  NEURO: cranial nerves 2-12 grossly intact, no facial asymmetry, no speech deficits.  PSYCH:  Alert, forgetful but appropriate.    BP Readings from Last 3 Encounters:   08/04/18 101/59   07/31/18 136/81   07/31/18 136/81       Recent Labs:   CBC RESULTS:   Recent Labs   Lab Test  07/22/18   0259 06/25/18   WBC  11.3*  8.3   RBC  3.73*  3.40*   HGB  11.4*  10.4*   HCT  33.6*  31.0*   MCV  90  91   MCH  30.6  30.6   MCHC  33.9  33.5   RDW  13.6  14.0   PLT  357  303       Last Basic Metabolic Panel:  Recent Labs   Lab Test  07/25/18   0806  07/24/18   1437  07/24/18   0604  07/23/18   0500   NA   --    --   134  131*   POTASSIUM  3.9  3.8  3.1*  3.6   CHLORIDE   --    --   106  101   DIANA   --    --   8.2*  8.6   CO2   --    --   20  18*   BUN   --    --   11  9   CR   --    --   0.54  0.48*   GLC   --    --   99  78       Liver Function Studies -   Recent Labs   Lab Test  07/22/18   0259  04/13/18   0429   PROTTOTAL  6.9  7.4   ALBUMIN  3.9  3.9   BILITOTAL  0.5  0.5   ALKPHOS  72  66   AST  12  14   ALT  15  14       TSH   Date Value Ref Range Status   07/22/2018 2.07 0.40 - 4.00 mU/L Final   12/28/2017 1.24 0.40 - 4.00 mU/L Final       Lab Results   Component Value Date    A1C 6.8 01/03/2017          ASSESSMENT / PLAN:  (E87.1) Hyponatremia  Comment/Plan:  Improved-stable, Alert  and seems at baseline.  Check prn as condition warrants,  monitor     (F03.90) Dementia without behavioral disturbance, unspecified dementia type  Comment/Plan:  SLUMS 11/30,  ACL 3.4/5.8,  CPT 4.2/5.6 AND SAFETY EVAL 25/27 here per therapies. Pt is in memory care setting for her home.    (R53.81) Physical deconditioning  Comment/Plan: PT OT, cog evals--see above    (J44.9) Steroid-dependent chronic obstructive pulmonary disease (H)  Comment/Plan: compensated, is on long time prednisone, has several COPD meds, no changes and monitor.    (R73.9,  T38.0X5A) Steroid-induced hyperglycemia  Comment/Plan: A1C is 5.6 today.    (I10) Essential hypertension  Comment/Plan: labile from 110-170's SBP), but mostly  from 130-140's SBP, no changes today, cont to monitor. BMP reasonable,     (M19.90) Arthritis pain  (M79.604,  M79.605) Pain in both lower extremities   Comment/Plan: will cont on tramadol 50 mg q8h and it has been decreased this past springtime/early summer.  No changes as pt has leg pains, will monitor.        (R30.0) Dysuria  Comment/Plan: check UA--UC if indicated as has three symptoms(Dysuria, burning, cramping and frequency).  On Friday only had frequency.       Electronically signed by  IVÁN Redding CNP

## 2018-08-07 ENCOUNTER — NURSING HOME VISIT (OUTPATIENT)
Dept: GERIATRICS | Facility: CLINIC | Age: 78
End: 2018-08-07
Payer: COMMERCIAL

## 2018-08-07 VITALS
OXYGEN SATURATION: 98 % | BODY MASS INDEX: 21.58 KG/M2 | RESPIRATION RATE: 20 BRPM | WEIGHT: 118 LBS | HEART RATE: 93 BPM | DIASTOLIC BLOOD PRESSURE: 73 MMHG | TEMPERATURE: 97.7 F | SYSTOLIC BLOOD PRESSURE: 116 MMHG

## 2018-08-07 DIAGNOSIS — R35.0 URINARY FREQUENCY: ICD-10-CM

## 2018-08-07 DIAGNOSIS — R30.0 DYSURIA: Primary | ICD-10-CM

## 2018-08-07 PROCEDURE — 99307 SBSQ NF CARE SF MDM 10: CPT | Performed by: NURSE PRACTITIONER

## 2018-08-07 NOTE — PROGRESS NOTES
CC: Lab Recheck    HPI:    Ana Luisa Lee is a 78 year old  (1940), who is being seen today for an episodic care visit at [facility]. Today's concern: lab recheck of UA        OBJECTIVE: A & O x 3, NAD.  resting in bed    /73  Pulse 93  Temp 97.7  F (36.5  C)  Resp 20  Wt 118 lb (53.5 kg)  SpO2 98%  BMI 21.58 kg/m2    LABS: 8/6/18:  UA negative yesterday, UC not indicated    ASSESSMENT / PLAN:  (R30.0) Dysuria  (primary encounter diagnosis)   (R35.0) Urinary frequency  Comment/Plan: updated pt, encouraged fluids, monitor.      Lucy Rodney RN, CNP

## 2018-08-08 ENCOUNTER — PATIENT OUTREACH (OUTPATIENT)
Dept: GERIATRIC MEDICINE | Facility: CLINIC | Age: 78
End: 2018-08-08

## 2018-08-08 NOTE — PROGRESS NOTES
[facility] Care Coordination Contact  Voice message left with Libia RINALDI at [facility] to inquire on client and if there is a care conference scheduled.  Yeni Savage RN, BC  Supervisor [facility]   [phone] (Fax)

## 2018-08-09 ENCOUNTER — PATIENT OUTREACH (OUTPATIENT)
Dept: GERIATRIC MEDICINE | Facility: CLINIC | Age: 78
End: 2018-08-09

## 2018-08-09 LAB — BACTERIA SPEC CULT: ABNORMAL

## 2018-08-09 NOTE — PROGRESS NOTES
[facility] Care Coordination Contact  Faxed copy to [facility] facility, uploaded into [employer] and submitted authorization to health plan.    Provider Signature - Summary:  Member indicates that they would like a summary of their POC shared with the following [facility] providers:  Fay ActualMedsbarbara.  Letter faxed to providers for signature.    Carmenza Griffin  Case Management Specialist  [facility]  [phone]

## 2018-08-10 ENCOUNTER — NURSING HOME VISIT (OUTPATIENT)
Dept: GERIATRICS | Facility: CLINIC | Age: 78
End: 2018-08-10
Payer: COMMERCIAL

## 2018-08-10 VITALS
SYSTOLIC BLOOD PRESSURE: 121 MMHG | DIASTOLIC BLOOD PRESSURE: 74 MMHG | TEMPERATURE: 97.7 F | WEIGHT: 113.8 LBS | BODY MASS INDEX: 20.81 KG/M2 | HEART RATE: 106 BPM | OXYGEN SATURATION: 98 % | RESPIRATION RATE: 20 BRPM

## 2018-08-10 DIAGNOSIS — R11.0 CHRONIC NAUSEA: ICD-10-CM

## 2018-08-10 DIAGNOSIS — F03.90 DEMENTIA WITHOUT BEHAVIORAL DISTURBANCE, UNSPECIFIED DEMENTIA TYPE: ICD-10-CM

## 2018-08-10 DIAGNOSIS — E87.1 HYPONATREMIA: Primary | ICD-10-CM

## 2018-08-10 DIAGNOSIS — I10 ESSENTIAL HYPERTENSION: ICD-10-CM

## 2018-08-10 DIAGNOSIS — Z92.241 STEROID-DEPENDENT CHRONIC OBSTRUCTIVE PULMONARY DISEASE (H): ICD-10-CM

## 2018-08-10 DIAGNOSIS — M19.90 ARTHRITIS PAIN: ICD-10-CM

## 2018-08-10 DIAGNOSIS — R53.81 PHYSICAL DECONDITIONING: ICD-10-CM

## 2018-08-10 DIAGNOSIS — J44.9 STEROID-DEPENDENT CHRONIC OBSTRUCTIVE PULMONARY DISEASE (H): ICD-10-CM

## 2018-08-10 DIAGNOSIS — R35.0 URINARY FREQUENCY: ICD-10-CM

## 2018-08-10 PROCEDURE — 99309 SBSQ NF CARE MODERATE MDM 30: CPT | Performed by: NURSE PRACTITIONER

## 2018-08-10 RX ORDER — ALBUTEROL SULFATE 90 UG/1
2 AEROSOL, METERED RESPIRATORY (INHALATION)
COMMUNITY

## 2018-08-10 NOTE — PROGRESS NOTES
[facility] Care Coordination Contact  Information rec'd from [facility]: The following RS Tool was successfully submitted and ACCEPTED with no errors found.   File Name: [id number]   Client PMI: [id number]   RS Start Date: 7/11/2018   Monthly RS Rate: 3872.68   Daily RS Rate: 127.23   Yeni Savage RN, BC  Supervisor [facility] Care Coordinator   [phone]  (Fax)

## 2018-08-10 NOTE — PROGRESS NOTES
"[facility]    Chief Complaint   Patient presents with     penitent[facility] Acute       [facility] Record Number:  [id number]    HPI:    Ana Luisa Lee is a 78 year old  (1940), who is being seen today for an episodic care visit at [facility].  HPI information obtained from: facility chart records, facility staff, patient report and [facility] chart review.Today's concern is: still has some frequency but says is not painful to urinate.     Hyponatremia  Dementia without behavioral disturbance, unspecified dementia type  Physical deconditioning--getting stronger--likely dc later next week.  Steroid-dependent chronic obstructive pulmonary disease (H): wants more often prn inhaler  Essential hypertension  Arthritis pain: feels more painful at times in legs, wonders if we can change med regimen  Urinary frequency  Chronic nausea: not new and had at home but worse in am and said Zofran helped in past       HPI/ROS:  No CP, SOB, Cough, dizziness, fevers, chills, HA,  Bowel Abnormalities. Appetite varies, \"I don't eat a lot\".  No pain except as above.    ALLERGIES: Hydralazine; Penicillin g; Meloxicam; Metoprolol; and Norvasc [amlodipine besylate]  Past Medical, Surgical, Family and Social History reviewed and updated in [facility].    Current Outpatient Prescriptions   Medication Sig Dispense Refill     acetaminophen (TYLENOL) 500 MG tablet Take 2 tablets (1,000 mg) by mouth 3 times daily       albuterol (PROAIR HFA/PROVENTIL HFA/VENTOLIN HFA) 108 (90 Base) MCG/ACT Inhaler Inhale 1 puff into the lungs every 2 hours as needed for shortness of breath / dyspnea or wheezing       budesonide (PULMICORT FLEXHALER) 180 MCG/ACT inhaler Inhale 1 puff into the lungs 2 times daily       calcium 600 MG tablet Take 1 tablet (600 mg) by mouth daily 60 tablet      calcium carbonate (TUMS) 500 MG chewable tablet Take 1 chew tab by mouth 4 times daily as needed for heartburn (nausea or " indigestion)       cholecalciferol (VITAMIN D) 1000 UNIT tablet Take 1 tablet (1,000 Units) by mouth daily 30 tablet      cyanocobalamin (B-12 TR) 1000 MCG TBCR Take 1 tablet by mouth daily 100 tablet 3     ferrous sulfate (IRON) 325 (65 Fe) MG tablet Take 325 mg by mouth three times a week Give on M-W-F AM only       gemfibrozil (LOPID) 600 MG tablet Take 1 tablet (600 mg) by mouth daily 90 tablet 0     levalbuterol (XOPENEX) 1.25 MG/3ML neb solution Take 1 ampule by nebulization 4 times daily       levothyroxine (SYNTHROID/LEVOTHROID) 25 MCG tablet Take 1 tablet (25 mcg) by mouth daily 90 tablet 3     LISINOPRIL PO Take 10 mg by mouth daily HOLD if SBP <110.Call if held x 2 in a row symptomatic       loperamide (IMODIUM) 2 MG capsule Take 2 mg by mouth every 8 hours as needed for diarrhea       montelukast (SINGULAIR) 10 MG tablet Take 1 tablet (10 mg) by mouth At Bedtime 30 tablet      Multiple Vitamins-Minerals (MULTIVITAMIN OR) Take 1 tablet by mouth daily       omeprazole (PRILOSEC) 40 MG capsule Take 1 capsule (40 mg) by mouth daily Take 30-60 minutes before a meal. 90 capsule 2     Ondansetron (ZOFRAN ODT PO) Take by mouth every 12 hours as needed for nausea or vomiting       Ondansetron HCl (ZOFRAN PO) Take 4 mg by mouth every morning       polyethylene glycol (MIRALAX/GLYCOLAX) Packet Take 17 g by mouth daily as needed for constipation       predniSONE (DELTASONE) 10 MG tablet Take 10 mg by mouth daily       roflumilast (DALIRESP) 500 MCG TABS tablet Take 500 mcg by mouth daily        TRAMADOL HCL PO Take 25 mg by mouth 3 times daily       TRAMADOL HCL PO Take 25 mg by mouth daily as needed for moderate to severe pain       umeclidinium (INCRUSE ELLIPTA) 62.5 MCG/INH oral inhaler Inhale 1 puff into the lungs daily       Medications reviewed:  Medications reconciled to facility chart and changes were made to reflect current medications as identified as above med list. Below are the changes that were made:    Medications stopped since last EPIC medication reconciliation:   There are no discontinued medications.    Medications started since last [facility] medication reconciliation:  No orders of the defined types were placed in this encounter.        REVIEW OF SYSTEMS:  See under HPI    Physical Exam:  /74  Pulse 106  Temp 97.7  F (36.5  C)  Resp 20  Wt 113 lb 12.8 oz (51.6 kg)  SpO2 98%  BMI 20.81 kg/m2  GENERAL APPEARANCE: alert, engaged and  pleasant and cooperative during exam  ENT:  Mouth normal, moist mucous membranes.  Hearing acuity : [place]  RESP:  respiratory effort normal, no respiratory distress, Lung sounds clear,    CV: auscultation of heart done, RRR, soft systolic murmur, no gallop or rub.   No edema  ABDOMEN:  normal bowel sounds, soft, nontender.  M/S:   seen in bed able to V[place] equally.  SKIN:  Inspection  of skin:  skin warm, dry and intact, healed right shin  NEURO: cranial nerves 2-12 grossly intact, no facial asymmetry, no speech deficits.  PSYCH:  Alert, forgetful but appropriate.    Last 3 BPs: (8/9) 121 / 74,  (8/7)/ 116 / 73,  (8/4) 101 / 59 mmHg    Recent Labs:   CBC RESULTS:   Recent Labs   Lab Test  07/22/18   0259 06/25/18   WBC  11.3*  8.3   RBC  3.73*  3.40*   HGB  11.4*  10.4*   HCT  33.6*  31.0*   MCV  90  91   MCH  30.6  30.6   MCHC  33.9  33.5   RDW  13.6  14.0   PLT  357  303       Last Basic Metabolic Panel:  Recent Labs   Lab Test  07/25/18   0806  07/24/18   1437  07/24/18   0604  07/23/18   0500   NA   --    --   134  131*   POTASSIUM  3.9  3.8  3.1*  3.6   CHLORIDE   --    --   106  101   DIANA   --    --   8.2*  8.6   CO2   --    --   20  18*   BUN   --    --   11  9   CR   --    --   0.54  0.48*   GLC   --    --   99  78       Liver Function Studies -   Recent Labs   Lab Test  07/22/18   0259  04/13/18   0429   PROTTOTAL  6.9  7.4   ALBUMIN  3.9  3.9   BILITOTAL  0.5  0.5   ALKPHOS  72  66   AST  12  14   ALT  15  14       TSH   Date Value Ref Range Status   07/22/2018 2.07  0.40 - 4.00 mU/L Final   12/28/2017 1.24 0.40 - 4.00 mU/L Final       Lab Results   Component Value Date    A1C 6.8 01/03/2017       ASSESSMENT / PLAN:    (E87.1) Hyponatremia  Comment/Plan:  Improved-stable, Alert and seems at baseline.  Check 8/14, monitor.     (F03.90) Dementia without behavioral disturbance, unspecified dementia type  Comment/Plan:  SLUMS 11/30,  ACL 3.4/5.8,  CPT 4.2/5.6 AND SAFETY EVAL 25/27 here per therapies. Pt is in memory care setting for her home.    (R53.81) Physical deconditioning  Comment/Plan:cont PT OT     (J44.9) Steroid-dependent chronic obstructive pulmonary disease (H)  Comment/Plan: compensated, is on long time prednisone, has several COPD meds, change Albuteral inhaler to q 2hr prn and monitor.     (I10) Essential hypertension  Comment/Plan:a bit labile from 110-170's SBP), but mostly  from 110-140's SBP, no changes today, cont to monitor.      (M19.90) Arthritis pain  (M79.604,  M79.605) Pain in both lower extremities   Comment/Plan: will change tramadol to 25mg tid and one prn dose per day to see if she gets more even coverage. (*had been on 50 mg bid not TID when she came to us. This had been decreased from TID this past spring/summer*). We  will monitor.       (R35.0) Urinary frequency  Comment/Plan: check UA/UC showed Staph epi--skin angel-will not treat. Dysuria and crarmping gone. Will monitor        (R11.0) Chronic nausea  Comment/Plan: chronic problem but will add/try scheduled Zofran in the am before BF and keep bid prn also.         Electronically signed by  IVÁN Redding CNP

## 2018-08-12 VITALS
WEIGHT: 113.8 LBS | HEART RATE: 69 BPM | RESPIRATION RATE: 16 BRPM | TEMPERATURE: 96.8 F | BODY MASS INDEX: 20.81 KG/M2 | DIASTOLIC BLOOD PRESSURE: 64 MMHG | OXYGEN SATURATION: 92 % | SYSTOLIC BLOOD PRESSURE: 113 MMHG

## 2018-08-12 NOTE — PROGRESS NOTES
"[facility] DISCHARGE SUMMARY    PATIENT'S NAME: Ana Luisa Lee  YOB: 1940    PRIMARY CARE PROVIDER AND CLINIC RESPONSIBLE AFTER TRANSFER: Db Alvarez     CODE STATUS: DNR/DNI    TRANSFERRING PROVIDERS: Lucy Rodney, IVÁN RETANA, Dr. Richard Kang,      DATE OF SNF ADMISSION:  July / 25 / 2018    DATE OF SNF DISCHARGE (including anticipating DC): August / 16 / 2018    DISCHARGE DISPOSITION: [facility] Provider    Nursing Facility: [facility] stay 7/22/18 to 7/25/18.     Condition on Discharge:  Stable.    Function:  Ambulates:   98ft  Transfers: sba  Cognitive Scores: SLUMS 11./30, CPT 4.2/5.6 and ACL:3.4/5.8, Safety 25/27    Equipment: walker    DISCHARGE DIAGNOSIS:      Metabolic encephalopathy  Hyponatremia  Unwitnessed fall  Skin abrasion  Physical deconditioning  Steroid-dependent chronic obstructive pulmonary disease (H)  Essential hypertension  Adjustment disorder with depressed mood  History of closed head injury        HOSPITAL COURSE:  \"1. Toxic/metabolic encephalopathy: Resolving.  So far hyponatremia is 126 and what I suspect is reduced reserve is the only apparent explanation.  She does not participate readily with neurologic exam but has been moving all extremities and no focal deficits have been seen.  She had imaging of her brain back in June in the form of an MRI and here in the emergency room in the form of a non contrast CT of her head and also a CT angiogram of her head and neck with no acute pathology identified.  --Improved.  --Afebrile, no infectious focus apparent  --Cortisol is appropriately somewhat elevated and TSH is within normal limits  --Blood gas upon presentation did not show significant CO2 retention  --Toxicology workup including ethanol, salicylate and Tylenol levels unrevealing      2.   Unwitnessed fall: It sounds as though she has had numerous falls in the past and again this one was " "unwitnessed.  No apparent bony injury is been identified and imaging was probably negative for traumatic injury as noted above.  I suspect she may require TCU placement upon discharge pending improvement in her mental status.  When fully alert and awake on 7/23 she denied any pain complaints whatsoever.      3.   Hypovolemic hyponatremia: So far correcting slowly with IV hydration.  It is possible she could have a component of SIADH given underlying lung disease.  She does have lung nodules which appear to not have been completely worked up and I discussed these with her daughter.  I suspect that biopsy would not be consistent with her goals of care at this time this could be readdressed at follow-up.      4.   Hypertension: Not apparently on any medications prior to admission.  She has been intermittently hypertensive here so we will plan to start low-dose lisinopril and have as needed nitroglycerin available.  She does have numerous drug allergies noted.      5.   Frequent falls      6.  COPD: Not currently in an acute exacerbation, continue home Singulair, Roflumilast and other inhalers as well as scheduled Xopenex.  She is chronically on prednisone 10 mg daily.  Hemodynamically does not appear adrenally insufficient and cortisol is appropriately somewhat elevated.  7.  Thyroid nodule:  My colleague  Discussed with her daughter.  TSH is normal.  8.  Pulmonary nodules: my colleague Discussed these with her daughter.  Consider outpatient CT scan.\"     HPI/ROS:  No CP, SOB, Cough, dizziness, fevers, chills, HA, N/V, dysuria or Bowel Abnormalities. Appetite is better.  No pain except arthritic pains    Last 3 BPs: (8/12) 113 / 64, (8/11) 113 / 74, (8/10)1 134 / 74 : 8/9 121 / 74 mmHg       PAST MEDICAL HISTORY:  Past Medical History:   Diagnosis Date     Anemia Dec 2011     Arthritis of hand      Asthma, persistent     f/U dr Brock at [facility]     Chronic intermittent steroid use     for asthma     COPD " exacerbation (H) 10/25/2013     Esophageal stricture 2007    s/p dilation     HTN, goal below 140/90      Hyperlipidemia      Hypothyroidism      Osteoporosis      Paroxysmal supraventricular tachycardia (H)      PVC (premature ventricular contraction) May 2012     SVT (supraventricular tachycardia) (H)      Type 2 diabetes mellitus without complication, without long-term current use of insulin (H) 8/2/2018       DISCHARGE MEDICATIONS:  Current Outpatient Prescriptions   Medication Sig Dispense Refill     acetaminophen (TYLENOL) 500 MG tablet Take 2 tablets (1,000 mg) by mouth 3 times daily       albuterol (PROAIR HFA/PROVENTIL HFA/VENTOLIN HFA) 108 (90 Base) MCG/ACT Inhaler Inhale 2 puffs into the lungs every 2 hours as needed for shortness of breath / dyspnea or wheezing        budesonide (PULMICORT FLEXHALER) 180 MCG/ACT inhaler Inhale 1 puff into the lungs 2 times daily       calcium 600 MG tablet Take 1 tablet (600 mg) by mouth daily 60 tablet      calcium carbonate (TUMS) 500 MG chewable tablet Take 1 chew tab by mouth 4 times daily as needed for heartburn (nausea or indigestion)       cholecalciferol (VITAMIN D) 1000 UNIT tablet Take 1 tablet (1,000 Units) by mouth daily 30 tablet      cyanocobalamin (B-12 TR) 1000 MCG TBCR Take 1 tablet by mouth daily 100 tablet 3     ferrous sulfate (IRON) 325 (65 Fe) MG tablet Take 325 mg by mouth three times a week Give on M-W-F AM only       gemfibrozil (LOPID) 600 MG tablet Take 1 tablet (600 mg) by mouth daily 90 tablet 0     levalbuterol (XOPENEX) 1.25 MG/3ML neb solution Take 1 ampule by nebulization 4 times daily       levothyroxine (SYNTHROID/LEVOTHROID) 25 MCG tablet Take 1 tablet (25 mcg) by mouth daily 90 tablet 3     LISINOPRIL PO Take 10 mg by mouth daily HOLD if SBP <110.Call if held x 2 in a row symptomatic       loperamide (IMODIUM) 2 MG capsule Take 2 mg by mouth every 8 hours as needed for diarrhea       montelukast (SINGULAIR) 10 MG tablet Take 1 tablet  (10 mg) by mouth At Bedtime 30 tablet      Multiple Vitamins-Minerals (MULTIVITAMIN OR) Take 1 tablet by mouth daily       omeprazole (PRILOSEC) 40 MG capsule Take 1 capsule (40 mg) by mouth daily Take 30-60 minutes before a meal. 90 capsule 2     Ondansetron (ZOFRAN ODT PO) Take 4 mg by mouth every 12 hours as needed for nausea or vomiting        Ondansetron HCl (ZOFRAN PO) Take 4 mg by mouth every morning       polyethylene glycol (MIRALAX/GLYCOLAX) Packet Take 17 g by mouth daily as needed for constipation       predniSONE (DELTASONE) 10 MG tablet Take 10 mg by mouth daily       roflumilast (DALIRESP) 500 MCG TABS tablet Take 500 mcg by mouth daily        TRAMADOL HCL PO Take 25 mg by mouth 3 times daily       TRAMADOL HCL PO Take 25 mg by mouth daily as needed for moderate to severe pain       umeclidinium (INCRUSE ELLIPTA) 62.5 MCG/INH oral inhaler Inhale 1 puff into the lungs daily         MEDICATION CHANGES/RATIONALE:   See dc orders    TCU/SNF COURSE: pt has progressed with PT and OT. Her Na had corrected to 131. Her mentation has improved and appears at baseline. She has had tramadol decreased but adjusted to tid for pain--seems better Cymbalta dc'd when she first arrived--seemed to oversedate and confuse her.   K was replaced as was low. Zofran ordered prn but seems to work best when dose given in am for chronic nausea issues.  Has intermittent frequency and one day had dysuria and burning so UA checked.  + for Staph Epi, not treated as skin angel and symptoms back to just frequency and did not recur.    ROS: see above under HPI    /64  Pulse 69  Temp 96.8  F (36  C)  Resp 16  Wt 113 lb 12.8 oz (51.6 kg)  SpO2 92%  BMI 20.81 kg/m2    PHYSICAL EXAM Today:  GENERAL APPEARANCE: alert, engaged and  pleasant and cooperative during exam  ENT:  Mouth normal, moist mucous membranes.  Hearing acuity : [place]  RESP:  respiratory effort normal, no respiratory distress, Lung sounds clear,    CV: auscultation  of heart done, RRR, soft systolic murmur, no gallop or rub.   No edema  ABDOMEN:  normal bowel sounds, soft, nontender.  M/S:   seen in bed able to [place] equally.  SKIN:  Inspection  of skin:  skin warm, dry and intact, healed right shin lesion which as scab  NEURO: cranial nerves 2-12 grossly intact, no facial asymmetry, no speech deficits.  PSYCH:  Alert, forgetful but appropriate.         SNF LABS  CBC RESULTS:   Recent Labs   Lab Test 08/06/18 07/30/18 07/22/18   0259   WBC   --   9.6  11.3*   RBC   --   3.57*  3.73*   HGB  10.7*  10.6*  11.4*   HCT   --   32.8*  33.6*   MCV   --   92  90   MCH   --   29.7  30.6   MCHC   --   32.3  33.9   RDW   --   13.8  13.6   PLT   --   360  357       Last Basic Metabolic Panel:  Recent Labs   Lab Test 08/06/18 07/30/18   NA  131*  132*   POTASSIUM  4.6  3.7   CHLORIDE  99  95*   DIANA  10.2  10.4   CO2  23  26   BUN  12  7*   CR  0.67  0.62   GLC  91  106       Liver Function Studies -   Recent Labs   Lab Test  07/22/18   0259  04/13/18   0429   PROTTOTAL  6.9  7.4   ALBUMIN  3.9  3.9   BILITOTAL  0.5  0.5   ALKPHOS  72  66   AST  12  14   ALT  15  14       TSH   Date Value Ref Range Status   07/22/2018 2.07 0.40 - 4.00 mU/L Final   12/28/2017 1.24 0.40 - 4.00 mU/L Final       Lab Results   Component Value Date    A1C 5.6 08/06/2018    A1C 6.8 01/03/2017         DISCHARGE PLAN:  Occupational Therapy, Physical Therapy, Home Health Aide and From:  agency still To be decided (TBA) Follow-up with PCP in 7 days: 7 days.   Current [place] or other scheduled appointments:No future appointments.     MTM referral needed and placed by this provider: none     Pending labs: none     Discharge Treatments:none       TOTAL DISCHARGE TIME:   Greater than 30 minutes    IVÁN Redding CNP             Documentation of Face-to-Face and Certification for Home Health Services     Patient: Ana Luisa Lee   YOB: 1940  MR Number: [id number]  Today's Date: 8/13/2018    I  certify that patient: Ana Luisa Lee is under my care and that I, or a nurse practitioner or physician's assistant working with me, had a face-to-face encounter that meets the physician face-to-face encounter requirements with this patient on: 8/13/2018.    This encounter with the patient was in whole, or in part, for the following medical condition, which is the primary reason for home health care:      Metabolic encephalopathy  Hyponatremia  Unwitnessed fall  Skin abrasion  Physical deconditioning  Steroid-dependent chronic obstructive pulmonary disease (H)  Essential hypertension  Adjustment disorder with depressed mood  History of closed head injury  .    I certify that, based on my findings, the following services are medically necessary home health services: Occupational Therapy and Physical Therapy.    My clinical findings support the need for the above services because: Occupational Therapy Services are needed to assess and treat cognitive ability and address ADL safety due to impairment in cognition and upper body strengthening and safety eval. and Physical Therapy Services are needed to assess and treat the following functional impairments: deconditioning, weakness.    Further, I certify that my clinical findings support that this patient is homebound (i.e. absences from home require considerable and taxing effort and are for medical reasons or [employer] services or infrequently or of short duration when for other reasons) because: Requires assistance of another person or specialized equipment to access medical services because patient: Requires supervision of another for safe transfer...    Based on the above findings. I certify that this patient is confined to the home and needs intermittent skilled nursing care, physical therapy and/or speech therapy.  The patient is under my care, and I have initiated the establishment of the plan of care.  This patient will be followed by a physician who will  periodically review the plan of care.  Physician/Provider to provide follow up care: Db Alvarez    Responsible Medicare certified PECOS Physician: Dr Richard Kang MD  Physician Signature: See electronic signature associated with these discharge orders.  Date: 8/13/2018

## 2018-08-13 ENCOUNTER — DISCHARGE SUMMARY NURSING HOME (OUTPATIENT)
Dept: GERIATRICS | Facility: CLINIC | Age: 78
End: 2018-08-13
Payer: COMMERCIAL

## 2018-08-13 ENCOUNTER — RECORDS - HEALTHEAST (OUTPATIENT)
Dept: LAB | Facility: CLINIC | Age: 78
End: 2018-08-13

## 2018-08-13 DIAGNOSIS — R29.6 UNWITNESSED FALL: ICD-10-CM

## 2018-08-13 DIAGNOSIS — I10 ESSENTIAL HYPERTENSION: ICD-10-CM

## 2018-08-13 DIAGNOSIS — Z87.820 HISTORY OF CLOSED HEAD INJURY: ICD-10-CM

## 2018-08-13 DIAGNOSIS — T14.8XXA SKIN ABRASION: ICD-10-CM

## 2018-08-13 DIAGNOSIS — R53.81 PHYSICAL DECONDITIONING: ICD-10-CM

## 2018-08-13 DIAGNOSIS — Z92.241 STEROID-DEPENDENT CHRONIC OBSTRUCTIVE PULMONARY DISEASE (H): ICD-10-CM

## 2018-08-13 DIAGNOSIS — E87.1 HYPONATREMIA: ICD-10-CM

## 2018-08-13 DIAGNOSIS — J44.9 STEROID-DEPENDENT CHRONIC OBSTRUCTIVE PULMONARY DISEASE (H): ICD-10-CM

## 2018-08-13 DIAGNOSIS — G93.41 METABOLIC ENCEPHALOPATHY: Primary | ICD-10-CM

## 2018-08-13 DIAGNOSIS — F43.21 ADJUSTMENT DISORDER WITH DEPRESSED MOOD: ICD-10-CM

## 2018-08-13 PROCEDURE — 99316 NF DSCHRG MGMT 30 MIN+: CPT | Performed by: NURSE PRACTITIONER

## 2018-08-13 NOTE — PROGRESS NOTES
[facility] Care Coordination Contact  8/10/2018 Rec'd an e-mail from Zachary BARTHOLOMEW [facility] to report the following: Informed that client will discharge back to [facility] on 8/16/18. Per Jan services will be much the same as client left, just a heavier assist.  Per Jan they will provides services and adjust accordingly.    8/10/18 secure e-mail to Jan that current auth is maximum for RS services, CC will f/u with TCU to review ADL needs.   8/13/18 Current Case Mix score E, dependences in bathing, toileting, transfers, dressing, ambulation.    VM left with Libia RINALDI at [facility] requesting a return to call review discharge plans and ADL needs.    Rec'd tele call from Libia, per rehab, client is at baseline and has made improvement since admission.  No changes in ADL needs:  1 person assist with transfers, ambulation, dressing, bathing and toileting. Libia stated that Ana Luisa is independent with bed mobility-sits at the edge of her bed independently, independent with grooming and eating.   Inquired on cognition; CPT 4.2/5.6, ACL 4.5/5.8 and 25/27 on safety questionnaire.  Per Libia, recommendation from rehab is to d/c and resume previous services.   No need to complete a new assessment since there is no change in her case mix (E).   Plan is for d/c 8/16/18.   Secure e-mail sent to Erin BARTHOLOMEW at [facility] with above information.   Yeni Savage RN, BC  Supervisor [facility] Care Coordinator   [phone]  (Fax)

## 2018-08-13 NOTE — PATIENT INSTRUCTIONS
[facility] Discharge Orders    Name: Ana Luisa Lee  : 1940  Planned Discharge Date: 18  Discharged to: previous assisted living    MEDICAL FOLLOW UP  Follow up with PCP in 1-2 weeks --facility care team. Db Alvarez MD  Follow up with Specialists none      FUTURE LABS: No labs orders/due    ORDER CHANGES:  Stopped Cymbalta, Allegra and decreased Tramadol.  Added Zofran    DISCHARGE MEDICATIONS:  The patient s pharmacy is authorized to dispense a 30-day supply of medications. Refill requests should be directed to the primary provider, Db Alvarez .   Additional hard copy prescription for Tramadol medication left in facility chart for patient/family to fill at pharmacy of choice after discharge from facility.  Current Outpatient Prescriptions   Medication Sig Dispense Refill     acetaminophen (TYLENOL) 500 MG tablet Take 2 tablets (1,000 mg) by mouth 3 times daily       albuterol (PROAIR HFA/PROVENTIL HFA/VENTOLIN HFA) 108 (90 Base) MCG/ACT Inhaler Inhale 2 puffs into the lungs every 2 hours as needed for shortness of breath / dyspnea or wheezing        budesonide (PULMICORT FLEXHALER) 180 MCG/ACT inhaler Inhale 1 puff into the lungs 2 times daily       calcium 600 MG tablet Take 1 tablet (600 mg) by mouth daily 60 tablet      calcium carbonate (TUMS) 500 MG chewable tablet Take 1 chew tab by mouth 4 times daily as needed for heartburn (nausea or indigestion)       cholecalciferol (VITAMIN D) 1000 UNIT tablet Take 1 tablet (1,000 Units) by mouth daily 30 tablet      cyanocobalamin (B-12 TR) 1000 MCG TBCR Take 1 tablet by mouth daily 100 tablet 3     ferrous sulfate (IRON) 325 (65 Fe) MG tablet Take 325 mg by mouth three times a week Give on -- AM only       gemfibrozil (LOPID) 600 MG tablet Take 1 tablet (600 mg) by mouth daily 90 tablet 0     levalbuterol (XOPENEX) 1.25 MG/3ML neb solution Take 1 ampule by nebulization 4 times daily       levothyroxine (SYNTHROID/LEVOTHROID) 25  MCG tablet Take 1 tablet (25 mcg) by mouth daily 90 tablet 3     LISINOPRIL PO Take 10 mg by mouth daily HOLD if SBP <110.Call if held x 2 in a row symptomatic       loperamide (IMODIUM) 2 MG capsule Take 2 mg by mouth every 8 hours as needed for diarrhea       montelukast (SINGULAIR) 10 MG tablet Take 1 tablet (10 mg) by mouth At Bedtime 30 tablet      Multiple Vitamins-Minerals (MULTIVITAMIN OR) Take 1 tablet by mouth daily       omeprazole (PRILOSEC) 40 MG capsule Take 1 capsule (40 mg) by mouth daily Take 30-60 minutes before a meal. 90 capsule 2     Ondansetron (ZOFRAN ODT PO) Take 4 mg by mouth every 12 hours as needed for nausea or vomiting        Ondansetron HCl (ZOFRAN PO) Take 4 mg by mouth every morning       polyethylene glycol (MIRALAX/GLYCOLAX) Packet Take 17 g by mouth daily as needed for constipation       predniSONE (DELTASONE) 10 MG tablet Take 10 mg by mouth daily       roflumilast (DALIRESP) 500 MCG TABS tablet Take 500 mcg by mouth daily        TRAMADOL HCL PO Take 25 mg by mouth 3 times daily       TRAMADOL HCL PO Take 25 mg by mouth daily as needed for moderate to severe pain       umeclidinium (INCRUSE ELLIPTA) 62.5 MCG/INH oral inhaler Inhale 1 puff into the lungs daily         SERVICES:  Home Care:  Occupational Therapy, Physical Therapy, Home Health Aide and From:  TBA    ADDITIONAL INSTRUCTIONS:  None    IVÁN Redding CNP  This document was electronically signed on August 13, 2018

## 2018-08-14 ENCOUNTER — NURSING HOME VISIT (OUTPATIENT)
Dept: GERIATRICS | Facility: CLINIC | Age: 78
End: 2018-08-14
Payer: COMMERCIAL

## 2018-08-14 ENCOUNTER — TRANSFERRED RECORDS (OUTPATIENT)
Dept: HEALTH INFORMATION MANAGEMENT | Facility: CLINIC | Age: 78
End: 2018-08-14

## 2018-08-14 VITALS
TEMPERATURE: 97.3 F | DIASTOLIC BLOOD PRESSURE: 64 MMHG | WEIGHT: 113.8 LBS | OXYGEN SATURATION: 94 % | HEART RATE: 70 BPM | SYSTOLIC BLOOD PRESSURE: 120 MMHG | RESPIRATION RATE: 20 BRPM | BODY MASS INDEX: 20.94 KG/M2 | HEIGHT: 62 IN

## 2018-08-14 DIAGNOSIS — E87.1 HYPONATREMIA: Primary | ICD-10-CM

## 2018-08-14 LAB
ANION GAP SERPL CALCULATED.3IONS-SCNC: 9 MMOL/L (ref 5–18)
ANION GAP SERPL CALCULATED.3IONS-SCNC: 9 MMOL/L (ref 5–18)
BUN SERPL-MCNC: 14 MG/DL (ref 8–28)
BUN SERPL-MCNC: 14 MG/DL (ref 8–28)
CALCIUM SERPL-MCNC: 9.8 MG/DL (ref 8.5–10.5)
CALCIUM SERPL-MCNC: 9.8 MG/DL (ref 8.5–10.5)
CHLORIDE BLD-SCNC: 98 MMOL/L (ref 98–107)
CHLORIDE SERPLBLD-SCNC: 98 MMOL/L (ref 98–107)
CO2 SERPL-SCNC: 21 MMOL/L (ref 22–31)
CO2 SERPL-SCNC: 21 MMOL/L (ref 22–31)
CREAT SERPL-MCNC: 0.64 MG/DL (ref 0.6–1.1)
CREAT SERPL-MCNC: 0.64 MG/DL (ref 0.6–1.1)
GFR SERPL CREATININE-BSD FRML MDRD: >60 ML/MIN/1.73M2
GFR SERPL CREATININE-BSD FRML MDRD: >60 ML/MIN/1.73M2
GLUCOSE BLD-MCNC: 90 MG/DL (ref 70–125)
GLUCOSE SERPL-MCNC: 90 MG/DL (ref 70–125)
POTASSIUM BLD-SCNC: 4.8 MMOL/L (ref 3.5–5)
POTASSIUM SERPL-SCNC: 4.8 MMOL/L (ref 3.5–5)
SODIUM SERPL-SCNC: 128 MMOL/L (ref 136–145)
SODIUM SERPL-SCNC: 128 MMOL/L (ref 136–145)

## 2018-08-14 PROCEDURE — 99308 SBSQ NF CARE LOW MDM 20: CPT | Performed by: NURSE PRACTITIONER

## 2018-08-14 NOTE — PROGRESS NOTES
"CC: Lab Recheck    HPI:    Ana Luisa Lee is a 78 year old  (1940), who is being seen today for an episodic care visit at [facility]. Today's concern: lab recheck of BMP        OBJECTIVE: A & O x 3, NAD. Seen up in dining room    /64  Pulse 70  Temp 97.3  F (36.3  C)  Resp 20  Ht 5' 2\" (1.575 m)  Wt 113 lb 12.8 oz (51.6 kg)  SpO2 94%  BMI 20.81 kg/m2    LABS: today   Last Comprehensive Metabolic Panel:  Sodium   Date Value Ref Range Status   08/14/2018 128 (L) 136 - 145 mmol/L Final     Potassium   Date Value Ref Range Status   08/14/2018 4.8 3.5 - 5.0 mmol/L Final     Chloride   Date Value Ref Range Status   08/14/2018 98 98 - 107 mmol/L Final     Carbon Dioxide   Date Value Ref Range Status   08/14/2018 21 (L) 22 - 31 mmol/L Final     Anion Gap   Date Value Ref Range Status   08/14/2018 9 5 - 18 mmol/L Final     Glucose   Date Value Ref Range Status   08/14/2018 90 70 - 125 mg/dL Final     Urea Nitrogen   Date Value Ref Range Status   08/14/2018 14 8 - 28 mg/dL Final     Creatinine   Date Value Ref Range Status   08/14/2018 0.64 0.60 - 1.10 mg/dL Final     GFR Estimate   Date Value Ref Range Status   08/14/2018 >60 >60 ml/min/1.73m2 Final     Calcium   Date Value Ref Range Status   08/14/2018 9.8 8.5 - 10.5 mg/dL Final       ASSESSMENT / PLAN:  (E87.1) Hyponatremia  (primary encounter diagnosis)  Comment: dropped again to 128 from 131.  Pt feels fine.    Plan: will have PharmD review meds.  Any chance a med is contributing to this or is it just a chronic issue to watch??      Lucy Rodney RN, CNP      "

## 2018-08-16 ENCOUNTER — NURSING HOME VISIT (OUTPATIENT)
Dept: GERIATRICS | Facility: CLINIC | Age: 78
End: 2018-08-16
Payer: COMMERCIAL

## 2018-08-16 VITALS
BODY MASS INDEX: 20.81 KG/M2 | SYSTOLIC BLOOD PRESSURE: 116 MMHG | RESPIRATION RATE: 16 BRPM | DIASTOLIC BLOOD PRESSURE: 73 MMHG | TEMPERATURE: 97.5 F | OXYGEN SATURATION: 97 % | WEIGHT: 113.8 LBS | HEART RATE: 87 BPM

## 2018-08-16 DIAGNOSIS — F03.90 DEMENTIA WITHOUT BEHAVIORAL DISTURBANCE, UNSPECIFIED DEMENTIA TYPE: ICD-10-CM

## 2018-08-16 DIAGNOSIS — E87.1 HYPONATREMIA: Primary | ICD-10-CM

## 2018-08-16 DIAGNOSIS — M19.90 ARTHRITIS PAIN: ICD-10-CM

## 2018-08-16 DIAGNOSIS — Z92.241 STEROID-DEPENDENT CHRONIC OBSTRUCTIVE PULMONARY DISEASE (H): ICD-10-CM

## 2018-08-16 DIAGNOSIS — J44.9 STEROID-DEPENDENT CHRONIC OBSTRUCTIVE PULMONARY DISEASE (H): ICD-10-CM

## 2018-08-16 DIAGNOSIS — I10 ESSENTIAL HYPERTENSION: ICD-10-CM

## 2018-08-16 PROCEDURE — 99309 SBSQ NF CARE MODERATE MDM 30: CPT | Performed by: NURSE PRACTITIONER

## 2018-08-16 NOTE — PROGRESS NOTES
[facility]    Chief Complaint   Patient presents with     halfway Acute       [place] Medical Record Number:  [id number]    HPI:    Ana Luisa Lee is a 78 year old  (1940), who is being seen today for an episodic care visit at [facility].  HPI information obtained from: facility chart records, facility staff, patient report and [facility] chart review.Today's concern is:       Hyponatremia:   Dementia without behavioral disturbance, unspecified dementia type  Steroid-dependent chronic obstructive pulmonary disease (H)  Essential hypertension  Arthritis pain     HPI/ROS:  No CP, SOB, Cough, dizziness, fevers, chills, HA,  Bowel Abnormalities. Appetite varies..  No pain except aches allover at times. Thinks tramadol takes the edge off.    ALLERGIES: Hydralazine; Penicillin g; Meloxicam; Metoprolol; and Norvasc [amlodipine besylate]  Past Medical, Surgical, Family and Social History reviewed and updated in [facility].    Current Outpatient Prescriptions   Medication Sig Dispense Refill     acetaminophen (TYLENOL) 500 MG tablet Take 2 tablets (1,000 mg) by mouth 3 times daily       albuterol (PROAIR HFA/PROVENTIL HFA/VENTOLIN HFA) 108 (90 Base) MCG/ACT Inhaler Inhale 2 puffs into the lungs every 2 hours as needed for shortness of breath / dyspnea or wheezing        budesonide (PULMICORT FLEXHALER) 180 MCG/ACT inhaler Inhale 1 puff into the lungs 2 times daily       calcium 600 MG tablet Take 1 tablet (600 mg) by mouth daily 60 tablet      calcium carbonate (TUMS) 500 MG chewable tablet Take 1 chew tab by mouth 4 times daily as needed for heartburn (nausea or indigestion)       cholecalciferol (VITAMIN D) 1000 UNIT tablet Take 1 tablet (1,000 Units) by mouth daily 30 tablet      cyanocobalamin (B-12 TR) 1000 MCG TBCR Take 1 tablet by mouth daily 100 tablet 3     ferrous sulfate (IRON) 325 (65 Fe) MG tablet Take 325 mg by mouth three times a week Give on M-W-F AM only        gemfibrozil (LOPID) 600 MG tablet Take 1 tablet (600 mg) by mouth daily 90 tablet 0     levalbuterol (XOPENEX) 1.25 MG/3ML neb solution Take 1 ampule by nebulization 4 times daily       levothyroxine (SYNTHROID/LEVOTHROID) 25 MCG tablet Take 1 tablet (25 mcg) by mouth daily 90 tablet 3     LISINOPRIL PO Take 5 mg by mouth daily HOLD if SBP <110.Call if held x 2 in a row symptomatic        loperamide (IMODIUM) 2 MG capsule Take 2 mg by mouth every 8 hours as needed for diarrhea       montelukast (SINGULAIR) 10 MG tablet Take 1 tablet (10 mg) by mouth At Bedtime 30 tablet      Multiple Vitamins-Minerals (MULTIVITAMIN OR) Take 1 tablet by mouth daily       OMEPRAZOLE PO Take 20 mg by mouth every morning FOR Gastroesophagel reflux disease Take 30-60 minutes before a meal.       Ondansetron (ZOFRAN ODT PO) Take 4 mg by mouth every 12 hours as needed for nausea or vomiting        Ondansetron HCl (ZOFRAN PO) Take 4 mg by mouth every morning       polyethylene glycol (MIRALAX/GLYCOLAX) Packet Take 17 g by mouth daily as needed for constipation       predniSONE (DELTASONE) 10 MG tablet Take 10 mg by mouth daily       roflumilast (DALIRESP) 500 MCG TABS tablet Take 500 mcg by mouth daily        TRAMADOL HCL PO Take 25 mg by mouth 3 times daily       TRAMADOL HCL PO Take 25 mg by mouth daily as needed for moderate to severe pain       umeclidinium (INCRUSE ELLIPTA) 62.5 MCG/INH oral inhaler Inhale 1 puff into the lungs daily       Medications reviewed:  Medications reconciled to facility chart and changes were made to reflect current medications as identified as above med list. Below are the changes that were made:   Medications stopped since last EPIC medication reconciliation:   There are no discontinued medications.    Medications started since last [facility] medication reconciliation:  No orders of the defined types were placed in this encounter.        REVIEW OF SYSTEMS:  See under HPI    Physical Exam:  /73   Pulse 87  Temp 97.5  F (36.4  C)  Resp 16  Wt 113 lb 12.8 oz (51.6 kg)  SpO2 97%  BMI 20.81 kg/m2  GENERAL APPEARANCE: alert, engaged and  pleasant and cooperative.  ENT:  Mouth normal, moist mucous membranes.  Hearing acuity : [place]  RESP:  respiratory effort normal, no respiratory distress, Lung sounds clear,    CV: auscultation of heart done, RRR, soft systolic murmur, no gallop or rub.   Trace pedal bilat edema  ABDOMEN:  normal bowel sounds, soft, nontender.  M/S:   seen in  chair [place] equally.  SKIN:  Inspection  of skin:  skin warm, dry and intact, healed right shin wound  NEURO: cranial nerves 2-12 grossly intact, no facial asymmetry, no speech deficits.  PSYCH:  Alert, forgetful but appropriate.    Last 3 BP's: (8/15) 116/73,  (8/14) 120/64 mmHg    Recent Labs:   CBC RESULTS:   Recent Labs   Lab Test  07/22/18   0259 06/25/18   WBC  11.3*  8.3   RBC  3.73*  3.40*   HGB  11.4*  10.4*   HCT  33.6*  31.0*   MCV  90  91   MCH  30.6  30.6   MCHC  33.9  33.5   RDW  13.6  14.0   PLT  357  303       Last Basic Metabolic Panel:  Recent Labs   Lab Test  07/25/18   0806  07/24/18   1437  07/24/18   0604  07/23/18   0500   NA   --    --   134  131*   POTASSIUM  3.9  3.8  3.1*  3.6   CHLORIDE   --    --   106  101   DIANA   --    --   8.2*  8.6   CO2   --    --   20  18*   BUN   --    --   11  9   CR   --    --   0.54  0.48*   GLC   --    --   99  78       Liver Function Studies -   Recent Labs   Lab Test  07/22/18   0259  04/13/18   0429   PROTTOTAL  6.9  7.4   ALBUMIN  3.9  3.9   BILITOTAL  0.5  0.5   ALKPHOS  72  66   AST  12  14   ALT  15  14       TSH   Date Value Ref Range Status   07/22/2018 2.07 0.40 - 4.00 mU/L Final   12/28/2017 1.24 0.40 - 4.00 mU/L Final       Lab Results   Component Value Date    A1C 6.8 01/03/2017           ASSESSMENT / PLAN:    (E87.1) Hyponatremia  Comment/Plan:  Down to 128 on Tuesday. D/w PharmD--decreased Lisinopril and PPI as has low but possible chance of affecting the Na+, will ask  PCP to check in 1 week at facility     (F03.90) Dementia without behavioral disturbance, unspecified dementia type  Comment/Plan:  SLUMS 11/30,  ACL 3.4/5.8,  CPT 4.2/5.6 AND SAFETY EVAL 25/27 here per therapies.       (J44.9) Steroid-dependent chronic obstructive pulmonary disease (H)  Comment/Plan: compensated, is on long time prednisone, has several COPD meds,   Albuteral inhaler to q 2hr prn and monitor.     (I10) Essential hypertension  Comment/Plan:  mostly 1200-140's SBP), watch since changed lisinopril, cont to monitor.      (M19.90) Arthritis pain    Comment/Plan:cont tramadol to 25 mg tid and one prn dose per day--keeping pain edge off. No changes. (*had been on 50 mg bid not TID when she came to us. This had been decreased from TID this past spring/summer*). Monitor.             **MSg left on her daughter Windy's  personal cell phone to update her**     Electronically signed by  IVÁN Redding CNP

## 2018-08-17 ENCOUNTER — PATIENT OUTREACH (OUTPATIENT)
Dept: GERIATRIC MEDICINE | Facility: CLINIC | Age: 78
End: 2018-08-17

## 2018-08-17 NOTE — PROGRESS NOTES
[facility] Care Coordination Contact  EPIC notes reviewed. VM left with Libia IRNALDI to confirm TCU discharge.   Secure e-mail sent to client's daughter to f/u on discharge plans.  Rec'd vm from Ira at [facility] to report client discharge on 8/16/18 to [facility]  Call placed to [facility], spoke with Dina in the nursing office to inquire on client. Dina reports no concerns, no questions, Client has an appt wit Dr. Alvarez 8/21/18. Dina states that a referral was placed with [facility]  E-mail sent to Windy BARTHOLOMEW [facility] to inform of the above. Shared that client will be transitioned to a new CC 9/1/18, [facility].  Call placed to client, states that she is happy to be back in her new home. Client states that she doesn't feel very ambitious. Client states that a [facility] nurse just completed a visit and is aware that she has had many medication changes due to her low Na  Inquired on LSS , client states that she no longer wishes to go out on outings, but would like her  to run errands for her. Explained that CC will f/u with LSS if this a role that her  can provide, CC to follow up with client next week.   Secure e-mail sent to Krystal SHORE to f/u on above info  JACINTA log completed.    Yeni Savage RN, BC  Manager [facility] Care Coordinator   [phone]  (Fax)

## 2018-08-20 NOTE — PROGRESS NOTES
[facility] Care Coordination Contact  8/17/18 Rec'd e-mail from client's daughter Windy to inquire when her mother's next appt with Dr. Carrasco is. Windy stated that she would like to be present for the appt. Windy also inquired on the CT scan that was completed 7/22/18, thyroid and lung nodule.  8/20/18 Call placed to [facility], CC informed that no appt is scheduled, client last seen in May and f/u is recommended in November.   8/2018 Secure e-mail sent to Windy with the above info, provided contact info to schedule an appt with Dr. Carrasco that works best for her schedule.  Recommended that Windy f/u with client's PCP, [facility] Wells regarding CT results. Explained that CC informed that client will be seen by Dr. Alvarez 8/21/18  Shared with Windy that her mother is unsure if she will cont with  through with LSS, stating that she would not be able to go out with the  to complete errands and does not feel that she will need assistance with errands as she receives all meals and medications at the [facility]   Care Coordinator will f/u with LSS.  Yeni Savage RN, BC  Manager [facility] Care Coordinator   [phone]  (Fax)

## 2018-08-22 ENCOUNTER — RECORDS - HEALTHEAST (OUTPATIENT)
Dept: LAB | Facility: CLINIC | Age: 78
End: 2018-08-22

## 2018-08-22 LAB
ALBUMIN SERPL-MCNC: 3.6 G/DL (ref 3.5–5)
ALP SERPL-CCNC: 49 U/L (ref 45–120)
ALT SERPL W P-5'-P-CCNC: <9 U/L (ref 0–45)
ANION GAP SERPL CALCULATED.3IONS-SCNC: 10 MMOL/L (ref 5–18)
AST SERPL W P-5'-P-CCNC: 13 U/L (ref 0–40)
BILIRUB SERPL-MCNC: 0.3 MG/DL (ref 0–1)
BUN SERPL-MCNC: 16 MG/DL (ref 8–28)
CALCIUM SERPL-MCNC: 10.2 MG/DL (ref 8.5–10.5)
CHLORIDE BLD-SCNC: 98 MMOL/L (ref 98–107)
CO2 SERPL-SCNC: 24 MMOL/L (ref 22–31)
CREAT SERPL-MCNC: 0.7 MG/DL (ref 0.6–1.1)
ERYTHROCYTE [DISTWIDTH] IN BLOOD BY AUTOMATED COUNT: 14.4 % (ref 11–14.5)
GFR SERPL CREATININE-BSD FRML MDRD: >60 ML/MIN/1.73M2
GLUCOSE BLD-MCNC: 87 MG/DL (ref 70–125)
HCT VFR BLD AUTO: 29 % (ref 35–47)
HGB BLD-MCNC: 9.7 G/DL (ref 12–16)
MCH RBC QN AUTO: 30.8 PG (ref 27–34)
MCHC RBC AUTO-ENTMCNC: 33.4 G/DL (ref 32–36)
MCV RBC AUTO: 92 FL (ref 80–100)
PLATELET # BLD AUTO: 263 THOU/UL (ref 140–440)
PMV BLD AUTO: 9 FL (ref 8.5–12.5)
POTASSIUM BLD-SCNC: 4.4 MMOL/L (ref 3.5–5)
PROT SERPL-MCNC: 6.3 G/DL (ref 6–8)
RBC # BLD AUTO: 3.15 MILL/UL (ref 3.8–5.4)
SODIUM SERPL-SCNC: 132 MMOL/L (ref 136–145)
WBC: 8 THOU/UL (ref 4–11)

## 2018-08-23 ENCOUNTER — PATIENT OUTREACH (OUTPATIENT)
Dept: GERIATRIC MEDICINE | Facility: CLINIC | Age: 78
End: 2018-08-23

## 2018-08-23 DIAGNOSIS — Z76.89 HEALTH CARE HOME: ICD-10-CM

## 2018-08-23 NOTE — PROGRESS NOTES
[facility] Care Coordination Contact  8/22/18 Rec'd follow up e-mail from [facility] re  services. Krystal states that the  cannot run errands for the client, stating that their services is about the client going with the . 8/22/18 Rec'd e-mail from client's daughter Windy to inquire if her mother would still receive PT and for how long. Windy also inquired if her mother will receive the commode that was recommended.  Windy stated that her mother no longer has a need for a  at this time and that she is no longer interested in getting out.   8/22/18 Secure e-mail sent to Windy that the referral for the commode was reinitiated when her mother discharged from TCU, this CC will follow.  Per Windy BARTHOLOMEW [facility] client will receive PT 1x this week and 2x/wk for 2 more weeks. Information communicated to client's dtr Windy.   8/23/18 Call placed to client to share above info re LSS/. Client states that she would like to continue with  services for visiting, watering her plants as she is not able to.  Explained that Care Coordinator will f/u with Krystal at [facility] to cont  at this time.  Yeni Savage RN, BC  Manager [facility] Care Coordinator   [phone]  (Fax)

## 2018-08-29 ENCOUNTER — PATIENT OUTREACH (OUTPATIENT)
Dept: GERIATRIC MEDICINE | Facility: CLINIC | Age: 78
End: 2018-08-29

## 2018-08-29 NOTE — PROGRESS NOTES
"8/28/2018 Rec'd vm from client inquiring when she will receive PT this week, \"no one has called me to schedule.\"  8/29/18 Call placed to [facility] intake, CC informed that a PT visit is planned for today.  8/29/18 e-mail sent to Windy BARTHOLOMEW [facility] and Erin BARTHOLOMEW [facility] to inform them that client will be transferred to [facility] 9/1/18 for ongoing care coordination, Dr. Alvarez is her PCP.   8/29/18 Call placed to client to inform of the above. Client is aware of care coordination transfer   Yeni Savage RN, BC  Manager [facility] Care Coordinator   [phone]  (Fax)    "

## 2018-08-31 ENCOUNTER — PATIENT OUTREACH (OUTPATIENT)
Dept: GERIATRIC MEDICINE | Facility: CLINIC | Age: 78
End: 2018-08-31

## 2018-08-31 NOTE — PROGRESS NOTES
8/30/18 Rec'd e-mail from Erin BARTHOLOMEW [facility] to report that [facility] is not able to provide nebulizer tubing.   Spoke with Windy BARTHOLOMEW [facility] who states that she has ordered through [facility],  Information provided to Erin.  8/31/18 Rec'd tele call from Erin that she did talk with the [facility] nurse who will order more nebulizer replacement sets for client  Yeni Savage RN, BC  Manager [facility] Care Coordinator   [phone]  (Fax)

## 2018-09-01 DIAGNOSIS — Z53.9 DIAGNOSIS NOT YET DEFINED: Primary | ICD-10-CM

## 2018-09-01 PROCEDURE — G0180 MD CERTIFICATION HHA PATIENT: HCPCS | Performed by: INTERNAL MEDICINE

## 2018-09-04 ENCOUNTER — TELEPHONE (OUTPATIENT)
Dept: INTERNAL MEDICINE | Facility: CLINIC | Age: 78
End: 2018-09-04

## 2018-09-04 ENCOUNTER — PATIENT OUTREACH (OUTPATIENT)
Dept: GERIATRIC MEDICINE | Facility: CLINIC | Age: 78
End: 2018-09-04

## 2018-09-04 NOTE — PROGRESS NOTES
Member dis-enrolled from [facility], transitioned to [facility] 9/1/ 2018, onsite medical team at [facility].  UTF completed, forms to be forwarded to [facility]: OBRA, LTCC, Care plan and signature page, RS tool, UT.

## 2018-09-04 NOTE — PROGRESS NOTES
[facility] Care Coordination Contact  Member's chart reviewed for transfer to [facility].  Disenrollment check-list completed, UTF done, chart handed off to CMS to process.   Yeni Savage RN, BC  Manager [facility] Care Coordinator   [phone]  (Fax)

## 2018-09-10 NOTE — PROGRESS NOTES
Re-faxed Provider Signature letter to [facility] for signature.     Carmenza Griffin  Case Management Specialist  [facility]  [phone]

## 2018-10-13 ENCOUNTER — HOSPITAL ENCOUNTER (INPATIENT)
Facility: CLINIC | Age: 78
LOS: 2 days | Discharge: NURSING, REHAB OR LONG-TERM CARE | DRG: 552 | End: 2018-10-18
Attending: EMERGENCY MEDICINE | Admitting: HOSPITALIST
Payer: COMMERCIAL

## 2018-10-13 ENCOUNTER — APPOINTMENT (OUTPATIENT)
Dept: GENERAL RADIOLOGY | Facility: CLINIC | Age: 78
DRG: 552 | End: 2018-10-13
Attending: EMERGENCY MEDICINE
Payer: COMMERCIAL

## 2018-10-13 ENCOUNTER — APPOINTMENT (OUTPATIENT)
Dept: CT IMAGING | Facility: CLINIC | Age: 78
DRG: 552 | End: 2018-10-13
Attending: EMERGENCY MEDICINE
Payer: COMMERCIAL

## 2018-10-13 DIAGNOSIS — J45.40 MODERATE PERSISTENT ASTHMA WITHOUT COMPLICATION: Primary | ICD-10-CM

## 2018-10-13 DIAGNOSIS — N39.0 COMPLICATED UTI (URINARY TRACT INFECTION): ICD-10-CM

## 2018-10-13 DIAGNOSIS — E87.1 HYPONATREMIA: ICD-10-CM

## 2018-10-13 DIAGNOSIS — S70.12XA HEMATOMA OF LEFT ILIOPSOAS MUSCLE, INITIAL ENCOUNTER: ICD-10-CM

## 2018-10-13 DIAGNOSIS — R10.32 LEFT INGUINAL PAIN: ICD-10-CM

## 2018-10-13 DIAGNOSIS — S76.312A RUPTURE OF LEFT HAMSTRING TENDON, INITIAL ENCOUNTER: ICD-10-CM

## 2018-10-13 DIAGNOSIS — R42 DIZZINESS: ICD-10-CM

## 2018-10-13 DIAGNOSIS — R33.9 URINARY RETENTION: ICD-10-CM

## 2018-10-13 LAB
ALBUMIN UR-MCNC: NEGATIVE MG/DL
ANION GAP SERPL CALCULATED.3IONS-SCNC: 8 MMOL/L (ref 3–14)
APPEARANCE UR: ABNORMAL
BASOPHILS # BLD AUTO: 0 10E9/L (ref 0–0.2)
BASOPHILS NFR BLD AUTO: 0.2 %
BILIRUB UR QL STRIP: NEGATIVE
BUN SERPL-MCNC: 12 MG/DL (ref 7–30)
CALCIUM SERPL-MCNC: 8.7 MG/DL (ref 8.5–10.1)
CHLORIDE SERPL-SCNC: 94 MMOL/L (ref 94–109)
CO2 SERPL-SCNC: 24 MMOL/L (ref 20–32)
COLOR UR AUTO: YELLOW
CREAT SERPL-MCNC: 0.66 MG/DL (ref 0.52–1.04)
DIFFERENTIAL METHOD BLD: ABNORMAL
EOSINOPHIL # BLD AUTO: 0.1 10E9/L (ref 0–0.7)
EOSINOPHIL NFR BLD AUTO: 1.2 %
ERYTHROCYTE [DISTWIDTH] IN BLOOD BY AUTOMATED COUNT: 14.1 % (ref 10–15)
GFR SERPL CREATININE-BSD FRML MDRD: 86 ML/MIN/1.7M2
GLUCOSE SERPL-MCNC: 108 MG/DL (ref 70–99)
GLUCOSE UR STRIP-MCNC: NEGATIVE MG/DL
HCT VFR BLD AUTO: 28.8 % (ref 35–47)
HGB BLD-MCNC: 9.8 G/DL (ref 11.7–15.7)
HGB UR QL STRIP: ABNORMAL
IMM GRANULOCYTES # BLD: 0.1 10E9/L (ref 0–0.4)
IMM GRANULOCYTES NFR BLD: 1.1 %
KETONES UR STRIP-MCNC: NEGATIVE MG/DL
LEUKOCYTE ESTERASE UR QL STRIP: ABNORMAL
LYMPHOCYTES # BLD AUTO: 1.2 10E9/L (ref 0.8–5.3)
LYMPHOCYTES NFR BLD AUTO: 14.6 %
MCH RBC QN AUTO: 30.7 PG (ref 26.5–33)
MCHC RBC AUTO-ENTMCNC: 34 G/DL (ref 31.5–36.5)
MCV RBC AUTO: 90 FL (ref 78–100)
MONOCYTES # BLD AUTO: 0.7 10E9/L (ref 0–1.3)
MONOCYTES NFR BLD AUTO: 9.2 %
NEUTROPHILS # BLD AUTO: 5.9 10E9/L (ref 1.6–8.3)
NEUTROPHILS NFR BLD AUTO: 73.7 %
NITRATE UR QL: NEGATIVE
NRBC # BLD AUTO: 0 10*3/UL
NRBC BLD AUTO-RTO: 0 /100
PH UR STRIP: 7 PH (ref 5–7)
PLATELET # BLD AUTO: 263 10E9/L (ref 150–450)
POTASSIUM SERPL-SCNC: 4 MMOL/L (ref 3.4–5.3)
RBC # BLD AUTO: 3.19 10E12/L (ref 3.8–5.2)
RBC #/AREA URNS AUTO: 45 /HPF (ref 0–2)
SODIUM SERPL-SCNC: 126 MMOL/L (ref 133–144)
SOURCE: ABNORMAL
SP GR UR STRIP: 1.01 (ref 1–1.03)
UROBILINOGEN UR STRIP-MCNC: 0 MG/DL (ref 0–2)
WBC # BLD AUTO: 8 10E9/L (ref 4–11)
WBC #/AREA URNS AUTO: >182 /HPF (ref 0–5)
WBC CLUMPS #/AREA URNS HPF: PRESENT /HPF

## 2018-10-13 PROCEDURE — 96360 HYDRATION IV INFUSION INIT: CPT

## 2018-10-13 PROCEDURE — 85025 COMPLETE CBC W/AUTO DIFF WBC: CPT | Performed by: EMERGENCY MEDICINE

## 2018-10-13 PROCEDURE — 87186 SC STD MICRODIL/AGAR DIL: CPT | Performed by: EMERGENCY MEDICINE

## 2018-10-13 PROCEDURE — 40000275 ZZH STATISTIC RCP TIME EA 10 MIN

## 2018-10-13 PROCEDURE — 25000132 ZZH RX MED GY IP 250 OP 250 PS 637: Performed by: PHYSICIAN ASSISTANT

## 2018-10-13 PROCEDURE — G0378 HOSPITAL OBSERVATION PER HR: HCPCS

## 2018-10-13 PROCEDURE — 81001 URINALYSIS AUTO W/SCOPE: CPT | Performed by: EMERGENCY MEDICINE

## 2018-10-13 PROCEDURE — 74176 CT ABD & PELVIS W/O CONTRAST: CPT

## 2018-10-13 PROCEDURE — 25000132 ZZH RX MED GY IP 250 OP 250 PS 637: Performed by: EMERGENCY MEDICINE

## 2018-10-13 PROCEDURE — 94640 AIRWAY INHALATION TREATMENT: CPT

## 2018-10-13 PROCEDURE — 87086 URINE CULTURE/COLONY COUNT: CPT | Performed by: EMERGENCY MEDICINE

## 2018-10-13 PROCEDURE — 25000125 ZZHC RX 250: Performed by: EMERGENCY MEDICINE

## 2018-10-13 PROCEDURE — 99220 ZZC INITIAL OBSERVATION CARE,LEVL III: CPT | Performed by: PHYSICIAN ASSISTANT

## 2018-10-13 PROCEDURE — 73502 X-RAY EXAM HIP UNI 2-3 VIEWS: CPT

## 2018-10-13 PROCEDURE — 87088 URINE BACTERIA CULTURE: CPT | Performed by: EMERGENCY MEDICINE

## 2018-10-13 PROCEDURE — 25000125 ZZHC RX 250: Performed by: PHYSICIAN ASSISTANT

## 2018-10-13 PROCEDURE — 25000128 H RX IP 250 OP 636: Performed by: PHYSICIAN ASSISTANT

## 2018-10-13 PROCEDURE — 99285 EMERGENCY DEPT VISIT HI MDM: CPT | Mod: 25

## 2018-10-13 PROCEDURE — 73700 CT LOWER EXTREMITY W/O DYE: CPT | Mod: LT

## 2018-10-13 PROCEDURE — 96361 HYDRATE IV INFUSION ADD-ON: CPT

## 2018-10-13 PROCEDURE — 80048 BASIC METABOLIC PNL TOTAL CA: CPT | Performed by: EMERGENCY MEDICINE

## 2018-10-13 RX ORDER — IPRATROPIUM BROMIDE AND ALBUTEROL SULFATE 2.5; .5 MG/3ML; MG/3ML
3 SOLUTION RESPIRATORY (INHALATION) ONCE
Status: COMPLETED | OUTPATIENT
Start: 2018-10-13 | End: 2018-10-13

## 2018-10-13 RX ORDER — TAMSULOSIN HYDROCHLORIDE 0.4 MG/1
0.4 CAPSULE ORAL DAILY
Status: COMPLETED | OUTPATIENT
Start: 2018-10-13 | End: 2018-10-15

## 2018-10-13 RX ORDER — POLYETHYLENE GLYCOL 3350 17 G/17G
17 POWDER, FOR SOLUTION ORAL DAILY PRN
Status: DISCONTINUED | OUTPATIENT
Start: 2018-10-13 | End: 2018-10-18 | Stop reason: HOSPADM

## 2018-10-13 RX ORDER — FEXOFENADINE HCL 180 MG/1
180 TABLET ORAL DAILY
COMMUNITY

## 2018-10-13 RX ORDER — FEXOFENADINE HCL 180 MG/1
180 TABLET ORAL DAILY
Status: DISCONTINUED | OUTPATIENT
Start: 2018-10-13 | End: 2018-10-18 | Stop reason: HOSPADM

## 2018-10-13 RX ORDER — ACETAMINOPHEN 500 MG
1000 TABLET ORAL 3 TIMES DAILY
Status: DISCONTINUED | OUTPATIENT
Start: 2018-10-13 | End: 2018-10-18 | Stop reason: HOSPADM

## 2018-10-13 RX ORDER — IPRATROPIUM BROMIDE AND ALBUTEROL SULFATE 2.5; .5 MG/3ML; MG/3ML
3 SOLUTION RESPIRATORY (INHALATION)
Status: DISCONTINUED | OUTPATIENT
Start: 2018-10-13 | End: 2018-10-16

## 2018-10-13 RX ORDER — MORPHINE SULFATE 2 MG/ML
2 INJECTION, SOLUTION INTRAMUSCULAR; INTRAVENOUS ONCE
Status: DISCONTINUED | OUTPATIENT
Start: 2018-10-13 | End: 2018-10-13

## 2018-10-13 RX ORDER — ONDANSETRON 4 MG/1
4 TABLET, ORALLY DISINTEGRATING ORAL EVERY MORNING
Status: DISCONTINUED | OUTPATIENT
Start: 2018-10-14 | End: 2018-10-18 | Stop reason: HOSPADM

## 2018-10-13 RX ORDER — CYCLOBENZAPRINE HCL 10 MG
10 TABLET ORAL 3 TIMES DAILY
Status: DISCONTINUED | OUTPATIENT
Start: 2018-10-13 | End: 2018-10-14

## 2018-10-13 RX ORDER — LIDOCAINE 50 MG/G
PATCH TOPICAL
Qty: 30 PATCH | Refills: 0 | Status: SHIPPED | OUTPATIENT
Start: 2018-10-13

## 2018-10-13 RX ORDER — LOPERAMIDE HCL 2 MG
2 CAPSULE ORAL EVERY 8 HOURS PRN
Status: DISCONTINUED | OUTPATIENT
Start: 2018-10-13 | End: 2018-10-18 | Stop reason: HOSPADM

## 2018-10-13 RX ORDER — CEPHALEXIN 500 MG/1
500 CAPSULE ORAL 2 TIMES DAILY
Qty: 12 CAPSULE | Refills: 0 | Status: SHIPPED | OUTPATIENT
Start: 2018-10-14 | End: 2018-10-13

## 2018-10-13 RX ORDER — SODIUM CHLORIDE 9 MG/ML
INJECTION, SOLUTION INTRAVENOUS CONTINUOUS
Status: ACTIVE | OUTPATIENT
Start: 2018-10-13 | End: 2018-10-14

## 2018-10-13 RX ORDER — ONDANSETRON 2 MG/ML
4 INJECTION INTRAMUSCULAR; INTRAVENOUS EVERY 6 HOURS PRN
Status: DISCONTINUED | OUTPATIENT
Start: 2018-10-13 | End: 2018-10-18 | Stop reason: HOSPADM

## 2018-10-13 RX ORDER — NALOXONE HYDROCHLORIDE 0.4 MG/ML
.1-.4 INJECTION, SOLUTION INTRAMUSCULAR; INTRAVENOUS; SUBCUTANEOUS
Status: DISCONTINUED | OUTPATIENT
Start: 2018-10-13 | End: 2018-10-18 | Stop reason: HOSPADM

## 2018-10-13 RX ORDER — ACETAMINOPHEN 500 MG
1000 TABLET ORAL ONCE
Status: COMPLETED | OUTPATIENT
Start: 2018-10-13 | End: 2018-10-13

## 2018-10-13 RX ORDER — MONTELUKAST SODIUM 10 MG/1
10 TABLET ORAL AT BEDTIME
Status: DISCONTINUED | OUTPATIENT
Start: 2018-10-13 | End: 2018-10-18 | Stop reason: HOSPADM

## 2018-10-13 RX ORDER — PREDNISONE 10 MG/1
10 TABLET ORAL DAILY
Status: DISCONTINUED | OUTPATIENT
Start: 2018-10-13 | End: 2018-10-17

## 2018-10-13 RX ORDER — ROFLUMILAST 500 UG/1
500 TABLET ORAL DAILY
Status: DISCONTINUED | OUTPATIENT
Start: 2018-10-13 | End: 2018-10-18 | Stop reason: HOSPADM

## 2018-10-13 RX ORDER — ONDANSETRON 4 MG/1
4 TABLET, ORALLY DISINTEGRATING ORAL EVERY 6 HOURS PRN
Status: DISCONTINUED | OUTPATIENT
Start: 2018-10-13 | End: 2018-10-18 | Stop reason: HOSPADM

## 2018-10-13 RX ORDER — LISINOPRIL 5 MG/1
5 TABLET ORAL DAILY
Status: DISCONTINUED | OUTPATIENT
Start: 2018-10-13 | End: 2018-10-14

## 2018-10-13 RX ORDER — CEPHALEXIN 500 MG/1
500 CAPSULE ORAL 2 TIMES DAILY
Qty: 14 CAPSULE | Refills: 0 | Status: SHIPPED | OUTPATIENT
Start: 2018-10-13 | End: 2018-10-18

## 2018-10-13 RX ORDER — AMOXICILLIN 250 MG
1 CAPSULE ORAL 2 TIMES DAILY
Status: DISCONTINUED | OUTPATIENT
Start: 2018-10-13 | End: 2018-10-18 | Stop reason: HOSPADM

## 2018-10-13 RX ORDER — LEVOTHYROXINE SODIUM 25 UG/1
25 TABLET ORAL DAILY
Status: DISCONTINUED | OUTPATIENT
Start: 2018-10-13 | End: 2018-10-18 | Stop reason: HOSPADM

## 2018-10-13 RX ORDER — AMOXICILLIN 250 MG
2 CAPSULE ORAL 2 TIMES DAILY
Status: DISCONTINUED | OUTPATIENT
Start: 2018-10-13 | End: 2018-10-18 | Stop reason: HOSPADM

## 2018-10-13 RX ORDER — CEPHALEXIN 500 MG/1
500 CAPSULE ORAL ONCE
Status: COMPLETED | OUTPATIENT
Start: 2018-10-13 | End: 2018-10-13

## 2018-10-13 RX ORDER — CEPHALEXIN 500 MG/1
500 CAPSULE ORAL EVERY 12 HOURS SCHEDULED
Status: DISCONTINUED | OUTPATIENT
Start: 2018-10-13 | End: 2018-10-18 | Stop reason: HOSPADM

## 2018-10-13 RX ORDER — CEFTRIAXONE 1 G/1
1 INJECTION, POWDER, FOR SOLUTION INTRAMUSCULAR; INTRAVENOUS ONCE
Status: DISCONTINUED | OUTPATIENT
Start: 2018-10-13 | End: 2018-10-13

## 2018-10-13 RX ORDER — LEVALBUTEROL INHALATION SOLUTION 0.63 MG/3ML
0.63 SOLUTION RESPIRATORY (INHALATION) EVERY 8 HOURS PRN
Status: DISCONTINUED | OUTPATIENT
Start: 2018-10-13 | End: 2018-10-18 | Stop reason: HOSPADM

## 2018-10-13 RX ORDER — LIDOCAINE 4 G/G
2 PATCH TOPICAL ONCE
Status: COMPLETED | OUTPATIENT
Start: 2018-10-13 | End: 2018-10-13

## 2018-10-13 RX ADMIN — TAMSULOSIN HYDROCHLORIDE 0.4 MG: 0.4 CAPSULE ORAL at 18:00

## 2018-10-13 RX ADMIN — FEXOFENADINE HYDROCHLORIDE 180 MG: 180 TABLET, FILM COATED ORAL at 18:16

## 2018-10-13 RX ADMIN — TRAMADOL HYDROCHLORIDE 25 MG: 50 TABLET ORAL at 20:00

## 2018-10-13 RX ADMIN — ACETAMINOPHEN 1000 MG: 500 TABLET, FILM COATED ORAL at 08:13

## 2018-10-13 RX ADMIN — LIDOCAINE 1 PATCH: 560 PATCH PERCUTANEOUS; TOPICAL; TRANSDERMAL at 10:17

## 2018-10-13 RX ADMIN — LEVOTHYROXINE SODIUM 25 MCG: 25 TABLET ORAL at 18:16

## 2018-10-13 RX ADMIN — IPRATROPIUM BROMIDE AND ALBUTEROL SULFATE 3 ML: .5; 3 SOLUTION RESPIRATORY (INHALATION) at 20:02

## 2018-10-13 RX ADMIN — TRAMADOL HYDROCHLORIDE 25 MG: 50 TABLET ORAL at 08:13

## 2018-10-13 RX ADMIN — PREDNISONE 10 MG: 10 TABLET ORAL at 18:16

## 2018-10-13 RX ADMIN — CEPHALEXIN 500 MG: 500 CAPSULE ORAL at 13:01

## 2018-10-13 RX ADMIN — ACETAMINOPHEN 1000 MG: 500 TABLET, FILM COATED ORAL at 20:00

## 2018-10-13 RX ADMIN — SENNOSIDES AND DOCUSATE SODIUM 1 TABLET: 8.6; 5 TABLET ORAL at 20:01

## 2018-10-13 RX ADMIN — LEVALBUTEROL HYDROCHLORIDE 0.63 MG: 0.63 SOLUTION RESPIRATORY (INHALATION) at 18:12

## 2018-10-13 RX ADMIN — CEPHALEXIN 500 MG: 500 CAPSULE ORAL at 20:00

## 2018-10-13 RX ADMIN — IPRATROPIUM BROMIDE AND ALBUTEROL SULFATE 3 ML: .5; 3 SOLUTION RESPIRATORY (INHALATION) at 12:29

## 2018-10-13 RX ADMIN — ROFLUMILAST 500 MCG: 500 TABLET ORAL at 20:05

## 2018-10-13 RX ADMIN — SODIUM CHLORIDE: 9 INJECTION, SOLUTION INTRAVENOUS at 17:57

## 2018-10-13 RX ADMIN — MONTELUKAST SODIUM 10 MG: 10 TABLET, FILM COATED ORAL at 23:12

## 2018-10-13 RX ADMIN — LISINOPRIL 5 MG: 5 TABLET ORAL at 18:15

## 2018-10-13 RX ADMIN — CYCLOBENZAPRINE HYDROCHLORIDE 10 MG: 10 TABLET, FILM COATED ORAL at 20:00

## 2018-10-13 ASSESSMENT — PAIN DESCRIPTION - DESCRIPTORS: DESCRIPTORS: SHARP;SHOOTING

## 2018-10-13 ASSESSMENT — ENCOUNTER SYMPTOMS
DIFFICULTY URINATING: 0
FEVER: 0
MYALGIAS: 1

## 2018-10-13 NOTE — IP AVS SNAPSHOT
MRN:[id number]                      After Visit Summary   10/13/2018    Ana Luisa Lee    MRN: [id number]           Thank you!     Thank you for choosing [facility] for your care. Our goal is always to provide you with excellent care. Hearing back from our patients is one way we can continue to improve our services. Please take a few minutes to complete the written survey that you may receive in the mail after you visit. If you would like to speak to someone directly about your visit please contact Patient Relations at [phone]. Thank you!          Patient Information     Date Of Birth          1940        Designated Caregiver       Most Recent Value    Caregiver    Will someone help with your care after discharge? no    Name of designated caregiver -- [assisted living facility]      About your hospital stay     You were admitted on:  October 13, 2018 You last received care in the:  [facility] Observation Department    You were discharged on:  October 18, 2018        Reason for your hospital stay       You were hospitalized for pain and were found to have a iliopsoas tear with hematoma and nondisplaced coccyx fracture.  You also were treated for a urinary tract infection.  You will go to a rehab facility for further therapy upon discharge.                  Who to Call     For medical emergencies, please call 911.  For non-urgent questions about your medical care, please call your primary care provider or clinic, [phone]          Attending Provider     Provider Specialty    Chayito Hampton MD Emergency Medicine    Gigi, Speedy Vinson MD Internal Medicine       Primary Care Provider Office Phone # Fax #    Db Alvarez [phone]      After Care Instructions     Activity - Up with assistive device           Advance Diet as Tolerated       Follow this diet upon discharge: Orders Placed This Encounter      Regular Diet Adult             Fall precautions           Crawley catheter       To straight gravity drainage. Change catheter every 2 weeks and PRN for leaking or decreased uring output with signs of bladder distention.     Crawley in place for urinary retention.  Continue Flomax, recommend trial removal in one week, if unsuccessful then follow up with Urology in clinic.            General info for SNF       Length of Stay Estimate: Short Term Care: Estimated # of Days <30  Condition at Discharge: Stable  Level of care:skilled   Rehabilitation Potential: Fair  Admission H&P remains valid and up-to-date: Yes  Recent Chemotherapy: N/A  Use Nursing Home Standing Orders: Yes            Mantoux instructions       Give two-step Mantoux (PPD) Per Facility Policy Yes                  Follow-up Appointments     Follow Up       Follow up with Neurology in clinic for possible repeat EMG if your foot drop persists of the weakness does not improve.                  Additional Services     Med Therapy Manage Referral       Your provider has referred you to: **[facility] Scheduling (numerous locations) [phone]   [email or web address]    Reason for Referral: Greater than 10 prescription medications    The [facility] department will contact you to schedule an appointment.  You may also schedule the appointment by calling [phone].  For [facility] patients, please call [phone] to confirm/schedule your appointment on the next business day.    This service is designed to help you get the most from your medications.  A specially trained Pharmacist will work closely with you and your providers to solve any questions, concerns, issues or problems related to your medications.    Please bring all of your prescription and non-prescription medications (such as vitamins, over-the-counter medications, and herbals) or a detailed medication list  to your appointment.    If you have a glucose meter or other home monitoring information, please also bring this to your appointment (i.e. blood glucose log, blood pressure log, pain log, etc.).            Occupational Therapy Adult Consult       Evaluate and treat as clinically indicated.    Reason:  Weakness            Physical Therapy Adult Consult       Evaluate and treat as clinically indicated.    Reason:  Weakness, iliopsoas tear, coccyx fracture                   Follow-up Information     Follow up with Db Alvarez MD.    Specialty:  Family Practice    Contact information:    [facility]  [address] [facility] [place] [address]  [phone]          Follow up with [facility].    Specialty:  EMERGENCY MEDICINE    Why:  If symptoms worsen    Contact information:    [address] [facility] [address] [phone]        [facility].    Contact information:    [address]  [address]  [facility] [address] [phone]                Pending Results     No orders found from 10/11/2018 to 10/14/2018.            Statement of Approval     Ordered          10/18/18 1145  I have reviewed and agree with all the recommendations and orders detailed in this document.  EFFECTIVE NOW     Approved and electronically signed by:  Suly Petersen MD             Admission Information     Date & Time Provider Department Dept. Phone    10/13/2018 Speedy Yu MD [facility] Observation Department [phone]      Your Vitals Were     Blood Pressure Pulse Temperature Respirations Pulse Oximetry       139/70 (BP Location: Right arm) 86 97.1  F (36.2  C) (Oral) 20 99%       MyChart Information     [employer] gives you secure access to your electronic health record. If you see a primary care provider, you can also send messages to your care team and make appointments. If you have questions,  please call your primary care clinic.  If you do not have a primary care provider, please call [phone] and they will assist you.        Care EveryWhere ID     This is your Care EveryWhere ID. This could be used by other organizations to access your [facility] medical records  [id number]        Equal Access to Services     PATRICIOSOLA ИРИНА : Hadii aad ku hadalaniso Sosteveali, waaxda luqadaha, qaybta kaalmada lucerokennediearlene, holly dietrichadjony braga. So [facility] [phone].    ATENCIÓN: Si habla español, tiene a martinez disposición servicios gratuitos de asistencia lingüística. Llame al [phone].    We comply with applicable federal civil rights laws and [place] laws. We do not discriminate on the basis of race, color, national origin, age, disability, sex, sexual orientation, or gender identity.               Review of your medicines      START taking        Dose / Directions    cephALEXin 500 MG capsule   Commonly known as:  KEFLEX   Used for:  Complicated UTI (urinary tract infection)        Dose:  500 mg   Take 1 capsule (500 mg) by mouth 2 times daily Take 500 mg on the evening of 10/18/18 to complete antibiotic course.   Quantity:  1 capsule   Refills:  0       ipratropium - albuterol 0.5 mg/2.5 mg/3 mL 0.5-2.5 (3) MG/3ML neb solution   Commonly known as:  DUONEB   Used for:  Moderate persistent asthma without complication        Dose:  3 mL   Take 1 vial (3 mLs) by nebulization every 4 hours as needed for shortness of breath / dyspnea or wheezing   Quantity:  360 mL   Refills:  0       lidocaine 5 % Patch   Commonly known as:  LIDODERM        Apply up to 3 patches to painful area at once for up to 12 h within a 24 h period.  Remove after 12 hours.   Quantity:  30 patch   Refills:  0       meclizine 25 MG tablet   Commonly known as:  ANTIVERT   Used for:  Dizziness        Dose:  25 mg   Take 1 tablet (25 mg) by mouth 2 times daily as needed for dizziness   Quantity:  90 tablet   Refills:  0        methocarbamol 500 MG tablet   Commonly known as:  ROBAXIN   Used for:  Hematoma of left iliopsoas muscle, initial encounter        Dose:  500 mg   Take 1 tablet (500 mg) by mouth 3 times daily as needed for muscle spasms   Quantity:  20 tablet   Refills:  0       sodium chloride 1 GM tablet   Used for:  Hyponatremia        Dose:  1 g   Take 1 tablet (1 g) by mouth 2 times daily (with meals)   Refills:  0       tamsulosin 0.4 MG capsule   Commonly known as:  FLOMAX        Dose:  0.4 mg   Start taking on:  10/19/2018   Take 1 capsule (0.4 mg) by mouth daily   Quantity:  60 capsule   Refills:  0         CONTINUE these medicines which may have CHANGED, or have new prescriptions. If we are uncertain of the size of tablets/capsules you have at home, strength may be listed as something that might have changed.        Dose / Directions    traMADol 50 MG tablet   Commonly known as:  ULTRAM   Indication:  Moderate to Moderately Severe Pain   This may have changed:    - when to take this  - reasons to take this  - Another medication with the same name was removed. Continue taking this medication, and follow the directions you see here.   Used for:  Left inguinal pain, Rupture of left hamstring tendon, initial encounter, Hematoma of left iliopsoas muscle, initial encounter        Dose:  25 mg   Take 0.5 tablets (25 mg) by mouth 2 times daily as needed for moderate to severe pain   Quantity:  30 tablet   Refills:  0         CONTINUE these medicines which have NOT CHANGED        Dose / Directions    acetaminophen 500 MG tablet   Commonly known as:  TYLENOL   Used for:  Pain of both shoulder joints        Dose:  1000 mg   Take 2 tablets (1,000 mg) by mouth 3 times daily   Refills:  0       albuterol 108 (90 Base) MCG/ACT inhaler   Commonly known as:  PROAIR HFA/PROVENTIL HFA/VENTOLIN HFA        Dose:  2 puff   Inhale 2 puffs into the lungs every 2 hours as needed for shortness of breath / dyspnea or wheezing   Refills:  0        budesonide 180 MCG/ACT inhaler   Commonly known as:  PULMICORT FLEXHALER   Used for:  Pulmonary emphysema, unspecified emphysema type (H)        Dose:  1 puff   Inhale 1 puff into the lungs 2 times daily   Refills:  0       calcium 600 MG tablet   Used for:  Pulmonary emphysema, unspecified emphysema type (H)        Dose:  1 tablet   Take 1 tablet (600 mg) by mouth daily   Quantity:  60 tablet   Refills:  0       calcium carbonate 500 MG chewable tablet   Commonly known as:  TUMS        Dose:  1 chew tab   Take 1 chew tab by mouth 4 times daily as needed for heartburn (nausea or indigestion)   Refills:  0       cholecalciferol 1000 UNIT tablet   Commonly known as:  vitamin D3   Used for:  COPD, moderate (H)        Dose:  1000 Units   Take 1 tablet (1,000 Units) by mouth daily   Quantity:  30 tablet   Refills:  0       cyanocobalamin 1000 MCG Tbcr   Commonly known as:  B-12 TR   Used for:  Pernicious anemia        Dose:  1 tablet   Take 1 tablet by mouth daily   Quantity:  100 tablet   Refills:  3       ferrous sulfate 325 (65 Fe) MG tablet   Commonly known as:  IRON        Dose:  325 mg   Take 325 mg by mouth three times a week Give on M-W-F AM only   Refills:  0       fexofenadine 180 MG tablet   Commonly known as:  ALLEGRA        Dose:  180 mg   Take 180 mg by mouth daily   Refills:  0       INCRUSE ELLIPTA 62.5 MCG/INH inhaler   Generic drug:  umeclidinium        Dose:  1 puff   Inhale 1 puff into the lungs daily   Refills:  0       levalbuterol 1.25 MG/3ML neb solution   Commonly known as:  XOPENEX        Dose:  1 ampule   Take 1 ampule by nebulization 4 times daily   Refills:  0       levothyroxine 25 MCG tablet   Commonly known as:  SYNTHROID/LEVOTHROID   Used for:  Hypothyroidism due to acquired atrophy of thyroid        Dose:  25 mcg   Take 1 tablet (25 mcg) by mouth daily   Quantity:  90 tablet   Refills:  3       LISINOPRIL PO        Dose:  5 mg   Take 5 mg by mouth daily HOLD if SBP <110.Call if held x 2  in a row symptomatic   Refills:  0       loperamide 2 MG capsule   Commonly known as:  IMODIUM        Dose:  2 mg   Take 2 mg by mouth every 8 hours as needed for diarrhea   Refills:  0       montelukast 10 MG tablet   Commonly known as:  SINGULAIR   Used for:  Pulmonary emphysema, unspecified emphysema type (H)        Dose:  10 mg   Take 1 tablet (10 mg) by mouth At Bedtime   Quantity:  30 tablet   Refills:  0       MULTIVITAMIN PO        Dose:  1 tablet   Take 1 tablet by mouth daily   Refills:  0       OMEPRAZOLE PO        Dose:  40 mg   Take 40 mg by mouth every morning FOR Gastroesophagel reflux disease Take 30-60 minutes before a meal.   Refills:  0       polyethylene glycol Packet   Commonly known as:  MIRALAX/GLYCOLAX        Dose:  17 g   Take 17 g by mouth daily as needed for constipation   Refills:  0       predniSONE 10 MG tablet   Commonly known as:  DELTASONE   Used for:  Moderate persistent asthma without complication        Dose:  10 mg   Take 1 tablet (10 mg) by mouth daily   Quantity:  60 tablet   Refills:  0       ROBAFEN COUGH PO        Dose:  10 mL   Take 10 mLs by mouth every 6 hours as needed   Refills:  0       roflumilast 500 MCG Tabs tablet   Commonly known as:  DALIRESP        Dose:  500 mcg   Take 500 mcg by mouth daily   Refills:  0       ZOFRAN ODT PO        Dose:  4 mg   Take 4 mg by mouth every 12 hours as needed for nausea or vomiting   Refills:  0       ZOFRAN PO        Dose:  4 mg   Take 4 mg by mouth every morning   Refills:  0            Where to get your medicines      Some of these will need a paper prescription and others can be bought over the counter. Ask your nurse if you have questions.     Bring a paper prescription for each of these medications     lidocaine 5 % Patch       You don't need a prescription for these medications     cephALEXin 500 MG capsule    ipratropium - albuterol 0.5 mg/2.5 mg/3 mL 0.5-2.5 (3) MG/3ML neb solution    meclizine 25 MG tablet     methocarbamol 500 MG tablet    predniSONE 10 MG tablet    sodium chloride 1 GM tablet    tamsulosin 0.4 MG capsule         Information about where to get these medications is not yet available     ! Ask your nurse or doctor about these medications     traMADol 50 MG tablet                Protect others around you: Learn how to safely use, store and throw away your medicines at www.disposemymeds.org.        ANTIBIOTIC INSTRUCTION     You've Been Prescribed an Antibiotic - Now What?  Your healthcare team thinks that you or your loved one might have an infection. Some infections can be treated with antibiotics, which are powerful, life-saving drugs. Like all medications, antibiotics have side effects and should only be used when necessary. There are some important things you should know about your antibiotic treatment.      Your healthcare team may run tests before you start taking an antibiotic.    Your team may take samples (e.g., from your blood, urine or other areas) to run tests to look for bacteria. These test can be important to determine if you need an antibiotic at all and, if you do, which antibiotic will work best.      Within a few days, your healthcare team might change or even stop your antibiotic.    Your team may start you on an antibiotic while they are working to find out what is making you sick.    Your team might change your antibiotic because test results show that a different antibiotic would be better to treat your infection.    In some cases, once your team has more information, they learn that you do not need an antibiotic at all. They may find out that you don't have an infection, or that the antibiotic you're taking won't work against your infection. For example, an infection caused by a virus can't be treated with antibiotics. Staying on an antibiotic when you don't need it is more likely to be harmful than helpful.      You may experience side effects from your antibiotic.    Like all  medications, antibiotics have side effects. Some of these can be serious.    Let you healthcare team know if you have any known allergies when you are admitted to the hospital.    One significant side effect of nearly all antibiotics is the risk of severe and sometimes deadly diarrhea caused by Clostridium difficile (C. Difficile). This occurs when a person takes antibiotics because some good germs are destroyed. Antibiotic use allows C. diificile to take over, putting patients at high risk for this serious infection.    As a patient or caregiver, it is important to understand your or your loved one's antibiotic treatment. It is especially important for caregivers to speak up when patients can't speak for themselves. Here are some important questions to ask your healthcare team.    What infection is this antibiotic treating and how do you know I have that infection?    What side effects might occur from this antibiotic?    How long will I need to take this antibiotic?    Is it safe to take this antibiotic with other medications or supplements (e.g., vitamins) that I am taking?     Are there any special directions I need to know about taking this antibiotic? For example, should I take it with food?    How will I be monitored to know whether my infection is responding to the antibiotic?    What tests may help to make sure the right antibiotic is prescribed for me?      Information provided by:  www.cdc.gov/getsmart  U.S. Department of Health and Human Services  Centers for disease Control and Prevention  National Center for Emerging and Zoonotic Infectious Diseases  Division of Healthcare Quality Promotion        Information about OPIOIDS     PRESCRIPTION OPIOIDS: WHAT YOU NEED TO KNOW   We gave you an opioid (narcotic) pain medicine. It is important to manage your pain, but opioids are not always the best choice. You should first try all the other options your care team gave you. Take this medicine for as short a  time (and as few doses) as possible.    Some activities can increase your pain, such as bandage changes or therapy sessions. It may help to take your pain medicine 30 to 60 minutes before these activities. Reduce your stress by getting enough sleep, working on hobbies you enjoy and practicing relaxation or meditation. Talk to your care team about ways to manage your pain beyond prescription opioids.    These medicines have risks:    DO NOT drive when on new or higher doses of pain medicine. These medicines can affect your alertness and reaction times, and you could be arrested for driving under the influence (DUI). If you need to use opioids long-term, talk to your care team about driving.    DO NOT operate heavy machinery    DO NOT do any other dangerous activities while taking these medicines.    DO NOT drink any alcohol while taking these medicines.     If the opioid prescribed includes acetaminophen, DO NOT take with any other medicines that contain acetaminophen. Read all labels carefully. Look for the word  acetaminophen  or  Tylenol.  Ask your pharmacist if you have questions or are unsure.    You can get addicted to pain medicines, especially if you have a history of addiction (chemical, alcohol or substance dependence). Talk to your care team about ways to reduce this risk.    All opioids tend to cause constipation. Drink plenty of water and eat foods that have a lot of fiber, such as fruits, vegetables, prune juice, apple juice and high-fiber cereal. Take a laxative (Miralax, milk of magnesia, Colace, Senna) if you don t move your bowels at least every other day. Other side effects include upset stomach, sleepiness, dizziness, throwing up, tolerance (needing more of the medicine to have the same effect), physical dependence and slowed breathing.    Store your pills in a secure place, locked if possible. We will not replace any lost or stolen medicine. If you don t finish your medicine, please throw away  (dispose) as directed by your pharmacist. The [place] Pollution Control Agency has more information about safe disposal: https://www.pca.state.[place]/living-green/managing-unwanted-medications             Medication List: This is a list of all your medications and when to take them. Check marks below indicate your daily home schedule. Keep this list as a reference.      Medications           Morning Afternoon Evening Bedtime As Needed    acetaminophen 500 MG tablet   Commonly known as:  TYLENOL   Take 2 tablets (1,000 mg) by mouth 3 times daily   Last time this was given:  1,000 mg on 10/18/2018 10:27 AM                                albuterol 108 (90 Base) MCG/ACT inhaler   Commonly known as:  PROAIR HFA/PROVENTIL HFA/VENTOLIN HFA   Inhale 2 puffs into the lungs every 2 hours as needed for shortness of breath / dyspnea or wheezing                                budesonide 180 MCG/ACT inhaler   Commonly known as:  PULMICORT FLEXHALER   Inhale 1 puff into the lungs 2 times daily                                calcium 600 MG tablet   Take 1 tablet (600 mg) by mouth daily                                calcium carbonate 500 MG chewable tablet   Commonly known as:  TUMS   Take 1 chew tab by mouth 4 times daily as needed for heartburn (nausea or indigestion)                                cephALEXin 500 MG capsule   Commonly known as:  KEFLEX   Take 1 capsule (500 mg) by mouth 2 times daily Take 500 mg on the evening of 10/18/18 to complete antibiotic course.   Last time this was given:  500 mg on 10/18/2018 10:26 AM                                cholecalciferol 1000 UNIT tablet   Commonly known as:  vitamin D3   Take 1 tablet (1,000 Units) by mouth daily                                cyanocobalamin 1000 MCG Tbcr   Commonly known as:  B-12 TR   Take 1 tablet by mouth daily                                ferrous sulfate 325 (65 Fe) MG tablet   Commonly known as:  IRON   Take 325 mg by mouth three times a week Give on  M-W-F AM only                                fexofenadine 180 MG tablet   Commonly known as:  ALLEGRA   Take 180 mg by mouth daily   Last time this was given:  180 mg on 10/18/2018 10:27 AM                                INCRUSE ELLIPTA 62.5 MCG/INH inhaler   Inhale 1 puff into the lungs daily   Generic drug:  umeclidinium                                ipratropium - albuterol 0.5 mg/2.5 mg/3 mL 0.5-2.5 (3) MG/3ML neb solution   Commonly known as:  DUONEB   Take 1 vial (3 mLs) by nebulization every 4 hours as needed for shortness of breath / dyspnea or wheezing   Last time this was given:  3 mLs on 10/18/2018 12:24 PM                                levalbuterol 1.25 MG/3ML neb solution   Commonly known as:  XOPENEX   Take 1 ampule by nebulization 4 times daily                                levothyroxine 25 MCG tablet   Commonly known as:  SYNTHROID/LEVOTHROID   Take 1 tablet (25 mcg) by mouth daily   Last time this was given:  25 mcg on 10/18/2018 10:27 AM                                lidocaine 5 % Patch   Commonly known as:  LIDODERM   Apply up to 3 patches to painful area at once for up to 12 h within a 24 h period.  Remove after 12 hours.                                LISINOPRIL PO   Take 5 mg by mouth daily HOLD if SBP <110.Call if held x 2 in a row symptomatic   Last time this was given:  5 mg on 10/18/2018 10:29 AM                                loperamide 2 MG capsule   Commonly known as:  IMODIUM   Take 2 mg by mouth every 8 hours as needed for diarrhea                                meclizine 25 MG tablet   Commonly known as:  ANTIVERT   Take 1 tablet (25 mg) by mouth 2 times daily as needed for dizziness   Last time this was given:  25 mg on 10/15/2018  1:43 PM                                methocarbamol 500 MG tablet   Commonly known as:  ROBAXIN   Take 1 tablet (500 mg) by mouth 3 times daily as needed for muscle spasms   Last time this was given:  500 mg on 10/17/2018  5:02 AM                                 montelukast 10 MG tablet   Commonly known as:  SINGULAIR   Take 1 tablet (10 mg) by mouth At Bedtime   Last time this was given:  10 mg on 10/17/2018 11:42 PM                                MULTIVITAMIN PO   Take 1 tablet by mouth daily                                OMEPRAZOLE PO   Take 40 mg by mouth every morning FOR Gastroesophagel reflux disease Take 30-60 minutes before a meal.   Last time this was given:  40 mg on 10/18/2018 10:26 AM                                polyethylene glycol Packet   Commonly known as:  MIRALAX/GLYCOLAX   Take 17 g by mouth daily as needed for constipation                                predniSONE 10 MG tablet   Commonly known as:  DELTASONE   Take 1 tablet (10 mg) by mouth daily   Last time this was given:  10 mg on 10/17/2018  9:46 AM                                ROBAFEN COUGH PO   Take 10 mLs by mouth every 6 hours as needed                                roflumilast 500 MCG Tabs tablet   Commonly known as:  DALIRESP   Take 500 mcg by mouth daily   Last time this was given:  500 mcg on 10/18/2018 10:27 AM                                sodium chloride 1 GM tablet   Take 1 tablet (1 g) by mouth 2 times daily (with meals)   Last time this was given:  1 g on 10/18/2018 10:26 AM                                tamsulosin 0.4 MG capsule   Commonly known as:  FLOMAX   Take 1 capsule (0.4 mg) by mouth daily   Start taking on:  10/19/2018   Last time this was given:  0.4 mg on 10/18/2018 10:27 AM                                traMADol 50 MG tablet   Commonly known as:  ULTRAM   Take 0.5 tablets (25 mg) by mouth 2 times daily as needed for moderate to severe pain   Last time this was given:  25 mg on 10/18/2018  7:33 AM                                ZOFRAN ODT PO   Take 4 mg by mouth every 12 hours as needed for nausea or vomiting   Last time this was given:  4 mg on 10/18/2018 10:26 AM                                ZOFRAN PO   Take 4 mg by mouth every morning

## 2018-10-13 NOTE — IP AVS SNAPSHOT
"          [facility] OBSERVATION DEPARTMENT: [phone]                                              INTERAGENCY TRANSFER FORM - LAB / IMAGING / EKG / EMG RESULTS   10/13/2018                    Hospital Admission Date: 10/13/2018  NAM DAS   : 1940  Sex: Female        Attending Provider: Speedy Yu MD     Allergies:  Hydralazine, Penicillin G, Meloxicam, Metoprolol, Norvasc [Amlodipine Besylate]    Infection:  None   Service:  Observation    Ht:  1.575 m (5' 2\")   Wt:  51.6 kg (113 lb 12.8 oz)   Admission Wt:  --    BMI:  20.81 kg/m 2   BSA:  1.5 m 2            Patient PCP Information     Provider PCP Type    Db Alvarez MD General         Lab Results - 3 Days      Basic metabolic panel [[id number]] (Abnormal)  Resulted: 10/18/18 0612, Result status: Final result    Ordering provider: Speedy Yu MD  10/18/18 0000 Resulting lab: [facility]    Specimen Information    Type Source Collected On   Blood  10/18/18 0540          Components       Value Reference Range Flag Lab   Sodium 131 133 - 144 mmol/L L FrRdHs   Potassium 4.0 3.4 - 5.3 mmol/L  FrRdHs   Chloride 99 94 - 109 mmol/L  FrRdHs   Carbon Dioxide 22 20 - 32 mmol/L  FrRdHs   Anion Gap 10 3 - 14 mmol/L  FrRdHs   Glucose 101 70 - 99 mg/dL H FrRdHs   Urea Nitrogen 7 7 - 30 mg/dL  FrRdHs   Creatinine 0.54 0.52 - 1.04 mg/dL  FrRdHs   GFR Estimate >90 >60 mL/min/1.7m2  FrRd   Comment:  Non  GFR Calc   GFR Estimate If Black >90 >60 mL/min/1.7m2  FrRd   Comment:  African American GFR Calc   Calcium 8.2 8.5 - 10.1 mg/dL L FrRdHs            Platelet count [[id number]]  Resulted: 10/18/18 0558, Result status: Final result    Ordering provider: Speedy Yu MD  10/18/18 0000 Resulting lab: [facility]    Specimen Information    Type Source Collected On   Blood  10/18/18 0540          Components       Value Reference Range Flag Lab   Platelet Count 252 150 - 450 10e9/L  " [facility]            Vitamin D Deficiency [[id number]]  Resulted: 10/17/18 1106, Result status: Final result    Ordering provider: Kesha Vela PA-C  10/16/18 0616 Resulting lab: [facility]    Specimen Information    Type Source Collected On     10/16/18 0616          Components       Value Reference Range Flag Lab   Vitamin D Deficiency screening 36 20 - 75 ug/L  51   Comment:         Season, race, dietary intake, and treatment affect the concentration of   25-hydroxy-Vitamin D. Values may decrease during winter months and increase   during summer months. Values 20-29 ug/L may indicate Vitamin D insufficiency   and values <20 ug/L may indicate Vitamin D deficiency.  Vitamin D determination is routinely performed by an immunoassay specific for   25 hydroxyvitamin D3.  If an individual is on vitamin D2 (ergocalciferol)   supplementation, please specify 25 OH vitamin D2 and D3 level determination by   LCMSMS test [id number].              Basic metabolic panel [[id number]] (Abnormal)  Resulted: 10/17/18 0738, Result status: Final result    Ordering provider: Meg Leger PA-C  10/17/18 0001 Resulting lab: [facility]    Specimen Information    Type Source Collected On   Blood  10/17/18 0651          Components       Value Reference Range Flag Lab   Sodium 131 133 - 144 mmol/L L FrRdHs   Potassium 3.8 3.4 - 5.3 mmol/L  FrRdHs   Chloride 99 94 - 109 mmol/L  FrRdHs   Carbon Dioxide 23 20 - 32 mmol/L  FrRdHs   Anion Gap 9 3 - 14 mmol/L  FrRdHs   Glucose 106 70 - 99 mg/dL H FrRdHs   Urea Nitrogen 9 7 - 30 mg/dL  FrRdHs   Creatinine 0.56 0.52 - 1.04 mg/dL  FrRdHs   GFR Estimate >90 >60 mL/min/1.7m2  FrRdHs   Comment:  Non  GFR Calc   GFR Estimate If Black >90 >60 mL/min/1.7m2  FrRdHs   Comment:  African American GFR Calc   Calcium 8.1 8.5 - 10.1 mg/dL L FrRdHs            CBC with platelets [[id number]] (Abnormal)  Resulted: 10/16/18 0944, Result status: Final  result    Ordering provider: Meg Leger PA-C  10/16/18 0753 Resulting lab: [facility]    Specimen Information    Type Source Collected On   Blood  10/16/18 0921          Components       Value Reference Range Flag Lab   WBC 9.0 4.0 - 11.0 10e9/L  FrRdHs   RBC Count 3.17 3.8 - 5.2 10e12/L L FrRdHs   Hemoglobin 9.7 11.7 - 15.7 g/dL L FrRdHs   Hematocrit 28.7 35.0 - 47.0 % L FrRdHs   MCV 91 78 - 100 fl  FrRdHs   MCH 30.6 26.5 - 33.0 pg  FrRdHs   MCHC 33.8 31.5 - 36.5 g/dL  FrRdHs   RDW 14.2 10.0 - 15.0 %  FrRdHs   Platelet Count 259 150 - 450 10e9/L  FrRdHs            Urine Culture [[id number]] (Abnormal)  Resulted: 10/16/18 0804, Result status: Final result    Ordering provider: Chayito Hampton MD  10/13/18 1212 Resulting lab: [facility]    Specimen Information    Type Source Collected On   Catheterized Urine  10/13/18 1125          Components       Value Reference Range Flag Lab   Specimen Description Catheterized Urine      Special Requests Specimen received in preservative   75   Culture Micro --  A 225   Result:         10,000 to 50,000 colonies/mL  Coagulase negative Staphylococcus              Basic metabolic panel [[id number]] (Abnormal)  Resulted: 10/16/18 0732, Result status: Final result    Ordering provider: Kesha Vela PA-C  10/16/18 0001 Resulting lab: [facility]    Specimen Information    Type Source Collected On   Blood  10/16/18 0616          Components       Value Reference Range Flag Lab   Sodium 130 133 - 144 mmol/L L FrRdHs   Potassium 3.7 3.4 - 5.3 mmol/L  FrRdHs   Chloride 101 94 - 109 mmol/L  FrRdHs   Carbon Dioxide 22 20 - 32 mmol/L  FrRdHs   Anion Gap 7 3 - 14 mmol/L  FrRdHs   Glucose 98 70 - 99 mg/dL  FrRdHs   Urea Nitrogen 7 7 - 30 mg/dL  FrRdHs   Creatinine 0.54 0.52 - 1.04 mg/dL  FrRdHs   GFR Estimate >90 >60 mL/min/1.7m2  FrRdHs   Comment:  Non  GFR Calc   GFR Estimate If Black >90 >60  mL/min/1.7m2  [facility]   Comment:  African American GFR Calc   Calcium 8.4 8.5 - 10.1 mg/dL L FrRd            Basic metabolic panel [[id number]] (Abnormal)  Resulted: 10/15/18 0736, Result status: Final result    Ordering provider: Kesha Vela PA-C  10/15/18 0000 Resulting lab: [facility]    Specimen Information    Type Source Collected On   Blood  10/15/18 0650          Components       Value Reference Range Flag Lab   Sodium 130 133 - 144 mmol/L L FrRdHs   Potassium 3.8 3.4 - 5.3 mmol/L  FrRdHs   Chloride 100 94 - 109 mmol/L  FrRdHs   Carbon Dioxide 22 20 - 32 mmol/L  FrRdHs   Anion Gap 8 3 - 14 mmol/L  FrRdHs   Glucose 92 70 - 99 mg/dL  FrRdHs   Urea Nitrogen 9 7 - 30 mg/dL  FrRdHs   Creatinine 0.59 0.52 - 1.04 mg/dL  FrRdHs   GFR Estimate >90 >60 mL/min/1.7m2  FrRd   Comment:  Non  GFR Calc   GFR Estimate If Black >90 >60 mL/min/1.7m2  FrRd   Comment:  African American GFR Calc   Calcium 8.5 8.5 - 10.1 mg/dL  FrRdHs            Platelet count [[id number]]  Resulted: 10/15/18 0710, Result status: Final result    Ordering provider: Speedy Yu MD  10/15/18 0000 Resulting lab: [facility]    Specimen Information    Type Source Collected On   Blood  10/15/18 0650          Components       Value Reference Range Flag Lab   Platelet Count 233 150 - 450 10e9/L  FrRdHs            Testing Performed By     Lab - Abbreviation Name Director Address Valid Date Range    12 - FrRdHs [facility] Unknown [address]  [facility] [address] 05/08/15 1057 - Present    51 - Unknown [facility] [place] Unknown [address] [facility] [address] 12/31/14 1010 - Present    75 - Unknown [facility] Unknown [address] [facility] [address] 01/15/15 1019 - Present    225 - Unknown [facility] Unknown [address] [facility] [address] 12/19/14 0954 - Present             Unresulted Labs (24h ago through future)    Start       Ordered    10/15/18 0600  Platelet count  EVERY THREE DAYS,   Routine      10/14/18 1536         Imaging Results - 3 Days      MR Sacrum and Coccyx w/o Contrast [[id number]]  Resulted: 10/16/18 1156, Result status: Final result    Ordering provider: Kesha Vela PA-C  10/15/18 1436 Resulted by: Quincy Zarate MD    Performed: 10/15/18 1649 - 10/15/18 1713 Resulting lab: RADIOLOGY RESULTS    Narrative:       MR SACRUM AND COCCYX WITHOUT CONTRAST  10/15/2018 5:13 PM     HISTORY:  New right foot drop.     TECHNIQUE: Sagittal and coronal oblique T1 and inversion recovery, and  transverse T1 and fat-suppressed T2-weighted pulse sequences.    FINDINGS: L5-S1 spondylolisthesis and L5 foraminal stenosis are noted,  left greater than right. Marrow signal within the sacrum appears  within normal limits. There is a transverse band of high signal  intensity within the coccyx which could represent a nondisplaced  coccyx fracture given its linear appearance on the T1-weighted images.   There may be minimal adjacent presacral soft tissue edema. Sacroiliac  joints appear within normal limits with no evidence of periarticular  marrow edema. There is prominent intramuscular fluid or edema within  the left iliopsoas muscle extending to the hip. There is also  asymmetrical edema within the right gluteal musculature which appears  atrophic relative to the left.      Impression:       IMPRESSION:  1. Mid to lower coccygeal marrow edema and linear transverse band  suspicious for nondisplaced fracture.  2. Left iliopsoas prominent intramuscular fluid or edema.  Possibilities include intramuscular strain or tear. However, on the  distalmost images, the left iliopsoas tendon appears to be probably  thickened. Therefore the intermuscular signal could be at least in  part related to a process distal to this scan.  3. Right gluteus medius and minimus muscle atrophy, only  partly  included on the scan. Right gluteal intramuscular edema is also noted  which could indicate superimposed acute muscle strain. However, acute  denervation atrophy might have a similar appearance.  4. Bilateral foraminal stenosis, severe and greater on the left than  right.    ELIZABETH HONG MD      MR Pelvis Muscular Tissue wo Contrast [[id number]]  Resulted: 10/16/18 1156, Result status: Final result    Ordering provider: Kesha Vela PA-C  10/15/18 1436 Resulted by: Elizabeth Hong MD    Performed: 10/15/18 1649 - 10/15/18 1714 Resulting lab: RADIOLOGY RESULTS    Narrative:       MR PELVIS MUSCULAR TISSUE WITHOUT CONTRAST   10/15/2018 5:14 PM    HISTORY:  Assess left common hamstring tendon rupture and iliopsoas  tendon rupture.    TECHNIQUE:  Coronal T1 and inversion recovery, sagittal T1, and  transverse proton density and fat suppressed T2 weighted images.    FINDINGS:   Osseous and Cartilaginous Structures:  No fracture or destructive bone  lesion.  No femoral head osteonecrosis.  No significant hip  osteoarthritis or apparent chondromalacia.       Acetabular Labrum: No juxtaacetabular cyst.  No obvious labral tear is  appreciated, allowing for the large FOV technique.  If indicated  clinically, MR arthrography would be considered the study of choice in  this regard.    Common Hamstring Tendon: There is increased signal intensity at the  ischial tuberosity of the left common hamstring tendon attachment  suggesting partial tear. No ozzie tendon rupture or retraction is  seen.    Gluteal Tendon Greater Trochanteric Attachments: The gluteus medius  and minimus tendons appear intact.    Trochanteric and Iliopsoas Bursae: Moderate fluid is noted in the  right greater trochanteric bursa.    Joint space: No joint effusion.     Additional findings: There appears to be a complete tear or rupture of  the left iliopsoas tendon from its lesser trochanteric attachment.  Adjacent edema or hemorrhage is noted  tracking proximally into the  left iliacus and to a lesser degree left psoas muscles. Edema is noted  within the right gluteal musculature. Fatty atrophy of the left  gluteus minimus and medius muscles is also noted.      Impression:       IMPRESSION:   1. Complete tear or rupture of the left iliopsoas tendon which is  proximally retracted and thickened. Adjacent soft tissue  edema/hemorrhage is also noted tracking proximally into the left  iliopsoas muscles. Given prominent edema within the left iliacus  muscle, superimposed muscle strain may also be present.  2. Partial tear or tendinosis of the left common hamstring tendons at  the ischial tuberosity attachment. No ozzie tendon rupture or distal  retraction is demonstrated.  3. Right greater trochanteric bursitis.  4. Right gluteal muscle edema. This is superimposed on chronic fatty  atrophy of the right gluteus medius and minimus muscles. Possibilities  include acute muscle strain. Acute denervation atrophy could have a  similar appearance.    ELIZABETH HONG MD      MR Lumbar Spine w/o Contrast [[id number]]  Resulted: 10/15/18 1806, Result status: Final result    Ordering provider: Kesha Vela PA-C  10/15/18 1436 Resulted by: John Miller MD    Performed: 10/15/18 1649 - 10/15/18 1712 Resulting lab: RADIOLOGY RESULTS    Narrative:       MRI LUMBAR SPINE WITHOUT CONTRAST  10/15/2018  5:12 PM     HISTORY: New right foot drop.    TECHNIQUE: Multiplanar multisequence MRI of the lumbar spine without  contrast.    COMPARISON: Radiographs 4/5/2018    FINDINGS: Previous radiographs show 5 lumbar-type vertebral bodies.  The distal spinal cord and cauda equina appear normal.    T12-L1: Normal disc, facet joints, spinal canal and neural foramina.     L1-L2: Loss of disc height, circumferential disc bulge and anterior  vertebral endplate osteophytes. Right posterolateral and lateral disc  bulges. No neural impingement.    L2-L3: Right posterolateral disc bulge  with herniation into the  inferior aspect of the right neural foramen. Anterior and posterior  vertebral endplate osteophytes. Otherwise normal.    L3-L4: Normal disc, facet joints, spinal canal and neural foramina.     L4-L5: Bilateral posterolateral disc bulges with vertebral endplate  osteophytes causing slight impression on the thecal sac. Mild  degeneration left facet joint. Otherwise normal.    L5-S1: Grade 1 spondylolisthesis related to bilateral defects in the  pars interarticularis. Minimal side-to-side narrowing of the spinal  canal. Moderate stenosis of the right neural foramen and severe  stenosis of the left neural foramen. Bilateral moderate degenerative  facet arthropathy.    Paraspinous soft tissues: Normal.     Bone marrow: Normal.       Impression:       IMPRESSION:   1. Multilevel degenerative disc disease and degenerative facet  arthropathy as described above.  2. L5-S1 grade 1 spondylolisthesis causing bilateral foraminal  stenoses.  3. No specific etiology of the new right foot drop is identified.    MAEVE OWEN MD      Testing Performed By     Lab - Abbreviation Name Director Address Valid Date Range    104 - Rad Rslts RADIOLOGY RESULTS Unknown Unknown 02/16/05 1553 - Present            Encounter-Level Documents:     There are no encounter-level documents.      Order-Level Documents:     There are no order-level documents.

## 2018-10-13 NOTE — IP AVS SNAPSHOT
` ` Patient Information     Patient Name Sex Ana Luisa Murdock ([id number]) Female 1940       Room Bed    [facility] 0225-01      Patient Demographics     Address Phone E-mail Address    [address]  [facility] [address] [phone] (Home) *Preferred*  NONE (Work)  [phone] (Mobile) [email or web address]      Patient Ethnicity & Race     Ethnic Group Patient Race    American White      Emergency Contact(s)     Name Relation Home Work Mobile    Windy Kowalski Daughter [phone] none [phone]    Jake Patel Son [phone] none [phone]      Documents on File        Status Date Received Description       Documents for the Patient    Insurance Card Received () 12/10/09     Face Sheet Received () 12/10/09     Privacy Notice - [place] Received 12/10/09     External Medication Information Consent Accepted () 12/10/09     Patient ID Received 14     Consent for Services - Hospital/Clinic Received () 10/25/11     Consent for Services - Hospital/Clinic  ()      External Medication Information Consent Accepted () 11     Insurance Card Received () 01/10/12     HIM PHYLICIA Authorization - File Only   PHYLICIA to Obtain From: [facility]-2012    HIM PHYLICIA Authorization - File Only   Records released to patient 12    HIM PHYLICIA Authorization   Release of Information: [facility] 2012    HIM PHYLICIA Authorization   [facility]    Consent for Services - Hospital/Clinic Received () 12     Insurance Card Received () 12     External Medication Information Consent Accepted 12     Insurance Card Received () 12     Consent for EHR Access  13 Copied from existing Consent for services - C/HOD collected on 2012    [facility] Specified Other       Insurance Card Received 13 Medicare    Insurance Card Received 13 [employer]    Consent for EHR Access  Received 09/10/13     Consent for Services - Hospital/Clinic Received () 09/10/13     Consent for Services - Geriatrics Received 13     HIM PHYLICIA Authorization - File Only   Radiology CD released to patient 13    Advance Directives and Living Will Received  POLST 13     HIM PHYLICIA Authorization - File Only  14 NAM DAS 2013    HIM PHYLICIA Authorization  14     Insurance Card Received 14     Insurance Card Received 09/15/14     Consent for Services - Hospital/Clinic Received () 09/15/14     Physical Therapy Certification Received 10/09/14 2014    Insurance Card Received 05/21/15 [employer]    Consent for Services - Informed Received 05/21/15 [facility]    Consent to Communicate Received 05/21/15 Consent to Communicate    HIM PHYLICIA Authorization - File Only Patient Refused 05/21/15 Email Consent    Insurance Card Received 08/11/15 MA    Consent for Services - Hospital/Clinic Received () 09/09/15     Insurance Card Received 11/13/15     Insurance Card Received 18 [employer]-no 2018 card    Consent for Services/Privacy Notice - Hospital/Clinic Received 18     Consent for Services/Privacy Notice - Hospital/Clinic Received () 16     Business/Insurance/Care Coordination/Health Form - Patient   Acceptance of Responsibility form - 2016    Patient ID Received 18 EXP 2019    Business/Insurance/Care Coordination/Health Form - Patient  16 BLOOD PRESSURE UNIT, [facility] - 3/17/2016    Consent to Communicate  16 AUTHORIZATION TO DISCUSS PROTECTED  HEALTH INFORMATION 16    Business/Insurance/Care Coordination/Health Form - Patient  16 "[employer] PROGRAM FORM- 2016    Consent for Services/Privacy Notice - Hospital/Clinic Received () 16     Advance Directives and Living Will Received 17 POLST 2017     Business/Insurance/Care Coordination/Health Form -  Patient  17 MEDICA LETTER REGARDING LANSOPRAZOLE SUPPLY    HIM PHYLICIA Authorization - File Only  17 AUTHORIZATION FOR ELECTRONIC COMMUNICATION    Consent to Communicate Received 17     HIM PHYLICIA Authorization - File Only  17 MYCHART ACCESS    Advance Directives and Living Will Received 17 Health Care Directive 17    Advance Directives and Living Will Not Received  Validation of AD 17    Business/Insurance/Care Coordination/Health Form - Patient  17 MEDICA APPROVAL OF DICLOFENAC SODIUM GEL 17 - 18    Care Everywhere Prospective Auth Received 10/31/17     Consent to Communicate  17 AUTHORIZATION TO DISCUSS PROTECTED HEALTH INFORMATION 17    Business/Insurance/Care Coordination/Health Form - Patient  02/15/18 ELIGIBILITY FOR NO COST BONE DENSITY TEST 18 MEDICA    Consent for Services - Hospital and Clinic Received 18     HIE Auth Received 18     HIM PHYLICIA Authorization  18     HIM PHYLICIA Authorization  18 [facility]    Advance Directives and Living Will Received 18 POLST 2018     Advance Directives and Living Will Received 18 POLST 2018       Documents for the Encounter    CMS IM for Patient Signature Received 10/17/18     Observation Notice Received 10/13/18 K.L. R.N.    CMS IM for Patient Signature Received 10/18/18     CMS AGUIRRE for Patient Signature Received 10/14/18 reviewed w/ dtr    Discharge Attachment  (Deleted)  ENOXAPARIN INJECTION (ENGLISH)      Admission Information     Attending Provider Admitting Provider Admission Type Admission Date/Time    Speedy Yu MD Young, Jean Tsai, MD Emergency 10/13/18  0752    Discharge Date Hospital Service Auth/Cert Status Service Area     Observation Incomplete [facility]    Unit Room/Bed Admission Status        OBSERVATION DEPT 0225/0225-01 Admission (Confirmed)       Admission     Complaint    Urinary  retention, Hematoma of iliopsoas muscle      Hospital Account     Name Acct ID Class Status Primary Coverage    Ana Luisa Lee [id number] Inpatient Open MEDICA - MEDICA DUAL SOL[employer]IONS [facility]            Guarantor Account (for Hospital Account #[id number])     Name Relation to Pt Service Area Active? Acct Type    Ana Luisa Lee  FCS Yes Personal/Family    Address Phone          [address]  [place] [address] [phone](H)  none(O)              Coverage Information (for Hospital Account #[id number])     F/O Payor/Plan Precert #    MEDICA/MEDICA DUAL SOLUTIONS [facility]     Subscriber Subscriber #    Ana Luisa Lee [id number]    Address Phone    [address] [address]  [place] [address] [phone]

## 2018-10-13 NOTE — ED NOTES
Patient presents with left-sided groin/back pain that radiates into the leg for the last 3 weeks. Patient was evaluated for pain and nothing was found. Patient is wheelchair bound and lives at assisted living. Patient states pain is not getting better. ABCDS intact, alert and oriented x 4.

## 2018-10-13 NOTE — ED NOTES
Daughter, Windy MARROQUIN, called back with multiple questions regarding further care. MD notified with phone number [phone] provided.

## 2018-10-13 NOTE — ED NOTES
Director of [facility] called at this time to inform they are unable to take patient back until RN from their facility can reassess patient's needs.  No RN on staff until Monday.

## 2018-10-13 NOTE — ED NOTES
Placed on bedpan for second time; no urine with first attempt, or second time.  Stated thought had a BM; no urine or stool present.

## 2018-10-13 NOTE — IP AVS SNAPSHOT
[facility] Observation Department    [address]    [facility] [address]    Phone:  [phone]                                       After Visit Summary   10/13/2018    Ana Luisa Lee    MRN: [id number]           After Visit Summary Signature Page     I have received my discharge instructions, and my questions have been answered. I have discussed any challenges I see with this plan with the nurse or doctor.    ..........................................................................................................................................  Patient/Patient Representative Signature      ..........................................................................................................................................  Patient Representative Print Name and Relationship to Patient    ..................................................               ................................................  Date                                   Time    ..........................................................................................................................................  Reviewed by Signature/Title    ...................................................              ..............................................  Date                                               Time          22EPIC Rev 08/18

## 2018-10-13 NOTE — ED NOTES
.  [facility]  ED Nurse Handoff Report    Ana Luisa Lee is a 78 year old female   ED Chief complaint: Groin Pain  . ED Diagnosis:   Final diagnoses:   Complicated UTI (urinary tract infection)   Left inguinal pain   Urinary retention   Rupture of left hamstring tendon, initial encounter   Hematoma of left iliopsoas muscle, initial encounter     Allergies:   Allergies   Allergen Reactions     Hydralazine Anxiety     Penicillin G Hives     Tolerated cephalosporines 2017     Meloxicam      dizziness       Metoprolol      ? Skin rash on the back     Norvasc [Amlodipine Besylate] Hives       Code Status: Full Code  Activity level - Baseline/Home:  Stand with Assist. Activity Level - Current:   Stand with Assist of 2. Lift room needed: No. Bariatric: No   Needed: No   Isolation: No. Infection: Not Applicable.     Vital Signs:   Vitals:    10/13/18 1300 10/13/18 1330 10/13/18 1340 10/13/18 1400   BP: 125/69 116/66  139/72   Resp:       Temp:       TempSrc:       SpO2: 98% 100% 100%        Cardiac Rhythm:  ,      Pain level: 0-10 Pain Scale: 8  Patient confused: No. Patient Falls Risk: Yes.   Elimination Status: Urethral catheter (joseph) in place; refer to orders to discontinue as per protocol    Patient Report - Initial Complaint: 78 year old female with a history of dementia who presents via EMS with groin pain. The patient reports that she has had left-sided groin pain for the past 3-4 weeks that comes on with movement. She states the pain radiates down the side of her left leg and has gotten worse since yesterday. She has already been seen for this problem at the [facility] where her workup was normal, but told her nurse this morning that she wants further evaluation today in the ED. She denies having any fevers, recent falls, leg swelling or difficulty urinating. The patient is wheelchair bound. Of note, the patient's power of  is her daughter who is currently in  [place]   Focused Assessment: Musculoskeletal - Musculoskeletal WDL:  WDL except Side: Left Pain Body Location: groin (present over the past 3 weeks; today rating 10/10 with movement) CMS Intact: Yes Musculoskeletal Comment: pedal pulses strong; numbness to bilat feet past two months   Tests Performed: labs, xray, CT. Abnormal Results: .  Labs Ordered and Resulted from Time of ED Arrival Up to the Time of Departure from the ED   CBC WITH PLATELETS DIFFERENTIAL - Abnormal; Notable for the following:        Result Value    RBC Count 3.19 (*)     Hemoglobin 9.8 (*)     Hematocrit 28.8 (*)     All other components within normal limits   BASIC METABOLIC PANEL - Abnormal; Notable for the following:     Sodium 126 (*)     Glucose 108 (*)     All other components within normal limits   ROUTINE UA WITH MICROSCOPIC - Abnormal; Notable for the following:     Blood Urine Small (*)     Leukocyte Esterase Urine Large (*)     WBC Urine >182 (*)     RBC Urine 45 (*)     WBC Clumps Present (*)     All other components within normal limits   INDWELLING URINARY CATHETER (JOSEPH)   URINE CULTURE AEROBIC BACTERIAL     .  Abd/pelvis CT no contrast - Stone Protocol   Final Result   IMPRESSION:   1. Question some minimal intramuscular hematoma in the iliopsoas on   the left. Please see CT report of the left hip for additional regional   findings.   2. Otherwise no acute process demonstrated in the abdomen and pelvis.       DEANN CROSS MD      CT Hip Left w/o Contrast   Preliminary Result   IMPRESSION:   1. Findings raising the possibility of left common hamstring tendon   rupture and iliopsoas tendon rupture.   2. Additional findings discussed above.          XR Pelvis and Hip Left 2 Views   Final Result   IMPRESSION: Osseous structures appear intact. No fractures are   identified.      WILMA CARBAJAL MD        .   Treatments provided: lidocaine patch, tramadol  Comments: unable to return to assisted living facility due to indwelling joseph  catheter placed.  Crawley placed due to unable to void x 2, bladder scanned for 691ml urine. Daughter, Windy MARROQUIN, from [place], has been updated.   OBS brochure/video discussed/provided to patient:  N/A  ED Medications:   Medications   Lidocaine (LIDOCARE) 4 % Patch 2 patch (1 patch Transdermal Given 10/13/18 1017)   acetaminophen (TYLENOL) tablet 1,000 mg (1,000 mg Oral Given 10/13/18 0813)   traMADol (ULTRAM) half-tab 25 mg (25 mg Oral Given 10/13/18 0813)   ipratropium - albuterol 0.5 mg/2.5 mg/3 mL (DUONEB) neb solution 3 mL (3 mLs Nebulization Given 10/13/18 1229)   cephALEXin (KEFLEX) capsule 500 mg (500 mg Oral Given 10/13/18 1301)     Drips infusing:  No  For the majority of the shift, the patient's behavior Green, HAS NOT BEEN CONFUSED BUT DOES HAVE UTI SO POSSIBLE CONFUSION LATER ON. Interventions performed were NA.     Severe Sepsis OR Septic Shock Diagnosis Present: No      ED Nurse Name/Phone Number: Nelia Santiago RN 3:00 PM      RECEIVING UNIT ED HANDOFF REVIEW    Above ED Nurse Handoff Report was reviewed: Yes  Reviewed by: Karen M. Lesch on October 13, 2018 at 3:41 PM

## 2018-10-13 NOTE — H&P
[facility] Outpatient / Observation Unit  History and Physical Exam     Ana Luisa Lee MRN# [id number]   YOB: 1940 Age: 78 year old      Date of Admission:  10/13/2018    Primary care provider: Db Alvarez          Assessment:   Ana Luisa Lee is a 78 year old female with a PMH listed below, who resides in an AL and has been newly wheelchair bound since this spring for weakness, who presents with 3 week hx of left groin pain. She comes to ED for pain control and further eval.   Work up in ED  Included CT of the abd that revealed minimal intramuscular hematoma in the iliopsoas on the left.  Furthermore CT scan of the left hip hip shows evidence of possible left common hamstring tendon rupture and iliopsoas tendon rupture.  She was also found to have abnormal urinalysis suggestive of a urinary tract infection.  She had a difficult time urinating here in the emergency room and was found to be retaining urine and therefore Crawley catheter was placed.  The plan was initially discharged her back to her assisted living with a Crawley catheter but her assisted living would not take her  back until she was assessed by their nurses.  She was therefore recommended for admission overnight for a voiding trial as well as further pain management.      1.  3-week history of left groin pain-CT imaging indicates possible hamstring and iliopsoas tendon rupture with possible hematoma along the iliopsoas.  Patient states that she does not recall any injuries that might have led to this.  She is however getting physical therapy at the assisted living so not sure if she might have overdid it.  Nonetheless she is not quite a bit of pain but it is more of a spasm.  I will place her on scheduled Tylenol along with as needed Flexeril for pain control.   Initially Ortho was not contacted because this is likely nonsurgical, however in speaking with the patient she states that her being in a wheelchair is a fairly new thing  and then that they are working towards her being able to walk again (she does have mild dementia so I am not sure how realistic that is) nonetheless.  I think it is worthwhile for her to be seen by orthopedic here in the hospital.  They can also comment on if MRI imaging is indicated and whether there are any activity restrictions for her outpatient.    2.  Abnormal urinalysis in the setting of urinary retention-she has no previous history of urinary retention.  I suspect this might be related to acute infection.  Urine culture has been sent.  She has been started on oral Keflex.  Will give voiding trial tomorrow and see how she does.  If she is still retaining then she will likely need to have outpatient Crawley bag.  Continue Keflex for now and tailor as antibiotic indicates    3.  Chronic hyponatremia-patient has history of chronic hyponatremia normally in the low 130s.  Will give a little bit gentle IV fluids overnight and repeat labs in the morning.    DVT prophylaxis-patient normally does not ambulate.  We will see how she does tomorrow and consider subcu Lovenox if she ends up having an extended stay.  CODE STATUS-DNR/DNI-discussed with patient  Disposition-I suspect she will be able to go back to her assisted living but may need more assistance.  Will see how she does clinically and request social work to assist with discharge.                  Chief Complaint:   Acute traumatic pain         History of Present Illness:    Ana Luisa Lee, a 78 year old female, presents to the ED with complaint of worsening left groin pain over the last 3 weeks.  History is obtained by speaking with the ER physician patient interview and chart review.  Patient states that she started noticing worsening left groin pain for the last 3 weeks.  Initially she thought that she might have pulled a muscle but since then it is just been getting progressively worse.  She denies any recent trauma.  She does do outpatient physical  therapy at her assisted living and denies doing anything too strenuous.  She is normally wheelchair bound however this is somewhat new for her.  She states that since the spring she just gotten so weak that she mostly gets around in a wheelchair.  She is still able to stand and pivot and maybe take one step.  She tells me that she is trying to work with her PT and her goal is to try to walk again.  She also tells me that she is been having increased urinary frequency but no fever or chills.  No recent illness no nausea vomiting, chest pain or shortness of breath.  Today she was having worsening of left groin pain that prompted her to come to the emergency room.             Past Medical History:     Past Medical History:   Diagnosis Date     Anemia Dec 2011     Arthritis of hand      Asthma, persistent     f/U dr Brock at [facility]     Chronic intermittent steroid use     for asthma     COPD exacerbation (H) 10/25/2013     Esophageal stricture 2007    s/p dilation     HTN, goal below 140/90      Hyperlipidemia      Hypothyroidism      Osteoporosis      Paroxysmal supraventricular tachycardia (H)      PVC (premature ventricular contraction) May 2012     SVT (supraventricular tachycardia) (H)      Type 2 diabetes mellitus without complication, without long-term current use of insulin (H) 8/2/2018               Past Surgical History:     Past Surgical History:   Procedure Laterality Date     BUNIONECTOMY LAPIDUS WITH TARSAL METATARSAL (TMT) FUSION  1990's     HYSTERECTOMY TOTAL ABDOMINAL       TONSILLECTOMY                 Social History:     Social History     Social History     Marital status:      Spouse name: N/A     Number of children: N/A     Years of education: N/A     Occupational History     Not on file.     Social History Main Topics     Smoking status: Former Smoker     Packs/day: 1.00     Years: 15.00     Quit date: 1/1/1980     Smokeless tobacco: Never Used     Alcohol use No      Comment:  Occasional drink     Drug use: No     Sexual activity: No     Other Topics Concern     Caffeine Concern No     1 cup of tea in the morning     Sleep Concern No     Stress Concern No     Weight Concern No     Special Diet No     Exercise No     some walking     Seat Belt Yes     Social History Narrative               Family History:     Family History   Problem Relation Age of Onset     Cerebrovascular Disease Mother      Hypertension Mother      Family History Negative Father      Unknown/Adopted Maternal Grandmother      Unknown/Adopted Maternal Grandfather      Unknown/Adopted Paternal Grandmother      Unknown/Adopted Paternal Grandfather      Family History Negative Brother      Family History Negative Sister      Family History Negative Son      Family History Negative Daughter      Asthma No family hx of      C.A.D. No family hx of      Diabetes No family hx of      Cancer No family hx of               Allergies:      Allergies   Allergen Reactions     Hydralazine Anxiety     Penicillin G Hives     Tolerated cephalosporines 2017     Meloxicam      dizziness       Metoprolol      ? Skin rash on the back     Norvasc [Amlodipine Besylate] Hives               Medications:     Prior to Admission medications    Medication Sig Last Dose Taking? Auth Provider   acetaminophen (TYLENOL) 500 MG tablet Take 2 tablets (1,000 mg) by mouth 3 times daily 10/12/2018 at hs Yes Kesha Vela PA-C   budesonide (PULMICORT FLEXHALER) 180 MCG/ACT inhaler Inhale 1 puff into the lungs 2 times daily 10/12/2018 at hs Yes Hallie Barroso MD   calcium 600 MG tablet Take 1 tablet (600 mg) by mouth daily 10/12/2018 at 0800 Yes Hallie Barroso MD   cephALEXin (KEFLEX) 500 MG capsule Take 1 capsule (500 mg) by mouth 2 times daily for 7 days  Yes Chayito Hampton MD   cholecalciferol (VITAMIN D) 1000 UNIT tablet Take 1 tablet (1,000 Units) by mouth daily 10/12/2018 at am Yes Hallie Barroso MD    cyanocobalamin (B-12 TR) 1000 MCG TBCR Take 1 tablet by mouth daily 10/12/2018 at am Yes Jenny Hamilton MD   Dextromethorphan HBr (ROBAFEN COUGH PO) Take 10 mLs by mouth every 6 hours as needed unknown at unknown Yes Unknown, Entered By History   ferrous sulfate (IRON) 325 (65 Fe) MG tablet Take 325 mg by mouth three times a week Give on M-W-F AM only 10/12/2018 at am Yes Unknown, Entered By History   fexofenadine (ALLEGRA) 180 MG tablet Take 180 mg by mouth daily 10/12/2018 at am Yes Unknown, Entered By History   levalbuterol (XOPENEX) 1.25 MG/3ML neb solution Take 1 ampule by nebulization 4 times daily 10/12/2018 at hs Yes Reported, Patient   levothyroxine (SYNTHROID/LEVOTHROID) 25 MCG tablet Take 1 tablet (25 mcg) by mouth daily 10/12/2018 at am Yes Jenny Hamilton MD   lidocaine (LIDODERM) 5 % Patch Apply up to 3 patches to painful area at once for up to 12 h within a 24 h period.  Remove after 12 hours.  Yes Chayito Hampton MD   LISINOPRIL PO Take 5 mg by mouth daily HOLD if SBP <110.Call if held x 2 in a row symptomatic  10/12/2018 at am Yes Reported, Patient   montelukast (SINGULAIR) 10 MG tablet Take 1 tablet (10 mg) by mouth At Bedtime 10/12/2018 at hs Yes Hallie Barroso MD   Multiple Vitamins-Minerals (MULTIVITAMIN OR) Take 1 tablet by mouth daily 10/12/2018 at am Yes Unknown, Entered By History   OMEPRAZOLE PO Take 40 mg by mouth every morning FOR Gastroesophagel reflux disease Take 30-60 minutes before a meal. 10/12/2018 at am Yes Reported, Patient   Ondansetron HCl (ZOFRAN PO) Take 4 mg by mouth every morning 10/12/2018 at am Yes Reported, Patient   predniSONE (DELTASONE) 10 MG tablet Take 10 mg by mouth daily 10/12/2018 at am Yes Unknown, Entered By History   roflumilast (DALIRESP) 500 MCG TABS tablet Take 500 mcg by mouth daily  10/12/2018 at am Yes Cole Carrasco MD   TRAMADOL HCL PO Take 25 mg by mouth 3 times daily 10/12/2018 at 2000 Yes  Reported, Patient   TRAMADOL HCL PO Take 25 mg by mouth daily as needed for moderate to severe pain 10/13/2018 at 0322 Yes Reported, Patient   umeclidinium (INCRUSE ELLIPTA) 62.5 MCG/INH oral inhaler Inhale 1 puff into the lungs daily 10/12/2018 at am Yes Cole Carrasco MD   albuterol (PROAIR HFA/PROVENTIL HFA/VENTOLIN HFA) 108 (90 Base) MCG/ACT Inhaler Inhale 2 puffs into the lungs every 2 hours as needed for shortness of breath / dyspnea or wheezing  unknown at unknown  Reported, Patient   calcium carbonate (TUMS) 500 MG chewable tablet Take 1 chew tab by mouth 4 times daily as needed for heartburn (nausea or indigestion) 10/10/2018 at 1200  Reported, Patient   loperamide (IMODIUM) 2 MG capsule Take 2 mg by mouth every 8 hours as needed for diarrhea unknown at unknown  Unknown, Entered By History   Ondansetron (ZOFRAN ODT PO) Take 4 mg by mouth every 12 hours as needed for nausea or vomiting  unknown at unknown  Reported, Patient   polyethylene glycol (MIRALAX/GLYCOLAX) Packet Take 17 g by mouth daily as needed for constipation 10/2/2018 at am  Unknown, Entered By History              Review of Systems:   A Comprehensive greater than 10 system review of systems was carried out.  Pertinent positives and negatives are noted above.  Otherwise negative for contributory information.            Physical Exam:   Blood pressure 127/57, pulse 88, temperature 98.5  F (36.9  C), temperature source Oral, resp. rate 20, SpO2 99 %, not currently breastfeeding.    GENERAL: healthy, alert and no distress  EYES: Eyes grossly normal to inspection, extraocular movements - intact, and PERRL  HENT: ear canals- normal; TMs- normal; Nose- normal; Mouth- no ulcers, no lesions  NECK: no tenderness, no adenopathy, no asymmetry, no masses, no stiffness; thyroid- normal to palpation  RESP: lungs clear to auscultation - no rales, no rhonchi, no wheezes  CV: regular rates and rhythm, normal S1 S2, no S3 or S4, no murmur, click or rub and  no irregular beats  ABDOMEN: soft, no tenderness, no  hepatosplenomegaly, no masses, normal bowel sounds  MS: Tenderness to palpation over the left groin.  Range of motion is limited secondary to spasm-like pains.  Strength is intact  SKIN: no suspicious lesions, no rashes  NEURO:  Reflexes normal and symmetric. Sensation and strength grossly WNL.  PSYCH: Alert and oriented times 3; coherent speech. Affect is normal.         Data:       Recent Labs  Lab 10/13/18  0818   WBC 8.0   HGB 9.8*   HCT 28.8*   MCV 90          Recent Labs  Lab 10/13/18  0818   *   POTASSIUM 4.0   CHLORIDE 94   CO2 24   ANIONGAP 8   *   BUN 12   CR 0.66   GFRESTIMATED 86   GFRESTBLACK >90   DIANA 8.7       Recent Labs  Lab 10/13/18  1125   CULT PENDING       Results for orders placed or performed during the hospital encounter of 10/13/18   XR Pelvis and Hip Left 2 Views    Narrative    PELVIS WITH UNILATERAL HIP TWO VIEWS LEFT  10/13/2018 9:17 AM     HISTORY: left hip pain/groin pain. rule out fracture;     COMPARISON: None.    FINDINGS: There is normal osseous alignment.  No fractures are  identified.      Impression    IMPRESSION: Osseous structures appear intact. No fractures are  identified.    WILMA CARBAJAL MD   Abd/pelvis CT no contrast - Stone Protocol    Narrative    CT ABDOMEN AND PELVIS WITHOUT CONTRAST 10/13/2018 11:56 AM     HISTORY: Lower abdominal pain/groin pain on left. Rule out stone.     COMPARISON: September 21, 2014    TECHNIQUE: Volumetric helical acquisition of CT images from the lung  bases through the symphysis pubis without intravenous contrast.  Radiation dose for this scan was reduced using automated exposure  control, adjustment of the mA and/or kV according to patient size, or  iterative reconstruction technique.    FINDINGS: No hydronephrosis or urolithiasis. Minimal asymmetry of the  iliopsoas, left greater than right, indicating a small amount of  intramuscular hemorrhage. Please see CT report  same date of the left  hip. No free air or free fluid. No bowel obstruction. There is mild  diverticulosis without evidence for diverticulitis. There are moderate  atherosclerotic changes of the visualized aorta and its branches.  There is no evidence of aortic aneurysm. There is cholelithiasis  without evidence for cholecystitis. Moderate hiatal hernia. The liver,  spleen, adrenal glands, kidneys, and pancreas demonstrate no worrisome  focal lesion in the absence of intravenous contrast. Lung bases clear  of acute infiltrates. Trace peripheral fibrosis and/or atelectasis.      Impression    IMPRESSION:  1. Question some minimal intramuscular hematoma in the iliopsoas on  the left. Please see CT report of the left hip for additional regional  findings.  2. Otherwise no acute process demonstrated in the abdomen and pelvis.     DEANN CROSS MD   CT Hip Left w/o Contrast    Narrative    CT HIP LEFT WITHOUT CONTRAST 10/13/2018 11:56 AM     HISTORY: Left hip pain/groin pain, rule out occult fracture.      TECHNIQUE: Axial images with reconstructions. Radiation dose for this  scan was reduced using automated exposure control, adjustment of the  mA and/or kV according to patient size, or iterative reconstruction  technique.      COMPARISON: 11/19/2017.     FINDINGS: There appears to be some focal fluid density adjacent to the  left ischial tuberosity. There is loss of cortex at the superior  aspect of the left ischial tuberosity. These findings suggest the  possibility of common hamstring tendon rupture. There is edema  adjacent to the left iliopsoas muscle, raising the possibility of  tendon rupture. Degenerative change of the lower lumbar spine.  Anterolisthesis at L5-S1. Chondrocalcinosis of the symphysis pubis,  consistent with calcium pyrophosphate deposition disease. No hip or  pubic fracture is identified. Incidentally noted colonic  diverticulosis.      Impression    IMPRESSION:  1. Findings raising the  possibility of left common hamstring tendon  rupture and iliopsoas tendon rupture. MRI recommended, if indicated  clinically.  2. Additional findings discussed above.     JUAN OROZCO MD     *Note: Due to a large number of results and/or encounters for the requested time period, some results have not been displayed. A complete set of results can be found in Results Review.         Patsy Rae PA-C

## 2018-10-13 NOTE — PHARMACY-ADMISSION MEDICATION HISTORY
Admission medication history interview status for this patient is complete. See [facility] admission navigator for allergy information, prior to admission medications and immunization status.     Medication history interview source(s):other   Medication history resources (including written lists, pill bottles, clinic record):[facility] med list and [facility]  Primary pharmacy:    Changes made to [facility] medication list:  Added: Robafen  Deleted: gemfibrozil  Changed: omeprazole dosage    Actions taken by pharmacist (provider contacted, etc):contacted [facility] for MAR     Additional medication history information:None    Medication reconciliation/reorder completed by provider prior to medication history? No    Do you take OTC medications (eg tylenol, ibuprofen, fish oil, eye/ear drops, etc)? NO(Y/N)    For patients on insulin therapy: NO (Y/N)  Lantus/levemir/NPH/Mix 70/30 dose:   (Y/N) (see Med list for doses)   Sliding scale Novolog Y/N  If Yes, do you have a baseline novolog pre-meal dose:  units with meals  Patients eat three meals a day:   Y/N    How many episodes of hypoglycemia do you have per week: _______  How many missed doses do you have per week: ______  How many times do you check your blood glucose per day: _______  Do you have a Continuous glucose monitor (CGM)   Y/N (remind pt that not approved for hospital use)   Any Barriers to therapy - Be specific :  cost of medications, comfortable with giving injections (if applicable), comfortable and confident with current diabetes regimen: Y/N ______________      Prior to Admission medications    Medication Sig Last Dose Taking? Auth Provider   acetaminophen (TYLENOL) 500 MG tablet Take 2 tablets (1,000 mg) by mouth 3 times daily 10/12/2018 at hs Yes Kesha Vela PA-C   budesonide (PULMICORT FLEXHALER) 180 MCG/ACT inhaler Inhale 1 puff into the lungs 2 times daily 10/12/2018 at hs Yes Hallie Barroso MD   calcium 600 MG  tablet Take 1 tablet (600 mg) by mouth daily 10/12/2018 at 0800 Yes Hallie Barroso MD   cephALEXin (KEFLEX) 500 MG capsule Take 1 capsule (500 mg) by mouth 2 times daily for 7 days  Yes Chayito Hampton MD   cholecalciferol (VITAMIN D) 1000 UNIT tablet Take 1 tablet (1,000 Units) by mouth daily 10/12/2018 at am Yes Hallie Barroso MD   cyanocobalamin (B-12 TR) 1000 MCG TBCR Take 1 tablet by mouth daily 10/12/2018 at am Yes Jenny Hamilton MD   Dextromethorphan HBr (ROBAFEN COUGH PO) Take 10 mLs by mouth every 6 hours as needed unknown at unknown Yes Unknown, Entered By History   ferrous sulfate (IRON) 325 (65 Fe) MG tablet Take 325 mg by mouth three times a week Give on M-W-F AM only 10/12/2018 at am Yes Unknown, Entered By History   fexofenadine (ALLEGRA) 180 MG tablet Take 180 mg by mouth daily 10/12/2018 at am Yes Unknown, Entered By History   levalbuterol (XOPENEX) 1.25 MG/3ML neb solution Take 1 ampule by nebulization 4 times daily 10/12/2018 at hs Yes Reported, Patient   levothyroxine (SYNTHROID/LEVOTHROID) 25 MCG tablet Take 1 tablet (25 mcg) by mouth daily 10/12/2018 at am Yes Jenny Hamilton MD   lidocaine (LIDODERM) 5 % Patch Apply up to 3 patches to painful area at once for up to 12 h within a 24 h period.  Remove after 12 hours.  Yes Chayito Hampton MD   LISINOPRIL PO Take 5 mg by mouth daily HOLD if SBP <110.Call if held x 2 in a row symptomatic  10/12/2018 at am Yes Reported, Patient   montelukast (SINGULAIR) 10 MG tablet Take 1 tablet (10 mg) by mouth At Bedtime 10/12/2018 at hs Yes Hallie Barroso MD   Multiple Vitamins-Minerals (MULTIVITAMIN OR) Take 1 tablet by mouth daily 10/12/2018 at am Yes Unknown, Entered By History   OMEPRAZOLE PO Take 40 mg by mouth every morning FOR Gastroesophagel reflux disease Take 30-60 minutes before a meal. 10/12/2018 at am Yes Reported, Patient   Ondansetron HCl (ZOFRAN PO) Take 4 mg by mouth  every morning 10/12/2018 at am Yes Reported, Patient   predniSONE (DELTASONE) 10 MG tablet Take 10 mg by mouth daily 10/12/2018 at am Yes Unknown, Entered By History   roflumilast (DALIRESP) 500 MCG TABS tablet Take 500 mcg by mouth daily  10/12/2018 at am Yes Cole Carrasco MD   TRAMADOL HCL PO Take 25 mg by mouth 3 times daily 10/12/2018 at 2000 Yes Reported, Patient   TRAMADOL HCL PO Take 25 mg by mouth daily as needed for moderate to severe pain 10/13/2018 at 0322 Yes Reported, Patient   umeclidinium (INCRUSE ELLIPTA) 62.5 MCG/INH oral inhaler Inhale 1 puff into the lungs daily 10/12/2018 at am Yes Cole Carrasco MD   albuterol (PROAIR HFA/PROVENTIL HFA/VENTOLIN HFA) 108 (90 Base) MCG/ACT Inhaler Inhale 2 puffs into the lungs every 2 hours as needed for shortness of breath / dyspnea or wheezing  unknown at unknown  Reported, Patient   calcium carbonate (TUMS) 500 MG chewable tablet Take 1 chew tab by mouth 4 times daily as needed for heartburn (nausea or indigestion) 10/10/2018 at 1200  Reported, Patient   loperamide (IMODIUM) 2 MG capsule Take 2 mg by mouth every 8 hours as needed for diarrhea unknown at unknown  Unknown, Entered By History   Ondansetron (ZOFRAN ODT PO) Take 4 mg by mouth every 12 hours as needed for nausea or vomiting  unknown at unknown  Reported, Patient   polyethylene glycol (MIRALAX/GLYCOLAX) Packet Take 17 g by mouth daily as needed for constipation 10/2/2018 at am  Unknown, Entered By History

## 2018-10-13 NOTE — IP AVS SNAPSHOT
"    [facility] OBSERVATION DEPARTMENT: [phone]                                              INTERAGENCY TRANSFER FORM - PHYSICIAN ORDERS   10/13/2018                    Hospital Admission Date: 10/13/2018  NAM DAS   : 1940  Sex: Female        Attending Provider: Speedy Yu MD     Allergies:  Hydralazine, Penicillin G, Meloxicam, Metoprolol, Norvasc [Amlodipine Besylate]    Infection:  None   Service:  Observation    Ht:  1.575 m (5' 2\")   Wt:  51.6 kg (113 lb 12.8 oz)   Admission Wt:  --    BMI:  20.81 kg/m 2   BSA:  1.5 m 2            Patient PCP Information     Provider PCP Type    Db Alvarez MD General      ED Clinical Impression     Diagnosis Description Comment Added By Time Added    Complicated UTI (urinary tract infection) [N39.0] Complicated UTI (urinary tract infection) [N39.0]  Chayito Hampton MD 10/13/2018 12:10 PM    Left inguinal pain [R10.32] Left inguinal pain [R10.32]  Chayito Hampton MD 10/13/2018 12:10 PM    Urinary retention [R33.9] Urinary retention [R33.9]  Chayito Hampton MD 10/13/2018 12:11 PM    Rupture of left hamstring tendon, initial encounter [S76.312A] Rupture of left hamstring tendon, initial encounter [S76.312A]  Chayito Hampton MD 10/13/2018 12:26 PM    Hematoma of left iliopsoas muscle, initial encounter [S70.12XA] Hematoma of left iliopsoas muscle, initial encounter [S70.12XA]  Chayito Hampton MD 10/13/2018 12:53 PM      Hospital Problems as of 10/18/2018              Priority Class Noted POA    Urinary retention Medium  10/13/2018 Yes    Hematoma of iliopsoas muscle Medium  10/16/2018 Yes      Non-Hospital Problems as of 10/18/2018              Priority Class Noted    Osteoporosis Medium  Unknown    Hyperlipidemia with target LDL less than 130 Medium  Unknown    Asthma, persistent Medium  Unknown    Pes planus Medium  2011    Pain in joint, shoulder region Medium  1/10/2012    PVC " (premature ventricular contraction) Medium  Unknown    Arthritis of hand Medium  Unknown    Pulmonary nodule seen on imaging study Medium  12/22/2014    Steroid-dependent chronic obstructive pulmonary disease (H) Medium  3/19/2016    SVT (supraventricular tachycardia) (H) Medium  5/17/2016    Hypothyroidism, unspecified type Medium  10/1/2016    Pernicious anemia Medium  11/3/2016    Paroxysmal supraventricular tachycardia (H) Medium  1/12/2017    PAC (premature atrial contraction) Medium  1/12/2017    Anemia, unspecified type Medium  5/28/2018    [id number]: mod-severe Ao Stenosis, severe Asthma/COPD steroid dependent, tachycardia/PVCs ( intol to BB and Ca gillian block), hypothyroidism,  HTN, osteoporosis, HLipidemia, depression  Medium  5/28/2018    Aortic stenosis, moderate Medium  5/28/2018    Fall Medium  6/17/2018    Encephalopathy Medium  7/22/2018    Skin abrasion: Right Shin 7/22/18 Medium  7/26/2018    Adjustment disorder with depressed mood Medium  7/26/2018    Arthritis pain Medium  7/26/2018    Hyponatremia Medium  7/26/2018    Type 2 diabetes mellitus without complication, without long-term current use of insulin (H) Medium  8/2/2018    Pain in both lower extremities Medium  8/6/2018    Steroid-induced hyperglycemia Medium  8/6/2018    Dementia without behavioral disturbance, unspecified dementia type Medium  8/6/2018    Dysuria Medium  8/6/2018    Urinary frequency Medium  8/7/2018    Chronic nausea Medium  8/10/2018      Code Status History     Date Active Date Inactive Code Status Order ID Comments User Context    10/18/2018 11:43 AM  DNR/DNI [id number]  Suly Petersen MD Outpatient    10/13/2018  5:12 PM 10/18/2018 11:43 AM DNR/DNI [id number]  Patsy Rae PA-C Inpatient    7/25/2018  2:34 PM 10/13/2018  5:12 PM DNR/DNI [id number]  Latesha Cao MD Outpatient    7/22/2018 10:58 AM 7/25/2018  2:34 PM DNR/DNI [id number]  Adrian Carvalho MD Inpatient    6/29/2018  1:33 PM  7/22/2018 10:58 AM DNR [id number]  Yany Torres, APRN CNP Outpatient    6/18/2018  1:26 PM 6/29/2018  1:33 PM DNR/DNI [id number]  Kesha Vela PA-C Outpatient    6/17/2018  4:55 PM 6/18/2018  1:26 PM DNR/DNI [id number]  Jodi Lopes MD Inpatient    4/17/2018 10:51 AM 6/17/2018  4:55 PM DNR/DNI [id number]  Paulo Curry MD Outpatient    4/13/2018  7:53 AM 4/17/2018 10:51 AM DNR/DNI [id number]  Paulo Curry MD Inpatient    11/22/2017 12:47 PM 4/13/2018  7:53 AM DNR/DNI [id number]  Fuentes Hopson, DO Outpatient    11/20/2017  1:02 AM 11/22/2017 12:47 PM DNR/DNI [id number]  Damien Johnson, DO Inpatient    5/27/2017  8:00 PM 5/28/2017  3:40 PM DNR/DNI [id number]  Speedy Yu MD Inpatient    5/5/2017  9:37 AM 5/27/2017  8:00 PM Full Code [id number]  Dea Riley, DO Outpatient    5/4/2017  2:07 AM 5/5/2017  9:37 AM Full Code [id number]  Sonido Spears MD Inpatient    1/6/2017 10:35 AM 5/4/2017  2:07 AM Full Code [id number]  Hallie Barroso MD Outpatient    1/2/2017  9:59 AM 1/6/2017 10:35 AM Full Code [id number]  Moses Calle MD ED    12/24/2016 11:48 AM 1/2/2017  9:59 AM Full Code [id number]  Kesha Vela PA-C Outpatient    12/22/2016  5:04 PM 12/24/2016 11:48 AM Full Code [id number]  Mimi Mullins PA-C ED    11/29/2016  5:41 PM 12/1/2016  3:24 PM DNR/DNI [id number]  Patsy Rae PA-C ED    3/19/2016 10:42 AM 11/29/2016  5:41 PM Full Code [id number]  Sonido Spears MD Outpatient    3/4/2016  5:36 PM 3/19/2016 10:42 AM Full Code [id number]  Meg Leger PA-C ED    12/13/2014  4:34 PM 12/19/2014  4:26 PM Full Code [id number]  Betty Roca MD Inpatient    11/4/2013  9:09 PM 12/13/2014  4:34 PM Full Code [id number]  Valentine Lundbegr Outpatient    10/25/2013  1:07 PM 10/30/2013  3:25 PM Full Code [id number]  Libia Arevalo PA Inpatient         Medication Review      START taking        Dose / Directions  Comments    cephALEXin 500 MG capsule   Commonly known as:  KEFLEX   Used for:  Complicated UTI (urinary tract infection)        Dose:  500 mg   Take 1 capsule (500 mg) by mouth 2 times daily Take 500 mg on the evening of 10/18/18 to complete antibiotic course.   Quantity:  1 capsule   Refills:  0        ipratropium - albuterol 0.5 mg/2.5 mg/3 mL 0.5-2.5 (3) MG/3ML neb solution   Commonly known as:  DUONEB   Used for:  Moderate persistent asthma without complication        Dose:  3 mL   Take 1 vial (3 mLs) by nebulization every 4 hours as needed for shortness of breath / dyspnea or wheezing   Quantity:  360 mL   Refills:  0        lidocaine 5 % Patch   Commonly known as:  LIDODERM        Apply up to 3 patches to painful area at once for up to 12 h within a 24 h period.  Remove after 12 hours.   Quantity:  30 patch   Refills:  0        meclizine 25 MG tablet   Commonly known as:  ANTIVERT   Used for:  Dizziness        Dose:  25 mg   Take 1 tablet (25 mg) by mouth 2 times daily as needed for dizziness   Quantity:  90 tablet   Refills:  0        methocarbamol 500 MG tablet   Commonly known as:  ROBAXIN   Used for:  Hematoma of left iliopsoas muscle, initial encounter        Dose:  500 mg   Take 1 tablet (500 mg) by mouth 3 times daily as needed for muscle spasms   Quantity:  20 tablet   Refills:  0        sodium chloride 1 GM tablet   Used for:  Hyponatremia        Dose:  1 g   Take 1 tablet (1 g) by mouth 2 times daily (with meals)   Refills:  0        tamsulosin 0.4 MG capsule   Commonly known as:  FLOMAX        Dose:  0.4 mg   Start taking on:  10/19/2018   Take 1 capsule (0.4 mg) by mouth daily   Quantity:  60 capsule   Refills:  0          CONTINUE these medications which may have CHANGED, or have new prescriptions. If we are uncertain of the size of tablets/capsules you have at home, strength may be listed as something that might have changed.        Dose / Directions Comments    traMADol 50 MG tablet   Commonly  known as:  ULTRAM   Indication:  Moderate to Moderately Severe Pain   This may have changed:    - when to take this  - reasons to take this  - Another medication with the same name was removed. Continue taking this medication, and follow the directions you see here.   Used for:  Left inguinal pain, Rupture of left hamstring tendon, initial encounter, Hematoma of left iliopsoas muscle, initial encounter        Dose:  25 mg   Take 0.5 tablets (25 mg) by mouth 2 times daily as needed for moderate to severe pain   Quantity:  30 tablet   Refills:  0          CONTINUE these medications which have NOT CHANGED        Dose / Directions Comments    acetaminophen 500 MG tablet   Commonly known as:  TYLENOL   Used for:  Pain of both shoulder joints        Dose:  1000 mg   Take 2 tablets (1,000 mg) by mouth 3 times daily   Refills:  0        albuterol 108 (90 Base) MCG/ACT inhaler   Commonly known as:  PROAIR HFA/PROVENTIL HFA/VENTOLIN HFA        Dose:  2 puff   Inhale 2 puffs into the lungs every 2 hours as needed for shortness of breath / dyspnea or wheezing   Refills:  0        budesonide 180 MCG/ACT inhaler   Commonly known as:  PULMICORT FLEXHALER   Used for:  Pulmonary emphysema, unspecified emphysema type (H)        Dose:  1 puff   Inhale 1 puff into the lungs 2 times daily   Refills:  0        calcium 600 MG tablet   Used for:  Pulmonary emphysema, unspecified emphysema type (H)        Dose:  1 tablet   Take 1 tablet (600 mg) by mouth daily   Quantity:  60 tablet   Refills:  0        calcium carbonate 500 MG chewable tablet   Commonly known as:  TUMS        Dose:  1 chew tab   Take 1 chew tab by mouth 4 times daily as needed for heartburn (nausea or indigestion)   Refills:  0        cholecalciferol 1000 UNIT tablet   Commonly known as:  vitamin D3   Used for:  COPD, moderate (H)        Dose:  1000 Units   Take 1 tablet (1,000 Units) by mouth daily   Quantity:  30 tablet   Refills:  0        cyanocobalamin 1000 MCG Tbcr    Commonly known as:  B-12 TR   Used for:  Pernicious anemia        Dose:  1 tablet   Take 1 tablet by mouth daily   Quantity:  100 tablet   Refills:  3        ferrous sulfate 325 (65 Fe) MG tablet   Commonly known as:  IRON        Dose:  325 mg   Take 325 mg by mouth three times a week Give on M-W-F AM only   Refills:  0        fexofenadine 180 MG tablet   Commonly known as:  ALLEGRA        Dose:  180 mg   Take 180 mg by mouth daily   Refills:  0        INCRUSE ELLIPTA 62.5 MCG/INH inhaler   Generic drug:  umeclidinium        Dose:  1 puff   Inhale 1 puff into the lungs daily   Refills:  0        levalbuterol 1.25 MG/3ML neb solution   Commonly known as:  XOPENEX        Dose:  1 ampule   Take 1 ampule by nebulization 4 times daily   Refills:  0        levothyroxine 25 MCG tablet   Commonly known as:  SYNTHROID/LEVOTHROID   Used for:  Hypothyroidism due to acquired atrophy of thyroid        Dose:  25 mcg   Take 1 tablet (25 mcg) by mouth daily   Quantity:  90 tablet   Refills:  3        LISINOPRIL PO        Dose:  5 mg   Take 5 mg by mouth daily HOLD if SBP <110.Call if held x 2 in a row symptomatic   Refills:  0        loperamide 2 MG capsule   Commonly known as:  IMODIUM        Dose:  2 mg   Take 2 mg by mouth every 8 hours as needed for diarrhea   Refills:  0        montelukast 10 MG tablet   Commonly known as:  SINGULAIR   Used for:  Pulmonary emphysema, unspecified emphysema type (H)        Dose:  10 mg   Take 1 tablet (10 mg) by mouth At Bedtime   Quantity:  30 tablet   Refills:  0        MULTIVITAMIN PO        Dose:  1 tablet   Take 1 tablet by mouth daily   Refills:  0        OMEPRAZOLE PO        Dose:  40 mg   Take 40 mg by mouth every morning FOR Gastroesophagel reflux disease Take 30-60 minutes before a meal.   Refills:  0        polyethylene glycol Packet   Commonly known as:  MIRALAX/GLYCOLAX        Dose:  17 g   Take 17 g by mouth daily as needed for constipation   Refills:  0        predniSONE 10 MG  tablet   Commonly known as:  DELTASONE   Used for:  Moderate persistent asthma without complication        Dose:  10 mg   Take 1 tablet (10 mg) by mouth daily   Quantity:  60 tablet   Refills:  0        ROBAFEN COUGH PO        Dose:  10 mL   Take 10 mLs by mouth every 6 hours as needed   Refills:  0        roflumilast 500 MCG Tabs tablet   Commonly known as:  DALIRESP        Dose:  500 mcg   Take 500 mcg by mouth daily   Refills:  0        ZOFRAN ODT PO        Dose:  4 mg   Take 4 mg by mouth every 12 hours as needed for nausea or vomiting   Refills:  0        ZOFRAN PO        Dose:  4 mg   Take 4 mg by mouth every morning   Refills:  0                Summary of Visit     Reason for your hospital stay       You were hospitalized for pain and were found to have a iliopsoas tear with hematoma and nondisplaced coccyx fracture.  You also were treated for a urinary tract infection.  You will go to a rehab facility for further therapy upon discharge.             After Care     Activity - Up with assistive device           Advance Diet as Tolerated       Follow this diet upon discharge: Orders Placed This Encounter      Regular Diet Adult       Fall precautions           Crawley catheter       To straight gravity drainage. Change catheter every 2 weeks and PRN for leaking or decreased uring output with signs of bladder distention.     Crawley in place for urinary retention.  Continue Flomax, recommend trial removal in one week, if unsuccessful then follow up with Urology in clinic.       General info for SNF       Length of Stay Estimate: Short Term Care: Estimated # of Days <30  Condition at Discharge: Stable  Level of care:skilled   Rehabilitation Potential: Fair  Admission H&P remains valid and up-to-date: Yes  Recent Chemotherapy: N/A  Use Nursing Home Standing Orders: Yes       Mantoux instructions       Give two-step Mantoux (PPD) Per Facility Policy Yes             Referrals     Med Therapy Manage Referral       Your  provider has referred you to: **[facility] Scheduling (numerous locations) [phone]   [email or web address]    Reason for Referral: Greater than 10 prescription medications    The [facility] department will contact you to schedule an appointment.  You may also schedule the appointment by calling [phone].  For [facility] [place] [facility] [place] patients, please call [phone] to confirm/schedule your appointment on the next business day.    This service is designed to help you get the most from your medications.  A specially trained Pharmacist will work closely with you and your providers to solve any questions, concerns, issues or problems related to your medications.    Please bring all of your prescription and non-prescription medications (such as vitamins, over-the-counter medications, and herbals) or a detailed medication list to your appointment.    If you have a glucose meter or other home monitoring information, please also bring this to your appointment (i.e. blood glucose log, blood pressure log, pain log, etc.).       Occupational Therapy Adult Consult       Evaluate and treat as clinically indicated.    Reason:  Weakness       Physical Therapy Adult Consult       Evaluate and treat as clinically indicated.    Reason:  Weakness, iliopsoas tear, coccyx fracture             Follow-Up Appointment Instructions     Future Labs/Procedures    Follow Up     Comments:    Follow up with Neurology in clinic for possible repeat EMG if your foot drop persists of the weakness does not improve.      Follow-Up Appointment Instructions     Follow Up       Follow up with Neurology in clinic for possible repeat EMG if your foot drop persists of the weakness does not improve.             Statement of Approval     Ordered          10/18/18 1145  I have reviewed and agree with all the recommendations and orders detailed in this  document.  EFFECTIVE NOW     Approved and electronically signed by:  Suly Petersen MD

## 2018-10-13 NOTE — IP AVS SNAPSHOT
` `     [facility] OBSERVATION DEPARTMENT: [phone]                 INTERAGENCY TRANSFER FORM - NOTES (H&P, Discharge Summary, Consults, Procedures, Therapies)   10/13/2018                    Hospital Admission Date: 10/13/2018  ANA LUISA LEE   : 1940  Sex: Female        Patient PCP Information     Provider PCP Type    Db Alvarez MD General         History & Physicals      H&P by Patsy Rae PA-C at 10/13/2018  5:55 PM     Author:  Patsy Rae PA-C Service:  Hospitalist Author Type:  Physician Assistant    Filed:  10/13/2018 11:20 PM Date of Service:  10/13/2018  5:55 PM Creation Time:  10/13/2018  5:54 PM    Status:  Attested :  Patsy Rae PA-C (Physician Assistant)    Cosigner:  Speedy Yu MD at 10/16/2018  3:17 PM        Attestation signed by Speedy Yu MD at 10/16/2018  3:17 PM        Physician Attestation   I, Speedy Yu, have reviewed and discussed with the advanced practice provider their history, physical and plan for Ana Luisa Lee. I did not participate in a shared visit by interviewing or examining the patient and this should be billed as an advanced practice provider only visit.    Speedy Yu  Date of Service (when I saw the patient): I did not personally see this patient today.                               [facility] Outpatient / Observation Unit  History and Physical Exam     Ana Luisa Lee MRN# [id number]   YOB: 1940 Age: 78 year old      Date of Admission:  10/13/2018    Primary care provider: Db Alvarez          Assessment:   Ana Luisa Lee is a 78 year old female with a PMH[CH1.1] listed below, who resides in an AL and has been newly wheelchair bound since this spring for weakness[CH1.2], who presents with[CH1.1] 3 week hx of left groin pain. She comes to ED for pain control and further eval.[CH1.2]   Work up in ED[CH1.1]  Included CT of the[CH1.2] abd that revealed minimal  intramuscular hematoma in the iliopsoas on the left.  Furthermore CT scan of the[CH1.3] left hip[CH1.2] hip shows evidence of possible left common hamstring tendon rupture and iliopsoas tendon rupture.  She was also found to have abnormal urinalysis suggestive of a urinary tract infection.  She had a difficult time urinating here in the emergency room and was found to be retaining urine and therefore Crawley catheter was placed.  The plan was initially discharged her back to her assisted living with a Crawley catheter but her assisted living would not take her  back until she was assessed by their nurses.  She was therefore recommended for admission overnight for a voiding trial as well as further pain management.[CH1.3]      1.[CH1.1]  3-week history of left groin pain-CT imaging indicates possible hamstring and iliopsoas tendon rupture with possible hematoma along the iliopsoas.  Patient states that she does not recall any injuries that might have led to this.  She is however getting physical therapy at the assisted living so not sure if she might have overdid it.  Nonetheless she is not quite a bit of pain but it is more of a spasm.  I will place her on scheduled Tylenol along with as needed Flexeril for pain control.   Initially Ortho was not contacted because this is likely nonsurgical, however in speaking with the patient she states that her being in a wheelchair is a fairly new thing and then that they are working towards her being able to walk again (she does have mild dementia so I am not sure how realistic that is) nonetheless.  I think it is worthwhile for her to be seen by orthopedic here in the hospital.  They can also comment on if MRI imaging is indicated and whether there are any activity restrictions for her outpatient.    2.  Abnormal urinalysis in the setting of urinary retention-she has no previous history of urinary retention.  I suspect this might be related to acute infection.  Urine culture has  been sent.  She has been started on oral Keflex.  Will give voiding trial tomorrow and see how she does.  If she is still retaining then she will likely need to have outpatient Crawley bag.  Continue Keflex for now and tailor as antibiotic indicates    3.  Chronic hyponatremia-patient has history of chronic hyponatremia normally in the low 130s.  Will give a little bit gentle IV fluids overnight and repeat labs in the morning.    DVT prophylaxis-patient normally does not ambulate.  We will see how she does tomorrow and consider subcu Lovenox if she ends up having an extended stay.  CODE STATUS-DNR/DNI-discussed with patient  Disposition-I suspect she will be able to go back to her assisted living but may need more assistance.  Will see how she does clinically and request social work to assist with discharge.[CH1.3]                  Chief Complaint:   Acute traumatic pain         History of Present Illness:    Ana Luisa Lee, a 78 year old female, presents to the ED with complaint of[CH1.1] worsening left groin pain over the last 3 weeks.  History is obtained by speaking with the ER physician patient interview and chart review.  Patient states that she started noticing worsening left groin pain for the last 3 weeks.  Initially she thought that she might have pulled a muscle but since then it is just been getting progressively worse.  She denies any recent trauma.  She does do outpatient physical therapy at her assisted living and denies doing anything too strenuous.  She is normally wheelchair bound however this is somewhat new for her.  She states that since the spring she just gotten so weak that she mostly gets around in a wheelchair.  She is still able to stand and pivot and maybe take one step.  She tells me that she is trying to work with her PT and her goal is to try to walk again.  She also tells me that she is been having increased urinary frequency but no fever or chills.  No recent illness no nausea  vomiting, chest pain or shortness of breath.  Today she was having worsening of left groin pain that prompted her to come to the emergency room.[CH1.3]             Past Medical History:[CH1.1]     Past Medical History:   Diagnosis Date     Anemia Dec 2011     Arthritis of hand      Asthma, persistent     f/U dr Brock at [facility]     Chronic intermittent steroid use     for asthma     COPD exacerbation (H) 10/25/2013     Esophageal stricture 2007    s/p dilation     HTN, goal below 140/90      Hyperlipidemia      Hypothyroidism      Osteoporosis      Paroxysmal supraventricular tachycardia (H)      PVC (premature ventricular contraction) May 2012     SVT (supraventricular tachycardia) (H)      Type 2 diabetes mellitus without complication, without long-term current use of insulin (H) 8/2/2018[CH1.4]               Past Surgical History:[CH1.1]     Past Surgical History:   Procedure Laterality Date     BUNIONECTOMY LAPIDUS WITH TARSAL METATARSAL (TMT) FUSION  1990's     HYSTERECTOMY TOTAL ABDOMINAL       TONSILLECTOMY[CH1.4]                 Social History:[CH1.1]     Social History     Social History     Marital status:      Spouse name: N/A     Number of children: N/A     Years of education: N/A     Occupational History     Not on file.     Social History Main Topics     Smoking status: Former Smoker     Packs/day: 1.00     Years: 15.00     Quit date: 1/1/1980     Smokeless tobacco: Never Used     Alcohol use No      Comment: Occasional drink     Drug use: No     Sexual activity: No     Other Topics Concern     Caffeine Concern No     1 cup of tea in the morning     Sleep Concern No     Stress Concern No     Weight Concern No     Special Diet No     Exercise No     some walking     Seat Belt Yes     Social History Narrative[CH1.4]               Family History:[CH1.1]     Family History   Problem Relation Age of Onset     Cerebrovascular Disease Mother      Hypertension Mother      Family History  Negative Father      Unknown/Adopted Maternal Grandmother      Unknown/Adopted Maternal Grandfather      Unknown/Adopted Paternal Grandmother      Unknown/Adopted Paternal Grandfather      Family History Negative Brother      Family History Negative Sister      Family History Negative Son      Family History Negative Daughter      Asthma No family hx of      C.A.D. No family hx of      Diabetes No family hx of      Cancer No family hx of[CH1.4]               Allergies:[CH1.1]      Allergies   Allergen Reactions     Hydralazine Anxiety     Penicillin G Hives     Tolerated cephalosporines 2017     Meloxicam      dizziness       Metoprolol      ? Skin rash on the back     Norvasc [Amlodipine Besylate] Hives[CH1.4]               Medications:     Prior to Admission medications    Medication Sig Last Dose Taking? Auth Provider   acetaminophen (TYLENOL) 500 MG tablet Take 2 tablets (1,000 mg) by mouth 3 times daily 10/12/2018 at hs Yes Kesha Vela PA-C   budesonide (PULMICORT FLEXHALER) 180 MCG/ACT inhaler Inhale 1 puff into the lungs 2 times daily 10/12/2018 at hs Yes Hallie Barroso MD   calcium 600 MG tablet Take 1 tablet (600 mg) by mouth daily 10/12/2018 at 0800 Yes Hallie Barroso MD   cephALEXin (KEFLEX) 500 MG capsule Take 1 capsule (500 mg) by mouth 2 times daily for 7 days  Yes Chayito Hampton MD   cholecalciferol (VITAMIN D) 1000 UNIT tablet Take 1 tablet (1,000 Units) by mouth daily 10/12/2018 at am Yes Hallie Barroso MD   cyanocobalamin (B-12 TR) 1000 MCG TBCR Take 1 tablet by mouth daily 10/12/2018 at am Yes Jenny Hamilton MD   Dextromethorphan HBr (ROBAFEN COUGH PO) Take 10 mLs by mouth every 6 hours as needed unknown at unknown Yes Unknown, Entered By History   ferrous sulfate (IRON) 325 (65 Fe) MG tablet Take 325 mg by mouth three times a week Give on M-W-F AM only 10/12/2018 at am Yes Unknown, Entered By History   fexofenadine (ALLEGRA) 180 MG  tablet Take 180 mg by mouth daily 10/12/2018 at am Yes Unknown, Entered By History   levalbuterol (XOPENEX) 1.25 MG/3ML neb solution Take 1 ampule by nebulization 4 times daily 10/12/2018 at hs Yes Reported, Patient   levothyroxine (SYNTHROID/LEVOTHROID) 25 MCG tablet Take 1 tablet (25 mcg) by mouth daily 10/12/2018 at am Yes Jenny Hamilton MD   lidocaine (LIDODERM) 5 % Patch Apply up to 3 patches to painful area at once for up to 12 h within a 24 h period.  Remove after 12 hours.  Yes Chayito Hampton MD   LISINOPRIL PO Take 5 mg by mouth daily HOLD if SBP <110.Call if held x 2 in a row symptomatic  10/12/2018 at am Yes Reported, Patient   montelukast (SINGULAIR) 10 MG tablet Take 1 tablet (10 mg) by mouth At Bedtime 10/12/2018 at hs Yes Hallie Barroso MD   Multiple Vitamins-Minerals (MULTIVITAMIN OR) Take 1 tablet by mouth daily 10/12/2018 at am Yes Unknown, Entered By History   OMEPRAZOLE PO Take 40 mg by mouth every morning FOR Gastroesophagel reflux disease Take 30-60 minutes before a meal. 10/12/2018 at am Yes Reported, Patient   Ondansetron HCl (ZOFRAN PO) Take 4 mg by mouth every morning 10/12/2018 at am Yes Reported, Patient   predniSONE (DELTASONE) 10 MG tablet Take 10 mg by mouth daily 10/12/2018 at am Yes Unknown, Entered By History   roflumilast (DALIRESP) 500 MCG TABS tablet Take 500 mcg by mouth daily  10/12/2018 at am Yes Cole Carrasco MD   TRAMADOL HCL PO Take 25 mg by mouth 3 times daily 10/12/2018 at 2000 Yes Reported, Patient   TRAMADOL HCL PO Take 25 mg by mouth daily as needed for moderate to severe pain 10/13/2018 at 0322 Yes Reported, Patient   umeclidinium (INCRUSE ELLIPTA) 62.5 MCG/INH oral inhaler Inhale 1 puff into the lungs daily 10/12/2018 at am Yes Cole Carrasco MD   albuterol (PROAIR HFA/PROVENTIL HFA/VENTOLIN HFA) 108 (90 Base) MCG/ACT Inhaler Inhale 2 puffs into the lungs every 2 hours as needed for shortness of breath / dyspnea or wheezing   unknown at unknown  Reported, Patient   calcium carbonate (TUMS) 500 MG chewable tablet Take 1 chew tab by mouth 4 times daily as needed for heartburn (nausea or indigestion) 10/10/2018 at 1200  Reported, Patient   loperamide (IMODIUM) 2 MG capsule Take 2 mg by mouth every 8 hours as needed for diarrhea unknown at unknown  Unknown, Entered By History   Ondansetron (ZOFRAN ODT PO) Take 4 mg by mouth every 12 hours as needed for nausea or vomiting  unknown at unknown  Reported, Patient   polyethylene glycol (MIRALAX/GLYCOLAX) Packet Take 17 g by mouth daily as needed for constipation 10/2/2018 at am  Unknown, Entered By History              Review of Systems:   A Comprehensive greater than 10 system review of systems was carried out.  Pertinent positives and negatives are noted above.  Otherwise negative for contributory information.            Physical Exam:[CH1.1]   Blood pressure 127/57, pulse 88, temperature 98.5  F (36.9  C), temperature source Oral, resp. rate 20, SpO2 99 %, not currently breastfeeding.[CH1.4]    GENERAL: healthy, alert and no distress  EYES: Eyes grossly normal to inspection, extraocular movements - intact, and PERRL  HENT: ear canals- normal; TMs- normal; Nose- normal; Mouth- no ulcers, no lesions  NECK: no tenderness, no adenopathy, no asymmetry, no masses, no stiffness; thyroid- normal to palpation  RESP: lungs clear to auscultation - no rales, no rhonchi, no wheezes  CV: regular rates and rhythm, normal S1 S2, no S3 or S4, no murmur, click or rub and no irregular beats  ABDOMEN: soft, no tenderness, no  hepatosplenomegaly, no masses, normal bowel sounds  MS[CH1.1]: Tenderness to palpation over the left groin.  Range of motion is limited secondary to spasm-like pains.  Strength is intact[CH1.3]  SKIN: no suspicious lesions, no rashes  NEURO:[CH1.1]  Reflexes normal and symmetric. Sensation and strength grossly WNL.[CH1.3]  PSYCH: Alert and oriented times 3; coherent speech. Affect is  normal.         Data:[CH1.1]       Recent Labs  Lab 10/13/18  0818   WBC 8.0   HGB 9.8*   HCT 28.8*   MCV 90          Recent Labs  Lab 10/13/18  0818   *   POTASSIUM 4.0   CHLORIDE 94   CO2 24   ANIONGAP 8   *   BUN 12   CR 0.66   GFRESTIMATED 86   GFRESTBLACK >90   DIANA 8.7       Recent Labs  Lab 10/13/18  1125   CULT PENDING       Results for orders placed or performed during the hospital encounter of 10/13/18   XR Pelvis and Hip Left 2 Views    Narrative    PELVIS WITH UNILATERAL HIP TWO VIEWS LEFT  10/13/2018 9:17 AM     HISTORY: left hip pain/groin pain. rule out fracture;     COMPARISON: None.    FINDINGS: There is normal osseous alignment.  No fractures are  identified.      Impression    IMPRESSION: Osseous structures appear intact. No fractures are  identified.    WILMA CARBAJAL MD   Abd/pelvis CT no contrast - Stone Protocol    Narrative    CT ABDOMEN AND PELVIS WITHOUT CONTRAST 10/13/2018 11:56 AM     HISTORY: Lower abdominal pain/groin pain on left. Rule out stone.     COMPARISON: September 21, 2014    TECHNIQUE: Volumetric helical acquisition of CT images from the lung  bases through the symphysis pubis without intravenous contrast.  Radiation dose for this scan was reduced using automated exposure  control, adjustment of the mA and/or kV according to patient size, or  iterative reconstruction technique.    FINDINGS: No hydronephrosis or urolithiasis. Minimal asymmetry of the  iliopsoas, left greater than right, indicating a small amount of  intramuscular hemorrhage. Please see CT report same date of the left  hip. No free air or free fluid. No bowel obstruction. There is mild  diverticulosis without evidence for diverticulitis. There are moderate  atherosclerotic changes of the visualized aorta and its branches.  There is no evidence of aortic aneurysm. There is cholelithiasis  without evidence for cholecystitis. Moderate hiatal hernia. The liver,  spleen, adrenal glands, kidneys, and  pancreas demonstrate no worrisome  focal lesion in the absence of intravenous contrast. Lung bases clear  of acute infiltrates. Trace peripheral fibrosis and/or atelectasis.      Impression    IMPRESSION:  1. Question some minimal intramuscular hematoma in the iliopsoas on  the left. Please see CT report of the left hip for additional regional  findings.  2. Otherwise no acute process demonstrated in the abdomen and pelvis.     DEANN CROSS MD   CT Hip Left w/o Contrast    Narrative    CT HIP LEFT WITHOUT CONTRAST 10/13/2018 11:56 AM     HISTORY: Left hip pain/groin pain, rule out occult fracture.      TECHNIQUE: Axial images with reconstructions. Radiation dose for this  scan was reduced using automated exposure control, adjustment of the  mA and/or kV according to patient size, or iterative reconstruction  technique.      COMPARISON: 11/19/2017.     FINDINGS: There appears to be some focal fluid density adjacent to the  left ischial tuberosity. There is loss of cortex at the superior  aspect of the left ischial tuberosity. These findings suggest the  possibility of common hamstring tendon rupture. There is edema  adjacent to the left iliopsoas muscle, raising the possibility of  tendon rupture. Degenerative change of the lower lumbar spine.  Anterolisthesis at L5-S1. Chondrocalcinosis of the symphysis pubis,  consistent with calcium pyrophosphate deposition disease. No hip or  pubic fracture is identified. Incidentally noted colonic  diverticulosis.      Impression    IMPRESSION:  1. Findings raising the possibility of left common hamstring tendon  rupture and iliopsoas tendon rupture. MRI recommended, if indicated  clinically.  2. Additional findings discussed above.     JUAN OROZCO MD     *Note: Due to a large number of results and/or encounters for the requested time period, some results have not been displayed. A complete set of results can be found in Results Review.[CH1.5]         Patsy Rae[CH1.1]  SHANNAN[CH1.3]           Revision History        User Key Date/Time User Provider Type Action    > CH1.5 10/13/2018 11:20 PM Rae, Patsy Cabrera PA-C Physician Assistant Sign     CH1.3 10/13/2018 10:52 PM Rae, Patsy Cabrera PA-C Physician Assistant      CH1.2 10/13/2018  6:00 PM Rae, Patsy Cabrera PA-C Physician Assistant      CH1.4 10/13/2018  5:55 PM Rae, Patsy Cabrera PA-C Physician Assistant      CH1.1 10/13/2018  5:54 PM Rae, Patsy Cabrera PA-C Physician Assistant                      Discharge Summaries      Discharge Summaries by Suly Petersen MD at 10/18/2018  8:46 AM     Author:  Suly Petersen MD Service:  Hospitalist Author Type:  Physician    Filed:  10/18/2018  1:21 PM Date of Service:  10/18/2018  8:46 AM Creation Time:  10/18/2018  8:45 AM    Status:  Signed :  Suly Petersen MD (Physician)         [facility]  Discharge Summary  Name: Ana Luisa Lee    MRN: [id number]  YOB: 1940    Age: 78 year old  Date of Discharge:[AC1.1]  10/18/2018[AC1.2]  Date of Admission: 10/13/2018  Primary Care Provider: Db Alvarez  Discharge Physician:  Suly Petersen MD  Discharging Service:  Hospitalist      Discharge Diagnoses:[AC1.1]  1. Left Groin pain 2/2 Iliopsoas Tear with Hematoma and Non displaced Coccyx Fracture  2. Right Foot Drop  3. Urinary Retention  4. UTI  5. Chronic Hyponatremia  6. Vertigo  7. COPD and Asthma  8. Hypothyroidism  9. HLD  10. GERD[AC1.2]     Hospital Course:[AC1.1]  Ana Luisa Lee is a 78 year old female with PMH significant for mild dementia, asthma and COPD on chronic Prednisone, esophageal stricture s/p dilation (2007), HTN, HLD, hypothyroidism, PSVT, DM2 who resides in an [facility] and has been wheelchair bound since the spring due to progressive generalized weakness with recent onset of left groin pain that has progressed over the last 3 weeks who was admitted 10/13/2018 due to acutely worsening pain and weakness and was found to have  iliopsoas tear with hematoma and non displaced coccyx fracture.  In addition treated for UTI.  Will discharge to TCU.      1. Left groin pain 2/2 Iliopsoas Tear with hematoma and nondisplaced Coccyx Fracture - No recent injuries or trauma prior to onset of pain but patient has been undergoing PT at her facility, unsure if she over did it with this.  MRI of the Sacrum/Coccyx revealed a nondisplaced coccyx fracture.  MRI of the Pelvis revealed a complete tear/rupture of the left iliopsoas tendon with adjacent soft tissue edema/hemorrhage, partial tear or tendinosis of the left common hamstring, right trochanteric bursitis and right gluteal muscle edema.  Orthopedics consulted and this was all non surgical and agreed with WBAT.  Her pain was controlled with Tylenol and PRN Tramadol.  PT followed and recommended TCU upon discharge.        2. Right foot drop - per patient on 10/14 this is chronic with her peripheral neuropathy but discussed with patient's daughter who reported this is new within the last week per the patient's facility and noted by PT on Friday PTA.  MRI of the lumbar spine 10/15 with Multilevel degenerative disc disease and degenerative facet arthropathy.  L5-S1 grade 1 spondylolisthesis causing bilateral foraminal stenoses.  No specific etiology of the new right foot drop is identified.  EMG[AC1.2] was[AC1.3] obtained the morning of discharge,[AC1.2] Dr. Marx from Neurology contacted me and stated there were several significant changes on both sides.  He recommended I discuss with radiology to ensure that the Lumbosacral Plexus was not affected given these changes[AC1.3].[AC1.2]  I discussed with Radiology who reviewed the imaging.  The lumbosacral plexus are symmetric and no abnormal signals.  Dr. Marx recommended follow up with Neurology in clinic if her foot drop persists or weakness persists for further evaluation and likely repeat EMG.[AC1.3]      3. UTI, urinary retention: found to be  retaining urine in the ED and joseph was placed on 10/13. Thought to be due to possible underlying UTI. Appears that patient has previously had urinary retention in setting of taking Duloxetine. Joseph removed on 10/14 and patient able to void with minimal post void residual. Current urine culture growing 10-50k coagulase negative staphylococcus sensitive to Oxacillin.  She re developed urinary[AC1.2] retention[AC1.4] and joseph catheter was replaced.  Again attempted joseph catheter removal on day of discharge but she had persistent retention.  Joseph catheter was replaced.  Would recommend continuing joseph catheter and Flomax for one week then could do a voiding trial at TCU.  If she continues to have urinary[AC1.2] retention[AC1.4] then would recommend that patient follow up with Urology in clinic.  She has one dose left of Keflex to complete a 7 day antibiotic course.      4. Chronic hyponatremia: appears baseline sodium around 130-132, initially 126 on admission with improvement today to 130 after IVFs. It has been thought previously that the patient may have an element of SIADH from her known lung disease. Sodium chloride 1 gram tablets added twice daily, sodium remained stable at 131.      5. Vertigo - Resolved: reported onset of dizziness at time of admission. Due to patient being a poor historian it has been difficult to qualify her symptoms. No prior h/o BPPV or CVA. No other neuro deficits appreciated on exam. Unclear if Flexeril started on admission was causing her symptoms, discontinued on 10/14. Patient was treated supportively overnight with IVFs, PRN Valium, and scheduled Meclizine for possible BPPV without improvement in her symptoms. After further discussion with daughter earlier this week, the patient tends to report dizziness when she is anxious as well with minor medication adjustments due to sensitivities to medications. Her symptoms are resolved.  She will have PRN Meclizine available as  needed.      6. COPD, asthma: shortness of breath at baseline per patient and no recent exacerbations. Follows with pulmonology in the outpatient setting for management.  She was continued on PTA Prednisone, Roflumilast, and PRN Xopenex nebs as well as Incruse Ellipta and Pulmicort inhales upon discharge.  Of note she is chronically on Prednisone 10 mg every day for COPD/asthma.      7. HTN: Continued on Lisinopril.      8. Hypothyroidism, HLD, and GERD: stable medical conditions, resume PTA medications     [AC1.2]    # Discharge Pain Plan:[AC1.1]   - During her hospitalization, Ana Luisa experienced pain due to[AC1.2] iliopsoas[AC1.4] tear, coccyx fracture.  The pain plan for discharge was discussed with Ana Luisa and the plan was created in a collaborative fashion.    - Chronic/continued opioids: Tramadol 25 mg BID PRN[AC1.2]         Discharge Disposition:[AC1.1]  Discharged to short-term care facility[AC1.2]     Allergies:  Allergies   Allergen Reactions     Hydralazine Anxiety     Penicillin G Hives     Tolerated cephalosporines 2017     Meloxicam      dizziness       Metoprolol      ? Skin rash on the back     Norvasc [Amlodipine Besylate] Hives        Condition on Discharge:  Discharge condition:[AC1.1] Stable[AC1.2]   Discharge vitals: Blood pressure 147/80, pulse 86, temperature 97.8  F (36.6  C), resp. rate 20, SpO2 99 %, not currently breastfeeding.   Code status on discharge:[AC1.1] DNR / DNI[AC1.2]     History of Illness:  See detailed admission note for full details.    Physical Exam:  Blood pressure 147/80, pulse 86, temperature 97.8  F (36.6  C), resp. rate 20, SpO2 99 %, not currently breastfeeding.  Wt Readings from Last 1 Encounters:   08/15/18 51.6 kg (113 lb 12.8 oz)     General: Alert, awake, no acute distress. Sitting up in a chair eating breakfast.  HEENT: Normocephalic, atraumatic, eyes anicteric and without scleral injection, EOMI, MMM.  Cardiac: RRR, normal S1, S2.  II/VI systolic murmur  heard throughout upper chest, no g/r. No LE edema.  Pulmonary: Normal chest rise, normal work of breathing.  Lungs CTAB without crackles or wheezing  Abdomen: soft, non-tender, non-distended.  Normoactive BS.  No guarding or rebound tenderness.  Extremities: no deformities.  Warm, well perfused.  Skin: no rashes or lesions noted.  Warm and Dry.  Neuro:[AC1.1] mild tenderness to left groin with palpation.  4/5 muscle strength. Right foot drop.[AC1.2]  Speech clear.    Psych: Appropriate affect.    Procedures other than Imaging:[AC1.1]  EEG[AC1.2]     Imaging:[AC1.1]  Results for orders placed or performed during the hospital encounter of 10/13/18   XR Pelvis and Hip Left 2 Views    Narrative    PELVIS WITH UNILATERAL HIP TWO VIEWS LEFT  10/13/2018 9:17 AM     HISTORY: left hip pain/groin pain. rule out fracture;     COMPARISON: None.    FINDINGS: There is normal osseous alignment.  No fractures are  identified.      Impression    IMPRESSION: Osseous structures appear intact. No fractures are  identified.    WILMA CARBAJAL MD   Abd/pelvis CT no contrast - Stone Protocol    Narrative    CT ABDOMEN AND PELVIS WITHOUT CONTRAST 10/13/2018 11:56 AM     HISTORY: Lower abdominal pain/groin pain on left. Rule out stone.     COMPARISON: September 21, 2014    TECHNIQUE: Volumetric helical acquisition of CT images from the lung  bases through the symphysis pubis without intravenous contrast.  Radiation dose for this scan was reduced using automated exposure  control, adjustment of the mA and/or kV according to patient size, or  iterative reconstruction technique.    FINDINGS: No hydronephrosis or urolithiasis. Minimal asymmetry of the  iliopsoas, left greater than right, indicating a small amount of  intramuscular hemorrhage. Please see CT report same date of the left  hip. No free air or free fluid. No bowel obstruction. There is mild  diverticulosis without evidence for diverticulitis. There are moderate  atherosclerotic changes  of the visualized aorta and its branches.  There is no evidence of aortic aneurysm. There is cholelithiasis  without evidence for cholecystitis. Moderate hiatal hernia. The liver,  spleen, adrenal glands, kidneys, and pancreas demonstrate no worrisome  focal lesion in the absence of intravenous contrast. Lung bases clear  of acute infiltrates. Trace peripheral fibrosis and/or atelectasis.      Impression    IMPRESSION:  1. Question some minimal intramuscular hematoma in the iliopsoas on  the left. Please see CT report of the left hip for additional regional  findings.  2. Otherwise no acute process demonstrated in the abdomen and pelvis.     DEANN CROSS MD   CT Hip Left w/o Contrast    Narrative    CT HIP LEFT WITHOUT CONTRAST 10/13/2018 11:56 AM     HISTORY: Left hip pain/groin pain, rule out occult fracture.      TECHNIQUE: Axial images with reconstructions. Radiation dose for this  scan was reduced using automated exposure control, adjustment of the  mA and/or kV according to patient size, or iterative reconstruction  technique.      COMPARISON: 11/19/2017.     FINDINGS: There appears to be some focal fluid density adjacent to the  left ischial tuberosity. There is loss of cortex at the superior  aspect of the left ischial tuberosity. These findings suggest the  possibility of common hamstring tendon rupture. There is edema  adjacent to the left iliopsoas muscle, raising the possibility of  tendon rupture. Degenerative change of the lower lumbar spine.  Anterolisthesis at L5-S1. Chondrocalcinosis of the symphysis pubis,  consistent with calcium pyrophosphate deposition disease. No hip or  pubic fracture is identified. Incidentally noted colonic  diverticulosis.      Impression    IMPRESSION:  1. Findings raising the possibility of left common hamstring tendon  rupture and iliopsoas tendon rupture. MRI recommended, if indicated  clinically.  2. Additional findings discussed above.     JUAN OROZCO MD   MR Sacrum  and Coccyx w/o Contrast    Narrative    MR SACRUM AND COCCYX WITHOUT CONTRAST  10/15/2018 5:13 PM     HISTORY:  New right foot drop.     TECHNIQUE: Sagittal and coronal oblique T1 and inversion recovery, and  transverse T1 and fat-suppressed T2-weighted pulse sequences.    FINDINGS: L5-S1 spondylolisthesis and L5 foraminal stenosis are noted,  left greater than right. Marrow signal within the sacrum appears  within normal limits. There is a transverse band of high signal  intensity within the coccyx which could represent a nondisplaced  coccyx fracture given its linear appearance on the T1-weighted images.   There may be minimal adjacent presacral soft tissue edema. Sacroiliac  joints appear within normal limits with no evidence of periarticular  marrow edema. There is prominent intramuscular fluid or edema within  the left iliopsoas muscle extending to the hip. There is also  asymmetrical edema within the right gluteal musculature which appears  atrophic relative to the left.      Impression    IMPRESSION:  1. Mid to lower coccygeal marrow edema and linear transverse band  suspicious for nondisplaced fracture.  2. Left iliopsoas prominent intramuscular fluid or edema.  Possibilities include intramuscular strain or tear. However, on the  distalmost images, the left iliopsoas tendon appears to be probably  thickened. Therefore the intermuscular signal could be at least in  part related to a process distal to this scan.  3. Right gluteus medius and minimus muscle atrophy, only partly  included on the scan. Right gluteal intramuscular edema is also noted  which could indicate superimposed acute muscle strain. However, acute  denervation atrophy might have a similar appearance.  4. Bilateral foraminal stenosis, severe and greater on the left than  right.    ELIZABETH HONG MD   MR Lumbar Spine w/o Contrast    Narrative    MRI LUMBAR SPINE WITHOUT CONTRAST  10/15/2018  5:12 PM     HISTORY: New right foot  drop.    TECHNIQUE: Multiplanar multisequence MRI of the lumbar spine without  contrast.    COMPARISON: Radiographs 4/5/2018    FINDINGS: Previous radiographs show 5 lumbar-type vertebral bodies.  The distal spinal cord and cauda equina appear normal.    T12-L1: Normal disc, facet joints, spinal canal and neural foramina.     L1-L2: Loss of disc height, circumferential disc bulge and anterior  vertebral endplate osteophytes. Right posterolateral and lateral disc  bulges. No neural impingement.    L2-L3: Right posterolateral disc bulge with herniation into the  inferior aspect of the right neural foramen. Anterior and posterior  vertebral endplate osteophytes. Otherwise normal.    L3-L4: Normal disc, facet joints, spinal canal and neural foramina.     L4-L5: Bilateral posterolateral disc bulges with vertebral endplate  osteophytes causing slight impression on the thecal sac. Mild  degeneration left facet joint. Otherwise normal.    L5-S1: Grade 1 spondylolisthesis related to bilateral defects in the  pars interarticularis. Minimal side-to-side narrowing of the spinal  canal. Moderate stenosis of the right neural foramen and severe  stenosis of the left neural foramen. Bilateral moderate degenerative  facet arthropathy.    Paraspinous soft tissues: Normal.     Bone marrow: Normal.       Impression    IMPRESSION:   1. Multilevel degenerative disc disease and degenerative facet  arthropathy as described above.  2. L5-S1 grade 1 spondylolisthesis causing bilateral foraminal  stenoses.  3. No specific etiology of the new right foot drop is identified.    MAEVE OWEN MD   MR Pelvis Muscular Tissue wo Contrast    Narrative    MR PELVIS MUSCULAR TISSUE WITHOUT CONTRAST   10/15/2018 5:14 PM    HISTORY:  Assess left common hamstring tendon rupture and iliopsoas  tendon rupture.    TECHNIQUE:  Coronal T1 and inversion recovery, sagittal T1, and  transverse proton density and fat suppressed T2 weighted images.    FINDINGS:    Osseous and Cartilaginous Structures:  No fracture or destructive bone  lesion.  No femoral head osteonecrosis.  No significant hip  osteoarthritis or apparent chondromalacia.       Acetabular Labrum: No juxtaacetabular cyst.  No obvious labral tear is  appreciated, allowing for the large FOV technique.  If indicated  clinically, MR arthrography would be considered the study of choice in  this regard.    Common Hamstring Tendon: There is increased signal intensity at the  ischial tuberosity of the left common hamstring tendon attachment  suggesting partial tear. No ozzie tendon rupture or retraction is  seen.    Gluteal Tendon Greater Trochanteric Attachments: The gluteus medius  and minimus tendons appear intact.    Trochanteric and Iliopsoas Bursae: Moderate fluid is noted in the  right greater trochanteric bursa.    Joint space: No joint effusion.     Additional findings: There appears to be a complete tear or rupture of  the left iliopsoas tendon from its lesser trochanteric attachment.  Adjacent edema or hemorrhage is noted tracking proximally into the  left iliacus and to a lesser degree left psoas muscles. Edema is noted  within the right gluteal musculature. Fatty atrophy of the left  gluteus minimus and medius muscles is also noted.      Impression    IMPRESSION:   1. Complete tear or rupture of the left iliopsoas tendon which is  proximally retracted and thickened. Adjacent soft tissue  edema/hemorrhage is also noted tracking proximally into the left  iliopsoas muscles. Given prominent edema within the left iliacus  muscle, superimposed muscle strain may also be present.  2. Partial tear or tendinosis of the left common hamstring tendons at  the ischial tuberosity attachment. No ozzie tendon rupture or distal  retraction is demonstrated.  3. Right greater trochanteric bursitis.  4. Right gluteal muscle edema. This is superimposed on chronic fatty  atrophy of the right gluteus medius and minimus muscles.  Possibilities  include acute muscle strain. Acute denervation atrophy could have a  similar appearance.    ELIZABETH HONG MD     *Note: Due to a large number of results and/or encounters for the requested time period, some results have not been displayed. A complete set of results can be found in Results Review.[AC1.5]        Consultations:  Consultations This Hospital Stay   SOCIAL WORK IP CONSULT  ORTHOPEDIC SURGERY IP CONSULT  PHYSICAL THERAPY ADULT IP CONSULT  SOCIAL WORK IP CONSULT  PHYSICAL THERAPY ADULT IP CONSULT  ORTHOPEDIC SURGERY IP CONSULT  ORTHOSIS BRACE IP CONSULT     Recent Lab Results:[AC1.1]    Recent Labs  Lab 10/18/18  0540 10/16/18  0921 10/15/18  0650 10/14/18  0714 10/13/18  0818   WBC  --  9.0  --  6.6 8.0   HGB  --  9.7*  --  9.3* 9.8*   HCT  --  28.7*  --  27.8* 28.8*   MCV  --  91  --  91 90    259 233 233 263       Recent Labs  Lab 10/18/18  0540 10/17/18  0651 10/16/18  0616   * 131* 130*   POTASSIUM 4.0 3.8 3.7   CHLORIDE 99 99 101   CO2 22 23 22   ANIONGAP 10 9 7   * 106* 98   BUN 7 9 7   CR 0.54 0.56 0.54   GFRESTIMATED >90 >90 >90   GFRESTBLACK >90 >90 >90   DIANA 8.2* 8.1* 8.4*[AC1.5]          Pending Results:    Unresulted Labs Ordered in the Past 30 Days of this Admission     No orders found from 8/14/2018 to 10/14/2018.           Discharge Instructions and Follow-Up:[AC1.1]   Discharge Orders[AC1.5]     Med Therapy Manage Referral     General info for SNF   Length of Stay Estimate: Short Term Care: Estimated # of Days <30  Condition at Discharge: Stable  Level of care:skilled   Rehabilitation Potential: Fair  Admission H&P remains valid and up-to-date: Yes  Recent Chemotherapy: N/A  Use Nursing Home Standing Orders: Yes     Mantoux instructions   Give two-step Mantoux (PPD) Per Facility Policy Yes     Reason for your hospital stay   You were hospitalized for pain and were found to have a iliopsoas tear with hematoma and nondisplaced coccyx fracture.  You also  were treated for a urinary tract infection.  You will go to a rehab facility for further therapy upon discharge.     Activity - Up with assistive device     Follow Up   Follow up with Neurology in clinic for possible repeat EMG if your foot drop persists of the weakness does not improve.     Crawley catheter   To straight gravity drainage. Change catheter every 2 weeks and PRN for leaking or decreased uring output with signs of bladder distention.     Crawley in place for urinary retention.  Continue Flomax, recommend trial removal in one week, if unsuccessful then follow up with Urology in clinic.     DNR/DNI     Physical Therapy Adult Consult   Evaluate and treat as clinically indicated.    Reason:  Weakness, iliopsoas tear, coccyx fracture     Occupational Therapy Adult Consult   Evaluate and treat as clinically indicated.    Reason:  Weakness     Fall precautions     Advance Diet as Tolerated   Follow this diet upon discharge: Orders Placed This Encounter     Regular Diet Adult[AC1.4]       Discharge Medications[AC1.1]   Current Discharge Medication List      START taking these medications    Details   cephALEXin (KEFLEX) 500 MG capsule Take 1 capsule (500 mg) by mouth 2 times daily Take 500 mg on the evening of 10/18/18 to complete antibiotic course.  Qty: 1 capsule, Refills: 0    Associated Diagnoses: Complicated UTI (urinary tract infection)      ipratropium - albuterol 0.5 mg/2.5 mg/3 mL (DUONEB) 0.5-2.5 (3) MG/3ML neb solution Take 1 vial (3 mLs) by nebulization every 4 hours as needed for shortness of breath / dyspnea or wheezing  Qty: 360 mL    Associated Diagnoses: Moderate persistent asthma without complication      lidocaine (LIDODERM) 5 % Patch Apply up to 3 patches to painful area at once for up to 12 h within a 24 h period.  Remove after 12 hours.  Qty: 30 patch, Refills: 0      meclizine (ANTIVERT) 25 MG tablet Take 1 tablet (25 mg) by mouth 2 times daily as needed for dizziness  Qty: 90 tablet     Associated Diagnoses: Dizziness      methocarbamol (ROBAXIN) 500 MG tablet Take 1 tablet (500 mg) by mouth 3 times daily as needed for muscle spasms  Qty: 20 tablet, Refills: 0    Associated Diagnoses: Hematoma of left iliopsoas muscle, initial encounter      sodium chloride 1 GM tablet Take 1 tablet (1 g) by mouth 2 times daily (with meals)    Associated Diagnoses: Hyponatremia      tamsulosin (FLOMAX) 0.4 MG capsule Take 1 capsule (0.4 mg) by mouth daily  Qty: 60 capsule    Associated Diagnoses: Urinary retention         CONTINUE these medications which have CHANGED    Details   predniSONE (DELTASONE) 10 MG tablet Take 1 tablet (10 mg) by mouth daily  Qty: 60 tablet, Refills: 0    Associated Diagnoses: Moderate persistent asthma without complication      traMADol (ULTRAM) 50 MG tablet Take 0.5 tablets (25 mg) by mouth 2 times daily as needed for moderate to severe pain  Qty: 30 tablet, Refills: 0    Associated Diagnoses: Left inguinal pain; Rupture of left hamstring tendon, initial encounter; Hematoma of left iliopsoas muscle, initial encounter         CONTINUE these medications which have NOT CHANGED    Details   acetaminophen (TYLENOL) 500 MG tablet Take 2 tablets (1,000 mg) by mouth 3 times daily    Associated Diagnoses: Pain of both shoulder joints      budesonide (PULMICORT FLEXHALER) 180 MCG/ACT inhaler Inhale 1 puff into the lungs 2 times daily    Associated Diagnoses: Pulmonary emphysema, unspecified emphysema type (H)      calcium 600 MG tablet Take 1 tablet (600 mg) by mouth daily  Qty: 60 tablet    Associated Diagnoses: Pulmonary emphysema, unspecified emphysema type (H)      cholecalciferol (VITAMIN D) 1000 UNIT tablet Take 1 tablet (1,000 Units) by mouth daily  Qty: 30 tablet    Associated Diagnoses: COPD, moderate (H)      cyanocobalamin (B-12 TR) 1000 MCG TBCR Take 1 tablet by mouth daily  Qty: 100 tablet, Refills: 3    Associated Diagnoses: Pernicious anemia      Dextromethorphan HBr (ROBAFEN COUGH  PO) Take 10 mLs by mouth every 6 hours as needed      ferrous sulfate (IRON) 325 (65 Fe) MG tablet Take 325 mg by mouth three times a week Give on M-W-F AM only      fexofenadine (ALLEGRA) 180 MG tablet Take 180 mg by mouth daily      levalbuterol (XOPENEX) 1.25 MG/3ML neb solution Take 1 ampule by nebulization 4 times daily      levothyroxine (SYNTHROID/LEVOTHROID) 25 MCG tablet Take 1 tablet (25 mcg) by mouth daily  Qty: 90 tablet, Refills: 3    Associated Diagnoses: Hypothyroidism due to acquired atrophy of thyroid      LISINOPRIL PO Take 5 mg by mouth daily HOLD if SBP <110.Call if held x 2 in a row symptomatic       montelukast (SINGULAIR) 10 MG tablet Take 1 tablet (10 mg) by mouth At Bedtime  Qty: 30 tablet    Associated Diagnoses: Pulmonary emphysema, unspecified emphysema type (H)      Multiple Vitamins-Minerals (MULTIVITAMIN OR) Take 1 tablet by mouth daily      OMEPRAZOLE PO Take 40 mg by mouth every morning FOR Gastroesophagel reflux disease Take 30-60 minutes before a meal.      Ondansetron HCl (ZOFRAN PO) Take 4 mg by mouth every morning      roflumilast (DALIRESP) 500 MCG TABS tablet Take 500 mcg by mouth daily       umeclidinium (INCRUSE ELLIPTA) 62.5 MCG/INH oral inhaler Inhale 1 puff into the lungs daily      albuterol (PROAIR HFA/PROVENTIL HFA/VENTOLIN HFA) 108 (90 Base) MCG/ACT Inhaler Inhale 2 puffs into the lungs every 2 hours as needed for shortness of breath / dyspnea or wheezing       calcium carbonate (TUMS) 500 MG chewable tablet Take 1 chew tab by mouth 4 times daily as needed for heartburn (nausea or indigestion)      loperamide (IMODIUM) 2 MG capsule Take 2 mg by mouth every 8 hours as needed for diarrhea      Ondansetron (ZOFRAN ODT PO) Take 4 mg by mouth every 12 hours as needed for nausea or vomiting       polyethylene glycol (MIRALAX/GLYCOLAX) Packet Take 17 g by mouth daily as needed for constipation[AC1.4]             Time Spent on this Encounter[AC1.1]   Suly VERGARA,  personally saw the patient today and spent greater than 30 minutes discharging this patient.[AC1.2]    Suly Petersen MD[AC1.1]       Revision History        User Key Date/Time User Provider Type Action    > AC1.4 10/18/2018  1:21 PM Suly Petersen MD Physician Sign     AC1.3 10/18/2018 12:57 PM Suly Petersen MD Physician      AC1.5 10/18/2018 12:06 PM Suly Petersen MD Physician      AC1.2 10/18/2018 11:46 AM Suly Petersen MD Physician      AC1.1 10/18/2018  8:45 AM Suly Petersen MD Physician                      Consult Notes      Consults by Meg Barkley PA-C at 10/16/2018  9:49 AM     Author:  Meg Barkley PA-C Service:  Orthopedics Author Type:  Physician Assistant - C    Filed:  10/16/2018 10:56 AM Date of Service:  10/16/2018  9:49 AM Creation Time:  10/16/2018  9:49 AM    Status:  Attested :  Meg Barkley PA-C (Physician Assistant - C)    Cosigner:  Erik Guerrero MD at 10/17/2018  4:36 PM         Consult Orders:    1. Orthopedic Surgery IP Consult: Patient to be seen: Routine - within 24 hours; nondisplaced coccyx fracture, ileopsoas tendon tear, partial tear left hamstring; Consultant may enter orders: Yes [[id number]] ordered by Meg Leger PA-C at 10/16/18 0853           Attestation signed by Erik Guerrero MD at 10/17/2018  4:36 PM        Physician Attestation   I, Erik Guerrero, have reviewed and discussed with the advanced practice provider their history, physical and plan for Ana Luisa Lee. I did not participate in a shared visit by interviewing or examining the patient and this should be billed as an advanced practice provider only visit.    Erik Guerrero  Date of Service (when I saw the patient): I did not personally see this patient today.                               Discussed new MRI findings with Mrs. Lee    Marrow edema in coccyx likely non-displaced fracture/stress reaction  Non-surgical  May continue  to WBAT with walker as pain allows  Will trial doughnut for sitting  Pain medication - continue current regimen  Likely will improve over next 4-6 weeks, similar to iliopsoas tear  Will check vitamin D levels - will likely increase dose from 1,000 to 5,000 international units pending results[EC1.1]  Also discussed dietary calcium intake - goal 3x per day at least[EC1.2]    Meg Barkley PAC[EC1.1]     Revision History        User Key Date/Time User Provider Type Action    > [N/A] 10/16/2018 10:56 AM Meg Barkley PA-C Physician Assistant - MOHINDER Sign     EC1.2 10/16/2018 10:56 AM Meg Barkley PA-C Physician Assistant - MOHINDER Sign     EC1.1 10/16/2018 10:53 AM Meg Barkley PA-C Physician Assistant - C             Consults by Elvira Camejo PA-C at 10/14/2018  8:58 PM     Author:  Elvira Camejo PA-C Service:  Orthopedics Author Type:  Physician Assistant    Filed:  10/14/2018  9:09 PM Date of Service:  10/14/2018  8:58 PM Creation Time:  10/14/2018  8:58 PM    Status:  Attested :  Elvira Camejo PA-C (Physician Assistant)    Cosigner:  Erik Guerrero MD at 10/15/2018 10:16 PM         Consult Orders:    1. Orthopedic Surgery IP Consult: Patient to be seen: Routine - within 24 hours; pt with left hamstring and ileopsoas tendon rupture. Pt is mainly wheelchair bound but does pivot and states working with PT to walk again. Pls evel and advise activity ... [[id number]] ordered by Patsy Rae PA-C at 10/13/18 5504           Attestation signed by Erik Guerrero MD at 10/15/2018 10:16 PM        Physician Attestation   I, Erik Guerrero, have reviewed and discussed with the advanced practice provider their history, physical and plan for Ana Luisa Lee. I did not participate in a shared visit by interviewing or examining the patient and this should be billed as an advanced practice provider only visit.    Erik Guerrero  Date of Service (when  I saw the patient): I did not personally see this patient today.                               [facility] Orthopedic Consultation    Ana Luisa Lee MRN# [id number]   Age: 78 year old YOB: 1940     Date of Admission:  10/13/2018    Reason for consult: Left groin pain       Requesting physician: LOOB Robertson       Level of consult: One-time consult to assist in determining a diagnosis and to recommend an appropriate treatment plan           Assessment and Plan:   Assessment:   Left groin pain       Plan:   Not recommending MRI at this time  Mobilize with PT  WBAT w/ walker   Pain control            Chief Complaint:   Left groin pain          History of Present Illness:   This patient is a 78 year old female who presents with the following condition requiring a hospital admission:    Patient resides in assisted living and states she has been wheelchair bound for the past couple of years. She denies any recent fall or mechanism of injury to her left hip. She states her left hip pain has been present for the past 2 weeks, gradually worsening. She presented to the ED yesterday for evaluation of her groin pain.     Patient does note she has seen her PCP about this pain and reports a negative XR and US of leg. She states he ordered PT for her to start tomorrow at her assisted living facility.           Past Medical History:     Past Medical History:   Diagnosis Date     Anemia Dec 2011     Arthritis of hand      Asthma, persistent     f/U dr Brock at [facility]     Chronic intermittent steroid use     for asthma     COPD exacerbation (H) 10/25/2013     Esophageal stricture 2007    s/p dilation     HTN, goal below 140/90      Hyperlipidemia      Hypothyroidism      Osteoporosis      Paroxysmal supraventricular tachycardia (H)      PVC (premature ventricular contraction) May 2012     SVT (supraventricular tachycardia) (H)      Type 2 diabetes mellitus without complication, without  long-term current use of insulin (H) 8/2/2018             Past Surgical History:     Past Surgical History:   Procedure Laterality Date     BUNIONECTOMY LAPIDUS WITH TARSAL METATARSAL (TMT) FUSION  1990's     HYSTERECTOMY TOTAL ABDOMINAL       TONSILLECTOMY               Social History:     Social History   Substance Use Topics     Smoking status: Former Smoker     Packs/day: 1.00     Years: 15.00     Quit date: 1/1/1980     Smokeless tobacco: Never Used     Alcohol use No      Comment: Occasional drink             Family History:     Family History   Problem Relation Age of Onset     Cerebrovascular Disease Mother      Hypertension Mother      Family History Negative Father      Unknown/Adopted Maternal Grandmother      Unknown/Adopted Maternal Grandfather      Unknown/Adopted Paternal Grandmother      Unknown/Adopted Paternal Grandfather      Family History Negative Brother      Family History Negative Sister      Family History Negative Son      Family History Negative Daughter      Asthma No family hx of      C.A.D. No family hx of      Diabetes No family hx of      Cancer No family hx of              Immunizations:     VACCINE/DOSE   Diptheria   DPT   DTAP   HBIG   Hepatitis A   Hepatitis B   HIB   Influenza   Measles   Meningococcal   MMR   Mumps   Pneumococcal   Polio   Rubella   Small Pox   TDAP   Varicella   Zoster             Allergies:     Allergies   Allergen Reactions     Hydralazine Anxiety     Penicillin G Hives     Tolerated cephalosporines 2017     Meloxicam      dizziness       Metoprolol      ? Skin rash on the back     Norvasc [Amlodipine Besylate] Hives             Medications:     Current Facility-Administered Medications   Medication     acetaminophen (TYLENOL) tablet 1,000 mg     cephALEXin (KEFLEX) capsule 500 mg     diazepam (VALIUM) tablet 2 mg     enoxaparin (LOVENOX) injection 40 mg     fexofenadine (ALLEGRA) tablet 180 mg     hydrOXYzine (ATARAX) tablet 25 mg     ipratropium - albuterol  0.5 mg/2.5 mg/3 mL (DUONEB) neb solution 3 mL     levalbuterol (XOPENEX) neb solution 0.63 mg     levothyroxine (SYNTHROID/LEVOTHROID) tablet 25 mcg     Lidocaine (LIDOCARE) 4 % Patch 2 patch    And     [START ON 10/15/2018] lidocaine patch REMOVAL    And     lidocaine patch in PLACE     loperamide (IMODIUM) capsule 2 mg     meclizine (ANTIVERT) tablet 25 mg     melatonin tablet 1 mg     montelukast (SINGULAIR) tablet 10 mg     naloxone (NARCAN) injection 0.1-0.4 mg     omeprazole (priLOSEC) CR capsule 40 mg     ondansetron (ZOFRAN-ODT) ODT tab 4 mg    Or     ondansetron (ZOFRAN) injection 4 mg     ondansetron (ZOFRAN-ODT) ODT tab 4 mg     polyethylene glycol (MIRALAX/GLYCOLAX) Packet 17 g     predniSONE (DELTASONE) tablet 10 mg     roflumilast (DALIRESP) tablet 500 mcg     senna-docusate (SENOKOT-S;PERICOLACE) 8.6-50 MG per tablet 1 tablet    Or     senna-docusate (SENOKOT-S;PERICOLACE) 8.6-50 MG per tablet 2 tablet     sodium chloride 0.9% infusion     tamsulosin (FLOMAX) capsule 0.4 mg     traMADol (ULTRAM) half-tab 25 mg     traMADol (ULTRAM) half-tab 25 mg             Review of Systems:   CV: NEGATIVE for chest pain, palpitations or peripheral edema  C: NEGATIVE for fever, chills, change in weight  E/M: NEGATIVE for ear, mouth and throat problems  R: NEGATIVE for significant cough or SOB     10 point review of systems negative aside from HPI and physical exam.           Physical Exam:   All vitals have been reviewed  Patient Vitals for the past 24 hrs:   BP Temp Temp src Pulse Heart Rate Resp SpO2   10/14/18 1938 - - - - - - 98 %   10/14/18 1929 118/59 98.5  F (36.9  C) Oral 89 - 20 97 %   10/14/18 1559 - - - - - - 97 %   10/14/18 1541 135/66 98.2  F (36.8  C) Oral 88 - 20 97 %   10/14/18 1256 133/64 - Oral - 103 16 97 %   10/14/18 1157 - - - - 90 20 97 %   10/14/18 0758 141/73 - - - 99 24 98 %   10/14/18 0739 - - - - 90 18 96 %   10/14/18 0443 151/76 98.4  F (36.9  C) Oral - 97 20 97 %   10/13/18 2344 128/69  99.6  F (37.6  C) Oral - 94 20 98 %   10/13/18 2103 130/69 99.8  F (37.7  C) Oral 100 - 20 96 %       Intake/Output Summary (Last 24 hours) at 10/14/18 2058  Last data filed at 10/14/18 2052   Gross per 24 hour   Intake             2513 ml   Output             1950 ml   Net              563 ml     Constitutional: Pleasant, alert, appropriate, following commands.  HEENT: Head atraumatic normocephalic. PERRLA.  Respiratory: Unlabored breathing no audible wheeze  Cardiovascular: Regular rate and rhythm  GI: Abdomen soft, non tender, and non distended.  Lymph/Hematologic: No edema or palor  Skin: No rashes, no cyanosis, no edema.  Neuro: Sensation intact to light touch in bilateral lower extremities.  Musculoskeletal:   Patient laying comfortably in bed   No ecchymosis or erythema over entirety of the left leg  No notable swelling or edema  Hip flexes to 90 degrees, bilaterally   No pain with internal/external rotation while in flexion  No TTP over great trochanter  Able to SLR on right, unable to SLR on left secondary to pain   Full ROM of left and right knee - 0 to 120 degrees without pain  Bilateral calves are soft, non-tender.  Bilateral lower extremity is NVI.  Sensation intact bilateral lower extremities  5/5 motor with resisted dorsi and plantar flexion on left, 4/5 on right (patient states RLE weakness per baseline)   5/5 EHL  +Dp pulse            Data:   All laboratory data reviewed  Results for orders placed or performed during the hospital encounter of 10/13/18   XR Pelvis and Hip Left 2 Views    Narrative    PELVIS WITH UNILATERAL HIP TWO VIEWS LEFT  10/13/2018 9:17 AM     HISTORY: left hip pain/groin pain. rule out fracture;     COMPARISON: None.    FINDINGS: There is normal osseous alignment.  No fractures are  identified.      Impression    IMPRESSION: Osseous structures appear intact. No fractures are  identified.    WILMA CARBAJAL MD   Abd/pelvis CT no contrast - Stone Protocol    Narrative    CT ABDOMEN  AND PELVIS WITHOUT CONTRAST 10/13/2018 11:56 AM     HISTORY: Lower abdominal pain/groin pain on left. Rule out stone.     COMPARISON: September 21, 2014    TECHNIQUE: Volumetric helical acquisition of CT images from the lung  bases through the symphysis pubis without intravenous contrast.  Radiation dose for this scan was reduced using automated exposure  control, adjustment of the mA and/or kV according to patient size, or  iterative reconstruction technique.    FINDINGS: No hydronephrosis or urolithiasis. Minimal asymmetry of the  iliopsoas, left greater than right, indicating a small amount of  intramuscular hemorrhage. Please see CT report same date of the left  hip. No free air or free fluid. No bowel obstruction. There is mild  diverticulosis without evidence for diverticulitis. There are moderate  atherosclerotic changes of the visualized aorta and its branches.  There is no evidence of aortic aneurysm. There is cholelithiasis  without evidence for cholecystitis. Moderate hiatal hernia. The liver,  spleen, adrenal glands, kidneys, and pancreas demonstrate no worrisome  focal lesion in the absence of intravenous contrast. Lung bases clear  of acute infiltrates. Trace peripheral fibrosis and/or atelectasis.      Impression    IMPRESSION:  1. Question some minimal intramuscular hematoma in the iliopsoas on  the left. Please see CT report of the left hip for additional regional  findings.  2. Otherwise no acute process demonstrated in the abdomen and pelvis.     DEANN CROSS MD   CT Hip Left w/o Contrast    Narrative    CT HIP LEFT WITHOUT CONTRAST 10/13/2018 11:56 AM     HISTORY: Left hip pain/groin pain, rule out occult fracture.      TECHNIQUE: Axial images with reconstructions. Radiation dose for this  scan was reduced using automated exposure control, adjustment of the  mA and/or kV according to patient size, or iterative reconstruction  technique.      COMPARISON: 11/19/2017.     FINDINGS: There appears to  be some focal fluid density adjacent to the  left ischial tuberosity. There is loss of cortex at the superior  aspect of the left ischial tuberosity. These findings suggest the  possibility of common hamstring tendon rupture. There is edema  adjacent to the left iliopsoas muscle, raising the possibility of  tendon rupture. Degenerative change of the lower lumbar spine.  Anterolisthesis at L5-S1. Chondrocalcinosis of the symphysis pubis,  consistent with calcium pyrophosphate deposition disease. No hip or  pubic fracture is identified. Incidentally noted colonic  diverticulosis.      Impression    IMPRESSION:  1. Findings raising the possibility of left common hamstring tendon  rupture and iliopsoas tendon rupture. MRI recommended, if indicated  clinically.  2. Additional findings discussed above.     JUAN OROZCO MD   CBC with platelets differential   Result Value Ref Range    WBC 8.0 4.0 - 11.0 10e9/L    RBC Count 3.19 (L) 3.8 - 5.2 10e12/L    Hemoglobin 9.8 (L) 11.7 - 15.7 g/dL    Hematocrit 28.8 (L) 35.0 - 47.0 %    MCV 90 78 - 100 fl    MCH 30.7 26.5 - 33.0 pg    MCHC 34.0 31.5 - 36.5 g/dL    RDW 14.1 10.0 - 15.0 %    Platelet Count 263 150 - 450 10e9/L    Diff Method Automated Method     % Neutrophils 73.7 %    % Lymphocytes 14.6 %    % Monocytes 9.2 %    % Eosinophils 1.2 %    % Basophils 0.2 %    % Immature Granulocytes 1.1 %    Nucleated RBCs 0 0 /100    Absolute Neutrophil 5.9 1.6 - 8.3 10e9/L    Absolute Lymphocytes 1.2 0.8 - 5.3 10e9/L    Absolute Monocytes 0.7 0.0 - 1.3 10e9/L    Absolute Eosinophils 0.1 0.0 - 0.7 10e9/L    Absolute Basophils 0.0 0.0 - 0.2 10e9/L    Abs Immature Granulocytes 0.1 0 - 0.4 10e9/L    Absolute Nucleated RBC 0.0    Basic metabolic panel   Result Value Ref Range    Sodium 126 (L) 133 - 144 mmol/L    Potassium 4.0 3.4 - 5.3 mmol/L    Chloride 94 94 - 109 mmol/L    Carbon Dioxide 24 20 - 32 mmol/L    Anion Gap 8 3 - 14 mmol/L    Glucose 108 (H) 70 - 99 mg/dL    Urea Nitrogen 12 7  - 30 mg/dL    Creatinine 0.66 0.52 - 1.04 mg/dL    GFR Estimate 86 >60 mL/min/1.7m2    GFR Estimate If Black >90 >60 mL/min/1.7m2    Calcium 8.7 8.5 - 10.1 mg/dL   UA with Microscopic   Result Value Ref Range    Color Urine Yellow     Appearance Urine Cloudy     Glucose Urine Negative NEG^Negative mg/dL    Bilirubin Urine Negative NEG^Negative    Ketones Urine Negative NEG^Negative mg/dL    Specific Gravity Urine 1.008 1.003 - 1.035    Blood Urine Small (A) NEG^Negative    pH Urine 7.0 5.0 - 7.0 pH    Protein Albumin Urine Negative NEG^Negative mg/dL    Urobilinogen mg/dL 0.0 0.0 - 2.0 mg/dL    Nitrite Urine Negative NEG^Negative    Leukocyte Esterase Urine Large (A) NEG^Negative    Source Catheterized Urine     WBC Urine >182 (H) 0 - 5 /HPF    RBC Urine 45 (H) 0 - 2 /HPF    WBC Clumps Present (A) NEG^Negative /HPF   Basic metabolic panel   Result Value Ref Range    Sodium 129 (L) 133 - 144 mmol/L    Potassium 4.0 3.4 - 5.3 mmol/L    Chloride 100 94 - 109 mmol/L    Carbon Dioxide 22 20 - 32 mmol/L    Anion Gap 7 3 - 14 mmol/L    Glucose 100 (H) 70 - 99 mg/dL    Urea Nitrogen 10 7 - 30 mg/dL    Creatinine 0.63 0.52 - 1.04 mg/dL    GFR Estimate >90 >60 mL/min/1.7m2    GFR Estimate If Black >90 >60 mL/min/1.7m2    Calcium 8.3 (L) 8.5 - 10.1 mg/dL   CBC with platelets   Result Value Ref Range    WBC 6.6 4.0 - 11.0 10e9/L    RBC Count 3.04 (L) 3.8 - 5.2 10e12/L    Hemoglobin 9.3 (L) 11.7 - 15.7 g/dL    Hematocrit 27.8 (L) 35.0 - 47.0 %    MCV 91 78 - 100 fl    MCH 30.6 26.5 - 33.0 pg    MCHC 33.5 31.5 - 36.5 g/dL    RDW 14.1 10.0 - 15.0 %    Platelet Count 233 150 - 450 10e9/L   Urine Culture   Result Value Ref Range    Specimen Description Catheterized Urine     Special Requests Specimen received in preservative     Culture Micro Culture in progress      *Note: Due to a large number of results and/or encounters for the requested time period, some results have not been displayed. A complete set of results can be found in  Results Review.          Attestation:  I have reviewed today's vital signs, notes, medications, labs and imaging with Dr. Guerrero.  Amount of time performed on this consult: 25 minutes.    Elvira Camejo PA-C[KP1.1]         Revision History        User Key Date/Time User Provider Type Action    > KP1.1 10/14/2018  9:09 PM Elvira Camejo PA-C Physician Assistant Sign                     Progress Notes - Physician (Notes from 10/15/18 through 10/18/18)      Progress Notes by Ivis Colin RN at 10/18/2018 11:16 AM     Author:  Ivis Colin RN Service:  (none) Author Type:      Filed:  10/18/2018 11:16 AM Date of Service:  10/18/2018 11:16 AM Creation Time:  10/18/2018 11:16 AM    Status:  Signed :  Ivis Colin RN ()         Transition Communication Hand-off for Care Transitions to Next Level of Care Provider    Name: Ana Luisa Alice Rosa  : 1940  MRN #: [id number]  Primary Care Provider: Db Alvarez  Primary Care MD Name: Db Alvarez  Primary Clinic: [facility] [address] [facility] [address]  Primary Care Clinic Name:  ([facility])  Reason for Hospitalization:  Urinary retention [R33.9]  Left inguinal pain [R10.32]  Complicated UTI (urinary tract infection) [N39.0]  Hematoma of left iliopsoas muscle, initial encounter [S70.12XA]  Rupture of left hamstring tendon, initial encounter [S76.312A]  Admit Date/Time: 10/13/2018  7:52 AM  Discharge Date: 10/18/18  Payor Source: Payor: MEDICA / Plan: MEDICA [employer] / Product Type: HMO /     Readmission Assessment Measure (COURTNEY) Risk Score/category: Elevated           Reason for Communication Hand-off Referral: Fragility    Discharge Plan: [facility]       Concern for non-adherence with plan of care:   No  Discharge Needs Assessment:  Needs       Most Recent Value    Anticipated Changes Related to Illness none    Equipment Currently Used at Home wheelchair, manual, shower  chair, grab bar, walker, rolling    # of Referrals Placed by CTS Post Acute Facilities, Transportation    PAS Number [id number]          Already enrolled in Tele-monitoring program and name of program:  NA  Follow-up specialty is recommended: No    Follow-up plan:  No future appointments.    Any outstanding tests or procedures:        Referrals     Future Labs/Procedures    Med Therapy Manage Referral     Comments:    Your provider has referred you to: **[place] [facility] Scheduling (numerous locations) [phone]   [email or web address]    Reason for Referral: Greater than 10 prescription medications    The [place] [facility] department will contact you to schedule an appointment.  You may also schedule the appointment by calling [phone].  For [place] [facility] [place] patients, please call [phone] to confirm/schedule your appointment on the next business day.    This service is designed to help you get the most from your medications.  A specially trained Pharmacist will work closely with you and your providers to solve any questions, concerns, issues or problems related to your medications.    Please bring all of your prescription and non-prescription medications (such as vitamins, over-the-counter medications, and herbals) or a detailed medication list to your appointment.    If you have a glucose meter or other home monitoring information, please also bring this to your appointment (i.e. blood glucose log, blood pressure log, pain log, etc.).            Key Recommendations:    COURTNEY score is elevated. Patient has had multiple admissions in the last 6 months: UTI, arthritis/delerium, bilateral shoulder pain/dizziness. No ED only visits in the last 6 months. Her PMH includes DM, COPD& asthma. She was a SBA in ambulation PTA and now is an assist of 2. Her history also includes an esophageal stricture. She has a history of  poor nutrition; her assisted living facility does provide meals. She has more than 10 prescription medications. An MTM referral was entered. Her medication list includes Tramadol. No anticoagulants, hypoglycemics, immunosuppressants, lactulose or Rifaximin are listed. Per ED MD note, she has a history of dementia (not listed in her PMH). She resides at [facility].  Patient was admitted on 10/13/2018 with falls, groin pain, foot drop. Found to have an Left iliopsoas tear with hematoma and nondisplaced coccyx fracture. Patient had an MRI and EMG completed to eval for foot drop.     - MRI lumbar spine was unrevealing for cause of foot drop    - she could have pressure/irritaion on her sciatic nerve from the atrophy/edema seen on MRI in R gluteus    - could have peroneal nerve irritation more peripherally   -Orthotics Consult for brace  Patient was discharged to [facility] and will need follow up after discharge from TCU.     Ivis Colin    AVS/Discharge Summary is the source of truth; this is a helpful guide for improved communication of patient story[SO1.1]     Revision History        User Key Date/Time User Provider Type Action    > SO1.1 10/18/2018 11:16 AM Ivis Colin RN Case Manager Sign            Progress Notes by Henri Gavin at 10/18/2018  9:37 AM     Author:  Henri Gavin Service:  (none) Author Type:  Coordinator    Filed:  10/18/2018  9:37 AM Date of Service:  10/18/2018  9:37 AM Creation Time:  10/18/2018  9:37 AM    Status:  Signed :  Henri Gavin (Coordinator)         Your information has been submitted on October 18th, 2018 at 09:37:38 AM CDT. The confirmation number is [id number][BB1.1]       Revision History        User Key Date/Time User Provider Type Action    > BB1.1 10/18/2018  9:37 AM Henri Gavin Coordinator Sign            Progress Notes by Coty Barraza RT at 10/17/2018  9:05 PM     Author:  Coty Barrzaa RT Service:  (none) Author  Type:  Respiratory Therapist    Filed:  10/17/2018  9:07 PM Date of Service:  10/17/2018  9:05 PM Creation Time:  10/17/2018  9:05 PM    Status:  Signed :  Coty Barraza RT (Respiratory Therapist)         Pt states she has an irritating cough and tickle in her throat, but it not short of breath, sat's 97% on RA.  Pt informed of indication and benefits of nebulizer.[BH1.1]     RT Maikel[BH1.2] on 10/17/2018 at 9:07 PM[BH1.1]         Revision History        User Key Date/Time User Provider Type Action    > BH1.2 10/17/2018  9:07 PM Coty Barraza RT Respiratory Therapist Sign     BH1.1 10/17/2018  9:05 PM Coty Barraza RT Respiratory Therapist             Progress Notes by Jesús Alston at 10/17/2018  4:54 PM     Author:  Jesús Alston Service:  (none) Author Type:  Orthotist    Filed:  10/17/2018  5:01 PM Date of Service:  10/17/2018  4:54 PM Creation Time:  10/17/2018  4:54 PM    Status:  Signed :  Jesús Alston (Orthotist)         Here for afo eval for right drop foot.  Patient said didn't know I was coming.  She said doesn't need a brace.  Doesn't have shoes with her because hasn't worn shoes in over 4 months.  Said in a chair most of the time and will do minimal ambulation with assistance. Said has balance problems besides her drop foot.   I asked her about somebody bringing shoes and said her daughter from [place] is going to be here in 3 weeks.   Patient said she doesn't need the brace despite the possibility of what it might do for her.  She also said wouldn't commit anyways without her daughter being here.  I talked to nurse Maryan about this.  She said patient going to [facility] tomorrow.  She went and talked to the patient and reported to me that she is saying the same thing about not wanting a brace.  She said she will inform the Dr.  Please call if questions.  Jesús Gamaliel C.O.L.O.[EM1.1]       Revision History        User Key Date/Time User Provider Type  Action    > EM1.1 10/17/2018  5:01 PM Jesús Alston Orthotist Sign            Progress Notes by Speedy Yu MD at 10/17/2018  2:25 PM     Author:  Speedy Yu MD Service:  Hospitalist Author Type:  Physician    Filed:  10/17/2018  3:49 PM Date of Service:  10/17/2018  2:25 PM Creation Time:  10/17/2018  2:25 PM    Status:  Addendum :  Speedy Yu MD (Physician)         [facility]    Hospitalist Progress Note    Date of Service (when I saw the patient): 10/17/2018    Assessment & Plan   Ana Luisa Lee is a 78 year old female with[JY1.1] DM, COPD, anemia, HTN, SVT, hypothyroidism, HLP who[JY1.2] was admitted on 10/13/2018[JY1.1] with falls, groin pain, foot drop. Found to have an Left iliopsoas tear with hematoma and nondisplaced coccyx fracture.    Foot drop    - MRI lumbar spine was unrevealing for cause of foot drop    - she could have pressure/irritaion on her sciatic nerve from the atrophy/edema seen on MRI in R gluteus    - could have peroneal nerve irritation more peripherally    - I have ordered an EMG which will be done tomorrow at 9AM    - I would have PT comment on possibly bracing her foot[JY1.2]- Addendum: ordered Orthotics Consult for brace[JY1.3]    Groin pain    - L iliopsoas tear/hematoma    - not having much pain at rest.     - not needing narcotics    - seen by PT- rec TCU. Has a bed for tomorrow. Transport is at 2    - will discontinue Prednisone at this point as I do not think it is contributing much (has been on for about 4-5 days)    DM    - sugars controlled    - not on meds    COPD- stable    - holding home inhalers    HTN    - cont meds    Called and updated daughter[JY1.2]    DVT Prophylaxis:[JY1.1] Enoxaparin (Lovenox) SQ[JY1.2]who   Code Status: DNR/DNI    Disposition: Expected discharge[JY1.1] tomorrow[JY1.2]    Speedy Yu    Interval History[JY1.1]   Patient in bed. Comfortable. Pain controlled at rest. Eating. No chest pain, sob,  abdo pain, n/v/d[JY1.2]    -Data reviewed today: I reviewed all new labs and imaging results over the last 24 hours. I personally reviewed[JY1.1] no images or EKG's today[JY1.2].    Physical Exam   Temp: 98.3  F (36.8  C) Temp src: Oral BP: 114/57 Pulse: 93 Heart Rate: 94 Resp: 16 SpO2: 97 % O2 Device: None (Room air)    There were no vitals filed for this visit.  Vital Signs with Ranges  Temp:  [98  F (36.7  C)-98.8  F (37.1  C)] 98.3  F (36.8  C)  Pulse:  [] 93  Heart Rate:  [90-99] 94  Resp:  [16-24] 16  BP: (101-148)/(56-76) 114/57  SpO2:  [96 %-100 %] 97 %  I/O last 3 completed shifts:  In: -   Out: 1250 [Urine:1250]    Constitutional: Awake, alert, cooperative, no apparent distress   Respiratory: Clear to auscultation bilaterally, no crackles or wheezing   Cardiovascular: Regular rate and rhythm, normal S1 and S2, and no murmur noted   Abdomen: Normal bowel sounds, soft, non-distended, non-tender   Skin: No rashes, no cyanosis, dry to touch   Neuro: Alert and oriented x3, no weakness, numbness, memory loss   Extremities: No edema, normal range of motion   Other(s):        All other systems: Negative     Medications       acetaminophen  1,000 mg Oral TID     cephalexin  500 mg Oral Q12H [place]     enoxaparin  40 mg Subcutaneous Q24H     fexofenadine  180 mg Oral Daily     ipratropium - albuterol 0.5 mg/2.5 mg/3 mL  3 mL Nebulization Q4H     levothyroxine  25 mcg Oral Daily     lidocaine  2 patch Transdermal Q24h    And     lidocaine   Transdermal Q24H    And     lidocaine   Transdermal Q8H     lisinopril (PRINIVIL/ZESTRIL) tablet 5 mg  5 mg Oral Daily     montelukast  10 mg Oral At Bedtime     omeprazole (priLOSEC) CR capsule 40 mg  40 mg Oral QAM     ondansetron  4 mg Oral QAM     roflumilast  500 mcg Oral Daily     senna-docusate  1 tablet Oral BID    Or     senna-docusate  2 tablet Oral BID     sodium chloride  1 g Oral BID w/meals     tamsulosin  0.4 mg Oral Daily       Data     Recent Labs  Lab  10/17/18  0651 10/16/18  0921 10/16/18  0616 10/15/18  0650 10/14/18  0714 10/13/18  0818   WBC  --  9.0  --   --  6.6 8.0   HGB  --  9.7*  --   --  9.3* 9.8*   MCV  --  91  --   --  91 90   PLT  --  259  --  233 233 263   *  --  130* 130* 129* 126*   POTASSIUM 3.8  --  3.7 3.8 4.0 4.0   CHLORIDE 99  --  101 100 100 94   CO2 23  --  22 22 22 24   BUN 9  --  7 9 10 12   CR 0.56  --  0.54 0.59 0.63 0.66   ANIONGAP 9  --  7 8 7 8   DIANA 8.1*  --  8.4* 8.5 8.3* 8.7   *  --  98 92 100* 108*       No results found for this or any previous visit (from the past 24 hour(s)).[JY1.1]     Revision History        User Key Date/Time User Provider Type Action    > JY1.3 10/17/2018  3:49 PM Speedy Yu MD Physician Addend     JY1.2 10/17/2018  3:06 PM Speedy Yu MD Physician Sign     JY1.1 10/17/2018  2:25 PM Speedy Yu MD Physician             Progress Notes by Ivis Colin RN at 10/17/2018  1:24 PM     Author:  Ivis Colin RN Service:  (none) Author Type:      Filed:  10/17/2018  1:28 PM Date of Service:  10/17/2018  1:24 PM Creation Time:  10/17/2018  1:24 PM    Status:  Signed :  Ivis Colin RN ()         Discharge Planner   Discharge Plans in progress: Yes  Barriers to discharge plan: Recommended TCU at discharge. [facility] has accepted and has a private room available Thursday. Spoke with daughter Windy who is in agreement for TCU, private pay for private room. [facility] transport arranged for Thursday 10/18 2 PM via wheelchair.   Follow up plan: CM to continue with discharge planning. CM to fax orders to [facility] when completed.          Entered by: Ivis Colin 10/17/2018 1:24 PM[SO1.1]          Revision History        User Key Date/Time User Provider Type Action    > SO1.1 10/17/2018  1:28 PM Ivis Colin RN Case Manager Sign            Progress Notes by Danielle Dunn PA-C at 10/16/2018  4:37 PM     Author:  Danielle Dunn PA-C  Service:  Hospitalist Author Type:  Physician Assistant - MOHINDER    Filed:  10/16/2018  4:39 PM Date of Service:  10/16/2018  4:37 PM Creation Time:  10/16/2018  4:37 PM    Status:  Addendum :  Danielle Dunn PA-C (Physician Assistant - C)         Contacted by Utilization Review this afternoon who states this patient meets inpatient criteria and requests that inpatient order be placed.[EM1.1] This writer did not round on this patient.[EM1.2] Will change to inpatient status[EM1.1] per UR order.[EM1.2]     Revision History        User Key Date/Time User Provider Type Action    > [N/A] 10/16/2018  4:39 PM Danielle Dunn PA-C Physician Assistant - C Addend     EM1.2 10/16/2018  4:38 PM Danielle Dunn PA-C Physician Assistant - C Sign     EM1.1 10/16/2018  4:37 PM Danielle Dunn PA-C Physician Assistant Jose VINES             Progress Notes by Lucy Alaniz CM at 10/16/2018  1:22 PM     Author:  Lucy Alaniz CM Service:  Care Coordinator Author Type:      Filed:  10/16/2018  3:46 PM Date of Service:  10/16/2018  1:22 PM Creation Time:  10/16/2018  1:22 PM    Status:  Addendum :  Lucy Alaniz CM ()         CM was updated by bedside RN that TCU is recommended at discharge. Met with pt at bedside. She is agreeable to TCU facilities south of the [place]. Discussed [facility], [facility], [facility] & [facility]. She definitely wants [facility]. She has been there before. Also sent referrals to [facility] & [facility]. Await responses.[BS1.1] LM with Windy, pt's daughter ([phone]) to update her on plan. Contacted Erin BARTHOLOMEW at [facility] to update on TCU plan. She requested update on final disposition.[BS1.2]     CM will continue to follow patient until discharge for any additional needs.     Candis Alaniz RN, BSN, CTS  [facility]  [phone]    Addendum: Received call from Cynthia - Berenice at [facility]. They don't have any beds until Friday.    Ds[BS1.1]    Addendum  3:10pm - [facility] can accept pt for tomorrow. [facility] will NOT have a bed until Thursday. Attempted again to reach daughter Windy. LM. Updated bedside RN.[BS1.5]   Ds[BS1.3]    Addendum3:44pm - Daughter Windy yoo pt NOT go to [facility] [place]. Pt has been there earlier this year and they were very unhappy with the facility. LM for Chery at [facility]. Cancelled transport arrangements thru [facility].    ds[BS1.4]     Revision History        User Key Date/Time User Provider Type Action    > BS1.4 10/16/2018  3:46 PM Lucy Alaniz, CM  Addend     BS1.5 10/16/2018  3:27 PM Lucy Alaniz, CM  Addend     [N/A] 10/16/2018  3:22 PM Lucy Alaniz, CM  Addend     BS1.3 10/16/2018  3:11 PM Lucy Alaniz, CM  Addend     [N/A] 10/16/2018  1:31 PM Lucy Alaniz, CM  Addend     BS1.2 10/16/2018  1:30 PM Lucy Alaniz, CM  Sign     BS1.1 10/16/2018  1:22 PM Lucy Alaniz, CM              Progress Notes by Meg Leger PA-C at 10/16/2018 11:38 AM     Author:  Meg Leger PA-C Service:  Hospitalist Author Type:  Physician Assistant - C    Filed:  10/16/2018  1:06 PM Date of Service:  10/16/2018 11:38 AM Creation Time:  10/16/2018 11:38 AM    Status:  Addendum :  Meg Leger PA-C (Physician Assistant - C)         [facility]  Observation Unit  Progress Note    Date of Service: 10/16/2018    Patient: Ana Luisa Lee  MRN: [id number]  Admission Date: 10/13/2018  Hospital Day #[EM1.1] 4[EM1.2]    Assessment & Plan: Ana Luisa Lee is a 78 year old female[EM1.1] with PMH significant for mild dementia, asthma and COPD on chronic Prednisone, esophageal stricture s/p dilation (2007), HTN, HLD, hypothyroidism, PSVT, DM2, and who resides in an AL and has been wheelchair bound since the spring due to progressive generalized weakness with recent onset of left groin pain that has progressed over the last 3  weeks and due to becoming acutely worse she was admitted 10/13/18 pain control and orthopedic consultation.      1. Left groin pain 2/2 Iliopsoas Tear with hematoma and nondisplaced Coccyx Fracture - No recent injuries or trauma prior to onset of pain but patient has been undergoing PT at her facility, unsure if she over did it with this but per her daughter she has been able to ambulate to meals with decreased use of a wheelchair over the last week pta.  MRI of the Sacrum/Coccyx revealed a nondisplaced coccyx fracture.  MRI of the Pelvis revealed a complete tear/rupture of the left iliopsoas tending with adjacent soft tissue edema/hemorrhage, partial tear or tendinosis of the left common hamstring, right trochanteric bursitis and right gluteal muscle edema.  - Orthopedics re-consulted today to review new MRI images.  They recommend non operative management and WBAT.   - hemoglobin rechecked an stable  - Pain control with scheduled Tylenol, PRN Lidocaine patches, PRN Robaxin (trial low dose), and Tramadol scheduled BID with additional dose daily PRN[EM1.2].  Pain appears controlled today on this regimine.[EM1.3]  - WBAT   - PT consulted and recommends assist of 1 back to assistive living but may benefit from TCU  - SW consulted[EM1.2] for discharge planning.[EM1.3]     2. Right foot drop - per patient on 10/14 this is chronic with her peripheral neuropathy but discussed with patient's daughter who reported this is new within the last week per the patient's facility and noted by PT on Friday pta.   - MRI of the lumbar spine 10/15 with Multilevel degenerative disc disease and degenerative facet arthropathy.  L5-S1 grade 1 spondylolisthesis causing bilateral foraminal stenoses.  No specific etiology of the new right foot drop is identified.     3. UTI, urinary retention: found to be retaining urine in the ED and joseph was placed on 10/13. Thought to be due to possible underlying UTI. Appears that patient has previously  had urinary retention in setting of taking Duloxetine. Crawley removed on 10/14 and patient able to void with minimal post void residual. Current urine culture growing 10-50k coagulase negative staphylococcus sensitive to Oxacillin.    - Continue PO Keflex (day 4/5)  - Continue bladder scans PRN to monitor post void residuals   - continue po Flomax     4. Chronic hyponatremia: appears baseline sodium around 130-132, initially 126 on admission with improvement today to 130 after IVFs. It has been thought previously that the patient may have an element of SIADH from her known lung disease. Aodium chloride 1 gram tablets added twice daily and closely monitor sodium level.   - Recheck BMP in AM     5. Vertigo: reported onset of dizziness at time of admission. Due to patient being a poor historian it has been difficult to qualify her symptoms. No prior h/o BPPV or CVA. No other neuro deficits appreciated on exam. Unclear if Flexeril started on admission was causing her symptoms, discontinued on 10/14. Patient was treated supportively overnight with IVFs, PRN Valium, and scheduled Meclizine for possible BPPV without improvement in her symptoms. After further discussion with daughter earlier this week, the patient tends to report dizziness when she is anxious as well with minor medication adjustments due to sensitivities to medications. As well per daughter the patient only receives Tramadol twice daily and suppose to request an additional dose in the afternoon but usually doesn't remember to do so at her facility.  Flexeril and Atarax discontinued previously due to dizziness.  - asymptomatic today   - PRN Meclizine BID  - Decreased Tramadol to scheduled BID with one additional dose PRN and stopped Atarax 10/15   - Neuro checks     6. COPD, asthma: shortness of breath at baseline per patient and no recent exacerbations. Follows with pulmonology in the outpatient setting for management.   - Continue PTA PO Prednisone,  Roflumilast, and PRN Xopenex nebs   - Resume Incruse Ellipta and Pulmicort inhales upon discharge      7. HTN: stable   - Resume Lisinopril with parameters      8. Hypothyroidism, HLD, and GERD: stable medical conditions, resume PTA medications      DVT Prophylaxis: Enoxaparin (Lovenox) subcutaneous due to decreased mobility   Code Status: DNR/DNI  Disposition: pending placement work up by Social Work[EM1.2]    Meg Leger MS, PA-C  Hospitalist Physician Assistant  [facility]  Pager: [phone]      Subjective & Interval Hx:[EM1.1]    Patient reports pain in the left groin is mild.  Denies chest pain, shortness of breath, abdominal pain, nausea, emesis.  Tolerating po well.[EM1.2]    Last 24 hr care team notes reviewed.   ROS:  4 point ROS including Respiratory, CV, GI and , other than that noted in the HPI, is negativ[EM1.1]e.[EM1.2]    Physical Exam:    Blood pressure 168/89, pulse 85, temperature 98  F (36.7  C), temperature source Oral, resp. rate 18, SpO2 98 %, not currently breastfeeding.[EM1.1] on room air[EM1.2]  General: Alert, interactive, NAD[EM1.1], sitting up in a chair.[EM1.2]  HEENT: AT/NC, sclera anicteric, PERRL, EOMI  Resp: clear to auscultation bilaterally, no crackles or wheezes  Cardiac: regular rate and rhythm, no murmur  Abdomen: Soft, nontender, nondistended. +BS.  No HSM or masses, no rebound or guarding.  Extremities:[EM1.1] mild tenderness to left groin with palpation.  4/5 muscle strength[EM1.2]  Neuro: Alert & oriented x 3    Labs & Images:  Reviewed in Epic   Medications:    Current Facility-Administered Medications   Medication     acetaminophen (TYLENOL) tablet 1,000 mg     cephALEXin (KEFLEX) capsule 500 mg     diazepam (VALIUM) tablet 2 mg     enoxaparin (LOVENOX) injection 40 mg     fexofenadine (ALLEGRA) tablet 180 mg     ipratropium - albuterol 0.5 mg/2.5 mg/3 mL (DUONEB) neb solution 3 mL     levalbuterol (XOPENEX) neb solution 0.63 mg     levothyroxine  (SYNTHROID/LEVOTHROID) tablet 25 mcg     Lidocaine (LIDOCARE) 4 % Patch 2 patch    And     lidocaine patch REMOVAL    And     lidocaine patch in PLACE     lisinopril (PRINIVIL/ZESTRIL) tablet 5 mg     loperamide (IMODIUM) capsule 2 mg     meclizine (ANTIVERT) tablet 25 mg     melatonin tablet 1 mg     methocarbamol (ROBAXIN) tablet 500 mg     montelukast (SINGULAIR) tablet 10 mg     naloxone (NARCAN) injection 0.1-0.4 mg     omeprazole (priLOSEC) CR capsule 40 mg     ondansetron (ZOFRAN-ODT) ODT tab 4 mg    Or     ondansetron (ZOFRAN) injection 4 mg     ondansetron (ZOFRAN-ODT) ODT tab 4 mg     polyethylene glycol (MIRALAX/GLYCOLAX) Packet 17 g     predniSONE (DELTASONE) tablet 10 mg     roflumilast (DALIRESP) tablet 500 mcg     senna-docusate (SENOKOT-S;PERICOLACE) 8.6-50 MG per tablet 1 tablet    Or     senna-docusate (SENOKOT-S;PERICOLACE) 8.6-50 MG per tablet 2 tablet     sodium chloride tablet 1 g     traMADol (ULTRAM) half-tab 25 mg[EM1.1]          Revision History        User Key Date/Time User Provider Type Action    > EM1.3 10/16/2018  1:06 PM Meg Leger PA-C Physician Assistant - MOHINDER Addend     EM1.2 10/16/2018  1:05 PM Meg Leger PA-C Physician Assistant - MOHINDER Sign     EM1.1 10/16/2018 11:38 AM Meg Leger PA-C Physician Assistant - C             Progress Notes by Kesha Vela PA-C at 10/15/2018  2:49 PM     Author:  Kesha Vela PA-C Service:  Hospitalist Author Type:  Physician Assistant - MOHINDER    Filed:  10/15/2018  4:26 PM Date of Service:  10/15/2018  2:49 PM Creation Time:  10/15/2018  2:49 PM    Status:  Signed :  Kesha Vela PA-C (Physician Assistant - C)         [facility]    Observation Unit  Hospitalist Progress Note  Name: Ana Luisahelen Lee    MRN: [id number]  Provider:  Ariella Vela PA-C  Date of Service: 10/15/2018    Assessment & Plan   Summary of Stay: Ana Luisa Lee is a 78 year old female with PMH significant for mild  dementia, asthma and COPD on chronic Prednisone, esophageal stricture s/p dilation (2007), HTN, HLD, hypothyroidism, PSVT, DM2, and who resides in an AL and has been wheelchair bound since the spring due to progressive generalized weakness with recent onset of left groin pain that has progressed over the last 3 weeks and due to becoming acutely worse she was admitted overnight for pain control and orthopedic consultation.     1. Left groin pain: CT on admission indicates possible hamstring and iliopsoas tendon rupture with possible hematoma along the iliopsoas. No recent injuries or trauma prior to onset of pain but patient has[KB1.1] been undergoing PT at her facility, unsure if she over did it with this but per her daughter she has been able to ambulate to meals with decreased use of a wheelchair over the last week[KB1.2].[KB1.1] Patient still has mild to moderate pain today.[KB1.2] Initially started on scheduled Tylenol, PRN Flexeril, and her PTA Tramadol. Due to reports of dizziness Flexeril was discontinued.[KB1.1] Will stop Atarax due to ongoing reported dizziness today.[KB1.2] Orthopedic surgery consulted on admission and recommend[KB1.1]ed[KB1.2] no need for MRI, WBAT, and pain control.    - Pain control with scheduled Tylenol, PRN Lidocaine patches, PRN Robaxin (trial low dose), and Tramadol scheduled BID with additional dose daily PRN  - WBAT with left LE  - MRI of pelvis   - PT consulted and recommends[KB1.1] assist of 1 back to assistive living but may benefit from TCU, will further determine pending MRI results[KB1.2]     2. Right foot drop:[KB1.1] per patient on 10/14 this is chronic with her peripheral neuropathy but discussed with patient's daughter who reported this is new within the last week per the patient's facility and noted by PT on Friday. Patient was suppose to have MRI of lumbar spine as outpatient to further assess but this has not yet been completed. Patient has right foot drop on exam.    -[KB1.2] Obtain MRI of lumbar and sacral spine     3. Abnormal UA, urinary retention: found to be retaining urine in the ED and joseph was placed on 10/13. Thought to be due to possible underlying UTI. Appears that patient has previously had urinary retention in setting of taking Duloxetine. Joseph removed on 10/14 and patient able to void with minimal post void residual. Current urine culture growing[KB1.1] 10-50k[KB1.2] coagulase negative staphylococcus with susceptibility testing in process.    - Await final urine culture results   - Continue PO Keflex (day 3/5)  -[KB1.1] Continue bladder scans PRN to monitor post void residuals[KB1.2]     4. Chronic hyponatremia: appears baseline sodium around 130-132, initially 126 on admission with improvement today to 130 after IVFs. It has been thought previously that the patient may have an element of SIADH from her known lung disease. Will add sodium chloride[KB1.1] 1 gram[KB1.2] tablets twice daily and closely monitor sodium level.   - Recheck BMP in AM    5. Vertigo: reported onset of dizziness at time of admission. Due to patient being a poor historian[KB1.1] it has been[KB1.2] difficult to qualify her symptoms. No prior h/o BPPV or CVA. No other neuro deficits appreciated on exam. Unclear if Flexeril started on admission was causing her symptoms, discontinued on 10/14. Patient was treated supportively overnight with IVFs, PRN Valium, and scheduled Meclizine for possible BPPV without improvement in her symptoms. After further discussion with daughter today the patient tends to report dizziness when she is anxious as well with minor medication adjustments due to sensitivities to medications. As well per daughter the patient only receives Tramadol twice daily and suppose to request an additional dose in the afternoon but usually doesn't remember to do so at her facility.   - PRN Meclizine BID  - Decrease Tramadol to scheduled BID with one additional dose PRN and stop  Atarax   - Neuro checks    6. COPD, asthma: shortness of breath at baseline per patient and no recent exacerbations. Follows with pulmonology in the outpatient setting for management.   - Continue PTA PO Prednisone, Roflumilast, and PRN Xopenex nebs   - Resume Incruse Ellipta and Pulmicort inhales upon discharge     7. HTN: stable   - Resume Lisinopril with parameters     8. Hypothyroidism, HLD, and GERD: stable medical conditions, resume PTA medications     DVT Prophylaxis: Enoxaparin (Lovenox) subcutaneous due to decreased mobility   Code Status: DNR/DNI  Disposition: Expected discharge unclear, pending findings on MRI    Interval History   Patient reports[KB1.1] ongoing pain of 8/10 this morning but when asked later in the day she is having only mild pain. She continues to report dizziness that she describes as the room spinning. When asked if she has dizziness at rest she initially reported no but she did feel dizzy after getting into bed earlier today where she was spinning but then a few seconds later states that she wasn't spinning but the room was. PT attempted to assess patient's dizziness today and felt it was difficult to qualify. Discussed with patient's daughter over the phone and the patient has previously reported dizziness when anxious as well with minor medication adjustments due to being sensitive to these changes in the past.     Contacted the patient's daughter Windy in [place], she is the patient's POA, and she was updated on the current plan and able to provider additional information about patient's presentation.[KB1.2]      -Data reviewed today: I reviewed all new labs and imaging reports over the last 24 hours. I personally reviewed all labs and imaging from this visit.     Physical Exam   Temp: 97.2  F (36.2  C) Temp src: Oral BP: 121/59 Pulse: 85 Heart Rate: 88 Resp: 18 SpO2: 97 % O2 Device: None (Room air)    There were no vitals filed for this visit.  Vital Signs with Ranges  Temp:   [96.9  F (36.1  C)-98.5  F (36.9  C)] 97.2  F (36.2  C)  Pulse:  [85-89] 85  Heart Rate:  [84-88] 88  Resp:  [18-20] 18  BP: (118-184)/(59-89) 121/59  SpO2:  [95 %-100 %] 97 %  I/O last 3 completed shifts:  In: 2513 [P.O.:720; I.V.:1793]  Out: 2400 [Urine:2400]    GEN:  Alert, oriented x 3, appears comfortable, NAD.  HEENT:  Normocephalic/atraumatic, no scleral icterus, no nasal discharge, mouth moist. Mild right sided nystagmus appreciated.   CV:  Regular rate and rhythm, no murmur or JVD.  S1 + S2 noted, no S3 or S4.  LUNGS:  Clear to auscultation bilaterally without rales/rhonchi/wheezing/retractions.  Symmetric chest rise on inhalation noted.  ABD:  Active bowel sounds, soft, non-tender/non-distended.  No rebound/guarding/rigidity.  EXT: mild tenderness to left groin with palpation, decreased ROM of bilateral LE (L>R), only able to flex left knee to 20 degrees. Unable to perform dorsiflexion of right foot. No cyanosis.  No acute joint synovitis noted.  SKIN:  Dry to touch, no exanthems noted in the visualized areas.  Neuro: bilateral LE strength 4/5, bilateral UE strength 5/5, sensation grossly intact. CN II-XII grossly intact.     Medications       acetaminophen  1,000 mg Oral TID     cephalexin  500 mg Oral Q12H [place]     enoxaparin  40 mg Subcutaneous Q24H     fexofenadine  180 mg Oral Daily     ipratropium - albuterol 0.5 mg/2.5 mg/3 mL  3 mL Nebulization 4x daily     levothyroxine  25 mcg Oral Daily     lidocaine  2 patch Transdermal Q24h    And     lidocaine   Transdermal Q24H    And     lidocaine   Transdermal Q8H     lisinopril (PRINIVIL/ZESTRIL) tablet 5 mg  5 mg Oral Daily     meclizine  25 mg Oral Q6H [place]     montelukast  10 mg Oral At Bedtime     omeprazole (priLOSEC) CR capsule 40 mg  40 mg Oral QAM     ondansetron  4 mg Oral QAM     predniSONE  10 mg Oral Daily     roflumilast  500 mcg Oral Daily     senna-docusate  1 tablet Oral BID    Or     senna-docusate  2 tablet Oral BID     sodium chloride  1  g Oral BID w/meals     Data     Results for orders placed or performed during the hospital encounter of 10/13/18   XR Pelvis and Hip Left 2 Views    Narrative    PELVIS WITH UNILATERAL HIP TWO VIEWS LEFT  10/13/2018 9:17 AM     HISTORY: left hip pain/groin pain. rule out fracture;     COMPARISON: None.    FINDINGS: There is normal osseous alignment.  No fractures are  identified.      Impression    IMPRESSION: Osseous structures appear intact. No fractures are  identified.    WILMA CARBAJAL MD   Abd/pelvis CT no contrast - Stone Protocol    Narrative    CT ABDOMEN AND PELVIS WITHOUT CONTRAST 10/13/2018 11:56 AM     HISTORY: Lower abdominal pain/groin pain on left. Rule out stone.     COMPARISON: September 21, 2014    TECHNIQUE: Volumetric helical acquisition of CT images from the lung  bases through the symphysis pubis without intravenous contrast.  Radiation dose for this scan was reduced using automated exposure  control, adjustment of the mA and/or kV according to patient size, or  iterative reconstruction technique.    FINDINGS: No hydronephrosis or urolithiasis. Minimal asymmetry of the  iliopsoas, left greater than right, indicating a small amount of  intramuscular hemorrhage. Please see CT report same date of the left  hip. No free air or free fluid. No bowel obstruction. There is mild  diverticulosis without evidence for diverticulitis. There are moderate  atherosclerotic changes of the visualized aorta and its branches.  There is no evidence of aortic aneurysm. There is cholelithiasis  without evidence for cholecystitis. Moderate hiatal hernia. The liver,  spleen, adrenal glands, kidneys, and pancreas demonstrate no worrisome  focal lesion in the absence of intravenous contrast. Lung bases clear  of acute infiltrates. Trace peripheral fibrosis and/or atelectasis.      Impression    IMPRESSION:  1. Question some minimal intramuscular hematoma in the iliopsoas on  the left. Please see CT report of the left hip  for additional regional  findings.  2. Otherwise no acute process demonstrated in the abdomen and pelvis.     DEANN CROSS MD   CT Hip Left w/o Contrast    Narrative    CT HIP LEFT WITHOUT CONTRAST 10/13/2018 11:56 AM     HISTORY: Left hip pain/groin pain, rule out occult fracture.      TECHNIQUE: Axial images with reconstructions. Radiation dose for this  scan was reduced using automated exposure control, adjustment of the  mA and/or kV according to patient size, or iterative reconstruction  technique.      COMPARISON: 11/19/2017.     FINDINGS: There appears to be some focal fluid density adjacent to the  left ischial tuberosity. There is loss of cortex at the superior  aspect of the left ischial tuberosity. These findings suggest the  possibility of common hamstring tendon rupture. There is edema  adjacent to the left iliopsoas muscle, raising the possibility of  tendon rupture. Degenerative change of the lower lumbar spine.  Anterolisthesis at L5-S1. Chondrocalcinosis of the symphysis pubis,  consistent with calcium pyrophosphate deposition disease. No hip or  pubic fracture is identified. Incidentally noted colonic  diverticulosis.      Impression    IMPRESSION:  1. Findings raising the possibility of left common hamstring tendon  rupture and iliopsoas tendon rupture. MRI recommended, if indicated  clinically.  2. Additional findings discussed above.     JUAN OROZCO MD   CBC with platelets differential   Result Value Ref Range    WBC 8.0 4.0 - 11.0 10e9/L    RBC Count 3.19 (L) 3.8 - 5.2 10e12/L    Hemoglobin 9.8 (L) 11.7 - 15.7 g/dL    Hematocrit 28.8 (L) 35.0 - 47.0 %    MCV 90 78 - 100 fl    MCH 30.7 26.5 - 33.0 pg    MCHC 34.0 31.5 - 36.5 g/dL    RDW 14.1 10.0 - 15.0 %    Platelet Count 263 150 - 450 10e9/L    Diff Method Automated Method     % Neutrophils 73.7 %    % Lymphocytes 14.6 %    % Monocytes 9.2 %    % Eosinophils 1.2 %    % Basophils 0.2 %    % Immature Granulocytes 1.1 %    Nucleated RBCs 0 0  /100    Absolute Neutrophil 5.9 1.6 - 8.3 10e9/L    Absolute Lymphocytes 1.2 0.8 - 5.3 10e9/L    Absolute Monocytes 0.7 0.0 - 1.3 10e9/L    Absolute Eosinophils 0.1 0.0 - 0.7 10e9/L    Absolute Basophils 0.0 0.0 - 0.2 10e9/L    Abs Immature Granulocytes 0.1 0 - 0.4 10e9/L    Absolute Nucleated RBC 0.0    Basic metabolic panel   Result Value Ref Range    Sodium 126 (L) 133 - 144 mmol/L    Potassium 4.0 3.4 - 5.3 mmol/L    Chloride 94 94 - 109 mmol/L    Carbon Dioxide 24 20 - 32 mmol/L    Anion Gap 8 3 - 14 mmol/L    Glucose 108 (H) 70 - 99 mg/dL    Urea Nitrogen 12 7 - 30 mg/dL    Creatinine 0.66 0.52 - 1.04 mg/dL    GFR Estimate 86 >60 mL/min/1.7m2    GFR Estimate If Black >90 >60 mL/min/1.7m2    Calcium 8.7 8.5 - 10.1 mg/dL   UA with Microscopic   Result Value Ref Range    Color Urine Yellow     Appearance Urine Cloudy     Glucose Urine Negative NEG^Negative mg/dL    Bilirubin Urine Negative NEG^Negative    Ketones Urine Negative NEG^Negative mg/dL    Specific Gravity Urine 1.008 1.003 - 1.035    Blood Urine Small (A) NEG^Negative    pH Urine 7.0 5.0 - 7.0 pH    Protein Albumin Urine Negative NEG^Negative mg/dL    Urobilinogen mg/dL 0.0 0.0 - 2.0 mg/dL    Nitrite Urine Negative NEG^Negative    Leukocyte Esterase Urine Large (A) NEG^Negative    Source Catheterized Urine     WBC Urine >182 (H) 0 - 5 /HPF    RBC Urine 45 (H) 0 - 2 /HPF    WBC Clumps Present (A) NEG^Negative /HPF   Basic metabolic panel   Result Value Ref Range    Sodium 129 (L) 133 - 144 mmol/L    Potassium 4.0 3.4 - 5.3 mmol/L    Chloride 100 94 - 109 mmol/L    Carbon Dioxide 22 20 - 32 mmol/L    Anion Gap 7 3 - 14 mmol/L    Glucose 100 (H) 70 - 99 mg/dL    Urea Nitrogen 10 7 - 30 mg/dL    Creatinine 0.63 0.52 - 1.04 mg/dL    GFR Estimate >90 >60 mL/min/1.7m2    GFR Estimate If Black >90 >60 mL/min/1.7m2    Calcium 8.3 (L) 8.5 - 10.1 mg/dL   CBC with platelets   Result Value Ref Range    WBC 6.6 4.0 - 11.0 10e9/L    RBC Count 3.04 (L) 3.8 - 5.2  10e12/L    Hemoglobin 9.3 (L) 11.7 - 15.7 g/dL    Hematocrit 27.8 (L) 35.0 - 47.0 %    MCV 91 78 - 100 fl    MCH 30.6 26.5 - 33.0 pg    MCHC 33.5 31.5 - 36.5 g/dL    RDW 14.1 10.0 - 15.0 %    Platelet Count 233 150 - 450 10e9/L   Basic metabolic panel   Result Value Ref Range    Sodium 130 (L) 133 - 144 mmol/L    Potassium 3.8 3.4 - 5.3 mmol/L    Chloride 100 94 - 109 mmol/L    Carbon Dioxide 22 20 - 32 mmol/L    Anion Gap 8 3 - 14 mmol/L    Glucose 92 70 - 99 mg/dL    Urea Nitrogen 9 7 - 30 mg/dL    Creatinine 0.59 0.52 - 1.04 mg/dL    GFR Estimate >90 >60 mL/min/1.7m2    GFR Estimate If Black >90 >60 mL/min/1.7m2    Calcium 8.5 8.5 - 10.1 mg/dL   Platelet count   Result Value Ref Range    Platelet Count 233 150 - 450 10e9/L   Orthopedic Surgery IP Consult: Patient to be seen: Routine - within 24 hours; pt with left hamstring and ileopsoas tendon rupture. Pt is mainly wheelchair bound but does pivot and states working with PT to walk again. Pls evel and advise activity ...    Narrative    Elvira Camejo PA-C     10/14/2018  9:09 PM  [facility] Orthopedic Consultation    Ana Luisa Lee MRN# [id number]   Age: 78 year old YOB: 1940     Date of Admission:  10/13/2018    Reason for consult: Left groin pain       Requesting physician: LOBO Robertson       Level of consult: One-time consult to assist in determining a   diagnosis and to recommend an appropriate treatment plan           Assessment and Plan:   Assessment:   Left groin pain       Plan:   Not recommending MRI at this time  Mobilize with PT  WBAT w/ walker   Pain control            Chief Complaint:   Left groin pain          History of Present Illness:   This patient is a 78 year old female who presents with the   following condition requiring a hospital admission:    Patient resides in assisted living and states she has been   wheelchair bound for the past couple of years. She denies any   recent fall or mechanism of  injury to her left hip. She states   her left hip pain has been present for the past 2 weeks,   gradually worsening. She presented to the ED yesterday for   evaluation of her groin pain.     Patient does note she has seen her PCP about this pain and   reports a negative XR and US of leg. She states he ordered PT for   her to start tomorrow at her assisted living facility.           Past Medical History:     Past Medical History:   Diagnosis Date     Anemia Dec 2011     Arthritis of hand      Asthma, persistent     f/U dr Brock at [facility]     Chronic intermittent steroid use     for asthma     COPD exacerbation (H) 10/25/2013     Esophageal stricture 2007    s/p dilation     HTN, goal below 140/90      Hyperlipidemia      Hypothyroidism      Osteoporosis      Paroxysmal supraventricular tachycardia (H)      PVC (premature ventricular contraction) May 2012     SVT (supraventricular tachycardia) (H)      Type 2 diabetes mellitus without complication, without   long-term current use of insulin (H) 8/2/2018             Past Surgical History:     Past Surgical History:   Procedure Laterality Date     BUNIONECTOMY LAPIDUS WITH TARSAL METATARSAL (TMT) FUSION    1990's     HYSTERECTOMY TOTAL ABDOMINAL       TONSILLECTOMY               Social History:     Social History   Substance Use Topics     Smoking status: Former Smoker     Packs/day: 1.00     Years: 15.00     Quit date: 1/1/1980     Smokeless tobacco: Never Used     Alcohol use No      Comment: Occasional drink             Family History:     Family History   Problem Relation Age of Onset     Cerebrovascular Disease Mother      Hypertension Mother      Family History Negative Father      Unknown/Adopted Maternal Grandmother      Unknown/Adopted Maternal Grandfather      Unknown/Adopted Paternal Grandmother      Unknown/Adopted Paternal Grandfather      Family History Negative Brother      Family History Negative Sister      Family History Negative Son       Family History Negative Daughter      Asthma No family hx of      C.A.D. No family hx of      Diabetes No family hx of      Cancer No family hx of              Immunizations:     VACCINE/DOSE   Diptheria   DPT   DTAP   HBIG   Hepatitis A   Hepatitis B   HIB   Influenza   Measles   Meningococcal   MMR   Mumps   Pneumococcal   Polio   Rubella   Small Pox   TDAP   Varicella   Zoster             Allergies:     Allergies   Allergen Reactions     Hydralazine Anxiety     Penicillin G Hives     Tolerated cephalosporines 2017     Meloxicam      dizziness       Metoprolol      ? Skin rash on the back     Norvasc [Amlodipine Besylate] Hives             Medications:     Current Facility-Administered Medications   Medication     acetaminophen (TYLENOL) tablet 1,000 mg     cephALEXin (KEFLEX) capsule 500 mg     diazepam (VALIUM) tablet 2 mg     enoxaparin (LOVENOX) injection 40 mg     fexofenadine (ALLEGRA) tablet 180 mg     hydrOXYzine (ATARAX) tablet 25 mg     ipratropium - albuterol 0.5 mg/2.5 mg/3 mL (DUONEB) neb   solution 3 mL     levalbuterol (XOPENEX) neb solution 0.63 mg     levothyroxine (SYNTHROID/LEVOTHROID) tablet 25 mcg     Lidocaine (LIDOCARE) 4 % Patch 2 patch    And     [START ON 10/15/2018] lidocaine patch REMOVAL    And     lidocaine patch in PLACE     loperamide (IMODIUM) capsule 2 mg     meclizine (ANTIVERT) tablet 25 mg     melatonin tablet 1 mg     montelukast (SINGULAIR) tablet 10 mg     naloxone (NARCAN) injection 0.1-0.4 mg     omeprazole (priLOSEC) CR capsule 40 mg     ondansetron (ZOFRAN-ODT) ODT tab 4 mg    Or     ondansetron (ZOFRAN) injection 4 mg     ondansetron (ZOFRAN-ODT) ODT tab 4 mg     polyethylene glycol (MIRALAX/GLYCOLAX) Packet 17 g     predniSONE (DELTASONE) tablet 10 mg     roflumilast (DALIRESP) tablet 500 mcg     senna-docusate (SENOKOT-S;PERICOLACE) 8.6-50 MG per tablet 1   tablet    Or     senna-docusate (SENOKOT-S;PERICOLACE) 8.6-50 MG per tablet 2   tablet     sodium chloride 0.9%  infusion     tamsulosin (FLOMAX) capsule 0.4 mg     traMADol (ULTRAM) half-tab 25 mg     traMADol (ULTRAM) half-tab 25 mg             Review of Systems:   CV: NEGATIVE for chest pain, palpitations or peripheral edema  C: NEGATIVE for fever, chills, change in weight  E/M: NEGATIVE for ear, mouth and throat problems  R: NEGATIVE for significant cough or SOB     10 point review of systems negative aside from HPI and physical   exam.           Physical Exam:   All vitals have been reviewed  Patient Vitals for the past 24 hrs:   BP Temp Temp src Pulse Heart Rate Resp SpO2   10/14/18 1938 - - - - - - 98 %   10/14/18 1929 118/59 98.5  F (36.9  C) Oral 89 - 20 97 %   10/14/18 1559 - - - - - - 97 %   10/14/18 1541 135/66 98.2  F (36.8  C) Oral 88 - 20 97 %   10/14/18 1256 133/64 - Oral - 103 16 97 %   10/14/18 1157 - - - - 90 20 97 %   10/14/18 0758 141/73 - - - 99 24 98 %   10/14/18 0739 - - - - 90 18 96 %   10/14/18 0443 151/76 98.4  F (36.9  C) Oral - 97 20 97 %   10/13/18 2344 128/69 99.6  F (37.6  C) Oral - 94 20 98 %   10/13/18 2103 130/69 99.8  F (37.7  C) Oral 100 - 20 96 %       Intake/Output Summary (Last 24 hours) at 10/14/18 2058  Last data filed at 10/14/18 2052   Gross per 24 hour   Intake             2513 ml   Output             1950 ml   Net              563 ml     Constitutional: Pleasant, alert, appropriate, following commands.  HEENT: Head atraumatic normocephalic. PERRLA.  Respiratory: Unlabored breathing no audible wheeze  Cardiovascular: Regular rate and rhythm  GI: Abdomen soft, non tender, and non distended.  Lymph/Hematologic: No edema or palor  Skin: No rashes, no cyanosis, no edema.  Neuro: Sensation intact to light touch in bilateral lower   extremities.  Musculoskeletal:   Patient laying comfortably in bed   No ecchymosis or erythema over entirety of the left leg  No notable swelling or edema  Hip flexes to 90 degrees, bilaterally   No pain with internal/external rotation while in flexion  No TTP  over great trochanter  Able to SLR on right, unable to SLR on left secondary to pain   Full ROM of left and right knee - 0 to 120 degrees without pain  Bilateral calves are soft, non-tender.  Bilateral lower extremity is NVI.  Sensation intact bilateral lower extremities  5/5 motor with resisted dorsi and plantar flexion on left, 4/5 on   right (patient states RLE weakness per baseline)   5/5 EHL  +Dp pulse            Data:   All laboratory data reviewed  Results for orders placed or performed during the hospital   encounter of 10/13/18   XR Pelvis and Hip Left 2 Views    Narrative    PELVIS WITH UNILATERAL HIP TWO VIEWS LEFT  10/13/2018 9:17 AM     HISTORY: left hip pain/groin pain. rule out fracture;     COMPARISON: None.    FINDINGS: There is normal osseous alignment.  No fractures are  identified.      Impression    IMPRESSION: Osseous structures appear intact. No fractures are  identified.    WILMA CARBAJAL MD   Abd/pelvis CT no contrast - Stone Protocol    Narrative    CT ABDOMEN AND PELVIS WITHOUT CONTRAST 10/13/2018 11:56 AM     HISTORY: Lower abdominal pain/groin pain on left. Rule out stone.       COMPARISON: September 21, 2014    TECHNIQUE: Volumetric helical acquisition of CT images from the   lung  bases through the symphysis pubis without intravenous contrast.  Radiation dose for this scan was reduced using automated exposure  control, adjustment of the mA and/or kV according to patient   size, or  iterative reconstruction technique.    FINDINGS: No hydronephrosis or urolithiasis. Minimal asymmetry of   the  iliopsoas, left greater than right, indicating a small amount of  intramuscular hemorrhage. Please see CT report same date of the   left  hip. No free air or free fluid. No bowel obstruction. There is   mild  diverticulosis without evidence for diverticulitis. There are   moderate  atherosclerotic changes of the visualized aorta and its branches.  There is no evidence of aortic aneurysm. There is  cholelithiasis  without evidence for cholecystitis. Moderate hiatal hernia. The   liver,  spleen, adrenal glands, kidneys, and pancreas demonstrate no   worrisome  focal lesion in the absence of intravenous contrast. Lung bases   clear  of acute infiltrates. Trace peripheral fibrosis and/or   atelectasis.      Impression    IMPRESSION:  1. Question some minimal intramuscular hematoma in the iliopsoas   on  the left. Please see CT report of the left hip for additional   regional  findings.  2. Otherwise no acute process demonstrated in the abdomen and   pelvis.     DEANN CROSS MD   CT Hip Left w/o Contrast    Narrative    CT HIP LEFT WITHOUT CONTRAST 10/13/2018 11:56 AM     HISTORY: Left hip pain/groin pain, rule out occult fracture.      TECHNIQUE: Axial images with reconstructions. Radiation dose for   this  scan was reduced using automated exposure control, adjustment of   the  mA and/or kV according to patient size, or iterative   reconstruction  technique.      COMPARISON: 11/19/2017.     FINDINGS: There appears to be some focal fluid density adjacent   to the  left ischial tuberosity. There is loss of cortex at the superior  aspect of the left ischial tuberosity. These findings suggest the  possibility of common hamstring tendon rupture. There is edema  adjacent to the left iliopsoas muscle, raising the possibility of  tendon rupture. Degenerative change of the lower lumbar spine.  Anterolisthesis at L5-S1. Chondrocalcinosis of the symphysis   pubis,  consistent with calcium pyrophosphate deposition disease. No hip   or  pubic fracture is identified. Incidentally noted colonic  diverticulosis.      Impression    IMPRESSION:  1. Findings raising the possibility of left common hamstring   tendon  rupture and iliopsoas tendon rupture. MRI recommended, if   indicated  clinically.  2. Additional findings discussed above.     JUAN OROZCO MD   CBC with platelets differential   Result Value Ref Range    WBC 8.0 4.0 -  11.0 10e9/L    RBC Count 3.19 (L) 3.8 - 5.2 10e12/L    Hemoglobin 9.8 (L) 11.7 - 15.7 g/dL    Hematocrit 28.8 (L) 35.0 - 47.0 %    MCV 90 78 - 100 fl    MCH 30.7 26.5 - 33.0 pg    MCHC 34.0 31.5 - 36.5 g/dL    RDW 14.1 10.0 - 15.0 %    Platelet Count 263 150 - 450 10e9/L    Diff Method Automated Method     % Neutrophils 73.7 %    % Lymphocytes 14.6 %    % Monocytes 9.2 %    % Eosinophils 1.2 %    % Basophils 0.2 %    % Immature Granulocytes 1.1 %    Nucleated RBCs 0 0 /100    Absolute Neutrophil 5.9 1.6 - 8.3 10e9/L    Absolute Lymphocytes 1.2 0.8 - 5.3 10e9/L    Absolute Monocytes 0.7 0.0 - 1.3 10e9/L    Absolute Eosinophils 0.1 0.0 - 0.7 10e9/L    Absolute Basophils 0.0 0.0 - 0.2 10e9/L    Abs Immature Granulocytes 0.1 0 - 0.4 10e9/L    Absolute Nucleated RBC 0.0    Basic metabolic panel   Result Value Ref Range    Sodium 126 (L) 133 - 144 mmol/L    Potassium 4.0 3.4 - 5.3 mmol/L    Chloride 94 94 - 109 mmol/L    Carbon Dioxide 24 20 - 32 mmol/L    Anion Gap 8 3 - 14 mmol/L    Glucose 108 (H) 70 - 99 mg/dL    Urea Nitrogen 12 7 - 30 mg/dL    Creatinine 0.66 0.52 - 1.04 mg/dL    GFR Estimate 86 >60 mL/min/1.7m2    GFR Estimate If Black >90 >60 mL/min/1.7m2    Calcium 8.7 8.5 - 10.1 mg/dL   UA with Microscopic   Result Value Ref Range    Color Urine Yellow     Appearance Urine Cloudy     Glucose Urine Negative NEG^Negative mg/dL    Bilirubin Urine Negative NEG^Negative    Ketones Urine Negative NEG^Negative mg/dL    Specific Gravity Urine 1.008 1.003 - 1.035    Blood Urine Small (A) NEG^Negative    pH Urine 7.0 5.0 - 7.0 pH    Protein Albumin Urine Negative NEG^Negative mg/dL    Urobilinogen mg/dL 0.0 0.0 - 2.0 mg/dL    Nitrite Urine Negative NEG^Negative    Leukocyte Esterase Urine Large (A) NEG^Negative    Source Catheterized Urine     WBC Urine >182 (H) 0 - 5 /HPF    RBC Urine 45 (H) 0 - 2 /HPF    WBC Clumps Present (A) NEG^Negative /HPF   Basic metabolic panel   Result Value Ref Range    Sodium 129 (L) 133 - 144  mmol/L    Potassium 4.0 3.4 - 5.3 mmol/L    Chloride 100 94 - 109 mmol/L    Carbon Dioxide 22 20 - 32 mmol/L    Anion Gap 7 3 - 14 mmol/L    Glucose 100 (H) 70 - 99 mg/dL    Urea Nitrogen 10 7 - 30 mg/dL    Creatinine 0.63 0.52 - 1.04 mg/dL    GFR Estimate >90 >60 mL/min/1.7m2    GFR Estimate If Black >90 >60 mL/min/1.7m2    Calcium 8.3 (L) 8.5 - 10.1 mg/dL   CBC with platelets   Result Value Ref Range    WBC 6.6 4.0 - 11.0 10e9/L    RBC Count 3.04 (L) 3.8 - 5.2 10e12/L    Hemoglobin 9.3 (L) 11.7 - 15.7 g/dL    Hematocrit 27.8 (L) 35.0 - 47.0 %    MCV 91 78 - 100 fl    MCH 30.6 26.5 - 33.0 pg    MCHC 33.5 31.5 - 36.5 g/dL    RDW 14.1 10.0 - 15.0 %    Platelet Count 233 150 - 450 10e9/L   Urine Culture   Result Value Ref Range    Specimen Description Catheterized Urine     Special Requests Specimen received in preservative     Culture Micro Culture in progress      *Note: Due to a large number of results and/or encounters for the   requested time period, some results have not been displayed. A   complete set of results can be found in Results Review.          Attestation:  I have reviewed today's vital signs, notes, medications, labs and   imaging with Dr. Guerrero.  Amount of time performed on this consult: 25 minutes.    Elvira Camejo PA-C       Urine Culture   Result Value Ref Range    Specimen Description Catheterized Urine     Special Requests Specimen received in preservative     Culture Micro (A)      10,000 to 50,000 colonies/mL  Coagulase negative Staphylococcus  Susceptibility testing in progress       *Note: Due to a large number of results and/or encounters for the requested time period, some results have not been displayed. A complete set of results can be found in Results Review.       Ariella Vela PA-C[KB1.1]         Revision History        User Key Date/Time User Provider Type Action    > KB1.2 10/15/2018  4:26 PM Kesha Vela PA-C Physician Assistant - C Sign     KB1.1 10/15/2018   2:49 PM Kesha Vela PA-C Physician Assistant - C             Progress Notes by Mae Loza PT at 10/15/2018  2:22 PM     Author:  Mae Loza PT Service:  (none) Author Type:  Physical Therapist    Filed:  10/15/2018  2:22 PM Date of Service:  10/15/2018  2:22 PM Creation Time:  10/15/2018  2:22 PM    Status:  Signed :  Mae Loza PT (Physical Therapist)            10/15/18 1305   Quick Adds   Type of Visit Initial PT Evaluation   Living Environment   Lives With alone   Living Arrangements assisted living   Home Accessibility grab bars present (toilet);grab bars present (bathtub)   Self-Care   Usual Activity Tolerance poor   Regular Exercise yes   Activity/Exercise Type walking;strength training   Exercise Amount/Frequency 2 times/wk   Equipment Currently Used at Home wheelchair, manual;shower chair;grab bar;walker, rolling   Activity/Exercise/Self-Care Comment Varying reports of activity at times she reports its been a month since she has been able to ambulate, then she reports it has been just a couple days that she has been using the W/C.    Functional Level Prior   Ambulation 1-->assistive equipment  (varying report from pt)   Transferring 1-->assistive equipment   Toileting 1-->assistive equipment   Bathing 3-->assistive equipment and person   Dressing 2-->assistive person   Eating 0-->independent   Communication 0-->understands/communicates without difficulty   Swallowing 0-->swallows foods/liquids without difficulty   Cognition 1 - attention or memory deficits   Fall history within last six months yes   Number of times patient has fallen within last six months 1   Prior Functional Level Comment Pt reports does breakfast and lunch in her apartment. Dinner in the cafeteria.   General Information   Onset of Illness/Injury or Date of Surgery - Date 10/13/18   Referring Physician Ariella Vela PA-C   Patient/Family Goals Statement Pt hoping to improve activity tolerance, decrease dizziness    Pertinent History of Current Problem (include personal factors and/or comorbidities that impact the POC) Ana Luisa Lee is a 78 year old female with PMH significant for mild dementia, asthma and COPD on chronic Prednisone, esophageal stricture s/p dilation (2007), HTN, HLD, hypothyroidism, PSVT, DM2, and who resides in an AL. Pt has been improving. at home from being W/C bound to improving to walking with PT and staff. Recently PT had recommended decreasing use of W/C.    Precautions/Limitations fall precautions   General Info Comments Pt having a difficult time with relaying her past functional mobility   Cognitive Status Examination   Orientation person;time  (oriented, then other times more confused with place)   Level of Consciousness alert   Follows Commands and Answers Questions 75% of the time;able to follow single-step instructions   Personal Safety and Judgment intact   Memory impaired   Range of Motion (ROM)   ROM Comment limited ROM in L hip flexion 90 degrees, limited extension, pain with attempts at lying flat   Strength   Strength Comments 1/5 on R DF. Pt has 3-/5 L hip flexion, painful with this performance.   Bed Mobility   Bed Mobility Comments mod A x 1   Transfer Skills   Transfer Comments Pt able to perform sit to stand with CGA x 2.    Gait   Gait Comments Pt able to take 2-3 steps to chair with mod A x 1,  CGA x 1   Balance   Balance Comments poor needing A x 1 for transfers   Sensory Examination   Sensory Perception Comments no numbness reported   Clinical Impression   Criteria for Skilled Therapeutic Intervention evaluation only   PT Diagnosis decreased functional mobility status post L tendon rupture   Influenced by the following impairments pain, decreased strength, decreased ROM    Functional limitations due to impairments decreased transfers, ambulation   Clinical Presentation Evolving/Changing   Clinical Presentation Rationale cognition, pain and dizziness   Clinical Decision  "Making (Complexity) Moderate complexity   Therapy Frequency` (eval only)   Predicted Duration of Therapy Intervention (days/wks) (eval only)   Anticipated Discharge Disposition Transitional Care Facility;Home with Home Therapy;Home with Assist   Risk & Benefits of therapy have been explained Yes   Patient, Family & other staff in agreement with plan of care Yes   [facility] TM \"6 Clicks\"   2016, Trustees of [facility], under license to [employer].  All rights reserved.   6 Clicks Short Forms Basic Mobility Inpatient Short Form   [facility]  \"6 Clicks\" V.2 Basic Mobility Inpatient Short Form   1. Turning from your back to your side while in a flat bed without using bedrails? 2 - A Lot   2. Moving from lying on your back to sitting on the side of a flat bed without using bedrails? 2 - A Lot   3. Moving to and from a bed to a chair (including a wheelchair)? 2 - A Lot   4. Standing up from a chair using your arms (e.g., wheelchair, or bedside chair)? 2 - A Lot   5. To walk in hospital room? 1 - Total   6. Climbing 3-5 steps with a railing? 1 - Total   Basic Mobility Raw Score (Score out of 24.Lower scores equate to lower levels of function) 10   Total Evaluation Time   Total Evaluation Time (Minutes) 10[MP1.1]        Revision History        User Key Date/Time User Provider Type Action    > MP1.1 10/15/2018  2:22 PM Mae Loza, PT Physical Therapist Sign            Progress Notes by Ivis Colin RN at 10/15/2018 10:44 AM     Author:  Ivis Colin RN Service:  (none) Author Type:      Filed:  10/15/2018 10:48 AM Date of Service:  10/15/2018 10:44 AM Creation Time:  10/15/2018 10:44 AM    Status:  Signed :  Ivis Colin RN ()         Discharge Planner   Discharge Plans in progress: Yes  Barriers to discharge plan: Patient currently requiring assist of 2 and adriano steady for transfers.   Spoke with PUMA Khan at [facility] who stated that prior to " admission patient was ambulating with a walker and had previously been using a wheelchair but wheelchair was recently removed as patient was no longer requiring. RN Erin stated concerns with patient returning to [facility] when requiring assist of 2 and utilizing Lidoderm patches (patches are not covered as outpatient) for pain control.  Follow up plan: CM to continue to follow for discharge needs. Follow up with pain management plan and PT recommendations. assisted to continue to assess if able to meet patient needs for transfers and ambulation.         Entered by: Ivis Colin 10/15/2018 10:44 AM[SO1.1]          Revision History        User Key Date/Time User Provider Type Action    > SO1.1 10/15/2018 10:48 AM Ivis Colin RN Case Manager Sign                  Procedure Notes     No notes of this type exist for this encounter.         Progress Notes - Therapies (Notes from 10/15/18 through 10/18/18)      Progress Notes by Coty Barraza RT at 10/17/2018  9:05 PM     Author:  Coty Barraza RT Service:  (none) Author Type:  Respiratory Therapist    Filed:  10/17/2018  9:07 PM Date of Service:  10/17/2018  9:05 PM Creation Time:  10/17/2018  9:05 PM    Status:  Signed :  Coty Barraza RT (Respiratory Therapist)         Pt states she has an irritating cough and tickle in her throat, but it not short of breath, sat's 97% on RA.  Pt informed of indication and benefits of nebulizer.[BH1.1]     RT Maikel[BH1.2] on 10/17/2018 at 9:07 PM[BH1.1]         Revision History        User Key Date/Time User Provider Type Action    > BH1.2 10/17/2018  9:07 PM Coty Barraza RT Respiratory Therapist Sign     BH1.1 10/17/2018  9:05 PM Coty Barraza RT Respiratory Therapist             Progress Notes by Mae Loza PT at 10/15/2018  2:22 PM     Author:  Mae Loza, PT Service:  (none) Author Type:  Physical Therapist    Filed:  10/15/2018  2:22 PM Date of Service:  10/15/2018  2:22 PM Creation Time:  10/15/2018  2:22  PM    Status:  Signed :  Mae Loza, PT (Physical Therapist)            10/15/18 1305   Quick Adds   Type of Visit Initial PT Evaluation   Living Environment   Lives With alone   Living Arrangements assisted living   Home Accessibility grab bars present (toilet);grab bars present (bathtub)   Self-Care   Usual Activity Tolerance poor   Regular Exercise yes   Activity/Exercise Type walking;strength training   Exercise Amount/Frequency 2 times/wk   Equipment Currently Used at Home wheelchair, manual;shower chair;grab bar;walker, rolling   Activity/Exercise/Self-Care Comment Varying reports of activity at times she reports its been a month since she has been able to ambulate, then she reports it has been just a couple days that she has been using the W/C.    Functional Level Prior   Ambulation 1-->assistive equipment  (varying report from pt)   Transferring 1-->assistive equipment   Toileting 1-->assistive equipment   Bathing 3-->assistive equipment and person   Dressing 2-->assistive person   Eating 0-->independent   Communication 0-->understands/communicates without difficulty   Swallowing 0-->swallows foods/liquids without difficulty   Cognition 1 - attention or memory deficits   Fall history within last six months yes   Number of times patient has fallen within last six months 1   Prior Functional Level Comment Pt reports does breakfast and lunch in her apartment. Dinner in the cafeteria.   General Information   Onset of Illness/Injury or Date of Surgery - Date 10/13/18   Referring Physician Ariella Vela PA-C   Patient/Family Goals Statement Pt hoping to improve activity tolerance, decrease dizziness   Pertinent History of Current Problem (include personal factors and/or comorbidities that impact the POC) Ana Luisa Lee is a 78 year old female with PMH significant for mild dementia, asthma and COPD on chronic Prednisone, esophageal stricture s/p dilation (2007), HTN, HLD, hypothyroidism, PSVT, DM2, and  who resides in an AL. Pt has been improving. at home from being W/C bound to improving to walking with PT and staff. Recently PT had recommended decreasing use of W/C.    Precautions/Limitations fall precautions   General Info Comments Pt having a difficult time with relaying her past functional mobility   Cognitive Status Examination   Orientation person;time  (oriented, then other times more confused with place)   Level of Consciousness alert   Follows Commands and Answers Questions 75% of the time;able to follow single-step instructions   Personal Safety and Judgment intact   Memory impaired   Range of Motion (ROM)   ROM Comment limited ROM in L hip flexion 90 degrees, limited extension, pain with attempts at lying flat   Strength   Strength Comments 1/5 on R DF. Pt has 3-/5 L hip flexion, painful with this performance.   Bed Mobility   Bed Mobility Comments mod A x 1   Transfer Skills   Transfer Comments Pt able to perform sit to stand with CGA x 2.    Gait   Gait Comments Pt able to take 2-3 steps to chair with mod A x 1,  CGA x 1   Balance   Balance Comments poor needing A x 1 for transfers   Sensory Examination   Sensory Perception Comments no numbness reported   Clinical Impression   Criteria for Skilled Therapeutic Intervention evaluation only   PT Diagnosis decreased functional mobility status post L tendon rupture   Influenced by the following impairments pain, decreased strength, decreased ROM    Functional limitations due to impairments decreased transfers, ambulation   Clinical Presentation Evolving/Changing   Clinical Presentation Rationale cognition, pain and dizziness   Clinical Decision Making (Complexity) Moderate complexity   Therapy Frequency` (eval only)   Predicted Duration of Therapy Intervention (days/wks) (eval only)   Anticipated Discharge Disposition Transitional Care Facility;Home with Home Therapy;Home with Assist   Risk & Benefits of therapy have been explained Yes   Patient, Family &  "other staff in agreement with plan of care Yes   [facility]-PAC TM \"6 Clicks\"   2016, Trustees of [facility], under license to [employer].  All rights reserved.   6 Clicks Short Forms Basic Mobility Inpatient Short Form   [facility] AM-PAC  \"6 Clicks\" V.2 Basic Mobility Inpatient Short Form   1. Turning from your back to your side while in a flat bed without using bedrails? 2 - A Lot   2. Moving from lying on your back to sitting on the side of a flat bed without using bedrails? 2 - A Lot   3. Moving to and from a bed to a chair (including a wheelchair)? 2 - A Lot   4. Standing up from a chair using your arms (e.g., wheelchair, or bedside chair)? 2 - A Lot   5. To walk in hospital room? 1 - Total   6. Climbing 3-5 steps with a railing? 1 - Total   Basic Mobility Raw Score (Score out of 24.Lower scores equate to lower levels of function) 10   Total Evaluation Time   Total Evaluation Time (Minutes) 10[MP1.1]        Revision History        User Key Date/Time User Provider Type Action    > MP1.1 10/15/2018  2:22 PM Mae Loza PT Physical Therapist Sign            "

## 2018-10-13 NOTE — ED NOTES
Called [facility] at [phone] and spoke with nurse, Dina.  Dina is calling director of facility to see if patient able to come back with joseph catheter in place. Dina, nurse, will call us back after speaking to director.

## 2018-10-13 NOTE — ED NOTES
Bed: ED01  Expected date: 10/13/18  Expected time:   Means of arrival: Ambulance  Comments:  Ana Cristina Cobb

## 2018-10-13 NOTE — ED AVS SNAPSHOT
[facility]    [address] [facility] [address]    Phone:  [phone]    Fax:  [phone]                                       Ana Luisa Lee   MRN: [id number]    Department:  [facility]   Date of Visit:  10/13/2018           Patient Information     Date Of Birth          1940        Your diagnoses for this visit were:     Complicated UTI (urinary tract infection)     Left inguinal pain     Urinary retention     Rupture of left hamstring tendon, initial encounter        You were seen by Chayito Hampton MD.      Follow-up Information     Follow up with Db Alvarez MD.    Specialty:  Family Practice    Contact information:    [facility] [place] [facility]  [address] [facility] [address] [phone]          Follow up with [facility].    Specialty:  EMERGENCY MEDICINE    Why:  If symptoms worsen    Contact information:    [address] [facility] [id number] [phone]        [facility]    Contact information:    [address]  [address] [place] [id number] [phone]        Discharge Instructions       Get your sodium and hemoglobin rechecked again in next week   Take next dose of antibiotic tonight  Follow up with urology about catheter  Follow up with orthopedics about hamstring rupture - do think that is why you are having pain  Increase tramadol back to 50 mg three times per day, use tylenol 1000 mg three times per day  Take lidocaine patch off in 12 hours, then leave off for 12 hours. Then can put a new patch on    Discharge Instructions  Urinary Tract Infection  You or your child have been diagnosed with a urinary tract infection, or UTI. The urinary tract includes the kidneys (which make urine/pee), ureters (the tubes that carry urine/pee from the kidneys to the bladder), the bladder  (which stores urine/pee), and urethra (the tube that carries urine/pee out of the bladder). Urinary tract infections occur when bacteria travel up the urethra into the bladder (bladder infection) and, in some cases, from there into the kidneys (kidney infection).  Generally, every Emergency Department visit should have a follow-up clinic visit with either a primary or a specialty clinic/provider. Please follow-up as instructed by your emergency provider today.  Return to the Emergency Department if:    You or your child have severe back pain.    You or your child are vomiting (throwing up) so that you cannot take your medicine.    You or your child have a new fever (had not previously had a fever) over 101 F.    You or your child have confusion or are very weak, or feel very ill.    Your child seems much more ill, will not wake up, will not respond right, or is crying for a long time and will not calm down.    You or your child are showing signs of dehydration. These signs may include decreased urination (pee), dry mouth/gums/tongue, or decreased activity.    Follow-up with your provider:     Children under 24 months need to be seen by their regular provider within one week after a diagnosis of a UTI. It may be necessary to do some more tests to look at the child s kidney or bladder.    You should begin to feel better within 24 - 48 hours of starting your antibiotic; follow-up with your regular clinic/doctor/provider if this is not the case.    Treatment:     You will be treated with an antibiotic to kill the bacteria. We have to make an educated guess, based on what we know about common bacteria and antibiotics, as to which antibiotic will work for your infection. We will be correct most times but there will be some cases where the antibiotic chosen is not correct (see urine cultures below).    Take a pain medication such as acetaminophen (Tylenol ) or ibuprofen (Advil , Motrin , Nuprin ).    Phenazopyridine  "(Pyridium , Uristat ) is a prescription medication that numbs the bladder to reduce the burning pain of some UTIs.  The same medication is available in a non-prescription version (Azo-Standard , Urodol ). This medication will change the color of the urine and tears (usually blue or orange). If you wear contacts, do not wear them while taking this medication as they may be stained by the medication.    Urine Cultures:    If indicated, a urine culture may have been performed today. This test generally takes 24-48 hours to complete so the results are not known at this time. The results can confirm that an infection is present but also determine which antibiotic is effective for the specific bacteria that is causing the infection. If your urine culture shows that the antibiotic you were given today will not work to treat your infection, we will attempt to contact you to make arrangements to change the antibiotic. If the culture confirms that the antibiotic is effective for your infection, you will not be contacted. We often recommend follow-up with your regular physician/provider on the culture results regardless of this process.    Antibiotic Warning:     If you have been placed on antibiotics - watch for signs of allergic reaction.  These include rash, lip swelling, difficulty breathing, wheezing, and dizziness.  If you develop any of these symptoms, stop the antibiotic immediately and go to an emergency room or urgent care for evaluation.    Probiotics: If you have been given an antibiotic, you may want to also take a probiotic pill or eat yogurt with live cultures. Probiotics have \"good bacteria\" to help your intestines stay healthy. Studies have shown that probiotics help prevent diarrhea and other intestine problems (including C. diff infection) when you take antibiotics. You can buy these without a prescription in the pharmacy section of the store.   If you were given a prescription for medicine here today, be " sure to read all of the information (including the package insert) that comes with your prescription.  This will include important information about the medicine, its side effects, and any warnings that you need to know about.  The pharmacist who fills the prescription can provide more information and answer questions you may have about the medicine.  If you have questions or concerns that the pharmacist cannot address, please call or return to the Emergency Department.   Remember that you can always come back to the Emergency Department if you are not able to see your regular provider in the amount of time listed above, if you get any new symptoms, or if there is anything that worries you.      24 Hour Appointment Hotline       To make an appointment at any [facility], call [phone] ([phone]). If you don't have a family doctor or clinic, we will help you find one. [place] clinics are conveniently located to serve the needs of you and your family.             Review of your medicines      START taking        Dose / Directions Last dose taken    cephALEXin 500 MG capsule   Commonly known as:  KEFLEX   Dose:  500 mg   Quantity:  14 capsule        Take 1 capsule (500 mg) by mouth 2 times daily for 7 days   Refills:  0        lidocaine 5 % Patch   Commonly known as:  LIDODERM   Quantity:  30 patch        Apply up to 3 patches to painful area at once for up to 12 h within a 24 h period.  Remove after 12 hours.   Refills:  0          Our records show that you are taking the medicines listed below. If these are incorrect, please call your family doctor or clinic.        Dose / Directions Last dose taken    acetaminophen 500 MG tablet   Commonly known as:  TYLENOL   Dose:  1000 mg        Take 2 tablets (1,000 mg) by mouth 3 times daily   Refills:  0        albuterol 108 (90 Base) MCG/ACT inhaler   Commonly known as:  PROAIR HFA/PROVENTIL HFA/VENTOLIN HFA   Dose:  2 puff        Inhale 2 puffs into the  lungs every 2 hours as needed for shortness of breath / dyspnea or wheezing   Refills:  0        budesonide 180 MCG/ACT inhaler   Commonly known as:  PULMICORT FLEXHALER   Dose:  1 puff        Inhale 1 puff into the lungs 2 times daily   Refills:  0        calcium 600 MG tablet   Dose:  1 tablet   Quantity:  60 tablet        Take 1 tablet (600 mg) by mouth daily   Refills:  0        calcium carbonate 500 MG chewable tablet   Commonly known as:  TUMS   Dose:  1 chew tab        Take 1 chew tab by mouth 4 times daily as needed for heartburn (nausea or indigestion)   Refills:  0        cholecalciferol 1000 UNIT tablet   Commonly known as:  vitamin D3   Dose:  1000 Units   Quantity:  30 tablet        Take 1 tablet (1,000 Units) by mouth daily   Refills:  0        cyanocobalamin 1000 MCG Tbcr   Commonly known as:  B-12 TR   Dose:  1 tablet   Quantity:  100 tablet        Take 1 tablet by mouth daily   Refills:  3        ferrous sulfate 325 (65 Fe) MG tablet   Commonly known as:  IRON   Dose:  325 mg        Take 325 mg by mouth three times a week Give on M-W-F AM only   Refills:  0        gemfibrozil 600 MG tablet   Commonly known as:  LOPID   Dose:  600 mg   Quantity:  90 tablet        Take 1 tablet (600 mg) by mouth daily   Refills:  0        INCRUSE ELLIPTA 62.5 MCG/INH inhaler   Dose:  1 puff   Generic drug:  umeclidinium        Inhale 1 puff into the lungs daily   Refills:  0        levalbuterol 1.25 MG/3ML neb solution   Commonly known as:  XOPENEX   Dose:  1 ampule        Take 1 ampule by nebulization 4 times daily   Refills:  0        levothyroxine 25 MCG tablet   Commonly known as:  SYNTHROID/LEVOTHROID   Dose:  25 mcg   Quantity:  90 tablet        Take 1 tablet (25 mcg) by mouth daily   Refills:  3        LISINOPRIL PO   Dose:  5 mg        Take 5 mg by mouth daily HOLD if SBP <110.Call if held x 2 in a row symptomatic   Refills:  0        loperamide 2 MG capsule   Commonly known as:  IMODIUM   Dose:  2 mg         Take 2 mg by mouth every 8 hours as needed for diarrhea   Refills:  0        montelukast 10 MG tablet   Commonly known as:  SINGULAIR   Dose:  10 mg   Quantity:  30 tablet        Take 1 tablet (10 mg) by mouth At Bedtime   Refills:  0        MULTIVITAMIN PO   Dose:  1 tablet        Take 1 tablet by mouth daily   Refills:  0        OMEPRAZOLE PO   Dose:  20 mg        Take 20 mg by mouth every morning FOR Gastroesophagel reflux disease Take 30-60 minutes before a meal.   Refills:  0        polyethylene glycol Packet   Commonly known as:  MIRALAX/GLYCOLAX   Dose:  17 g        Take 17 g by mouth daily as needed for constipation   Refills:  0        predniSONE 10 MG tablet   Commonly known as:  DELTASONE   Dose:  10 mg        Take 10 mg by mouth daily   Refills:  0        roflumilast 500 MCG Tabs tablet   Commonly known as:  DALIRESP   Dose:  500 mcg        Take 500 mcg by mouth daily   Refills:  0        * TRAMADOL HCL PO   Dose:  25 mg   Indication:  Moderate to Moderately Severe Pain        Take 25 mg by mouth 3 times daily   Refills:  0        * TRAMADOL HCL PO   Dose:  25 mg        Take 25 mg by mouth daily as needed for moderate to severe pain   Refills:  0        ZOFRAN ODT PO   Dose:  4 mg        Take 4 mg by mouth every 12 hours as needed for nausea or vomiting   Refills:  0        ZOFRAN PO   Dose:  4 mg        Take 4 mg by mouth every morning   Refills:  0        * Notice:  This list has 2 medication(s) that are the same as other medications prescribed for you. Read the directions carefully, and ask your doctor or other care provider to review them with you.            Prescriptions were sent or printed at these locations (2 Prescriptions)                   Other Prescriptions                Printed at Department/Unit printer (2 of 2)         cephALEXin (KEFLEX) 500 MG capsule               lidocaine (LIDODERM) 5 % Patch                Procedures and tests performed during your visit     Abd/pelvis CT no  contrast - Stone Protocol    Basic metabolic panel    CBC with platelets differential    CT Hip Left w/o Contrast    UA with Microscopic    Urine Culture    XR Pelvis and Hip Left 2 Views      Orders Needing Specimen Collection     None      Pending Results     Date and Time Order Name Status Description    10/13/2018 1212 Urine Culture In process     10/13/2018 1022 CT Hip Left w/o Contrast Preliminary             Pending Culture Results     Date and Time Order Name Status Description    10/13/2018 1212 Urine Culture In process             Pending Results Instructions     If you had any lab results that were not finalized at the time of your Discharge, you can call the ED Lab Result RN at [phone]. You will be contacted by this team for any positive Lab results or changes in treatment. The nurses are available 7 days a week from 10A to 6:30P.  You can leave a message 24 hours per day and they will return your call.        Test Results From Your Hospital Stay        10/13/2018  8:30 AM      Component Results     Component Value Ref Range & Units Status    WBC 8.0 4.0 - 11.0 10e9/L Final    RBC Count 3.19 (L) 3.8 - 5.2 10e12/L Final    Hemoglobin 9.8 (L) 11.7 - 15.7 g/dL Final    Hematocrit 28.8 (L) 35.0 - 47.0 % Final    MCV 90 78 - 100 fl Final    MCH 30.7 26.5 - 33.0 pg Final    MCHC 34.0 31.5 - 36.5 g/dL Final    RDW 14.1 10.0 - 15.0 % Final    Platelet Count 263 150 - 450 10e9/L Final    Diff Method Automated Method  Final    % Neutrophils 73.7 % Final    % Lymphocytes 14.6 % Final    % Monocytes 9.2 % Final    % Eosinophils 1.2 % Final    % Basophils 0.2 % Final    % Immature Granulocytes 1.1 % Final    Nucleated RBCs 0 0 /100 Final    Absolute Neutrophil 5.9 1.6 - 8.3 10e9/L Final    Absolute Lymphocytes 1.2 0.8 - 5.3 10e9/L Final    Absolute Monocytes 0.7 0.0 - 1.3 10e9/L Final    Absolute Eosinophils 0.1 0.0 - 0.7 10e9/L Final    Absolute Basophils 0.0 0.0 - 0.2 10e9/L Final    Abs Immature Granulocytes  0.1 0 - 0.4 10e9/L Final    Absolute Nucleated RBC 0.0  Final         10/13/2018  9:00 AM      Component Results     Component Value Ref Range & Units Status    Sodium 126 (L) 133 - 144 mmol/L Final    Potassium 4.0 3.4 - 5.3 mmol/L Final    Chloride 94 94 - 109 mmol/L Final    Carbon Dioxide 24 20 - 32 mmol/L Final    Anion Gap 8 3 - 14 mmol/L Final    Glucose 108 (H) 70 - 99 mg/dL Final    Urea Nitrogen 12 7 - 30 mg/dL Final    Creatinine 0.66 0.52 - 1.04 mg/dL Final    GFR Estimate 86 >60 mL/min/1.7m2 Final    Non  GFR Calc    GFR Estimate If Black >90 >60 mL/min/1.7m2 Final    African American GFR Calc    Calcium 8.7 8.5 - 10.1 mg/dL Final         10/13/2018 11:56 AM      Component Results     Component Value Ref Range & Units Status    Color Urine Yellow  Final    Appearance Urine Cloudy  Final    Glucose Urine Negative NEG^Negative mg/dL Final    Bilirubin Urine Negative NEG^Negative Final    Ketones Urine Negative NEG^Negative mg/dL Final    Specific Gravity Urine 1.008 1.003 - 1.035 Final    Blood Urine Small (A) NEG^Negative Final    pH Urine 7.0 5.0 - 7.0 pH Final    Protein Albumin Urine Negative NEG^Negative mg/dL Final    Urobilinogen mg/dL 0.0 0.0 - 2.0 mg/dL Final    Nitrite Urine Negative NEG^Negative Final    Leukocyte Esterase Urine Large (A) NEG^Negative Final    Source Catheterized Urine  Final    WBC Urine >182 (H) 0 - 5 /HPF Final    RBC Urine 45 (H) 0 - 2 /HPF Final    WBC Clumps Present (A) NEG^Negative /HPF Final         10/13/2018 11:22 AM      Narrative     PELVIS WITH UNILATERAL HIP TWO VIEWS LEFT  10/13/2018 9:17 AM     HISTORY: left hip pain/groin pain. rule out fracture;     COMPARISON: None.    FINDINGS: There is normal osseous alignment.  No fractures are  identified.        Impression     IMPRESSION: Osseous structures appear intact. No fractures are  identified.    WILMA CARBAJAL MD         10/13/2018 12:25 PM      Narrative     CT ABDOMEN AND PELVIS WITHOUT CONTRAST  10/13/2018 11:56 AM     HISTORY: Lower abdominal pain/groin pain on left. Rule out stone.     COMPARISON: September 21, 2014    TECHNIQUE: Volumetric helical acquisition of CT images from the lung  bases through the symphysis pubis without intravenous contrast.  Radiation dose for this scan was reduced using automated exposure  control, adjustment of the mA and/or kV according to patient size, or  iterative reconstruction technique.    FINDINGS: No hydronephrosis or urolithiasis. Minimal asymmetry of the  iliopsoas, left greater than right, indicating a small amount of  intramuscular hemorrhage. Please see CT report same date of the left  hip. No free air or free fluid. No bowel obstruction. There is mild  diverticulosis without evidence for diverticulitis. There are moderate  atherosclerotic changes of the visualized aorta and its branches.  There is no evidence of aortic aneurysm. There is cholelithiasis  without evidence for cholecystitis. Moderate hiatal hernia. The liver,  spleen, adrenal glands, kidneys, and pancreas demonstrate no worrisome  focal lesion in the absence of intravenous contrast. Lung bases clear  of acute infiltrates. Trace peripheral fibrosis and/or atelectasis.        Impression     IMPRESSION:  1. Question some minimal intramuscular hematoma in the iliopsoas on  the left. Please see CT report of the left hip for additional regional  findings.  2. Otherwise no acute process demonstrated in the abdomen and pelvis.     DEANN CROSS MD         10/13/2018 12:21 PM      Narrative     CT HIP LEFT WITHOUT CONTRAST 10/13/2018 11:56 AM     HISTORY: Left hip pain/groin pain, rule out occult fracture.      TECHNIQUE: Axial images with reconstructions. Radiation dose for this  scan was reduced using automated exposure control, adjustment of the  mA and/or kV according to patient size, or iterative reconstruction  technique.      COMPARISON: 11/19/2017.     FINDINGS: There appears to be some focal fluid  density adjacent to the  left ischial tuberosity. There is loss of cortex at the superior  aspect of the left ischial tuberosity. These findings suggest the  possibility of common hamstring tendon rupture. There is edema  adjacent to the left iliopsoas muscle, raising the possibility of  tendon rupture. Degenerative change of the lower lumbar spine.  Anterolisthesis at L5-S1. Chondrocalcinosis of the symphysis pubis,  consistent with calcium pyrophosphate deposition disease. No hip or  pubic fracture is identified. Incidentally noted colonic  diverticulosis.        Impression     IMPRESSION:  1. Findings raising the possibility of left common hamstring tendon  rupture and iliopsoas tendon rupture.  2. Additional findings discussed above.            10/13/2018 12:30 PM                Clinical Quality Measure: Blood Pressure Screening     Your blood pressure was checked while you were in the emergency department today. The last reading we obtained was  BP: 147/72 . Please read the guidelines below about what these numbers mean and what you should do about them.  If your systolic blood pressure (the top number) is less than 120 and your diastolic blood pressure (the bottom number) is less than 80, then your blood pressure is normal. There is nothing more that you need to do about it.  If your systolic blood pressure (the top number) is 120-139 or your diastolic blood pressure (the bottom number) is 80-89, your blood pressure may be higher than it should be. You should have your blood pressure rechecked within a year by a primary care provider.  If your systolic blood pressure (the top number) is 140 or greater or your diastolic blood pressure (the bottom number) is 90 or greater, you may have high blood pressure. High blood pressure is treatable, but if left untreated over time it can put you at risk for heart attack, stroke, or kidney failure. You should have your blood pressure rechecked by a primary care provider  within the next 4 weeks.  If your provider in the emergency department today gave you specific instructions to follow-up with your doctor or provider even sooner than that, you should follow that instruction and not wait for up to 4 weeks for your follow-up visit.        Thank you for choosing [facility]       Thank you for choosing [facility] for your care. Our goal is always to provide you with excellent care. Hearing back from our patients is one way we can continue to improve our services. Please take a few minutes to complete the written survey that you may receive in the mail after you visit with us. Thank you!        [employer] Information     [employer] gives you secure access to your electronic health record. If you see a primary care provider, you can also send messages to your care team and make appointments. If you have questions, please call your primary care clinic.  If you do not have a primary care provider, please call [phone] and they will assist you.        Care EveryWhere ID     This is your Care EveryWhere ID. This could be used by other organizations to access your [facility] medical records  [id number]        Equal Access to Services     SOBEIDA GIFFORD : Hadii ankit alegriao Somerry, waaxda luqadaha, qaybta kaalmada adezoë, holly braga. So [facility] [phone].    ATENCIÓN: Si habla español, tiene a martinez disposición servicios gratuitos de asistencia lingüística. [facility] [place] [phone].    We comply with applicable federal civil rights laws and [place] laws. We do not discriminate on the basis of race, color, national origin, age, disability, sex, sexual orientation, or gender identity.            After Visit Summary       This is your record. Keep this with you and show to your community pharmacist(s) and doctor(s) at your next visit.

## 2018-10-13 NOTE — ED NOTES
Up to [facility] with assist x 2.  Patient states has not ambulated since Spring.  Nurse, Dina, from [facility] also reported she is wheelchair bound.  Has been receiving PT and stands at PT.  Tolerated standing and pivoting well with assist x 2.

## 2018-10-13 NOTE — ED NOTES
Spoke with daughter, Windy from [place], at [phone] regarding discharge plans.  Daughter voiced pleasure in findings from the CT. Updated regarding joseph catheter placed at this time. No concerns from daughter.

## 2018-10-13 NOTE — ED NOTES
Assisted Pt.to commode with RN. Pt. Gait not steady. Pt. Needs two person assist when transferring. Writer would not walk Pt. Due to safety concerns.

## 2018-10-13 NOTE — ED PROVIDER NOTES
History     Chief Complaint:  Groin Pain    HPI   Ana Luisa Lee is a 78 year old female with a history of dementia who presents via EMS with groin pain. The patient reports that she has had left-sided groin pain for the past 3-4 weeks that comes on with movement. She states the pain radiates down the side of her left leg and has gotten worse since yesterday. She has seen doctor at her facility about this. Had reportedly negative xray and US of leg. She denies having any fevers, recent falls, leg swelling or difficulty urinating. The patient is wheelchair bound. Of note, the patient's power of  is her daughter who is currently in [place].    Allergies:  Hydralazine  Penicillin G  Meloxicam  Metoprolol  Norvasc     Medications:    Albuterol  Pulmicort  Calcium  Iron  Lopid  Levalbuterol  Levothyroxine  Lisinopril  Imodium  Singulair  Omeprazole  Zofran  Miralax  Prednisone  Daliresp  Tramadol  Incruse ellipta    Past Medical History:    Aortic stenosis  Anemia  Arthritis of hand  Chronic intermittent steroid use  Chronic nausea  Chronic pain in lower extremities  COPD  Dementia   Encephalopathy   Esophageal stricture  Hypertension  Hyperlipidemia  Hypothyroidism  Osteoporosis  Paroxysmal supraventricular tachycardia   Pulmonary nodule  PVC (premature ventricular contraction)  SVT (supraventricular tachycardia)  Type 2 diabetes mellitus without complication, without long-term current use of insulin     Past Surgical History:    Bunionectomy lapidus with tarsal metatarsal fusion  Hysterectomy total abdominal  Tonsillectomy     Family History:    Cerebrovascular disease  Hypertension    Social History:  Marital Status:   [4]  Smoking status: Former Smoker, quit 1980  Alcohol use: Yes   The patient resides in a nursing home    Review of Systems   Constitutional: Negative for fever.   Cardiovascular: Negative for leg swelling.   Genitourinary: Negative for difficulty urinating.    Musculoskeletal: Positive for myalgias (Left leg).        Left-sided groin pain   All other systems reviewed and are negative.    Physical Exam     Patient Vitals for the past 24 hrs:   BP Temp Temp src Heart Rate Resp SpO2   10/13/18 1400 139/72 - - - - -   10/13/18 1340 - - - - - 100 %   10/13/18 1330 116/66 - - - - 100 %   10/13/18 1300 125/69 - - - - 98 %   10/13/18 1230 146/68 - - - - 98 %   10/13/18 1229 - - - - - 97 %   10/13/18 1115 - - - - - 96 %   10/13/18 1100 147/72 - - - - 95 %   10/13/18 1038 - - - - - 97 %   10/13/18 1035 154/76 - - - - -   10/13/18 0758 163/83 - - - - -   10/13/18 0755 - 98.3  F (36.8  C) Oral 97 20 97 %       Physical Exam  General: Resting comfortably on the gurney  Eyes:  The pupils are equal and round    Conjunctivae and sclerae are normal  ENT:    Moist mucous membranes  Neck:  Normal range of motion  CV:  Regular rate and rhythm    Skin warm and well perfused     DP/PT pulses 2+ bilaterally  Resp:  Lungs are clear    Non-labored    No rales    No wheezing   GI:  Abdomen is soft, there is no rigidity    No distension    No rebound tenderness     No abdominal tenderness  MS:  Moving bilateral LE but with any movement of LLE, has pain of left groin. Slight tenderness on left upper leg/groin area. No leg swelling  Skin:  No rash or acute skin lesions noted on groin/abdomen  Neuro:   Awake, alert.      Speech is normal and fluent.    Face is symmetric.     Moves all extremities equally    SILT on bilateral LE  Psych: Normal affect.  Appropriate interactions.    Emergency Department Course     Imaging:  Radiographic findings were communicated with the patient who voiced understanding of the findings.    XR PELVIS AND HIP LEFT 2 VIEWS:  Osseous structures appear intact. No fractures are identified.  As read by Radiology.     CT HIP LEFT W/O CONTRAST:  1. Findings raising the possibility of left common hamstring tendon  rupture and iliopsoas tendon rupture.  2. Additional findings  discussed above.   As read by Radiology.    ABD/PELVIS CT NO CONTRAST:  1. Question some minimal intramuscular hematoma in the iliopsoas on  the left. Please see CT report of the left hip for additional regional  findings.  2. Otherwise no acute process demonstrated in the abdomen and pelvis.   As read by Radiology.    Laboratory:  CBC: HGB 9.8 (L), o/w WNL (WBC 8.0, )  BMP:  (L), Glucose 108 (H), o/w WNL (Creatinine 0.66)  UA: Blood--Small, Leukocyte Esterase--Large, WBC/HPF >182 (H), RBC/HPF 45 (H), WBC Clumps Present, o/w Negative    Urine Culture: in process     Interventions:  0813: Tylenol 1,000 mg PO  0813: Tramadol 25 mg PO  1017: Lidocaine 1 Patch Transdermal  1229: Albuterol 3 mL Nebulization   1301: Keflex 500 mg PO    Emergency Department Course:  The patient arrived in the emergency department via EMS.    Past medical records, nursing notes, and vitals reviewed.  0755: I performed an exam of the patient and obtained history, as documented above.   IV inserted and blood samples were collected and sent for laboratory testing, findings above.  A urine sample was collected and sent for laboratory testing, findings above.  The patient was sent for a pelvic/hip x-ray while in the emergency department, findings above.    0859: I rechecked the patient. Explained findings to the patient.    0951: I rechecked the patient. Explained findings to the patient.    1017: I rechecked the patient. Explained findings to the patient.  The patient was sent for a hip CT and abdomen/pelvis CT while in the emergency department, findings above.    1217: I rechecked the patient. Explained abdomen/pelvis CT findings to the patient.    1227: I rechecked the patient. Explained hip CT findings to the patient.    1326: I spoke to the patient's daughter (POA) on the phone.    1438: I spoke to DEVEN Jones for Dr. Petersen of the hospitalist service who accepts the patient for admission.     1442: I spoke to the  patient's daughter on the phone regarding the patient's hospital admission.    Findings and plan explained to the patient who consents to admission.     Discussed the patient with DEVEN Jones, who will admit the patient to an observation bed for further monitoring, evaluation, and treatment.     Impression & Plan      Medical Decision Making:  Ana Luisa Lee is a 78 year old female who presented to the ED with groin pain. Vital signs wnl. Seems to be having pain in left groin area with movement. Appears comfortable lying in bed when not moving. Is wheelchair bound. Suspect musculoskeletal. No swelling to suggest DVT. Neurovascularly intact. Labs with mild hyponatremia which is not a new problem for her. Patient attempted to urinate multiple times in ED with no results. Bladder scan showed almost 700cc of urine so catheter placed for urinary retention. UA with evidence of UTI. Given keflex for this. Patient on re-exam seems to have more pain in lower abdomen now so obtained CT scan of abdomen and hip. Possible left hamstring tendon and iliopsoas rupture on imaging. Doubt orthopedics would do anything at this point given that she has likely had this for awhile given pain for several weeks as well as patient wheelchair bound. Attempted outpatient management of UTI, CT findings but facility won't take her back because a nurse needs to do an assessment which can't happen until Monday. Given this will have patient stay in hospital for observation for safe discharge. Daughter updated on plan of care. Discussed with hospitalist for admission.    Diagnosis:    ICD-10-CM   1. Complicated UTI (urinary tract infection) N39.0   2. Left inguinal pain R10.32   3. Urinary retention R33.9   4. Rupture of left hamstring tendon, initial encounter S76.312A   5. Hematoma of left iliopsoas muscle, initial encounter S70.12XA       Disposition:  Admitted to observation in the care of Dr. Trinity Jaramillo  10/13/2018    [facility] EMERGENCY DEPARTMENT  I, Helene Jaramillo, am serving as a scribe at 7:55 AM on 10/13/2018 to document services personally performed by Chayito Hampton MD,  based on my observations and the provider's statements to me.        Chayito Hampton MD  10/13/18 1547

## 2018-10-13 NOTE — PLAN OF CARE
Problem: Patient Care Overview  Goal: Plan of Care/Patient Progress Review  ROOM # 225    Living Situation Patient lives at [facility]  Facility name: [facility]  : daughter Windy [phone]    Activity level at baseline: wheelchair, assist of two to transfer  Activity level on admit: wheelchair, assist of two to transfer      Patient registered to observation; given Patient Bill of Rights; given the opportunity to ask questions about observation status and their plan of care.  Patient has been oriented to the observation room, bathroom and call light is in place.    Discussed discharge goals and expectations with patient/family.

## 2018-10-13 NOTE — ED AVS SNAPSHOT
[facility] Emergency Department    [address]    [facility] [address]    Phone:  [phone]    Fax:  [phone]                                       Ana Luisa Lee   MRN: [id number]    Department:  [facility] Emergency Department   Date of Visit:  10/13/2018           After Visit Summary Signature Page     I have received my discharge instructions, and my questions have been answered. I have discussed any challenges I see with this plan with the nurse or doctor.    ..........................................................................................................................................  Patient/Patient Representative Signature      ..........................................................................................................................................  Patient Representative Print Name and Relationship to Patient    ..................................................               ................................................  Date                                   Time    ..........................................................................................................................................  Reviewed by Signature/Title    ...................................................              ..............................................  Date                                               Time          22EPIC Rev 08/18

## 2018-10-13 NOTE — ED NOTES
Joseph cath attempted on Pt.- not able to get joseph placed. RN attempted joseph cath- not able to place joseph.

## 2018-10-13 NOTE — IP AVS SNAPSHOT
"` `           [facility] OBSERVATION DEPARTMENT: [phone]                                              INTERAGENCY TRANSFER FORM - NURSING   10/13/2018                    Hospital Admission Date: 10/13/2018  NAM DAS   : 1940  Sex: Female        Attending Provider: Speedy Yu MD     Allergies:  Hydralazine, Penicillin G, Meloxicam, Metoprolol, Norvasc [Amlodipine Besylate]    Infection:  None   Service:  Observation    Ht:  1.575 m (5' 2\")   Wt:  51.6 kg (113 lb 12.8 oz)   Admission Wt:  --    BMI:  20.81 kg/m 2   BSA:  1.5 m 2            Patient PCP Information     Provider PCP Type    Db Alvarez MD General      Current Code Status     Date Active Code Status Order ID Comments User Context       Prior      Code Status History     Date Active Date Inactive Code Status Order ID Comments User Context    10/18/2018 11:43 AM  DNR/DNI [id number]  Suly Petersen MD Outpatient    10/13/2018  5:12 PM 10/18/2018 11:43 AM DNR/DNI [id number]  Patsy Rae PA-C Inpatient    2018  2:34 PM 10/13/2018  5:12 PM DNR/DNI [id number]  Latesha Cao MD Outpatient    2018 10:58 AM 2018  2:34 PM DNR/DNI [id number]  Adrian Carvalho MD Inpatient    2018  1:33 PM 2018 10:58 AM DNR [id number]  Melissa YanyIVÁN Buenrostro CNP Outpatient    2018  1:26 PM 2018  1:33 PM DNR/DNI [id number]  Kesha Vela PA-C Outpatient    2018  4:55 PM 2018  1:26 PM DNR/DNI [id number]  Jodi Lopes MD Inpatient    2018 10:51 AM 2018  4:55 PM DNR/DNI [id number]  Paulo Curry MD Outpatient    2018  7:53 AM 2018 10:51 AM DNR/DNI [id number]  Paulo Curry MD Inpatient    2017 12:47 PM 2018  7:53 AM DNR/DNI [id number]  Fuentes Hopson, DO Outpatient    2017  1:02 AM 2017 12:47 PM DNR/DNI [id number]  Damien Johnson,  Inpatient    2017  8:00 PM 2017  3:40 PM DNR/DNI " [id number]  Speedy Yu MD Inpatient    5/5/2017  9:37 AM 5/27/2017  8:00 PM Full Code [id number]  Dea Riley DO Outpatient    5/4/2017  2:07 AM 5/5/2017  9:37 AM Full Code [id number]  Sonido Spears MD Inpatient    1/6/2017 10:35 AM 5/4/2017  2:07 AM Full Code [id number]  Hallie Barroso MD Outpatient    1/2/2017  9:59 AM 1/6/2017 10:35 AM Full Code [id number]  Moses Calle MD ED    12/24/2016 11:48 AM 1/2/2017  9:59 AM Full Code [id number]  Kesha Vela PA-C Outpatient    12/22/2016  5:04 PM 12/24/2016 11:48 AM Full Code [id number]  Mimi Mullins PA-C ED    11/29/2016  5:41 PM 12/1/2016  3:24 PM DNR/DNI [id number]  Patsy Rae PA-C ED    3/19/2016 10:42 AM 11/29/2016  5:41 PM Full Code [id number]  Sonido Spears MD Outpatient    3/4/2016  5:36 PM 3/19/2016 10:42 AM Full Code [id number]  Meg Leger PA-C ED    12/13/2014  4:34 PM 12/19/2014  4:26 PM Full Code [id number]  Betty Roca MD Inpatient    11/4/2013  9:09 PM 12/13/2014  4:34 PM Full Code [id number]  TamikaValentine Kaya Outpatient    10/25/2013  1:07 PM 10/30/2013  3:25 PM Full Code [id number]  Libia Arevalo PA Inpatient      Advance Directives        Scanned docmt in ACP Activity?           Yes, scanned ACP docmt        Hospital Problems as of 10/18/2018              Priority Class Noted POA    Urinary retention Medium  10/13/2018 Yes    Hematoma of iliopsoas muscle Medium  10/16/2018 Yes      Non-Hospital Problems as of 10/18/2018              Priority Class Noted    Osteoporosis Medium  Unknown    Hyperlipidemia with target LDL less than 130 Medium  Unknown    Asthma, persistent Medium  Unknown    Pes planus Medium  11/29/2011    Pain in joint, shoulder region Medium  1/10/2012    PVC (premature ventricular contraction) Medium  Unknown    Arthritis of hand Medium  Unknown    Pulmonary nodule seen on imaging study Medium  12/22/2014    Steroid-dependent chronic obstructive  pulmonary disease (H) Medium  3/19/2016    SVT (supraventricular tachycardia) (H) Medium  5/17/2016    Hypothyroidism, unspecified type Medium  10/1/2016    Pernicious anemia Medium  11/3/2016    Paroxysmal supraventricular tachycardia (H) Medium  1/12/2017    PAC (premature atrial contraction) Medium  1/12/2017    Anemia, unspecified type Medium  5/28/2018    [id number]: mod-severe Ao Stenosis, severe Asthma/COPD steroid dependent, tachycardia/PVCs ( intol to BB and Ca gillian block), hypothyroidism,  HTN, osteoporosis, HLipidemia, depression  Medium  5/28/2018    Aortic stenosis, moderate Medium  5/28/2018    Fall Medium  6/17/2018    Encephalopathy Medium  7/22/2018    Skin abrasion: Right Shin 7/22/18 Medium  7/26/2018    Adjustment disorder with depressed mood Medium  7/26/2018    Arthritis pain Medium  7/26/2018    Hyponatremia Medium  7/26/2018    Type 2 diabetes mellitus without complication, without long-term current use of insulin (H) Medium  8/2/2018    Pain in both lower extremities Medium  8/6/2018    Steroid-induced hyperglycemia Medium  8/6/2018    Dementia without behavioral disturbance, unspecified dementia type Medium  8/6/2018    Dysuria Medium  8/6/2018    Urinary frequency Medium  8/7/2018    Chronic nausea Medium  8/10/2018      Immunizations     Name Date      Influenza (H1N1) 12/10/09     Influenza (High Dose) 3 valent vaccine 09/26/17     Influenza (High Dose) 3 valent vaccine 09/20/16     Influenza (High Dose) 3 valent vaccine 10/06/15     Influenza (High Dose) 3 valent vaccine 09/21/14     Influenza (High Dose) 3 valent vaccine 09/10/13     Influenza (IIV3) PF 10/01/12     Influenza (IIV3) PF 01/01/11     Pneumo Conj 13-V (2010&after) 08/26/16     Tdap (Adacel,Boostrix) 03/28/11     Tdap (Adacel,Boostrix) 04/13/07          END      ASSESSMENT     Discharge Profile Flowsheet     EXPECTED DISCHARGE     COMMUNICATION ASSESSMENT      Expected Discharge Date  10/18/18 ([facility]) 10/17/18  1416   Patient's communication style  spoken language (English or Bilingual) 10/13/18 0752    DISCHARGE NEEDS ASSESSMENT     FINAL RESOURCES      Concerns To Be Addressed  all concerns addressed in this encounter 06/18/18 1356   Resources List  Skilled Nursing Facility 07/25/18 1403    Patient/family verbalizes understanding of discharge plan recommendations?  Yes 10/14/18 1031   Other Resources  [facility] Worker 07/23/18 0945    Medical Team notified of plan?  yes 10/14/18 1031   PAS Number  [id number] 10/18/18 0938    Readmission Within The Last 30 Days  no previous admission in last 30 days 10/14/18 1031   SKIN      Anticipated Changes Related to Illness  none 10/14/18 1031   Inspection of bony prominences  Full except (identify areas not inspected) 10/18/18 0803    Equipment Currently Used at Home  wheelchair, manual;shower chair;grab bar;walker, rolling 10/15/18 1309   Full except areas not inspected   Buttock, left;Buttock, right;Sacrum;Coccyx 10/18/18 0803    # of Referrals Placed by CTS  Communication hand-offs to next level of Care Providers 10/18/18 1116   Inspection under devices  Full except (identify device(s) not inspected) 10/16/18 1652    Does Patient Need a Referral for Clinic CC  No 05/05/17 1045   Skin WDL  ex 10/18/18 0803    New Steerage to [facility]?  No 10/14/18 1030   Skin Color/Characteristics  bruised (ecchymotic);trini 10/18/18 0803    Primary Care Clinic Name  -- ([facility]) 10/14/18 1030   Skin Temperature  warm 10/18/18 0803    Primary Care MD Name  Db Alvarez 10/14/18 1030   Skin Moisture  dry 10/18/18 0803    Equipment Used at Home  cane, straight 03/15/16 1542   Skin Elasticity  slow return to original state 10/17/18 2125    GASTROINTESTINAL (ADULT,PEDIATRIC,OB)     Skin Integrity  bruise(s) 10/18/18 0803    GI WDL  WDL 10/18/18 0803   SAFETY      Last Bowel Movement  10/17/18 10/18/18 1130   Safety WDL  WDL 10/18/18 1148    Passing flatus  yes 10/17/18 2125   All Alarms  alarm(s)  "activated and audible 10/18/18 1148                 Assessment WDL (Within Defined Limits) Definitions           Safety WDL     Effective: 09/28/15    Row Information: <b>WDL Definition:</b> Bed in low position, wheels locked; call light in reach; upper side rails up x 2; ID band on<br> <font color=\"gray\"><i>Item=AS safety wdl>>List=AS safety wdl>>Version=F14</i></font>      Skin WDL     Effective: 09/28/15    Row Information: <b>WDL Definition:</b> Warm; dry; intact; elastic; without discoloration; pressure points without redness<br> <font color=\"gray\"><i>Item=AS skin wdl>>List=AS skin wdl>>Version=F14</i></font>      Vitals     Vital Signs Flowsheet     VITAL SIGNS     Word Pain Scale  2 10/16/18 1248    Temp  97.1  F (36.2  C) 10/18/18 1318   Pain Location  Groin 10/18/18 0804    Temp src  Oral 10/18/18 1318   Pain Orientation  Left 10/18/18 0804    Resp  20 10/18/18 1318   Pain Descriptors  Discomfort 10/18/18 0804    Pulse  86 10/17/18 2348   Pain Intervention(s)  Medication (See eMAR) 10/18/18 0804    Heart Rate  97 10/18/18 1318   Response to Interventions  Decrease in pain (\"Mild.\") 10/15/18 1458    Pulse/Heart Rate Source  Monitor 10/18/18 1318   ANALGESIA SIDE EFFECTS MONITORING      BP  139/70 10/18/18 1318   Side Effects Monitoring: Respiratory Quality  R 10/17/18 0841    BP Location  Right arm 10/18/18 1318   Side Effects Monitoring: Respiratory Depth  N 10/17/18 0841    LYING ORTHOSTATIC BP     Side Effects Monitoring: Sedation Level  1 10/17/18 0841    Lying Orthostatic BP  136/72 10/14/18 0835   EUSEBIA COMA SCALE      Lying Orthostatic Pulse  97 bpm 10/14/18 0835   Best Eye Response  4-->(E4) spontaneous 10/17/18 2052    SITTING ORTHOSTATIC BP     Best Motor Response  6-->(M6) obeys commands 10/17/18 2052    Sitting Orthostatic BP  132/66 10/14/18 0835   Best Verbal Response  5-->(V5) oriented 10/17/18 2052    Sitting Orthostatic Pulse  94 bpm 10/14/18 0835   Eusebia Coma Scale Score  15 10/17/18 " 2052    STANDING ORTHOSTATIC BP     POSITIONING      Standing Orthostatic BP  111/50 10/14/18 0835   Head of Bed (HOB)  HOB at 30-45 degrees 10/17/18 2051    Standing Orthostatic Pulse  100 bpm 10/14/18 0835   Body Position  supine, head elevated 10/18/18 0803    OXYGEN THERAPY     Chair  Recline and up in chair 10/17/18 1234    SpO2  99 % 10/18/18 1318   Positioning/Transfer Devices  pillows;in use 10/17/18 1358    O2 Device  None (Room air) 10/18/18 1318   DAILY CARE      PAIN/COMFORT     Activity Management  activity adjusted per tolerance 10/18/18 1148    Patient Currently in Pain  yes 10/18/18 0804   Activity Assistance Provided  assistance, 2 people 10/18/18 0458    Preferred Pain Scale  number (Numeric Rating Pain Scale) 10/18/18 0804   Assistive Device Utilized  gait belt;walker 10/18/18 0458    Patient's Stated Pain Goal  No pain 10/18/18 0804   Additional Documentation  Activity Device Assistance (Row) 10/17/18 0322    0-10 Pain Scale  8 10/18/18 0733                 Patient Lines/Drains/Airways Status    Active LINES/DRAINS/AIRWAYS     Name: Placement date: Placement time: Site: Days: Last dressing change:    Peripheral IV 07/22/18 Left Hand 07/22/18   1833   Hand   87     Peripheral IV 10/13/18 Left 10/13/18   1524      4     Wound 07/23/18 Right;Lower;Anterior Leg Skin tear skin tear 07/23/18   1500   Leg   86     Wound 10/14/18 Right Arm Skin tear;Abrasion(s) ecchymosis/skin tear prior to admission 10/14/18      Arm   4             Patient Lines/Drains/Airways Status    Active PICC/CVC     None            Intake/Output Detail Report     Date Intake     Output Net    Shift P.O. I.V. IV Piggyback Total Urine Total       Noc 10/16/18 2300 - 10/17/18 0659 -- -- -- -- 500 500 -500    Day 10/17/18 0700 - 10/17/18 1459 -- -- -- -- 600 600 -600    Dottie 10/17/18 1500 - 10/17/18 2259 -- -- -- -- 250 250 -250    Noc 10/17/18 2300 - 10/18/18 0659 100 -- -- 100 1450 1450 -1350    Day 10/18/18 0700 - 10/18/18 1459 --  -- -- -- 325 325 -325      Last Void/BM       Most Recent Value    Urine Occurrence 1 at 10/16/2018 1107    Stool Occurrence 1 at 10/17/2018 1914      Case Management/Discharge Planning     Case Management/Discharge Planning Flowsheet     REFERRAL INFORMATION     COPING/STRESS      Did the Initial Social Work Assessment result in a Social Work Case?  Yes 10/14/18 1030   Major Change/Loss/Stressor  hospitalization 10/17/18 2158    Admission Type  observation 10/14/18 1030   Patient Personal Strengths  expressive of needs;strong support system 04/16/18 1310    Arrived From  home or self-care;home healthcare service 10/14/18 1030   EXPECTED DISCHARGE      Referral Source  nursing 10/14/18 1030   Expected Discharge Date  10/18/18 ([facility]) 10/17/18 1416    New Steerage to  Clinics?  No 10/14/18 1030   ASSESSMENT/CONCERNS TO BE ADDRESSED      # of Referrals Placed by CTS  Communication hand-offs to next level of Care Providers 10/18/18 1116   Concerns To Be Addressed  all concerns addressed in this encounter 06/18/18 1356    Reason For Consult  discharge planning 10/14/18 1030   DISCHARGE PLANNING      Record Reviewed  clinical discipline documentation;history and physical;medical record;patient profile;plan of care 10/14/18 1030   Patient/family verbalizes understanding of discharge plan recommendations?  Yes 10/14/18 1031    CTS Assigned to Case  Ivis 10/17/18 1328   Medical Team notified of plan?  yes 10/14/18 1031    Primary Care Clinic Name  -- ([facility]) 10/14/18 1030   Readmission Within The Last 30 Days  no previous admission in last 30 days 10/14/18 1031    Primary Care MD Name  Db Alvarez 10/14/18 1030   Anticipated Changes Related to Illness  none 10/14/18 1031    LIVING ENVIRONMENT     Does Patient Need a Referral for Clinic CC  No 05/05/17 1045    Lives With  alone 10/15/18 1307   Equipment Used at Home  cane, straight 03/15/16 1542    Living Arrangements  assisted living 10/15/18 1307   FINAL  RESOURCES      Provides Primary Care For  no one 10/14/18 1030   Equipment Currently Used at Home  wheelchair, manual;shower chair;grab bar;walker, rolling 10/15/18 1309    Quality Of Family Relationships  supportive;helpful;involved 10/14/18 1030   Resources List  Skilled Nursing Facility 07/25/18 1403    Able to Return to Prior Living Arrangements  yes 10/14/18 1030   Other Resources  [facility] Worker 07/23/18 0945    HOME SAFETY     PAS Number  [id number] 10/18/18 0938    Patient Feels Safe Living in Home?  yes 10/14/18 1030   ABUSE RISK SCREEN      ASSESSMENT OF FAMILY/SOCIAL SUPPORT     QUESTION TO PATIENT:  Has a member of your family or a partner(now or in the past) intimidated, hurt, manipulated, or controlled you in any way?  no 10/13/18 1726    Marital Status   10/14/18 1031   QUESTION TO PATIENT: Do you feel safe going back to the place where you are living?  yes 10/13/18 1726    Who is your support system?  Children 10/14/18 1031   OBSERVATION: Is there reason to believe there has been maltreatment of a vulnerable adult (ie. Physical/Sexual/Emotional abuse, self neglect, lack of adequate food, shelter, medical care, or financial exploitation)?  no 10/13/18 1726    Description of Support System  Supportive;Involved 10/14/18 1031   OTHER      Support Assessment  Adequate family and caregiver support;Adequate social supports 10/14/18 1031   Are you depressed or being treated for depression?  No 10/15/18 1101    EMPLOYMENT     HOMICIDE RISK      Do you work full or part-time?  no 10/14/18 1031   Feels Like Hurting Others  no 10/13/18 1726

## 2018-10-13 NOTE — IP AVS SNAPSHOT
` `     [facility] OBSERVATION DEPARTMENT: [phone]            Medication Administration Report for Ana Luisa Lee as of 10/18/18 1352   Legend:    Given Hold Not Given Due Canceled Entry Other Actions    Time Time (Time) Time  Time-Action       Inactive    Active    Linked        Medications 10/12/18 10/13/18 10/14/18 10/15/18 10/16/18 10/17/18 10/18/18    acetaminophen (TYLENOL) tablet 1,000 mg  Dose: 1,000 mg  Freq: 3 TIMES DAILY Route: PO  Start: 10/13/18 2000   Admin Instructions: Maximum acetaminophen dose from all sources = 75 mg/kg/day not to exceed 4 gram    Admin. Amount: 2 tablet (2 × 500 mg tablet)  Last Admin: 10/18/18 1027  Dispense Loc:  ADS MS2B OBS      2000 (1,000 mg)-Given        0850 (1,000 mg)-Given       1327 (1,000 mg)-Given       1932 (1,000 mg)-Given        0934 (1,000 mg)-Given       1343 (1,000 mg)-Given       2007 (1,000 mg)-Given        0928 (1,000 mg)-Given       (1949)-Not Given [C]       2003 (1,000 mg)-Given       (2157)-Not Given [C]        0820 (1,000 mg)-Given       1356 (1,000 mg)-Given       1924 (1,000 mg)-Given        1027 (1,000 mg)-Given       [ ] 1600       [ ] 2200           cephALEXin (KEFLEX) capsule 500 mg  Dose: 500 mg  Freq: EVERY 12 HOURS SCHEDULED Route: PO  Indications of Use: URINARY TRACT INFECTION  Start: 10/13/18 2000   Admin. Amount: 1 capsule (1 × 500 mg capsule)  Last Admin: 10/18/18 1026  Dispense Loc: RH ADS MS2B OBS      2000 (500 mg)-Given        0847 (500 mg)-Given       1931 (500 mg)-Given        0935 (500 mg)-Given       2007 (500 mg)-Given        (0931)-Not Given [C]       1232 (500 mg)-Given [C]       2003 (500 mg)-Given        0943 (500 mg)-Given       1924 (500 mg)-Given        1026 (500 mg)-Given       [ ] 2000           enoxaparin (LOVENOX) injection 40 mg  Dose: 40 mg  Freq: EVERY 24 HOURS Route: SC  Start: 10/14/18 1437   Admin. Amount: 40 mg = 0.4 mL Conc: 40 mg/0.4 mL  Last Admin: 10/17/18 3915  Dispense Loc:   ADS MS2B OBS  Volume: 0.4 mL       1607 (40 mg)-Given        1723 (40 mg)-Given [C]        1740 (40 mg)-Given        1740 (40 mg)-Given        [ ] 1700           fexofenadine (ALLEGRA) tablet 180 mg  Dose: 180 mg  Freq: DAILY Route: PO  Start: 10/13/18 1713   Admin. Amount: 1 tablet (1 × 180 mg tablet)  Last Admin: 10/18/18 1027  Dispense Loc:  ADS MS2B OBS      1816 (180 mg)-Given        0849 (180 mg)-Given        0935 (180 mg)-Given        0927 (180 mg)-Given        0943 (180 mg)-Given        1027 (180 mg)-Given           ipratropium - albuterol 0.5 mg/2.5 mg/3 mL (DUONEB) neb solution 3 mL  Dose: 3 mL  Freq: EVERY 4 HOURS Route: NEBULIZATION  Start: 10/16/18 0534   Admin. Amount: 3 mL  Last Admin: 10/18/18 1224  Dispense Loc:  ADS MS2B OBS  Volume: 3 mL         0603 (3 mL)-Given       0934 (0 )-Return to Cabinet [C]       1142 (3 mL)-Given       1538 (3 mL)-Given       2004 (3 mL)-Given        0039 (3 mL)-Given       0351 (3 mL)-Given       0713 (3 mL)-Given       1132 (3 mL)-Given       1522 (3 mL)-Given       (1946)-Not Given       2058 (3 mL)-Given       2355 (3 mL)-Given        0346 (3 mL)-Given       0742 (3 mL)-Given       1224 (3 mL)-Given       [ ] 1600       [ ] 2000           levalbuterol (XOPENEX) neb solution 0.63 mg  Dose: 0.63 mg  Freq: EVERY 8 HOURS PRN Route: NEBULIZATION  PRN Reason: wheezing  Start: 10/13/18 1711   Order specific questions:  Levalbuterol orders will be substituted to albuterol unless intolerance is documented here Tachycardia     Admin. Amount: 0.63 mg = 3 mL Conc: 0.63 mg/3 mL  Last Admin: 10/16/18 1607  Dispense Loc: RH ADS MS2B OBS  Volume: 3 mL      1812 (0.63 mg)-Given         0205 (0.63 mg)-Given        0212 (0.63 mg)-Given       1607 (0.63 mg)-Given             levothyroxine (SYNTHROID/LEVOTHROID) tablet 25 mcg  Dose: 25 mcg  Freq: DAILY Route: PO  Start: 10/13/18 1053   Admin Instructions: Separate oral administration of iron- or calcium-containing products and  levothyroxine by at least 4 hours.    Admin. Amount: 1 tablet (1 × 25 mcg tablet)  Last Admin: 10/18/18 1027  Dispense Loc:  ADS MS2B OBS      1816 (25 mcg)-Given        0848 (25 mcg)-Given        0934 (25 mcg)-Given        0928 (25 mcg)-Given        0943 (25 mcg)-Given        1027 (25 mcg)-Given           Lidocaine (LIDOCARE) 4 % Patch 2 patch  Dose: 2 patch  Freq: EVERY 24 HOURS 2000 Route: TD  Start: 10/14/18 2000   Admin Instructions: Apply patch(s) to left groin. To prevent lidocaine toxicity, patient should be patch free for 12 hrs daily. Patches may be cut to smaller size prior to removing release liner.  NEVER APPLY HEAT OVER PATCH which increases absorption and risk of local anesthetic toxicity. Do not apply over area where liposomal bupivacaine was injected for 96 hours post injection.    Admin. Amount: 2 patch  Last Admin: 10/17/18 2348  Dispense Loc:  ADS MS2B OBS  Infused Over: 12 Hours       1932 (2 patch)-Given [C]        (2135)-Not Given [C]        2152 (2 patch)-Given [C]        2348 (1 patch)-Given [C]           And  lidocaine patch REMOVAL  Freq: EVERY 24 HOURS 0800 Route: TD  Start: 10/15/18 0800   Admin Instructions: Remove lidocaine Patch.    Dispense Loc:  Main Pharmacy        0943 ( )-Patch Removed        0931 ( )-Patch Removed        0956 ( )-Patch Removed        1319 ( )-Patch Removed          And  lidocaine patch in PLACE  Freq: EVERY 8 HOURS Route: TD  Start: 10/14/18 2000   Admin Instructions: Chart every shift, confirming that patch is still in place on patient (no barcode scan needed). See patch order for dose information.  NEVER APPLY HEAT OVER PATCH which will increase absorption and may lead to risk of local anesthetic toxicity. Do not apply over area where liposomal bupivacaine injected for 96 hours.    Last Admin: 10/18/18 1033  Dispense Loc:  Main Pharmacy       1935 ( )-Patch in Place        0335 ( )-Patch in Place                                     0256 (2 each)-Patch  in Place [C]       0849 ( )-Patch in Place [C]              2353 (1 each)-Patch in Place        1033 ( )-Patch in Place       [ ] 1600           lisinopril (PRINIVIL/ZESTRIL) tablet 5 mg  Dose: 5 mg  Freq: DAILY Route: PO  Start: 10/15/18 0833   Admin Instructions: HOLD if SBP <110.Call if held x 2 in a row symptomatic    Admin. Amount: 1 tablet (1 × 5 mg tablet)  Last Admin: 10/18/18 1029  Dispense Loc: RH ADS MS2B OBS        0954 (5 mg)-Given        0928 (5 mg)-Given        0943-Hold        1029 (5 mg)-Given           loperamide (IMODIUM) capsule 2 mg  Dose: 2 mg  Freq: EVERY 8 HOURS PRN Route: PO  PRN Reason: diarrhea  Start: 10/13/18 1705   Admin. Amount: 1 capsule (1 × 2 mg capsule)  Dispense Loc: RH ADS MS2B OBS               meclizine (ANTIVERT) tablet 25 mg  Dose: 25 mg  Freq: 2 TIMES DAILY PRN Route: PO  PRN Reason: dizziness  Start: 10/15/18 1500   Admin. Amount: 1 tablet (1 × 25 mg tablet)  Dispense Loc: RH ADS MS2B OBS               melatonin tablet 1 mg  Dose: 1 mg  Freq: AT BEDTIME PRN Route: PO  PRN Reason: sleep  Start: 10/13/18 1706   Admin Instructions: Do not give unless at least 6 hours of uninterrupted sleep is expected.    Admin. Amount: 1 tablet (1 × 1 mg tablet)  Dispense Loc: RH ADS MS2B OBS               methocarbamol (ROBAXIN) tablet 500 mg  Dose: 500 mg  Freq: 3 TIMES DAILY PRN Route: PO  PRN Reason: muscle spasms  Start: 10/15/18 1443   Admin. Amount: 1 tablet (1 × 500 mg tablet)  Last Admin: 10/17/18 0502  Dispense Loc: RH ADS MS2B OBS          0502 (500 mg)-Given            montelukast (SINGULAIR) tablet 10 mg  Dose: 10 mg  Freq: AT BEDTIME Route: PO  Start: 10/13/18 2200   Admin. Amount: 1 tablet (1 × 10 mg tablet)  Last Admin: 10/17/18 2342  Dispense Loc: RH ADS MS2B OBS      2312 (10 mg)-Given        1931 (10 mg)-Given               2133 (10 mg)-Given        2152 (10 mg)-Given        2342 (10 mg)-Given        [ ] 2200           naloxone (NARCAN) injection 0.1-0.4 mg  Dose: 0.1-0.4  mg  Freq: EVERY 2 MIN PRN Route: IV  PRN Reason: opioid reversal  Start: 10/13/18 1706   Admin Instructions: For respiratory rate LESS than or EQUAL to 8.  Partial reversal dose:  0.1 mg titrated q 2 minutes for Analgesia Side Effects Monitoring Sedation Level of 3 (frequently drowsy, arousable, drifts to sleep during conversation).Full reversal dose:  0.4 mg bolus for Analgesia Side Effects Monitoring Sedation Level of 4 (somnolent, minimal or no response to stimulation).  For ordered IV doses 0.1-2mg give IVP. Give each 0.4mg over 15 seconds in emergency situations. For non-emergent situations further dilute in 9mL of NS to facilitate titration of response.    Admin. Amount: 0.1-0.4 mg = 0.25-1 mL Conc: 0.4 mg/mL  Dispense Loc: RH ADS MS2B OBS  Volume: 1 mL               omeprazole (priLOSEC) CR capsule 40 mg  Dose: 40 mg  Freq: EVERY MORNING Route: PO  Start: 10/14/18 0800   Admin. Amount: 2 capsule (2 × 20 mg capsule)  Last Admin: 10/18/18 1026  Dispense Loc: RH ADS MS2B OBS       0850 (40 mg)-Given        0935 (40 mg)-Given        0927 (40 mg)-Given        0943 (40 mg)-Given        1026 (40 mg)-Given           ondansetron (ZOFRAN-ODT) ODT tab 4 mg  Dose: 4 mg  Freq: EVERY 6 HOURS PRN Route: PO  PRN Reasons: nausea,vomiting  Start: 10/13/18 1706   Admin Instructions: This is Step 1 of nausea and vomiting management.  If nausea not resolved in 15 minutes, go to Step 2 prochlorperazine (COMPAZINE). Do not push through foil backing. Peel back foil and gently remove. Place on tongue immediately. Administration with liquid unnecessary  With dry hands, peel back foil backing and gently remove tablet; do not push oral disintegrating tablet through foil backing; administer immediately on tongue and oral disintegrating tablet dissolves in seconds; then swallow with saliva; liquid not required.    Admin. Amount: 1 tablet (1 × 4 mg tablet)  Dispense Loc: RH ADS MS2B OBS              Or  ondansetron (ZOFRAN) injection 4  mg  Dose: 4 mg  Freq: EVERY 6 HOURS PRN Route: IV  PRN Reasons: nausea,vomiting  Start: 10/13/18 1706   Admin Instructions: This is Step 1 of nausea and vomiting management.  If nausea not resolved in 15 minutes, go to Step 2 prochlorperazine (COMPAZINE).  Irritant. For ordered IV doses 0.1-4 mg, give IV Push undiluted over 2-5 minutes.    Admin. Amount: 4 mg = 2 mL Conc: 4 mg/2 mL  Dispense Loc: RH ADS MS2B OBS  Infused Over: 2-5 Minutes  Volume: 2 mL               ondansetron (ZOFRAN-ODT) ODT tab 4 mg  Dose: 4 mg  Freq: EVERY MORNING Route: PO  Start: 10/14/18 0800   Admin Instructions: With dry hands, peel back foil backing and gently remove tablet; do not push oral disintegrating tablet through foil backing; administer immediately on tongue and oral disintegrating tablet dissolves in seconds; then swallow with saliva; liquid not required.    Admin. Amount: 1 tablet (1 × 4 mg tablet)  Last Admin: 10/18/18 1026  Dispense Loc: RH ADS MS2B OBS       0851 (4 mg)-Given        0935 (4 mg)-Given        0928 (4 mg)-Given        0946 (4 mg)-Given        1026 (4 mg)-Given           polyethylene glycol (MIRALAX/GLYCOLAX) Packet 17 g  Dose: 17 g  Freq: DAILY PRN Route: PO  PRN Reason: constipation  Start: 10/13/18 1706   Admin Instructions: 1 Packet = 17 grams. Mixed prescribed dose in 8 ounces of water. Follow with 8 oz. of water.    Admin. Amount: 17 g  Dispense Loc: RH ADS MS2B OBS               roflumilast (DALIRESP) tablet 500 mcg  Dose: 500 mcg  Freq: DAILY Route: PO  Start: 10/13/18 1800   Admin. Amount: 1 tablet (1 × 500 mcg tablet)  Last Admin: 10/18/18 1027  Dispense Loc: RH ADS MS2B OBS      2005 (500 mcg)-Given        0853 (500 mcg)-Given        0935 (500 mcg)-Given        0928 (500 mcg)-Given        0942 (500 mcg)-Given        1027 (500 mcg)-Given           senna-docusate (SENOKOT-S;PERICOLACE) 8.6-50 MG per tablet 1 tablet  Dose: 1 tablet  Freq: 2 TIMES DAILY Route: PO  Start: 10/13/18 2000   Admin  Instructions: If no bowel movement in 24 hours, increase to 2 tablets PO.  Hold for loose stools.    Admin. Amount: 1 tablet  Last Admin: 10/17/18 0946  Dispense Loc: DADA WILLARD MS2B OBS      2001 (1 tablet)-Given                       0934 (1 tablet)-Given       1945-Hold [C]        0930-Hold       2003 (1 tablet)-Given        0946 (1 tablet)-Given       (1920)-Not Given [C]               [ ] 2000          Or  senna-docusate (SENOKOT-S;PERICOLACE) 8.6-50 MG per tablet 2 tablet  Dose: 2 tablet  Freq: 2 TIMES DAILY Route: PO  Start: 10/13/18 2000   Admin Instructions: Hold for loose stools.    Admin. Amount: 2 tablet  Last Admin: 10/14/18 1932  Dispense Loc: DADA WILLARD MS2B OBS              0850 (2 tablet)-Given       1932 (2 tablet)-Given                                                     0952-Hold       [ ] 2000           sodium chloride tablet 1 g  Dose: 1 g  Freq: 2 TIMES DAILY WITH MEALS Route: PO  Start: 10/15/18 0830   Admin. Amount: 1 tablet (1 × 1 g tablet)  Last Admin: 10/18/18 1026  Dispense Loc: DADA ADS MS2B OBS        1343 (1 g)-Given       1821 (1 g)-Given        0928 (1 g)-Given       1740 (1 g)-Given        0943 (1 g)-Given       1740 (1 g)-Given        1026 (1 g)-Given       [ ] 1800           tamsulosin (FLOMAX) capsule 0.4 mg  Dose: 0.4 mg  Freq: DAILY Route: PO  Start: 10/16/18 1248   Admin Instructions: Administer 30 minutes after the same meal each day.  Capsules should be swallowed whole; do not crush chew or open.    Admin. Amount: 1 capsule (1 × 0.4 mg capsule)  Last Admin: 10/18/18 1027  Dispense Loc: DADA WILLARD MS2B OBS         1602 (0.4 mg)-Given        0943 (0.4 mg)-Given        1027 (0.4 mg)-Given           traMADol (ULTRAM) half-tab 25 mg  Dose: 25 mg  Freq: 2 TIMES DAILY PRN Route: PO  PRN Reason: moderate to severe pain  Indications of Use: MODERATE TO MODERATELY SEVERE PAIN  Start: 10/15/18 1440   Admin. Amount: 1 half-tab (1 × 25 mg half-tab)  Last Admin: 10/18/18 3408  Dispense Loc:  ADS  MS2B OBS         0454 (25 mg)-Given       2152 (25 mg)-Given        0836 (25 mg)-Given       2342 (25 mg)-Given        0733 (25 mg)-Given          Discontinued Medications  Medications 10/12/18 10/13/18 10/14/18 10/15/18 10/16/18 10/17/18 10/18/18         Dose: 2 mg  Freq: EVERY 6 HOURS PRN Route: PO  PRN Reason: other  PRN Comment: vertigo  Start: 10/14/18 1427   End: 10/17/18 1423   Admin. Amount: 1 tablet (1 × 2 mg tablet)  Last Admin: 10/15/18 0413  Dispense Loc: RH ADS MS2B OBS  Administrations Remainin       1727 (2 mg)-Given        0413 (2 mg)-Given         1423-Med Discontinued          Dose: 25 mg  Freq: EVERY 6 HOURS PRN Route: PO  PRN Reason: other  PRN Comment: adjuvant pain  Start: 10/14/18 1601   End: 10/15/18 1440   Admin. Amount: 1 tablet (1 × 25 mg tablet)  Last Admin: 10/15/18 004  Dispense Loc: RH ADS MS2B OBS       1727 (25 mg)-Given        0046 (25 mg)-Given       1440-Med Discontinued            Dose: 3 mL  Freq: 4 TIMES DAILY Route: NEBULIZATION  Start: 10/13/18 1723   End: 10/16/18 05   Admin. Amount: 3 mL  Last Admin: 10/15/18 2016  Dispense Loc: RH ADS MS2B OBS  Volume: 3 mL              (3 mL)-Given        0739 (3 mL)-Given       1157 (3 mL)-Given       1559 (3 mL)-Given       1938 (3 mL)-Given        0735 (3 mL)-Given       1143 (3 mL)-Given       1509 (3 mL)-Given        (3 mL)-Given        0533-Med Discontinued           Dose: 25 mg  Freq: EVERY 6 HOURS SCHEDULED Route: PO  Start: 10/14/18 1800   End: 10/15/18 145   Admin. Amount: 1 tablet (1 × 25 mg tablet)  Last Admin: 10/15/18 1343  Dispense Loc: RH ADS MS2B OBS       1727 (25 mg)-Given        0042 (25 mg)-Given       0631 (25 mg)-Given       1343 (25 mg)-Given       1459-Med Discontinued            Dose: 10 mg  Freq: DAILY Route: PO  Start: 10/13/18 1725   End: 10/17/18 1424   Admin. Amount: 1 tablet (1 × 10 mg tablet)  Last Admin: 10/17/18 0940  Dispense Loc: RH ADS MS2B OBS      1816 (10 mg)-Given        0849 (10  mg)-Given        0935 (10 mg)-Given        0928 (10 mg)-Given        0946 (10 mg)-Given       1424-Med Discontinued          Dose: 25 mg  Freq: DAILY PRN Route: PO  PRN Reason: moderate to severe pain  Start: 10/14/18 0000   End: 10/15/18 1516   Admin. Amount: 1 half-tab (1 × 25 mg half-tab)  Last Admin: 10/15/18 0414  Dispense Loc:  ADS MS2B OBS       0038 (25 mg)-Given        0414 (25 mg)-Given       1516-Med Discontinued            Dose: 25 mg  Freq: 3 TIMES DAILY Route: PO  Indications of Use: MODERATE TO MODERATELY SEVERE PAIN  Start: 10/13/18 2000   End: 10/15/18 1440   Admin. Amount: 1 half-tab (1 × 25 mg half-tab)  Last Admin: 10/15/18 1343  Dispense Loc:  ADS MS2B OBS      2000 (25 mg)-Given        0852 (25 mg)-Given       1328 (25 mg)-Given [C]       1931 (25 mg)-Given               (0941)-Not Given [C]       1343 (25 mg)-Given       1440-Med Discontinued       Medications 10/12/18 10/13/18 10/14/18 10/15/18 10/16/18 10/17/18 10/18/18

## 2018-10-13 NOTE — LETTER
Transition Communication Hand-off for Care Transitions to Next Level of Care Provider    Name: Ana Luisa Lee  : 1940  MRN #: [id number]  Primary Care Provider: Db Alvarez  Primary Care MD Name: Db Alvarez  Primary Clinic: [facility] [address] [facility] [address]  Primary Care Clinic Name:  ([facility])  Reason for Hospitalization:  Urinary retention [R33.9]  Left inguinal pain [R10.32]  Complicated UTI (urinary tract infection) [N39.0]  Hematoma of left iliopsoas muscle, initial encounter [S70.12XA]  Rupture of left hamstring tendon, initial encounter [S76.312A]  Admit Date/Time: 10/13/2018  7:52 AM  Discharge Date: 10/18/18  Payor Source: Payor: MEDICA / Plan: [employer] / Product Type: HMO /     Readmission Assessment Measure (COURTNEY) Risk Score/category: Elevated           Reason for Communication Hand-off Referral: Fragility    Discharge Plan: [facility]       Concern for non-adherence with plan of care:   No  Discharge Needs Assessment:  Needs       Most Recent Value    Anticipated Changes Related to Illness none    Equipment Currently Used at Home wheelchair, manual, shower chair, grab bar, walker, rolling    # of Referrals Placed by CTS Post Acute Facilities, Transportation    PAS Number [id number]          Already enrolled in Tele-monitoring program and name of program:  NA  Follow-up specialty is recommended: No    Follow-up plan:  No future appointments.    Any outstanding tests or procedures:        Referrals     Future Labs/Procedures    Med Therapy Manage Referral     Comments:    Your provider has referred you to: **[facility] Scheduling (numerous locations) [phone]   [email or web address]    Reason for Referral: Greater than 10 prescription medications    The [facility] department will contact you to schedule an appointment.  You may also  schedule the appointment by calling [phone].  For [facility] patients, please call [phone] to confirm/schedule your appointment on the next business day.    This service is designed to help you get the most from your medications.  A specially trained Pharmacist will work closely with you and your providers to solve any questions, concerns, issues or problems related to your medications.    Please bring all of your prescription and non-prescription medications (such as vitamins, over-the-counter medications, and herbals) or a detailed medication list to your appointment.    If you have a glucose meter or other home monitoring information, please also bring this to your appointment (i.e. blood glucose log, blood pressure log, pain log, etc.).            Key Recommendations:    COURTNEY score is elevated. Patient has had multiple admissions in the last 6 months: UTI, arthritis/delerium, bilateral shoulder pain/dizziness. No ED only visits in the last 6 months. Her PMH includes DM, COPD& asthma. She was a SBA in ambulation PTA and now is an assist of 2. Her history also includes an esophageal stricture. She has a history of poor nutrition; her assisted living facility does provide meals. She has more than 10 prescription medications. An MTM referral was entered. Her medication list includes Tramadol. No anticoagulants, hypoglycemics, immunosuppressants, lactulose or Rifaximin are listed. Per ED MD note, she has a history of dementia (not listed in her PMH). She resides at [facility].  Patient was admitted on 10/13/2018 with falls, groin pain, foot drop. Found to have an Left iliopsoas tear with hematoma and nondisplaced coccyx fracture. Patient had an MRI and EMG completed to eval for foot drop.     - MRI lumbar spine was unrevealing for cause of foot drop    - she could have pressure/irritaion on her sciatic nerve from the atrophy/edema seen on MRI in R gluteus    - could have  peroneal nerve irritation more peripherally   -Orthotics Consult for brace  Patient was discharged to [facility] TCU and will need follow up after discharge from TCU.     Ivis Colin    AVS/Discharge Summary is the source of truth; this is a helpful guide for improved communication of patient story

## 2018-10-13 NOTE — ED NOTES
Placed on bedpan; had stated needed to urinate when MD in assessing.  Patient refused to use BSC.

## 2018-10-14 LAB
ANION GAP SERPL CALCULATED.3IONS-SCNC: 7 MMOL/L (ref 3–14)
BUN SERPL-MCNC: 10 MG/DL (ref 7–30)
CALCIUM SERPL-MCNC: 8.3 MG/DL (ref 8.5–10.1)
CHLORIDE SERPL-SCNC: 100 MMOL/L (ref 94–109)
CO2 SERPL-SCNC: 22 MMOL/L (ref 20–32)
CREAT SERPL-MCNC: 0.63 MG/DL (ref 0.52–1.04)
ERYTHROCYTE [DISTWIDTH] IN BLOOD BY AUTOMATED COUNT: 14.1 % (ref 10–15)
GFR SERPL CREATININE-BSD FRML MDRD: >90 ML/MIN/1.7M2
GLUCOSE SERPL-MCNC: 100 MG/DL (ref 70–99)
HCT VFR BLD AUTO: 27.8 % (ref 35–47)
HGB BLD-MCNC: 9.3 G/DL (ref 11.7–15.7)
MCH RBC QN AUTO: 30.6 PG (ref 26.5–33)
MCHC RBC AUTO-ENTMCNC: 33.5 G/DL (ref 31.5–36.5)
MCV RBC AUTO: 91 FL (ref 78–100)
PLATELET # BLD AUTO: 233 10E9/L (ref 150–450)
POTASSIUM SERPL-SCNC: 4 MMOL/L (ref 3.4–5.3)
RBC # BLD AUTO: 3.04 10E12/L (ref 3.8–5.2)
SODIUM SERPL-SCNC: 129 MMOL/L (ref 133–144)
WBC # BLD AUTO: 6.6 10E9/L (ref 4–11)

## 2018-10-14 PROCEDURE — 94640 AIRWAY INHALATION TREATMENT: CPT | Mod: 76

## 2018-10-14 PROCEDURE — 25000132 ZZH RX MED GY IP 250 OP 250 PS 637: Performed by: PHYSICIAN ASSISTANT

## 2018-10-14 PROCEDURE — 25000131 ZZH RX MED GY IP 250 OP 636 PS 637: Performed by: PHYSICIAN ASSISTANT

## 2018-10-14 PROCEDURE — 94640 AIRWAY INHALATION TREATMENT: CPT

## 2018-10-14 PROCEDURE — 25000128 H RX IP 250 OP 636: Performed by: PHYSICIAN ASSISTANT

## 2018-10-14 PROCEDURE — 40000275 ZZH STATISTIC RCP TIME EA 10 MIN

## 2018-10-14 PROCEDURE — 85027 COMPLETE CBC AUTOMATED: CPT | Performed by: PHYSICIAN ASSISTANT

## 2018-10-14 PROCEDURE — 80048 BASIC METABOLIC PNL TOTAL CA: CPT | Performed by: PHYSICIAN ASSISTANT

## 2018-10-14 PROCEDURE — G0378 HOSPITAL OBSERVATION PER HR: HCPCS

## 2018-10-14 PROCEDURE — 99226 ZZC SUBSEQUENT OBSERVATION CARE,LEVEL III: CPT | Performed by: PHYSICIAN ASSISTANT

## 2018-10-14 PROCEDURE — 25000125 ZZHC RX 250: Performed by: PHYSICIAN ASSISTANT

## 2018-10-14 PROCEDURE — 99207 ZZC CDG-CODE CATEGORY CHANGED: CPT | Performed by: PHYSICIAN ASSISTANT

## 2018-10-14 PROCEDURE — 96361 HYDRATE IV INFUSION ADD-ON: CPT

## 2018-10-14 PROCEDURE — 36415 COLL VENOUS BLD VENIPUNCTURE: CPT | Performed by: PHYSICIAN ASSISTANT

## 2018-10-14 PROCEDURE — 96372 THER/PROPH/DIAG INJ SC/IM: CPT

## 2018-10-14 RX ORDER — SODIUM CHLORIDE 9 MG/ML
INJECTION, SOLUTION INTRAVENOUS CONTINUOUS
Status: DISCONTINUED | OUTPATIENT
Start: 2018-10-14 | End: 2018-10-15

## 2018-10-14 RX ORDER — MECLIZINE HYDROCHLORIDE 25 MG/1
25 TABLET ORAL 3 TIMES DAILY PRN
Status: DISCONTINUED | OUTPATIENT
Start: 2018-10-14 | End: 2018-10-14

## 2018-10-14 RX ORDER — LIDOCAINE 4 G/G
2 PATCH TOPICAL
Status: DISCONTINUED | OUTPATIENT
Start: 2018-10-14 | End: 2018-10-18 | Stop reason: HOSPADM

## 2018-10-14 RX ORDER — DIAZEPAM 2 MG
2 TABLET ORAL EVERY 6 HOURS PRN
Status: DISCONTINUED | OUTPATIENT
Start: 2018-10-14 | End: 2018-10-17

## 2018-10-14 RX ORDER — HYDROXYZINE HYDROCHLORIDE 25 MG/1
25 TABLET, FILM COATED ORAL EVERY 6 HOURS PRN
Status: DISCONTINUED | OUTPATIENT
Start: 2018-10-14 | End: 2018-10-15

## 2018-10-14 RX ORDER — SODIUM CHLORIDE 9 MG/ML
INJECTION, SOLUTION INTRAVENOUS CONTINUOUS
Status: ACTIVE | OUTPATIENT
Start: 2018-10-14 | End: 2018-10-14

## 2018-10-14 RX ORDER — MECLIZINE HYDROCHLORIDE 25 MG/1
25 TABLET ORAL EVERY 6 HOURS SCHEDULED
Status: DISCONTINUED | OUTPATIENT
Start: 2018-10-14 | End: 2018-10-15

## 2018-10-14 RX ADMIN — SODIUM CHLORIDE: 9 INJECTION, SOLUTION INTRAVENOUS at 08:37

## 2018-10-14 RX ADMIN — TRAMADOL HYDROCHLORIDE 25 MG: 50 TABLET ORAL at 13:28

## 2018-10-14 RX ADMIN — ACETAMINOPHEN 1000 MG: 500 TABLET, FILM COATED ORAL at 13:27

## 2018-10-14 RX ADMIN — ONDANSETRON 4 MG: 4 TABLET, ORALLY DISINTEGRATING ORAL at 08:51

## 2018-10-14 RX ADMIN — SENNOSIDES AND DOCUSATE SODIUM 2 TABLET: 8.6; 5 TABLET ORAL at 08:50

## 2018-10-14 RX ADMIN — LEVOTHYROXINE SODIUM 25 MCG: 25 TABLET ORAL at 08:48

## 2018-10-14 RX ADMIN — TRAMADOL HYDROCHLORIDE 25 MG: 50 TABLET ORAL at 00:38

## 2018-10-14 RX ADMIN — FEXOFENADINE HYDROCHLORIDE 180 MG: 180 TABLET, FILM COATED ORAL at 08:49

## 2018-10-14 RX ADMIN — TAMSULOSIN HYDROCHLORIDE 0.4 MG: 0.4 CAPSULE ORAL at 08:48

## 2018-10-14 RX ADMIN — CEPHALEXIN 500 MG: 500 CAPSULE ORAL at 08:47

## 2018-10-14 RX ADMIN — ACETAMINOPHEN 1000 MG: 500 TABLET, FILM COATED ORAL at 08:50

## 2018-10-14 RX ADMIN — MONTELUKAST SODIUM 10 MG: 10 TABLET, FILM COATED ORAL at 19:31

## 2018-10-14 RX ADMIN — TRAMADOL HYDROCHLORIDE 25 MG: 50 TABLET ORAL at 08:52

## 2018-10-14 RX ADMIN — DIAZEPAM 2 MG: 2 TABLET ORAL at 17:27

## 2018-10-14 RX ADMIN — ACETAMINOPHEN 1000 MG: 500 TABLET, FILM COATED ORAL at 19:32

## 2018-10-14 RX ADMIN — IPRATROPIUM BROMIDE AND ALBUTEROL SULFATE 3 ML: .5; 3 SOLUTION RESPIRATORY (INHALATION) at 19:38

## 2018-10-14 RX ADMIN — OMEPRAZOLE 40 MG: 20 CAPSULE, DELAYED RELEASE ORAL at 08:50

## 2018-10-14 RX ADMIN — MECLIZINE HYDROCHLORIDE 25 MG: 25 TABLET ORAL at 17:27

## 2018-10-14 RX ADMIN — IPRATROPIUM BROMIDE AND ALBUTEROL SULFATE 3 ML: .5; 3 SOLUTION RESPIRATORY (INHALATION) at 15:59

## 2018-10-14 RX ADMIN — PREDNISONE 10 MG: 10 TABLET ORAL at 08:49

## 2018-10-14 RX ADMIN — MECLIZINE HYDROCHLORIDE 25 MG: 25 TABLET ORAL at 12:05

## 2018-10-14 RX ADMIN — TRAMADOL HYDROCHLORIDE 25 MG: 50 TABLET ORAL at 19:31

## 2018-10-14 RX ADMIN — LIDOCAINE 2 PATCH: 560 PATCH PERCUTANEOUS; TOPICAL; TRANSDERMAL at 19:32

## 2018-10-14 RX ADMIN — IPRATROPIUM BROMIDE AND ALBUTEROL SULFATE 3 ML: .5; 3 SOLUTION RESPIRATORY (INHALATION) at 11:57

## 2018-10-14 RX ADMIN — SENNOSIDES AND DOCUSATE SODIUM 2 TABLET: 8.6; 5 TABLET ORAL at 19:32

## 2018-10-14 RX ADMIN — SODIUM CHLORIDE: 9 INJECTION, SOLUTION INTRAVENOUS at 20:08

## 2018-10-14 RX ADMIN — IPRATROPIUM BROMIDE AND ALBUTEROL SULFATE 3 ML: .5; 3 SOLUTION RESPIRATORY (INHALATION) at 07:39

## 2018-10-14 RX ADMIN — ROFLUMILAST 500 MCG: 500 TABLET ORAL at 08:53

## 2018-10-14 RX ADMIN — HYDROXYZINE HYDROCHLORIDE 25 MG: 25 TABLET ORAL at 17:27

## 2018-10-14 RX ADMIN — CEPHALEXIN 500 MG: 500 CAPSULE ORAL at 19:31

## 2018-10-14 RX ADMIN — ENOXAPARIN SODIUM 40 MG: 40 INJECTION SUBCUTANEOUS at 16:07

## 2018-10-14 NOTE — PROGRESS NOTES
For skin tear on R) forearm. Previous dressing removed, skin tear cleansed and redressed. Scant serosanguineous drainage, bruising periwound.

## 2018-10-14 NOTE — PROGRESS NOTES
COURTNEY score is elevated. Will follow along with [facility] for DC planning. Will give hand off to clinic RN CTS at time of discharge and assist in making follow up appointments. Patient has had multiple admissions in the last 6 months: UTI, arthritis/delerium, bilateral shoulder pain/dizziness. No ED only visits in the last 6 months. Her PMH includes DM, COPD& asthma. She was a SBA in ambulation PTA and now is an assist of 2. Her history also includes an esophageal stricture. She has a history of poor nutrition; her assisted living facility does provide meals. She has more than 10 prescription medications. An MTM referral was entered. Her medication list includes Tramadol. No anticoagulants, hypoglycemics, immunosuppressants, lactulos or Rifaximin are listed. Per ED MD note, she has a history of dementia (not listed in her PMH). She resides at [facility]. See SW note dated 10/14/18 for more information.      Candis Alaniz RN, BSN, CTS  [facility]  [phone]

## 2018-10-14 NOTE — PROGRESS NOTES
[facility]    Observation Unit  Hospitalist Progress Note  Name: Ana Luisa Lee    MRN: [id number]  Provider:  Ariella Vela PA-C  Date of Service: 10/14/2018    Assessment & Plan   Summary of Stay: Ana Luisa Lee is a 78 year old female with PMH significant for mild dementia, asthma and COPD on chronic Prednisone, esophageal stricture s/p dilation (2007), HTN, HLD, hypothyroidism, PSVT, DM2, and who resides in an AL and has been wheelchair bound since the spring due to progressive generalized weakness with recent onset of left groin pain that has progressed over the last 3 weeks and due to becoming acutely worse she was admitted overnight for pain control and orthopedic consultation.     1. Left groin pain: CT on admission indicates possible hamstring and iliopsoas tendon rupture with possible hematoma along the iliopsoas. No recent injuries or trauma prior to onset of pain but patient has recently started PT at her AL so unsure if she may of overdone it during one of these sessions. Patient states her baseline pain of a 6/10 has been tolerable but it has been up to a 8/10 over the past day. Initially started on scheduled Tylenol, PRN Flexeril, and her PTA Tramadol. Due to reports of dizziness this morning her Flexeril was discontinued. She is still reporting 8/10 pain, will adjust regimen as outlined below. Orthopedic surgery consulted on admission for weight bearing restrictions and if MRI is needed, still awaiting their recommendations.  - Pain control with scheduled Tylenol, PRN Lidocaine patches, Valium (for dizziness as well muscle spasms), low dose Atarax, and Tramadol   - WBAT with left LE  - Orthopedics consulted, awaiting recommendations     2. Abnormal UA, urinary retention: found to be retaining urine in the ED and joseph was placed. Thought to be due to possible underlying UTI. Appears that patient has previously had urinary retention in setting of taking Duloxetine. Joseph  removed this morning and patient able to void this afternoon but still retaining >200 ml.  - Follow urine culture  - Continue PO Keflex (day 2)  - Continue bladder scans every 2-4 hours with PRN straight cath     3. Chronic hyponatremia: appears baseline sodium around 130-132, initially 126 on admission with improvement today to 129 after IVFs overnight.   - Continue IVF hydration with NS at 75 ml/hour overnight  - Recheck BMP in AM    4. Vertigo: reported onset of dizziness yesterday, but not noted in admission note. Describes as worse with flexing her chin towards her chest and sensation that the room is spinning. Mild right sided nystagmus appreciated on exam. No prior h/o BPPV or CVA. No other neuro deficits appreciated on exam. Unclear if Flexeril started on admission is causing her symptoms, this has been discontinued. Mildly orthostatic BPs this morning, Lisinopril held this morning, will continue to monitor. Will treat supportively with IVFs, PRN Valium, and scheduled Meclizine since it sounds like there is an element of BPPV.   - Trial PO Valium   - Schedule Meclizine every 6 hours   - Neuro checks  - Hold off on MRI of brain   - Consider PT assessment in AM pending improvement in symptoms   - Monitor for improvement of symptoms     5. COPD, asthma: shortness of breath at baseline per patient and no recent exacerbations. Follows with pulmonology in the outpatient setting for management.   - Continue PTA PO Prednisone, Roflumilast, and PRN Xopenex nebs   - Resume Incruse Ellipta and Pulmicort inhales upon discharge     6. HTN: borderline orthostatic BPs this AM, Lisinopril initially held.   - Hold Lisinopril, reassess restarting in AM    7. Hypothyroidism, HLD, and GERD: stable medical conditions, resume PTA medications     DVT Prophylaxis: Enoxaparin (Lovenox) subcutaneous due to decreased mobility   Code Status: DNR/DNI  Disposition: Expected discharge in 1-2 days     Interval History   Patient reports  onset of dizziness that she describes as the room spinning that started yesterday and worse when she bends her neck forward. Denies h/o vertigo or previous CVA. Denies slurred speech, extremity weakness, vision changes, or headache. She states that the pain in her left groin is still present and rates as a 8/10 with radiation down her leg but denies numbness and tingling. She notes baseline numbness/tingling in her bilateral feet (R>L) that has been present for the past 6 months. Patient appears forgetful at times and has a hard time with giving specific details in her history.     -Data reviewed today: I reviewed all new labs and imaging reports over the last 24 hours. I personally reviewed all labs and imaging from this visit.     Physical Exam   Temp: 98.4  F (36.9  C) Temp src: Oral BP: 133/64 Pulse: 100 Heart Rate: 103 Resp: 16 SpO2: 97 % O2 Device: None (Room air)    There were no vitals filed for this visit.  Vital Signs with Ranges  Temp:  [98.4  F (36.9  C)-99.8  F (37.7  C)] 98.4  F (36.9  C)  Pulse:  [] 100  Heart Rate:  [] 103  Resp:  [16-24] 16  BP: (127-151)/(57-76) 133/64  SpO2:  [96 %-99 %] 97 %  I/O last 3 completed shifts:  In: -   Out: 2325 [Urine:2325]    GEN:  Alert, oriented x 3, appears comfortable, NAD.  HEENT:  Normocephalic/atraumatic, no scleral icterus, no nasal discharge, mouth moist. Mild right sided nystagmus appreciated.   CV:  Regular rate and rhythm, no murmur or JVD.  S1 + S2 noted, no S3 or S4.  LUNGS:  Clear to auscultation bilaterally without rales/rhonchi/wheezing/retractions.  Symmetric chest rise on inhalation noted.  ABD:  Active bowel sounds, soft, non-tender/non-distended.  No rebound/guarding/rigidity.  EXT: mild tenderness to left groin with palpation, decreased ROM of bilateral LE (L>R), only able to flex left knee to 20 degrees.  No cyanosis.  No acute joint synovitis noted.  SKIN:  Dry to touch, no exanthems noted in the visualized areas.  Neuro: bilateral  LE strength 4/5, bilateral UE strength 5/5, sensation grossly intact. CN II-XII grossly intact.     Medications     sodium chloride         acetaminophen  1,000 mg Oral TID     cephalexin  500 mg Oral Q12H [place]     fexofenadine  180 mg Oral Daily     ipratropium - albuterol 0.5 mg/2.5 mg/3 mL  3 mL Nebulization 4x daily     levothyroxine  25 mcg Oral Daily     lisinopril (PRINIVIL/ZESTRIL) tablet 5 mg  5 mg Oral Daily     meclizine  25 mg Oral Q6H [place]     montelukast  10 mg Oral At Bedtime     omeprazole (priLOSEC) CR capsule 40 mg  40 mg Oral QAM     ondansetron  4 mg Oral QAM     predniSONE  10 mg Oral Daily     roflumilast  500 mcg Oral Daily     senna-docusate  1 tablet Oral BID    Or     senna-docusate  2 tablet Oral BID     tamsulosin  0.4 mg Oral Daily     traMADol (ULTRAM) half-tab 25 mg  25 mg Oral TID     Data     Results for orders placed or performed during the hospital encounter of 10/13/18   XR Pelvis and Hip Left 2 Views    Narrative    PELVIS WITH UNILATERAL HIP TWO VIEWS LEFT  10/13/2018 9:17 AM     HISTORY: left hip pain/groin pain. rule out fracture;     COMPARISON: None.    FINDINGS: There is normal osseous alignment.  No fractures are  identified.      Impression    IMPRESSION: Osseous structures appear intact. No fractures are  identified.    WILMA CARBAJAL MD   Abd/pelvis CT no contrast - Stone Protocol    Narrative    CT ABDOMEN AND PELVIS WITHOUT CONTRAST 10/13/2018 11:56 AM     HISTORY: Lower abdominal pain/groin pain on left. Rule out stone.     COMPARISON: September 21, 2014    TECHNIQUE: Volumetric helical acquisition of CT images from the lung  bases through the symphysis pubis without intravenous contrast.  Radiation dose for this scan was reduced using automated exposure  control, adjustment of the mA and/or kV according to patient size, or  iterative reconstruction technique.    FINDINGS: No hydronephrosis or urolithiasis. Minimal asymmetry of the  iliopsoas, left greater than right,  indicating a small amount of  intramuscular hemorrhage. Please see CT report same date of the left  hip. No free air or free fluid. No bowel obstruction. There is mild  diverticulosis without evidence for diverticulitis. There are moderate  atherosclerotic changes of the visualized aorta and its branches.  There is no evidence of aortic aneurysm. There is cholelithiasis  without evidence for cholecystitis. Moderate hiatal hernia. The liver,  spleen, adrenal glands, kidneys, and pancreas demonstrate no worrisome  focal lesion in the absence of intravenous contrast. Lung bases clear  of acute infiltrates. Trace peripheral fibrosis and/or atelectasis.      Impression    IMPRESSION:  1. Question some minimal intramuscular hematoma in the iliopsoas on  the left. Please see CT report of the left hip for additional regional  findings.  2. Otherwise no acute process demonstrated in the abdomen and pelvis.     DEANN CROSS MD   CT Hip Left w/o Contrast    Narrative    CT HIP LEFT WITHOUT CONTRAST 10/13/2018 11:56 AM     HISTORY: Left hip pain/groin pain, rule out occult fracture.      TECHNIQUE: Axial images with reconstructions. Radiation dose for this  scan was reduced using automated exposure control, adjustment of the  mA and/or kV according to patient size, or iterative reconstruction  technique.      COMPARISON: 11/19/2017.     FINDINGS: There appears to be some focal fluid density adjacent to the  left ischial tuberosity. There is loss of cortex at the superior  aspect of the left ischial tuberosity. These findings suggest the  possibility of common hamstring tendon rupture. There is edema  adjacent to the left iliopsoas muscle, raising the possibility of  tendon rupture. Degenerative change of the lower lumbar spine.  Anterolisthesis at L5-S1. Chondrocalcinosis of the symphysis pubis,  consistent with calcium pyrophosphate deposition disease. No hip or  pubic fracture is identified. Incidentally noted  colonic  diverticulosis.      Impression    IMPRESSION:  1. Findings raising the possibility of left common hamstring tendon  rupture and iliopsoas tendon rupture. MRI recommended, if indicated  clinically.  2. Additional findings discussed above.     JUAN OROZCO MD   CBC with platelets differential   Result Value Ref Range    WBC 8.0 4.0 - 11.0 10e9/L    RBC Count 3.19 (L) 3.8 - 5.2 10e12/L    Hemoglobin 9.8 (L) 11.7 - 15.7 g/dL    Hematocrit 28.8 (L) 35.0 - 47.0 %    MCV 90 78 - 100 fl    MCH 30.7 26.5 - 33.0 pg    MCHC 34.0 31.5 - 36.5 g/dL    RDW 14.1 10.0 - 15.0 %    Platelet Count 263 150 - 450 10e9/L    Diff Method Automated Method     % Neutrophils 73.7 %    % Lymphocytes 14.6 %    % Monocytes 9.2 %    % Eosinophils 1.2 %    % Basophils 0.2 %    % Immature Granulocytes 1.1 %    Nucleated RBCs 0 0 /100    Absolute Neutrophil 5.9 1.6 - 8.3 10e9/L    Absolute Lymphocytes 1.2 0.8 - 5.3 10e9/L    Absolute Monocytes 0.7 0.0 - 1.3 10e9/L    Absolute Eosinophils 0.1 0.0 - 0.7 10e9/L    Absolute Basophils 0.0 0.0 - 0.2 10e9/L    Abs Immature Granulocytes 0.1 0 - 0.4 10e9/L    Absolute Nucleated RBC 0.0    Basic metabolic panel   Result Value Ref Range    Sodium 126 (L) 133 - 144 mmol/L    Potassium 4.0 3.4 - 5.3 mmol/L    Chloride 94 94 - 109 mmol/L    Carbon Dioxide 24 20 - 32 mmol/L    Anion Gap 8 3 - 14 mmol/L    Glucose 108 (H) 70 - 99 mg/dL    Urea Nitrogen 12 7 - 30 mg/dL    Creatinine 0.66 0.52 - 1.04 mg/dL    GFR Estimate 86 >60 mL/min/1.7m2    GFR Estimate If Black >90 >60 mL/min/1.7m2    Calcium 8.7 8.5 - 10.1 mg/dL   UA with Microscopic   Result Value Ref Range    Color Urine Yellow     Appearance Urine Cloudy     Glucose Urine Negative NEG^Negative mg/dL    Bilirubin Urine Negative NEG^Negative    Ketones Urine Negative NEG^Negative mg/dL    Specific Gravity Urine 1.008 1.003 - 1.035    Blood Urine Small (A) NEG^Negative    pH Urine 7.0 5.0 - 7.0 pH    Protein Albumin Urine Negative NEG^Negative mg/dL     Urobilinogen mg/dL 0.0 0.0 - 2.0 mg/dL    Nitrite Urine Negative NEG^Negative    Leukocyte Esterase Urine Large (A) NEG^Negative    Source Catheterized Urine     WBC Urine >182 (H) 0 - 5 /HPF    RBC Urine 45 (H) 0 - 2 /HPF    WBC Clumps Present (A) NEG^Negative /HPF   Basic metabolic panel   Result Value Ref Range    Sodium 129 (L) 133 - 144 mmol/L    Potassium 4.0 3.4 - 5.3 mmol/L    Chloride 100 94 - 109 mmol/L    Carbon Dioxide 22 20 - 32 mmol/L    Anion Gap 7 3 - 14 mmol/L    Glucose 100 (H) 70 - 99 mg/dL    Urea Nitrogen 10 7 - 30 mg/dL    Creatinine 0.63 0.52 - 1.04 mg/dL    GFR Estimate >90 >60 mL/min/1.7m2    GFR Estimate If Black >90 >60 mL/min/1.7m2    Calcium 8.3 (L) 8.5 - 10.1 mg/dL   CBC with platelets   Result Value Ref Range    WBC 6.6 4.0 - 11.0 10e9/L    RBC Count 3.04 (L) 3.8 - 5.2 10e12/L    Hemoglobin 9.3 (L) 11.7 - 15.7 g/dL    Hematocrit 27.8 (L) 35.0 - 47.0 %    MCV 91 78 - 100 fl    MCH 30.6 26.5 - 33.0 pg    MCHC 33.5 31.5 - 36.5 g/dL    RDW 14.1 10.0 - 15.0 %    Platelet Count 233 150 - 450 10e9/L   Urine Culture   Result Value Ref Range    Specimen Description Catheterized Urine     Special Requests Specimen received in preservative     Culture Micro Culture in progress      *Note: Due to a large number of results and/or encounters for the requested time period, some results have not been displayed. A complete set of results can be found in Results Review.       Ariella Vela PA-C

## 2018-10-14 NOTE — PROGRESS NOTES
Pt. Up to bathroom with Mercedes Steady and assist of 2, pt. Complains of dizziness with position changes.  Pt. Voided in toilet (missed urine hat), bladder scanned for 289 PVR.  Reported to SHANNAN Krishnan

## 2018-10-14 NOTE — PLAN OF CARE
Problem: Patient Care Overview  Goal: Plan of Care/Patient Progress Review  Outcome: No Change  Problem: Patient Care Overview  Goal: Plan of Care/Patient Progress Review  Problem: Patient Care Overview  Goal: Plan of Care/Patient Progress Review  PRIMARY DIAGNOSIS: ACUTE PAIN to L) groin/ UTI  OUTPATIENT/OBSERVATION GOALS TO BE MET BEFORE DISCHARGE:  1. Pain Status: Controlled with oral pain medications.  Flexeril D/C'd because of dizziness.  Pt. Reports baseline arthritic pain in shoulders - 6/10, currently 9/10, pt. Now complaining of groin pain. Scheduled tramadol and tylenol given.        2. Return to near baseline physical activity: Pt chairfast Ax2 for pivot transfers.  Pt. Stood up for orthostatic vital sings this morning.  Pt. Reports dizziness.  Pt. Able to bear weight, but unsteady with dizziness. Meclizine given for dizziness with no improvement.      3. Cleared for discharge by consultants (if involved): No, orthopedic surgery consulted for hamstring      Discharge Planner Nurse   Safe discharge environment identified: No, pt cannot return to AL apartment d/t Crawley in place (AL said they cannot take her at this time d/t an RN not on duty).  Crawley removed this morning, and will continue to monitor for retention, pt. Reports no urge to void at this time     Barriers to discharge: Yes      Please review provider order for any additional goals.   Nurse to notify provider when observation goals have been met and patient is ready for discharge.      Pt A&Ox4, but at times forgetful. Has skin tear on R) forearm that was acquired in AL apartment, dressing changed and dated. Crawley discontinued for voiding trial, Keflex continued. Continuous IVF NS 75/hr and Na 129 improved from 126, site in tact. Pt. Took scheduled tramadol, pt. Reports arthritic shoulder pain 8/10 - baseline pain. Pt. Reported dizziness this morning with position changes, orthostatic changes/dizziness reported to SHANNAN Krishnan.  Guido and  lisinopril held. Meclizine given.  Pt. Continues to report dizziness.

## 2018-10-14 NOTE — PROGRESS NOTES
Care Transition Initial Assessment - SW  Reason For Consult: discharge planning  Met with: Patient    Active Problems:    Urinary retention       DATA  Lives With: alone  Living Arrangements: assisted living  Description of Support System: Supportive, Involved  Who is your support system?: Children  Support Assessment: Adequate family and caregiver support, Adequate social supports.   Identified issues/concerns regarding health management: Pt stated that she anticipates to be able to void today without concerns.   Quality Of Family Relationships: supportive, helpful, involved    ASSESSMENT  Cognitive Status:  awake, alert and oriented  Concerns to be addressed: Met with pt to introduce SW role. Pt is a 78 y.o female who resides at [facility] in [place] ([phone]). Pt stated that she gets assistance with her personal cares, meal preparation, medication management, shopping, household chores, ambulation (has a manual wheelchair) and transportation. Pt stated that she is wanting to return to [facility]. Per chart review, pt has a care coordinator (Yeni Aparicio) through [email or web address]. Pt indicated that she has support from family and friends.    PLAN  Financial costs for the patient includes: Pt was alerted by staff to her OBS status. Pt did not indicate financial concerns.  Patient given options and choices for discharge: Pt stated that she wants to return to [facility].  Patient/family is agreeable to the plan?  Yes: Pt wants to return to [facility].  Patient Goals and Preferences: [facility].  Patient anticipates discharging to:  Return to [facility]. Pt stated that she will need transportation coordinated ([facility] [phone])- does not have w/c here with her. SW will plan to follow-up with [facility] ([phone]) after pt is able to void as this was a barrier yesterday    Weekend SW: Chase Anders, LUCA, LICSW

## 2018-10-14 NOTE — PLAN OF CARE
Problem: Patient Care Overview  Goal: Plan of Care/Patient Progress Review  PRIMARY DIAGNOSIS: ACUTE PAIN to L) groin/ UTI  OUTPATIENT/OBSERVATION GOALS TO BE MET BEFORE DISCHARGE:  1. Pain Status: Improved-controlled with oral pain medications. Pt reported sharp/shooting pain 10/10 in her L) groin, requested pain meds. PRN Tramadol given and pt fell asleep shortly after.     2. Return to near baseline physical activity: Pt chairfast Ax2 for pivot transfers. Pt did not get out of bed during assessment, but states this to be her baseline.     3. Cleared for discharge by consultants (if involved): No, orthopedic surgery consulted in AM     Discharge Planner Nurse   Safe discharge environment identified: No, pt cannot return to AL apartment d/t Crawley in place (AL said they cannot take her at this time d/t an RN not on duty)  Barriers to discharge: Yes    Please review provider order for any additional goals.   Nurse to notify provider when observation goals have been met and patient is ready for discharge.     Pt A&Ox4, but at times forgetful, rates severe pain in L) groin. Has skin tear on R) forearm that was acquired in AL apartment, dressing changed and dated. Crawley in place, denies UTI-associated symptoms. Continuous IVF NS 75/hr, site in tact. Pt sleeping after taking PRN tramadol, no signs of pain noted, continue to monitor.

## 2018-10-14 NOTE — PLAN OF CARE
Problem: Patient Care Overview  Goal: Plan of Care/Patient Progress Review  PRIMARY DIAGNOSIS: ACUTE PAIN to L) groin/ UTI  OUTPATIENT/OBSERVATION GOALS TO BE MET BEFORE DISCHARGE:  1. Pain Status: Improved-controlled with oral pain medications. Pt reported sharp/shooting pain 10/10 in her L) groin, requested pain meds. PRN Tramadol given and pt fell asleep shortly after.    2. Return to near baseline physical activity: Pt chairfast Ax2 for pivot transfers. Pt did not get out of bed during assessment, but states this to be her baseline.    3. Cleared for discharge by consultants (if involved): No, orthopedic surgery consulted in AM    Discharge Planner Nurse   Safe discharge environment identified: No, pt cannot return to AL apartment d/t Crawley in place (AL said they cannot take her at this time d/t an RN not on duty)  Barriers to discharge: Yes       Entered by: Olena Duenas 10/14/2018 12:47 AM     Please review provider order for any additional goals.   Nurse to notify provider when observation goals have been met and patient is ready for discharge.    Pt A&Ox4, but at times forgetful, rates severe pain in L) groin. Has skin tear on R) forearm that was acquired in AL apartment. Crawley in place, denies UTI-associated symptoms. Continuous IVF NS 75/hr, site in tact. Pt sleeping after taking PRN tramadol, continue to monitor.

## 2018-10-14 NOTE — PLAN OF CARE
Problem: Patient Care Overview  Goal: Plan of Care/Patient Progress Review  PRIMARY DIAGNOSIS: Urinary Retention/UTI  OUTPATIENT/OBSERVATION GOALS TO BE MET BEFORE DISCHARGE:  1. ADLs back to baseline: No    2. Activity and level of assistance: Up with maximum assistance. Consider SW and/or PT evaluation.     3. Pain status: Improved-controlled with oral pain medications.    4. Return to near baseline physical activity: No     Discharge Planner Nurse   Safe discharge environment identified: Yes  Barriers to discharge: Yes       Entered by: Vero Byrd 10/14/2018 4:44 PM     Please review provider order for any additional goals.   Nurse to notify provider when observation goals have been met and patient is ready for discharge.    Patient is alert and oriented x4, but patient can be forgetful. VS WNL and documented on the FS. Lung sounds diminished in all lobes. Patient is on RA and denies SOB. Active bowel sounds in all 4 quadrants with LBM 10/12. Patient was able to void 100 ml, but bladder scanned 304, but patient will try to void again in 1 hour. Patient is on Keflex for a UTI. We will continue to have patient void and bladder scan throughout the shift. Patient denied pain upon this assessment but can have Tylenol, Lidocaine patches, Valium, Atarax, and Tramadol for (L) groin pain. Patient is WBAT on (L) extremity. Patient is on IV fluids that are infusing at 75 ml/hr. Patient tolerating a regular diet. (R) arm skin tear is covered and dry and intact.     Plan: Ortho consult today, bladder scans,IV fluids, Trial Valium, Meclizine Q6 hrs.

## 2018-10-14 NOTE — PLAN OF CARE
Problem: Patient Care Overview  Goal: Plan of Care/Patient Progress Review  Problem: Patient Care Overview  Goal: Plan of Care/Patient Progress Review  PRIMARY DIAGNOSIS: ACUTE PAIN to L) groin/ UTI  OUTPATIENT/OBSERVATION GOALS TO BE MET BEFORE DISCHARGE:  1. Pain Status: Improved-controlled with oral pain medications.  Pt. Reports baseline arthritic pain in shoulders - 8/10. Scheduled tramadol given.  Pt. Reports right groin pain is not bothering her.  Pt. Stood up, and reported dizziness, but did not report pain.      2. Return to near baseline physical activity: Pt chairfast Ax2 for pivot transfers.  Pt. Stood up for orthostatic vital sings.  Pt. Reports dizziness.  Pt. Able to bear weight, but unsteady with dizziness.     3. Cleared for discharge by consultants (if involved): No, orthopedic surgery consulted in AM      Discharge Planner Nurse   Safe discharge environment identified: No, pt cannot return to AL apartment d/t Crawley in place (AL said they cannot take her at this time d/t an RN not on duty).  Crawley removed this morning, and will continue to monitor for retention     Barriers to discharge: Yes     Please review provider order for any additional goals.   Nurse to notify provider when observation goals have been met and patient is ready for discharge.      Pt A&Ox4, but at times forgetful. Has skin tear on R) forearm that was acquired in AL apartment, dressing changed and dated. Crawley discontinued for voiding trial, Keflex continued. Continuous IVF NS 75/hr and Na 129 improved from 126, site in tact. Pt. Took scheduled tramadol, pt. Reports arthritic shoulder pain 8/10 - baseline pain. Pt. Reported dizziness this morning with position changes, orthostatic changes/dizziness reported to SHANNAN Krishnan.  Flexeril and lisinopril held.

## 2018-10-14 NOTE — PLAN OF CARE
"Problem: Patient Care Overview  Goal: Plan of Care/Patient Progress Review  Outcome: No Change  PRIMARY DIAGNOSIS: Common hamstring tendon rupture, Iliopsoas tendon rupture, UTI  OUTPATIENT/OBSERVATION GOALS TO BE MET BEFORE DISCHARGE:  1. Stable vital signs Yes  2. Tolerating diet:Yes  3. Pain controlled with oral pain medications:  Yes  4. Positive bowel sounds:  Yes  5. Voiding without difficulty:  No, joseph placed in Emergency Department  6. Able to ambulate:  No, patient has been wheelchair bound since Spring of this year per Assisted Living Facility  7. Provider specific discharge goals met:  No    Temp: 99.8  F (37.7  C) Temp src: Oral BP: 130/69 Pulse: 100 Heart Rate: 97 Resp: 20 SpO2: 96 % O2 Device: None (Room air)       VSS. Lung Sounds clear.  Patient received regularly scheduled nebs.  Patient c/o of left groin pain \"only when I have to move\", causing sharp shooting pains down her left leg.  Patient taking moderate amounts of her diet tray.  No nausea or emesis.   Joseph patient.  Patient alert, orientated x 3. Some forgetfulness demonstrated.  Plan:  Patient resides in an Assistive living environment. [facility] will not receive patients return until Monday due to no RN coverage as well patients need for a joseph catheter.  Continue to provide supportive cares.     Discharge Planner Nurse   Safe discharge environment identified: Yes  Barriers to discharge: Yes, unless medically cleared.       Entered by: Karen M. Lesch 10/13/2018 10:11 PM     Please review provider order for any additional goals.   Nurse to notify provider when observation goals have been met and patient is ready for discharge.      "

## 2018-10-15 ENCOUNTER — APPOINTMENT (OUTPATIENT)
Dept: MRI IMAGING | Facility: CLINIC | Age: 78
DRG: 552 | End: 2018-10-15
Attending: PHYSICIAN ASSISTANT
Payer: COMMERCIAL

## 2018-10-15 ENCOUNTER — APPOINTMENT (OUTPATIENT)
Dept: PHYSICAL THERAPY | Facility: CLINIC | Age: 78
DRG: 552 | End: 2018-10-15
Attending: PHYSICIAN ASSISTANT
Payer: COMMERCIAL

## 2018-10-15 LAB
ANION GAP SERPL CALCULATED.3IONS-SCNC: 8 MMOL/L (ref 3–14)
BUN SERPL-MCNC: 9 MG/DL (ref 7–30)
CALCIUM SERPL-MCNC: 8.5 MG/DL (ref 8.5–10.1)
CHLORIDE SERPL-SCNC: 100 MMOL/L (ref 94–109)
CO2 SERPL-SCNC: 22 MMOL/L (ref 20–32)
CREAT SERPL-MCNC: 0.59 MG/DL (ref 0.52–1.04)
GFR SERPL CREATININE-BSD FRML MDRD: >90 ML/MIN/1.7M2
GLUCOSE SERPL-MCNC: 92 MG/DL (ref 70–99)
PLATELET # BLD AUTO: 233 10E9/L (ref 150–450)
POTASSIUM SERPL-SCNC: 3.8 MMOL/L (ref 3.4–5.3)
SODIUM SERPL-SCNC: 130 MMOL/L (ref 133–144)

## 2018-10-15 PROCEDURE — 40000193 ZZH STATISTIC PT WARD VISIT: Performed by: PHYSICAL THERAPIST

## 2018-10-15 PROCEDURE — 85049 AUTOMATED PLATELET COUNT: CPT | Performed by: PHYSICIAN ASSISTANT

## 2018-10-15 PROCEDURE — 72195 MRI PELVIS W/O DYE: CPT | Mod: XS

## 2018-10-15 PROCEDURE — 96372 THER/PROPH/DIAG INJ SC/IM: CPT

## 2018-10-15 PROCEDURE — 94640 AIRWAY INHALATION TREATMENT: CPT | Mod: 76

## 2018-10-15 PROCEDURE — 40000275 ZZH STATISTIC RCP TIME EA 10 MIN

## 2018-10-15 PROCEDURE — 96361 HYDRATE IV INFUSION ADD-ON: CPT

## 2018-10-15 PROCEDURE — 36415 COLL VENOUS BLD VENIPUNCTURE: CPT | Performed by: PHYSICIAN ASSISTANT

## 2018-10-15 PROCEDURE — 94640 AIRWAY INHALATION TREATMENT: CPT

## 2018-10-15 PROCEDURE — 25000128 H RX IP 250 OP 636: Performed by: PHYSICIAN ASSISTANT

## 2018-10-15 PROCEDURE — 25000131 ZZH RX MED GY IP 250 OP 636 PS 637: Performed by: PHYSICIAN ASSISTANT

## 2018-10-15 PROCEDURE — 80048 BASIC METABOLIC PNL TOTAL CA: CPT | Performed by: PHYSICIAN ASSISTANT

## 2018-10-15 PROCEDURE — 25000132 ZZH RX MED GY IP 250 OP 250 PS 637: Performed by: PHYSICIAN ASSISTANT

## 2018-10-15 PROCEDURE — 97162 PT EVAL MOD COMPLEX 30 MIN: CPT | Mod: GP | Performed by: PHYSICAL THERAPIST

## 2018-10-15 PROCEDURE — 72195 MRI PELVIS W/O DYE: CPT

## 2018-10-15 PROCEDURE — 99207 ZZC CDG-CODE CATEGORY CHANGED: CPT | Performed by: PHYSICIAN ASSISTANT

## 2018-10-15 PROCEDURE — 99225 ZZC SUBSEQUENT OBSERVATION CARE,LEVEL II: CPT | Performed by: PHYSICIAN ASSISTANT

## 2018-10-15 PROCEDURE — G0378 HOSPITAL OBSERVATION PER HR: HCPCS

## 2018-10-15 PROCEDURE — 25000125 ZZHC RX 250: Performed by: PHYSICIAN ASSISTANT

## 2018-10-15 PROCEDURE — 72148 MRI LUMBAR SPINE W/O DYE: CPT

## 2018-10-15 RX ORDER — SODIUM CHLORIDE 1 G/1
1 TABLET ORAL 2 TIMES DAILY WITH MEALS
Status: DISCONTINUED | OUTPATIENT
Start: 2018-10-15 | End: 2018-10-18 | Stop reason: HOSPADM

## 2018-10-15 RX ORDER — LISINOPRIL 5 MG/1
5 TABLET ORAL DAILY
Status: DISCONTINUED | OUTPATIENT
Start: 2018-10-15 | End: 2018-10-18 | Stop reason: HOSPADM

## 2018-10-15 RX ORDER — METHOCARBAMOL 500 MG/1
500 TABLET, FILM COATED ORAL 3 TIMES DAILY PRN
Status: DISCONTINUED | OUTPATIENT
Start: 2018-10-15 | End: 2018-10-18 | Stop reason: HOSPADM

## 2018-10-15 RX ORDER — MECLIZINE HYDROCHLORIDE 25 MG/1
25 TABLET ORAL 2 TIMES DAILY PRN
Status: DISCONTINUED | OUTPATIENT
Start: 2018-10-15 | End: 2018-10-18 | Stop reason: HOSPADM

## 2018-10-15 RX ADMIN — TRAMADOL HYDROCHLORIDE 25 MG: 50 TABLET ORAL at 13:43

## 2018-10-15 RX ADMIN — MECLIZINE HYDROCHLORIDE 25 MG: 25 TABLET ORAL at 13:43

## 2018-10-15 RX ADMIN — MECLIZINE HYDROCHLORIDE 25 MG: 25 TABLET ORAL at 00:42

## 2018-10-15 RX ADMIN — DIAZEPAM 2 MG: 2 TABLET ORAL at 04:13

## 2018-10-15 RX ADMIN — TAMSULOSIN HYDROCHLORIDE 0.4 MG: 0.4 CAPSULE ORAL at 09:35

## 2018-10-15 RX ADMIN — ACETAMINOPHEN 1000 MG: 500 TABLET, FILM COATED ORAL at 09:34

## 2018-10-15 RX ADMIN — ROFLUMILAST 500 MCG: 500 TABLET ORAL at 09:35

## 2018-10-15 RX ADMIN — FEXOFENADINE HYDROCHLORIDE 180 MG: 180 TABLET, FILM COATED ORAL at 09:35

## 2018-10-15 RX ADMIN — IPRATROPIUM BROMIDE AND ALBUTEROL SULFATE 3 ML: .5; 3 SOLUTION RESPIRATORY (INHALATION) at 15:09

## 2018-10-15 RX ADMIN — SODIUM CHLORIDE TAB 1 GM 1 G: 1 TAB at 13:43

## 2018-10-15 RX ADMIN — ACETAMINOPHEN 1000 MG: 500 TABLET, FILM COATED ORAL at 13:43

## 2018-10-15 RX ADMIN — LEVOTHYROXINE SODIUM 25 MCG: 25 TABLET ORAL at 09:34

## 2018-10-15 RX ADMIN — CEPHALEXIN 500 MG: 500 CAPSULE ORAL at 20:07

## 2018-10-15 RX ADMIN — MECLIZINE HYDROCHLORIDE 25 MG: 25 TABLET ORAL at 06:31

## 2018-10-15 RX ADMIN — CEPHALEXIN 500 MG: 500 CAPSULE ORAL at 09:35

## 2018-10-15 RX ADMIN — LISINOPRIL 5 MG: 5 TABLET ORAL at 09:54

## 2018-10-15 RX ADMIN — IPRATROPIUM BROMIDE AND ALBUTEROL SULFATE 3 ML: .5; 3 SOLUTION RESPIRATORY (INHALATION) at 11:43

## 2018-10-15 RX ADMIN — ENOXAPARIN SODIUM 40 MG: 40 INJECTION SUBCUTANEOUS at 17:23

## 2018-10-15 RX ADMIN — LEVALBUTEROL HYDROCHLORIDE 0.63 MG: 0.63 SOLUTION RESPIRATORY (INHALATION) at 02:05

## 2018-10-15 RX ADMIN — IPRATROPIUM BROMIDE AND ALBUTEROL SULFATE 3 ML: .5; 3 SOLUTION RESPIRATORY (INHALATION) at 20:16

## 2018-10-15 RX ADMIN — HYDROXYZINE HYDROCHLORIDE 25 MG: 25 TABLET ORAL at 00:46

## 2018-10-15 RX ADMIN — SODIUM CHLORIDE TAB 1 GM 1 G: 1 TAB at 18:21

## 2018-10-15 RX ADMIN — IPRATROPIUM BROMIDE AND ALBUTEROL SULFATE 3 ML: .5; 3 SOLUTION RESPIRATORY (INHALATION) at 07:35

## 2018-10-15 RX ADMIN — ONDANSETRON 4 MG: 4 TABLET, ORALLY DISINTEGRATING ORAL at 09:35

## 2018-10-15 RX ADMIN — OMEPRAZOLE 40 MG: 20 CAPSULE, DELAYED RELEASE ORAL at 09:35

## 2018-10-15 RX ADMIN — SENNOSIDES AND DOCUSATE SODIUM 1 TABLET: 8.6; 5 TABLET ORAL at 09:34

## 2018-10-15 RX ADMIN — MONTELUKAST SODIUM 10 MG: 10 TABLET, FILM COATED ORAL at 21:33

## 2018-10-15 RX ADMIN — PREDNISONE 10 MG: 10 TABLET ORAL at 09:35

## 2018-10-15 RX ADMIN — ACETAMINOPHEN 1000 MG: 500 TABLET, FILM COATED ORAL at 20:07

## 2018-10-15 RX ADMIN — TRAMADOL HYDROCHLORIDE 25 MG: 50 TABLET ORAL at 04:14

## 2018-10-15 NOTE — PROGRESS NOTES
[facility]    Observation Unit  Hospitalist Progress Note  Name: Ana Luisa Lee    MRN: [id number]  Provider:  Ariella Vela PA-C  Date of Service: 10/15/2018    Assessment & Plan   Summary of Stay: Ana Luisa Lee is a 78 year old female with PMH significant for mild dementia, asthma and COPD on chronic Prednisone, esophageal stricture s/p dilation (2007), HTN, HLD, hypothyroidism, PSVT, DM2, and who resides in an AL and has been wheelchair bound since the spring due to progressive generalized weakness with recent onset of left groin pain that has progressed over the last 3 weeks and due to becoming acutely worse she was admitted overnight for pain control and orthopedic consultation.     1. Left groin pain: CT on admission indicates possible hamstring and iliopsoas tendon rupture with possible hematoma along the iliopsoas. No recent injuries or trauma prior to onset of pain but patient has been undergoing PT at her facility, unsure if she over did it with this but per her daughter she has been able to ambulate to meals with decreased use of a wheelchair over the last week. Patient still has mild to moderate pain today. Initially started on scheduled Tylenol, PRN Flexeril, and her PTA Tramadol. Due to reports of dizziness Flexeril was discontinued. Will stop Atarax due to ongoing reported dizziness today. Orthopedic surgery consulted on admission and recommended no need for MRI, WBAT, and pain control.    - Pain control with scheduled Tylenol, PRN Lidocaine patches, PRN Robaxin (trial low dose), and Tramadol scheduled BID with additional dose daily PRN  - WBAT with left LE  - MRI of pelvis   - PT consulted and recommends assist of 1 back to assistive living but may benefit from TCU, will further determine pending MRI results     2. Right foot drop: per patient on 10/14 this is chronic with her peripheral neuropathy but discussed with patient's daughter who reported this is new within the  last week per the patient's facility and noted by PT on Friday. Patient was suppose to have MRI of lumbar spine as outpatient to further assess but this has not yet been completed. Patient has right foot drop on exam.   - Obtain MRI of lumbar and sacral spine     3. Abnormal UA, urinary retention: found to be retaining urine in the ED and joseph was placed on 10/13. Thought to be due to possible underlying UTI. Appears that patient has previously had urinary retention in setting of taking Duloxetine. Joseph removed on 10/14 and patient able to void with minimal post void residual. Current urine culture growing 10-50k coagulase negative staphylococcus with susceptibility testing in process.    - Await final urine culture results   - Continue PO Keflex (day 3/5)  - Continue bladder scans PRN to monitor post void residuals     4. Chronic hyponatremia: appears baseline sodium around 130-132, initially 126 on admission with improvement today to 130 after IVFs. It has been thought previously that the patient may have an element of SIADH from her known lung disease. Will add sodium chloride 1 gram tablets twice daily and closely monitor sodium level.   - Recheck BMP in AM    5. Vertigo: reported onset of dizziness at time of admission. Due to patient being a poor historian it has been difficult to qualify her symptoms. No prior h/o BPPV or CVA. No other neuro deficits appreciated on exam. Unclear if Flexeril started on admission was causing her symptoms, discontinued on 10/14. Patient was treated supportively overnight with IVFs, PRN Valium, and scheduled Meclizine for possible BPPV without improvement in her symptoms. After further discussion with daughter today the patient tends to report dizziness when she is anxious as well with minor medication adjustments due to sensitivities to medications. As well per daughter the patient only receives Tramadol twice daily and suppose to request an additional dose in the afternoon  but usually doesn't remember to do so at her facility.   - PRN Meclizine BID  - Decrease Tramadol to scheduled BID with one additional dose PRN and stop Atarax   - Neuro checks    6. COPD, asthma: shortness of breath at baseline per patient and no recent exacerbations. Follows with pulmonology in the outpatient setting for management.   - Continue PTA PO Prednisone, Roflumilast, and PRN Xopenex nebs   - Resume Incruse Ellipta and Pulmicort inhales upon discharge     7. HTN: stable   - Resume Lisinopril with parameters     8. Hypothyroidism, HLD, and GERD: stable medical conditions, resume PTA medications     DVT Prophylaxis: Enoxaparin (Lovenox) subcutaneous due to decreased mobility   Code Status: DNR/DNI  Disposition: Expected discharge unclear, pending findings on MRI    Interval History   Patient reports ongoing pain of 8/10 this morning but when asked later in the day she is having only mild pain. She continues to report dizziness that she describes as the room spinning. When asked if she has dizziness at rest she initially reported no but she did feel dizzy after getting into bed earlier today where she was spinning but then a few seconds later states that she wasn't spinning but the room was. PT attempted to assess patient's dizziness today and felt it was difficult to qualify. Discussed with patient's daughter over the phone and the patient has previously reported dizziness when anxious as well with minor medication adjustments due to being sensitive to these changes in the past.     Contacted the patient's daughter Windy in [place], she is the patient's POA, and she was updated on the current plan and able to provider additional information about patient's presentation.      -Data reviewed today: I reviewed all new labs and imaging reports over the last 24 hours. I personally reviewed all labs and imaging from this visit.     Physical Exam   Temp: 97.2  F (36.2  C) Temp src: Oral BP: 121/59 Pulse: 85  Heart Rate: 88 Resp: 18 SpO2: 97 % O2 Device: None (Room air)    There were no vitals filed for this visit.  Vital Signs with Ranges  Temp:  [96.9  F (36.1  C)-98.5  F (36.9  C)] 97.2  F (36.2  C)  Pulse:  [85-89] 85  Heart Rate:  [84-88] 88  Resp:  [18-20] 18  BP: (118-184)/(59-89) 121/59  SpO2:  [95 %-100 %] 97 %  I/O last 3 completed shifts:  In: 2513 [P.O.:720; I.V.:1793]  Out: 2400 [Urine:2400]    GEN:  Alert, oriented x 3, appears comfortable, NAD.  HEENT:  Normocephalic/atraumatic, no scleral icterus, no nasal discharge, mouth moist. Mild right sided nystagmus appreciated.   CV:  Regular rate and rhythm, no murmur or JVD.  S1 + S2 noted, no S3 or S4.  LUNGS:  Clear to auscultation bilaterally without rales/rhonchi/wheezing/retractions.  Symmetric chest rise on inhalation noted.  ABD:  Active bowel sounds, soft, non-tender/non-distended.  No rebound/guarding/rigidity.  EXT: mild tenderness to left groin with palpation, decreased ROM of bilateral LE (L>R), only able to flex left knee to 20 degrees. Unable to perform dorsiflexion of right foot. No cyanosis.  No acute joint synovitis noted.  SKIN:  Dry to touch, no exanthems noted in the visualized areas.  Neuro: bilateral LE strength 4/5, bilateral UE strength 5/5, sensation grossly intact. CN II-XII grossly intact.     Medications       acetaminophen  1,000 mg Oral TID     cephalexin  500 mg Oral Q12H [place]     enoxaparin  40 mg Subcutaneous Q24H     fexofenadine  180 mg Oral Daily     ipratropium - albuterol 0.5 mg/2.5 mg/3 mL  3 mL Nebulization 4x daily     levothyroxine  25 mcg Oral Daily     lidocaine  2 patch Transdermal Q24h    And     lidocaine   Transdermal Q24H    And     lidocaine   Transdermal Q8H     lisinopril (PRINIVIL/ZESTRIL) tablet 5 mg  5 mg Oral Daily     meclizine  25 mg Oral Q6H [place]     montelukast  10 mg Oral At Bedtime     omeprazole (priLOSEC) CR capsule 40 mg  40 mg Oral QAM     ondansetron  4 mg Oral QAM     predniSONE  10 mg Oral Daily      roflumilast  500 mcg Oral Daily     senna-docusate  1 tablet Oral BID    Or     senna-docusate  2 tablet Oral BID     sodium chloride  1 g Oral BID w/meals     Data     Results for orders placed or performed during the hospital encounter of 10/13/18   XR Pelvis and Hip Left 2 Views    Narrative    PELVIS WITH UNILATERAL HIP TWO VIEWS LEFT  10/13/2018 9:17 AM     HISTORY: left hip pain/groin pain. rule out fracture;     COMPARISON: None.    FINDINGS: There is normal osseous alignment.  No fractures are  identified.      Impression    IMPRESSION: Osseous structures appear intact. No fractures are  identified.    WILMA CARBAJAL MD   Abd/pelvis CT no contrast - Stone Protocol    Narrative    CT ABDOMEN AND PELVIS WITHOUT CONTRAST 10/13/2018 11:56 AM     HISTORY: Lower abdominal pain/groin pain on left. Rule out stone.     COMPARISON: September 21, 2014    TECHNIQUE: Volumetric helical acquisition of CT images from the lung  bases through the symphysis pubis without intravenous contrast.  Radiation dose for this scan was reduced using automated exposure  control, adjustment of the mA and/or kV according to patient size, or  iterative reconstruction technique.    FINDINGS: No hydronephrosis or urolithiasis. Minimal asymmetry of the  iliopsoas, left greater than right, indicating a small amount of  intramuscular hemorrhage. Please see CT report same date of the left  hip. No free air or free fluid. No bowel obstruction. There is mild  diverticulosis without evidence for diverticulitis. There are moderate  atherosclerotic changes of the visualized aorta and its branches.  There is no evidence of aortic aneurysm. There is cholelithiasis  without evidence for cholecystitis. Moderate hiatal hernia. The liver,  spleen, adrenal glands, kidneys, and pancreas demonstrate no worrisome  focal lesion in the absence of intravenous contrast. Lung bases clear  of acute infiltrates. Trace peripheral fibrosis and/or atelectasis.       Impression    IMPRESSION:  1. Question some minimal intramuscular hematoma in the iliopsoas on  the left. Please see CT report of the left hip for additional regional  findings.  2. Otherwise no acute process demonstrated in the abdomen and pelvis.     DEANN CROSS MD   CT Hip Left w/o Contrast    Narrative    CT HIP LEFT WITHOUT CONTRAST 10/13/2018 11:56 AM     HISTORY: Left hip pain/groin pain, rule out occult fracture.      TECHNIQUE: Axial images with reconstructions. Radiation dose for this  scan was reduced using automated exposure control, adjustment of the  mA and/or kV according to patient size, or iterative reconstruction  technique.      COMPARISON: 11/19/2017.     FINDINGS: There appears to be some focal fluid density adjacent to the  left ischial tuberosity. There is loss of cortex at the superior  aspect of the left ischial tuberosity. These findings suggest the  possibility of common hamstring tendon rupture. There is edema  adjacent to the left iliopsoas muscle, raising the possibility of  tendon rupture. Degenerative change of the lower lumbar spine.  Anterolisthesis at L5-S1. Chondrocalcinosis of the symphysis pubis,  consistent with calcium pyrophosphate deposition disease. No hip or  pubic fracture is identified. Incidentally noted colonic  diverticulosis.      Impression    IMPRESSION:  1. Findings raising the possibility of left common hamstring tendon  rupture and iliopsoas tendon rupture. MRI recommended, if indicated  clinically.  2. Additional findings discussed above.     JUAN OROZCO MD   CBC with platelets differential   Result Value Ref Range    WBC 8.0 4.0 - 11.0 10e9/L    RBC Count 3.19 (L) 3.8 - 5.2 10e12/L    Hemoglobin 9.8 (L) 11.7 - 15.7 g/dL    Hematocrit 28.8 (L) 35.0 - 47.0 %    MCV 90 78 - 100 fl    MCH 30.7 26.5 - 33.0 pg    MCHC 34.0 31.5 - 36.5 g/dL    RDW 14.1 10.0 - 15.0 %    Platelet Count 263 150 - 450 10e9/L    Diff Method Automated Method     % Neutrophils 73.7 %    %  Lymphocytes 14.6 %    % Monocytes 9.2 %    % Eosinophils 1.2 %    % Basophils 0.2 %    % Immature Granulocytes 1.1 %    Nucleated RBCs 0 0 /100    Absolute Neutrophil 5.9 1.6 - 8.3 10e9/L    Absolute Lymphocytes 1.2 0.8 - 5.3 10e9/L    Absolute Monocytes 0.7 0.0 - 1.3 10e9/L    Absolute Eosinophils 0.1 0.0 - 0.7 10e9/L    Absolute Basophils 0.0 0.0 - 0.2 10e9/L    Abs Immature Granulocytes 0.1 0 - 0.4 10e9/L    Absolute Nucleated RBC 0.0    Basic metabolic panel   Result Value Ref Range    Sodium 126 (L) 133 - 144 mmol/L    Potassium 4.0 3.4 - 5.3 mmol/L    Chloride 94 94 - 109 mmol/L    Carbon Dioxide 24 20 - 32 mmol/L    Anion Gap 8 3 - 14 mmol/L    Glucose 108 (H) 70 - 99 mg/dL    Urea Nitrogen 12 7 - 30 mg/dL    Creatinine 0.66 0.52 - 1.04 mg/dL    GFR Estimate 86 >60 mL/min/1.7m2    GFR Estimate If Black >90 >60 mL/min/1.7m2    Calcium 8.7 8.5 - 10.1 mg/dL   UA with Microscopic   Result Value Ref Range    Color Urine Yellow     Appearance Urine Cloudy     Glucose Urine Negative NEG^Negative mg/dL    Bilirubin Urine Negative NEG^Negative    Ketones Urine Negative NEG^Negative mg/dL    Specific Gravity Urine 1.008 1.003 - 1.035    Blood Urine Small (A) NEG^Negative    pH Urine 7.0 5.0 - 7.0 pH    Protein Albumin Urine Negative NEG^Negative mg/dL    Urobilinogen mg/dL 0.0 0.0 - 2.0 mg/dL    Nitrite Urine Negative NEG^Negative    Leukocyte Esterase Urine Large (A) NEG^Negative    Source Catheterized Urine     WBC Urine >182 (H) 0 - 5 /HPF    RBC Urine 45 (H) 0 - 2 /HPF    WBC Clumps Present (A) NEG^Negative /HPF   Basic metabolic panel   Result Value Ref Range    Sodium 129 (L) 133 - 144 mmol/L    Potassium 4.0 3.4 - 5.3 mmol/L    Chloride 100 94 - 109 mmol/L    Carbon Dioxide 22 20 - 32 mmol/L    Anion Gap 7 3 - 14 mmol/L    Glucose 100 (H) 70 - 99 mg/dL    Urea Nitrogen 10 7 - 30 mg/dL    Creatinine 0.63 0.52 - 1.04 mg/dL    GFR Estimate >90 >60 mL/min/1.7m2    GFR Estimate If Black >90 >60 mL/min/1.7m2     Calcium 8.3 (L) 8.5 - 10.1 mg/dL   CBC with platelets   Result Value Ref Range    WBC 6.6 4.0 - 11.0 10e9/L    RBC Count 3.04 (L) 3.8 - 5.2 10e12/L    Hemoglobin 9.3 (L) 11.7 - 15.7 g/dL    Hematocrit 27.8 (L) 35.0 - 47.0 %    MCV 91 78 - 100 fl    MCH 30.6 26.5 - 33.0 pg    MCHC 33.5 31.5 - 36.5 g/dL    RDW 14.1 10.0 - 15.0 %    Platelet Count 233 150 - 450 10e9/L   Basic metabolic panel   Result Value Ref Range    Sodium 130 (L) 133 - 144 mmol/L    Potassium 3.8 3.4 - 5.3 mmol/L    Chloride 100 94 - 109 mmol/L    Carbon Dioxide 22 20 - 32 mmol/L    Anion Gap 8 3 - 14 mmol/L    Glucose 92 70 - 99 mg/dL    Urea Nitrogen 9 7 - 30 mg/dL    Creatinine 0.59 0.52 - 1.04 mg/dL    GFR Estimate >90 >60 mL/min/1.7m2    GFR Estimate If Black >90 >60 mL/min/1.7m2    Calcium 8.5 8.5 - 10.1 mg/dL   Platelet count   Result Value Ref Range    Platelet Count 233 150 - 450 10e9/L   Orthopedic Surgery IP Consult: Patient to be seen: Routine - within 24 hours; pt with left hamstring and ileopsoas tendon rupture. Pt is mainly wheelchair bound but does pivot and states working with PT to walk again. Pls evel and advise activity ...    Narrative    Elvira Camejo PA-C     10/14/2018  9:09 PM  [facility] Orthopedic Consultation    Ana Luisa Lee MRN# [id number]   Age: 78 year old YOB: 1940     Date of Admission:  10/13/2018    Reason for consult: Left groin pain       Requesting physician: LOBO Robertson       Level of consult: One-time consult to assist in determining a   diagnosis and to recommend an appropriate treatment plan           Assessment and Plan:   Assessment:   Left groin pain       Plan:   Not recommending MRI at this time  Mobilize with PT  WBAT w/ walker   Pain control            Chief Complaint:   Left groin pain          History of Present Illness:   This patient is a 78 year old female who presents with the   following condition requiring a hospital admission:    Patient  resides in assisted living and states she has been   wheelchair bound for the past couple of years. She denies any   recent fall or mechanism of injury to her left hip. She states   her left hip pain has been present for the past 2 weeks,   gradually worsening. She presented to the ED yesterday for   evaluation of her groin pain.     Patient does note she has seen her PCP about this pain and   reports a negative XR and US of leg. She states he ordered PT for   her to start tomorrow at her assisted living facility.           Past Medical History:     Past Medical History:   Diagnosis Date     Anemia Dec 2011     Arthritis of hand      Asthma, persistent     f/U dr Brock at [facility]     Chronic intermittent steroid use     for asthma     COPD exacerbation (H) 10/25/2013     Esophageal stricture 2007    s/p dilation     HTN, goal below 140/90      Hyperlipidemia      Hypothyroidism      Osteoporosis      Paroxysmal supraventricular tachycardia (H)      PVC (premature ventricular contraction) May 2012     SVT (supraventricular tachycardia) (H)      Type 2 diabetes mellitus without complication, without   long-term current use of insulin (H) 8/2/2018             Past Surgical History:     Past Surgical History:   Procedure Laterality Date     BUNIONECTOMY LAPIDUS WITH TARSAL METATARSAL (TMT) FUSION    1990's     HYSTERECTOMY TOTAL ABDOMINAL       TONSILLECTOMY               Social History:     Social History   Substance Use Topics     Smoking status: Former Smoker     Packs/day: 1.00     Years: 15.00     Quit date: 1/1/1980     Smokeless tobacco: Never Used     Alcohol use No      Comment: Occasional drink             Family History:     Family History   Problem Relation Age of Onset     Cerebrovascular Disease Mother      Hypertension Mother      Family History Negative Father      Unknown/Adopted Maternal Grandmother      Unknown/Adopted Maternal Grandfather      Unknown/Adopted Paternal Grandmother       Unknown/Adopted Paternal Grandfather      Family History Negative Brother      Family History Negative Sister      Family History Negative Son      Family History Negative Daughter      Asthma No family hx of      C.A.D. No family hx of      Diabetes No family hx of      Cancer No family hx of              Immunizations:     VACCINE/DOSE   Diptheria   DPT   DTAP   HBIG   Hepatitis A   Hepatitis B   HIB   Influenza   Measles   Meningococcal   MMR   Mumps   Pneumococcal   Polio   Rubella   Small Pox   TDAP   Varicella   Zoster             Allergies:     Allergies   Allergen Reactions     Hydralazine Anxiety     Penicillin G Hives     Tolerated cephalosporines 2017     Meloxicam      dizziness       Metoprolol      ? Skin rash on the back     Norvasc [Amlodipine Besylate] Hives             Medications:     Current Facility-Administered Medications   Medication     acetaminophen (TYLENOL) tablet 1,000 mg     cephALEXin (KEFLEX) capsule 500 mg     diazepam (VALIUM) tablet 2 mg     enoxaparin (LOVENOX) injection 40 mg     fexofenadine (ALLEGRA) tablet 180 mg     hydrOXYzine (ATARAX) tablet 25 mg     ipratropium - albuterol 0.5 mg/2.5 mg/3 mL (DUONEB) neb   solution 3 mL     levalbuterol (XOPENEX) neb solution 0.63 mg     levothyroxine (SYNTHROID/LEVOTHROID) tablet 25 mcg     Lidocaine (LIDOCARE) 4 % Patch 2 patch    And     [START ON 10/15/2018] lidocaine patch REMOVAL    And     lidocaine patch in PLACE     loperamide (IMODIUM) capsule 2 mg     meclizine (ANTIVERT) tablet 25 mg     melatonin tablet 1 mg     montelukast (SINGULAIR) tablet 10 mg     naloxone (NARCAN) injection 0.1-0.4 mg     omeprazole (priLOSEC) CR capsule 40 mg     ondansetron (ZOFRAN-ODT) ODT tab 4 mg    Or     ondansetron (ZOFRAN) injection 4 mg     ondansetron (ZOFRAN-ODT) ODT tab 4 mg     polyethylene glycol (MIRALAX/GLYCOLAX) Packet 17 g     predniSONE (DELTASONE) tablet 10 mg     roflumilast (DALIRESP) tablet 500 mcg     senna-docusate  (SENOKOT-S;PERICOLACE) 8.6-50 MG per tablet 1   tablet    Or     senna-docusate (SENOKOT-S;PERICOLACE) 8.6-50 MG per tablet 2   tablet     sodium chloride 0.9% infusion     tamsulosin (FLOMAX) capsule 0.4 mg     traMADol (ULTRAM) half-tab 25 mg     traMADol (ULTRAM) half-tab 25 mg             Review of Systems:   CV: NEGATIVE for chest pain, palpitations or peripheral edema  C: NEGATIVE for fever, chills, change in weight  E/M: NEGATIVE for ear, mouth and throat problems  R: NEGATIVE for significant cough or SOB     10 point review of systems negative aside from HPI and physical   exam.           Physical Exam:   All vitals have been reviewed  Patient Vitals for the past 24 hrs:   BP Temp Temp src Pulse Heart Rate Resp SpO2   10/14/18 1938 - - - - - - 98 %   10/14/18 1929 118/59 98.5  F (36.9  C) Oral 89 - 20 97 %   10/14/18 1559 - - - - - - 97 %   10/14/18 1541 135/66 98.2  F (36.8  C) Oral 88 - 20 97 %   10/14/18 1256 133/64 - Oral - 103 16 97 %   10/14/18 1157 - - - - 90 20 97 %   10/14/18 0758 141/73 - - - 99 24 98 %   10/14/18 0739 - - - - 90 18 96 %   10/14/18 0443 151/76 98.4  F (36.9  C) Oral - 97 20 97 %   10/13/18 2344 128/69 99.6  F (37.6  C) Oral - 94 20 98 %   10/13/18 2103 130/69 99.8  F (37.7  C) Oral 100 - 20 96 %       Intake/Output Summary (Last 24 hours) at 10/14/18 2058  Last data filed at 10/14/18 2052   Gross per 24 hour   Intake             2513 ml   Output             1950 ml   Net              563 ml     Constitutional: Pleasant, alert, appropriate, following commands.  HEENT: Head atraumatic normocephalic. PERRLA.  Respiratory: Unlabored breathing no audible wheeze  Cardiovascular: Regular rate and rhythm  GI: Abdomen soft, non tender, and non distended.  Lymph/Hematologic: No edema or palor  Skin: No rashes, no cyanosis, no edema.  Neuro: Sensation intact to light touch in bilateral lower   extremities.  Musculoskeletal:   Patient laying comfortably in bed   No ecchymosis or erythema over  entirety of the left leg  No notable swelling or edema  Hip flexes to 90 degrees, bilaterally   No pain with internal/external rotation while in flexion  No TTP over great trochanter  Able to SLR on right, unable to SLR on left secondary to pain   Full ROM of left and right knee - 0 to 120 degrees without pain  Bilateral calves are soft, non-tender.  Bilateral lower extremity is NVI.  Sensation intact bilateral lower extremities  5/5 motor with resisted dorsi and plantar flexion on left, 4/5 on   right (patient states RLE weakness per baseline)   5/5 EHL  +Dp pulse            Data:   All laboratory data reviewed  Results for orders placed or performed during the hospital   encounter of 10/13/18   XR Pelvis and Hip Left 2 Views    Narrative    PELVIS WITH UNILATERAL HIP TWO VIEWS LEFT  10/13/2018 9:17 AM     HISTORY: left hip pain/groin pain. rule out fracture;     COMPARISON: None.    FINDINGS: There is normal osseous alignment.  No fractures are  identified.      Impression    IMPRESSION: Osseous structures appear intact. No fractures are  identified.    WILMA CARBAJAL MD   Abd/pelvis CT no contrast - Stone Protocol    Narrative    CT ABDOMEN AND PELVIS WITHOUT CONTRAST 10/13/2018 11:56 AM     HISTORY: Lower abdominal pain/groin pain on left. Rule out stone.       COMPARISON: September 21, 2014    TECHNIQUE: Volumetric helical acquisition of CT images from the   lung  bases through the symphysis pubis without intravenous contrast.  Radiation dose for this scan was reduced using automated exposure  control, adjustment of the mA and/or kV according to patient   size, or  iterative reconstruction technique.    FINDINGS: No hydronephrosis or urolithiasis. Minimal asymmetry of   the  iliopsoas, left greater than right, indicating a small amount of  intramuscular hemorrhage. Please see CT report same date of the   left  hip. No free air or free fluid. No bowel obstruction. There is   mild  diverticulosis without evidence for  diverticulitis. There are   moderate  atherosclerotic changes of the visualized aorta and its branches.  There is no evidence of aortic aneurysm. There is cholelithiasis  without evidence for cholecystitis. Moderate hiatal hernia. The   liver,  spleen, adrenal glands, kidneys, and pancreas demonstrate no   worrisome  focal lesion in the absence of intravenous contrast. Lung bases   clear  of acute infiltrates. Trace peripheral fibrosis and/or   atelectasis.      Impression    IMPRESSION:  1. Question some minimal intramuscular hematoma in the iliopsoas   on  the left. Please see CT report of the left hip for additional   regional  findings.  2. Otherwise no acute process demonstrated in the abdomen and   pelvis.     DEANN CROSS MD   CT Hip Left w/o Contrast    Narrative    CT HIP LEFT WITHOUT CONTRAST 10/13/2018 11:56 AM     HISTORY: Left hip pain/groin pain, rule out occult fracture.      TECHNIQUE: Axial images with reconstructions. Radiation dose for   this  scan was reduced using automated exposure control, adjustment of   the  mA and/or kV according to patient size, or iterative   reconstruction  technique.      COMPARISON: 11/19/2017.     FINDINGS: There appears to be some focal fluid density adjacent   to the  left ischial tuberosity. There is loss of cortex at the superior  aspect of the left ischial tuberosity. These findings suggest the  possibility of common hamstring tendon rupture. There is edema  adjacent to the left iliopsoas muscle, raising the possibility of  tendon rupture. Degenerative change of the lower lumbar spine.  Anterolisthesis at L5-S1. Chondrocalcinosis of the symphysis   pubis,  consistent with calcium pyrophosphate deposition disease. No hip   or  pubic fracture is identified. Incidentally noted colonic  diverticulosis.      Impression    IMPRESSION:  1. Findings raising the possibility of left common hamstring   tendon  rupture and iliopsoas tendon rupture. MRI recommended, if    indicated  clinically.  2. Additional findings discussed above.     JUAN OROZCO MD   CBC with platelets differential   Result Value Ref Range    WBC 8.0 4.0 - 11.0 10e9/L    RBC Count 3.19 (L) 3.8 - 5.2 10e12/L    Hemoglobin 9.8 (L) 11.7 - 15.7 g/dL    Hematocrit 28.8 (L) 35.0 - 47.0 %    MCV 90 78 - 100 fl    MCH 30.7 26.5 - 33.0 pg    MCHC 34.0 31.5 - 36.5 g/dL    RDW 14.1 10.0 - 15.0 %    Platelet Count 263 150 - 450 10e9/L    Diff Method Automated Method     % Neutrophils 73.7 %    % Lymphocytes 14.6 %    % Monocytes 9.2 %    % Eosinophils 1.2 %    % Basophils 0.2 %    % Immature Granulocytes 1.1 %    Nucleated RBCs 0 0 /100    Absolute Neutrophil 5.9 1.6 - 8.3 10e9/L    Absolute Lymphocytes 1.2 0.8 - 5.3 10e9/L    Absolute Monocytes 0.7 0.0 - 1.3 10e9/L    Absolute Eosinophils 0.1 0.0 - 0.7 10e9/L    Absolute Basophils 0.0 0.0 - 0.2 10e9/L    Abs Immature Granulocytes 0.1 0 - 0.4 10e9/L    Absolute Nucleated RBC 0.0    Basic metabolic panel   Result Value Ref Range    Sodium 126 (L) 133 - 144 mmol/L    Potassium 4.0 3.4 - 5.3 mmol/L    Chloride 94 94 - 109 mmol/L    Carbon Dioxide 24 20 - 32 mmol/L    Anion Gap 8 3 - 14 mmol/L    Glucose 108 (H) 70 - 99 mg/dL    Urea Nitrogen 12 7 - 30 mg/dL    Creatinine 0.66 0.52 - 1.04 mg/dL    GFR Estimate 86 >60 mL/min/1.7m2    GFR Estimate If Black >90 >60 mL/min/1.7m2    Calcium 8.7 8.5 - 10.1 mg/dL   UA with Microscopic   Result Value Ref Range    Color Urine Yellow     Appearance Urine Cloudy     Glucose Urine Negative NEG^Negative mg/dL    Bilirubin Urine Negative NEG^Negative    Ketones Urine Negative NEG^Negative mg/dL    Specific Gravity Urine 1.008 1.003 - 1.035    Blood Urine Small (A) NEG^Negative    pH Urine 7.0 5.0 - 7.0 pH    Protein Albumin Urine Negative NEG^Negative mg/dL    Urobilinogen mg/dL 0.0 0.0 - 2.0 mg/dL    Nitrite Urine Negative NEG^Negative    Leukocyte Esterase Urine Large (A) NEG^Negative    Source Catheterized Urine     WBC Urine >182 (H) 0  - 5 /HPF    RBC Urine 45 (H) 0 - 2 /HPF    WBC Clumps Present (A) NEG^Negative /HPF   Basic metabolic panel   Result Value Ref Range    Sodium 129 (L) 133 - 144 mmol/L    Potassium 4.0 3.4 - 5.3 mmol/L    Chloride 100 94 - 109 mmol/L    Carbon Dioxide 22 20 - 32 mmol/L    Anion Gap 7 3 - 14 mmol/L    Glucose 100 (H) 70 - 99 mg/dL    Urea Nitrogen 10 7 - 30 mg/dL    Creatinine 0.63 0.52 - 1.04 mg/dL    GFR Estimate >90 >60 mL/min/1.7m2    GFR Estimate If Black >90 >60 mL/min/1.7m2    Calcium 8.3 (L) 8.5 - 10.1 mg/dL   CBC with platelets   Result Value Ref Range    WBC 6.6 4.0 - 11.0 10e9/L    RBC Count 3.04 (L) 3.8 - 5.2 10e12/L    Hemoglobin 9.3 (L) 11.7 - 15.7 g/dL    Hematocrit 27.8 (L) 35.0 - 47.0 %    MCV 91 78 - 100 fl    MCH 30.6 26.5 - 33.0 pg    MCHC 33.5 31.5 - 36.5 g/dL    RDW 14.1 10.0 - 15.0 %    Platelet Count 233 150 - 450 10e9/L   Urine Culture   Result Value Ref Range    Specimen Description Catheterized Urine     Special Requests Specimen received in preservative     Culture Micro Culture in progress      *Note: Due to a large number of results and/or encounters for the   requested time period, some results have not been displayed. A   complete set of results can be found in Results Review.          Attestation:  I have reviewed today's vital signs, notes, medications, labs and   imaging with Dr. Guerrero.  Amount of time performed on this consult: 25 minutes.    Elvira Camejo PA-C       Urine Culture   Result Value Ref Range    Specimen Description Catheterized Urine     Special Requests Specimen received in preservative     Culture Micro (A)      10,000 to 50,000 colonies/mL  Coagulase negative Staphylococcus  Susceptibility testing in progress       *Note: Due to a large number of results and/or encounters for the requested time period, some results have not been displayed. A complete set of results can be found in Results Review.       Ariella Vela PA-C

## 2018-10-15 NOTE — PLAN OF CARE
Problem: Patient Care Overview  Goal: Plan of Care/Patient Progress Review  Outcome: No Change  PRIMARY DIAGNOSIS: ACUTE PAIN  OUTPATIENT/OBSERVATION GOALS TO BE MET BEFORE DISCHARGE:  1. Pain Status: Improved-controlled with oral pain medications.     2. Return to near baseline physical activity: Yes - Ax2 with Mercedes Steady. Possibly wheelchair bound at baseline.      3. Cleared for discharge by consultants (if involved): No - PT/SW     Reports continued 8/10 L groin/leg pain. Appears to be resting comfortably between cares; sleeping each time writer checked in. Sodium low; PO sodium tablets ordered. Reports dizziness with activity and at rest; scheduled Meclizine ordered. + bowel movement today. Bladder scan for 354 mL post void. Awaiting PT consult to determine discharge plan.      Discharge Planner Nurse   Safe discharge environment identified: Yes  Barriers to discharge: Yes       Entered by: Ira Cash 10/15/2018       Please review provider order for any additional goals.   Nurse to notify provider when observation goals have been met and patient is ready for discharge.

## 2018-10-15 NOTE — PROGRESS NOTES
Problem: Patient Care Overview  Goal: Plan of Care/Patient Progress Review  PRIMARY DIAGNOSIS: Urinary Retention/UTI  OUTPATIENT/OBSERVATION GOALS TO BE MET BEFORE DISCHARGE:  1. ADLs back to baseline: No     2. Activity and level of assistance: Up with maximum assistance.2 assist plus adriano steady.      3. Pain status: Improved-controlled with oral pain medications.     4. Return to near baseline physical activity: No      Discharge Planner Nurse   Safe discharge environment identified: Yes  Barriers to discharge: Yes       Entered by: Vero Byrd 10/14/2018 4:44 PM  Please review provider order for any additional goals.   Nurse to notify provider when observation goals have been met and patient is ready for discharge.     Patient is alert and oriented x4, but patient can be forgetful. VS WNL and documented on the FS. Lung sounds diminished in all lobes (patient on nebs). Patient is on RA and denies SOB. Active bowel sounds in all 4 quadrants with LBM 10/12 (stool softeners being given). Patient was able to void 250 ml, but bladder scanned 341, but patient will try to void again in 1 hour. Patient is on Keflex for a UTI. We will continue to have patient void and bladder scan throughout the shift. Patient rating left groin pain a 9/10 and is being treated with Tylenol, Lidocaine patches, Valium, Atarax, and Tramadol. Patient is WBAT on (L) extremity. Patient is on IV fluids that are infusing at 75 ml/hr. Patient tolerating a regular diet. (R) arm skin tear is covered and dry and intact. Patient is a very heavy assist of 2 with adriano steady to bathroom. Waiting on orthopedics to eval patient (will page out again).      Plan: Ortho consult today, bladder scans,IV fluids, Trial Valium, Meclizine Q6 hrs.     /59  Pulse 89  Temp 98.5  F (36.9  C) (Oral)  Resp 20  SpO2 98%

## 2018-10-15 NOTE — PROGRESS NOTES
Talked to Dr. Flores regarding ortho consult. He stated his PA, Ariella Camejo, was suppose to see the patient today, but if she didn't [facility] will see her right away in the morning.

## 2018-10-15 NOTE — PLAN OF CARE
"Problem: Patient Care Overview  Goal: Plan of Care/Patient Progress Review  Outcome: No Change  PRIMARY DIAGNOSIS: ACUTE PAIN  OUTPATIENT/OBSERVATION GOALS TO BE MET BEFORE DISCHARGE:  1. Pain Status: Improved-controlled with oral pain medications.      2. Return to near baseline physical activity: Yes - Ax2 with Mercedes Stephenson.       3. Cleared for discharge by consultants (if involved): No - PT/SW      Patient reports groin/leg pain improved; \"mild\" after scheduled Tylenol/Tramadol given. Word scale easier for patient to use than number scale. Up with Mercedes Stephenson and Ax2 today to bathroom, but was able to take a few steps with PT. Patient did not want to attempt ambulating from chair to bed with staff this afternoon. Down at MRI at this time to assess lower extremity symptoms. Will await MRI results. May need TCU at discharge.       Discharge Planner Nurse   Safe discharge environment identified: Yes  Barriers to discharge: Yes       Entered by: Ira Fairbanks 10/15/2018        Please review provider order for any additional goals.   Nurse to notify provider when observation goals have been met and patient is ready for discharge.              "

## 2018-10-15 NOTE — PROGRESS NOTES
10/15/18 1305   Quick Adds   Type of Visit Initial PT Evaluation   Living Environment   Lives With alone   Living Arrangements assisted living   Home Accessibility grab bars present (toilet);grab bars present (bathtub)   Self-Care   Usual Activity Tolerance poor   Regular Exercise yes   Activity/Exercise Type walking;strength training   Exercise Amount/Frequency 2 times/wk   Equipment Currently Used at Home wheelchair, manual;shower chair;grab bar;walker, rolling   Activity/Exercise/Self-Care Comment Varying reports of activity at times she reports its been a month since she has been able to ambulate, then she reports it has been just a couple days that she has been using the W/C.    Functional Level Prior   Ambulation 1-->assistive equipment  (varying report from pt)   Transferring 1-->assistive equipment   Toileting 1-->assistive equipment   Bathing 3-->assistive equipment and person   Dressing 2-->assistive person   Eating 0-->independent   Communication 0-->understands/communicates without difficulty   Swallowing 0-->swallows foods/liquids without difficulty   Cognition 1 - attention or memory deficits   Fall history within last six months yes   Number of times patient has fallen within last six months 1   Prior Functional Level Comment Pt reports does breakfast and lunch in her apartment. Dinner in the cafeteria.   General Information   Onset of Illness/Injury or Date of Surgery - Date 10/13/18   Referring Physician Ariella Vela PA-C   Patient/Family Goals Statement Pt hoping to improve activity tolerance, decrease dizziness   Pertinent History of Current Problem (include personal factors and/or comorbidities that impact the POC) Ana Luisa Lee is a 78 year old female with PMH significant for mild dementia, asthma and COPD on chronic Prednisone, esophageal stricture s/p dilation (2007), HTN, HLD, hypothyroidism, PSVT, DM2, and who resides in an AL. Pt has been improving. at home from being W/C bound to  improving to walking with PT and staff. Recently PT had recommended decreasing use of W/C.    Precautions/Limitations fall precautions   General Info Comments Pt having a difficult time with relaying her past functional mobility   Cognitive Status Examination   Orientation person;time  (oriented, then other times more confused with place)   Level of Consciousness alert   Follows Commands and Answers Questions 75% of the time;able to follow single-step instructions   Personal Safety and Judgment intact   Memory impaired   Range of Motion (ROM)   ROM Comment limited ROM in L hip flexion 90 degrees, limited extension, pain with attempts at lying flat   Strength   Strength Comments 1/5 on R DF. Pt has 3-/5 L hip flexion, painful with this performance.   Bed Mobility   Bed Mobility Comments mod A x 1   Transfer Skills   Transfer Comments Pt able to perform sit to stand with CGA x 2.    Gait   Gait Comments Pt able to take 2-3 steps to chair with mod A x 1,  CGA x 1   Balance   Balance Comments poor needing A x 1 for transfers   Sensory Examination   Sensory Perception Comments no numbness reported   Clinical Impression   Criteria for Skilled Therapeutic Intervention evaluation only   PT Diagnosis decreased functional mobility status post L tendon rupture   Influenced by the following impairments pain, decreased strength, decreased ROM    Functional limitations due to impairments decreased transfers, ambulation   Clinical Presentation Evolving/Changing   Clinical Presentation Rationale cognition, pain and dizziness   Clinical Decision Making (Complexity) Moderate complexity   Therapy Frequency` (eval only)   Predicted Duration of Therapy Intervention (days/wks) (eval only)   Anticipated Discharge Disposition Transitional Care Facility;Home with Home Therapy;Home with Assist   Risk & Benefits of therapy have been explained Yes   Patient, Family & other staff in agreement with plan of care Yes   [facility] AM-PAC TM  "\"6 Clicks\"   2016, Trustees of [facility], under license to [employer].  All rights reserved.   6 Clicks Short Forms Basic Mobility Inpatient Short Form   [facility] AM-PAC  \"6 Clicks\" V.2 Basic Mobility Inpatient Short Form   1. Turning from your back to your side while in a flat bed without using bedrails? 2 - A Lot   2. Moving from lying on your back to sitting on the side of a flat bed without using bedrails? 2 - A Lot   3. Moving to and from a bed to a chair (including a wheelchair)? 2 - A Lot   4. Standing up from a chair using your arms (e.g., wheelchair, or bedside chair)? 2 - A Lot   5. To walk in hospital room? 1 - Total   6. Climbing 3-5 steps with a railing? 1 - Total   Basic Mobility Raw Score (Score out of 24.Lower scores equate to lower levels of function) 10   Total Evaluation Time   Total Evaluation Time (Minutes) 10     "

## 2018-10-15 NOTE — PLAN OF CARE
Problem: Patient Care Overview  Goal: Plan of Care/Patient Progress Review  PRIMARY DIAGNOSIS: Urinary retention and UTI  OUTPATIENT/OBSERVATION GOALS TO BE MET BEFORE DISCHARGE:  1. ADLs back to baseline: Yes    2. Activity and level of assistance: A2 with gait belt and adriano steady    3. Pain status: Improved-controlled with oral pain medications.    4. Return to near baseline physical activity: No     Vitals are Temp: 97.4  F (36.3  C) Temp src: Oral BP: 168/76 Pulse: 85 Heart Rate: 103 Resp: 20 SpO2: 98 %.    Patient is Alert and Oriented x4 but forgetful. They are 2 assist with Gait Belt and Adriano Steady.  Pt is a Regular diet and complaining of 7/10 pain in their left leg.  Atarax given for pain.  Patient has Normal Saline 0.9% running at 75 mL per hour.  Neuros are intact.  Pt had 600mL of urine and was bladder scanned after for 72mL.  Plan of care is for patient to have PT in the morning.    Discharge Planner Nurse   Safe discharge environment identified: Yes  Barriers to discharge: Yes        Please review provider order for any additional goals.   Nurse to notify provider when observation goals have been met and patient is ready for discharge.

## 2018-10-15 NOTE — PROGRESS NOTES
Discharge Planner   Discharge Plans in progress: Yes  Barriers to discharge plan: Patient currently requiring assist of 2 and adrianoaugustina bedolla for transfers.   Spoke with PUMA Khan at [facility] who stated that prior to admission patient was ambulating with a walker and had previously been using a wheelchair but wheelchair was recently removed as patient was no longer requiring. PUMA Khan stated concerns with patient returning to [facility] when requiring assist of 2 and utilizing Lidoderm patches (patches are not covered as outpatient) for pain control.  Follow up plan: CM to continue to follow for discharge needs. Follow up with pain management plan and PT recommendations. [facility] to continue to assess if able to meet patient needs for transfers and ambulation.         Entered by: Ivis Colin 10/15/2018 10:44 AM

## 2018-10-15 NOTE — PLAN OF CARE
Problem: Patient Care Overview  Goal: Plan of Care/Patient Progress Review  PRIMARY DIAGNOSIS: Urinary retention and UTI  OUTPATIENT/OBSERVATION GOALS TO BE MET BEFORE DISCHARGE:  1. ADLs back to baseline: Yes     2. Activity and level of assistance: A2 with gait belt and adriano steady     3. Pain status: Improved-controlled with oral pain medications.     4. Return to near baseline physical activity: No      Vitals are Temp: 97.4  F (36.3  C) Temp src: Oral BP: 184/89 Pulse: 85 Heart Rate: 87 Resp: 18 SpO2: 95 %  Patient is Alert and Oriented x4 but forgetful. They are 2 assist with Gait Belt and Adriano Steady.  Pt is a Regular diet and complaining of 7/10 pain in their left leg.  Atarax and tramadol given for pain.  Patient has Normal Saline 0.9% running at 75 mL per hour.  Neuros are intact.  Pt is feeling dizzy and meclizine and valium were given.  Pt Right skin tear dressing is CDI.  Pt had 600mL of urine and was bladder scanned after for 72mL and then later went 400 and was scanned for 150.  Plan of care is for patient to have PT in the morning.     Discharge Planner Nurse   Safe discharge environment identified: Yes  Barriers to discharge: Yes        Please review provider order for any additional goals.   Nurse to notify provider when observation goals have been met and patient is ready for discharge.

## 2018-10-15 NOTE — PLAN OF CARE
Problem: Patient Care Overview  Goal: Plan of Care/Patient Progress Review  PT: PT eval completed. Pt here with new L groin pain found to have possible iliopsoas and hamstring rupture. Pt also reporting vague symptoms of dizziness. Pt lives in [place]. Pt was able to ambulate with assistance at home and was getting assist PRN at home for all cares.   Discharge Planner PT   Patient plan for discharge: none stated  Current status: Pt needing mod A for supine to sit due to L hip pain altagracia with bed flat. Pt was also reports vague dizziness with sitting up on EOB. Pt was able to perform 2-3 steps to chair with mod A, CGA of another. Noted R foot drop.   Barriers to return to prior living situation: Pt does have A x 1 PRN, but is below baseline with inability to take more than 2-3 steps.   Recommendations for discharge: Would benefit from TCU, but if family/pt refuses pt could discharge home with continued work with PT  Rationale for recommendations: Pt has assist x 1 with activity at home but has been able to ambulate prior to this tendon rupture and is below baseline.        Entered by: Mae Loza 10/15/2018 2:23 PM

## 2018-10-15 NOTE — PLAN OF CARE
Problem: Patient Care Overview  Goal: Plan of Care/Patient Progress Review  Outcome: Improving  PRIMARY DIAGNOSIS: ACUTE PAIN  OUTPATIENT/OBSERVATION GOALS TO BE MET BEFORE DISCHARGE:  1. Pain Status: Improved-controlled with oral pain medications.    2. Return to near baseline physical activity: Yes - Ax2 with Mercedes Steady. Wheelchair bound at baseline.     3. Cleared for discharge by consultants (if involved): No - PT/SW    Reports 8/10 L groin pain, as well as shoulder/generalized pain. Chronic numbness to feet. Strength 4/5 in BLE's. Appears comfortable at rest. Scheduled Tylenol given. Scheduled Tramadol held due to drowsiness. Voided 200 mL with post void bladder scan of 200 mL. Denies urinary symptoms. Awaiting PT consult to determine discharge plan.     Discharge Planner Nurse   Safe discharge environment identified: Yes  Barriers to discharge: Yes       Entered by: Ira Cash 10/15/2018      Please review provider order for any additional goals.   Nurse to notify provider when observation goals have been met and patient is ready for discharge.

## 2018-10-15 NOTE — CONSULTS
[facility] Orthopedic Consultation    Ana Luisa Lee MRN# [id number]   Age: 78 year old YOB: 1940     Date of Admission:  10/13/2018    Reason for consult: Left groin pain       Requesting physician: LOBO Robertson       Level of consult: One-time consult to assist in determining a diagnosis and to recommend an appropriate treatment plan           Assessment and Plan:   Assessment:   Left groin pain       Plan:   Not recommending MRI at this time  Mobilize with PT  WBAT w/ walker   Pain control            Chief Complaint:   Left groin pain          History of Present Illness:   This patient is a 78 year old female who presents with the following condition requiring a hospital admission:    Patient resides in assisted living and states she has been wheelchair bound for the past couple of years. She denies any recent fall or mechanism of injury to her left hip. She states her left hip pain has been present for the past 2 weeks, gradually worsening. She presented to the ED yesterday for evaluation of her groin pain.     Patient does note she has seen her PCP about this pain and reports a negative XR and US of leg. She states he ordered PT for her to start tomorrow at her assisted living facility.           Past Medical History:     Past Medical History:   Diagnosis Date     Anemia Dec 2011     Arthritis of hand      Asthma, persistent     f/U dr Brock at [facility]     Chronic intermittent steroid use     for asthma     COPD exacerbation (H) 10/25/2013     Esophageal stricture 2007    s/p dilation     HTN, goal below 140/90      Hyperlipidemia      Hypothyroidism      Osteoporosis      Paroxysmal supraventricular tachycardia (H)      PVC (premature ventricular contraction) May 2012     SVT (supraventricular tachycardia) (H)      Type 2 diabetes mellitus without complication, without long-term current use of insulin (H) 8/2/2018             Past Surgical History:     Past  Surgical History:   Procedure Laterality Date     BUNIONECTOMY LAPIDUS WITH TARSAL METATARSAL (TMT) FUSION  1990's     HYSTERECTOMY TOTAL ABDOMINAL       TONSILLECTOMY               Social History:     Social History   Substance Use Topics     Smoking status: Former Smoker     Packs/day: 1.00     Years: 15.00     Quit date: 1/1/1980     Smokeless tobacco: Never Used     Alcohol use No      Comment: Occasional drink             Family History:     Family History   Problem Relation Age of Onset     Cerebrovascular Disease Mother      Hypertension Mother      Family History Negative Father      Unknown/Adopted Maternal Grandmother      Unknown/Adopted Maternal Grandfather      Unknown/Adopted Paternal Grandmother      Unknown/Adopted Paternal Grandfather      Family History Negative Brother      Family History Negative Sister      Family History Negative Son      Family History Negative Daughter      Asthma No family hx of      C.A.D. No family hx of      Diabetes No family hx of      Cancer No family hx of              Immunizations:     VACCINE/DOSE   Diptheria   DPT   DTAP   HBIG   Hepatitis A   Hepatitis B   HIB   Influenza   Measles   Meningococcal   MMR   Mumps   Pneumococcal   Polio   Rubella   Small Pox   TDAP   Varicella   Zoster             Allergies:     Allergies   Allergen Reactions     Hydralazine Anxiety     Penicillin G Hives     Tolerated cephalosporines 2017     Meloxicam      dizziness       Metoprolol      ? Skin rash on the back     Norvasc [Amlodipine Besylate] Hives             Medications:     Current Facility-Administered Medications   Medication     acetaminophen (TYLENOL) tablet 1,000 mg     cephALEXin (KEFLEX) capsule 500 mg     diazepam (VALIUM) tablet 2 mg     enoxaparin (LOVENOX) injection 40 mg     fexofenadine (ALLEGRA) tablet 180 mg     hydrOXYzine (ATARAX) tablet 25 mg     ipratropium - albuterol 0.5 mg/2.5 mg/3 mL (DUONEB) neb solution 3 mL     levalbuterol (XOPENEX) neb solution  0.63 mg     levothyroxine (SYNTHROID/LEVOTHROID) tablet 25 mcg     Lidocaine (LIDOCARE) 4 % Patch 2 patch    And     [START ON 10/15/2018] lidocaine patch REMOVAL    And     lidocaine patch in PLACE     loperamide (IMODIUM) capsule 2 mg     meclizine (ANTIVERT) tablet 25 mg     melatonin tablet 1 mg     montelukast (SINGULAIR) tablet 10 mg     naloxone (NARCAN) injection 0.1-0.4 mg     omeprazole (priLOSEC) CR capsule 40 mg     ondansetron (ZOFRAN-ODT) ODT tab 4 mg    Or     ondansetron (ZOFRAN) injection 4 mg     ondansetron (ZOFRAN-ODT) ODT tab 4 mg     polyethylene glycol (MIRALAX/GLYCOLAX) Packet 17 g     predniSONE (DELTASONE) tablet 10 mg     roflumilast (DALIRESP) tablet 500 mcg     senna-docusate (SENOKOT-S;PERICOLACE) 8.6-50 MG per tablet 1 tablet    Or     senna-docusate (SENOKOT-S;PERICOLACE) 8.6-50 MG per tablet 2 tablet     sodium chloride 0.9% infusion     tamsulosin (FLOMAX) capsule 0.4 mg     traMADol (ULTRAM) half-tab 25 mg     traMADol (ULTRAM) half-tab 25 mg             Review of Systems:   CV: NEGATIVE for chest pain, palpitations or peripheral edema  C: NEGATIVE for fever, chills, change in weight  E/M: NEGATIVE for ear, mouth and throat problems  R: NEGATIVE for significant cough or SOB     10 point review of systems negative aside from HPI and physical exam.           Physical Exam:   All vitals have been reviewed  Patient Vitals for the past 24 hrs:   BP Temp Temp src Pulse Heart Rate Resp SpO2   10/14/18 1938 - - - - - - 98 %   10/14/18 1929 118/59 98.5  F (36.9  C) Oral 89 - 20 97 %   10/14/18 1559 - - - - - - 97 %   10/14/18 1541 135/66 98.2  F (36.8  C) Oral 88 - 20 97 %   10/14/18 1256 133/64 - Oral - 103 16 97 %   10/14/18 1157 - - - - 90 20 97 %   10/14/18 0758 141/73 - - - 99 24 98 %   10/14/18 0739 - - - - 90 18 96 %   10/14/18 0443 151/76 98.4  F (36.9  C) Oral - 97 20 97 %   10/13/18 2344 128/69 99.6  F (37.6  C) Oral - 94 20 98 %   10/13/18 2103 130/69 99.8  F (37.7  C) Oral 100 -  20 96 %       Intake/Output Summary (Last 24 hours) at 10/14/18 2058  Last data filed at 10/14/18 2052   Gross per 24 hour   Intake             2513 ml   Output             1950 ml   Net              563 ml     Constitutional: Pleasant, alert, appropriate, following commands.  HEENT: Head atraumatic normocephalic. PERRLA.  Respiratory: Unlabored breathing no audible wheeze  Cardiovascular: Regular rate and rhythm  GI: Abdomen soft, non tender, and non distended.  Lymph/Hematologic: No edema or palor  Skin: No rashes, no cyanosis, no edema.  Neuro: Sensation intact to light touch in bilateral lower extremities.  Musculoskeletal:   Patient laying comfortably in bed   No ecchymosis or erythema over entirety of the left leg  No notable swelling or edema  Hip flexes to 90 degrees, bilaterally   No pain with internal/external rotation while in flexion  No TTP over great trochanter  Able to SLR on right, unable to SLR on left secondary to pain   Full ROM of left and right knee - 0 to 120 degrees without pain  Bilateral calves are soft, non-tender.  Bilateral lower extremity is NVI.  Sensation intact bilateral lower extremities  5/5 motor with resisted dorsi and plantar flexion on left, 4/5 on right (patient states RLE weakness per baseline)   5/5 EHL  +Dp pulse            Data:   All laboratory data reviewed  Results for orders placed or performed during the hospital encounter of 10/13/18   XR Pelvis and Hip Left 2 Views    Narrative    PELVIS WITH UNILATERAL HIP TWO VIEWS LEFT  10/13/2018 9:17 AM     HISTORY: left hip pain/groin pain. rule out fracture;     COMPARISON: None.    FINDINGS: There is normal osseous alignment.  No fractures are  identified.      Impression    IMPRESSION: Osseous structures appear intact. No fractures are  identified.    WILMA CARBAJAL MD   Abd/pelvis CT no contrast - Stone Protocol    Narrative    CT ABDOMEN AND PELVIS WITHOUT CONTRAST 10/13/2018 11:56 AM     HISTORY: Lower abdominal pain/groin  pain on left. Rule out stone.     COMPARISON: September 21, 2014    TECHNIQUE: Volumetric helical acquisition of CT images from the lung  bases through the symphysis pubis without intravenous contrast.  Radiation dose for this scan was reduced using automated exposure  control, adjustment of the mA and/or kV according to patient size, or  iterative reconstruction technique.    FINDINGS: No hydronephrosis or urolithiasis. Minimal asymmetry of the  iliopsoas, left greater than right, indicating a small amount of  intramuscular hemorrhage. Please see CT report same date of the left  hip. No free air or free fluid. No bowel obstruction. There is mild  diverticulosis without evidence for diverticulitis. There are moderate  atherosclerotic changes of the visualized aorta and its branches.  There is no evidence of aortic aneurysm. There is cholelithiasis  without evidence for cholecystitis. Moderate hiatal hernia. The liver,  spleen, adrenal glands, kidneys, and pancreas demonstrate no worrisome  focal lesion in the absence of intravenous contrast. Lung bases clear  of acute infiltrates. Trace peripheral fibrosis and/or atelectasis.      Impression    IMPRESSION:  1. Question some minimal intramuscular hematoma in the iliopsoas on  the left. Please see CT report of the left hip for additional regional  findings.  2. Otherwise no acute process demonstrated in the abdomen and pelvis.     DEANN CROSS MD   CT Hip Left w/o Contrast    Narrative    CT HIP LEFT WITHOUT CONTRAST 10/13/2018 11:56 AM     HISTORY: Left hip pain/groin pain, rule out occult fracture.      TECHNIQUE: Axial images with reconstructions. Radiation dose for this  scan was reduced using automated exposure control, adjustment of the  mA and/or kV according to patient size, or iterative reconstruction  technique.      COMPARISON: 11/19/2017.     FINDINGS: There appears to be some focal fluid density adjacent to the  left ischial tuberosity. There is loss of  cortex at the superior  aspect of the left ischial tuberosity. These findings suggest the  possibility of common hamstring tendon rupture. There is edema  adjacent to the left iliopsoas muscle, raising the possibility of  tendon rupture. Degenerative change of the lower lumbar spine.  Anterolisthesis at L5-S1. Chondrocalcinosis of the symphysis pubis,  consistent with calcium pyrophosphate deposition disease. No hip or  pubic fracture is identified. Incidentally noted colonic  diverticulosis.      Impression    IMPRESSION:  1. Findings raising the possibility of left common hamstring tendon  rupture and iliopsoas tendon rupture. MRI recommended, if indicated  clinically.  2. Additional findings discussed above.     JUAN OROZCO MD   CBC with platelets differential   Result Value Ref Range    WBC 8.0 4.0 - 11.0 10e9/L    RBC Count 3.19 (L) 3.8 - 5.2 10e12/L    Hemoglobin 9.8 (L) 11.7 - 15.7 g/dL    Hematocrit 28.8 (L) 35.0 - 47.0 %    MCV 90 78 - 100 fl    MCH 30.7 26.5 - 33.0 pg    MCHC 34.0 31.5 - 36.5 g/dL    RDW 14.1 10.0 - 15.0 %    Platelet Count 263 150 - 450 10e9/L    Diff Method Automated Method     % Neutrophils 73.7 %    % Lymphocytes 14.6 %    % Monocytes 9.2 %    % Eosinophils 1.2 %    % Basophils 0.2 %    % Immature Granulocytes 1.1 %    Nucleated RBCs 0 0 /100    Absolute Neutrophil 5.9 1.6 - 8.3 10e9/L    Absolute Lymphocytes 1.2 0.8 - 5.3 10e9/L    Absolute Monocytes 0.7 0.0 - 1.3 10e9/L    Absolute Eosinophils 0.1 0.0 - 0.7 10e9/L    Absolute Basophils 0.0 0.0 - 0.2 10e9/L    Abs Immature Granulocytes 0.1 0 - 0.4 10e9/L    Absolute Nucleated RBC 0.0    Basic metabolic panel   Result Value Ref Range    Sodium 126 (L) 133 - 144 mmol/L    Potassium 4.0 3.4 - 5.3 mmol/L    Chloride 94 94 - 109 mmol/L    Carbon Dioxide 24 20 - 32 mmol/L    Anion Gap 8 3 - 14 mmol/L    Glucose 108 (H) 70 - 99 mg/dL    Urea Nitrogen 12 7 - 30 mg/dL    Creatinine 0.66 0.52 - 1.04 mg/dL    GFR Estimate 86 >60 mL/min/1.7m2     GFR Estimate If Black >90 >60 mL/min/1.7m2    Calcium 8.7 8.5 - 10.1 mg/dL   UA with Microscopic   Result Value Ref Range    Color Urine Yellow     Appearance Urine Cloudy     Glucose Urine Negative NEG^Negative mg/dL    Bilirubin Urine Negative NEG^Negative    Ketones Urine Negative NEG^Negative mg/dL    Specific Gravity Urine 1.008 1.003 - 1.035    Blood Urine Small (A) NEG^Negative    pH Urine 7.0 5.0 - 7.0 pH    Protein Albumin Urine Negative NEG^Negative mg/dL    Urobilinogen mg/dL 0.0 0.0 - 2.0 mg/dL    Nitrite Urine Negative NEG^Negative    Leukocyte Esterase Urine Large (A) NEG^Negative    Source Catheterized Urine     WBC Urine >182 (H) 0 - 5 /HPF    RBC Urine 45 (H) 0 - 2 /HPF    WBC Clumps Present (A) NEG^Negative /HPF   Basic metabolic panel   Result Value Ref Range    Sodium 129 (L) 133 - 144 mmol/L    Potassium 4.0 3.4 - 5.3 mmol/L    Chloride 100 94 - 109 mmol/L    Carbon Dioxide 22 20 - 32 mmol/L    Anion Gap 7 3 - 14 mmol/L    Glucose 100 (H) 70 - 99 mg/dL    Urea Nitrogen 10 7 - 30 mg/dL    Creatinine 0.63 0.52 - 1.04 mg/dL    GFR Estimate >90 >60 mL/min/1.7m2    GFR Estimate If Black >90 >60 mL/min/1.7m2    Calcium 8.3 (L) 8.5 - 10.1 mg/dL   CBC with platelets   Result Value Ref Range    WBC 6.6 4.0 - 11.0 10e9/L    RBC Count 3.04 (L) 3.8 - 5.2 10e12/L    Hemoglobin 9.3 (L) 11.7 - 15.7 g/dL    Hematocrit 27.8 (L) 35.0 - 47.0 %    MCV 91 78 - 100 fl    MCH 30.6 26.5 - 33.0 pg    MCHC 33.5 31.5 - 36.5 g/dL    RDW 14.1 10.0 - 15.0 %    Platelet Count 233 150 - 450 10e9/L   Urine Culture   Result Value Ref Range    Specimen Description Catheterized Urine     Special Requests Specimen received in preservative     Culture Micro Culture in progress      *Note: Due to a large number of results and/or encounters for the requested time period, some results have not been displayed. A complete set of results can be found in Results Review.          Attestation:  I have reviewed today's vital signs, notes,  medications, labs and imaging with Dr. Guerrero.  Amount of time performed on this consult: 25 minutes.    Elvira Camejo PA-C

## 2018-10-16 PROBLEM — S70.10XA HEMATOMA OF ILIOPSOAS MUSCLE: Status: ACTIVE | Noted: 2018-10-16

## 2018-10-16 LAB
ANION GAP SERPL CALCULATED.3IONS-SCNC: 7 MMOL/L (ref 3–14)
BACTERIA SPEC CULT: ABNORMAL
BUN SERPL-MCNC: 7 MG/DL (ref 7–30)
CALCIUM SERPL-MCNC: 8.4 MG/DL (ref 8.5–10.1)
CHLORIDE SERPL-SCNC: 101 MMOL/L (ref 94–109)
CO2 SERPL-SCNC: 22 MMOL/L (ref 20–32)
CREAT SERPL-MCNC: 0.54 MG/DL (ref 0.52–1.04)
ERYTHROCYTE [DISTWIDTH] IN BLOOD BY AUTOMATED COUNT: 14.2 % (ref 10–15)
GFR SERPL CREATININE-BSD FRML MDRD: >90 ML/MIN/1.7M2
GLUCOSE SERPL-MCNC: 98 MG/DL (ref 70–99)
HCT VFR BLD AUTO: 28.7 % (ref 35–47)
HGB BLD-MCNC: 9.7 G/DL (ref 11.7–15.7)
Lab: ABNORMAL
MCH RBC QN AUTO: 30.6 PG (ref 26.5–33)
MCHC RBC AUTO-ENTMCNC: 33.8 G/DL (ref 31.5–36.5)
MCV RBC AUTO: 91 FL (ref 78–100)
PLATELET # BLD AUTO: 259 10E9/L (ref 150–450)
POTASSIUM SERPL-SCNC: 3.7 MMOL/L (ref 3.4–5.3)
RBC # BLD AUTO: 3.17 10E12/L (ref 3.8–5.2)
SODIUM SERPL-SCNC: 130 MMOL/L (ref 133–144)
SPECIMEN SOURCE: ABNORMAL
WBC # BLD AUTO: 9 10E9/L (ref 4–11)

## 2018-10-16 PROCEDURE — 36415 COLL VENOUS BLD VENIPUNCTURE: CPT | Performed by: PHYSICIAN ASSISTANT

## 2018-10-16 PROCEDURE — 40000275 ZZH STATISTIC RCP TIME EA 10 MIN

## 2018-10-16 PROCEDURE — 12000000 ZZH R&B MED SURG/OB

## 2018-10-16 PROCEDURE — 25000131 ZZH RX MED GY IP 250 OP 636 PS 637: Performed by: PHYSICIAN ASSISTANT

## 2018-10-16 PROCEDURE — 85027 COMPLETE CBC AUTOMATED: CPT | Performed by: PHYSICIAN ASSISTANT

## 2018-10-16 PROCEDURE — G0378 HOSPITAL OBSERVATION PER HR: HCPCS

## 2018-10-16 PROCEDURE — 94640 AIRWAY INHALATION TREATMENT: CPT

## 2018-10-16 PROCEDURE — 94640 AIRWAY INHALATION TREATMENT: CPT | Mod: 76

## 2018-10-16 PROCEDURE — 25000132 ZZH RX MED GY IP 250 OP 250 PS 637: Performed by: PHYSICIAN ASSISTANT

## 2018-10-16 PROCEDURE — 99207 ZZC CDG-CODE CATEGORY CHANGED: CPT | Performed by: PHYSICIAN ASSISTANT

## 2018-10-16 PROCEDURE — 25000125 ZZHC RX 250: Performed by: PHYSICIAN ASSISTANT

## 2018-10-16 PROCEDURE — 80048 BASIC METABOLIC PNL TOTAL CA: CPT | Performed by: PHYSICIAN ASSISTANT

## 2018-10-16 PROCEDURE — 82306 VITAMIN D 25 HYDROXY: CPT | Performed by: PHYSICIAN ASSISTANT

## 2018-10-16 PROCEDURE — 25000125 ZZHC RX 250: Performed by: INTERNAL MEDICINE

## 2018-10-16 PROCEDURE — 25000128 H RX IP 250 OP 636: Performed by: PHYSICIAN ASSISTANT

## 2018-10-16 PROCEDURE — 99232 SBSQ HOSP IP/OBS MODERATE 35: CPT | Performed by: PHYSICIAN ASSISTANT

## 2018-10-16 RX ORDER — IPRATROPIUM BROMIDE AND ALBUTEROL SULFATE 2.5; .5 MG/3ML; MG/3ML
3 SOLUTION RESPIRATORY (INHALATION) EVERY 4 HOURS
Status: DISCONTINUED | OUTPATIENT
Start: 2018-10-16 | End: 2018-10-18 | Stop reason: HOSPADM

## 2018-10-16 RX ORDER — TAMSULOSIN HYDROCHLORIDE 0.4 MG/1
0.4 CAPSULE ORAL DAILY
Status: DISCONTINUED | OUTPATIENT
Start: 2018-10-16 | End: 2018-10-18 | Stop reason: HOSPADM

## 2018-10-16 RX ADMIN — SODIUM CHLORIDE TAB 1 GM 1 G: 1 TAB at 17:40

## 2018-10-16 RX ADMIN — LEVOTHYROXINE SODIUM 25 MCG: 25 TABLET ORAL at 09:28

## 2018-10-16 RX ADMIN — CEPHALEXIN 500 MG: 500 CAPSULE ORAL at 12:32

## 2018-10-16 RX ADMIN — PREDNISONE 10 MG: 10 TABLET ORAL at 09:28

## 2018-10-16 RX ADMIN — IPRATROPIUM BROMIDE AND ALBUTEROL SULFATE 3 ML: .5; 3 SOLUTION RESPIRATORY (INHALATION) at 15:38

## 2018-10-16 RX ADMIN — ENOXAPARIN SODIUM 40 MG: 40 INJECTION SUBCUTANEOUS at 17:40

## 2018-10-16 RX ADMIN — TAMSULOSIN HYDROCHLORIDE 0.4 MG: 0.4 CAPSULE ORAL at 16:02

## 2018-10-16 RX ADMIN — TRAMADOL HYDROCHLORIDE 25 MG: 50 TABLET, FILM COATED ORAL at 04:54

## 2018-10-16 RX ADMIN — ONDANSETRON 4 MG: 4 TABLET, ORALLY DISINTEGRATING ORAL at 09:28

## 2018-10-16 RX ADMIN — SENNOSIDES AND DOCUSATE SODIUM 1 TABLET: 8.6; 5 TABLET ORAL at 20:03

## 2018-10-16 RX ADMIN — IPRATROPIUM BROMIDE AND ALBUTEROL SULFATE 3 ML: .5; 3 SOLUTION RESPIRATORY (INHALATION) at 20:04

## 2018-10-16 RX ADMIN — LEVALBUTEROL HYDROCHLORIDE 0.63 MG: 0.63 SOLUTION RESPIRATORY (INHALATION) at 02:12

## 2018-10-16 RX ADMIN — OMEPRAZOLE 40 MG: 20 CAPSULE, DELAYED RELEASE ORAL at 09:27

## 2018-10-16 RX ADMIN — LIDOCAINE 2 PATCH: 560 PATCH PERCUTANEOUS; TOPICAL; TRANSDERMAL at 21:52

## 2018-10-16 RX ADMIN — FEXOFENADINE HYDROCHLORIDE 180 MG: 180 TABLET, FILM COATED ORAL at 09:27

## 2018-10-16 RX ADMIN — IPRATROPIUM BROMIDE AND ALBUTEROL SULFATE 3 ML: .5; 3 SOLUTION RESPIRATORY (INHALATION) at 06:03

## 2018-10-16 RX ADMIN — SODIUM CHLORIDE TAB 1 GM 1 G: 1 TAB at 09:28

## 2018-10-16 RX ADMIN — IPRATROPIUM BROMIDE AND ALBUTEROL SULFATE 3 ML: .5; 3 SOLUTION RESPIRATORY (INHALATION) at 11:42

## 2018-10-16 RX ADMIN — LEVALBUTEROL HYDROCHLORIDE 0.63 MG: 0.63 SOLUTION RESPIRATORY (INHALATION) at 16:07

## 2018-10-16 RX ADMIN — MONTELUKAST SODIUM 10 MG: 10 TABLET, FILM COATED ORAL at 21:52

## 2018-10-16 RX ADMIN — CEPHALEXIN 500 MG: 500 CAPSULE ORAL at 20:03

## 2018-10-16 RX ADMIN — ROFLUMILAST 500 MCG: 500 TABLET ORAL at 09:28

## 2018-10-16 RX ADMIN — LISINOPRIL 5 MG: 5 TABLET ORAL at 09:28

## 2018-10-16 RX ADMIN — ACETAMINOPHEN 1000 MG: 500 TABLET, FILM COATED ORAL at 09:28

## 2018-10-16 RX ADMIN — ACETAMINOPHEN 1000 MG: 500 TABLET, FILM COATED ORAL at 20:03

## 2018-10-16 RX ADMIN — TRAMADOL HYDROCHLORIDE 25 MG: 50 TABLET, FILM COATED ORAL at 21:52

## 2018-10-16 NOTE — PLAN OF CARE
Problem: Patient Care Overview  Goal: Plan of Care/Patient Progress Review  Outcome: No Change  PRIMARY DIAGNOSIS: ACUTE PAIN  OUTPATIENT/OBSERVATION GOALS TO BE MET BEFORE DISCHARGE:  1. Pain Status: Improved-controlled with oral pain medications.      2. Return to near baseline physical activity: No - Ax1-2 with Mercedes Stephenson.       3. Cleared for discharge by consultants (if involved): No - PT/SW      Reporting left groin pain 10/10 with activity; about a 5 at rest. Declined need for intervention at this time. Reports meds are making her feel more confused and causing bad dreams. Alert and oriented this AM. Strength to LLE 4/5, weaker than R. Strength intact to RLE. Sensation intact, besides baseline numbness. MRI completed last evening. PT/SW following.       Discharge Planner Nurse   Safe discharge environment identified: Yes  Barriers to discharge: Yes       Entered by: Ira Fairbanks 10/16/2018       Please review provider order for any additional goals.   Nurse to notify provider when observation goals have been met and patient is ready for discharge.

## 2018-10-16 NOTE — PROGRESS NOTES
Contacted by Utilization Review this afternoon who states this patient meets inpatient criteria and requests that inpatient order be placed. This writer did not round on this patient. Will change to inpatient status per UR order.

## 2018-10-16 NOTE — PLAN OF CARE
Problem: Patient Care Overview  Goal: Plan of Care/Patient Progress Review  PRIMARY DIAGNOSIS: Urinary Retention and UTI  OUTPATIENT/OBSERVATION GOALS TO BE MET BEFORE DISCHARGE:  1. ADLs back to baseline: Yes     2. Activity and level of assistance: A1 with adriano steady     3. Pain status: Improved-controlled with oral pain medications.     4. Return to near baseline physical activity: No      Vitals are Temp: 96.6  F (35.9  C) Temp src: Oral BP: 152/78   Heart Rate: 83 Resp: 18 SpO2: 98 %  Patient is Alert and Oriented x4 but forgetful. They are 1 Assist with Adriano Steady.  Pt is a Regular diet and denying pain.  Patient is Saline locked.  Pt has had several large BMs this shift.  Neuro's are intact.  Pt stated that she didn't want lidocaine patches right now.  Pt is getting bladder scanned after every void.  Pt is recommending TCU for patient before she returns to her TCU.        Discharge Planner Nurse   Safe discharge environment identified: Yes  Barriers to discharge: No          Please review provider order for any additional goals.   Nurse to notify provider when observation goals have been met and patient is ready for discharge.

## 2018-10-16 NOTE — PLAN OF CARE
Problem: Patient Care Overview  Goal: Plan of Care/Patient Progress Review  PRIMARY DIAGNOSIS: Urinary Retention and UTI  OUTPATIENT/OBSERVATION GOALS TO BE MET BEFORE DISCHARGE:  1. ADLs back to baseline: Yes    2. Activity and level of assistance: A1 with adriano steady    3. Pain status: Improved-controlled with oral pain medications.    4. Return to near baseline physical activity: No     Vitals are Temp: 97.9  F (36.6  C) Temp src: Oral BP: 128/68 Pulse: 85 Heart Rate: 96 Resp: 20 SpO2: 97 %.    Patient is Alert and Oriented x4 but forgetful. They are 1 Assist with Adriano Steady.  Pt is a Regular diet and denying pain.  Patient is Saline locked.  Pt has had several large BMs this shift.  Neuro's are intact.  Pt stated that she didn't want lidocaine patches right now.  Pt is getting bladder scanned after every void.  Pt is recommending TCU for patient before she returns to her TCU.      Discharge Planner Nurse   Safe discharge environment identified: Yes  Barriers to discharge: No          Please review provider order for any additional goals.   Nurse to notify provider when observation goals have been met and patient is ready for discharge.

## 2018-10-16 NOTE — CONSULTS
Discussed new MRI findings with Mrs. Lee    Marrow edema in coccyx likely non-displaced fracture/stress reaction  Non-surgical  May continue to WBAT with walker as pain allows  Will trial doughnut for sitting  Pain medication - continue current regimen  Likely will improve over next 4-6 weeks, similar to iliopsoas tear  Will check vitamin D levels - will likely increase dose from 1,000 to 5,000 international units pending results  Also discussed dietary calcium intake - goal 3x per day at least    Meg Barkley PAC

## 2018-10-16 NOTE — PLAN OF CARE
"Problem: Patient Care Overview  Goal: Plan of Care/Patient Progress Review  Outcome: No Change  PRIMARY DIAGNOSIS: ACUTE PAIN  OUTPATIENT/OBSERVATION GOALS TO BE MET BEFORE DISCHARGE:  1. Pain Status: Improved-controlled with oral pain medications.      2. Return to near baseline physical activity: No - Ax1-2 with Mercedes Stephenson.       3. Cleared for discharge by consultants (if involved): No - PT/SW      Left groin pain \"mild\" this afternoon. Receiving scheduled Tylenol at this time. Drowsy this AM, but improved throughout the morning. Straight cath for 600 mL due to high post void residual bladder scan. Notified PA about re-starting Flomax (d/c'd yesterday). Ortho rounded on patients for MRI findings; no change in plan of care at this time. Care coordinator notified of this and is going to look into discharge planning.       Discharge Planner Nurse   Safe discharge environment identified: Yes  Barriers to discharge: Yes       Entered by: Ira Fairbanks 10/16/2018       Please review provider order for any additional goals.   Nurse to notify provider when observation goals have been met and patient is ready for discharge.      "

## 2018-10-16 NOTE — PROGRESS NOTES
[facility]  Observation Unit  Progress Note    Date of Service: 10/16/2018    Patient: Ana Luisa Lee  MRN: [id number]  Admission Date: 10/13/2018  Hospital Day # 4    Assessment & Plan: Ana Luisa Lee is a 78 year old female with PMH significant for mild dementia, asthma and COPD on chronic Prednisone, esophageal stricture s/p dilation (2007), HTN, HLD, hypothyroidism, PSVT, DM2, and who resides in an AL and has been wheelchair bound since the spring due to progressive generalized weakness with recent onset of left groin pain that has progressed over the last 3 weeks and due to becoming acutely worse she was admitted 10/13/18 pain control and orthopedic consultation.      1. Left groin pain 2/2 Iliopsoas Tear with hematoma and nondisplaced Coccyx Fracture - No recent injuries or trauma prior to onset of pain but patient has been undergoing PT at her facility, unsure if she over did it with this but per her daughter she has been able to ambulate to meals with decreased use of a wheelchair over the last week pta.  MRI of the Sacrum/Coccyx revealed a nondisplaced coccyx fracture.  MRI of the Pelvis revealed a complete tear/rupture of the left iliopsoas tending with adjacent soft tissue edema/hemorrhage, partial tear or tendinosis of the left common hamstring, right trochanteric bursitis and right gluteal muscle edema.  - Orthopedics re-consulted today to review new MRI images.  They recommend non operative management and WBAT.   - hemoglobin rechecked an stable  - Pain control with scheduled Tylenol, PRN Lidocaine patches, PRN Robaxin (trial low dose), and Tramadol scheduled BID with additional dose daily PRN.  Pain appears controlled today on this regimine.  - WBAT   - PT consulted and recommends assist of 1 back to assistive living but may benefit from TCU  - SW consulted for discharge planning.     2. Right foot drop - per patient on 10/14 this is chronic with her peripheral neuropathy  but discussed with patient's daughter who reported this is new within the last week per the patient's facility and noted by PT on Friday pta.   - MRI of the lumbar spine 10/15 with Multilevel degenerative disc disease and degenerative facet arthropathy.  L5-S1 grade 1 spondylolisthesis causing bilateral foraminal stenoses.  No specific etiology of the new right foot drop is identified.     3. UTI, urinary retention: found to be retaining urine in the ED and joseph was placed on 10/13. Thought to be due to possible underlying UTI. Appears that patient has previously had urinary retention in setting of taking Duloxetine. Joseph removed on 10/14 and patient able to void with minimal post void residual. Current urine culture growing 10-50k coagulase negative staphylococcus sensitive to Oxacillin.    - Continue PO Keflex (day 4/5)  - Continue bladder scans PRN to monitor post void residuals   - continue po Flomax     4. Chronic hyponatremia: appears baseline sodium around 130-132, initially 126 on admission with improvement today to 130 after IVFs. It has been thought previously that the patient may have an element of SIADH from her known lung disease. Aodium chloride 1 gram tablets added twice daily and closely monitor sodium level.   - Recheck BMP in AM     5. Vertigo: reported onset of dizziness at time of admission. Due to patient being a poor historian it has been difficult to qualify her symptoms. No prior h/o BPPV or CVA. No other neuro deficits appreciated on exam. Unclear if Flexeril started on admission was causing her symptoms, discontinued on 10/14. Patient was treated supportively overnight with IVFs, PRN Valium, and scheduled Meclizine for possible BPPV without improvement in her symptoms. After further discussion with daughter earlier this week, the patient tends to report dizziness when she is anxious as well with minor medication adjustments due to sensitivities to medications. As well per daughter the  patient only receives Tramadol twice daily and suppose to request an additional dose in the afternoon but usually doesn't remember to do so at her facility.  Flexeril and Atarax discontinued previously due to dizziness.  - asymptomatic today   - PRN Meclizine BID  - Decreased Tramadol to scheduled BID with one additional dose PRN and stopped Atarax 10/15   - Neuro checks     6. COPD, asthma: shortness of breath at baseline per patient and no recent exacerbations. Follows with pulmonology in the outpatient setting for management.   - Continue PTA PO Prednisone, Roflumilast, and PRN Xopenex nebs   - Resume Incruse Ellipta and Pulmicort inhales upon discharge      7. HTN: stable   - Resume Lisinopril with parameters      8. Hypothyroidism, HLD, and GERD: stable medical conditions, resume PTA medications      DVT Prophylaxis: Enoxaparin (Lovenox) subcutaneous due to decreased mobility   Code Status: DNR/DNI  Disposition: pending placement work up by Social Work    Meg HANDYC  Hospitalist Physician Assistant  [facility]  Pager: [phone]      Subjective & Interval Hx:    Patient reports pain in the left groin is mild.  Denies chest pain, shortness of breath, abdominal pain, nausea, emesis.  Tolerating po well.    Last 24 hr care team notes reviewed.   ROS:  4 point ROS including Respiratory, CV, GI and , other than that noted in the HPI, is negative.    Physical Exam:    Blood pressure 168/89, pulse 85, temperature 98  F (36.7  C), temperature source Oral, resp. rate 18, SpO2 98 %, not currently breastfeeding. on room air  General: Alert, interactive, NAD, sitting up in a chair.  HEENT: AT/NC, sclera anicteric, PERRL, EOMI  Resp: clear to auscultation bilaterally, no crackles or wheezes  Cardiac: regular rate and rhythm, no murmur  Abdomen: Soft, nontender, nondistended. +BS.  No HSM or masses, no rebound or guarding.  Extremities: mild tenderness to left groin with palpation.  4/5 muscle  strength  Neuro: Alert & oriented x 3    Labs & Images:  Reviewed in Epic   Medications:    Current Facility-Administered Medications   Medication     acetaminophen (TYLENOL) tablet 1,000 mg     cephALEXin (KEFLEX) capsule 500 mg     diazepam (VALIUM) tablet 2 mg     enoxaparin (LOVENOX) injection 40 mg     fexofenadine (ALLEGRA) tablet 180 mg     ipratropium - albuterol 0.5 mg/2.5 mg/3 mL (DUONEB) neb solution 3 mL     levalbuterol (XOPENEX) neb solution 0.63 mg     levothyroxine (SYNTHROID/LEVOTHROID) tablet 25 mcg     Lidocaine (LIDOCARE) 4 % Patch 2 patch    And     lidocaine patch REMOVAL    And     lidocaine patch in PLACE     lisinopril (PRINIVIL/ZESTRIL) tablet 5 mg     loperamide (IMODIUM) capsule 2 mg     meclizine (ANTIVERT) tablet 25 mg     melatonin tablet 1 mg     methocarbamol (ROBAXIN) tablet 500 mg     montelukast (SINGULAIR) tablet 10 mg     naloxone (NARCAN) injection 0.1-0.4 mg     omeprazole (priLOSEC) CR capsule 40 mg     ondansetron (ZOFRAN-ODT) ODT tab 4 mg    Or     ondansetron (ZOFRAN) injection 4 mg     ondansetron (ZOFRAN-ODT) ODT tab 4 mg     polyethylene glycol (MIRALAX/GLYCOLAX) Packet 17 g     predniSONE (DELTASONE) tablet 10 mg     roflumilast (DALIRESP) tablet 500 mcg     senna-docusate (SENOKOT-S;PERICOLACE) 8.6-50 MG per tablet 1 tablet    Or     senna-docusate (SENOKOT-S;PERICOLACE) 8.6-50 MG per tablet 2 tablet     sodium chloride tablet 1 g     traMADol (ULTRAM) half-tab 25 mg

## 2018-10-16 NOTE — PROGRESS NOTES
CM was updated by bedside RN that TCU is recommended at discharge. Met with pt at bedside. She is agreeable to TCU facilities south of the [place]. Discussed [facility], [facility], [facility] & [facility]. She definitely wants [facility]. She has been there before. Also sent referrals to [facility] & [facility]. Await responses. LM with Windy, pt's daughter ([phone]) to update her on plan. Contacted Erin BARTHOLOMEW at [facility] to update on TCU plan. She requested update on final disposition.     CM will continue to follow patient until discharge for any additional needs.     Candis Alaniz, RN, BSN, CTS  [facility]  [phone]    Addendum: Received call from Cynthia - Admissions at [facility]. They don't have any beds until Friday.    Ds    Addendum 3:10pm - [facility] can accept pt for tomorrow. [facility] will NOT have a bed until Thursday. Attempted again to reach daughter Windy. LM. Updated bedside RN.   Ds    Addendum3:44pm - Daughter Windy adamant pt NOT go to [facility]. Pt has been there earlier this year and they were very unhappy with the facility. LM for Chery at [facility]. Cancelled transport arrangements thru [facility].    julia

## 2018-10-16 NOTE — PLAN OF CARE
Problem: Patient Care Overview  Goal: Plan of Care/Patient Progress Review  PRIMARY DIAGNOSIS: Urinary Retention and UTI  OUTPATIENT/OBSERVATION GOALS TO BE MET BEFORE DISCHARGE:  1. ADLs back to baseline: Yes      2. Activity and level of assistance: A1 with adriano steady      3. Pain status: Improved-controlled with oral pain medications.      4. Return to near baseline physical activity: No      Vitals are Temp: 98  F (36.7  C) Temp src: Oral BP: 175/87   Heart Rate: 98 Resp: 18 SpO2: 94 %  Patient is Alert and Oriented x4 but forgetful. They are 1 Assist with Adriano Steady.  Pt is a Regular diet and denying pain.  Patient is Saline locked.  Pt has had several large BMs this shift.  Neuro's are intact.  Pt stated that she didn't want lidocaine patches right now.  Pt is getting bladder scanned after every void.  Pt is feeling SOB and is receiving nebs.  Pt is recommending TCU for patient before she returns to her TCU.          Discharge Planner Nurse   Safe discharge environment identified: Yes  Barriers to discharge: No     Please review provider order for any additional goals.   Nurse to notify provider when observation goals have been met and patient is ready for discharge.

## 2018-10-17 LAB
ANION GAP SERPL CALCULATED.3IONS-SCNC: 9 MMOL/L (ref 3–14)
BUN SERPL-MCNC: 9 MG/DL (ref 7–30)
CALCIUM SERPL-MCNC: 8.1 MG/DL (ref 8.5–10.1)
CHLORIDE SERPL-SCNC: 99 MMOL/L (ref 94–109)
CO2 SERPL-SCNC: 23 MMOL/L (ref 20–32)
CREAT SERPL-MCNC: 0.56 MG/DL (ref 0.52–1.04)
DEPRECATED CALCIDIOL+CALCIFEROL SERPL-MC: 36 UG/L (ref 20–75)
GFR SERPL CREATININE-BSD FRML MDRD: >90 ML/MIN/1.7M2
GLUCOSE SERPL-MCNC: 106 MG/DL (ref 70–99)
POTASSIUM SERPL-SCNC: 3.8 MMOL/L (ref 3.4–5.3)
SODIUM SERPL-SCNC: 131 MMOL/L (ref 133–144)

## 2018-10-17 PROCEDURE — 40000275 ZZH STATISTIC RCP TIME EA 10 MIN

## 2018-10-17 PROCEDURE — 80048 BASIC METABOLIC PNL TOTAL CA: CPT | Performed by: PHYSICIAN ASSISTANT

## 2018-10-17 PROCEDURE — 25000132 ZZH RX MED GY IP 250 OP 250 PS 637: Performed by: PHYSICIAN ASSISTANT

## 2018-10-17 PROCEDURE — 99207 ZZC CDG-MDM COMPONENT: MEETS LOW - DOWN CODED: CPT | Performed by: HOSPITALIST

## 2018-10-17 PROCEDURE — 36415 COLL VENOUS BLD VENIPUNCTURE: CPT | Performed by: PHYSICIAN ASSISTANT

## 2018-10-17 PROCEDURE — 94640 AIRWAY INHALATION TREATMENT: CPT

## 2018-10-17 PROCEDURE — 25000125 ZZHC RX 250: Performed by: INTERNAL MEDICINE

## 2018-10-17 PROCEDURE — 99232 SBSQ HOSP IP/OBS MODERATE 35: CPT | Performed by: HOSPITALIST

## 2018-10-17 PROCEDURE — 94640 AIRWAY INHALATION TREATMENT: CPT | Mod: 76

## 2018-10-17 PROCEDURE — 12000000 ZZH R&B MED SURG/OB

## 2018-10-17 PROCEDURE — 25000128 H RX IP 250 OP 636: Performed by: PHYSICIAN ASSISTANT

## 2018-10-17 PROCEDURE — 25000131 ZZH RX MED GY IP 250 OP 636 PS 637: Performed by: PHYSICIAN ASSISTANT

## 2018-10-17 PROCEDURE — 25000125 ZZHC RX 250: Performed by: PHYSICIAN ASSISTANT

## 2018-10-17 RX ADMIN — SODIUM CHLORIDE TAB 1 GM 1 G: 1 TAB at 09:43

## 2018-10-17 RX ADMIN — IPRATROPIUM BROMIDE AND ALBUTEROL SULFATE 3 ML: .5; 3 SOLUTION RESPIRATORY (INHALATION) at 07:13

## 2018-10-17 RX ADMIN — SENNOSIDES AND DOCUSATE SODIUM 1 TABLET: 8.6; 5 TABLET ORAL at 09:46

## 2018-10-17 RX ADMIN — ACETAMINOPHEN 1000 MG: 500 TABLET, FILM COATED ORAL at 19:24

## 2018-10-17 RX ADMIN — IPRATROPIUM BROMIDE AND ALBUTEROL SULFATE 3 ML: .5; 3 SOLUTION RESPIRATORY (INHALATION) at 15:22

## 2018-10-17 RX ADMIN — TRAMADOL HYDROCHLORIDE 25 MG: 50 TABLET, FILM COATED ORAL at 08:36

## 2018-10-17 RX ADMIN — IPRATROPIUM BROMIDE AND ALBUTEROL SULFATE 3 ML: .5; 3 SOLUTION RESPIRATORY (INHALATION) at 00:39

## 2018-10-17 RX ADMIN — PREDNISONE 10 MG: 10 TABLET ORAL at 09:46

## 2018-10-17 RX ADMIN — ACETAMINOPHEN 1000 MG: 500 TABLET, FILM COATED ORAL at 13:56

## 2018-10-17 RX ADMIN — TRAMADOL HYDROCHLORIDE 25 MG: 50 TABLET, FILM COATED ORAL at 23:42

## 2018-10-17 RX ADMIN — ENOXAPARIN SODIUM 40 MG: 40 INJECTION SUBCUTANEOUS at 17:40

## 2018-10-17 RX ADMIN — LIDOCAINE 1 PATCH: 560 PATCH PERCUTANEOUS; TOPICAL; TRANSDERMAL at 23:48

## 2018-10-17 RX ADMIN — ONDANSETRON 4 MG: 4 TABLET, ORALLY DISINTEGRATING ORAL at 09:46

## 2018-10-17 RX ADMIN — SODIUM CHLORIDE TAB 1 GM 1 G: 1 TAB at 17:40

## 2018-10-17 RX ADMIN — OMEPRAZOLE 40 MG: 20 CAPSULE, DELAYED RELEASE ORAL at 09:43

## 2018-10-17 RX ADMIN — TAMSULOSIN HYDROCHLORIDE 0.4 MG: 0.4 CAPSULE ORAL at 09:43

## 2018-10-17 RX ADMIN — LEVOTHYROXINE SODIUM 25 MCG: 25 TABLET ORAL at 09:43

## 2018-10-17 RX ADMIN — IPRATROPIUM BROMIDE AND ALBUTEROL SULFATE 3 ML: .5; 3 SOLUTION RESPIRATORY (INHALATION) at 20:58

## 2018-10-17 RX ADMIN — MONTELUKAST SODIUM 10 MG: 10 TABLET, FILM COATED ORAL at 23:42

## 2018-10-17 RX ADMIN — IPRATROPIUM BROMIDE AND ALBUTEROL SULFATE 3 ML: .5; 3 SOLUTION RESPIRATORY (INHALATION) at 23:55

## 2018-10-17 RX ADMIN — IPRATROPIUM BROMIDE AND ALBUTEROL SULFATE 3 ML: .5; 3 SOLUTION RESPIRATORY (INHALATION) at 11:32

## 2018-10-17 RX ADMIN — ROFLUMILAST 500 MCG: 500 TABLET ORAL at 09:42

## 2018-10-17 RX ADMIN — CEPHALEXIN 500 MG: 500 CAPSULE ORAL at 19:24

## 2018-10-17 RX ADMIN — CEPHALEXIN 500 MG: 500 CAPSULE ORAL at 09:43

## 2018-10-17 RX ADMIN — FEXOFENADINE HYDROCHLORIDE 180 MG: 180 TABLET, FILM COATED ORAL at 09:43

## 2018-10-17 RX ADMIN — METHOCARBAMOL 500 MG: 500 TABLET ORAL at 05:02

## 2018-10-17 RX ADMIN — IPRATROPIUM BROMIDE AND ALBUTEROL SULFATE 3 ML: .5; 3 SOLUTION RESPIRATORY (INHALATION) at 03:51

## 2018-10-17 RX ADMIN — ACETAMINOPHEN 1000 MG: 500 TABLET, FILM COATED ORAL at 08:20

## 2018-10-17 NOTE — PLAN OF CARE
"Problem: Patient Care Overview  Goal: Plan of Care/Patient Progress Review  PRIMARY DIAGNOSIS: ACUTE PAIN  OUTPATIENT/OBSERVATION GOALS TO BE MET BEFORE DISCHARGE:  1. Pain Status: Improved-controlled with oral pain medications.      2. Return to near baseline physical activity: No - assist of one-two with Mercedes Steady for transfers      3. Cleared for discharge by consultants (if involved): No - PT/SW    Temp: 98.6  F (37  C) Temp src: Oral BP: 136/67 Pulse: 102 Heart Rate: 94 Resp: 24 SpO2: 99 % O2 Device: None (Room air)       VSS.  Lung sounds clear, adequate sats on room air.  Patient requesting her\" breathing treatment\"  No remembering that her neb treatments were previously just given to her.  Patient c/o of pain, improved with regularly scheduled tylenol.    Patient bladder scanned for 300 ccs of urine.  Patient placed on toilet with assist of two and Mercedes Steady.  Awaiting results.  PLAN:  Continue to provide supportive cares.           Discharge Planner Nurse   Safe discharge environment identified: Yes, Patient to discharge to [facility] on Thursday, 10/18/2018  Barriers to discharge:  No, not at this time.         Entered by:  Karen Lesch  10/16/2018 20:32 PM       Please review provider order for any additional goals.   Nurse to notify provider when observation goals have been met and patient is ready for discharge.         "

## 2018-10-17 NOTE — PROGRESS NOTES
Here for sandeep chan for right drop foot.  Patient said didn't know I was coming.  She said doesn't need a brace.  Doesn't have shoes with her because hasn't worn shoes in over 4 months.  Said in a chair most of the time and will do minimal ambulation with assistance. Said has balance problems besides her drop foot.   I asked her about somebody bringing shoes and said her daughter from [place] is going to be here in 3 weeks.   Patient said she doesn't need the brace despite the possibility of what it might do for her.  She also said wouldn't commit anyways without her daughter being here.  I talked to nurse Maryan about this.  She said patient going to [facility] tomorrow.  She went and talked to the patient and reported to me that she is saying the same thing about not wanting a brace.  She said she will inform the Dr.  Please call if questions.  Jesús METZ.

## 2018-10-17 NOTE — PROGRESS NOTES
Discharge Planner   Discharge Plans in progress: Yes  Barriers to discharge plan: Recommended TCU at discharge. [facility] has accepted and has a private room available Thursday. Spoke with daughter Windy who is in agreement for TCU, private pay for private room. [facility] transport arranged for Thursday 10/18 2 PM via wheelchair.   Follow up plan: CM to continue with discharge planning. CM to fax orders to [facility] when completed.          Entered by: Ivis Colin 10/17/2018 1:24 PM

## 2018-10-17 NOTE — PLAN OF CARE
Problem: Patient Care Overview  Goal: Plan of Care/Patient Progress Review  Problem: Pain, Acute (Adult)  Goal: Acceptable Pain Control/Comfort Level  Patient will demonstrate the desired outcomes by discharge/transition of care.   Outcome: Improving    Temp: 98.7  F (37.1  C) Temp src: Oral BP: 126/65 Pulse: 93 Heart Rate: 94 Resp: 28 SpO2: 98 % O2 Device: None (Room air)      Pain:  Patient currently denies any pain, stating her pain is present only when she moves.    Neuro: Alert, oriented. Strength to RLE weaker than left. R foot drop present.  Orthotics here to see and fit patient for a brace.  Patient refusing.  Patient also has no shoes for which is necessary for   Bracing.  Please see orthotics' note   Cardiac: WNL  GI: WNL  : Crawley patent, draining small amounts of clear, yellow urine.    Resp:  Lung sounds clear, adequate sats on room air.  RT at bedside to administer regular scheduled nembs.   Skin: Skin tear to R arm; Mepilex in place with small amount of drainage noted.   Mobility:  Transfer from bed to chair with assist of two and gait belt.  Will use BRAYDEN steady for any transfers to bathroom.    Plan: Continue with pain and symptom management.  EMG at 0900 tomorrow to assess.  [facility]

## 2018-10-17 NOTE — PROGRESS NOTES
[facility]    Hospitalist Progress Note    Date of Service (when I saw the patient): 10/17/2018    Assessment & Plan   Ana Luisa Lee is a 78 year old female with DM, COPD, anemia, HTN, SVT, hypothyroidism, HLP who was admitted on 10/13/2018 with falls, groin pain, foot drop. Found to have an Left iliopsoas tear with hematoma and nondisplaced coccyx fracture.    Foot drop    - MRI lumbar spine was unrevealing for cause of foot drop    - she could have pressure/irritaion on her sciatic nerve from the atrophy/edema seen on MRI in R gluteus    - could have peroneal nerve irritation more peripherally    - I have ordered an EMG which will be done tomorrow at 9AM    - I would have PT comment on possibly bracing her foot- Addendum: ordered Orthotics Consult for brace    Groin pain    - L iliopsoas tear/hematoma    - not having much pain at rest.     - not needing narcotics    - seen by PT- rec TCU. Has a bed for tomorrow. Transport is at 2    - will discontinue Prednisone at this point as I do not think it is contributing much (has been on for about 4-5 days)    DM    - sugars controlled    - not on meds    COPD- stable    - holding home inhalers    HTN    - cont meds    Called and updated daughter    DVT Prophylaxis: Enoxaparin (Lovenox) SQwho   Code Status: DNR/DNI    Disposition: Expected discharge tomorrow    Speedy Yu    Interval History   Patient in bed. Comfortable. Pain controlled at rest. Eating. No chest pain, sob, abdo pain, n/v/d    -Data reviewed today: I reviewed all new labs and imaging results over the last 24 hours. I personally reviewed no images or EKG's today.    Physical Exam   Temp: 98.3  F (36.8  C) Temp src: Oral BP: 114/57 Pulse: 93 Heart Rate: 94 Resp: 16 SpO2: 97 % O2 Device: None (Room air)    There were no vitals filed for this visit.  Vital Signs with Ranges  Temp:  [98  F (36.7  C)-98.8  F (37.1  C)] 98.3  F (36.8  C)  Pulse:  [] 93  Heart Rate:  [90-99]  94  Resp:  [16-24] 16  BP: (101-148)/(56-76) 114/57  SpO2:  [96 %-100 %] 97 %  I/O last 3 completed shifts:  In: -   Out: 1250 [Urine:1250]    Constitutional: Awake, alert, cooperative, no apparent distress   Respiratory: Clear to auscultation bilaterally, no crackles or wheezing   Cardiovascular: Regular rate and rhythm, normal S1 and S2, and no murmur noted   Abdomen: Normal bowel sounds, soft, non-distended, non-tender   Skin: No rashes, no cyanosis, dry to touch   Neuro: Alert and oriented x3, no weakness, numbness, memory loss   Extremities: No edema, normal range of motion   Other(s):        All other systems: Negative     Medications       acetaminophen  1,000 mg Oral TID     cephalexin  500 mg Oral Q12H [place]     enoxaparin  40 mg Subcutaneous Q24H     fexofenadine  180 mg Oral Daily     ipratropium - albuterol 0.5 mg/2.5 mg/3 mL  3 mL Nebulization Q4H     levothyroxine  25 mcg Oral Daily     lidocaine  2 patch Transdermal Q24h    And     lidocaine   Transdermal Q24H    And     lidocaine   Transdermal Q8H     lisinopril (PRINIVIL/ZESTRIL) tablet 5 mg  5 mg Oral Daily     montelukast  10 mg Oral At Bedtime     omeprazole (priLOSEC) CR capsule 40 mg  40 mg Oral QAM     ondansetron  4 mg Oral QAM     roflumilast  500 mcg Oral Daily     senna-docusate  1 tablet Oral BID    Or     senna-docusate  2 tablet Oral BID     sodium chloride  1 g Oral BID w/meals     tamsulosin  0.4 mg Oral Daily       Data     Recent Labs  Lab 10/17/18  0651 10/16/18  0921 10/16/18  0616 10/15/18  0650 10/14/18  0714 10/13/18  0818   WBC  --  9.0  --   --  6.6 8.0   HGB  --  9.7*  --   --  9.3* 9.8*   MCV  --  91  --   --  91 90   PLT  --  259  --  233 233 263   *  --  130* 130* 129* 126*   POTASSIUM 3.8  --  3.7 3.8 4.0 4.0   CHLORIDE 99  --  101 100 100 94   CO2 23  --  22 22 22 24   BUN 9  --  7 9 10 12   CR 0.56  --  0.54 0.59 0.63 0.66   ANIONGAP 9  --  7 8 7 8   DIANA 8.1*  --  8.4* 8.5 8.3* 8.7   *  --  98 92 100* 108*        No results found for this or any previous visit (from the past 24 hour(s)).

## 2018-10-17 NOTE — PLAN OF CARE
Problem: Pain, Acute (Adult)  Goal: Identify Related Risk Factors and Signs and Symptoms  Related risk factors and signs and symptoms are identified upon initiation of [employer] Clinical Practice Guideline (CPG).  Outcome: No Change    Vitals: /65  Pulse 93  Temp 98  F  Resp 16  SpO2 97%  Labs/Imaging: MRI = possible coccyx fracture, L iliopsoas tear & hematoma, L hamstring tear. Hgb 9.3 -> 9.7. Na+ 130. UA +.   Pain: Robaxin given x 1 for pain. Tylenol & Tramadol given on evening shift.  Neuro: Intact. A&O x 4 but often forgetful.   Cardiac: WNL.  GI/: WNL ex malodorous urine & urine retention. Crawley placed ~0300 & left in place due to very difficult catheterization and unclear documentation - writer unsure if this was patient's second or third time being straight cathed.   Respiratory: Patient reports SOB. O2 sat good, LS clear. Scheduled nebs given.  Skin: WNL ex small, scattered bruising and skin tear (covered with mepilex)  Mobility: Ax2 w/ sarasteady   Interventions This Shift: catheter placed for PVR of 491 (Flomax started on evening shift). Robaxin given.   Plan: Discharge to [facility] on Thursday (FYI: will only be two nights as inpatient on Thursday).

## 2018-10-18 ENCOUNTER — TRANSFERRED RECORDS (OUTPATIENT)
Dept: HEALTH INFORMATION MANAGEMENT | Facility: CLINIC | Age: 78
End: 2018-10-18

## 2018-10-18 VITALS
DIASTOLIC BLOOD PRESSURE: 70 MMHG | TEMPERATURE: 97.1 F | RESPIRATION RATE: 20 BRPM | HEART RATE: 86 BPM | SYSTOLIC BLOOD PRESSURE: 139 MMHG | OXYGEN SATURATION: 99 %

## 2018-10-18 LAB
ANION GAP SERPL CALCULATED.3IONS-SCNC: 10 MMOL/L (ref 3–14)
BUN SERPL-MCNC: 7 MG/DL (ref 7–30)
CALCIUM SERPL-MCNC: 8.2 MG/DL (ref 8.5–10.1)
CHLORIDE SERPL-SCNC: 99 MMOL/L (ref 94–109)
CO2 SERPL-SCNC: 22 MMOL/L (ref 20–32)
CREAT SERPL-MCNC: 0.54 MG/DL (ref 0.52–1.04)
GFR SERPL CREATININE-BSD FRML MDRD: >90 ML/MIN/1.7M2
GLUCOSE SERPL-MCNC: 101 MG/DL (ref 70–99)
PLATELET # BLD AUTO: 252 10E9/L (ref 150–450)
POTASSIUM SERPL-SCNC: 4 MMOL/L (ref 3.4–5.3)
SODIUM SERPL-SCNC: 131 MMOL/L (ref 133–144)

## 2018-10-18 PROCEDURE — 25000132 ZZH RX MED GY IP 250 OP 250 PS 637: Performed by: PHYSICIAN ASSISTANT

## 2018-10-18 PROCEDURE — 80048 BASIC METABOLIC PNL TOTAL CA: CPT | Performed by: HOSPITALIST

## 2018-10-18 PROCEDURE — 40000275 ZZH STATISTIC RCP TIME EA 10 MIN

## 2018-10-18 PROCEDURE — 25000125 ZZHC RX 250: Performed by: INTERNAL MEDICINE

## 2018-10-18 PROCEDURE — 25000131 ZZH RX MED GY IP 250 OP 636 PS 637: Performed by: PHYSICIAN ASSISTANT

## 2018-10-18 PROCEDURE — 36415 COLL VENOUS BLD VENIPUNCTURE: CPT | Performed by: HOSPITALIST

## 2018-10-18 PROCEDURE — 94640 AIRWAY INHALATION TREATMENT: CPT

## 2018-10-18 PROCEDURE — 94640 AIRWAY INHALATION TREATMENT: CPT | Mod: 76

## 2018-10-18 PROCEDURE — 99239 HOSP IP/OBS DSCHRG MGMT >30: CPT | Performed by: INTERNAL MEDICINE

## 2018-10-18 PROCEDURE — 85049 AUTOMATED PLATELET COUNT: CPT | Performed by: HOSPITALIST

## 2018-10-18 RX ORDER — PREDNISONE 10 MG/1
10 TABLET ORAL DAILY
Qty: 60 TABLET | Refills: 0 | DISCHARGE
Start: 2018-10-18 | End: 2019-02-09

## 2018-10-18 RX ORDER — METHOCARBAMOL 500 MG/1
500 TABLET, FILM COATED ORAL 3 TIMES DAILY PRN
Qty: 20 TABLET | Refills: 0 | DISCHARGE
Start: 2018-10-18

## 2018-10-18 RX ORDER — TRAMADOL HYDROCHLORIDE 50 MG/1
25 TABLET ORAL 2 TIMES DAILY PRN
Qty: 30 TABLET | Refills: 0 | Status: SHIPPED | DISCHARGE
Start: 2018-10-18

## 2018-10-18 RX ORDER — MECLIZINE HYDROCHLORIDE 25 MG/1
25 TABLET ORAL 2 TIMES DAILY PRN
Qty: 90 TABLET | DISCHARGE
Start: 2018-10-18

## 2018-10-18 RX ORDER — TAMSULOSIN HYDROCHLORIDE 0.4 MG/1
0.4 CAPSULE ORAL DAILY
Qty: 60 CAPSULE | DISCHARGE
Start: 2018-10-19

## 2018-10-18 RX ORDER — IPRATROPIUM BROMIDE AND ALBUTEROL SULFATE 2.5; .5 MG/3ML; MG/3ML
3 SOLUTION RESPIRATORY (INHALATION) EVERY 4 HOURS PRN
Qty: 360 ML | DISCHARGE
Start: 2018-10-18

## 2018-10-18 RX ORDER — SODIUM CHLORIDE 1 G/1
1 TABLET ORAL 2 TIMES DAILY WITH MEALS
DISCHARGE
Start: 2018-10-18

## 2018-10-18 RX ORDER — CEPHALEXIN 500 MG/1
500 CAPSULE ORAL 2 TIMES DAILY
Qty: 1 CAPSULE | Refills: 0 | DISCHARGE
Start: 2018-10-18

## 2018-10-18 RX ADMIN — OMEPRAZOLE 40 MG: 20 CAPSULE, DELAYED RELEASE ORAL at 10:26

## 2018-10-18 RX ADMIN — ONDANSETRON 4 MG: 4 TABLET, ORALLY DISINTEGRATING ORAL at 10:26

## 2018-10-18 RX ADMIN — LISINOPRIL 5 MG: 5 TABLET ORAL at 10:29

## 2018-10-18 RX ADMIN — IPRATROPIUM BROMIDE AND ALBUTEROL SULFATE 3 ML: .5; 3 SOLUTION RESPIRATORY (INHALATION) at 03:46

## 2018-10-18 RX ADMIN — ROFLUMILAST 500 MCG: 500 TABLET ORAL at 10:27

## 2018-10-18 RX ADMIN — SODIUM CHLORIDE TAB 1 GM 1 G: 1 TAB at 10:26

## 2018-10-18 RX ADMIN — LEVOTHYROXINE SODIUM 25 MCG: 25 TABLET ORAL at 10:27

## 2018-10-18 RX ADMIN — IPRATROPIUM BROMIDE AND ALBUTEROL SULFATE 3 ML: .5; 3 SOLUTION RESPIRATORY (INHALATION) at 12:24

## 2018-10-18 RX ADMIN — CEPHALEXIN 500 MG: 500 CAPSULE ORAL at 10:26

## 2018-10-18 RX ADMIN — ACETAMINOPHEN 1000 MG: 500 TABLET, FILM COATED ORAL at 10:27

## 2018-10-18 RX ADMIN — TAMSULOSIN HYDROCHLORIDE 0.4 MG: 0.4 CAPSULE ORAL at 10:27

## 2018-10-18 RX ADMIN — FEXOFENADINE HYDROCHLORIDE 180 MG: 180 TABLET, FILM COATED ORAL at 10:27

## 2018-10-18 RX ADMIN — IPRATROPIUM BROMIDE AND ALBUTEROL SULFATE 3 ML: .5; 3 SOLUTION RESPIRATORY (INHALATION) at 07:42

## 2018-10-18 RX ADMIN — TRAMADOL HYDROCHLORIDE 25 MG: 50 TABLET, FILM COATED ORAL at 07:33

## 2018-10-18 NOTE — PLAN OF CARE
Problem: Patient Care Overview  Goal: Plan of Care/Patient Progress Review  PRIMARY DIAGNOSIS: Urinary Retention  OUTPATIENT/OBSERVATION GOALS TO BE MET BEFORE DISCHARGE:  1. ADLs back to baseline: No    2. Activity and level of assistance: A2 with adriano steady    3. Pain status: Improved-controlled with oral pain medications.    4. Return to near baseline physical activity: No     Vitals are Temp: 98.5  F (36.9  C) Temp src: Oral BP: 169/82 Pulse: 86 Heart Rate: 94 Resp: 18 SpO2: 96 %.    Patient is Alert and Oriented x4. They are 2 assist with Gait Belt and Adriano Steady.  Pt is a Regular diet and complaining of 6/10 pain in their groin.  Ultram given for pain.  Patient is Saline locked.  Patient is getting nebs Q4 hrs.  Patient has a right foot drop and is getting an EMG at 0900 today.  Plan of care is patient will be discharged at 1400 today to [facility] providing transport.    Discharge Planner Nurse   Safe discharge environment identified: Yes  Barriers to discharge: No         Please review provider order for any additional goals.   Nurse to notify provider when observation goals have been met and patient is ready for discharge.

## 2018-10-18 NOTE — PROGRESS NOTES
Pt states she has an irritating cough and tickle in her throat, but it not short of breath, sat's 97% on RA.  Pt informed of indication and benefits of nebulizer.     Coty Barraza, RT on 10/17/2018 at 9:07 PM

## 2018-10-18 NOTE — PROGRESS NOTES
Transition Communication Hand-off for Care Transitions to Next Level of Care Provider    Name: Ana Luisa Lee  : 1940  MRN #: [id number]  Primary Care Provider: Db Alvarez  Primary Care MD Name: Db Alvarez  Primary Clinic: [facility] [address] [facility] [address]  Primary Care Clinic Name:  ([facility])  Reason for Hospitalization:  Urinary retention [R33.9]  Left inguinal pain [R10.32]  Complicated UTI (urinary tract infection) [N39.0]  Hematoma of left iliopsoas muscle, initial encounter [S70.12XA]  Rupture of left hamstring tendon, initial encounter [S76.312A]  Admit Date/Time: 10/13/2018  7:52 AM  Discharge Date: 10/18/18  Payor Source: Payor: MEDICA / Plan: [employer] / Product Type: HMO /     Readmission Assessment Measure (COURTNEY) Risk Score/category: Elevated           Reason for Communication Hand-off Referral: Fragility    Discharge Plan: [facility]       Concern for non-adherence with plan of care:   No  Discharge Needs Assessment:  Needs       Most Recent Value    Anticipated Changes Related to Illness none    Equipment Currently Used at Home wheelchair, manual, shower chair, grab bar, walker, rolling    # of Referrals Placed by CTS Post Acute Facilities, Transportation    PAS Number [id number]          Already enrolled in Tele-monitoring program and name of program:  NA  Follow-up specialty is recommended: No    Follow-up plan:  No future appointments.    Any outstanding tests or procedures:        Referrals     Future Labs/Procedures    Med Therapy Manage Referral     Comments:    Your provider has referred you to: **[facility] Scheduling (numerous locations) [phone]   [email or web address]    Reason for Referral: Greater than 10 prescription medications    The [facility] department will contact you to schedule an appointment.  You may also  schedule the appointment by calling [phone].  For [facility] patients, please call [phone] to confirm/schedule your appointment on the next business day.    This service is designed to help you get the most from your medications.  A specially trained Pharmacist will work closely with you and your providers to solve any questions, concerns, issues or problems related to your medications.    Please bring all of your prescription and non-prescription medications (such as vitamins, over-the-counter medications, and herbals) or a detailed medication list to your appointment.    If you have a glucose meter or other home monitoring information, please also bring this to your appointment (i.e. blood glucose log, blood pressure log, pain log, etc.).            Key Recommendations:    COURTNEY score is elevated. Patient has had multiple admissions in the last 6 months: UTI, arthritis/delerium, bilateral shoulder pain/dizziness. No ED only visits in the last 6 months. Her PMH includes DM, COPD& asthma. She was a SBA in ambulation PTA and now is an assist of 2. Her history also includes an esophageal stricture. She has a history of poor nutrition; her assisted living facility does provide meals. She has more than 10 prescription medications. An MTM referral was entered. Her medication list includes Tramadol. No anticoagulants, hypoglycemics, immunosuppressants, lactulose or Rifaximin are listed. Per ED MD note, she has a history of dementia (not listed in her PMH). She resides at [facility].  Patient was admitted on 10/13/2018 with falls, groin pain, foot drop. Found to have an Left iliopsoas tear with hematoma and nondisplaced coccyx fracture. Patient had an MRI and EMG completed to eval for foot drop.     - MRI lumbar spine was unrevealing for cause of foot drop    - she could have pressure/irritaion on her sciatic nerve from the atrophy/edema seen on MRI in R gluteus    - could have  peroneal nerve irritation more peripherally   -Orthotics Consult for brace  Patient was discharged to [facility]'s TCU and will need follow up after discharge from TCU.     Ivis Colin    AVS/Discharge Summary is the source of truth; this is a helpful guide for improved communication of patient story

## 2018-10-18 NOTE — DISCHARGE SUMMARY
[facility]  Discharge Summary  Name: Ana Luisa Lee    MRN: [id number]  YOB: 1940    Age: 78 year old  Date of Discharge:  10/18/2018  Date of Admission: 10/13/2018  Primary Care Provider: Db Alvarez  Discharge Physician:  Suly Petersen MD  Discharging Service:  Hospitalist      Discharge Diagnoses:  1. Left Groin pain 2/2 Iliopsoas Tear with Hematoma and Non displaced Coccyx Fracture  2. Right Foot Drop  3. Urinary Retention  4. UTI  5. Chronic Hyponatremia  6. Vertigo  7. COPD and Asthma  8. Hypothyroidism  9. HLD  10. GERD     Hospital Course:  Ana Luisa Lee is a 78 year old female with PMH significant for mild dementia, asthma and COPD on chronic Prednisone, esophageal stricture s/p dilation (2007), HTN, HLD, hypothyroidism, PSVT, DM2 who resides in an [facility] and has been wheelchair bound since the spring due to progressive generalized weakness with recent onset of left groin pain that has progressed over the last 3 weeks who was admitted 10/13/2018 due to acutely worsening pain and weakness and was found to have iliopsoas tear with hematoma and non displaced coccyx fracture.  In addition treated for UTI.  Will discharge to TCU.      1. Left groin pain 2/2 Iliopsoas Tear with hematoma and nondisplaced Coccyx Fracture - No recent injuries or trauma prior to onset of pain but patient has been undergoing PT at her facility, unsure if she over did it with this.  MRI of the Sacrum/Coccyx revealed a nondisplaced coccyx fracture.  MRI of the Pelvis revealed a complete tear/rupture of the left iliopsoas tendon with adjacent soft tissue edema/hemorrhage, partial tear or tendinosis of the left common hamstring, right trochanteric bursitis and right gluteal muscle edema.  Orthopedics consulted and this was all non surgical and agreed with WBAT.  Her pain was controlled with Tylenol and PRN Tramadol.  PT followed and recommended TCU upon discharge.        2. Right foot drop - per  patient on 10/14 this is chronic with her peripheral neuropathy but discussed with patient's daughter who reported this is new within the last week per the patient's facility and noted by PT on Friday PTA.  MRI of the lumbar spine 10/15 with Multilevel degenerative disc disease and degenerative facet arthropathy.  L5-S1 grade 1 spondylolisthesis causing bilateral foraminal stenoses.  No specific etiology of the new right foot drop is identified.  EMG was obtained the morning of discharge, Dr. Marx from Neurology contacted me and stated there were several significant changes on both sides.  He recommended I discuss with radiology to ensure that the Lumbosacral Plexus was not affected given these changes.  I discussed with Radiology who reviewed the imaging.  The lumbosacral plexus are symmetric and no abnormal signals.  Dr. Marx recommended follow up with Neurology in clinic if her foot drop persists or weakness persists for further evaluation and likely repeat EMG.      3. UTI, urinary retention: found to be retaining urine in the ED and joseph was placed on 10/13. Thought to be due to possible underlying UTI. Appears that patient has previously had urinary retention in setting of taking Duloxetine. Joseph removed on 10/14 and patient able to void with minimal post void residual. Current urine culture growing 10-50k coagulase negative staphylococcus sensitive to Oxacillin.  She re developed urinary retention and joseph catheter was replaced.  Again attempted joseph catheter removal on day of discharge but she had persistent retention.  Joseph catheter was replaced.  Would recommend continuing joseph catheter and Flomax for one week then could do a voiding trial at TCU.  If she continues to have urinary retention then would recommend that patient follow up with Urology in clinic.  She has one dose left of Keflex to complete a 7 day antibiotic course.      4. Chronic hyponatremia: appears baseline sodium around  130-132, initially 126 on admission with improvement today to 130 after IVFs. It has been thought previously that the patient may have an element of SIADH from her known lung disease. Sodium chloride 1 gram tablets added twice daily, sodium remained stable at 131.      5. Vertigo - Resolved: reported onset of dizziness at time of admission. Due to patient being a poor historian it has been difficult to qualify her symptoms. No prior h/o BPPV or CVA. No other neuro deficits appreciated on exam. Unclear if Flexeril started on admission was causing her symptoms, discontinued on 10/14. Patient was treated supportively overnight with IVFs, PRN Valium, and scheduled Meclizine for possible BPPV without improvement in her symptoms. After further discussion with daughter earlier this week, the patient tends to report dizziness when she is anxious as well with minor medication adjustments due to sensitivities to medications. Her symptoms are resolved.  She will have PRN Meclizine available as needed.      6. COPD, asthma: shortness of breath at baseline per patient and no recent exacerbations. Follows with pulmonology in the outpatient setting for management.  She was continued on PTA Prednisone, Roflumilast, and PRN Xopenex nebs as well as Incruse Ellipta and Pulmicort inhales upon discharge.  Of note she is chronically on Prednisone 10 mg every day for COPD/asthma.      7. HTN: Continued on Lisinopril.      8. Hypothyroidism, HLD, and GERD: stable medical conditions, resume PTA medications         # Discharge Pain Plan:   - During her hospitalization, Ana Luisa experienced pain due to iliopsoas tear, coccyx fracture.  The pain plan for discharge was discussed with Ana Luisa and the plan was created in a collaborative fashion.    - Chronic/continued opioids: Tramadol 25 mg BID PRN         Discharge Disposition:  Discharged to short-term care facility     Allergies:  Allergies   Allergen Reactions     Hydralazine Anxiety      Penicillin G Hives     Tolerated cephalosporines 2017     Meloxicam      dizziness       Metoprolol      ? Skin rash on the back     Norvasc [Amlodipine Besylate] Hives        Condition on Discharge:  Discharge condition: Stable   Discharge vitals: Blood pressure 147/80, pulse 86, temperature 97.8  F (36.6  C), resp. rate 20, SpO2 99 %, not currently breastfeeding.   Code status on discharge: DNR / DNI     History of Illness:  See detailed admission note for full details.    Physical Exam:  Blood pressure 147/80, pulse 86, temperature 97.8  F (36.6  C), resp. rate 20, SpO2 99 %, not currently breastfeeding.  Wt Readings from Last 1 Encounters:   08/15/18 51.6 kg (113 lb 12.8 oz)     General: Alert, awake, no acute distress. Sitting up in a chair eating breakfast.  HEENT: Normocephalic, atraumatic, eyes anicteric and without scleral injection, EOMI, MMM.  Cardiac: RRR, normal S1, S2.  II/VI systolic murmur heard throughout upper chest, no g/r. No LE edema.  Pulmonary: Normal chest rise, normal work of breathing.  Lungs CTAB without crackles or wheezing  Abdomen: soft, non-tender, non-distended.  Normoactive BS.  No guarding or rebound tenderness.  Extremities: no deformities.  Warm, well perfused.  Skin: no rashes or lesions noted.  Warm and Dry.  Neuro: mild tenderness to left groin with palpation.  4/5 muscle strength. Right foot drop.  Speech clear.    Psych: Appropriate affect.    Procedures other than Imaging:  EEG     Imaging:  Results for orders placed or performed during the hospital encounter of 10/13/18   XR Pelvis and Hip Left 2 Views    Narrative    PELVIS WITH UNILATERAL HIP TWO VIEWS LEFT  10/13/2018 9:17 AM     HISTORY: left hip pain/groin pain. rule out fracture;     COMPARISON: None.    FINDINGS: There is normal osseous alignment.  No fractures are  identified.      Impression    IMPRESSION: Osseous structures appear intact. No fractures are  identified.    WILMA CARBAJAL MD   Abd/pelvis CT no contrast -  Stone Protocol    Narrative    CT ABDOMEN AND PELVIS WITHOUT CONTRAST 10/13/2018 11:56 AM     HISTORY: Lower abdominal pain/groin pain on left. Rule out stone.     COMPARISON: September 21, 2014    TECHNIQUE: Volumetric helical acquisition of CT images from the lung  bases through the symphysis pubis without intravenous contrast.  Radiation dose for this scan was reduced using automated exposure  control, adjustment of the mA and/or kV according to patient size, or  iterative reconstruction technique.    FINDINGS: No hydronephrosis or urolithiasis. Minimal asymmetry of the  iliopsoas, left greater than right, indicating a small amount of  intramuscular hemorrhage. Please see CT report same date of the left  hip. No free air or free fluid. No bowel obstruction. There is mild  diverticulosis without evidence for diverticulitis. There are moderate  atherosclerotic changes of the visualized aorta and its branches.  There is no evidence of aortic aneurysm. There is cholelithiasis  without evidence for cholecystitis. Moderate hiatal hernia. The liver,  spleen, adrenal glands, kidneys, and pancreas demonstrate no worrisome  focal lesion in the absence of intravenous contrast. Lung bases clear  of acute infiltrates. Trace peripheral fibrosis and/or atelectasis.      Impression    IMPRESSION:  1. Question some minimal intramuscular hematoma in the iliopsoas on  the left. Please see CT report of the left hip for additional regional  findings.  2. Otherwise no acute process demonstrated in the abdomen and pelvis.     DEANN CROSS MD   CT Hip Left w/o Contrast    Narrative    CT HIP LEFT WITHOUT CONTRAST 10/13/2018 11:56 AM     HISTORY: Left hip pain/groin pain, rule out occult fracture.      TECHNIQUE: Axial images with reconstructions. Radiation dose for this  scan was reduced using automated exposure control, adjustment of the  mA and/or kV according to patient size, or iterative reconstruction  technique.      COMPARISON:  11/19/2017.     FINDINGS: There appears to be some focal fluid density adjacent to the  left ischial tuberosity. There is loss of cortex at the superior  aspect of the left ischial tuberosity. These findings suggest the  possibility of common hamstring tendon rupture. There is edema  adjacent to the left iliopsoas muscle, raising the possibility of  tendon rupture. Degenerative change of the lower lumbar spine.  Anterolisthesis at L5-S1. Chondrocalcinosis of the symphysis pubis,  consistent with calcium pyrophosphate deposition disease. No hip or  pubic fracture is identified. Incidentally noted colonic  diverticulosis.      Impression    IMPRESSION:  1. Findings raising the possibility of left common hamstring tendon  rupture and iliopsoas tendon rupture. MRI recommended, if indicated  clinically.  2. Additional findings discussed above.     JUAN OROZCO MD   MR Sacrum and Coccyx w/o Contrast    Narrative    MR SACRUM AND COCCYX WITHOUT CONTRAST  10/15/2018 5:13 PM     HISTORY:  New right foot drop.     TECHNIQUE: Sagittal and coronal oblique T1 and inversion recovery, and  transverse T1 and fat-suppressed T2-weighted pulse sequences.    FINDINGS: L5-S1 spondylolisthesis and L5 foraminal stenosis are noted,  left greater than right. Marrow signal within the sacrum appears  within normal limits. There is a transverse band of high signal  intensity within the coccyx which could represent a nondisplaced  coccyx fracture given its linear appearance on the T1-weighted images.   There may be minimal adjacent presacral soft tissue edema. Sacroiliac  joints appear within normal limits with no evidence of periarticular  marrow edema. There is prominent intramuscular fluid or edema within  the left iliopsoas muscle extending to the hip. There is also  asymmetrical edema within the right gluteal musculature which appears  atrophic relative to the left.      Impression    IMPRESSION:  1. Mid to lower coccygeal marrow edema and  linear transverse band  suspicious for nondisplaced fracture.  2. Left iliopsoas prominent intramuscular fluid or edema.  Possibilities include intramuscular strain or tear. However, on the  distalmost images, the left iliopsoas tendon appears to be probably  thickened. Therefore the intermuscular signal could be at least in  part related to a process distal to this scan.  3. Right gluteus medius and minimus muscle atrophy, only partly  included on the scan. Right gluteal intramuscular edema is also noted  which could indicate superimposed acute muscle strain. However, acute  denervation atrophy might have a similar appearance.  4. Bilateral foraminal stenosis, severe and greater on the left than  right.    ELIZABETH HONG MD   MR Lumbar Spine w/o Contrast    Narrative    MRI LUMBAR SPINE WITHOUT CONTRAST  10/15/2018  5:12 PM     HISTORY: New right foot drop.    TECHNIQUE: Multiplanar multisequence MRI of the lumbar spine without  contrast.    COMPARISON: Radiographs 4/5/2018    FINDINGS: Previous radiographs show 5 lumbar-type vertebral bodies.  The distal spinal cord and cauda equina appear normal.    T12-L1: Normal disc, facet joints, spinal canal and neural foramina.     L1-L2: Loss of disc height, circumferential disc bulge and anterior  vertebral endplate osteophytes. Right posterolateral and lateral disc  bulges. No neural impingement.    L2-L3: Right posterolateral disc bulge with herniation into the  inferior aspect of the right neural foramen. Anterior and posterior  vertebral endplate osteophytes. Otherwise normal.    L3-L4: Normal disc, facet joints, spinal canal and neural foramina.     L4-L5: Bilateral posterolateral disc bulges with vertebral endplate  osteophytes causing slight impression on the thecal sac. Mild  degeneration left facet joint. Otherwise normal.    L5-S1: Grade 1 spondylolisthesis related to bilateral defects in the  pars interarticularis. Minimal side-to-side narrowing of the  spinal  canal. Moderate stenosis of the right neural foramen and severe  stenosis of the left neural foramen. Bilateral moderate degenerative  facet arthropathy.    Paraspinous soft tissues: Normal.     Bone marrow: Normal.       Impression    IMPRESSION:   1. Multilevel degenerative disc disease and degenerative facet  arthropathy as described above.  2. L5-S1 grade 1 spondylolisthesis causing bilateral foraminal  stenoses.  3. No specific etiology of the new right foot drop is identified.    MAEVE OWEN MD   MR Pelvis Muscular Tissue wo Contrast    Narrative    MR PELVIS MUSCULAR TISSUE WITHOUT CONTRAST   10/15/2018 5:14 PM    HISTORY:  Assess left common hamstring tendon rupture and iliopsoas  tendon rupture.    TECHNIQUE:  Coronal T1 and inversion recovery, sagittal T1, and  transverse proton density and fat suppressed T2 weighted images.    FINDINGS:   Osseous and Cartilaginous Structures:  No fracture or destructive bone  lesion.  No femoral head osteonecrosis.  No significant hip  osteoarthritis or apparent chondromalacia.       Acetabular Labrum: No juxtaacetabular cyst.  No obvious labral tear is  appreciated, allowing for the large FOV technique.  If indicated  clinically, MR arthrography would be considered the study of choice in  this regard.    Common Hamstring Tendon: There is increased signal intensity at the  ischial tuberosity of the left common hamstring tendon attachment  suggesting partial tear. No ozzie tendon rupture or retraction is  seen.    Gluteal Tendon Greater Trochanteric Attachments: The gluteus medius  and minimus tendons appear intact.    Trochanteric and Iliopsoas Bursae: Moderate fluid is noted in the  right greater trochanteric bursa.    Joint space: No joint effusion.     Additional findings: There appears to be a complete tear or rupture of  the left iliopsoas tendon from its lesser trochanteric attachment.  Adjacent edema or hemorrhage is noted tracking proximally into  the  left iliacus and to a lesser degree left psoas muscles. Edema is noted  within the right gluteal musculature. Fatty atrophy of the left  gluteus minimus and medius muscles is also noted.      Impression    IMPRESSION:   1. Complete tear or rupture of the left iliopsoas tendon which is  proximally retracted and thickened. Adjacent soft tissue  edema/hemorrhage is also noted tracking proximally into the left  iliopsoas muscles. Given prominent edema within the left iliacus  muscle, superimposed muscle strain may also be present.  2. Partial tear or tendinosis of the left common hamstring tendons at  the ischial tuberosity attachment. No ozzie tendon rupture or distal  retraction is demonstrated.  3. Right greater trochanteric bursitis.  4. Right gluteal muscle edema. This is superimposed on chronic fatty  atrophy of the right gluteus medius and minimus muscles. Possibilities  include acute muscle strain. Acute denervation atrophy could have a  similar appearance.    ELIZABETH HONG MD     *Note: Due to a large number of results and/or encounters for the requested time period, some results have not been displayed. A complete set of results can be found in Results Review.        Consultations:  Consultations This Hospital Stay   SOCIAL WORK IP CONSULT  ORTHOPEDIC SURGERY IP CONSULT  PHYSICAL THERAPY ADULT IP CONSULT  SOCIAL WORK IP CONSULT  PHYSICAL THERAPY ADULT IP CONSULT  ORTHOPEDIC SURGERY IP CONSULT  ORTHOSIS BRACE IP CONSULT     Recent Lab Results:    Recent Labs  Lab 10/18/18  0540 10/16/18  0921 10/15/18  0650 10/14/18  0714 10/13/18  0818   WBC  --  9.0  --  6.6 8.0   HGB  --  9.7*  --  9.3* 9.8*   HCT  --  28.7*  --  27.8* 28.8*   MCV  --  91  --  91 90    259 233 233 263       Recent Labs  Lab 10/18/18  0540 10/17/18  0651 10/16/18  0616   * 131* 130*   POTASSIUM 4.0 3.8 3.7   CHLORIDE 99 99 101   CO2 22 23 22   ANIONGAP 10 9 7   * 106* 98   BUN 7 9 7   CR 0.54 0.56 0.54   GFRESTIMATED  >90 >90 >90   GFRESTBLACK >90 >90 >90   DIANA 8.2* 8.1* 8.4*          Pending Results:    Unresulted Labs Ordered in the Past 30 Days of this Admission     No orders found from 8/14/2018 to 10/14/2018.           Discharge Instructions and Follow-Up:   Discharge Orders     Med Therapy Manage Referral     General info for SNF   Length of Stay Estimate: Short Term Care: Estimated # of Days <30  Condition at Discharge: Stable  Level of care:skilled   Rehabilitation Potential: Fair  Admission H&P remains valid and up-to-date: Yes  Recent Chemotherapy: N/A  Use Nursing Home Standing Orders: Yes     Mantoux instructions   Give two-step Mantoux (PPD) Per Facility Policy Yes     Reason for your hospital stay   You were hospitalized for pain and were found to have a iliopsoas tear with hematoma and nondisplaced coccyx fracture.  You also were treated for a urinary tract infection.  You will go to a rehab facility for further therapy upon discharge.     Activity - Up with assistive device     Follow Up   Follow up with Neurology in clinic for possible repeat EMG if your foot drop persists of the weakness does not improve.     Crawley catheter   To straight gravity drainage. Change catheter every 2 weeks and PRN for leaking or decreased uring output with signs of bladder distention.     Crawley in place for urinary retention.  Continue Flomax, recommend trial removal in one week, if unsuccessful then follow up with Urology in clinic.     DNR/DNI     Physical Therapy Adult Consult   Evaluate and treat as clinically indicated.    Reason:  Weakness, iliopsoas tear, coccyx fracture     Occupational Therapy Adult Consult   Evaluate and treat as clinically indicated.    Reason:  Weakness     Fall precautions     Advance Diet as Tolerated   Follow this diet upon discharge: Orders Placed This Encounter     Regular Diet Adult       Discharge Medications   Current Discharge Medication List      START taking these medications    Details    cephALEXin (KEFLEX) 500 MG capsule Take 1 capsule (500 mg) by mouth 2 times daily Take 500 mg on the evening of 10/18/18 to complete antibiotic course.  Qty: 1 capsule, Refills: 0    Associated Diagnoses: Complicated UTI (urinary tract infection)      ipratropium - albuterol 0.5 mg/2.5 mg/3 mL (DUONEB) 0.5-2.5 (3) MG/3ML neb solution Take 1 vial (3 mLs) by nebulization every 4 hours as needed for shortness of breath / dyspnea or wheezing  Qty: 360 mL    Associated Diagnoses: Moderate persistent asthma without complication      lidocaine (LIDODERM) 5 % Patch Apply up to 3 patches to painful area at once for up to 12 h within a 24 h period.  Remove after 12 hours.  Qty: 30 patch, Refills: 0      meclizine (ANTIVERT) 25 MG tablet Take 1 tablet (25 mg) by mouth 2 times daily as needed for dizziness  Qty: 90 tablet    Associated Diagnoses: Dizziness      methocarbamol (ROBAXIN) 500 MG tablet Take 1 tablet (500 mg) by mouth 3 times daily as needed for muscle spasms  Qty: 20 tablet, Refills: 0    Associated Diagnoses: Hematoma of left iliopsoas muscle, initial encounter      sodium chloride 1 GM tablet Take 1 tablet (1 g) by mouth 2 times daily (with meals)    Associated Diagnoses: Hyponatremia      tamsulosin (FLOMAX) 0.4 MG capsule Take 1 capsule (0.4 mg) by mouth daily  Qty: 60 capsule    Associated Diagnoses: Urinary retention         CONTINUE these medications which have CHANGED    Details   predniSONE (DELTASONE) 10 MG tablet Take 1 tablet (10 mg) by mouth daily  Qty: 60 tablet, Refills: 0    Associated Diagnoses: Moderate persistent asthma without complication      traMADol (ULTRAM) 50 MG tablet Take 0.5 tablets (25 mg) by mouth 2 times daily as needed for moderate to severe pain  Qty: 30 tablet, Refills: 0    Associated Diagnoses: Left inguinal pain; Rupture of left hamstring tendon, initial encounter; Hematoma of left iliopsoas muscle, initial encounter         CONTINUE these medications which have NOT CHANGED     Details   acetaminophen (TYLENOL) 500 MG tablet Take 2 tablets (1,000 mg) by mouth 3 times daily    Associated Diagnoses: Pain of both shoulder joints      budesonide (PULMICORT FLEXHALER) 180 MCG/ACT inhaler Inhale 1 puff into the lungs 2 times daily    Associated Diagnoses: Pulmonary emphysema, unspecified emphysema type (H)      calcium 600 MG tablet Take 1 tablet (600 mg) by mouth daily  Qty: 60 tablet    Associated Diagnoses: Pulmonary emphysema, unspecified emphysema type (H)      cholecalciferol (VITAMIN D) 1000 UNIT tablet Take 1 tablet (1,000 Units) by mouth daily  Qty: 30 tablet    Associated Diagnoses: COPD, moderate (H)      cyanocobalamin (B-12 TR) 1000 MCG TBCR Take 1 tablet by mouth daily  Qty: 100 tablet, Refills: 3    Associated Diagnoses: Pernicious anemia      Dextromethorphan HBr (ROBAFEN COUGH PO) Take 10 mLs by mouth every 6 hours as needed      ferrous sulfate (IRON) 325 (65 Fe) MG tablet Take 325 mg by mouth three times a week Give on M-W-F AM only      fexofenadine (ALLEGRA) 180 MG tablet Take 180 mg by mouth daily      levalbuterol (XOPENEX) 1.25 MG/3ML neb solution Take 1 ampule by nebulization 4 times daily      levothyroxine (SYNTHROID/LEVOTHROID) 25 MCG tablet Take 1 tablet (25 mcg) by mouth daily  Qty: 90 tablet, Refills: 3    Associated Diagnoses: Hypothyroidism due to acquired atrophy of thyroid      LISINOPRIL PO Take 5 mg by mouth daily HOLD if SBP <110.Call if held x 2 in a row symptomatic       montelukast (SINGULAIR) 10 MG tablet Take 1 tablet (10 mg) by mouth At Bedtime  Qty: 30 tablet    Associated Diagnoses: Pulmonary emphysema, unspecified emphysema type (H)      Multiple Vitamins-Minerals (MULTIVITAMIN OR) Take 1 tablet by mouth daily      OMEPRAZOLE PO Take 40 mg by mouth every morning FOR Gastroesophagel reflux disease Take 30-60 minutes before a meal.      Ondansetron HCl (ZOFRAN PO) Take 4 mg by mouth every morning      roflumilast (DALIRESP) 500 MCG TABS tablet Take  500 mcg by mouth daily       umeclidinium (INCRUSE ELLIPTA) 62.5 MCG/INH oral inhaler Inhale 1 puff into the lungs daily      albuterol (PROAIR HFA/PROVENTIL HFA/VENTOLIN HFA) 108 (90 Base) MCG/ACT Inhaler Inhale 2 puffs into the lungs every 2 hours as needed for shortness of breath / dyspnea or wheezing       calcium carbonate (TUMS) 500 MG chewable tablet Take 1 chew tab by mouth 4 times daily as needed for heartburn (nausea or indigestion)      loperamide (IMODIUM) 2 MG capsule Take 2 mg by mouth every 8 hours as needed for diarrhea      Ondansetron (ZOFRAN ODT PO) Take 4 mg by mouth every 12 hours as needed for nausea or vomiting       polyethylene glycol (MIRALAX/GLYCOLAX) Packet Take 17 g by mouth daily as needed for constipation             Time Spent on this Encounter   I, Suly Petersen, personally saw the patient today and spent greater than 30 minutes discharging this patient.    Suly Petersen MD

## 2018-10-18 NOTE — PLAN OF CARE
Problem: Patient Care Overview  Goal: Plan of Care/Patient Progress Review  Outcome: Improving  Patient is alert and oriented, but forgetful at times. RLE weaker than Left. Reported 10/10 pain with movement and 8/10 at rest, but appears comfortable at rest. Tramadol given in AM and scheduled tylenol given. Use of assist of 1 and Sera steady to bathroom. WBAT to RLE. Crawley removed this morning and attempted to void x2, but was unsuccessful. Crawley inserted again at 1400. Held AM bowel meds due to pt request (loose stools). Lung sounds were clear and received scheduled nebs. Skin tear on RUE covered with foam dressing, small amount of bleeding when assessed. EMG completed this morning @ 0900. Discharge to TCU this afternoon.

## 2018-10-18 NOTE — PLAN OF CARE
Problem: Patient Care Overview  Goal: Plan of Care/Patient Progress Review  Outcome: Adequate for Discharge Date Met: 10/18/18  Patient's After Visit Summary was reviewed with patient.   Patient verbalized understanding of After Visit Summary, recommended follow up and was given an opportunity to ask questions.   Discharge medications sent home with patient/family: Not applicable   Discharged with transport tech    Discharge orders and paperwork sent to TCU per social work. IATF packet sent with pt and transport. Pt left with belongings and joseph catheter in place.

## 2018-10-19 ENCOUNTER — TELEPHONE (OUTPATIENT)
Dept: PHARMACY | Facility: OTHER | Age: 78
End: 2018-10-19

## 2018-10-19 NOTE — TELEPHONE ENCOUNTER
MTM referral from: Transitions of Care (recent hospital discharge or ED visit)    MTM referral outreach attempt #1 on October 19, 2018 at 11:46 AM      Outcome: Patient is not interested at this time because she was discharged to a rehab facility, will route to MTM Pharmacist/Provider as an FYI. Thank you for the referral.     Wallace Ibanez, MTM Coordinator

## 2018-11-12 ENCOUNTER — TRANSFERRED RECORDS (OUTPATIENT)
Dept: HEALTH INFORMATION MANAGEMENT | Facility: CLINIC | Age: 78
End: 2018-11-12

## 2018-11-12 NOTE — PLAN OF CARE
"Problem: Patient Care Overview  Goal: Plan of Care/Patient Progress Review  Outcome: No Change  /90  Pulse 106  Temp 99  F (37.2  C) (Oral)  Resp 16  Ht 1.575 m (5' 2\")  Wt 56 kg (123 lb 6.4 oz)  SpO2 98%  BMI 22.57 kg/m2  Neuro: A&Ox4, forgetful  Pain: c/o chronic pain in shoulders, reports relief with PRN tylenol and tramadol  Resp: LS clear but diminished, on RA, no SOB noted  Cardiac: Pulse elevated, BP WDL  GI/: bowel sounds positive, had small BM, urine cloudy w/ foul odor, occasional inconinence  Diet: tolerating regular diet  Skin/mobility: intact, up with Ax1 and walker pivot to commode  Tx/plan:  IV levaquin, PT consulted  Will continue to monitor and provide supportive care.          " 0

## 2018-11-27 ENCOUNTER — RECORDS - HEALTHEAST (OUTPATIENT)
Dept: LAB | Facility: CLINIC | Age: 78
End: 2018-11-27

## 2018-11-27 LAB
ALBUMIN UR-MCNC: NEGATIVE MG/DL
APPEARANCE UR: CLEAR
BACTERIA #/AREA URNS HPF: ABNORMAL HPF
BILIRUB UR QL STRIP: NEGATIVE
COLOR UR AUTO: YELLOW
GLUCOSE UR STRIP-MCNC: NEGATIVE MG/DL
HGB UR QL STRIP: NEGATIVE
HYALINE CASTS #/AREA URNS LPF: ABNORMAL LPF
KETONES UR STRIP-MCNC: NEGATIVE MG/DL
LEUKOCYTE ESTERASE UR QL STRIP: ABNORMAL
MUCOUS THREADS #/AREA URNS LPF: ABNORMAL LPF
NITRATE UR QL: NEGATIVE
PH UR STRIP: 6 [PH] (ref 4.5–8)
RBC #/AREA URNS AUTO: ABNORMAL HPF
SP GR UR STRIP: 1.01 (ref 1–1.03)
SQUAMOUS #/AREA URNS AUTO: ABNORMAL LPF
TRANS CELLS #/AREA URNS HPF: ABNORMAL LPF
UROBILINOGEN UR STRIP-ACNC: ABNORMAL
WBC #/AREA URNS AUTO: ABNORMAL HPF

## 2018-11-28 LAB — BACTERIA SPEC CULT: NO GROWTH

## 2018-11-29 ENCOUNTER — RECORDS - HEALTHEAST (OUTPATIENT)
Dept: LAB | Facility: CLINIC | Age: 78
End: 2018-11-29

## 2018-11-29 LAB
ANION GAP SERPL CALCULATED.3IONS-SCNC: 9 MMOL/L (ref 5–18)
BUN SERPL-MCNC: 16 MG/DL (ref 8–28)
CALCIUM SERPL-MCNC: 9.7 MG/DL (ref 8.5–10.5)
CHLORIDE BLD-SCNC: 100 MMOL/L (ref 98–107)
CO2 SERPL-SCNC: 22 MMOL/L (ref 22–31)
CREAT SERPL-MCNC: 0.67 MG/DL (ref 0.6–1.1)
ERYTHROCYTE [DISTWIDTH] IN BLOOD BY AUTOMATED COUNT: 14.2 % (ref 11–14.5)
GFR SERPL CREATININE-BSD FRML MDRD: >60 ML/MIN/1.73M2
GLUCOSE BLD-MCNC: 104 MG/DL (ref 70–125)
HCT VFR BLD AUTO: 32.8 % (ref 35–47)
HGB BLD-MCNC: 10.3 G/DL (ref 12–16)
MCH RBC QN AUTO: 29.9 PG (ref 27–34)
MCHC RBC AUTO-ENTMCNC: 31.4 G/DL (ref 32–36)
MCV RBC AUTO: 95 FL (ref 80–100)
PLATELET # BLD AUTO: 273 THOU/UL (ref 140–440)
PMV BLD AUTO: 9 FL (ref 8.5–12.5)
POTASSIUM BLD-SCNC: 4.4 MMOL/L (ref 3.5–5)
RBC # BLD AUTO: 3.44 MILL/UL (ref 3.8–5.4)
SODIUM SERPL-SCNC: 131 MMOL/L (ref 136–145)
TSH SERPL DL<=0.005 MIU/L-ACNC: 1.63 UIU/ML (ref 0.3–5)
WBC: 9.3 THOU/UL (ref 4–11)

## 2018-11-30 ENCOUNTER — RECORDS - HEALTHEAST (OUTPATIENT)
Dept: LAB | Facility: CLINIC | Age: 78
End: 2018-11-30

## 2018-11-30 LAB
25(OH)D3 SERPL-MCNC: 27.7 NG/ML (ref 30–80)
VIT B12 SERPL-MCNC: 1738 PG/ML (ref 213–816)

## 2018-12-05 ENCOUNTER — TRANSFERRED RECORDS (OUTPATIENT)
Dept: HEALTH INFORMATION MANAGEMENT | Facility: CLINIC | Age: 78
End: 2018-12-05

## 2019-02-09 ENCOUNTER — APPOINTMENT (OUTPATIENT)
Dept: GENERAL RADIOLOGY | Facility: CLINIC | Age: 79
End: 2019-02-09
Attending: EMERGENCY MEDICINE
Payer: COMMERCIAL

## 2019-02-09 ENCOUNTER — HOSPITAL ENCOUNTER (EMERGENCY)
Facility: CLINIC | Age: 79
Discharge: NURSING, REHAB OR LONG-TERM CARE | End: 2019-02-09
Attending: EMERGENCY MEDICINE | Admitting: EMERGENCY MEDICINE
Payer: COMMERCIAL

## 2019-02-09 VITALS
DIASTOLIC BLOOD PRESSURE: 104 MMHG | WEIGHT: 120 LBS | HEART RATE: 98 BPM | RESPIRATION RATE: 23 BRPM | SYSTOLIC BLOOD PRESSURE: 143 MMHG | OXYGEN SATURATION: 98 % | TEMPERATURE: 98.3 F | BODY MASS INDEX: 21.95 KG/M2

## 2019-02-09 DIAGNOSIS — J44.1 COPD EXACERBATION (H): ICD-10-CM

## 2019-02-09 LAB
ANION GAP SERPL CALCULATED.3IONS-SCNC: 6 MMOL/L (ref 3–14)
BASOPHILS # BLD AUTO: 0 10E9/L (ref 0–0.2)
BASOPHILS NFR BLD AUTO: 0.2 %
BUN SERPL-MCNC: 16 MG/DL (ref 7–30)
CALCIUM SERPL-MCNC: 8.2 MG/DL (ref 8.5–10.1)
CHLORIDE SERPL-SCNC: 98 MMOL/L (ref 94–109)
CO2 SERPL-SCNC: 25 MMOL/L (ref 20–32)
CREAT SERPL-MCNC: 0.63 MG/DL (ref 0.52–1.04)
D DIMER PPP FEU-MCNC: 0.4 UG/ML FEU (ref 0–0.5)
DIFFERENTIAL METHOD BLD: ABNORMAL
EOSINOPHIL # BLD AUTO: 0 10E9/L (ref 0–0.7)
EOSINOPHIL NFR BLD AUTO: 0.2 %
ERYTHROCYTE [DISTWIDTH] IN BLOOD BY AUTOMATED COUNT: 14 % (ref 10–15)
GFR SERPL CREATININE-BSD FRML MDRD: 85 ML/MIN/{1.73_M2}
GLUCOSE SERPL-MCNC: 119 MG/DL (ref 70–99)
HCT VFR BLD AUTO: 27.2 % (ref 35–47)
HGB BLD-MCNC: 8.6 G/DL (ref 11.7–15.7)
IMM GRANULOCYTES # BLD: 0.1 10E9/L (ref 0–0.4)
IMM GRANULOCYTES NFR BLD: 0.9 %
LYMPHOCYTES # BLD AUTO: 0.6 10E9/L (ref 0.8–5.3)
LYMPHOCYTES NFR BLD AUTO: 10.9 %
MCH RBC QN AUTO: 28.5 PG (ref 26.5–33)
MCHC RBC AUTO-ENTMCNC: 31.6 G/DL (ref 31.5–36.5)
MCV RBC AUTO: 90 FL (ref 78–100)
MONOCYTES # BLD AUTO: 0.5 10E9/L (ref 0–1.3)
MONOCYTES NFR BLD AUTO: 9.6 %
NEUTROPHILS # BLD AUTO: 4.2 10E9/L (ref 1.6–8.3)
NEUTROPHILS NFR BLD AUTO: 78.2 %
NRBC # BLD AUTO: 0 10*3/UL
NRBC BLD AUTO-RTO: 0 /100
NT-PROBNP SERPL-MCNC: 681 PG/ML (ref 0–1800)
PLATELET # BLD AUTO: 239 10E9/L (ref 150–450)
POTASSIUM SERPL-SCNC: 4.7 MMOL/L (ref 3.4–5.3)
RBC # BLD AUTO: 3.02 10E12/L (ref 3.8–5.2)
SODIUM SERPL-SCNC: 129 MMOL/L (ref 133–144)
TROPONIN I SERPL-MCNC: <0.015 UG/L (ref 0–0.04)
WBC # BLD AUTO: 5.3 10E9/L (ref 4–11)

## 2019-02-09 PROCEDURE — 71046 X-RAY EXAM CHEST 2 VIEWS: CPT

## 2019-02-09 PROCEDURE — 25000125 ZZHC RX 250: Performed by: EMERGENCY MEDICINE

## 2019-02-09 PROCEDURE — 93005 ELECTROCARDIOGRAM TRACING: CPT

## 2019-02-09 PROCEDURE — 99285 EMERGENCY DEPT VISIT HI MDM: CPT | Mod: 25

## 2019-02-09 PROCEDURE — 85025 COMPLETE CBC W/AUTO DIFF WBC: CPT | Performed by: EMERGENCY MEDICINE

## 2019-02-09 PROCEDURE — 94640 AIRWAY INHALATION TREATMENT: CPT

## 2019-02-09 PROCEDURE — 25000128 H RX IP 250 OP 636: Performed by: EMERGENCY MEDICINE

## 2019-02-09 PROCEDURE — 84484 ASSAY OF TROPONIN QUANT: CPT | Performed by: EMERGENCY MEDICINE

## 2019-02-09 PROCEDURE — 80048 BASIC METABOLIC PNL TOTAL CA: CPT | Performed by: EMERGENCY MEDICINE

## 2019-02-09 PROCEDURE — 85379 FIBRIN DEGRADATION QUANT: CPT | Performed by: EMERGENCY MEDICINE

## 2019-02-09 PROCEDURE — 96374 THER/PROPH/DIAG INJ IV PUSH: CPT

## 2019-02-09 PROCEDURE — 83880 ASSAY OF NATRIURETIC PEPTIDE: CPT | Performed by: EMERGENCY MEDICINE

## 2019-02-09 RX ORDER — PREDNISONE 20 MG/1
40 TABLET ORAL DAILY
Qty: 8 TABLET | Refills: 0 | Status: SHIPPED | OUTPATIENT
Start: 2019-02-09 | End: 2019-02-13

## 2019-02-09 RX ORDER — METHYLPREDNISOLONE SODIUM SUCCINATE 125 MG/2ML
125 INJECTION, POWDER, LYOPHILIZED, FOR SOLUTION INTRAMUSCULAR; INTRAVENOUS ONCE
Status: COMPLETED | OUTPATIENT
Start: 2019-02-09 | End: 2019-02-09

## 2019-02-09 RX ORDER — DOXYCYCLINE 100 MG/1
100 CAPSULE ORAL 2 TIMES DAILY
Qty: 14 CAPSULE | Refills: 0 | Status: SHIPPED | OUTPATIENT
Start: 2019-02-09 | End: 2019-02-16

## 2019-02-09 RX ORDER — IPRATROPIUM BROMIDE AND ALBUTEROL SULFATE 2.5; .5 MG/3ML; MG/3ML
3 SOLUTION RESPIRATORY (INHALATION)
Status: COMPLETED | OUTPATIENT
Start: 2019-02-09 | End: 2019-02-09

## 2019-02-09 RX ADMIN — IPRATROPIUM BROMIDE AND ALBUTEROL SULFATE 3 ML: .5; 3 SOLUTION RESPIRATORY (INHALATION) at 21:23

## 2019-02-09 RX ADMIN — IPRATROPIUM BROMIDE AND ALBUTEROL SULFATE 3 ML: .5; 3 SOLUTION RESPIRATORY (INHALATION) at 21:28

## 2019-02-09 RX ADMIN — METHYLPREDNISOLONE 125 MG: 125 INJECTION, POWDER, LYOPHILIZED, FOR SOLUTION INTRAMUSCULAR; INTRAVENOUS at 21:37

## 2019-02-09 RX ADMIN — IPRATROPIUM BROMIDE AND ALBUTEROL SULFATE 3 ML: .5; 3 SOLUTION RESPIRATORY (INHALATION) at 21:32

## 2019-02-09 ASSESSMENT — ENCOUNTER SYMPTOMS
CHILLS: 0
WHEEZING: 1
COUGH: 1
FEVER: 0
SHORTNESS OF BREATH: 1

## 2019-02-09 NOTE — ED AVS SNAPSHOT
[facility] Emergency Department  [address]  [facility] [address]  Phone:  [phone]  Fax:  [phone]                                    Ana Luisa Lee   MRN: [id number]    Department:  [facility] Emergency Department   Date of Visit:  2/9/2019           After Visit Summary Signature Page    I have received my discharge instructions, and my questions have been answered. I have discussed any challenges I see with this plan with the nurse or doctor.    ..........................................................................................................................................  Patient/Patient Representative Signature      ..........................................................................................................................................  Patient Representative Print Name and Relationship to Patient    ..................................................               ................................................  Date                                   Time    ..........................................................................................................................................  Reviewed by Signature/Title    ...................................................              ..............................................  Date                                               Time          22EPIC Rev 08/18

## 2019-02-10 LAB — INTERPRETATION ECG - MUSE: NORMAL

## 2019-02-10 NOTE — ED NOTES
Bed: ED12  Expected date: 2/9/19  Expected time: 7:44 PM  Means of arrival: Ambulance  Comments:  Ana Critsina Robles

## 2019-02-10 NOTE — ED TRIAGE NOTES
Pt presents to ED via EMS from nursing home for increasing shortness of breath.  Hx of asthma and COPD.  Had to sleep in chair last night due to shortness of breath.  Per EMS, VSS with expiratory wheezes.  Albuterol neb given en route.  ABCs intact, alert and orientated.

## 2019-02-10 NOTE — ED NOTES
Called and updated staff at nursing home.  Instructed that prescriptions will need to be filled.    Called and updated pts daughter Windy about plan of care and pts discharge from ED

## 2019-02-12 ENCOUNTER — DOCUMENTATION ONLY (OUTPATIENT)
Dept: OTHER | Facility: CLINIC | Age: 79
End: 2019-02-12

## 2019-02-18 ENCOUNTER — TELEPHONE (OUTPATIENT)
Dept: INTERNAL MEDICINE | Facility: CLINIC | Age: 79
End: 2019-02-18

## 2019-02-18 NOTE — TELEPHONE ENCOUNTER
Windy wants to know if mother had an adverse responce to some medication that shw had after a Mri  sometime in 2017 -2018

## 2019-02-19 NOTE — TELEPHONE ENCOUNTER
Upons scanning the chart I do not see where patient may have had an adverse response to a med after an MRI.  CAROL Flores R.N.

## 2019-02-20 NOTE — TELEPHONE ENCOUNTER
Windy (daughter) states the reaction occurred while patient was hospitalized.  Advised that we cannot find anything in clinic chart but that she may want to check with hospital.  CAROL Flores R.N.

## 2019-11-05 ENCOUNTER — HEALTH MAINTENANCE LETTER (OUTPATIENT)
Age: 79
End: 2019-11-05

## 2020-01-29 NOTE — PROGRESS NOTES
Received after visit chart from care coordinator.  Completed following tasks: Mailed copy of care plan to client  Updated services in access  Chart was returned to CC.     Carmenza Griffin  Case Management Specialist  [facility]  [phone]       no

## 2020-02-16 ENCOUNTER — HEALTH MAINTENANCE LETTER (OUTPATIENT)
Age: 80
End: 2020-02-16

## 2020-11-22 ENCOUNTER — HEALTH MAINTENANCE LETTER (OUTPATIENT)
Age: 80
End: 2020-11-22

## 2021-04-04 ENCOUNTER — HEALTH MAINTENANCE LETTER (OUTPATIENT)
Age: 81
End: 2021-04-04

## 2021-05-30 ENCOUNTER — HEALTH MAINTENANCE LETTER (OUTPATIENT)
Age: 81
End: 2021-05-30

## 2021-09-19 ENCOUNTER — HEALTH MAINTENANCE LETTER (OUTPATIENT)
Age: 81
End: 2021-09-19

## 2021-09-19 NOTE — PROGRESS NOTES
Call placed to [facility] to cancel virtual visit scheduled for today.  Yeni Savage RN, BC  Supervisor [facility]   [phone] (Fax)     The patient is a 63y Female complaining of rib pain/injury.

## 2021-09-29 NOTE — TELEPHONE ENCOUNTER
Called Laura-relayed below.   
Laura RN with [facility] ([phone]) calling with update since stopping Losartan 5/8/17.    5/15/17 140/78  5/11/17 134/84,  148/80  5/9/17  110/78,  98/70    Still feels dizzy at times, more often associated with position changes.  Heart rate has also been running higher, .  CAROL Flores R.N.    
bp in ok range leave off losartan.  
3

## 2022-01-09 ENCOUNTER — HEALTH MAINTENANCE LETTER (OUTPATIENT)
Age: 82
End: 2022-01-09

## 2022-05-01 ENCOUNTER — HEALTH MAINTENANCE LETTER (OUTPATIENT)
Age: 82
End: 2022-05-01

## 2022-08-21 ENCOUNTER — HEALTH MAINTENANCE LETTER (OUTPATIENT)
Age: 82
End: 2022-08-21

## 2022-08-30 NOTE — Clinical Note
ANy thoughts?? She goes home on Thurs to AL/memory care?  I know it is a very rate side effect of tramadol to have low Na+ but am loath to dc completely as needs some pain meds.  It has been decreased from 50mg tid this past late spring/summer to current dose.  Thoughts?? Thanks Lucy Hemigard Intro: Due to skin fragility and wound tension, it was decided to use HEMIGARD adhesive retention suture devices to permit a linear closure. The skin was cleaned and dried for a 6cm distance away from the wound. Excessive hair, if present, was removed to allow for adhesion.

## 2022-11-21 ENCOUNTER — HEALTH MAINTENANCE LETTER (OUTPATIENT)
Age: 82
End: 2022-11-21

## 2023-03-19 NOTE — PLAN OF CARE
Problem: Patient Care Overview  Goal: Plan of Care/Patient Progress Review  Outcome: No Change  PRIMARY DIAGNOSIS: GENERALIZED WEAKNESS    OUTPATIENT/OBSERVATION GOALS TO BE MET BEFORE DISCHARGE  1. Orthostatic performed: No    2. Tolerating PO medications: Yes    3. Return to near baseline physical activity: No    4. Cleared for discharge by consultants (if involved): Yes    Discharge Planner Nurse   Safe discharge environment identified: Yes  Barriers to discharge: Yes-patient unable to void       Entered by: Aurora Lee 06/19/2018 9:28 AM     Please review provider order for any additional goals.   Nurse to notify provider when observation goals have been met and patient is ready for discharge.    Patient is alert and oriented. Patient is still up with an assist of 2 d/t weakness. Patient has been straight cathed twice now dt urinary retention. Plan is to push fluids, obtain a UA, and decide if placing joseph is appropriate prior to discharge to tcu. Patient was accepted to [facility] TCU yesterday. SW following.        used

## 2023-04-16 ENCOUNTER — HEALTH MAINTENANCE LETTER (OUTPATIENT)
Age: 83
End: 2023-04-16

## 2023-06-02 ENCOUNTER — HEALTH MAINTENANCE LETTER (OUTPATIENT)
Age: 83
End: 2023-06-02

## 2023-09-30 NOTE — PROGRESS NOTES
Rec'd tele call from Maryan at [facility],  Maryan reports that [employer] auth will end 6/16/17, [facility] auth to begin 6/19/17. CM to have authorization updated.  Yeni Savage RN, BC  Supervisor [facility]   [phone] (Fax)     (V5) oriented

## 2023-10-23 NOTE — PROGRESS NOTES
Rec'd vm from client stating that she rec'd a bill from [facility] for $70. Client states that she never ordered the items that [facility] states.  Call placed to client, left vm requesting a return with a claim number, etc and CM will f/u with ADOLFO Savage RN, BC  Supervisor [facility]   [phone] (Fax)     Cyclosporine Counseling:  I discussed with the patient the risks of cyclosporine including but not limited to hypertension, gingival hyperplasia,myelosuppression, immunosuppression, liver damage, kidney damage, neurotoxicity, lymphoma, and serious infections. The patient understands that monitoring is required including baseline blood pressure, CBC, CMP, lipid panel and uric acid, and then 1-2 times monthly CMP and blood pressure.

## 2023-11-25 ENCOUNTER — HEALTH MAINTENANCE LETTER (OUTPATIENT)
Age: 83
End: 2023-11-25

## 2024-06-11 NOTE — NURSING NOTE
"/74 (BP Location: Left arm, Patient Position: Chair, Cuff Size: Adult Regular)  Pulse 97  Temp 98.4  F (36.9  C) (Oral)  Resp 16  Ht 5' 2\" (1.575 m)  Wt 117 lb (53.1 kg)  SpO2 100%  Breastfeeding? No  BMI 21.4 kg/m2  Mariluz Ibrahim CMA    "
ADMIT

## 2025-01-14 NOTE — PROGRESS NOTES
NOC SHIFT SUMMARY
 
PATTIE WAS ORIENTED X3-4 OVERNIGHT, VSS, ON RA. FORGETFUL AT TIMES, IMPULSIVE
BUT COOPERATIVE. ABLE TO MAKE NEEDS KNOWN. WICKING SYSTEM IN PLACE, REDNESS TO
SCROTUM WITH NYSTATIN POWDER APPLIED. Q2H REPOSITIONING. COCCYX WITH MEPILEX
IN PLACE. BM X1. NO ACUTE CHANGES OVERNIGHT. NPO @0000 Transition Communication Hand-off for Care Transitions to Next Level of Care Provider    Name: Ana Luisa Lee  : 1940  MRN #: [id number]  Primary Care Provider: Db Alvarez  Primary Care MD Name: Db Jimenez  Primary Clinic: [facility] [address] [facility] [address]  Primary Care Clinic Name: [facility]   Reason for Hospitalization:  Delirium [R41.0]  Hyponatremia [E87.1]  Skin tear of right lower leg without complication, initial encounter [S81.811A]  Admit Date/Time: 2018  2:46 AM  Discharge Date:   Payor Source: Payor: MEDICA / Plan: [employer] / Product Type: HMO /     Readmission Assessment Measure (COURTNEY) Risk Score/category: Elevated    Discharge Plan: TCU     Discharge Needs Assessment:  Needs       Most Recent Value    Equipment Currently Used at Home walker, rolling    # of Referrals Placed by CTS External Care Coordination    Assisted Living -- [[facility]]    Other Resources [employer] Worker    [employer] Worker Name  ROSARIO missy [facility] Worker Contact Info [phone]    [employer] Worker Status Active    Home Care [facility] [phone], Fax: [phone]    Skilled Nursing Facility [facility] [phone], Fax: [phone]    Lists of hospitals in the [place] Number [id number]            Referrals     Future Labs/Procedures    Occupational Therapy Adult Consult     Comments:    Evaluate and treat as clinically indicated.    Reason:  weakness    Physical Therapy Adult Consult     Comments:    Evaluate and treat as clinically indicated.    Reason:  weakness          Recommendations:  Pt is admitted with Encephalopathy and fall.  COURTNEY ELEVATED.      Encephalopathy was felt to be related to low Na on admission which was 126. Her Na on dc was 134. She also had an unwitnessed fall at her TCU prior to admission. PT/OT were recommending TCU. She was dc'd to TCU at [facility] on dc. Please continue to follow pt  when she returns to [facility].    Katie Ellis/Gay Kong RN CTS    AVS/Discharge Summary is the source of truth; this is a helpful guide for improved communication of patient story

## 2025-04-03 NOTE — PROGRESS NOTES
Rec'd vm 7/13/19 from Meg at [facility] requesting a return call regarding client.  [phone]  7/13/17 Left vm with Meg that ROSARIO is retuning her call.  7/13//17 Rec'd f/u voice message from Meg requesting a return call on 7/17/17 7/17/17 Attempted to reach Meg, left voice message that ROSARIO is returning her call. Explained that ROSARIO was informed by client that she is not interested in moving to an AL at this time, unsure if she has rec'd new information.  Yeni Savage RN, BC  Supervisor [facility]   [phone] (Fax)    
BMP/Urinalysis/EKG